# Patient Record
Sex: MALE | Race: WHITE | NOT HISPANIC OR LATINO | Employment: OTHER | URBAN - METROPOLITAN AREA
[De-identification: names, ages, dates, MRNs, and addresses within clinical notes are randomized per-mention and may not be internally consistent; named-entity substitution may affect disease eponyms.]

---

## 2017-01-18 ENCOUNTER — OFFICE VISIT (OUTPATIENT)
Dept: PAIN MEDICINE | Facility: CLINIC | Age: 62
End: 2017-01-18
Payer: MEDICARE

## 2017-01-18 VITALS
WEIGHT: 252 LBS | HEART RATE: 74 BPM | RESPIRATION RATE: 18 BRPM | DIASTOLIC BLOOD PRESSURE: 68 MMHG | SYSTOLIC BLOOD PRESSURE: 110 MMHG | TEMPERATURE: 98 F | BODY MASS INDEX: 32.35 KG/M2

## 2017-01-18 DIAGNOSIS — Z79.891 OPIOID CONTRACT EXISTS: ICD-10-CM

## 2017-01-18 DIAGNOSIS — M54.12 CERVICAL RADICULOPATHY: ICD-10-CM

## 2017-01-18 DIAGNOSIS — G89.4 CHRONIC PAIN SYNDROME: Primary | ICD-10-CM

## 2017-01-18 DIAGNOSIS — M96.1 POSTLAMINECTOMY SYNDROME OF LUMBAR REGION: ICD-10-CM

## 2017-01-18 DIAGNOSIS — M50.30 DDD (DEGENERATIVE DISC DISEASE), CERVICAL: ICD-10-CM

## 2017-01-18 PROCEDURE — 99213 OFFICE O/P EST LOW 20 MIN: CPT | Mod: PBBFAC,PO | Performed by: PHYSICIAN ASSISTANT

## 2017-01-18 PROCEDURE — 99999 PR PBB SHADOW E&M-EST. PATIENT-LVL III: CPT | Mod: PBBFAC,,, | Performed by: PHYSICIAN ASSISTANT

## 2017-01-18 PROCEDURE — 99214 OFFICE O/P EST MOD 30 MIN: CPT | Mod: S$PBB,,, | Performed by: PHYSICIAN ASSISTANT

## 2017-01-18 RX ORDER — OXYCODONE AND ACETAMINOPHEN 10; 325 MG/1; MG/1
1 TABLET ORAL 3 TIMES DAILY PRN
Qty: 90 TABLET | Refills: 0 | Status: SHIPPED | OUTPATIENT
Start: 2017-03-12 | End: 2017-04-11

## 2017-01-18 RX ORDER — OXYCODONE AND ACETAMINOPHEN 10; 325 MG/1; MG/1
1 TABLET ORAL 3 TIMES DAILY PRN
Qty: 90 TABLET | Refills: 0 | Status: SHIPPED | OUTPATIENT
Start: 2017-04-11 | End: 2017-04-11 | Stop reason: SDUPTHER

## 2017-01-18 RX ORDER — OXYCODONE AND ACETAMINOPHEN 10; 325 MG/1; MG/1
1 TABLET ORAL 3 TIMES DAILY PRN
Qty: 90 TABLET | Refills: 0 | Status: SHIPPED | OUTPATIENT
Start: 2017-02-10 | End: 2017-03-12

## 2017-01-19 NOTE — PROGRESS NOTES
CC:back pain    HPI: The patient is a 61-year-old man with a history of peripheral vascular disease, diabetes, cervical DDD, lumbar postlaminectomy syndrome who presents in followup.  He returns in follow-up today with multiple pain complaint.  He reports excellent relief of his low back and leg pain with his spinal cord stimulator.  However, he reports worsening neck pain radiating to the right arm.  This is worse with turning his head and using his arms, improved with pain medication.  He complains of weakness in his left upper extremity and numbness in his right fingers.  He denies bladder or bowel incontinence.    Pain intervention history: He has seen several other providers including Dr. Victor and Dr. Win Mckeon.  He has a cervical dorsal column stimulator which provides relief of neck pain.He had taken higher dose pain medication in the past but presented to us taking 60 mg of MS Contin t.i.d. and oxycodone 15 mg one to 2 tablets t.i.d.  He is taking gabapentin 300 mg t.i.d.  Baclofen 20 mg t.i.d.  Amitriptyline 25 mg q.h.s. He had undergone fusion L4-S1 in 2012.    ROS:He reports shortness of breath, weight gain, back pain, joint stiffness, and loss of balance.  Balance of review of systems is negative.    Medical, surgical, family and social history reviewed elsewhere in record.    Medications/Allergies: See med card    Vitals:    01/18/17 1307   BP: 110/68   Pulse: 74   Resp: 18   Temp: 97.7 °F (36.5 °C)   TempSrc: Oral   Weight: 114.3 kg (251 lb 15.8 oz)   PainSc:   6   PainLoc: Back         Physical exam:  Gen: A and O x3, pleasant, well-groomed  Skin:  Taut skin in legs with purplish hue bilateral lower legs.  IPG site incision healed without evidence of infection.  HEENT: PERRLA  CVS: Regular rate and rhythm, normal S1 and S2, no murmurs.  Resp: Clear to auscultation bilaterally, no wheezes or rales.  Abdomen: Soft, NT/ND, normal bowel sounds present.  Musculoskeletal: Antalgic gait. Unable to  heel and toe walk.      Neuro:  Upper extremities: 5/5 strength bilaterally  Lower extremities: 5/5 strength bilaterally   Reflexes: Brachioradialis 0+, Bicep 0+, Tricep 0+, Patellar 0+, Achilles 0+ bilaterally.  Sensory:  Intact and symmetrical to light touch and pinprick in C2-T1 dermatomes bilaterally.  Intact and symmetrical to light touch and pinprick in L2-S1 dermatomes bilaterally, except for L4-S1 distribution right leg decreased sensation.    Cervical spine: Range of motion is mildly reduced with flexion, extension and lateral rotation with increased pain in the bilateral neck during each maneuver but especially with extension.  Myofascial exam: No tenderness to palpation to the cervical paraspinous muscles.    Imagin12 Xray L-spine  There is disk desiccation and disk space narrowing at L5-S1. There is mild diffuse disk space narrowing elsewhere throughout the lumbar spine. Osteophytes project from vertebral bodies and there is sclerosis and posterior facets. There is no acute compression or subluxation.    CT L-spine 7/15/13  T12-L1: Left posterior lateral disk bulging and osteophyte formation is present causing mild left foraminal narrowing the central canal and right foraminal intact.  L1/2 Mild annular disk bulge and osteophyte formation is present causing a mild canal and left foraminal narrowing.  L2/3: Degenerative facet changes combine with disk bulging to produce mild left greater than right foraminal narrowing. Central canal is mildly distorted.  L3/4: There is mild annular disk bulging and degenerative facet changes are present. There is mild canal and foraminal distortion.  L4/5: At this level levels of prior laminectomy canal canal visualization is limited. The bony foramina appear grossly intact and there is no evidence of malalignment.  L5/S1: Moderate degenerative facet changes are present and there is some bony foraminal narrowing on the left. There is evidence of prior laminectomy  and the right foramen is intact. The bony canal soft tissue visualization is limited but it is grossly intact the degenerative changes are seen in the sacroiliac joints.    X-ray thoracic spine 12/18/14  Evidence of anterior cervical fusion is noted in the lower cervical spine. Neurostimulator appears to be present entering at approximately the T10-T11 level with its tip at the T8 level. Vertebral body heights appear well preserved. Alignment appears adequate. Osseous demineralization is noted.   T7-T8 T8-T9 and T9-T10 mild intervertebral disk height loss appears to be present.     X-ray thoracic and lumbar spine 12/28/15  Comparison: Thoracic spine radiographs-12/18/2014  Thoracic spine: There has been interval development of a new minor superior endplate compression fracture at the superior endplate of T7. There are two neurostimulator leads present within the posterior aspect of the thoracic spinal canal which terminating at approximately T7. No retropulsion. There is multilevel degenerative change of the thoracic spine in the form of marginal osteophyte formation and intervertebral disc space narrowing. There is suddenly observed postoperative change of ACDF at C5-C7. The heads of the C7 vertebral body screws do not appear firmly apposed to the adjacent metallic fixation plate.  Lumbar spine:There are postoperative changes of posterior intermittent fusion at L4-S1 with interbody device is noted at L4-L5 and L5-S1. No hardware complication. There is an IVC filter present. There is degenerative change of the lumbar spine in the form of marginal osteophyte formation and disc space narrowing.     CT cervical spine 3/7/16  Anterior plate fusion is noted at the C5, C6, and C7 levels with intervertebral disk prostheses at the C5-C6 C6-C7 levels.  There is loss of the normal cervical lordosis which may be positional or related to muscular strain.  Vertebral body heights appear well-preserved.  At the C2-C3 level, mild  disk osteophyte spurring is noted of approximately 2-3 mm.  No significant neuroforaminal stenosis is noted, minimal central canal narrowing is noted.  At the C3-C4 level mild disk osteophyte spurring is noted of approximately 2 mm.  No significant nerve foraminal narrowing is noted.  Minimal central canal narrowing is noted.  At the C4-C5 level, mild disk osteophyte bulge is noted centrally of up to 3 mm.  Facet arthropathy is noted bilaterally.  Moderate bilateral neuroforaminal narrowing appears to be present.  Mild central canal narrowing is noted.  At the C5-C6 level, a disk prosthesis is noted with metallic artifact hindering ideal evaluation.  Uncovertebral spurring and facet arthropathy appears to be present right greater than left with moderate right neural foraminal stenosis and mild to moderate left neural foraminal stenosis.  Mild to moderate central canal narrowing is suspected but not ideally evaluated  At the C6-C7 level, uncovertebral spurring is noted bilaterally.  Moderate to severe left-sided neuroforaminal stenosis is noted with moderate right-sided neuroforaminal stenosis.  Mild to moderate central canals narrowing is suspected.  Evaluation is hindered by metallic artifact.  At the C7-T1 level, uncovertebral spurring is noted to the right greater than left with severe right neural foraminal stenosis suspected and mild left neural foraminal stenosis with mild central canal is stenosis suspected.  Evaluation of this level is hindered by metallic artifact      Assessment:  The patient is a 61-year-old man with a history of peripheral vascular disease, diabetes, cervical DDD, lumbar postlaminectomy syndrome who presents in followup.      1. Chronic pain syndrome    2. Postlaminectomy syndrome of lumbar region    3. DDD (degenerative disc disease), cervical    4. Opioid contract exists    5. Cervical radiculopathy        Plan:  1.  The patient recently had an MRI of the brain and cervical spine CT at  [facility] and I have requested these records.  He would likely benefit from a cervical JENNIFER.  He is not sure what his latest hemoglobin A1c is and states that he will be seeing his primary care next week.  He said he may have this checked and will let us know what the results are.  If his hemoglobin A1c is very high, we may consider medial branch blocks instead of a cervical JENNIFER.  2.  Dr. Buckley provided prescriptions for oxycodone-acetaminophen 10/325 mg up to 3 times a day as needed for pain.  3.  Follow-up in 3 months or sooner as needed.    Greater than 50% of this 25 minute visit was spent counseling the patient.

## 2017-01-24 RX ORDER — GABAPENTIN 300 MG/1
CAPSULE ORAL
Qty: 180 CAPSULE | Refills: 2 | Status: SHIPPED | OUTPATIENT
Start: 2017-01-24 | End: 2017-08-11

## 2017-01-31 ENCOUNTER — TELEPHONE (OUTPATIENT)
Dept: PAIN MEDICINE | Facility: CLINIC | Age: 62
End: 2017-01-31

## 2017-01-31 NOTE — TELEPHONE ENCOUNTER
Please let the patient know that I reviewed his cervical spine MRI results and he has some arthritic changes as well as mild canal narrowing at C6-7 and foraminal narrowing at C4-5, C5-6 and C6-7.  He may benefit from a cervical JENNIFER and he states that he was going to have his hemoglobin A1c checked with his primary care last week.  Please ask him if he found out what it is.

## 2017-03-14 ENCOUNTER — HOSPITAL ENCOUNTER (EMERGENCY)
Facility: HOSPITAL | Age: 62
Discharge: HOME | End: 2017-03-15
Attending: EMERGENCY MEDICINE
Payer: MEDICARE

## 2017-03-14 DIAGNOSIS — S20.212A RIB CONTUSION, LEFT, INITIAL ENCOUNTER: Primary | ICD-10-CM

## 2017-03-14 DIAGNOSIS — R07.81 RIB PAIN ON LEFT SIDE: ICD-10-CM

## 2017-03-14 PROCEDURE — 99284 EMERGENCY DEPT VISIT MOD MDM: CPT | Mod: 25

## 2017-03-14 PROCEDURE — 96372 THER/PROPH/DIAG INJ SC/IM: CPT

## 2017-03-14 PROCEDURE — 63600175 PHARM REV CODE 636 W HCPCS: Performed by: EMERGENCY MEDICINE

## 2017-03-14 RX ORDER — HYDROMORPHONE HYDROCHLORIDE 1 MG/ML
1 INJECTION, SOLUTION INTRAMUSCULAR; INTRAVENOUS; SUBCUTANEOUS
Status: COMPLETED | OUTPATIENT
Start: 2017-03-14 | End: 2017-03-14

## 2017-03-14 RX ADMIN — HYDROMORPHONE HYDROCHLORIDE 1 MG: 1 INJECTION, SOLUTION INTRAMUSCULAR; INTRAVENOUS; SUBCUTANEOUS at 10:03

## 2017-03-14 NOTE — ED AVS SNAPSHOT
[facility] [address] [place] [address]               Kevan Colin   3/14/2017 10:05 PM   ED    Description:  Male : 1955   Department:  [facility]           Your Care was Coordinated By:     Provider Role From To    Byron Loyd MD Attending Provider [id number] --      Reason for Visit     Rib Injury           Diagnoses this Visit        Comments    Rib contusion, left, initial encounter    -  Primary     Rib pain on left side           ED Disposition     None           To Do List           Follow-up Information     Follow up with [facility].    Specialty:  Emergency Medicine    Why:  As needed, If symptoms worsen    Contact information:    [address] [place] [id number] [phone]       These Medications        Disp Refills Start End    ibuprofen (ADVIL,MOTRIN) 800 MG tablet 20 tablet 0 3/15/2017     Take 1 tablet (800 mg total) by mouth every 6 (six) hours as needed for Pain. - Oral    Pharmacy: [id number] [address]  #: [phone]         [facility]     [facility] Care [facility] -  Assistance  Registered nurses in the [facility] provide clinical advisement, health education, appointment booking, and other advisory services.  Call for this free service at [phone].             Medications           Message regarding Medications     Verify the changes and/or additions to your medication regime listed below are the same as discussed with your clinician today.  If any of these changes or additions are incorrect, please notify your healthcare provider.        START taking these NEW medications        Refills    ibuprofen (ADVIL,MOTRIN) 800 MG tablet 0    Sig: Take 1 tablet (800 mg total) by mouth every 6 (six) hours as needed for Pain.    Class: Print    Route: Oral      These medications were  "administered today        Dose Freq    hydromorphone (PF) injection 1 mg 1 mg ED 1 Time    Sig: Inject 1 mL (1 mg total) into the muscle ED 1 Time.    Class: Normal    Route: Intramuscular    hydromorphone (PF) injection 1 mg 1 mg ED 1 Time    Sig: Inject 1 mL (1 mg total) into the muscle ED 1 Time.    Class: Normal    Route: Intramuscular           Verify that the below list of medications is an accurate representation of the medications you are currently taking.  If none reported, the list may be blank. If incorrect, please contact your healthcare provider. Carry this list with you in case of emergency.           Current Medications     allopurinol (ZYLOPRIM) 100 MG tablet Take 100 mg by mouth once daily.    atorvastatin (LIPITOR) 40 MG tablet Take 1 tablet (40 mg total) by mouth once daily.    carbidopa-levodopa  mg (SINEMET)  mg per tablet Take 1 tablet by mouth 3 (three) times daily.     carvedilol (COREG) 3.125 MG tablet TAKE 1 TABLET BY MOUTH TWICE DAILY    ciclopirox (PENLAC) 8 % Soln Apply topically nightly.    ENOXAPARIN SODIUM (LOVENOX SUBQ) Inject into the skin. Pt unable to verify dosage    escitalopram oxalate (LEXAPRO) 20 MG tablet Take 20 mg by mouth every evening.     furosemide (LASIX) 40 MG tablet     gabapentin (NEURONTIN) 300 MG capsule TAKE TWO CAPSULES BY MOUTH THREE TIMES DAILY.    hydromorphone (PF) injection 1 mg Inject 1 mL (1 mg total) into the muscle ED 1 Time.    ibuprofen (ADVIL,MOTRIN) 800 MG tablet Take 1 tablet (800 mg total) by mouth every 6 (six) hours as needed for Pain.    insulin glargine (LANTUS) 100 unit/mL injection Inject 65 Units into the skin every evening.     insulin lispro (HUMALOG) 100 unit/mL injection Inject 15 Units into the skin 3 (three) times daily before meals.    insulin needles, disposable, (BD INSULIN PEN NEEDLE UF SHORT) 31 X 5/16 " Ndle 1 each by Misc.(Non-Drug; Combo Route) route 2 (two) times daily.    lipase-protease-amylase 25,000-85,000- " "136,000 unit CpDR Take 6 capsules by mouth 3 (three) times daily.     lisinopril (PRINIVIL,ZESTRIL) 20 MG tablet Take 0.5 tablets (10 mg total) by mouth once daily.    metformin (GLUCOPHAGE) 500 MG tablet Take 500 mg by mouth 2 (two) times daily with meals.    multivitamin capsule Take 1 capsule by mouth once daily.    olanzapine (ZYPREXA) 10 MG tablet Take 10 mg by mouth once daily.    ondansetron (ZOFRAN) 8 MG tablet Take 8 mg by mouth every 8 (eight) hours.    ondansetron (ZOFRAN-ODT) 8 MG TbDL Take 1 tablet (8 mg total) by mouth every 8 (eight) hours as needed.    oxycodone-acetaminophen (PERCOCET)  mg per tablet Take 1 tablet by mouth 3 (three) times daily as needed for Pain.    oxycodone-acetaminophen (PERCOCET)  mg per tablet Starting on Apr 11, 2017. Take 1 tablet by mouth 3 (three) times daily as needed for Pain.    pantoprazole (PROTONIX) 40 MG tablet Take 1 tablet (40 mg total) by mouth every morning.    potassium chloride SA (K-DUR,KLOR-CON) 20 MEQ tablet Take 20 mEq by mouth 2 (two) times daily.    promethazine (PHENERGAN) 25 MG suppository Place 1 suppository (25 mg total) rectally every 6 (six) hours as needed for Nausea.    quetiapine (SEROQUEL) 200 MG Tab Take 300 mg by mouth nightly as needed.    RIVAROXABAN (XARELTO ORAL) Take by mouth.    SPIRIVA WITH HANDIHALER 18 mcg inhalation capsule     trazodone (DESYREL) 100 MG tablet Take 100 mg by mouth nightly as needed for Insomnia.           Clinical Reference Information           Your Vitals Were     BP Pulse Temp Resp Height Weight    120/89 (BP Location: Right arm, Patient Position: Sitting) 94 97.4 °F (36.3 °C) (Oral) 16 6' 2" (1.88 m) 115.7 kg (255 lb)    SpO2 BMI             96% 32.74 kg/m2         Allergies as of 3/15/2017        Reactions    Bee Pollens Anaphylaxis    Bee stings     Penicillins Nausea Only    Other reaction(s): Unknown    Codeine Rash    Other reaction(s): Unknown    Morphine Rash      Immunizations Administered " on Date of Encounter - 3/15/2017     None      ED Micro, Lab, POCT     None      ED Imaging Orders     Start Ordered       Status Ordering Provider    03/14/17 2216 03/14/17 2215  X-Ray Chest PA And Lateral  1 time imaging      In process     03/14/17 2216 03/14/17 2215  X-Ray Ribs 2 View Left  1 time imaging      In process         Discharge Instructions         Rib Contusion or Minor Fracture    A rib contusion is a bruise to one or more rib bones. It may cause pain, tenderness, swelling, and a purplish tint to the skin. There may be a sharp pain with each breath. A rib contusion takes anywhere from a few days to a few weeks to heal. A minor rib fracture or break may cause the same symptoms as a rib contusion. The small crack may not be seen on a regular chest X-ray. Treatment for both problems is the same.  Home care  · You may use over-the-counter pain medicine to control pain, unless another pain medicine was prescribed. If you have chronic liver or kidney disease or ever had a stomach ulcer or GI bleeding, talk with your healthcare provider before using these medicines.  · Rest. Do not lift anything heavy or do any activity that causes pain.  · Apply an ice pack over the injured area for 15 to 20 minutes every 1 to 2 hours. You should do this for the first 24 to 48 hours. You can make an ice pack by filling a plastic bag that seals at the top with ice cubes and then wrapping it with a thin towel. Continue with ice packs as needed for the relief of pain and swelling.  · The first 3 to 4 weeks of healing will be the most painful. If your pain is not under control with the treatment given, call your healthcare provider. Sometimes a stronger pain medicine may be needed. A nerve block can be done in case of severe pain. It will numb the nerve between the ribs.  Follow-up care  Follow up with your healthcare provider, or as advised.  If X-rays were taken, you will be told of any new findings that may affect your  care.  Call 911  Call 911 if you have:  · Dizziness, weakness or fainting  · Shortness of breath with or without chest discomfort  · New or worsening pain  When to seek medical advice  Call your healthcare provider right away if any of these occur:  · Fever of 100.4°F (38°C) or above lasting for 24 to 48 hours  · Stomach pain  Date Last Reviewed: 12/2/2015  © 0035-4937 [employer]. [address] [place] [address], [place] [address]. All rights reserved. This information is not intended as a substitute for professional medical care. Always follow your healthcare professional's instructions.          Your Scheduled Appointments     Apr 18, 2017  1:00 PM CDT   Established Patient Visit with JO[facility] Jac[facility] - [facility] ([place]) [address] [facility] [address] [place] [address]   [phone]               [facility] complies with applicable Federal civil rights laws and does not discriminate on the basis of race, color, national origin, age, disability, or sex.        Language Assistance Services     ATTENTION: Language assistance services are available, free of charge. Please call [phone].      ATENCIÓN: Si habla español, tiene a cha disposición servicios gratuitos de asistencia lingüística. Llame al [phone].     CHÚ Ý: N?u b?n nói Ti?ng Vi?t, có các d?ch v? h? tr? ngôn ng? mi?n phí dành cho b?n. G?i s? [phone].

## 2017-03-15 VITALS
HEART RATE: 88 BPM | TEMPERATURE: 97 F | BODY MASS INDEX: 32.73 KG/M2 | SYSTOLIC BLOOD PRESSURE: 160 MMHG | OXYGEN SATURATION: 95 % | WEIGHT: 255 LBS | DIASTOLIC BLOOD PRESSURE: 86 MMHG | HEIGHT: 74 IN | RESPIRATION RATE: 16 BRPM

## 2017-03-15 PROCEDURE — 63600175 PHARM REV CODE 636 W HCPCS: Performed by: EMERGENCY MEDICINE

## 2017-03-15 RX ORDER — IBUPROFEN 800 MG/1
800 TABLET ORAL EVERY 6 HOURS PRN
Qty: 20 TABLET | Refills: 0 | Status: ON HOLD | OUTPATIENT
Start: 2017-03-15 | End: 2017-08-22 | Stop reason: HOSPADM

## 2017-03-15 RX ORDER — ONDANSETRON HYDROCHLORIDE 8 MG/1
8 TABLET, FILM COATED ORAL EVERY 8 HOURS
Qty: 12 TABLET | Refills: 0 | Status: SHIPPED | OUTPATIENT
Start: 2017-03-15 | End: 2018-02-12

## 2017-03-15 RX ORDER — HYDROMORPHONE HYDROCHLORIDE 1 MG/ML
1 INJECTION, SOLUTION INTRAMUSCULAR; INTRAVENOUS; SUBCUTANEOUS
Status: COMPLETED | OUTPATIENT
Start: 2017-03-15 | End: 2017-03-15

## 2017-03-15 RX ADMIN — HYDROMORPHONE HYDROCHLORIDE 1 MG: 1 INJECTION, SOLUTION INTRAMUSCULAR; INTRAVENOUS; SUBCUTANEOUS at 12:03

## 2017-03-15 NOTE — DISCHARGE INSTRUCTIONS
Rib Contusion or Minor Fracture    A rib contusion is a bruise to one or more rib bones. It may cause pain, tenderness, swelling, and a purplish tint to the skin. There may be a sharp pain with each breath. A rib contusion takes anywhere from a few days to a few weeks to heal. A minor rib fracture or break may cause the same symptoms as a rib contusion. The small crack may not be seen on a regular chest X-ray. Treatment for both problems is the same.  Home care  · You may use over-the-counter pain medicine to control pain, unless another pain medicine was prescribed. If you have chronic liver or kidney disease or ever had a stomach ulcer or GI bleeding, talk with your healthcare provider before using these medicines.  · Rest. Do not lift anything heavy or do any activity that causes pain.  · Apply an ice pack over the injured area for 15 to 20 minutes every 1 to 2 hours. You should do this for the first 24 to 48 hours. You can make an ice pack by filling a plastic bag that seals at the top with ice cubes and then wrapping it with a thin towel. Continue with ice packs as needed for the relief of pain and swelling.  · The first 3 to 4 weeks of healing will be the most painful. If your pain is not under control with the treatment given, call your healthcare provider. Sometimes a stronger pain medicine may be needed. A nerve block can be done in case of severe pain. It will numb the nerve between the ribs.  Follow-up care  Follow up with your healthcare provider, or as advised.  If X-rays were taken, you will be told of any new findings that may affect your care.  Call 911  Call 911 if you have:  · Dizziness, weakness or fainting  · Shortness of breath with or without chest discomfort  · New or worsening pain  When to seek medical advice  Call your healthcare provider right away if any of these occur:  · Fever of 100.4°F (38°C) or above lasting for 24 to 48 hours  · Stomach pain  Date Last Reviewed: 12/2/2015  ©  1004-2643 [employer]. [address] [place] [address], [place] [address]. All rights reserved. This information is not intended as a substitute for professional medical care. Always follow your healthcare professional's instructions.

## 2017-03-15 NOTE — ED NOTES
Patient identifiers for Kevan Colin checked and correct.  LOC: Patient is awake, alert, and aware of environment with an appropriate affect. Patient is oriented x 3 and speaking appropriately.  APPEARANCE:  Patient is clean and well groomed, patient's clothing is properly fastened.  SKIN: The skin is warm and dry. Patient has normal skin turgor and moist mucus membrances. Skin is intact; no bruising or breakdown noted.  MUSCULOSKELETAL: Patient is moving all extremities well, no obvious deformities noted. Pulses intact. Pt c/o left rib pain after fall while walking dog. Pt reports pain with resp.    RESPIRATORY: Airway is open and patent. Respirations are spontaneous and non-labored with normal effort and rate. Pt placed on continuous pulse ox.  CARDIAC: Patient has a normal rate and rhythm. No peripheral edema noted. Capillary refill < 3 seconds.     Allergies reported:   Review of patient's allergies indicates:   Allergen Reactions    Bee pollens Anaphylaxis     Bee stings     Penicillins Nausea Only     Other reaction(s): Unknown    Codeine Rash     Other reaction(s): Unknown    Morphine Rash     Bed locked, lowest position. Call light within easy reach. Side rails up x2.
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
## 2017-04-05 ENCOUNTER — TELEPHONE (OUTPATIENT)
Dept: PAIN MEDICINE | Facility: CLINIC | Age: 62
End: 2017-04-05

## 2017-04-05 NOTE — TELEPHONE ENCOUNTER
----- Message from Deborah Alicea sent at 4/5/2017 10:49 AM CDT -----  Contact: patient  Patient calling in regards to speaking with a Nurse about his medication. Please advise.  Call back .  Thanks!

## 2017-04-05 NOTE — TELEPHONE ENCOUNTER
Spoke with the patient and he has 1 more RX before he needs to come into the office for a visit. He is having trouble locating that RX. He will contact the office if he is unable to find it.

## 2017-04-11 ENCOUNTER — OFFICE VISIT (OUTPATIENT)
Dept: PAIN MEDICINE | Facility: CLINIC | Age: 62
End: 2017-04-11
Payer: MEDICARE

## 2017-04-11 VITALS
TEMPERATURE: 98 F | SYSTOLIC BLOOD PRESSURE: 110 MMHG | RESPIRATION RATE: 20 BRPM | DIASTOLIC BLOOD PRESSURE: 68 MMHG | BODY MASS INDEX: 32.15 KG/M2 | WEIGHT: 250.44 LBS | HEART RATE: 70 BPM

## 2017-04-11 DIAGNOSIS — G89.4 CHRONIC PAIN SYNDROME: Primary | ICD-10-CM

## 2017-04-11 DIAGNOSIS — M96.1 POSTLAMINECTOMY SYNDROME OF LUMBAR REGION: ICD-10-CM

## 2017-04-11 DIAGNOSIS — Z79.891 OPIOID CONTRACT EXISTS: ICD-10-CM

## 2017-04-11 DIAGNOSIS — M50.30 DDD (DEGENERATIVE DISC DISEASE), CERVICAL: ICD-10-CM

## 2017-04-11 PROCEDURE — 99213 OFFICE O/P EST LOW 20 MIN: CPT | Mod: S$PBB,,, | Performed by: PHYSICIAN ASSISTANT

## 2017-04-11 PROCEDURE — 99215 OFFICE O/P EST HI 40 MIN: CPT | Mod: PBBFAC,PO | Performed by: PHYSICIAN ASSISTANT

## 2017-04-11 PROCEDURE — 99999 PR PBB SHADOW E&M-EST. PATIENT-LVL V: CPT | Mod: PBBFAC,,, | Performed by: PHYSICIAN ASSISTANT

## 2017-04-11 RX ORDER — OXYCODONE AND ACETAMINOPHEN 10; 325 MG/1; MG/1
1 TABLET ORAL 3 TIMES DAILY PRN
Qty: 90 TABLET | Refills: 0 | Status: SHIPPED | OUTPATIENT
Start: 2017-04-11 | End: 2017-05-11

## 2017-04-11 RX ORDER — OXYCODONE AND ACETAMINOPHEN 10; 325 MG/1; MG/1
1 TABLET ORAL 3 TIMES DAILY PRN
Qty: 90 TABLET | Refills: 0 | Status: SHIPPED | OUTPATIENT
Start: 2017-05-11 | End: 2017-06-10

## 2017-04-11 RX ORDER — OXYCODONE AND ACETAMINOPHEN 10; 325 MG/1; MG/1
1 TABLET ORAL 3 TIMES DAILY PRN
Qty: 90 TABLET | Refills: 0 | Status: SHIPPED | OUTPATIENT
Start: 2017-06-10 | End: 2017-07-10 | Stop reason: SDUPTHER

## 2017-04-11 NOTE — PROGRESS NOTES
CC:back pain    HPI: The patient is a 61-year-old man with a history of peripheral vascular disease, diabetes, cervical DDD, lumbar postlaminectomy syndrome who presents in followup.  He returns in follow-up today early because he lost his last prescription he states that he is currently moving and must have misplaced it.  He complains of neck pain, back pain and rib pain.  He states that he fell a few weeks ago and possibly fractured a rib.  He has been losing weight, states that he has been walking for exercise and eating less.  He reports relief with his spinal cord stimulator.  He currently denies weakness.  He reports numbness in his fingers on the right.  He denies bladder or bowel incontinence.    Pain intervention history: He has seen several other providers including Dr. Victor and Dr. Win Mckeon.  He has a cervical dorsal column stimulator which provides relief of neck pain.He had taken higher dose pain medication in the past but presented to us taking 60 mg of MS Contin t.i.d. and oxycodone 15 mg one to 2 tablets t.i.d.  He is taking gabapentin 300 mg t.i.d.  Baclofen 20 mg t.i.d.  Amitriptyline 25 mg q.h.s. He had undergone fusion L4-S1 in 2012.    ROS:He reports shortness of breath, weight gain, back pain, joint stiffness, and loss of balance.  Balance of review of systems is negative.    Medical, surgical, family and social history reviewed elsewhere in record.    Medications/Allergies: See med card    Vitals:    04/11/17 0823   BP: 110/68   Pulse: 70   Resp: 20   Temp: 97.5 °F (36.4 °C)   TempSrc: Oral   Weight: 113.6 kg (250 lb 7.1 oz)   PainSc:   6   PainLoc: Back         Physical exam:  Gen: A and O x3, pleasant, well-groomed  Skin:  Taut skin in legs with purplish hue bilateral lower legs.  IPG site incision healed without evidence of infection.  HEENT: PERRLA  CVS: Regular rate and rhythm, normal S1 and S2, no murmurs.  Resp: Clear to auscultation bilaterally, no wheezes or  rales.  Abdomen: Soft, NT/ND, normal bowel sounds present.  Musculoskeletal: Antalgic gait. Unable to heel and toe walk.      Neuro:  Upper extremities: 5/5 strength bilaterally  Lower extremities: 5/5 strength bilaterally   Reflexes: Brachioradialis 0+, Bicep 0+, Tricep 0+, Patellar 0+, Achilles 0+ bilaterally.  Sensory:  Intact and symmetrical to light touch and pinprick in C2-T1 dermatomes bilaterally.  Intact and symmetrical to light touch and pinprick in L2-S1 dermatomes bilaterally, except for L4-S1 distribution right leg decreased sensation.    Cervical spine: Range of motion is mildly reduced with flexion, extension and lateral rotation with increased pain in the bilateral neck during each maneuver but especially with extension.  Myofascial exam: No tenderness to palpation to the cervical paraspinous muscles.    Lumbar spine:  Range of motion is mildly limited with flexion and extension without increased pain.  Straight leg raise negative bilaterally.  Internal and external rotation of hip is negative bilaterally.  Matteo's test is deferred.  Myofascial exam: No tenderness to palpation to lumbar paraspinous muscles.    Imagin12 Xray L-spine  There is disk desiccation and disk space narrowing at L5-S1. There is mild diffuse disk space narrowing elsewhere throughout the lumbar spine. Osteophytes project from vertebral bodies and there is sclerosis and posterior facets. There is no acute compression or subluxation.    CT L-spine 7/15/13  T12-L1: Left posterior lateral disk bulging and osteophyte formation is present causing mild left foraminal narrowing the central canal and right foraminal intact.  L1/2 Mild annular disk bulge and osteophyte formation is present causing a mild canal and left foraminal narrowing.  L2/3: Degenerative facet changes combine with disk bulging to produce mild left greater than right foraminal narrowing. Central canal is mildly distorted.  L3/4: There is mild annular disk  bulging and degenerative facet changes are present. There is mild canal and foraminal distortion.  L4/5: At this level levels of prior laminectomy canal canal visualization is limited. The bony foramina appear grossly intact and there is no evidence of malalignment.  L5/S1: Moderate degenerative facet changes are present and there is some bony foraminal narrowing on the left. There is evidence of prior laminectomy and the right foramen is intact. The bony canal soft tissue visualization is limited but it is grossly intact the degenerative changes are seen in the sacroiliac joints.    X-ray thoracic spine 12/18/14  Evidence of anterior cervical fusion is noted in the lower cervical spine. Neurostimulator appears to be present entering at approximately the T10-T11 level with its tip at the T8 level. Vertebral body heights appear well preserved. Alignment appears adequate. Osseous demineralization is noted.   T7-T8 T8-T9 and T9-T10 mild intervertebral disk height loss appears to be present.     X-ray thoracic and lumbar spine 12/28/15  Comparison: Thoracic spine radiographs-12/18/2014  Thoracic spine: There has been interval development of a new minor superior endplate compression fracture at the superior endplate of T7. There are two neurostimulator leads present within the posterior aspect of the thoracic spinal canal which terminating at approximately T7. No retropulsion. There is multilevel degenerative change of the thoracic spine in the form of marginal osteophyte formation and intervertebral disc space narrowing. There is suddenly observed postoperative change of ACDF at C5-C7. The heads of the C7 vertebral body screws do not appear firmly apposed to the adjacent metallic fixation plate.  Lumbar spine:There are postoperative changes of posterior intermittent fusion at L4-S1 with interbody device is noted at L4-L5 and L5-S1. No hardware complication. There is an IVC filter present. There is degenerative change of  the lumbar spine in the form of marginal osteophyte formation and disc space narrowing.     CT cervical spine 3/7/16  Anterior plate fusion is noted at the C5, C6, and C7 levels with intervertebral disk prostheses at the C5-C6 C6-C7 levels.  There is loss of the normal cervical lordosis which may be positional or related to muscular strain.  Vertebral body heights appear well-preserved.  At the C2-C3 level, mild disk osteophyte spurring is noted of approximately 2-3 mm.  No significant neuroforaminal stenosis is noted, minimal central canal narrowing is noted.  At the C3-C4 level mild disk osteophyte spurring is noted of approximately 2 mm.  No significant nerve foraminal narrowing is noted.  Minimal central canal narrowing is noted.  At the C4-C5 level, mild disk osteophyte bulge is noted centrally of up to 3 mm.  Facet arthropathy is noted bilaterally.  Moderate bilateral neuroforaminal narrowing appears to be present.  Mild central canal narrowing is noted.  At the C5-C6 level, a disk prosthesis is noted with metallic artifact hindering ideal evaluation.  Uncovertebral spurring and facet arthropathy appears to be present right greater than left with moderate right neural foraminal stenosis and mild to moderate left neural foraminal stenosis.  Mild to moderate central canal narrowing is suspected but not ideally evaluated  At the C6-C7 level, uncovertebral spurring is noted bilaterally.  Moderate to severe left-sided neuroforaminal stenosis is noted with moderate right-sided neuroforaminal stenosis.  Mild to moderate central canals narrowing is suspected.  Evaluation is hindered by metallic artifact.  At the C7-T1 level, uncovertebral spurring is noted to the right greater than left with severe right neural foraminal stenosis suspected and mild left neural foraminal stenosis with mild central canal is stenosis suspected.  Evaluation of this level is hindered by metallic artifact      Assessment:  The patient is a  61-year-old man with a history of peripheral vascular disease, diabetes, cervical DDD, lumbar postlaminectomy syndrome who presents in followup.      1. Chronic pain syndrome    2. Postlaminectomy syndrome of lumbar region    3. DDD (degenerative disc disease), cervical    4. Opioid contract exists        Plan:  1.  Overall patient is doing well.  Dr. Buckley provided prescriptions for oxycodone-acetaminophen 10/325 mg up to 3 times a day as needed for pain.  I have reviewed the [place] [employer] website and there are no abberancies.    2.  Follow-up in 3 months or sooner as needed.    Greater than 50% of this 15 minute visit was spent counseling the patient.

## 2017-06-24 ENCOUNTER — HOSPITAL ENCOUNTER (EMERGENCY)
Facility: HOSPITAL | Age: 62
Discharge: HOME | End: 2017-06-24
Attending: EMERGENCY MEDICINE
Payer: MEDICARE

## 2017-06-24 VITALS
RESPIRATION RATE: 20 BRPM | SYSTOLIC BLOOD PRESSURE: 104 MMHG | WEIGHT: 237 LBS | HEIGHT: 74 IN | OXYGEN SATURATION: 96 % | TEMPERATURE: 98 F | HEART RATE: 85 BPM | DIASTOLIC BLOOD PRESSURE: 69 MMHG | BODY MASS INDEX: 30.42 KG/M2

## 2017-06-24 DIAGNOSIS — R10.9 ABDOMINAL PAIN, VOMITING, AND DIARRHEA: Primary | ICD-10-CM

## 2017-06-24 DIAGNOSIS — R19.7 ABDOMINAL PAIN, VOMITING, AND DIARRHEA: Primary | ICD-10-CM

## 2017-06-24 DIAGNOSIS — R11.10 ABDOMINAL PAIN, VOMITING, AND DIARRHEA: Primary | ICD-10-CM

## 2017-06-24 LAB
ALBUMIN SERPL BCP-MCNC: 3.9 G/DL
ALP SERPL-CCNC: 106 U/L
ALT SERPL W/O P-5'-P-CCNC: 16 U/L
ANION GAP SERPL CALC-SCNC: 12 MMOL/L
AST SERPL-CCNC: 10 U/L
BACTERIA #/AREA URNS HPF: NORMAL /HPF
BASOPHILS # BLD AUTO: 0.1 K/UL
BASOPHILS NFR BLD: 0.9 %
BILIRUB SERPL-MCNC: 0.3 MG/DL
BILIRUB UR QL STRIP: NEGATIVE
BUN SERPL-MCNC: 10 MG/DL
CALCIUM SERPL-MCNC: 9.5 MG/DL
CHLORIDE SERPL-SCNC: 98 MMOL/L
CLARITY UR: CLEAR
CO2 SERPL-SCNC: 24 MMOL/L
COLOR UR: YELLOW
CREAT SERPL-MCNC: 1.1 MG/DL
DIFFERENTIAL METHOD: ABNORMAL
EOSINOPHIL # BLD AUTO: 0.2 K/UL
EOSINOPHIL NFR BLD: 2.2 %
ERYTHROCYTE [DISTWIDTH] IN BLOOD BY AUTOMATED COUNT: 12.9 %
EST. GFR  (AFRICAN AMERICAN): >60 ML/MIN/1.73 M^2
EST. GFR  (NON AFRICAN AMERICAN): >60 ML/MIN/1.73 M^2
GLUCOSE SERPL-MCNC: 347 MG/DL
GLUCOSE UR QL STRIP: ABNORMAL
HCT VFR BLD AUTO: 43.3 %
HGB BLD-MCNC: 14.5 G/DL
HGB UR QL STRIP: NEGATIVE
KETONES UR QL STRIP: NEGATIVE
LEUKOCYTE ESTERASE UR QL STRIP: NEGATIVE
LIPASE SERPL-CCNC: 41 U/L
LYMPHOCYTES # BLD AUTO: 3.5 K/UL
LYMPHOCYTES NFR BLD: 42.1 %
MCH RBC QN AUTO: 30.4 PG
MCHC RBC AUTO-ENTMCNC: 33.4 %
MCV RBC AUTO: 91 FL
MICROSCOPIC COMMENT: NORMAL
MONOCYTES # BLD AUTO: 0.5 K/UL
MONOCYTES NFR BLD: 5.6 %
NEUTROPHILS # BLD AUTO: 4.1 K/UL
NEUTROPHILS NFR BLD: 49.2 %
NITRITE UR QL STRIP: NEGATIVE
PH UR STRIP: 6 [PH] (ref 5–8)
PLATELET # BLD AUTO: 215 K/UL
PMV BLD AUTO: 8 FL
POTASSIUM SERPL-SCNC: 4.5 MMOL/L
PROT SERPL-MCNC: 7.8 G/DL
PROT UR QL STRIP: NEGATIVE
RBC # BLD AUTO: 4.76 M/UL
SODIUM SERPL-SCNC: 134 MMOL/L
SP GR UR STRIP: 1.01 (ref 1–1.03)
URN SPEC COLLECT METH UR: ABNORMAL
UROBILINOGEN UR STRIP-ACNC: NEGATIVE EU/DL
WBC # BLD AUTO: 8.3 K/UL
WBC #/AREA URNS HPF: 2 /HPF (ref 0–5)
YEAST URNS QL MICRO: NORMAL

## 2017-06-24 PROCEDURE — 99284 EMERGENCY DEPT VISIT MOD MDM: CPT | Mod: 25

## 2017-06-24 PROCEDURE — 85025 COMPLETE CBC W/AUTO DIFF WBC: CPT

## 2017-06-24 PROCEDURE — 81000 URINALYSIS NONAUTO W/SCOPE: CPT

## 2017-06-24 PROCEDURE — 25500020 PHARM REV CODE 255

## 2017-06-24 PROCEDURE — 96374 THER/PROPH/DIAG INJ IV PUSH: CPT | Mod: 59

## 2017-06-24 PROCEDURE — 96372 THER/PROPH/DIAG INJ SC/IM: CPT

## 2017-06-24 PROCEDURE — 96375 TX/PRO/DX INJ NEW DRUG ADDON: CPT

## 2017-06-24 PROCEDURE — 80053 COMPREHEN METABOLIC PANEL: CPT

## 2017-06-24 PROCEDURE — 36415 COLL VENOUS BLD VENIPUNCTURE: CPT

## 2017-06-24 PROCEDURE — 63600175 PHARM REV CODE 636 W HCPCS: Performed by: EMERGENCY MEDICINE

## 2017-06-24 PROCEDURE — 25000003 PHARM REV CODE 250: Performed by: EMERGENCY MEDICINE

## 2017-06-24 PROCEDURE — 83690 ASSAY OF LIPASE: CPT

## 2017-06-24 RX ORDER — ONDANSETRON 4 MG/1
4 TABLET, ORALLY DISINTEGRATING ORAL EVERY 8 HOURS PRN
Qty: 12 TABLET | Refills: 0 | Status: SHIPPED | OUTPATIENT
Start: 2017-06-24 | End: 2017-10-21 | Stop reason: ALTCHOICE

## 2017-06-24 RX ORDER — DICYCLOMINE HYDROCHLORIDE 10 MG/ML
20 INJECTION INTRAMUSCULAR
Status: COMPLETED | OUTPATIENT
Start: 2017-06-24 | End: 2017-06-24

## 2017-06-24 RX ORDER — ONDANSETRON 2 MG/ML
4 INJECTION INTRAMUSCULAR; INTRAVENOUS
Status: COMPLETED | OUTPATIENT
Start: 2017-06-24 | End: 2017-06-24

## 2017-06-24 RX ORDER — DICYCLOMINE HYDROCHLORIDE 20 MG/1
20 TABLET ORAL 3 TIMES DAILY PRN
Qty: 20 TABLET | Refills: 0 | Status: SHIPPED | OUTPATIENT
Start: 2017-06-24 | End: 2017-07-24

## 2017-06-24 RX ORDER — MORPHINE SULFATE 4 MG/ML
4 INJECTION, SOLUTION INTRAMUSCULAR; INTRAVENOUS
Status: COMPLETED | OUTPATIENT
Start: 2017-06-24 | End: 2017-06-24

## 2017-06-24 RX ADMIN — DICYCLOMINE HYDROCHLORIDE 20 MG: 10 INJECTION INTRAMUSCULAR at 07:06

## 2017-06-24 RX ADMIN — MORPHINE SULFATE 4 MG: 4 INJECTION INTRAVENOUS at 05:06

## 2017-06-24 RX ADMIN — IOHEXOL 100 ML: 350 INJECTION, SOLUTION INTRAVENOUS at 04:06

## 2017-06-24 RX ADMIN — ONDANSETRON 4 MG: 2 INJECTION INTRAMUSCULAR; INTRAVENOUS at 05:06

## 2017-06-24 RX ADMIN — SODIUM CHLORIDE 1000 ML: 0.9 INJECTION, SOLUTION INTRAVENOUS at 05:06
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
## 2017-06-28 ENCOUNTER — OFFICE VISIT (OUTPATIENT)
Dept: PODIATRY | Facility: CLINIC | Age: 62
End: 2017-06-28
Payer: MEDICARE

## 2017-06-28 VITALS — WEIGHT: 238.31 LBS | HEIGHT: 74 IN | BODY MASS INDEX: 30.59 KG/M2

## 2017-06-28 DIAGNOSIS — G20.A1 PARKINSON'S DISEASE: ICD-10-CM

## 2017-06-28 DIAGNOSIS — E11.42 DIABETIC POLYNEUROPATHY ASSOCIATED WITH TYPE 2 DIABETES MELLITUS: Primary | ICD-10-CM

## 2017-06-28 DIAGNOSIS — L84 CORN OR CALLUS: ICD-10-CM

## 2017-06-28 DIAGNOSIS — M20.10 HALLUX ABDUCTO VALGUS, UNSPECIFIED LATERALITY: ICD-10-CM

## 2017-06-28 DIAGNOSIS — I73.9 PVD (PERIPHERAL VASCULAR DISEASE): ICD-10-CM

## 2017-06-28 DIAGNOSIS — Z79.01 ANTICOAGULANT LONG-TERM USE: ICD-10-CM

## 2017-06-28 PROCEDURE — 4010F ACE/ARB THERAPY RXD/TAKEN: CPT | Mod: ,,, | Performed by: PODIATRIST

## 2017-06-28 PROCEDURE — 99999 PR PBB SHADOW E&M-EST. PATIENT-LVL III: CPT | Mod: PBBFAC,,, | Performed by: PODIATRIST

## 2017-06-28 PROCEDURE — 99213 OFFICE O/P EST LOW 20 MIN: CPT | Mod: S$PBB,,, | Performed by: PODIATRIST

## 2017-06-28 PROCEDURE — 99213 OFFICE O/P EST LOW 20 MIN: CPT | Mod: PBBFAC,PO | Performed by: PODIATRIST

## 2017-06-28 NOTE — PROGRESS NOTES
Subjective:      Patient ID: Kevan Colin is a 62 y.o. male.    Chief Complaint: Nail Care    Thick long painful toenails all ten toes.  Gradual onset, worsening over past several weeks, aggravated by increased weight bearing, shoe gear, pressure.  Periodic debridement helps symptoms.    CC#2:  Diabetes, increased risk amputation needing evaluation/management/optomization of foot care.     Casual shoes.    Review of Systems   Constitution: Negative for chills, diaphoresis, fever, malaise/fatigue and night sweats.   Cardiovascular: Negative for claudication, cyanosis, leg swelling and syncope.   Skin: Positive for nail changes. Negative for color change, dry skin, rash, suspicious lesions and unusual hair distribution.   Musculoskeletal: Negative for falls, joint pain, joint swelling, muscle cramps, muscle weakness and stiffness.   Gastrointestinal: Negative for constipation, diarrhea, nausea and vomiting.   Neurological: Positive for numbness, paresthesias and sensory change. Negative for brief paralysis, disturbances in coordination, focal weakness and tremors.           Objective:      Physical Exam   Constitutional: He is oriented to person, place, and time. He appears well-developed and well-nourished. He is cooperative.   Oriented to time, place, and person.   Cardiovascular:   Pulses:       Dorsalis pedis pulses are 1+ on the right side, and 1+ on the left side.        Posterior tibial pulses are 1+ on the right side, and 1+ on the left side.   Capillary fill time 3-5 seconds.  All toes warm to touch.      Negative lower extremity edema bilateral today.    Negative elevational pallor and dependent rubor bilateral.     Musculoskeletal:   Normal angle, base, station of gait. Decreased stride length, early heel off, moderately propulsive toe off bilateral.    All ten toes without clubbing, cyanosis, or signs of ischemia.      Visible and palpable bunion without pain at dorsomedial 1st metatarsal head  right and left.  Hallux abducted right and left partially reducible, tracks laterally without being track bound.  No ecchymosis, erythema, edema, or cardinal signs infection or signs of trauma same foot.    No pain to palpation bilateral lower extremities.      Range of motion, stability, muscle strength, and muscle tone are age and health appropriate normal bilateral feet and legs.       Lymphadenopathy:   Negative lymphadenopathy bilateral popliteal fossa and tarsal tunnel.     Neurological: He is alert and oriented to person, place, and time. He has normal strength. He is not disoriented. He displays no atrophy and no tremor. A sensory deficit is present. He exhibits normal muscle tone.   Reflex Scores:       Patellar reflexes are 2+ on the right side and 2+ on the left side.       Achilles reflexes are 2+ on the right side and 2+ on the left side.  Decreased/absent vibratory sensation bilateral feet to 128Hz tuning fork.    Diminished/loss of protective sensation all toes bilateral to 10 gram monofilament.    Paresthesias, and hyperesthesia bilateral feet with no clearly identified trigger or source.     Skin: Skin is warm, dry and intact. No abrasion, no bruising, no burn, no ecchymosis, no laceration, no lesion, no petechiae and no rash noted. He is not diaphoretic. No cyanosis or erythema. No pallor. Nails show no clubbing.   Focal hyperkeratotic lesion consisting entirely of hyperkeratotic tissue without open skin, drainage, pus, fluctuance, malodor, or signs of infection plantar medial left 1st pipj.    Otherwise, Skin thin, atrophic, with decreased density and distribution of pedal hair bilateral, but without , jaylin discoloration,  ulcers, masses, nodules or cords palpated bilateral feet and legs.    Mottled hyperpigmentation both feet/ankles.    Toenails 1st, 2nd, 3rd, 4th, 5th  bilateral are hypertrophic thickened 2-3 mm, dystrophic, discolored tanish brown with tan, gray crumbly subungual debris.   Tender to distal nail plate pressure, without periungual skin abnormality of each.               Assessment:       Encounter Diagnoses   Name Primary?    Diabetic polyneuropathy associated with type 2 diabetes mellitus Yes    PVD (peripheral vascular disease)     Anticoagulant long-term use     Parkinson's disease     Hallux abducto valgus, unspecified laterality     Corn or callus          Plan:       Kevan was seen today for nail care.    Diagnoses and all orders for this visit:    Diabetic polyneuropathy associated with type 2 diabetes mellitus  -     DIABETIC SHOES FOR HOME USE    PVD (peripheral vascular disease)    Anticoagulant long-term use    Parkinson's disease    Hallux abducto valgus, unspecified laterality  -     DIABETIC SHOES FOR HOME USE    Corn or callus  -     DIABETIC SHOES FOR HOME USE      I counseled the patient on his conditions, their implications and medical management.        - Shoe inspection. Diabetic Foot Education. Patient reminded of the importance of good nutrition and blood sugar control to help prevent podiatric complications of diabetes. Patient instructed on proper foot hygeine. We discussed wearing proper shoe gear, daily foot inspections, never walking without protective shoe gear, never putting sharp instruments to feet, routine podiatric visits at least annually.      Rx DM shoes/heat molded inserts.    Continue penlac.            Return in about 2 weeks (around 7/12/2017) for procb/non covred foot care.

## 2017-07-10 ENCOUNTER — OFFICE VISIT (OUTPATIENT)
Dept: PAIN MEDICINE | Facility: CLINIC | Age: 62
End: 2017-07-10
Payer: MEDICARE

## 2017-07-10 VITALS
HEART RATE: 74 BPM | TEMPERATURE: 97 F | RESPIRATION RATE: 20 BRPM | DIASTOLIC BLOOD PRESSURE: 60 MMHG | BODY MASS INDEX: 31.35 KG/M2 | SYSTOLIC BLOOD PRESSURE: 100 MMHG | WEIGHT: 244.19 LBS

## 2017-07-10 DIAGNOSIS — M51.36 DDD (DEGENERATIVE DISC DISEASE), LUMBAR: ICD-10-CM

## 2017-07-10 DIAGNOSIS — G89.4 CHRONIC PAIN SYNDROME: ICD-10-CM

## 2017-07-10 DIAGNOSIS — M96.1 POSTLAMINECTOMY SYNDROME OF LUMBAR REGION: Primary | ICD-10-CM

## 2017-07-10 DIAGNOSIS — Z79.891 OPIOID CONTRACT EXISTS: ICD-10-CM

## 2017-07-10 PROCEDURE — 99215 OFFICE O/P EST HI 40 MIN: CPT | Mod: PBBFAC,PO | Performed by: PHYSICIAN ASSISTANT

## 2017-07-10 PROCEDURE — 99999 PR PBB SHADOW E&M-EST. PATIENT-LVL V: CPT | Mod: PBBFAC,,, | Performed by: PHYSICIAN ASSISTANT

## 2017-07-10 PROCEDURE — 99213 OFFICE O/P EST LOW 20 MIN: CPT | Mod: S$PBB,,, | Performed by: PHYSICIAN ASSISTANT

## 2017-07-10 RX ORDER — OXYCODONE AND ACETAMINOPHEN 10; 325 MG/1; MG/1
1 TABLET ORAL 3 TIMES DAILY PRN
Qty: 90 TABLET | Refills: 0 | Status: SHIPPED | OUTPATIENT
Start: 2017-08-09 | End: 2017-09-08

## 2017-07-10 RX ORDER — OXYCODONE AND ACETAMINOPHEN 10; 325 MG/1; MG/1
1 TABLET ORAL 3 TIMES DAILY PRN
Qty: 90 TABLET | Refills: 0 | Status: ON HOLD | OUTPATIENT
Start: 2017-09-08 | End: 2017-08-22 | Stop reason: HOSPADM

## 2017-07-10 RX ORDER — OXYCODONE AND ACETAMINOPHEN 10; 325 MG/1; MG/1
1 TABLET ORAL 3 TIMES DAILY PRN
Qty: 90 TABLET | Refills: 0 | Status: SHIPPED | OUTPATIENT
Start: 2017-07-10 | End: 2017-08-09

## 2017-07-12 NOTE — PROGRESS NOTES
CC:back pain    HPI: The patient is a 62-year-old man with a history of peripheral vascular disease, diabetes, cervical DDD, lumbar postlaminectomy syndrome who presents in followup.  He returns in follow-up today with low back pain.  He states that his low back and leg pain is manageable with his pain medication and spinal cord stimulator.  He has been walking for exercise.  He reports having some abdominal pain and is being worked up for this.  He denies weakness, numbness, bladder or bowel incontinence.    Pain intervention history: He has seen several other providers including Dr. Victor and Dr. Win Mckeon.  He has a cervical dorsal column stimulator which provides relief of neck pain.He had taken higher dose pain medication in the past but presented to us taking 60 mg of MS Contin t.i.d. and oxycodone 15 mg one to 2 tablets t.i.d.  He is taking gabapentin 300 mg t.i.d.  Baclofen 20 mg t.i.d.  Amitriptyline 25 mg q.h.s. He had undergone fusion L4-S1 in 2012.    ROS:He reports shortness of breath, weight gain, back pain, joint stiffness, and loss of balance.  Balance of review of systems is negative.    Medical, surgical, family and social history reviewed elsewhere in record.    Medications/Allergies: See med card    Vitals:    07/10/17 1040   BP: 100/60   Pulse: 74   Resp: 20   Temp: 97.4 °F (36.3 °C)   TempSrc: Oral   Weight: 110.8 kg (244 lb 3.2 oz)   PainSc:   6   PainLoc: Back         Physical exam:  Gen: A and O x3, pleasant, well-groomed  Skin:  Taut skin in legs with purplish hue bilateral lower legs.   HEENT: PERRLA  CVS: Regular rate and rhythm, normal S1 and S2, no murmurs.  Resp: Clear to auscultation bilaterally, no wheezes or rales.  Abdomen: Soft, NT/ND, normal bowel sounds present.  Musculoskeletal: Antalgic gait. Unable to heel and toe walk.      Neuro:  Upper extremities: 5/5 strength bilaterally  Lower extremities: 5/5 strength bilaterally   Reflexes: Brachioradialis 0+, Bicep 0+,  Tricep 0+, Patellar 0+, Achilles 0+ bilaterally.  Sensory:  Intact and symmetrical to light touch and pinprick in C2-T1 dermatomes bilaterally.  Intact and symmetrical to light touch and pinprick in L2-S1 dermatomes bilaterally, except for L4-S1 distribution right leg decreased sensation.    Lumbar spine:  Range of motion is mildly limited with flexion and extension without increased pain.  Straight leg raise negative bilaterally.  Internal and external rotation of hip is negative bilaterally.  Matteo's test is deferred.  Myofascial exam: No tenderness to palpation to lumbar paraspinous muscles.    Imagin12 Xray L-spine  There is disk desiccation and disk space narrowing at L5-S1. There is mild diffuse disk space narrowing elsewhere throughout the lumbar spine. Osteophytes project from vertebral bodies and there is sclerosis and posterior facets. There is no acute compression or subluxation.    CT L-spine 7/15/13  T12-L1: Left posterior lateral disk bulging and osteophyte formation is present causing mild left foraminal narrowing the central canal and right foraminal intact.  L1/2 Mild annular disk bulge and osteophyte formation is present causing a mild canal and left foraminal narrowing.  L2/3: Degenerative facet changes combine with disk bulging to produce mild left greater than right foraminal narrowing. Central canal is mildly distorted.  L3/4: There is mild annular disk bulging and degenerative facet changes are present. There is mild canal and foraminal distortion.  L4/5: At this level levels of prior laminectomy canal canal visualization is limited. The bony foramina appear grossly intact and there is no evidence of malalignment.  L5/S1: Moderate degenerative facet changes are present and there is some bony foraminal narrowing on the left. There is evidence of prior laminectomy and the right foramen is intact. The bony canal soft tissue visualization is limited but it is grossly intact the  degenerative changes are seen in the sacroiliac joints.    X-ray thoracic spine 12/18/14  Evidence of anterior cervical fusion is noted in the lower cervical spine. Neurostimulator appears to be present entering at approximately the T10-T11 level with its tip at the T8 level. Vertebral body heights appear well preserved. Alignment appears adequate. Osseous demineralization is noted.   T7-T8 T8-T9 and T9-T10 mild intervertebral disk height loss appears to be present.     X-ray thoracic and lumbar spine 12/28/15  Comparison: Thoracic spine radiographs-12/18/2014  Thoracic spine: There has been interval development of a new minor superior endplate compression fracture at the superior endplate of T7. There are two neurostimulator leads present within the posterior aspect of the thoracic spinal canal which terminating at approximately T7. No retropulsion. There is multilevel degenerative change of the thoracic spine in the form of marginal osteophyte formation and intervertebral disc space narrowing. There is suddenly observed postoperative change of ACDF at C5-C7. The heads of the C7 vertebral body screws do not appear firmly apposed to the adjacent metallic fixation plate.  Lumbar spine:There are postoperative changes of posterior intermittent fusion at L4-S1 with interbody device is noted at L4-L5 and L5-S1. No hardware complication. There is an IVC filter present. There is degenerative change of the lumbar spine in the form of marginal osteophyte formation and disc space narrowing.     CT cervical spine 3/7/16  Anterior plate fusion is noted at the C5, C6, and C7 levels with intervertebral disk prostheses at the C5-C6 C6-C7 levels.  There is loss of the normal cervical lordosis which may be positional or related to muscular strain.  Vertebral body heights appear well-preserved.  At the C2-C3 level, mild disk osteophyte spurring is noted of approximately 2-3 mm.  No significant neuroforaminal stenosis is noted,  minimal central canal narrowing is noted.  At the C3-C4 level mild disk osteophyte spurring is noted of approximately 2 mm.  No significant nerve foraminal narrowing is noted.  Minimal central canal narrowing is noted.  At the C4-C5 level, mild disk osteophyte bulge is noted centrally of up to 3 mm.  Facet arthropathy is noted bilaterally.  Moderate bilateral neuroforaminal narrowing appears to be present.  Mild central canal narrowing is noted.  At the C5-C6 level, a disk prosthesis is noted with metallic artifact hindering ideal evaluation.  Uncovertebral spurring and facet arthropathy appears to be present right greater than left with moderate right neural foraminal stenosis and mild to moderate left neural foraminal stenosis.  Mild to moderate central canal narrowing is suspected but not ideally evaluated  At the C6-C7 level, uncovertebral spurring is noted bilaterally.  Moderate to severe left-sided neuroforaminal stenosis is noted with moderate right-sided neuroforaminal stenosis.  Mild to moderate central canals narrowing is suspected.  Evaluation is hindered by metallic artifact.  At the C7-T1 level, uncovertebral spurring is noted to the right greater than left with severe right neural foraminal stenosis suspected and mild left neural foraminal stenosis with mild central canal is stenosis suspected.  Evaluation of this level is hindered by metallic artifact      Assessment:  The patient is a 62-year-old man with a history of peripheral vascular disease, diabetes, cervical DDD, lumbar postlaminectomy syndrome who presents in followup.      1. Postlaminectomy syndrome of lumbar region    2. Chronic pain syndrome    3. Opioid contract exists    4. DDD (degenerative disc disease), lumbar        Plan:  1.  Dr. Buckley provided prescriptions for oxycodone-acetaminophen 10/325 mg up to 3 times a day as needed for pain.  I have reviewed the [place] Board of [employer] website and there are no abberancies.    2.   Follow-up in 3 months or sooner as needed.    Greater than 50% of this 15 minute visit was spent counseling the patient.

## 2017-08-11 ENCOUNTER — HOSPITAL ENCOUNTER (INPATIENT)
Facility: HOSPITAL | Age: 62
LOS: 10 days | Discharge: NURSING, REHAB OR LONG-TERM CARE | DRG: 004 | End: 2017-08-22
Attending: EMERGENCY MEDICINE | Admitting: INTERNAL MEDICINE
Payer: MEDICARE

## 2017-08-11 DIAGNOSIS — Z93.0 S/P EMERGENCY TRACHEOTOMY FOR ASSISTANCE IN BREATHING: ICD-10-CM

## 2017-08-11 DIAGNOSIS — I25.10 CORONARY ARTERY DISEASE INVOLVING NATIVE CORONARY ARTERY WITHOUT ANGINA PECTORIS, UNSPECIFIED WHETHER NATIVE OR TRANSPLANTED HEART: ICD-10-CM

## 2017-08-11 DIAGNOSIS — R73.9 HYPERGLYCEMIA: ICD-10-CM

## 2017-08-11 DIAGNOSIS — R63.30 FEEDING DIFFICULTY: ICD-10-CM

## 2017-08-11 DIAGNOSIS — J96.00 ACUTE RESPIRATORY FAILURE, UNSPECIFIED WHETHER WITH HYPOXIA OR HYPERCAPNIA: ICD-10-CM

## 2017-08-11 DIAGNOSIS — J44.1 COPD EXACERBATION: Primary | ICD-10-CM

## 2017-08-11 DIAGNOSIS — R05.9 COUGH: ICD-10-CM

## 2017-08-11 DIAGNOSIS — J96.02 ACUTE RESPIRATORY FAILURE WITH HYPOXIA AND HYPERCARBIA: ICD-10-CM

## 2017-08-11 DIAGNOSIS — G47.33 OSA (OBSTRUCTIVE SLEEP APNEA): ICD-10-CM

## 2017-08-11 DIAGNOSIS — I10 BENIGN ESSENTIAL HTN: ICD-10-CM

## 2017-08-11 DIAGNOSIS — J15.20 STAPHYLOCOCCAL PNEUMONIA: ICD-10-CM

## 2017-08-11 DIAGNOSIS — J96.01 ACUTE RESPIRATORY FAILURE WITH HYPOXIA AND HYPERCARBIA: ICD-10-CM

## 2017-08-11 DIAGNOSIS — E11.9 TYPE 2 DIABETES MELLITUS WITHOUT COMPLICATION, UNSPECIFIED LONG TERM INSULIN USE STATUS: ICD-10-CM

## 2017-08-11 DIAGNOSIS — J96.00 ACUTE RESPIRATORY FAILURE: ICD-10-CM

## 2017-08-11 DIAGNOSIS — J02.9 PHARYNGITIS, UNSPECIFIED ETIOLOGY: ICD-10-CM

## 2017-08-11 DIAGNOSIS — I25.10 CORONARY ARTERY DISEASE INVOLVING NATIVE CORONARY ARTERY OF NATIVE HEART WITHOUT ANGINA PECTORIS: ICD-10-CM

## 2017-08-11 LAB
ALBUMIN SERPL BCP-MCNC: 3.8 G/DL
ALBUMIN SERPL BCP-MCNC: 3.9 G/DL
ALP SERPL-CCNC: 81 U/L
ALP SERPL-CCNC: 84 U/L
ALT SERPL W/O P-5'-P-CCNC: 8 U/L
ALT SERPL W/O P-5'-P-CCNC: <5 U/L
ANION GAP SERPL CALC-SCNC: 12 MMOL/L
ANION GAP SERPL CALC-SCNC: 13 MMOL/L
AST SERPL-CCNC: 11 U/L
AST SERPL-CCNC: 9 U/L
BASOPHILS # BLD AUTO: 0 K/UL
BASOPHILS # BLD AUTO: ABNORMAL K/UL
BASOPHILS NFR BLD: 0 %
BASOPHILS NFR BLD: 0 %
BILIRUB SERPL-MCNC: 0.6 MG/DL
BILIRUB SERPL-MCNC: 0.6 MG/DL
BNP SERPL-MCNC: 33 PG/ML
BUN SERPL-MCNC: 11 MG/DL
BUN SERPL-MCNC: 15 MG/DL
CALCIUM SERPL-MCNC: 9.5 MG/DL
CALCIUM SERPL-MCNC: 9.7 MG/DL
CHLORIDE SERPL-SCNC: 97 MMOL/L
CHLORIDE SERPL-SCNC: 98 MMOL/L
CO2 SERPL-SCNC: 23 MMOL/L
CO2 SERPL-SCNC: 25 MMOL/L
CREAT SERPL-MCNC: 1 MG/DL
CREAT SERPL-MCNC: 1.1 MG/DL
DIFFERENTIAL METHOD: ABNORMAL
DIFFERENTIAL METHOD: ABNORMAL
EOSINOPHIL # BLD AUTO: 0.1 K/UL
EOSINOPHIL # BLD AUTO: ABNORMAL K/UL
EOSINOPHIL NFR BLD: 0 %
EOSINOPHIL NFR BLD: 0.9 %
ERYTHROCYTE [DISTWIDTH] IN BLOOD BY AUTOMATED COUNT: 12.9 %
ERYTHROCYTE [DISTWIDTH] IN BLOOD BY AUTOMATED COUNT: 13.1 %
EST. GFR  (AFRICAN AMERICAN): >60 ML/MIN/1.73 M^2
EST. GFR  (AFRICAN AMERICAN): >60 ML/MIN/1.73 M^2
EST. GFR  (NON AFRICAN AMERICAN): >60 ML/MIN/1.73 M^2
EST. GFR  (NON AFRICAN AMERICAN): >60 ML/MIN/1.73 M^2
ESTIMATED AVG GLUCOSE: 295 MG/DL
GLUCOSE SERPL-MCNC: 439 MG/DL
GLUCOSE SERPL-MCNC: 446 MG/DL
HBA1C MFR BLD HPLC: 11.9 %
HCT VFR BLD AUTO: 41.2 %
HCT VFR BLD AUTO: 42.1 %
HGB BLD-MCNC: 14 G/DL
HGB BLD-MCNC: 14.2 G/DL
LYMPHOCYTES # BLD AUTO: 1.7 K/UL
LYMPHOCYTES # BLD AUTO: ABNORMAL K/UL
LYMPHOCYTES NFR BLD: 10.3 %
LYMPHOCYTES NFR BLD: 9 %
MCH RBC QN AUTO: 30.4 PG
MCH RBC QN AUTO: 31 PG
MCHC RBC AUTO-ENTMCNC: 33.3 G/DL
MCHC RBC AUTO-ENTMCNC: 34.4 G/DL
MCV RBC AUTO: 90 FL
MCV RBC AUTO: 91 FL
MONOCYTES # BLD AUTO: 0.2 K/UL
MONOCYTES # BLD AUTO: ABNORMAL K/UL
MONOCYTES NFR BLD: 0.9 %
MONOCYTES NFR BLD: 4 %
NEUTROPHILS # BLD AUTO: 14.4 K/UL
NEUTROPHILS NFR BLD: 83 %
NEUTROPHILS NFR BLD: 87.9 %
NEUTS BAND NFR BLD MANUAL: 4 %
PLATELET # BLD AUTO: 183 K/UL
PLATELET # BLD AUTO: 193 K/UL
PLATELET BLD QL SMEAR: ABNORMAL
PMV BLD AUTO: 7.5 FL
PMV BLD AUTO: 8 FL
POCT GLUCOSE: 248 MG/DL (ref 70–110)
POCT GLUCOSE: 423 MG/DL (ref 70–110)
POCT GLUCOSE: 439 MG/DL (ref 70–110)
POCT GLUCOSE: 468 MG/DL (ref 70–110)
POTASSIUM SERPL-SCNC: 4.1 MMOL/L
POTASSIUM SERPL-SCNC: 4.4 MMOL/L
PROT SERPL-MCNC: 7.7 G/DL
PROT SERPL-MCNC: 8.1 G/DL
RBC # BLD AUTO: 4.57 M/UL
RBC # BLD AUTO: 4.6 M/UL
SODIUM SERPL-SCNC: 133 MMOL/L
SODIUM SERPL-SCNC: 135 MMOL/L
WBC # BLD AUTO: 16.4 K/UL
WBC # BLD AUTO: 20.5 K/UL

## 2017-08-11 PROCEDURE — 25000242 PHARM REV CODE 250 ALT 637 W/ HCPCS: Performed by: INTERNAL MEDICINE

## 2017-08-11 PROCEDURE — G0378 HOSPITAL OBSERVATION PER HR: HCPCS

## 2017-08-11 PROCEDURE — 25000003 PHARM REV CODE 250: Performed by: EMERGENCY MEDICINE

## 2017-08-11 PROCEDURE — 83036 HEMOGLOBIN GLYCOSYLATED A1C: CPT

## 2017-08-11 PROCEDURE — 87040 BLOOD CULTURE FOR BACTERIA: CPT

## 2017-08-11 PROCEDURE — 85025 COMPLETE CBC W/AUTO DIFF WBC: CPT

## 2017-08-11 PROCEDURE — G8998 SWALLOW D/C STATUS: HCPCS | Mod: CH

## 2017-08-11 PROCEDURE — 94640 AIRWAY INHALATION TREATMENT: CPT

## 2017-08-11 PROCEDURE — 96365 THER/PROPH/DIAG IV INF INIT: CPT

## 2017-08-11 PROCEDURE — 94761 N-INVAS EAR/PLS OXIMETRY MLT: CPT

## 2017-08-11 PROCEDURE — 63600175 PHARM REV CODE 636 W HCPCS

## 2017-08-11 PROCEDURE — 96372 THER/PROPH/DIAG INJ SC/IM: CPT

## 2017-08-11 PROCEDURE — 92610 EVALUATE SWALLOWING FUNCTION: CPT

## 2017-08-11 PROCEDURE — 99285 EMERGENCY DEPT VISIT HI MDM: CPT

## 2017-08-11 PROCEDURE — 85027 COMPLETE CBC AUTOMATED: CPT

## 2017-08-11 PROCEDURE — 82962 GLUCOSE BLOOD TEST: CPT

## 2017-08-11 PROCEDURE — 27000221 HC OXYGEN, UP TO 24 HOURS

## 2017-08-11 PROCEDURE — 96360 HYDRATION IV INFUSION INIT: CPT

## 2017-08-11 PROCEDURE — 80053 COMPREHEN METABOLIC PANEL: CPT | Mod: 91

## 2017-08-11 PROCEDURE — 25000242 PHARM REV CODE 250 ALT 637 W/ HCPCS: Performed by: EMERGENCY MEDICINE

## 2017-08-11 PROCEDURE — 63600175 PHARM REV CODE 636 W HCPCS: Performed by: EMERGENCY MEDICINE

## 2017-08-11 PROCEDURE — 80053 COMPREHEN METABOLIC PANEL: CPT

## 2017-08-11 PROCEDURE — G8997 SWALLOW GOAL STATUS: HCPCS | Mod: CH

## 2017-08-11 PROCEDURE — 83880 ASSAY OF NATRIURETIC PEPTIDE: CPT

## 2017-08-11 PROCEDURE — 63600175 PHARM REV CODE 636 W HCPCS: Performed by: INTERNAL MEDICINE

## 2017-08-11 PROCEDURE — G8996 SWALLOW CURRENT STATUS: HCPCS | Mod: CH

## 2017-08-11 PROCEDURE — 85007 BL SMEAR W/DIFF WBC COUNT: CPT

## 2017-08-11 PROCEDURE — 25000003 PHARM REV CODE 250: Performed by: INTERNAL MEDICINE

## 2017-08-11 PROCEDURE — 99219 PR INITIAL OBSERVATION CARE,LEVL II: CPT | Mod: ,,, | Performed by: INTERNAL MEDICINE

## 2017-08-11 PROCEDURE — 93005 ELECTROCARDIOGRAM TRACING: CPT

## 2017-08-11 PROCEDURE — 36415 COLL VENOUS BLD VENIPUNCTURE: CPT

## 2017-08-11 RX ORDER — PRAZOSIN HYDROCHLORIDE 2 MG/1
1 CAPSULE ORAL NIGHTLY
COMMUNITY
End: 2018-02-12

## 2017-08-11 RX ORDER — INSULIN ASPART 100 [IU]/ML
15 INJECTION, SOLUTION INTRAVENOUS; SUBCUTANEOUS
Status: DISCONTINUED | OUTPATIENT
Start: 2017-08-11 | End: 2017-08-11

## 2017-08-11 RX ORDER — ATORVASTATIN CALCIUM 40 MG/1
40 TABLET, FILM COATED ORAL DAILY
Status: DISCONTINUED | OUTPATIENT
Start: 2017-08-11 | End: 2017-08-22 | Stop reason: HOSPADM

## 2017-08-11 RX ORDER — GABAPENTIN 100 MG/1
200 CAPSULE ORAL 3 TIMES DAILY
COMMUNITY
End: 2018-01-08

## 2017-08-11 RX ORDER — CARBIDOPA AND LEVODOPA 50; 200 MG/1; MG/1
1 TABLET, EXTENDED RELEASE ORAL 3 TIMES DAILY
Status: DISCONTINUED | OUTPATIENT
Start: 2017-08-11 | End: 2017-08-11

## 2017-08-11 RX ORDER — TRAZODONE HYDROCHLORIDE 50 MG/1
150 TABLET ORAL NIGHTLY
Status: DISCONTINUED | OUTPATIENT
Start: 2017-08-11 | End: 2017-08-22 | Stop reason: HOSPADM

## 2017-08-11 RX ORDER — ALBUTEROL SULFATE 2.5 MG/.5ML
2.5 SOLUTION RESPIRATORY (INHALATION) EVERY 4 HOURS
Status: DISCONTINUED | OUTPATIENT
Start: 2017-08-11 | End: 2017-08-11

## 2017-08-11 RX ORDER — HYDROCODONE BITARTRATE AND ACETAMINOPHEN 10; 325 MG/1; MG/1
1 TABLET ORAL
Status: COMPLETED | OUTPATIENT
Start: 2017-08-11 | End: 2017-08-11

## 2017-08-11 RX ORDER — INSULIN ASPART 100 [IU]/ML
1-10 INJECTION, SOLUTION INTRAVENOUS; SUBCUTANEOUS
Status: DISCONTINUED | OUTPATIENT
Start: 2017-08-11 | End: 2017-08-22 | Stop reason: HOSPADM

## 2017-08-11 RX ORDER — CARBIDOPA, LEVODOPA AND ENTACAPONE 50; 200; 200 MG/1; MG/1; MG/1
1 TABLET, FILM COATED ORAL 3 TIMES DAILY
Status: DISCONTINUED | OUTPATIENT
Start: 2017-08-11 | End: 2017-08-11 | Stop reason: SDUPTHER

## 2017-08-11 RX ORDER — IPRATROPIUM BROMIDE AND ALBUTEROL SULFATE 2.5; .5 MG/3ML; MG/3ML
SOLUTION RESPIRATORY (INHALATION)
Status: DISPENSED
Start: 2017-08-11 | End: 2017-08-11

## 2017-08-11 RX ORDER — ENTACAPONE 200 MG/1
200 TABLET ORAL 3 TIMES DAILY
Status: DISCONTINUED | OUTPATIENT
Start: 2017-08-11 | End: 2017-08-11

## 2017-08-11 RX ORDER — HYDROXYZINE HYDROCHLORIDE 25 MG/1
25 TABLET, FILM COATED ORAL DAILY PRN
Status: DISCONTINUED | OUTPATIENT
Start: 2017-08-11 | End: 2017-08-22 | Stop reason: HOSPADM

## 2017-08-11 RX ORDER — MIRTAZAPINE 15 MG/1
15 TABLET, FILM COATED ORAL NIGHTLY
Status: DISCONTINUED | OUTPATIENT
Start: 2017-08-11 | End: 2017-08-22 | Stop reason: HOSPADM

## 2017-08-11 RX ORDER — IBUPROFEN 200 MG
24 TABLET ORAL
Status: DISCONTINUED | OUTPATIENT
Start: 2017-08-11 | End: 2017-08-22 | Stop reason: HOSPADM

## 2017-08-11 RX ORDER — QUETIAPINE FUMARATE 100 MG/1
300 TABLET, FILM COATED ORAL NIGHTLY PRN
Status: DISCONTINUED | OUTPATIENT
Start: 2017-08-11 | End: 2017-08-20

## 2017-08-11 RX ORDER — ALLOPURINOL 100 MG/1
200 TABLET ORAL DAILY
Status: DISCONTINUED | OUTPATIENT
Start: 2017-08-11 | End: 2017-08-22 | Stop reason: HOSPADM

## 2017-08-11 RX ORDER — ENTACAPONE 200 MG/1
200 TABLET ORAL
Status: DISCONTINUED | OUTPATIENT
Start: 2017-08-11 | End: 2017-08-11

## 2017-08-11 RX ORDER — IPRATROPIUM BROMIDE AND ALBUTEROL SULFATE 2.5; .5 MG/3ML; MG/3ML
3 SOLUTION RESPIRATORY (INHALATION)
Status: DISCONTINUED | OUTPATIENT
Start: 2017-08-11 | End: 2017-08-11 | Stop reason: SDUPTHER

## 2017-08-11 RX ORDER — PANTOPRAZOLE SODIUM 40 MG/1
40 TABLET, DELAYED RELEASE ORAL DAILY
Status: DISCONTINUED | OUTPATIENT
Start: 2017-08-11 | End: 2017-08-11 | Stop reason: SDUPTHER

## 2017-08-11 RX ORDER — TRAZODONE HYDROCHLORIDE 100 MG/1
100 TABLET ORAL NIGHTLY
COMMUNITY
End: 2020-01-06 | Stop reason: CLARIF

## 2017-08-11 RX ORDER — AMOXICILLIN 250 MG
1 CAPSULE ORAL 2 TIMES DAILY PRN
Status: DISCONTINUED | OUTPATIENT
Start: 2017-08-11 | End: 2017-08-22 | Stop reason: HOSPADM

## 2017-08-11 RX ORDER — METFORMIN HYDROCHLORIDE 500 MG/1
500 TABLET ORAL 2 TIMES DAILY WITH MEALS
Status: DISCONTINUED | OUTPATIENT
Start: 2017-08-11 | End: 2017-08-11

## 2017-08-11 RX ORDER — ONDANSETRON 4 MG/1
8 TABLET, ORALLY DISINTEGRATING ORAL EVERY 8 HOURS PRN
Status: DISCONTINUED | OUTPATIENT
Start: 2017-08-11 | End: 2017-08-22 | Stop reason: HOSPADM

## 2017-08-11 RX ORDER — DICYCLOMINE HYDROCHLORIDE 20 MG/1
20 TABLET ORAL 3 TIMES DAILY
COMMUNITY
End: 2020-06-29

## 2017-08-11 RX ORDER — OLANZAPINE 5 MG/1
10 TABLET ORAL DAILY
Status: DISCONTINUED | OUTPATIENT
Start: 2017-08-11 | End: 2017-08-20

## 2017-08-11 RX ORDER — NYSTATIN 100000 [USP'U]/ML
5 SUSPENSION ORAL EVERY 6 HOURS
Status: DISCONTINUED | OUTPATIENT
Start: 2017-08-11 | End: 2017-08-22 | Stop reason: HOSPADM

## 2017-08-11 RX ORDER — MIRTAZAPINE 15 MG/1
15 TABLET, FILM COATED ORAL NIGHTLY
COMMUNITY
End: 2018-02-12

## 2017-08-11 RX ORDER — CARBIDOPA, LEVODOPA AND ENTACAPONE 50; 200; 200 MG/1; MG/1; MG/1
1 TABLET, FILM COATED ORAL 3 TIMES DAILY
COMMUNITY
End: 2018-02-12

## 2017-08-11 RX ORDER — TIOTROPIUM BROMIDE 18 UG/1
1 CAPSULE ORAL; RESPIRATORY (INHALATION) DAILY
Status: DISCONTINUED | OUTPATIENT
Start: 2017-08-11 | End: 2017-08-11

## 2017-08-11 RX ORDER — GABAPENTIN 100 MG/1
200 CAPSULE ORAL 3 TIMES DAILY
Status: DISCONTINUED | OUTPATIENT
Start: 2017-08-11 | End: 2017-08-22 | Stop reason: HOSPADM

## 2017-08-11 RX ORDER — HYDROXYZINE HYDROCHLORIDE 25 MG/1
25 TABLET, FILM COATED ORAL DAILY PRN
COMMUNITY
End: 2017-09-11 | Stop reason: HOSPADM

## 2017-08-11 RX ORDER — ZOLPIDEM TARTRATE 5 MG/1
5 TABLET ORAL NIGHTLY PRN
COMMUNITY
End: 2018-02-12

## 2017-08-11 RX ORDER — FUROSEMIDE 40 MG/1
40 TABLET ORAL DAILY
Status: DISCONTINUED | OUTPATIENT
Start: 2017-08-11 | End: 2017-08-22 | Stop reason: HOSPADM

## 2017-08-11 RX ORDER — ESCITALOPRAM OXALATE 10 MG/1
20 TABLET ORAL NIGHTLY
Status: DISCONTINUED | OUTPATIENT
Start: 2017-08-11 | End: 2017-08-22 | Stop reason: HOSPADM

## 2017-08-11 RX ORDER — IPRATROPIUM BROMIDE 0.5 MG/2.5ML
0.5 SOLUTION RESPIRATORY (INHALATION) EVERY 6 HOURS
Status: DISCONTINUED | OUTPATIENT
Start: 2017-08-11 | End: 2017-08-11

## 2017-08-11 RX ORDER — BUPROPION HYDROCHLORIDE 150 MG/1
150 TABLET, EXTENDED RELEASE ORAL 2 TIMES DAILY
Status: DISCONTINUED | OUTPATIENT
Start: 2017-08-11 | End: 2017-08-22 | Stop reason: HOSPADM

## 2017-08-11 RX ORDER — CARBIDOPA AND LEVODOPA 50; 200 MG/1; MG/1
1 TABLET, EXTENDED RELEASE ORAL
Status: DISCONTINUED | OUTPATIENT
Start: 2017-08-11 | End: 2017-08-11

## 2017-08-11 RX ORDER — BUPROPION HYDROCHLORIDE 150 MG/1
150 TABLET, EXTENDED RELEASE ORAL 2 TIMES DAILY
COMMUNITY
End: 2018-02-12

## 2017-08-11 RX ORDER — OXYCODONE AND ACETAMINOPHEN 10; 325 MG/1; MG/1
1 TABLET ORAL 3 TIMES DAILY PRN
Status: DISCONTINUED | OUTPATIENT
Start: 2017-08-11 | End: 2017-08-19

## 2017-08-11 RX ORDER — PRAZOSIN HYDROCHLORIDE 1 MG/1
1 CAPSULE ORAL NIGHTLY
Status: DISCONTINUED | OUTPATIENT
Start: 2017-08-11 | End: 2017-08-22 | Stop reason: HOSPADM

## 2017-08-11 RX ORDER — GLUCAGON 1 MG
1 KIT INJECTION
Status: DISCONTINUED | OUTPATIENT
Start: 2017-08-11 | End: 2017-08-22 | Stop reason: HOSPADM

## 2017-08-11 RX ORDER — ENTACAPONE 200 MG/1
200 TABLET ORAL 3 TIMES DAILY
Status: DISCONTINUED | OUTPATIENT
Start: 2017-08-11 | End: 2017-08-22 | Stop reason: HOSPADM

## 2017-08-11 RX ORDER — IPRATROPIUM BROMIDE AND ALBUTEROL SULFATE 2.5; .5 MG/3ML; MG/3ML
3 SOLUTION RESPIRATORY (INHALATION)
Status: COMPLETED | OUTPATIENT
Start: 2017-08-11 | End: 2017-08-11

## 2017-08-11 RX ORDER — ACETAMINOPHEN 325 MG/1
650 TABLET ORAL EVERY 6 HOURS PRN
Status: DISCONTINUED | OUTPATIENT
Start: 2017-08-11 | End: 2017-08-20

## 2017-08-11 RX ORDER — IBUPROFEN 200 MG
16 TABLET ORAL
Status: DISCONTINUED | OUTPATIENT
Start: 2017-08-11 | End: 2017-08-22 | Stop reason: HOSPADM

## 2017-08-11 RX ORDER — ONDANSETRON 2 MG/ML
4 INJECTION INTRAMUSCULAR; INTRAVENOUS EVERY 8 HOURS PRN
Status: DISCONTINUED | OUTPATIENT
Start: 2017-08-11 | End: 2017-08-22 | Stop reason: HOSPADM

## 2017-08-11 RX ORDER — IPRATROPIUM BROMIDE AND ALBUTEROL SULFATE 2.5; .5 MG/3ML; MG/3ML
3 SOLUTION RESPIRATORY (INHALATION)
Status: DISCONTINUED | OUTPATIENT
Start: 2017-08-11 | End: 2017-08-12

## 2017-08-11 RX ORDER — POTASSIUM CHLORIDE 20 MEQ/1
20 TABLET, EXTENDED RELEASE ORAL 2 TIMES DAILY
Status: DISCONTINUED | OUTPATIENT
Start: 2017-08-11 | End: 2017-08-12

## 2017-08-11 RX ORDER — CARBIDOPA AND LEVODOPA 25; 100 MG/1; MG/1
2 TABLET ORAL 3 TIMES DAILY
Status: DISCONTINUED | OUTPATIENT
Start: 2017-08-11 | End: 2017-08-22 | Stop reason: HOSPADM

## 2017-08-11 RX ORDER — INSULIN ASPART 100 [IU]/ML
20 INJECTION, SOLUTION INTRAVENOUS; SUBCUTANEOUS
Status: DISCONTINUED | OUTPATIENT
Start: 2017-08-11 | End: 2017-08-12

## 2017-08-11 RX ORDER — PANTOPRAZOLE SODIUM 40 MG/1
40 TABLET, DELAYED RELEASE ORAL EVERY MORNING
Status: DISCONTINUED | OUTPATIENT
Start: 2017-08-11 | End: 2017-08-12

## 2017-08-11 RX ORDER — CARVEDILOL 3.12 MG/1
3.12 TABLET ORAL 2 TIMES DAILY
Status: DISCONTINUED | OUTPATIENT
Start: 2017-08-11 | End: 2017-08-16

## 2017-08-11 RX ADMIN — PANTOPRAZOLE SODIUM 40 MG: 40 TABLET, DELAYED RELEASE ORAL at 10:08

## 2017-08-11 RX ADMIN — HYDROCODONE BITARTRATE AND ACETAMINOPHEN 1 TABLET: 10; 325 TABLET ORAL at 06:08

## 2017-08-11 RX ADMIN — ENTACAPONE 200 MG: 200 TABLET, FILM COATED ORAL at 06:08

## 2017-08-11 RX ADMIN — CARVEDILOL 3.12 MG: 3.12 TABLET, FILM COATED ORAL at 08:08

## 2017-08-11 RX ADMIN — IPRATROPIUM BROMIDE AND ALBUTEROL SULFATE 3 ML: .5; 3 SOLUTION RESPIRATORY (INHALATION) at 06:08

## 2017-08-11 RX ADMIN — POTASSIUM CHLORIDE 20 MEQ: 1500 TABLET, EXTENDED RELEASE ORAL at 08:08

## 2017-08-11 RX ADMIN — ATORVASTATIN CALCIUM 40 MG: 40 TABLET, FILM COATED ORAL at 10:08

## 2017-08-11 RX ADMIN — PRAZOSIN HYDROCHLORIDE 1 MG: 1 CAPSULE ORAL at 09:08

## 2017-08-11 RX ADMIN — POTASSIUM CHLORIDE 20 MEQ: 1500 TABLET, EXTENDED RELEASE ORAL at 10:08

## 2017-08-11 RX ADMIN — INSULIN DETEMIR 75 UNITS: 100 INJECTION, SOLUTION SUBCUTANEOUS at 08:08

## 2017-08-11 RX ADMIN — NYSTATIN 500000 UNITS: 100000 SUSPENSION ORAL at 06:08

## 2017-08-11 RX ADMIN — BUPROPION HYDROCHLORIDE 150 MG: 150 TABLET, FILM COATED, EXTENDED RELEASE ORAL at 10:08

## 2017-08-11 RX ADMIN — OLANZAPINE 10 MG: 5 TABLET ORAL at 10:08

## 2017-08-11 RX ADMIN — IPRATROPIUM BROMIDE AND ALBUTEROL SULFATE 3 ML: .5; 3 SOLUTION RESPIRATORY (INHALATION) at 03:08

## 2017-08-11 RX ADMIN — INSULIN ASPART 15 UNITS: 100 INJECTION, SOLUTION INTRAVENOUS; SUBCUTANEOUS at 10:08

## 2017-08-11 RX ADMIN — PANCRELIPASE 6 CAPSULE: 24000; 76000; 120000 CAPSULE, DELAYED RELEASE PELLETS ORAL at 10:08

## 2017-08-11 RX ADMIN — IPRATROPIUM BROMIDE AND ALBUTEROL SULFATE 3 ML: .5; 3 SOLUTION RESPIRATORY (INHALATION) at 11:08

## 2017-08-11 RX ADMIN — IPRATROPIUM BROMIDE AND ALBUTEROL SULFATE 3 ML: .5; 3 SOLUTION RESPIRATORY (INHALATION) at 07:08

## 2017-08-11 RX ADMIN — PANCRELIPASE 6 CAPSULE: 24000; 76000; 120000 CAPSULE, DELAYED RELEASE PELLETS ORAL at 02:08

## 2017-08-11 RX ADMIN — NYSTATIN 500000 UNITS: 100000 SUSPENSION ORAL at 02:08

## 2017-08-11 RX ADMIN — QUETIAPINE FUMARATE 300 MG: 100 TABLET, FILM COATED ORAL at 10:08

## 2017-08-11 RX ADMIN — GABAPENTIN 200 MG: 100 CAPSULE ORAL at 02:08

## 2017-08-11 RX ADMIN — INSULIN ASPART 20 UNITS: 100 INJECTION, SOLUTION INTRAVENOUS; SUBCUTANEOUS at 12:08

## 2017-08-11 RX ADMIN — INSULIN ASPART 20 UNITS: 100 INJECTION, SOLUTION INTRAVENOUS; SUBCUTANEOUS at 06:08

## 2017-08-11 RX ADMIN — FUROSEMIDE 40 MG: 40 TABLET ORAL at 10:08

## 2017-08-11 RX ADMIN — LIDOCAINE HYDROCHLORIDE: 20 SOLUTION ORAL; TOPICAL at 06:08

## 2017-08-11 RX ADMIN — ALLOPURINOL 200 MG: 100 TABLET ORAL at 10:08

## 2017-08-11 RX ADMIN — GABAPENTIN 200 MG: 100 CAPSULE ORAL at 10:08

## 2017-08-11 RX ADMIN — ALBUTEROL SULFATE 2.5 MG: 2.5 SOLUTION RESPIRATORY (INHALATION) at 08:08

## 2017-08-11 RX ADMIN — RIVAROXABAN 20 MG: 20 TABLET, FILM COATED ORAL at 10:08

## 2017-08-11 RX ADMIN — AZITHROMYCIN MONOHYDRATE 500 MG: 500 INJECTION, POWDER, LYOPHILIZED, FOR SOLUTION INTRAVENOUS at 07:08

## 2017-08-11 RX ADMIN — OXYCODONE AND ACETAMINOPHEN 1 TABLET: 10; 325 TABLET ORAL at 06:08

## 2017-08-11 RX ADMIN — TRAZODONE HYDROCHLORIDE 150 MG: 50 TABLET ORAL at 08:08

## 2017-08-11 RX ADMIN — INSULIN ASPART 5 UNITS: 100 INJECTION, SOLUTION INTRAVENOUS; SUBCUTANEOUS at 08:08

## 2017-08-11 RX ADMIN — MIRTAZAPINE 15 MG: 15 TABLET, FILM COATED ORAL at 08:08

## 2017-08-11 RX ADMIN — BUPROPION HYDROCHLORIDE 150 MG: 150 TABLET, FILM COATED, EXTENDED RELEASE ORAL at 08:08

## 2017-08-11 RX ADMIN — INSULIN ASPART 4 UNITS: 100 INJECTION, SOLUTION INTRAVENOUS; SUBCUTANEOUS at 06:08

## 2017-08-11 RX ADMIN — ESCITALOPRAM 20 MG: 10 TABLET, FILM COATED ORAL at 08:08

## 2017-08-11 RX ADMIN — CARVEDILOL 3.12 MG: 3.12 TABLET, FILM COATED ORAL at 10:08

## 2017-08-11 RX ADMIN — CARBIDOPA AND LEVODOPA 2 TABLET: 25; 100 TABLET ORAL at 06:08

## 2017-08-11 RX ADMIN — OXYCODONE AND ACETAMINOPHEN 1 TABLET: 10; 325 TABLET ORAL at 10:08

## 2017-08-11 RX ADMIN — INSULIN ASPART 10 UNITS: 100 INJECTION, SOLUTION INTRAVENOUS; SUBCUTANEOUS at 12:08

## 2017-08-11 RX ADMIN — PREDNISONE 30 MG: 5 TABLET ORAL at 02:08

## 2017-08-11 RX ADMIN — THERA TABS 1 TABLET: TAB at 10:08
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
## 2017-08-11 NOTE — UM SECONDARY REVIEW
Physician Advisor External    Level of Care Issue    Per Dr. Wright at [facility], pt is OP appropriate for 8/11/2017
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
## 2017-08-11 NOTE — PLAN OF CARE
08/11/17 0845   Patient Assessment/Suction   Level of Consciousness (AVPU) alert   Respiratory Effort Normal;Unlabored   Expansion/Accessory Muscles/Retractions no retractions;no use of accessory muscles   All Lung Fields Breath Sounds diminished   Cough Frequency infrequent   Cough Type good;nonproductive   PRE-TX-O2-ETCO2   O2 Device (Oxygen Therapy) nasal cannula   $ Is the patient on Oxygen? Yes   Flow (L/min) 2   Oxygen Concentration (%) 28   SpO2 95 %   Pulse Oximetry Type Intermittent   $ Pulse Oximetry - Multiple Charge Pulse Oximetry - Multiple   Pulse 96   Resp 18   Aerosol Therapy   $ Aerosol Therapy Charges Aerosol Treatment   Respiratory Treatment Status given   SVN/Inhaler Treatment Route mask   Position During Treatment HOB at 30 degrees   Patient Tolerance good   Post-Treatment   Post-treatment Heart Rate (beats/min) 98   Post-treatment Resp Rate (breaths/min) 18   All Fields Breath Sounds unchanged       Aerosol treatments q4. Patient tolerated well.

## 2017-08-11 NOTE — PLAN OF CARE
Problem: SLP Goal  Goal: SLP Goal  Outcome: Outcome(s) achieved Date Met: 08/11/17    Kevan Colin is a 62 y.o. male with a medical diagnosis of COPD exacerbation. Clinical swallowing evaluation completed. Limited PO trials 2/2  c/o odynophagia. Pt tolerated thin liquids via cup and ~ 2 oz Jello with no overt s/s oropharyngeal dysphagia. Facial grimacing noted with each swallow 2/2 odynophagia. Oropharyngeal dysphagia not suspected. Continue PO diet as tolerated; may need full or clear liquid diet. Consider GI consult. Please reconsult PRN.    Hayde Malagon MS, CCC/SLP 8/11/2017

## 2017-08-11 NOTE — ED NOTES
Pt presents with complaints of shortness of breath and sore throat. Symptoms started yesterday. Pt has COPD with shortness of breath but worse than normal since yesterday. Pt lungs with coarse breath sounds upon expiration. Pt alert and oriented. No noted cyanosis. On oxygen at 3 lpm per nasal cannula. Pt also on vitals and cardiac monitor. Respiratory treatment in progress.

## 2017-08-12 ENCOUNTER — OUTSIDE PLACE OF SERVICE (OUTPATIENT)
Dept: PULMONOLOGY | Facility: CLINIC | Age: 62
End: 2017-08-12
Payer: MEDICARE

## 2017-08-12 PROBLEM — J96.00 ACUTE RESPIRATORY FAILURE: Status: ACTIVE | Noted: 2017-08-12

## 2017-08-12 LAB
ALBUMIN SERPL BCP-MCNC: 3.3 G/DL
ALLENS TEST: ABNORMAL
ALP SERPL-CCNC: 66 U/L
ALT SERPL W/O P-5'-P-CCNC: 11 U/L
ANION GAP SERPL CALC-SCNC: 10 MMOL/L
ANION GAP SERPL CALC-SCNC: 11 MMOL/L
ANION GAP SERPL CALC-SCNC: 11 MMOL/L
ANION GAP SERPL CALC-SCNC: 12 MMOL/L
AST SERPL-CCNC: 11 U/L
BASOPHILS # BLD AUTO: 0 K/UL
BASOPHILS NFR BLD: 0.2 %
BILIRUB SERPL-MCNC: 0.5 MG/DL
BUN SERPL-MCNC: 11 MG/DL
BUN SERPL-MCNC: 12 MG/DL
CALCIUM SERPL-MCNC: 8.7 MG/DL
CALCIUM SERPL-MCNC: 8.9 MG/DL
CALCIUM SERPL-MCNC: 9.1 MG/DL
CALCIUM SERPL-MCNC: 9.2 MG/DL
CHLORIDE SERPL-SCNC: 100 MMOL/L
CHLORIDE SERPL-SCNC: 102 MMOL/L
CHLORIDE SERPL-SCNC: 104 MMOL/L
CHLORIDE SERPL-SCNC: 93 MMOL/L
CO2 SERPL-SCNC: 23 MMOL/L
CO2 SERPL-SCNC: 24 MMOL/L
CO2 SERPL-SCNC: 25 MMOL/L
CO2 SERPL-SCNC: 25 MMOL/L
CREAT SERPL-MCNC: 0.8 MG/DL
CREAT SERPL-MCNC: 0.8 MG/DL
CREAT SERPL-MCNC: 0.9 MG/DL
CREAT SERPL-MCNC: 0.9 MG/DL
DELSYS: ABNORMAL
DIFFERENTIAL METHOD: ABNORMAL
EOSINOPHIL # BLD AUTO: 0 K/UL
EOSINOPHIL NFR BLD: 0 %
ERYTHROCYTE [DISTWIDTH] IN BLOOD BY AUTOMATED COUNT: 13.3 %
ERYTHROCYTE [SEDIMENTATION RATE] IN BLOOD BY WESTERGREN METHOD: 14 MM/H
EST. GFR  (AFRICAN AMERICAN): >60 ML/MIN/1.73 M^2
EST. GFR  (NON AFRICAN AMERICAN): >60 ML/MIN/1.73 M^2
FIO2: 40
GLUCOSE SERPL-MCNC: 213 MG/DL
GLUCOSE SERPL-MCNC: 224 MG/DL
GLUCOSE SERPL-MCNC: 344 MG/DL
GLUCOSE SERPL-MCNC: 360 MG/DL
HCO3 UR-SCNC: 28.7 MMOL/L (ref 24–28)
HCT VFR BLD AUTO: 38 %
HGB BLD-MCNC: 13.7 G/DL
LYMPHOCYTES # BLD AUTO: 1.1 K/UL
LYMPHOCYTES NFR BLD: 5.9 %
MAGNESIUM SERPL-MCNC: 2.1 MG/DL
MCH RBC QN AUTO: 33.3 PG
MCHC RBC AUTO-ENTMCNC: 36.1 G/DL
MCV RBC AUTO: 92 FL
MIN VOL: 11.2
MODE: ABNORMAL
MONOCYTES # BLD AUTO: 0.4 K/UL
MONOCYTES NFR BLD: 2.3 %
NEUTROPHILS # BLD AUTO: 17.5 K/UL
NEUTROPHILS NFR BLD: 91.6 %
PCO2 BLDA: 55.5 MMHG (ref 35–45)
PEEP: 5
PH SMN: 7.32 [PH] (ref 7.35–7.45)
PIP: 20
PLATELET # BLD AUTO: 158 K/UL
PMV BLD AUTO: 8.8 FL
PO2 BLDA: 68 MMHG (ref 80–100)
POC BE: 3 MMOL/L
POC SATURATED O2: 91 % (ref 95–100)
POC TCO2: 30 MMOL/L (ref 23–27)
POCT GLUCOSE: 223 MG/DL (ref 70–110)
POCT GLUCOSE: 313 MG/DL (ref 70–110)
POCT GLUCOSE: 330 MG/DL (ref 70–110)
POTASSIUM SERPL-SCNC: 4 MMOL/L
POTASSIUM SERPL-SCNC: 4 MMOL/L
POTASSIUM SERPL-SCNC: 4.2 MMOL/L
POTASSIUM SERPL-SCNC: 4.8 MMOL/L
PROT SERPL-MCNC: 7.4 G/DL
RBC # BLD AUTO: 4.13 M/UL
SAMPLE: ABNORMAL
SITE: ABNORMAL
SODIUM SERPL-SCNC: 127 MMOL/L
SODIUM SERPL-SCNC: 137 MMOL/L
SODIUM SERPL-SCNC: 138 MMOL/L
SODIUM SERPL-SCNC: 138 MMOL/L
SP02: 96
VT: 500
WBC # BLD AUTO: 19.1 K/UL

## 2017-08-12 PROCEDURE — 80053 COMPREHEN METABOLIC PANEL: CPT

## 2017-08-12 PROCEDURE — 83735 ASSAY OF MAGNESIUM: CPT

## 2017-08-12 PROCEDURE — S0028 INJECTION, FAMOTIDINE, 20 MG: HCPCS | Performed by: NURSE PRACTITIONER

## 2017-08-12 PROCEDURE — 63600175 PHARM REV CODE 636 W HCPCS: Performed by: FAMILY MEDICINE

## 2017-08-12 PROCEDURE — 27000190 HC CPAP FULL FACE MASK W/VALVE

## 2017-08-12 PROCEDURE — 63600175 PHARM REV CODE 636 W HCPCS: Performed by: NURSE PRACTITIONER

## 2017-08-12 PROCEDURE — 99900035 HC TECH TIME PER 15 MIN (STAT)

## 2017-08-12 PROCEDURE — 94660 CPAP INITIATION&MGMT: CPT

## 2017-08-12 PROCEDURE — C9113 INJ PANTOPRAZOLE SODIUM, VIA: HCPCS | Performed by: INTERNAL MEDICINE

## 2017-08-12 PROCEDURE — 27000221 HC OXYGEN, UP TO 24 HOURS

## 2017-08-12 PROCEDURE — 25000003 PHARM REV CODE 250: Performed by: NURSE PRACTITIONER

## 2017-08-12 PROCEDURE — 99291 CRITICAL CARE FIRST HOUR: CPT | Mod: ,,, | Performed by: INTERNAL MEDICINE

## 2017-08-12 PROCEDURE — 25000242 PHARM REV CODE 250 ALT 637 W/ HCPCS: Performed by: NURSE PRACTITIONER

## 2017-08-12 PROCEDURE — 63600175 PHARM REV CODE 636 W HCPCS

## 2017-08-12 PROCEDURE — 96361 HYDRATE IV INFUSION ADD-ON: CPT

## 2017-08-12 PROCEDURE — 36600 WITHDRAWAL OF ARTERIAL BLOOD: CPT

## 2017-08-12 PROCEDURE — 25000003 PHARM REV CODE 250: Performed by: FAMILY MEDICINE

## 2017-08-12 PROCEDURE — 94003 VENT MGMT INPAT SUBQ DAY: CPT

## 2017-08-12 PROCEDURE — 85025 COMPLETE CBC W/AUTO DIFF WBC: CPT

## 2017-08-12 PROCEDURE — 63600175 PHARM REV CODE 636 W HCPCS: Performed by: INTERNAL MEDICINE

## 2017-08-12 PROCEDURE — 25500020 PHARM REV CODE 255

## 2017-08-12 PROCEDURE — 94002 VENT MGMT INPAT INIT DAY: CPT

## 2017-08-12 PROCEDURE — 20000000 HC ICU ROOM

## 2017-08-12 PROCEDURE — 87077 CULTURE AEROBIC IDENTIFY: CPT

## 2017-08-12 PROCEDURE — 80048 BASIC METABOLIC PNL TOTAL CA: CPT

## 2017-08-12 PROCEDURE — 36415 COLL VENOUS BLD VENIPUNCTURE: CPT

## 2017-08-12 PROCEDURE — 25000003 PHARM REV CODE 250: Performed by: INTERNAL MEDICINE

## 2017-08-12 PROCEDURE — 25000003 PHARM REV CODE 250

## 2017-08-12 PROCEDURE — 87205 SMEAR GRAM STAIN: CPT

## 2017-08-12 PROCEDURE — 87186 SC STD MICRODIL/AGAR DIL: CPT

## 2017-08-12 PROCEDURE — 87070 CULTURE OTHR SPECIMN AEROBIC: CPT

## 2017-08-12 PROCEDURE — 94640 AIRWAY INHALATION TREATMENT: CPT

## 2017-08-12 PROCEDURE — 82803 BLOOD GASES ANY COMBINATION: CPT

## 2017-08-12 PROCEDURE — 94770 HC EXHALED C02 TEST: CPT

## 2017-08-12 PROCEDURE — 63600175 PHARM REV CODE 636 W HCPCS: Performed by: PHYSICIAN ASSISTANT

## 2017-08-12 RX ORDER — PROPOFOL 10 MG/ML
INJECTION, EMULSION INTRAVENOUS
Status: COMPLETED
Start: 2017-08-12 | End: 2017-08-12

## 2017-08-12 RX ORDER — KETAMINE HYDROCHLORIDE 100 MG/ML
INJECTION, SOLUTION INTRAMUSCULAR; INTRAVENOUS
Status: COMPLETED
Start: 2017-08-12 | End: 2017-08-12

## 2017-08-12 RX ORDER — DIPHENHYDRAMINE HYDROCHLORIDE 50 MG/ML
50 INJECTION INTRAMUSCULAR; INTRAVENOUS ONCE
Status: COMPLETED | OUTPATIENT
Start: 2017-08-12 | End: 2017-08-12

## 2017-08-12 RX ORDER — POTASSIUM CHLORIDE 20 MEQ/15ML
20 SOLUTION ORAL 2 TIMES DAILY
Status: DISCONTINUED | OUTPATIENT
Start: 2017-08-12 | End: 2017-08-22 | Stop reason: HOSPADM

## 2017-08-12 RX ORDER — INSULIN ASPART 100 [IU]/ML
12 INJECTION, SOLUTION INTRAVENOUS; SUBCUTANEOUS ONCE
Status: COMPLETED | OUTPATIENT
Start: 2017-08-12 | End: 2017-08-12

## 2017-08-12 RX ORDER — ONDANSETRON 2 MG/ML
INJECTION INTRAMUSCULAR; INTRAVENOUS
Status: COMPLETED
Start: 2017-08-12 | End: 2017-08-12

## 2017-08-12 RX ORDER — DEXAMETHASONE SODIUM PHOSPHATE 4 MG/ML
10 INJECTION, SOLUTION INTRA-ARTICULAR; INTRALESIONAL; INTRAMUSCULAR; INTRAVENOUS; SOFT TISSUE ONCE
Status: COMPLETED | OUTPATIENT
Start: 2017-08-12 | End: 2017-08-12

## 2017-08-12 RX ORDER — SODIUM CHLORIDE 9 MG/ML
INJECTION, SOLUTION INTRAVENOUS CONTINUOUS
Status: DISCONTINUED | OUTPATIENT
Start: 2017-08-12 | End: 2017-08-22 | Stop reason: HOSPADM

## 2017-08-12 RX ORDER — INSULIN ASPART 100 [IU]/ML
22 INJECTION, SOLUTION INTRAVENOUS; SUBCUTANEOUS
Status: DISCONTINUED | OUTPATIENT
Start: 2017-08-12 | End: 2017-08-19

## 2017-08-12 RX ORDER — PANTOPRAZOLE SODIUM 40 MG/10ML
40 INJECTION, POWDER, LYOPHILIZED, FOR SOLUTION INTRAVENOUS DAILY
Status: DISCONTINUED | OUTPATIENT
Start: 2017-08-12 | End: 2017-08-18 | Stop reason: SDUPTHER

## 2017-08-12 RX ORDER — PROPOFOL 10 MG/ML
20 INJECTION, EMULSION INTRAVENOUS CONTINUOUS
Status: DISCONTINUED | OUTPATIENT
Start: 2017-08-12 | End: 2017-08-20

## 2017-08-12 RX ORDER — EPINEPHRINE 1 MG/ML
0.3 INJECTION, SOLUTION INTRACARDIAC; INTRAMUSCULAR; INTRAVENOUS; SUBCUTANEOUS ONCE
Status: COMPLETED | OUTPATIENT
Start: 2017-08-12 | End: 2017-08-12

## 2017-08-12 RX ORDER — IPRATROPIUM BROMIDE AND ALBUTEROL SULFATE 2.5; .5 MG/3ML; MG/3ML
3 SOLUTION RESPIRATORY (INHALATION) EVERY 6 HOURS
Status: DISCONTINUED | OUTPATIENT
Start: 2017-08-12 | End: 2017-08-22 | Stop reason: HOSPADM

## 2017-08-12 RX ORDER — DIPHENHYDRAMINE HYDROCHLORIDE 50 MG/ML
25 INJECTION INTRAMUSCULAR; INTRAVENOUS EVERY 8 HOURS
Status: DISCONTINUED | OUTPATIENT
Start: 2017-08-12 | End: 2017-08-20

## 2017-08-12 RX ORDER — FAMOTIDINE 10 MG/ML
20 INJECTION INTRAVENOUS ONCE
Status: COMPLETED | OUTPATIENT
Start: 2017-08-12 | End: 2017-08-12

## 2017-08-12 RX ORDER — METHYLPREDNISOLONE SOD SUCC 125 MG
125 VIAL (EA) INJECTION EVERY 6 HOURS
Status: DISCONTINUED | OUTPATIENT
Start: 2017-08-12 | End: 2017-08-14

## 2017-08-12 RX ADMIN — DIPHENHYDRAMINE HYDROCHLORIDE 25 MG: 50 INJECTION, SOLUTION INTRAMUSCULAR; INTRAVENOUS at 09:08

## 2017-08-12 RX ADMIN — IPRATROPIUM BROMIDE AND ALBUTEROL SULFATE 3 ML: .5; 3 SOLUTION RESPIRATORY (INHALATION) at 07:08

## 2017-08-12 RX ADMIN — INSULIN ASPART 2 UNITS: 100 INJECTION, SOLUTION INTRAVENOUS; SUBCUTANEOUS at 10:08

## 2017-08-12 RX ADMIN — FUROSEMIDE 40 MG: 40 TABLET ORAL at 09:08

## 2017-08-12 RX ADMIN — CARVEDILOL 3.12 MG: 3.12 TABLET, FILM COATED ORAL at 09:08

## 2017-08-12 RX ADMIN — RACEPINEPHRINE HYDROCHLORIDE 0.5 ML: 11.25 SOLUTION RESPIRATORY (INHALATION) at 04:08

## 2017-08-12 RX ADMIN — IOHEXOL 100 ML: 350 INJECTION, SOLUTION INTRAVENOUS at 03:08

## 2017-08-12 RX ADMIN — ATORVASTATIN CALCIUM 40 MG: 40 TABLET, FILM COATED ORAL at 09:08

## 2017-08-12 RX ADMIN — ENTACAPONE 200 MG: 200 TABLET, FILM COATED ORAL at 04:08

## 2017-08-12 RX ADMIN — KETAMINE HYDROCHLORIDE: 100 INJECTION, SOLUTION, CONCENTRATE INTRAMUSCULAR; INTRAVENOUS at 03:08

## 2017-08-12 RX ADMIN — INSULIN ASPART 22 UNITS: 100 INJECTION, SOLUTION INTRAVENOUS; SUBCUTANEOUS at 04:08

## 2017-08-12 RX ADMIN — SODIUM CHLORIDE: 0.9 INJECTION, SOLUTION INTRAVENOUS at 04:08

## 2017-08-12 RX ADMIN — ENTACAPONE 200 MG: 200 TABLET, FILM COATED ORAL at 09:08

## 2017-08-12 RX ADMIN — ALLOPURINOL 200 MG: 100 TABLET ORAL at 09:08

## 2017-08-12 RX ADMIN — PRAZOSIN HYDROCHLORIDE 1 MG: 1 CAPSULE ORAL at 10:08

## 2017-08-12 RX ADMIN — DIPHENHYDRAMINE HYDROCHLORIDE 25 MG: 50 INJECTION, SOLUTION INTRAMUSCULAR; INTRAVENOUS at 02:08

## 2017-08-12 RX ADMIN — POTASSIUM CHLORIDE 20 MEQ: 1500 TABLET, EXTENDED RELEASE ORAL at 09:08

## 2017-08-12 RX ADMIN — MIRTAZAPINE 15 MG: 15 TABLET, FILM COATED ORAL at 09:08

## 2017-08-12 RX ADMIN — GABAPENTIN 200 MG: 100 CAPSULE ORAL at 09:08

## 2017-08-12 RX ADMIN — IPRATROPIUM BROMIDE AND ALBUTEROL SULFATE 3 ML: .5; 3 SOLUTION RESPIRATORY (INHALATION) at 01:08

## 2017-08-12 RX ADMIN — PROPOFOL 20 MCG/KG/MIN: 10 INJECTION, EMULSION INTRAVENOUS at 07:08

## 2017-08-12 RX ADMIN — BUPROPION HYDROCHLORIDE 150 MG: 150 TABLET, FILM COATED, EXTENDED RELEASE ORAL at 09:08

## 2017-08-12 RX ADMIN — METHYLPREDNISOLONE SODIUM SUCCINATE 125 MG: 125 INJECTION, POWDER, FOR SOLUTION INTRAMUSCULAR; INTRAVENOUS at 11:08

## 2017-08-12 RX ADMIN — ESCITALOPRAM 20 MG: 10 TABLET, FILM COATED ORAL at 09:08

## 2017-08-12 RX ADMIN — RIVAROXABAN 20 MG: 20 TABLET, FILM COATED ORAL at 09:08

## 2017-08-12 RX ADMIN — PROPOFOL 50 MCG/KG/MIN: 10 INJECTION, EMULSION INTRAVENOUS at 09:08

## 2017-08-12 RX ADMIN — CARBIDOPA AND LEVODOPA 2 TABLET: 25; 100 TABLET ORAL at 04:08

## 2017-08-12 RX ADMIN — SODIUM CHLORIDE 500 ML: 0.9 INJECTION, SOLUTION INTRAVENOUS at 10:08

## 2017-08-12 RX ADMIN — DEXAMETHASONE SODIUM PHOSPHATE 10 MG: 4 INJECTION, SOLUTION INTRAMUSCULAR; INTRAVENOUS at 02:08

## 2017-08-12 RX ADMIN — DEXMEDETOMIDINE HYDROCHLORIDE 0.2 MCG/KG/HR: 100 INJECTION, SOLUTION INTRAVENOUS at 10:08

## 2017-08-12 RX ADMIN — ENTACAPONE 200 MG: 200 TABLET, FILM COATED ORAL at 11:08

## 2017-08-12 RX ADMIN — TRAZODONE HYDROCHLORIDE 150 MG: 50 TABLET ORAL at 09:08

## 2017-08-12 RX ADMIN — CARBIDOPA AND LEVODOPA 2 TABLET: 25; 100 TABLET ORAL at 09:08

## 2017-08-12 RX ADMIN — THERA TABS 1 TABLET: TAB at 09:08

## 2017-08-12 RX ADMIN — EPINEPHRINE 0.3 MG: 1 INJECTION INTRAMUSCULAR; INTRAVENOUS; SUBCUTANEOUS at 02:08

## 2017-08-12 RX ADMIN — PROPOFOL 20 MCG/KG/MIN: 10 INJECTION, EMULSION INTRAVENOUS at 10:08

## 2017-08-12 RX ADMIN — OLANZAPINE 10 MG: 5 TABLET ORAL at 09:08

## 2017-08-12 RX ADMIN — ONDANSETRON: 2 INJECTION, SOLUTION INTRAMUSCULAR; INTRAVENOUS at 04:08

## 2017-08-12 RX ADMIN — DIPHENHYDRAMINE HYDROCHLORIDE 50 MG: 50 INJECTION, SOLUTION INTRAMUSCULAR; INTRAVENOUS at 02:08

## 2017-08-12 RX ADMIN — INSULIN ASPART 8 UNITS: 100 INJECTION, SOLUTION INTRAVENOUS; SUBCUTANEOUS at 07:08

## 2017-08-12 RX ADMIN — CARBIDOPA AND LEVODOPA 2 TABLET: 25; 100 TABLET ORAL at 11:08

## 2017-08-12 RX ADMIN — PANTOPRAZOLE SODIUM 40 MG: 40 INJECTION, POWDER, FOR SOLUTION INTRAVENOUS at 09:08

## 2017-08-12 RX ADMIN — PROPOFOL 20 MCG/KG/MIN: 10 INJECTION, EMULSION INTRAVENOUS at 06:08

## 2017-08-12 RX ADMIN — PROPOFOL 20 MCG/KG/MIN: 10 INJECTION, EMULSION INTRAVENOUS at 04:08

## 2017-08-12 RX ADMIN — INSULIN ASPART 22 UNITS: 100 INJECTION, SOLUTION INTRAVENOUS; SUBCUTANEOUS at 12:08

## 2017-08-12 RX ADMIN — BUPROPION HYDROCHLORIDE 150 MG: 150 TABLET, FILM COATED, EXTENDED RELEASE ORAL at 10:08

## 2017-08-12 RX ADMIN — NYSTATIN 500000 UNITS: 100000 SUSPENSION ORAL at 12:08

## 2017-08-12 RX ADMIN — PANCRELIPASE 6 CAPSULE: 24000; 76000; 120000 CAPSULE, DELAYED RELEASE PELLETS ORAL at 09:08

## 2017-08-12 RX ADMIN — GABAPENTIN 200 MG: 100 CAPSULE ORAL at 02:08

## 2017-08-12 RX ADMIN — PROPOFOL 50 MCG/KG/MIN: 10 INJECTION, EMULSION INTRAVENOUS at 02:08

## 2017-08-12 RX ADMIN — POTASSIUM CHLORIDE 20 MEQ: 20 SOLUTION ORAL at 10:08

## 2017-08-12 RX ADMIN — CEFTRIAXONE 1 G: 1 INJECTION, SOLUTION INTRAVENOUS at 02:08

## 2017-08-12 RX ADMIN — INSULIN ASPART 8 UNITS: 100 INJECTION, SOLUTION INTRAVENOUS; SUBCUTANEOUS at 12:08

## 2017-08-12 RX ADMIN — RACEPINEPHRINE HYDROCHLORIDE 0.5 ML: 11.25 SOLUTION RESPIRATORY (INHALATION) at 02:08

## 2017-08-12 RX ADMIN — METHYLPREDNISOLONE SODIUM SUCCINATE 125 MG: 125 INJECTION, POWDER, FOR SOLUTION INTRAMUSCULAR; INTRAVENOUS at 06:08

## 2017-08-12 RX ADMIN — PROPOFOL 1000 MG: 10 INJECTION, EMULSION INTRAVENOUS at 04:08

## 2017-08-12 RX ADMIN — Medication 20 MG: at 03:08

## 2017-08-12 RX ADMIN — INSULIN ASPART 12 UNITS: 100 INJECTION, SOLUTION INTRAVENOUS; SUBCUTANEOUS at 10:08

## 2017-08-12 RX ADMIN — PROPOFOL 50 MCG/KG/MIN: 10 INJECTION, EMULSION INTRAVENOUS at 11:08

## 2017-08-12 NOTE — PROGRESS NOTES
Progress Note    Admit Date: 8/11/2017   LOS: 0 days     SUBJECTIVE:     Chief Complaint: Worsening SOB and sore-throat for 1 day     History of Present Illness:  Patient is a 62 y.o. male admitted to Hospitalist Service from [facility] Emergency Room with complaint of worsening SOB and sore-throat for 1 day. Patient has PMH significant for COPD, CAD, DM-2, depression, DVT, hypertension, history of MI, history of PE and chronic pain. Patient is continuing to smoke cigarettes. Patient presented with complaint of subjective fever, sore-throat and SOB for 1-2 days. Patient admits yellow productive cough and frequent wheezing. Patient requiring multiple doses of nebulization treatments in the ER. Patient denied chest pain, abdominal pain, nausea, vomiting, headache, vision changes, focal neuro-deficits. Patient admitted non-compliance with medications and diet. He had few doughnuts last night and now blood sugars are high.    Interval history  Patient seen and examined this morning. Patient intubated last night due to worsening respiratory status. Follow up with pulmonary for further recommendations.       Scheduled Meds:   albuterol-ipratropium 2.5mg-0.5mg/3mL  3 mL Nebulization Q6H    allopurinol  200 mg Oral Daily    atorvastatin  40 mg Oral Daily    buPROPion  150 mg Oral BID    carbidopa-levodopa  mg  2 tablet Oral TID    And    entacapone  200 mg Oral TID    carvedilol  3.125 mg Oral BID    cefTRIAXone (ROCEPHIN) IVPB  1 g Intravenous Q24H    diphenhydrAMINE  25 mg Intravenous Q8H    escitalopram oxalate  20 mg Oral QHS    furosemide  40 mg Oral Daily    gabapentin  200 mg Oral TID    insulin aspart  20 Units Subcutaneous TID AC    insulin detemir  75 Units Subcutaneous QHS    lipase-protease-amylase 24,000-76,000-120,000 units  6 capsule Oral TID    methylPREDNISolone sodium succinate  125 mg Intravenous Q6H    mirtazapine  15 mg Oral QHS    multivitamin  1 tablet Oral Daily     nystatin  5 mL Oral Q6H    olanzapine  10 mg Oral Daily    pantoprazole  40 mg Intravenous Daily    potassium chloride SA  20 mEq Oral BID    prazosin  1 mg Oral QHS    rivaroxaban  20 mg Oral Daily    trazodone  150 mg Oral QHS     Continuous Infusions:   sodium chloride 0.9% 100 mL/hr at 08/12/17 0400    propofol 50 mcg/kg/min (08/12/17 0928)     PRN Meds:acetaminophen, dextrose 50%, dextrose 50%, glucagon (human recombinant), glucose, glucose, hydrOXYzine HCl, insulin aspart, ondansetron, ondansetron, oxycodone-acetaminophen, quetiapine, senna-docusate 8.6-50 mg    Review of patient's allergies indicates:   Allergen Reactions    Bee pollens Anaphylaxis     Bee stings     Penicillins Nausea Only     Other reaction(s): Unknown    Codeine Rash     Other reaction(s): Unknown    Morphine Rash       Review of Systems  -unable to perform patient is intubated     OBJECTIVE:     Vital Signs (Most Recent)  Temp: 97.8 °F (36.6 °C) (08/12/17 0730)  Pulse: 81 (08/12/17 1000)  Resp: (!) 22 (08/12/17 0915)  BP: 138/73 (08/12/17 1000)  SpO2: 95 % (08/12/17 1000)    Vital Signs Range (Last 24H):  Temp:  [97.8 °F (36.6 °C)-99.1 °F (37.3 °C)]   Pulse:  []   Resp:  [12-34]   BP: ()/(53-81)   SpO2:  [93 %-99 %]     I & O (Last 24H):  Intake/Output Summary (Last 24 hours) at 08/12/17 1023  Last data filed at 08/12/17 1000   Gross per 24 hour   Intake              585 ml   Output             2050 ml   Net            -1465 ml     Physical Exam:  General appearance: INTUBATED and sedated   Head: normocephalic, atraumatic  Eyes:  conjunctivae/corneas clear. PERRL.  Nose: Nares normal. Septum midline.  Throat: lips, mucosa, and tongue normal; teeth and gums normal, no throat erythema.  Neck: supple, symmetrical, trachea midline, no JVD and thyroid not enlarged, symmetric, no tenderness/mass/nodules  Lungs:  Bilateral scattered rhonchi with prolonged expiration  Chest wall: no tenderness  Heart: regular rate and  rhythm, S1, S2 normal, no murmur, click, rub or gallop  Abdomen: soft, non-tender non-distented; bowel sounds normal; no masses,  no organomegaly  Extremities: no cyanosis, clubbing or edema.   Pulses: 2+ and symmetric  Skin: Skin color, texture, turgor normal. No rashes or lesions.  Lymph nodes: Cervical, supraclavicular, and axillary nodes normal.  Neurologic: Normal strength and tone. No focal numbness or weakness. CNII-XII intact.    Laboratory:  CBC:   Recent Labs  Lab 08/12/17  0628   WBC 19.10*   RBC 4.13*   HGB 13.7*   HCT 38.0*      MCV 92   MCH 33.3*   MCHC 36.1*     CMP:   Recent Labs  Lab 08/12/17 0628   *   CALCIUM 8.7   ALBUMIN 3.3*   PROT 7.4   *   K 4.8   CO2 23   CL 93*   BUN 11   CREATININE 0.8   ALKPHOS 66   ALT 11   AST 11   BILITOT 0.5       Diagnostic Results:  Labs: Reviewed  X-Ray: Reviewed  /    ASSESSMENT/PLAN:      *COPD exacerbation [J44.1]  Intubated --> follow up pulmonary   Prednisone 30 mg po q day.  Continue beta 2 agonist bronchodilator treatments.   Continue IV antibiotics.  Check sputum GS and Cx.   Continue routine medications as before. Patient stated, he quit smoking 2 weeks ago.   Yes    Diabetes mellitus, type II [E11.9] - uncontrolled due to non-compliance with diet  Check blood glucose level q AC/HS.  Use Novolog Insulin Sliding Scale as needed.   Continue American Diabetic Association 1800 Kcal diet.  Dietry compliance discussed with patient.   Yes    Benign essential HTN [I10]  Chronic problem. Will continue chronic medications and monitor for any changes, adjusting as needed.   Yes    JOSE ALBERTO (obstructive sleep apnea) [G47.33]  Use CPAP as needed.      Yes    Depression [F32.9]  Chronic problem. Will continue chronic medications and monitor for any changes, adjusting as needed.   Yes    CAD (coronary artery disease) [I25.10]  Patient with known CAD and monitor for S/Sx of angina/ACS. Continue to monitor on telemetry.   Yes    DVT (deep venous  thrombosis)- Hx IVC filter [I82.409]  Lovenox 40 mg SQ q day.      Yes              VTE Risk Mitigation      Lovenox 40 mg SQ q day     Schuyler Lino MD  [facility]  8/12/2017 10:26 AM

## 2017-08-12 NOTE — PLAN OF CARE
Problem: Patient Care Overview  Goal: Plan of Care Review  Ventilated via oral ETT settings as charted alarms oambu, sx at bedside aerosolQ 4 W/A

## 2017-08-12 NOTE — PLAN OF CARE
Problem: Patient Care Overview  Goal: Plan of Care Review  Outcome: Ongoing (interventions implemented as appropriate)  Pt remains intubated and sedated. Even w/ propofol maxed at 50 mcg/kg/min pt opens eyes and follows commands. His BG remains elevated, his insulin aspart and levemir were both increased today. He's remained free from falls/injuries this shift.

## 2017-08-12 NOTE — PROGRESS NOTES
Pt intubated by Dr. Iglesias with a 7.0 ETT. ETT secured 24 cm at the lips and patent. Settings as documented. Pt maintaining sats at this time of >92% with 40% FiO2. All alarms on and functioning with [facility] at [facility]. Duoneb ordered Q6.

## 2017-08-12 NOTE — NURSING
DR. Iglesias and BE Murphy at bedside  To Assess pt. New orders received.Pt continues to have difficulty breathing. Pt on Bipap. Pt to be transfer to ICU. House supervisor, charge nurse notified.

## 2017-08-12 NOTE — NURSING
Pt continue to complain of difficulty breathing.Assess pt and notice has with patches in the back of mouth. Respiratory in room with pt. resp stated pt has respiratory stridor. Called JOYA An and notified that the pt was still complaining of breathing difficulty and that pt gland in neck was swollen. Also notified that respiratory therapist had put pt on Bipap. PA state that she would have NP Katherine have a look at pt.

## 2017-08-12 NOTE — NURSING
CT scan completed. Pt arrived to ICU on bipap. Report given to Divina. DR. Iglesias and BE Murphy at bedside.
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
## 2017-08-12 NOTE — NURSING
C/o of sob. Pt on 2L nc. O2 sat 98%. Pt ambulated around the unit without oxygen. Tolerated well.  Reminded pt to  Not get out of bed without oxygen. Will continue to monitor.

## 2017-08-12 NOTE — NURSING
Pt stated that he is not taking any meds for BG at home. He was taking Metformin previously but it was dc by physician because it was causing him to have diarrhea. Pt is also c/o difficulty breathing. His O2 is 98% on 2L nc. he received resp Tx at 1945 next dose resp tx will be 2345. Please advice. JOYA An notified. Will continue to monitor

## 2017-08-12 NOTE — PROGRESS NOTES
"Called by nursing staff multiple times for different issues:    1. Elevated blood sugar: patient is on his routine insulin dose in addition to a sliding scale. He was due for his nightly dose of insulin 15 minutes after the initial call was made. Nurse was instructed to continue home medications. Increase sliding scale and monitor. He is currently on steroids for his COPD which is most likely the cause of his hyperglycemia in addition to his known DM-2.    2. Called for shortness of breath with oxygen saturation of 98%. CXR reviewed. EKG obtained today and patient denying chest pain. Patient ambulated around unit, per nursing staff, without oxygen, after complaint of shortness of breath, with no complaints. His oxygen saturation is stable. Call at 12:00AM per nursing reports patient is still with stable oxygen saturation and waiting on nebulizing treatment from respiratory. No further work up at this time. Please call if oxygen saturation worsens or patient has increased work of breathing. Do not allow patient to ambulate without oxygen. Patient with PMH significant for DVT and PE. Chart does show documented IVF filter. Patient with history of medication non-compliance. May require further workup if no improvement.    Called back by nursing staff and reported patient was placed on BiPAP for "swollen glands'. She is unable to give me more information. Called Iona Marroquin NP in house to have her go access patient. She reports stridor and patient was given racemic epi and continuous pulse ox with no improvement. Patient moved to ICU for closer monitoring.     Discussed with YOEL Marroquin NP. Patient was evaluated by ED physician. Patient with continued stridor and increased work of breathing despite BiPAP. STAT CT neck ordered by in house providers, which was negative for acute findings including no PTA or oral swelling. I discussed the case with Dr. Wu who recommends intubation by ED provider with  continued steroids and " anti-histamine. Hold any ACE/ARB medication. No other recommendations from MD at this time. Attempted to call ICU nursing staff, will call back to check on patient.

## 2017-08-12 NOTE — NURSING
To CT scan, with BIPAP on, accompanied by nurse and respiratory therapist, pt's wife called with no answer, pt's sister called to inform of pt's transfer to ICU after CT scan.

## 2017-08-12 NOTE — NURSING
"Patient sister (Lisette) called. Update given that patient in ICU. Explained patient intubated and restrained. Lisette states "his wife was very tired and I will call her in the morning and let her know. Reassurance given. Password established.  "

## 2017-08-12 NOTE — PROGRESS NOTES
08/11/17 1945   Patient Assessment/Suction   Level of Consciousness (AVPU) alert   Respiratory Effort Normal   Expansion/Accessory Muscles/Retractions no retractions;no use of accessory muscles;expansion symmetric   All Lung Fields Breath Sounds diminished   Rhythm/Pattern, Respiratory unlabored   Cough Frequency infrequent   Cough Type good   Suction Method not required   PRE-TX-O2-ETCO2   O2 Device (Oxygen Therapy) nasal cannula   $ Is the patient on Oxygen? Yes   Flow (L/min) 2   SpO2 96 %   Pulse Oximetry Type Intermittent   $ Pulse Oximetry - Multiple Charge Pulse Oximetry - Multiple   Pulse 87   Resp 18   Aerosol Therapy   $ Aerosol Therapy Charges Aerosol Treatment   Respiratory Treatment Status given   SVN/Inhaler Treatment Route mask   Position During Treatment HOB at 30 degrees   Patient Tolerance good   Post-Treatment   Post-treatment Heart Rate (beats/min) 88   Post-treatment Resp Rate (breaths/min) 18   All Fields Breath Sounds aeration increased

## 2017-08-12 NOTE — SIGNIFICANT EVENT
Results for GODFREY SUAREZ (MRN [id number]) as of 8/12/2017 05:30   Ref. Range 8/12/2017 05:19   POC PH Latest Ref Range: 7.35 - 7.45  7.322 (L)   POC PCO2 Latest Ref Range: 35 - 45 mmHg 55.5 (H)   POC PO2 Latest Ref Range: 80 - 100 mmHg 68 (L)   POC BE Latest Ref Range: -2 to 2 mmol/L 3   POC HCO3 Latest Ref Range: 24 - 28 mmol/L 28.7 (H)   POC SATURATED O2 Latest Ref Range: 95 - 100 % 91 (L)   POC TCO2 Latest Ref Range: 23 - 27 mmol/L 30 (H)   FiO2 Unknown 40   Vt Unknown 500   PiP Unknown 20   PEEP Unknown 5   Sample Unknown ARTERIAL   DelSys Unknown Adult Vent   Allens Test Unknown Pass   Site Unknown RR   Mode Unknown AC/PRVC   Results reported to AARTI Murcia ATT. No critical values.

## 2017-08-12 NOTE — NURSING
pt  BG is 423, gave 5 unit insulin per sliding scale, and 75 unit levemir. BE Araya notified. Will continue to monitor

## 2017-08-12 NOTE — PLAN OF CARE
Pt called stating he was having difficulty breathing. Pt noted to have inspiratory stridor & supra clavicular retractions both of which improved somewhat in the sniff position. Room Air P Ox 04%. Pt's nurse aware.     BiPAP 13/8 @ 30% started & successfully tolerated by patient.. Improved respiration per patient. He states easier to breath & my throat not making those noises.    POx 98% with BiPAP 13/8 in use and patient resting comfortably after 5 minutes.

## 2017-08-12 NOTE — PLAN OF CARE
Problem: Patient Care Overview  Goal: Plan of Care Review  Pt with c/o throat hurting every time he swallows. Slight redness noted to throat. Gave pt sucret lozenges and throat spray. Order obtained for nystatin. Pt only able to eat soup, jello, and mashed potatoes.

## 2017-08-12 NOTE — SIGNIFICANT EVENT
Called to examine patient.    Upon entering room he appears very comfortable on bipap with reported pulse ox 98% on 13/8 at 30% FiO2.    Upon removal of bipap the patient appears anxious, inspiratory stridor noted with coarse breath sounds upon tracheal auscultation, does have good breath sounds bilaterally in lower lung fields.  Stridorous inhalation noted with swollen, tender RIGHT cervical lymphadenopathy, upon examination of throat exudate noted, no edema or erythema.  Patient is alert, oriented, and does not appear sedate or lethargic.  He reports worsening of throat discomfort and increased sensation of swelling throughout the night.  He reports great improvement on bipap      Discussed with PA on call for Dr. Henry's service.  With apparent stridor and improved condition on bipap, will continue bipap therapy for now, administer racemic epi inhalation, monitor continuous pulse ox, continue steroid therapy as currently ordered, and escalate care as required.

## 2017-08-12 NOTE — PROGRESS NOTES
08/12/17 0215   Patient Assessment/Suction   Level of Consciousness (AVPU) alert   Expansion/Accessory Muscles/Retractions supraclavicular retractions   All Lung Fields Breath Sounds stridorous, inspiratory   Rhythm/Pattern, Respiratory mechanical device   PRE-TX-O2-ETCO2   O2 Device (Oxygen Therapy) BiPAP   Oxygen Concentration (%) 30   SpO2 99 %   Pulse Oximetry Type Continuous   Pulse 86   Resp 12   Aerosol Therapy   $ Aerosol Therapy Charges Aerosol Treatment   Respiratory Treatment Status given   SVN/Inhaler Treatment Route in-line;mask   Position During Treatment HOB at 30 degrees   Patient Tolerance good   Post-Treatment   Post-treatment Heart Rate (beats/min) 65   Post-treatment Resp Rate (breaths/min) 13   All Fields Breath Sounds aeration increased   Ready to Wean/Extubation Screen   FIO2<=50 (chart decimal) 0.3

## 2017-08-12 NOTE — SIGNIFICANT EVENT
Patient arrived to ICU after CT neck, bipap removed with continued inspiratory stridor and supraclavicular retractions.  No benefit noted from IM epinephrine, benadryl, pepcid, decadron IV, and repeat racemic epi inhalation.  Dr. Iglesias notifed, at bedside with anesthesiologist-- Dr. Espinoza-- for intubation for airway maintenance. JOYA An notified of status requiring intubation-- further care per on call provider, patient stabilized with ventilator and sedation orders placed.    I made two attempts to contact both his spouse and sister listed on emergency contact list with no answer.

## 2017-08-12 NOTE — PLAN OF CARE
CM spoke to sister, Lisette Monsivais, next of kin, [phone] and significant other, Cheryle Villafana, whom patient lives with to get information. CM verified address and insurance. They report that patient was previously independent at home, and does not have HH. Patient sees MELODY Ochoa NP  for primary care.  Patient jose snot have POA or living will. Discharge plans are unsure at this time, patient in ICU and intubated.      08/12/17 5779   Discharge Assessment   Assessment Type Discharge Planning Assessment   Assessment information obtained from? Caregiver   Prior to hospitilization cognitive status: Alert/Oriented   Prior to hospitalization functional status: Assistive Equipment   Current cognitive status: Unable to Assess   Current Functional Status: Completely Dependent   Arrived From home or self-care   Lives With significant other  (Cheryle Broderick [phone]- significant other)   Able to Return to Prior Arrangements unable to determine at this time (comments)   Is patient able to care for self after discharge? Unable to determine at this time (comments)   How many people do you have in your home that can help with your care after discharge? 1   Who are your caregiver(s) and their phone number(s)? Cheryle Villafana- significant other [phone]   Patient's perception of discharge disposition home or selfcare   Readmission Within The Last 30 Days no previous admission in last 30 days   Patient currently being followed by outpatient case management? No   Patient currently receives home health services? No   Does the patient currently use HME? No   Patient currently receives private duty nursing? No   Patient currently receives any other outside agency services? No   Equipment Currently Used at Home shower chair;cane, quad   Do you have any problems affording any of your prescribed medications? No   Is the patient taking medications as prescribed? yes   Do you have any financial concerns preventing you from  receiving the healthcare you need? No   Does the patient have transportation to healthcare appointments? Yes   Transportation Available car   On Dialysis? No   Does the patient receive services at the [facility]? No   Are there any open cases? No   Discharge Plan A Home   Patient/Family In Agreement With Plan yes

## 2017-08-13 LAB
ALLENS TEST: ABNORMAL
ANION GAP SERPL CALC-SCNC: 10 MMOL/L
ANION GAP SERPL CALC-SCNC: 9 MMOL/L
BASOPHILS # BLD AUTO: 0 K/UL
BASOPHILS NFR BLD: 0.3 %
BUN SERPL-MCNC: 15 MG/DL
BUN SERPL-MCNC: 18 MG/DL
CALCIUM SERPL-MCNC: 8.9 MG/DL
CALCIUM SERPL-MCNC: 9.1 MG/DL
CHLORIDE SERPL-SCNC: 104 MMOL/L
CHLORIDE SERPL-SCNC: 105 MMOL/L
CO2 SERPL-SCNC: 24 MMOL/L
CO2 SERPL-SCNC: 24 MMOL/L
CREAT SERPL-MCNC: 0.8 MG/DL
CREAT SERPL-MCNC: 0.8 MG/DL
DELSYS: ABNORMAL
DIFFERENTIAL METHOD: ABNORMAL
EOSINOPHIL # BLD AUTO: 0 K/UL
EOSINOPHIL NFR BLD: 0 %
ERYTHROCYTE [DISTWIDTH] IN BLOOD BY AUTOMATED COUNT: 13.2 %
ERYTHROCYTE [SEDIMENTATION RATE] IN BLOOD BY WESTERGREN METHOD: 14 MM/H
ERYTHROCYTE [SEDIMENTATION RATE] IN BLOOD BY WESTERGREN METHOD: 18 MM/H
EST. GFR  (AFRICAN AMERICAN): >60 ML/MIN/1.73 M^2
EST. GFR  (AFRICAN AMERICAN): >60 ML/MIN/1.73 M^2
EST. GFR  (NON AFRICAN AMERICAN): >60 ML/MIN/1.73 M^2
EST. GFR  (NON AFRICAN AMERICAN): >60 ML/MIN/1.73 M^2
ETCO2: 30
FIO2: 45
FIO2: 45
FIO2: 50
GLUCOSE SERPL-MCNC: 271 MG/DL
GLUCOSE SERPL-MCNC: 295 MG/DL
HCO3 UR-SCNC: 26.3 MMOL/L (ref 24–28)
HCO3 UR-SCNC: 29.1 MMOL/L (ref 24–28)
HCO3 UR-SCNC: 29.9 MMOL/L (ref 24–28)
HCT VFR BLD AUTO: 41.3 %
HGB BLD-MCNC: 13.5 G/DL
LYMPHOCYTES # BLD AUTO: 1.2 K/UL
LYMPHOCYTES NFR BLD: 7.3 %
MCH RBC QN AUTO: 30.7 PG
MCHC RBC AUTO-ENTMCNC: 32.8 G/DL
MCV RBC AUTO: 94 FL
MIN VOL: 12
MIN VOL: 9.5
MODE: ABNORMAL
MONOCYTES # BLD AUTO: 0.4 K/UL
MONOCYTES NFR BLD: 2.2 %
NEUTROPHILS # BLD AUTO: 15 K/UL
NEUTROPHILS NFR BLD: 90.2 %
PCO2 BLDA: 37.7 MMHG (ref 35–45)
PCO2 BLDA: 40.3 MMHG (ref 35–45)
PCO2 BLDA: 42.6 MMHG (ref 35–45)
PEEP: 5
PH SMN: 7.45 [PH] (ref 7.35–7.45)
PH SMN: 7.45 [PH] (ref 7.35–7.45)
PH SMN: 7.47 [PH] (ref 7.35–7.45)
PIP: 16
PIP: 24
PLATELET # BLD AUTO: 171 K/UL
PMV BLD AUTO: 8.4 FL
PO2 BLDA: 56 MMHG (ref 80–100)
PO2 BLDA: 57 MMHG (ref 80–100)
PO2 BLDA: 71 MMHG (ref 80–100)
POC BE: 2 MMOL/L
POC BE: 5 MMOL/L
POC BE: 6 MMOL/L
POC SATURATED O2: 90 % (ref 95–100)
POC SATURATED O2: 91 % (ref 95–100)
POC SATURATED O2: 95 % (ref 95–100)
POC TCO2: 27 MMOL/L (ref 23–27)
POC TCO2: 30 MMOL/L (ref 23–27)
POC TCO2: 31 MMOL/L (ref 23–27)
POCT GLUCOSE: 167 MG/DL (ref 70–110)
POCT GLUCOSE: 223 MG/DL (ref 70–110)
POCT GLUCOSE: 223 MG/DL (ref 70–110)
POCT GLUCOSE: 279 MG/DL (ref 70–110)
POCT GLUCOSE: 285 MG/DL (ref 70–110)
POTASSIUM SERPL-SCNC: 4.1 MMOL/L
POTASSIUM SERPL-SCNC: 4.5 MMOL/L
PS: 10
RBC # BLD AUTO: 4.4 M/UL
SAMPLE: ABNORMAL
SITE: ABNORMAL
SODIUM SERPL-SCNC: 138 MMOL/L
SODIUM SERPL-SCNC: 138 MMOL/L
SP02: 93
SP02: 93
SPONT RATE: 17
VT: 500
VT: 500
WBC # BLD AUTO: 16.7 K/UL

## 2017-08-13 PROCEDURE — 20000000 HC ICU ROOM

## 2017-08-13 PROCEDURE — 25000242 PHARM REV CODE 250 ALT 637 W/ HCPCS: Performed by: NURSE PRACTITIONER

## 2017-08-13 PROCEDURE — 63600175 PHARM REV CODE 636 W HCPCS: Performed by: NURSE PRACTITIONER

## 2017-08-13 PROCEDURE — C9113 INJ PANTOPRAZOLE SODIUM, VIA: HCPCS | Performed by: INTERNAL MEDICINE

## 2017-08-13 PROCEDURE — 25000003 PHARM REV CODE 250: Performed by: NURSE PRACTITIONER

## 2017-08-13 PROCEDURE — 63600175 PHARM REV CODE 636 W HCPCS

## 2017-08-13 PROCEDURE — 94761 N-INVAS EAR/PLS OXIMETRY MLT: CPT

## 2017-08-13 PROCEDURE — 85025 COMPLETE CBC W/AUTO DIFF WBC: CPT

## 2017-08-13 PROCEDURE — 94770 HC EXHALED C02 TEST: CPT

## 2017-08-13 PROCEDURE — 0B110F4 BYPASS TRACHEA TO CUTANEOUS WITH TRACHEOSTOMY DEVICE, OPEN APPROACH: ICD-10-PCS | Performed by: SURGERY

## 2017-08-13 PROCEDURE — 63600175 PHARM REV CODE 636 W HCPCS: Performed by: PHYSICIAN ASSISTANT

## 2017-08-13 PROCEDURE — 82803 BLOOD GASES ANY COMBINATION: CPT

## 2017-08-13 PROCEDURE — 27000221 HC OXYGEN, UP TO 24 HOURS

## 2017-08-13 PROCEDURE — 0BH18EZ INSERTION OF ENDOTRACHEAL AIRWAY INTO TRACHEA, VIA NATURAL OR ARTIFICIAL OPENING ENDOSCOPIC: ICD-10-PCS | Performed by: INTERNAL MEDICINE

## 2017-08-13 PROCEDURE — 99900035 HC TECH TIME PER 15 MIN (STAT)

## 2017-08-13 PROCEDURE — 25000003 PHARM REV CODE 250: Performed by: INTERNAL MEDICINE

## 2017-08-13 PROCEDURE — 99291 CRITICAL CARE FIRST HOUR: CPT | Mod: ,,, | Performed by: INTERNAL MEDICINE

## 2017-08-13 PROCEDURE — 25000003 PHARM REV CODE 250: Performed by: EMERGENCY MEDICINE

## 2017-08-13 PROCEDURE — 5A1955Z RESPIRATORY VENTILATION, GREATER THAN 96 CONSECUTIVE HOURS: ICD-10-PCS | Performed by: INTERNAL MEDICINE

## 2017-08-13 PROCEDURE — 94002 VENT MGMT INPAT INIT DAY: CPT

## 2017-08-13 PROCEDURE — 63600175 PHARM REV CODE 636 W HCPCS: Performed by: FAMILY MEDICINE

## 2017-08-13 PROCEDURE — 63600175 PHARM REV CODE 636 W HCPCS: Performed by: INTERNAL MEDICINE

## 2017-08-13 PROCEDURE — 94003 VENT MGMT INPAT SUBQ DAY: CPT

## 2017-08-13 PROCEDURE — 36415 COLL VENOUS BLD VENIPUNCTURE: CPT

## 2017-08-13 PROCEDURE — 63600175 PHARM REV CODE 636 W HCPCS: Performed by: EMERGENCY MEDICINE

## 2017-08-13 PROCEDURE — 94640 AIRWAY INHALATION TREATMENT: CPT

## 2017-08-13 PROCEDURE — 80048 BASIC METABOLIC PNL TOTAL CA: CPT

## 2017-08-13 PROCEDURE — 25000003 PHARM REV CODE 250

## 2017-08-13 PROCEDURE — 36600 WITHDRAWAL OF ARTERIAL BLOOD: CPT

## 2017-08-13 RX ORDER — INSULIN ASPART 100 [IU]/ML
7 INJECTION, SOLUTION INTRAVENOUS; SUBCUTANEOUS ONCE
Status: COMPLETED | OUTPATIENT
Start: 2017-08-13 | End: 2017-08-13

## 2017-08-13 RX ORDER — SUCCINYLCHOLINE CHLORIDE 20 MG/ML
120 INJECTION INTRAMUSCULAR; INTRAVENOUS ONCE
Status: COMPLETED | OUTPATIENT
Start: 2017-08-13 | End: 2017-08-13

## 2017-08-13 RX ORDER — ETOMIDATE 2 MG/ML
30 INJECTION INTRAVENOUS ONCE
Status: COMPLETED | OUTPATIENT
Start: 2017-08-13 | End: 2017-08-13

## 2017-08-13 RX ADMIN — PROPOFOL 50 MCG/KG/MIN: 10 INJECTION, EMULSION INTRAVENOUS at 03:08

## 2017-08-13 RX ADMIN — SUCCINYLCHOLINE CHLORIDE 120 MG: 20 INJECTION, SOLUTION INTRAMUSCULAR; INTRAVENOUS at 02:08

## 2017-08-13 RX ADMIN — GABAPENTIN 200 MG: 100 CAPSULE ORAL at 06:08

## 2017-08-13 RX ADMIN — FUROSEMIDE 40 MG: 40 TABLET ORAL at 10:08

## 2017-08-13 RX ADMIN — IPRATROPIUM BROMIDE AND ALBUTEROL SULFATE 3 ML: .5; 3 SOLUTION RESPIRATORY (INHALATION) at 07:08

## 2017-08-13 RX ADMIN — PROPOFOL 40 MCG/KG/MIN: 10 INJECTION, EMULSION INTRAVENOUS at 11:08

## 2017-08-13 RX ADMIN — INSULIN ASPART 4 UNITS: 100 INJECTION, SOLUTION INTRAVENOUS; SUBCUTANEOUS at 05:08

## 2017-08-13 RX ADMIN — INSULIN ASPART 1 UNITS: 100 INJECTION, SOLUTION INTRAVENOUS; SUBCUTANEOUS at 10:08

## 2017-08-13 RX ADMIN — PANTOPRAZOLE SODIUM 40 MG: 40 INJECTION, POWDER, FOR SOLUTION INTRAVENOUS at 10:08

## 2017-08-13 RX ADMIN — METHYLPREDNISOLONE SODIUM SUCCINATE 125 MG: 125 INJECTION, POWDER, FOR SOLUTION INTRAMUSCULAR; INTRAVENOUS at 05:08

## 2017-08-13 RX ADMIN — DIPHENHYDRAMINE HYDROCHLORIDE 25 MG: 50 INJECTION, SOLUTION INTRAMUSCULAR; INTRAVENOUS at 09:08

## 2017-08-13 RX ADMIN — PROPOFOL 20 MCG/KG/MIN: 10 INJECTION, EMULSION INTRAVENOUS at 11:08

## 2017-08-13 RX ADMIN — ENTACAPONE 200 MG: 200 TABLET, FILM COATED ORAL at 11:08

## 2017-08-13 RX ADMIN — PROPOFOL 35 MCG/KG/MIN: 10 INJECTION, EMULSION INTRAVENOUS at 09:08

## 2017-08-13 RX ADMIN — LORAZEPAM 2 MG: 2 INJECTION INTRAMUSCULAR; INTRAVENOUS at 02:08

## 2017-08-13 RX ADMIN — ALLOPURINOL 200 MG: 100 TABLET ORAL at 10:08

## 2017-08-13 RX ADMIN — DEXMEDETOMIDINE HYDROCHLORIDE 0.2 MCG/KG/HR: 100 INJECTION, SOLUTION INTRAVENOUS at 09:08

## 2017-08-13 RX ADMIN — ESCITALOPRAM 20 MG: 10 TABLET, FILM COATED ORAL at 09:08

## 2017-08-13 RX ADMIN — DIPHENHYDRAMINE HYDROCHLORIDE 25 MG: 50 INJECTION, SOLUTION INTRAMUSCULAR; INTRAVENOUS at 01:08

## 2017-08-13 RX ADMIN — METHYLPREDNISOLONE SODIUM SUCCINATE 125 MG: 125 INJECTION, POWDER, FOR SOLUTION INTRAMUSCULAR; INTRAVENOUS at 11:08

## 2017-08-13 RX ADMIN — ETOMIDATE 30 MG: 2 INJECTION, SOLUTION INTRAVENOUS at 02:08

## 2017-08-13 RX ADMIN — DIPHENHYDRAMINE HYDROCHLORIDE 25 MG: 50 INJECTION, SOLUTION INTRAMUSCULAR; INTRAVENOUS at 06:08

## 2017-08-13 RX ADMIN — METHYLPREDNISOLONE SODIUM SUCCINATE 125 MG: 125 INJECTION, POWDER, FOR SOLUTION INTRAMUSCULAR; INTRAVENOUS at 12:08

## 2017-08-13 RX ADMIN — PROPOFOL 50 MCG/KG/MIN: 10 INJECTION, EMULSION INTRAVENOUS at 06:08

## 2017-08-13 RX ADMIN — PROPOFOL 20 MCG/KG/MIN: 10 INJECTION, EMULSION INTRAVENOUS at 01:08

## 2017-08-13 RX ADMIN — ATORVASTATIN CALCIUM 40 MG: 40 TABLET, FILM COATED ORAL at 10:08

## 2017-08-13 RX ADMIN — CARBIDOPA AND LEVODOPA 2 TABLET: 25; 100 TABLET ORAL at 11:08

## 2017-08-13 RX ADMIN — NYSTATIN 500000 UNITS: 100000 SUSPENSION ORAL at 12:08

## 2017-08-13 RX ADMIN — INSULIN DETEMIR 90 UNITS: 100 INJECTION, SOLUTION SUBCUTANEOUS at 09:08

## 2017-08-13 RX ADMIN — CEFTRIAXONE 1 G: 1 INJECTION, SOLUTION INTRAVENOUS at 03:08

## 2017-08-13 RX ADMIN — DEXMEDETOMIDINE HYDROCHLORIDE 0.7 MCG/KG/HR: 100 INJECTION, SOLUTION INTRAVENOUS at 01:08

## 2017-08-13 RX ADMIN — PRAZOSIN HYDROCHLORIDE 1 MG: 1 CAPSULE ORAL at 09:08

## 2017-08-13 RX ADMIN — INSULIN ASPART 22 UNITS: 100 INJECTION, SOLUTION INTRAVENOUS; SUBCUTANEOUS at 10:08

## 2017-08-13 RX ADMIN — BUPROPION HYDROCHLORIDE 150 MG: 150 TABLET, FILM COATED, EXTENDED RELEASE ORAL at 09:08

## 2017-08-13 RX ADMIN — GABAPENTIN 200 MG: 100 CAPSULE ORAL at 09:08

## 2017-08-13 RX ADMIN — TRAZODONE HYDROCHLORIDE 150 MG: 50 TABLET ORAL at 09:08

## 2017-08-13 RX ADMIN — RIVAROXABAN 20 MG: 20 TABLET, FILM COATED ORAL at 10:08

## 2017-08-13 RX ADMIN — BUPROPION HYDROCHLORIDE 150 MG: 150 TABLET, FILM COATED, EXTENDED RELEASE ORAL at 10:08

## 2017-08-13 RX ADMIN — METHYLPREDNISOLONE SODIUM SUCCINATE 125 MG: 125 INJECTION, POWDER, FOR SOLUTION INTRAMUSCULAR; INTRAVENOUS at 06:08

## 2017-08-13 RX ADMIN — NYSTATIN 500000 UNITS: 100000 SUSPENSION ORAL at 06:08

## 2017-08-13 RX ADMIN — POTASSIUM CHLORIDE 20 MEQ: 20 SOLUTION ORAL at 09:08

## 2017-08-13 RX ADMIN — INSULIN ASPART 7 UNITS: 100 INJECTION, SOLUTION INTRAVENOUS; SUBCUTANEOUS at 10:08

## 2017-08-13 RX ADMIN — IPRATROPIUM BROMIDE AND ALBUTEROL SULFATE 3 ML: .5; 3 SOLUTION RESPIRATORY (INHALATION) at 08:08

## 2017-08-13 RX ADMIN — INSULIN ASPART 6 UNITS: 100 INJECTION, SOLUTION INTRAVENOUS; SUBCUTANEOUS at 06:08

## 2017-08-13 RX ADMIN — POTASSIUM CHLORIDE 20 MEQ: 20 SOLUTION ORAL at 10:08

## 2017-08-13 RX ADMIN — THERA TABS 1 TABLET: TAB at 10:08

## 2017-08-13 RX ADMIN — CARVEDILOL 3.12 MG: 3.12 TABLET, FILM COATED ORAL at 09:08

## 2017-08-13 RX ADMIN — PANCRELIPASE 6 CAPSULE: 24000; 76000; 120000 CAPSULE, DELAYED RELEASE PELLETS ORAL at 09:08

## 2017-08-13 RX ADMIN — MIRTAZAPINE 15 MG: 15 TABLET, FILM COATED ORAL at 09:08

## 2017-08-13 RX ADMIN — IPRATROPIUM BROMIDE AND ALBUTEROL SULFATE 3 ML: .5; 3 SOLUTION RESPIRATORY (INHALATION) at 12:08

## 2017-08-13 RX ADMIN — CARBIDOPA AND LEVODOPA 2 TABLET: 25; 100 TABLET ORAL at 05:08

## 2017-08-13 RX ADMIN — INSULIN ASPART 22 UNITS: 100 INJECTION, SOLUTION INTRAVENOUS; SUBCUTANEOUS at 05:08

## 2017-08-13 RX ADMIN — ENTACAPONE 200 MG: 200 TABLET, FILM COATED ORAL at 05:08

## 2017-08-13 RX ADMIN — PANCRELIPASE 6 CAPSULE: 24000; 76000; 120000 CAPSULE, DELAYED RELEASE PELLETS ORAL at 06:08

## 2017-08-13 RX ADMIN — OLANZAPINE 10 MG: 5 TABLET ORAL at 10:08

## 2017-08-13 NOTE — PLAN OF CARE
Problem: Patient Care Overview  Goal: Plan of Care Review  Pt reintubated by Dr. Loyd due to increasing SOB and stridor

## 2017-08-13 NOTE — PROGRESS NOTES
Results for GODFREY SUAREZ (MRN [id number]) as of 8/13/2017 06:24   Ref. Range 8/13/2017 05:13   POC PH Latest Ref Range: 7.35 - 7.45  7.454 (H)   POC PCO2 Latest Ref Range: 35 - 45 mmHg 42.6   POC PO2 Latest Ref Range: 80 - 100 mmHg 71 (L)   POC BE Latest Ref Range: -2 to 2 mmol/L 6   POC HCO3 Latest Ref Range: 24 - 28 mmol/L 29.9 (H)   POC SATURATED O2 Latest Ref Range: 95 - 100 % 95   POC TCO2 Latest Ref Range: 23 - 27 mmol/L 31 (H)   FiO2 Unknown 45   Vt Unknown 500   PiP Unknown 24   PEEP Unknown 5   Sample Unknown ARTERIAL   DelSys Unknown Adult Vent   Allens Test Unknown Pass   Site Unknown RR   Mode Unknown AC/PRVC   Results reported to RN ATT. No critical values

## 2017-08-13 NOTE — PLAN OF CARE
Problem: Patient Care Overview  Goal: Plan of Care Review  Outcome: Ongoing (interventions implemented as appropriate)  Pt extubated today, reintubated d/t sever respiratory distress. Pt again intubated and sedated. He's remained free from falls/injuries this shift.

## 2017-08-13 NOTE — PROGRESS NOTES
Progress Note    Admit Date: 8/11/2017   LOS: 1 day     SUBJECTIVE:     Chief Complaint: Worsening SOB and sore-throat for 1 day     History of Present Illness:  Patient is a 62 y.o. male admitted to Hospitalist Service from [facility] Emergency Room with complaint of worsening SOB and sore-throat for 1 day. Patient has PMH significant for COPD, CAD, DM-2, depression, DVT, hypertension, history of MI, history of PE and chronic pain. Patient is continuing to smoke cigarettes. Patient presented with complaint of subjective fever, sore-throat and SOB for 1-2 days. Patient admits yellow productive cough and frequent wheezing. Patient requiring multiple doses of nebulization treatments in the ER. Patient denied chest pain, abdominal pain, nausea, vomiting, headache, vision changes, focal neuro-deficits. Patient admitted non-compliance with medications and diet. He had few doughnuts last night and now blood sugars are high.    Interval history  Patient seen and examined this morning. Patient remains intubated. No acute events overnight. Follow up with pulmonary for further recommendations.       Scheduled Meds:   albuterol-ipratropium 2.5mg-0.5mg/3mL  3 mL Nebulization Q6H    allopurinol  200 mg Oral Daily    atorvastatin  40 mg Oral Daily    buPROPion  150 mg Oral BID    carbidopa-levodopa  mg  2 tablet Oral TID    And    entacapone  200 mg Oral TID    carvedilol  3.125 mg Oral BID    cefTRIAXone (ROCEPHIN) IVPB  1 g Intravenous Q24H    diphenhydrAMINE  25 mg Intravenous Q8H    escitalopram oxalate  20 mg Oral QHS    furosemide  40 mg Oral Daily    gabapentin  200 mg Oral TID    insulin aspart  22 Units Subcutaneous TID AC    insulin aspart  7 Units Subcutaneous Once    insulin detemir  90 Units Subcutaneous QHS    lipase-protease-amylase 24,000-76,000-120,000 units  6 capsule Oral TID    methylPREDNISolone sodium succinate  125 mg Intravenous Q6H    mirtazapine  15 mg Oral QHS     multivitamin  1 tablet Oral Daily    nystatin  5 mL Oral Q6H    olanzapine  10 mg Oral Daily    pantoprazole  40 mg Intravenous Daily    potassium chloride 10%  20 mEq Oral BID    prazosin  1 mg Oral QHS    rivaroxaban  20 mg Oral Daily    trazodone  150 mg Oral QHS     Continuous Infusions:   sodium chloride 0.9% 100 mL/hr at 08/12/17 0400    dexmedetomidine (PRECEDEX) infusion 0.2 mcg/kg/hr (08/12/17 2221)    propofol 20 mcg/kg/min (08/13/17 0124)     PRN Meds:acetaminophen, dextrose 50%, dextrose 50%, glucagon (human recombinant), glucose, glucose, hydrOXYzine HCl, insulin aspart, ondansetron, ondansetron, oxycodone-acetaminophen, quetiapine, senna-docusate 8.6-50 mg    Review of patient's allergies indicates:   Allergen Reactions    Bee pollens Anaphylaxis     Bee stings     Penicillins Nausea Only     Other reaction(s): Unknown    Codeine Rash     Other reaction(s): Unknown    Morphine Rash       Review of Systems  -unable to perform patient is intubated     OBJECTIVE:     Vital Signs (Most Recent)  Temp: 98.8 °F (37.1 °C) (08/13/17 0800)  Pulse: 73 (08/13/17 0845)  Resp: (!) 30 (08/13/17 0845)  BP: 131/66 (08/13/17 0845)  SpO2: (!) 94 % (08/13/17 0845)    Vital Signs Range (Last 24H):  Temp:  [98.6 °F (37 °C)-99.2 °F (37.3 °C)]   Pulse:  [53-96]   Resp:  [14-39]   BP: ()/(53-78)   SpO2:  [89 %-97 %]     I & O (Last 24H):    Intake/Output Summary (Last 24 hours) at 08/13/17 0927  Last data filed at 08/13/17 0600   Gross per 24 hour   Intake          3233.84 ml   Output             3700 ml   Net          -466.16 ml     Physical Exam:  General appearance: INTUBATED and sedated   Head: normocephalic, atraumatic  Eyes:  conjunctivae/corneas clear. PERRL.  Nose: Nares normal. Septum midline.  Throat: lips, mucosa, and tongue normal; teeth and gums normal, no throat erythema.  Neck: supple, symmetrical, trachea midline, no JVD and thyroid not enlarged, symmetric, no tenderness/mass/nodules  Lungs:   Bilateral scattered rhonchi with prolonged expiration  Chest wall: no tenderness  Heart: regular rate and rhythm, S1, S2 normal, no murmur, click, rub or gallop  Abdomen: soft, non-tender non-distented; bowel sounds normal; no masses,  no organomegaly  Extremities: no cyanosis, clubbing or edema.   Pulses: 2+ and symmetric  Skin: Skin color, texture, turgor normal. No rashes or lesions.  Lymph nodes: Cervical, supraclavicular, and axillary nodes normal.  Neurologic: Normal strength and tone. No focal numbness or weakness. CNII-XII intact.    Laboratory:  CBC:     Recent Labs  Lab 08/12/17 0628   WBC 19.10*   RBC 4.13*   HGB 13.7*   HCT 38.0*      MCV 92   MCH 33.3*   MCHC 36.1*     CMP:   Recent Labs  Lab 08/12/17 0628 08/13/17  0352   *  < > 271*   CALCIUM 8.7  < > 9.1   ALBUMIN 3.3*  --   --    PROT 7.4  --   --    *  < > 138   K 4.8  < > 4.5   CO2 23  < > 24   CL 93*  < > 105   BUN 11  < > 15   CREATININE 0.8  < > 0.8   ALKPHOS 66  --   --    ALT 11  --   --    AST 11  --   --    BILITOT 0.5  --   --    < > = values in this interval not displayed.    Diagnostic Results:  Labs: Reviewed  X-Ray: Reviewed  /    ASSESSMENT/PLAN:      *COPD exacerbation [J44.1]  Intubated --> follow up pulmonary   Prednisone 30 mg po q day.  Continue beta 2 agonist bronchodilator treatments.   Continue IV antibiotics.  Check sputum GS and Cx.   Continue routine medications as before. Patient stated, he quit smoking 2 weeks ago.   Yes    Diabetes mellitus, type II [E11.9] - uncontrolled due to non-compliance with diet  Check blood glucose level q AC/HS.  Use Novolog Insulin Sliding Scale as needed.   Continue American Diabetic Association 1800 Kcal diet.  Dietry compliance discussed with patient.   Yes    Benign essential HTN [I10]  Chronic problem. Will continue chronic medications and monitor for any changes, adjusting as needed.   Yes    JOSE ALBERTO (obstructive sleep apnea) [G47.33]  Use CPAP as needed.      Yes     Depression [F32.9]  Chronic problem. Will continue chronic medications and monitor for any changes, adjusting as needed.   Yes    CAD (coronary artery disease) [I25.10]  Patient with known CAD and monitor for S/Sx of angina/ACS. Continue to monitor on telemetry.   Yes    DVT (deep venous thrombosis)- Hx IVC filter [I82.409]  Lovenox 40 mg SQ q day.      Yes              VTE Risk Mitigation      Lovenox 40 mg SQ q day     Schuyler Lino MD  [facility]  8/13/2017 10:26 AM

## 2017-08-13 NOTE — PROGRESS NOTES
Attempted to call pt's wife Cheryle w/ no answer, will try again at a later time.    Calvin Mello RN

## 2017-08-13 NOTE — CODE DOCUMENTATION
Intubation documentation.    Called to bedside for patient in severe respiratory distress on BiPAP with audible stridor.  Presentation note revealed the patient had significant subglottic stenosis upon intubation.  Patient's O2 sats are in the low 90s on BiPAP the patient with significant work of breathing.  RSI was initiated with etomidate and succinylcholine.  4-0 mac laryngoscope blade used to visualize 100% of the cords.  Significant edema noted in the subglottic area with narrowing.  8-0 ET tube was used to perform intubation on first attempt CO2 detector confirmed placement.  No breath sounds over the abdomen and bilateral breath sounds present.  Chest x-ray confirms proper placement.  No immediate consultations.    Byron Loyd MD

## 2017-08-13 NOTE — CODE DOCUMENTATION
The hospitalist nurse practitioner called into the patient's room to evaluate possible stridor.  The patient was hospitalized for presumed COPD and tells me he's been having a sore throat since admission.  The sore throat appears to be getting worse and the respiratory therapist evaluated the patient placed on BiPAP.  The patient's lungs were clear but he did have inspiratory stridor.  I ordered subcutaneous epinephrine ×3, racemic epinephrine nebulizer, Benadryl, Pepcid, and a CT scan of the neck.  The patient's symptoms did not improve and the original read by virtual radiologic was no acute findings.  The patient went to the ICU where I monitored him closely but is stridor was worsening.  Therefore I intubated the patient using a 7 ET tube with a Mac 4 blade with 1 look.  There patient was sedated with ketamine and then subsequently paralyzed prior to looking.  Upon looking the patient had supraglottic edema with significant stenosis of the upper airway.  I was able to pass a tube on first attempt.  Chest x-ray showed the tube is in good place.  He had bilateral breath sounds with no wheezing and the positive color change on the end tidal CO2 device.  Patient care resume by the hospitalist team.

## 2017-08-13 NOTE — PROGRESS NOTES
Pt currently on  at documented settings with all alarms on and functioning. AMBU at [facility]. Q6 Duoneb aero tx given and tolerated well. ABG in AM

## 2017-08-13 NOTE — PROGRESS NOTES
Shortly after extubation pt developed respiratory distress and stridor.  I arrived to see pt and we decided that he would need reintubation.  Pt reintubated by ER MD without difficulty.  Vent orders given and CXR ordered.  He will need sedation (required propofol and precedex).

## 2017-08-13 NOTE — PROGRESS NOTES
1405- Since pt was extubated his respiratory status has progressively worsened. Pt was coughing up large amounts of thick green phlegm. His BBS was diminshed w/ little ait movement noted. He appeared flushed. He repeatedly stated he felt like he couldn't breathe. His SpO2 was >94% w/ RR 30s. His O2 was titrated from NC to 100% NRB. Scattered wheezes noted. He was given lorazepam IV push for his anxiety and started back on precedex drip w/ no help in WOB. He was then placed on bipap per RT and Dr. Aldrich was called and notified of his declining status. MD stated he would be here shortly to assess pt.    1410- Pt having inspiratory stridor. Dr. Aldrich and Dr. Evert PINTO at . Decision made to intubated pt for airway management.    Calvin Mello RN

## 2017-08-13 NOTE — PLAN OF CARE
Problem: Patient Care Overview  Goal: Plan of Care Review  Remains ventilated via oral ETT settings as charted alarms on ambu and sx at bedside aerosol Q 6

## 2017-08-13 NOTE — PROGRESS NOTES
Pt's wife Cheryle at BS. I informed her that I had tried to call her, but she must have been in route to hospital. Updated her on events post intubation requiring reintubation. She was very grateful, would like to be called shelly when pulmonologist has more info about what could be causing her  to have stridor and difficulty breathing.    Calvin Mello RN

## 2017-08-13 NOTE — PROGRESS NOTES
"Progress Note, Pulmonary/Critical Care    Admit Date: 8/11/2017    SUBJECTIVE:     Follow-up For:  COPD exacerbation    HPI/Interval history (See H&P for complete P,F,SHx) :     Stable overnight, no new issues reported.  He was placed on a SBT with good TV and RR and adequate ABG and will plan to extubate.  Pt sedated when I saw him and not able to provide any history.    Review of Systems: List if applicable  Pain scale: 0/10  Review of Systems   Unable to perform ROS: Intubated       OBJECTIVE:     Vital Signs Range (Last 24H):  Temp:  [98.6 °F (37 °C)-99.2 °F (37.3 °C)]   Pulse:  [49-96]   Resp:  [13-39]   BP: ()/(54-78)   SpO2:  [89 %-97 %]     I & O (Last 24H):    Intake/Output Summary (Last 24 hours) at 08/13/17 1342  Last data filed at 08/13/17 0600   Gross per 24 hour   Intake          2733.84 ml   Output             2500 ml   Net           233.84 ml       Estimated body mass index is 32.27 kg/m² as calculated from the following:    Height as of this encounter: 6' 2" (1.88 m).    Weight as of this encounter: 114 kg (251 lb 5.2 oz).    Vent Settings- Vent Mode: Spont  Oxygen Concentration (%):  [28-45] 28  Resp Rate Total:  [15 br/min-23 br/min] 15 br/min  Vt Set:  [500 mL] 500 mL  PEEP/CPAP:  [5 cmH20] 5 cmH20  Pressure Support:  [0 cmH20] 0 cmH20  Mean Airway Pressure:  [-1.4 fsK96-41 cmH20] 6 cmH20      Physical Exam  Physical Exam   Constitutional: He is well-developed, well-nourished, and in no distress. No distress.   intubated   HENT:   Head: Normocephalic and atraumatic.   intubated   Eyes: Conjunctivae and EOM are normal. Pupils are equal, round, and reactive to light. No scleral icterus.   Neck: Normal range of motion. Neck supple. No JVD present. No tracheal deviation present. No thyromegaly present.   Cardiovascular: Normal rate, regular rhythm and normal heart sounds.  Exam reveals no gallop and no friction rub.    No murmur heard.  Pulmonary/Chest: Effort normal and breath sounds normal. " No stridor. No respiratory distress. He has no wheezes. He has no rales. He exhibits no tenderness.   Abdominal: Soft. Bowel sounds are normal. He exhibits no distension. There is no tenderness. There is no rebound and no guarding.   Genitourinary: Penis normal.   Musculoskeletal: Normal range of motion. He exhibits no edema or deformity.   Lymphadenopathy:     He has no cervical adenopathy.   Neurological:   Sedated, GUERIN   Skin: Skin is warm and dry. He is not diaphoretic.   Vitals reviewed.      Laboratory/Diagnostic Data:  Reviewed and noted in plan where applicable- Please see chart for full lab data.  CBC, BMP, ABG noted  CXR - reviewed    Medications:  Medication list was reviewed and changes noted under Assessment/Plan.    ASSESSMENT/PLAN:     Active Problems:    Active Hospital Problems    Diagnosis  POA    *COPD exacerbation [J44.1]  Yes    Acute respiratory failure [J96.00]  Yes    Diabetes mellitus, type II [E11.9]  Yes    Benign essential HTN [I10]  Yes    JOSE ALBERTO (obstructive sleep apnea) [G47.33]  Yes    Depression [F32.9]  Yes    CAD (coronary artery disease) [I25.10]  Yes    DVT (deep venous thrombosis)- Hx IVC filter [I82.409]  Yes      Resolved Hospital Problems    Diagnosis Date Resolved POA   No resolved problems to display.     Respiratory failure acute and chronic hypercapneic  - will extubate as above  - continue other treatments  COPD/emphysema  - aware  - continue treatment  H/o cigarette use  - aware, will discuss with pt when stable  H/o DVT/PE  - on treatment  H/o sleep apnea   - aware  Leukocytosis  - likely related to infection, better  - follow with treatment  - await cultures  Hyponatremia  - likely related to underlying lung issues  - give NS and follow, better  Anemia  - no acute blood loss reported, likely chronic anemia     Critical Care Time     I have spent > 35 minutes providing critical care services for this pt for the above diagnoses.  These services have included pt  evaluation, pt exam, ventilator assessment, SBT oversight and decision to extubate , discussions with staff, chart review, data review, note preparation and .

## 2017-08-14 PROBLEM — J15.20 STAPHYLOCOCCAL PNEUMONIA: Status: ACTIVE | Noted: 2017-08-14

## 2017-08-14 PROBLEM — J96.02 ACUTE RESPIRATORY FAILURE WITH HYPOXIA AND HYPERCARBIA: Status: ACTIVE | Noted: 2017-08-14

## 2017-08-14 PROBLEM — J96.01 ACUTE RESPIRATORY FAILURE WITH HYPOXIA AND HYPERCARBIA: Status: ACTIVE | Noted: 2017-08-14

## 2017-08-14 LAB
ALLENS TEST: ABNORMAL
ANION GAP SERPL CALC-SCNC: 11 MMOL/L
BUN SERPL-MCNC: 21 MG/DL
CALCIUM SERPL-MCNC: 8.8 MG/DL
CHLORIDE SERPL-SCNC: 106 MMOL/L
CO2 SERPL-SCNC: 23 MMOL/L
CREAT SERPL-MCNC: 0.7 MG/DL
DELSYS: ABNORMAL
ERYTHROCYTE [SEDIMENTATION RATE] IN BLOOD BY WESTERGREN METHOD: 22 MM/H
EST. GFR  (AFRICAN AMERICAN): >60 ML/MIN/1.73 M^2
EST. GFR  (NON AFRICAN AMERICAN): >60 ML/MIN/1.73 M^2
ETCO2: 27
FIO2: 60
GLUCOSE SERPL-MCNC: 218 MG/DL
HCO3 UR-SCNC: 27.5 MMOL/L (ref 24–28)
MIN VOL: 10.6
MODE: ABNORMAL
PCO2 BLDA: 36.7 MMHG (ref 35–45)
PEEP: 5
PH SMN: 7.48 [PH] (ref 7.35–7.45)
PIP: 17
PO2 BLDA: 74 MMHG (ref 80–100)
POC BE: 4 MMOL/L
POC SATURATED O2: 96 % (ref 95–100)
POC TCO2: 29 MMOL/L (ref 23–27)
POCT GLUCOSE: 110 MG/DL (ref 70–110)
POCT GLUCOSE: 198 MG/DL (ref 70–110)
POCT GLUCOSE: 243 MG/DL (ref 70–110)
POTASSIUM SERPL-SCNC: 3.9 MMOL/L
SAMPLE: ABNORMAL
SITE: ABNORMAL
SODIUM SERPL-SCNC: 140 MMOL/L
SP02: 96
VT: 500

## 2017-08-14 PROCEDURE — 63600175 PHARM REV CODE 636 W HCPCS: Performed by: PHYSICIAN ASSISTANT

## 2017-08-14 PROCEDURE — 99233 SBSQ HOSP IP/OBS HIGH 50: CPT | Mod: ,,, | Performed by: INTERNAL MEDICINE

## 2017-08-14 PROCEDURE — 82803 BLOOD GASES ANY COMBINATION: CPT

## 2017-08-14 PROCEDURE — 25000003 PHARM REV CODE 250: Performed by: INTERNAL MEDICINE

## 2017-08-14 PROCEDURE — 63600175 PHARM REV CODE 636 W HCPCS: Performed by: INTERNAL MEDICINE

## 2017-08-14 PROCEDURE — 36600 WITHDRAWAL OF ARTERIAL BLOOD: CPT

## 2017-08-14 PROCEDURE — 20000000 HC ICU ROOM

## 2017-08-14 PROCEDURE — 99900035 HC TECH TIME PER 15 MIN (STAT)

## 2017-08-14 PROCEDURE — C9113 INJ PANTOPRAZOLE SODIUM, VIA: HCPCS | Performed by: INTERNAL MEDICINE

## 2017-08-14 PROCEDURE — 25000003 PHARM REV CODE 250: Performed by: NURSE PRACTITIONER

## 2017-08-14 PROCEDURE — 94770 HC EXHALED C02 TEST: CPT

## 2017-08-14 PROCEDURE — 94640 AIRWAY INHALATION TREATMENT: CPT

## 2017-08-14 PROCEDURE — 94003 VENT MGMT INPAT SUBQ DAY: CPT

## 2017-08-14 PROCEDURE — 87040 BLOOD CULTURE FOR BACTERIA: CPT

## 2017-08-14 PROCEDURE — 36415 COLL VENOUS BLD VENIPUNCTURE: CPT

## 2017-08-14 PROCEDURE — 94761 N-INVAS EAR/PLS OXIMETRY MLT: CPT

## 2017-08-14 PROCEDURE — 27000221 HC OXYGEN, UP TO 24 HOURS

## 2017-08-14 PROCEDURE — 25000242 PHARM REV CODE 250 ALT 637 W/ HCPCS: Performed by: NURSE PRACTITIONER

## 2017-08-14 PROCEDURE — 63600175 PHARM REV CODE 636 W HCPCS: Performed by: NURSE PRACTITIONER

## 2017-08-14 PROCEDURE — 99291 CRITICAL CARE FIRST HOUR: CPT | Mod: ,,, | Performed by: INTERNAL MEDICINE

## 2017-08-14 PROCEDURE — 80048 BASIC METABOLIC PNL TOTAL CA: CPT

## 2017-08-14 RX ORDER — ENOXAPARIN SODIUM 100 MG/ML
1 INJECTION SUBCUTANEOUS
Status: DISCONTINUED | OUTPATIENT
Start: 2017-08-14 | End: 2017-08-14

## 2017-08-14 RX ORDER — DIAZEPAM 10 MG/2ML
INJECTION INTRAMUSCULAR
Status: DISPENSED
Start: 2017-08-14 | End: 2017-08-14

## 2017-08-14 RX ORDER — PREDNISONE 5 MG/ML
10 SOLUTION ORAL 2 TIMES DAILY
Status: DISCONTINUED | OUTPATIENT
Start: 2017-08-14 | End: 2017-08-22 | Stop reason: HOSPADM

## 2017-08-14 RX ORDER — DIAZEPAM 10 MG/2ML
5 INJECTION INTRAMUSCULAR
Status: DISCONTINUED | OUTPATIENT
Start: 2017-08-14 | End: 2017-08-18 | Stop reason: RX

## 2017-08-14 RX ORDER — ENOXAPARIN SODIUM 150 MG/ML
1 INJECTION SUBCUTANEOUS
Status: DISCONTINUED | OUTPATIENT
Start: 2017-08-14 | End: 2017-08-14

## 2017-08-14 RX ORDER — ENOXAPARIN SODIUM 100 MG/ML
40 INJECTION SUBCUTANEOUS EVERY 24 HOURS
Status: DISCONTINUED | OUTPATIENT
Start: 2017-08-14 | End: 2017-08-14

## 2017-08-14 RX ORDER — ENOXAPARIN SODIUM 150 MG/ML
110 INJECTION SUBCUTANEOUS
Status: DISCONTINUED | OUTPATIENT
Start: 2017-08-14 | End: 2017-08-20

## 2017-08-14 RX ORDER — DIAZEPAM 10 MG/2ML
5 INJECTION INTRAMUSCULAR EVERY 4 HOURS PRN
Status: DISCONTINUED | OUTPATIENT
Start: 2017-08-14 | End: 2017-08-14

## 2017-08-14 RX ADMIN — CARBIDOPA AND LEVODOPA 2 TABLET: 25; 100 TABLET ORAL at 05:08

## 2017-08-14 RX ADMIN — INSULIN ASPART 2 UNITS: 100 INJECTION, SOLUTION INTRAVENOUS; SUBCUTANEOUS at 05:08

## 2017-08-14 RX ADMIN — NYSTATIN 500000 UNITS: 100000 SUSPENSION ORAL at 05:08

## 2017-08-14 RX ADMIN — ENTACAPONE 200 MG: 200 TABLET, FILM COATED ORAL at 12:08

## 2017-08-14 RX ADMIN — PANTOPRAZOLE SODIUM 40 MG: 40 INJECTION, POWDER, FOR SOLUTION INTRAVENOUS at 08:08

## 2017-08-14 RX ADMIN — DIAZEPAM 5 MG: 5 INJECTION, SOLUTION INTRAMUSCULAR; INTRAVENOUS at 09:08

## 2017-08-14 RX ADMIN — INSULIN ASPART 22 UNITS: 100 INJECTION, SOLUTION INTRAVENOUS; SUBCUTANEOUS at 08:08

## 2017-08-14 RX ADMIN — ENOXAPARIN SODIUM 110 MG: 150 INJECTION SUBCUTANEOUS at 03:08

## 2017-08-14 RX ADMIN — PROPOFOL 20 MCG/KG/MIN: 10 INJECTION, EMULSION INTRAVENOUS at 03:08

## 2017-08-14 RX ADMIN — INSULIN ASPART 22 UNITS: 100 INJECTION, SOLUTION INTRAVENOUS; SUBCUTANEOUS at 12:08

## 2017-08-14 RX ADMIN — ENTACAPONE 200 MG: 200 TABLET, FILM COATED ORAL at 05:08

## 2017-08-14 RX ADMIN — ENTACAPONE 200 MG: 200 TABLET, FILM COATED ORAL at 07:08

## 2017-08-14 RX ADMIN — CARBIDOPA AND LEVODOPA 2 TABLET: 25; 100 TABLET ORAL at 12:08

## 2017-08-14 RX ADMIN — IPRATROPIUM BROMIDE AND ALBUTEROL SULFATE 3 ML: .5; 3 SOLUTION RESPIRATORY (INHALATION) at 06:08

## 2017-08-14 RX ADMIN — ALLOPURINOL 200 MG: 100 TABLET ORAL at 08:08

## 2017-08-14 RX ADMIN — TRAZODONE HYDROCHLORIDE 150 MG: 50 TABLET ORAL at 09:08

## 2017-08-14 RX ADMIN — INSULIN ASPART 22 UNITS: 100 INJECTION, SOLUTION INTRAVENOUS; SUBCUTANEOUS at 04:08

## 2017-08-14 RX ADMIN — METHYLPREDNISOLONE SODIUM SUCCINATE 125 MG: 125 INJECTION, POWDER, FOR SOLUTION INTRAMUSCULAR; INTRAVENOUS at 12:08

## 2017-08-14 RX ADMIN — ESCITALOPRAM 20 MG: 10 TABLET, FILM COATED ORAL at 09:08

## 2017-08-14 RX ADMIN — INSULIN DETEMIR 90 UNITS: 100 INJECTION, SOLUTION SUBCUTANEOUS at 09:08

## 2017-08-14 RX ADMIN — IPRATROPIUM BROMIDE AND ALBUTEROL SULFATE 3 ML: .5; 3 SOLUTION RESPIRATORY (INHALATION) at 12:08

## 2017-08-14 RX ADMIN — BUPROPION HYDROCHLORIDE 150 MG: 150 TABLET, FILM COATED, EXTENDED RELEASE ORAL at 09:08

## 2017-08-14 RX ADMIN — PANCRELIPASE 6 CAPSULE: 24000; 76000; 120000 CAPSULE, DELAYED RELEASE PELLETS ORAL at 09:08

## 2017-08-14 RX ADMIN — PROPOFOL 20 MCG/KG/MIN: 10 INJECTION, EMULSION INTRAVENOUS at 08:08

## 2017-08-14 RX ADMIN — OLANZAPINE 10 MG: 5 TABLET ORAL at 08:08

## 2017-08-14 RX ADMIN — METHYLPREDNISOLONE SODIUM SUCCINATE 125 MG: 125 INJECTION, POWDER, FOR SOLUTION INTRAMUSCULAR; INTRAVENOUS at 05:08

## 2017-08-14 RX ADMIN — OXYCODONE AND ACETAMINOPHEN 1 TABLET: 10; 325 TABLET ORAL at 08:08

## 2017-08-14 RX ADMIN — INSULIN ASPART 4 UNITS: 100 INJECTION, SOLUTION INTRAVENOUS; SUBCUTANEOUS at 12:08

## 2017-08-14 RX ADMIN — FUROSEMIDE 40 MG: 40 TABLET ORAL at 08:08

## 2017-08-14 RX ADMIN — ATORVASTATIN CALCIUM 40 MG: 40 TABLET, FILM COATED ORAL at 08:08

## 2017-08-14 RX ADMIN — PROPOFOL 50 MCG/KG/MIN: 10 INJECTION, EMULSION INTRAVENOUS at 03:08

## 2017-08-14 RX ADMIN — VANCOMYCIN HYDROCHLORIDE 1750 MG: 1 INJECTION, POWDER, LYOPHILIZED, FOR SOLUTION INTRAVENOUS at 03:08

## 2017-08-14 RX ADMIN — DIPHENHYDRAMINE HYDROCHLORIDE 25 MG: 50 INJECTION, SOLUTION INTRAMUSCULAR; INTRAVENOUS at 03:08

## 2017-08-14 RX ADMIN — POTASSIUM CHLORIDE 20 MEQ: 20 SOLUTION ORAL at 08:08

## 2017-08-14 RX ADMIN — MIRTAZAPINE 15 MG: 15 TABLET, FILM COATED ORAL at 09:08

## 2017-08-14 RX ADMIN — PROPOFOL 50 MCG/KG/MIN: 10 INJECTION, EMULSION INTRAVENOUS at 07:08

## 2017-08-14 RX ADMIN — CARBIDOPA AND LEVODOPA 2 TABLET: 25; 100 TABLET ORAL at 07:08

## 2017-08-14 RX ADMIN — GABAPENTIN 200 MG: 100 CAPSULE ORAL at 05:08

## 2017-08-14 RX ADMIN — SODIUM CHLORIDE: 0.9 INJECTION, SOLUTION INTRAVENOUS at 03:08

## 2017-08-14 RX ADMIN — BUPROPION HYDROCHLORIDE 150 MG: 150 TABLET, FILM COATED, EXTENDED RELEASE ORAL at 08:08

## 2017-08-14 RX ADMIN — PROPOFOL 50 MCG/KG/MIN: 10 INJECTION, EMULSION INTRAVENOUS at 10:08

## 2017-08-14 RX ADMIN — POTASSIUM CHLORIDE 20 MEQ: 20 SOLUTION ORAL at 09:08

## 2017-08-14 RX ADMIN — DIPHENHYDRAMINE HYDROCHLORIDE 25 MG: 50 INJECTION, SOLUTION INTRAMUSCULAR; INTRAVENOUS at 09:08

## 2017-08-14 RX ADMIN — PREDNISONE 10 MG: 5 SOLUTION ORAL at 09:08

## 2017-08-14 RX ADMIN — CEFTRIAXONE 1 G: 1 INJECTION, SOLUTION INTRAVENOUS at 03:08

## 2017-08-14 RX ADMIN — DEXMEDETOMIDINE HYDROCHLORIDE 0.5 MCG/KG/HR: 100 INJECTION, SOLUTION INTRAVENOUS at 07:08

## 2017-08-14 RX ADMIN — DIAZEPAM 5 MG: 5 INJECTION, SOLUTION INTRAMUSCULAR; INTRAVENOUS at 03:08

## 2017-08-14 RX ADMIN — IPRATROPIUM BROMIDE AND ALBUTEROL SULFATE 3 ML: .5; 3 SOLUTION RESPIRATORY (INHALATION) at 07:08

## 2017-08-14 RX ADMIN — NYSTATIN 500000 UNITS: 100000 SUSPENSION ORAL at 12:08

## 2017-08-14 RX ADMIN — PRAZOSIN HYDROCHLORIDE 1 MG: 1 CAPSULE ORAL at 09:08

## 2017-08-14 RX ADMIN — GABAPENTIN 200 MG: 100 CAPSULE ORAL at 09:08

## 2017-08-14 RX ADMIN — GABAPENTIN 200 MG: 100 CAPSULE ORAL at 02:08

## 2017-08-14 RX ADMIN — DEXMEDETOMIDINE HYDROCHLORIDE 0.4 MCG/KG/HR: 100 INJECTION, SOLUTION INTRAVENOUS at 03:08

## 2017-08-14 RX ADMIN — THERA TABS 1 TABLET: TAB at 08:08

## 2017-08-14 RX ADMIN — DIPHENHYDRAMINE HYDROCHLORIDE 25 MG: 50 INJECTION, SOLUTION INTRAMUSCULAR; INTRAVENOUS at 05:08

## 2017-08-14 RX ADMIN — PROPOFOL 50 MCG/KG/MIN: 10 INJECTION, EMULSION INTRAVENOUS at 05:08

## 2017-08-14 RX ADMIN — CARVEDILOL 3.12 MG: 3.12 TABLET, FILM COATED ORAL at 09:08

## 2017-08-14 RX ADMIN — PANCRELIPASE 6 CAPSULE: 24000; 76000; 120000 CAPSULE, DELAYED RELEASE PELLETS ORAL at 05:08

## 2017-08-14 RX ADMIN — RIVAROXABAN 20 MG: 20 TABLET, FILM COATED ORAL at 08:08

## 2017-08-14 RX ADMIN — PROPOFOL 20 MCG/KG/MIN: 10 INJECTION, EMULSION INTRAVENOUS at 11:08

## 2017-08-14 NOTE — SIGNIFICANT EVENT
Results for GODFREY SUAREZ (MRN [id number]) as of 8/14/2017 04:12   Ref. Range 8/13/2017 21:04   POC PH Latest Ref Range: 7.35 - 7.45  7.466 (H)   POC PCO2 Latest Ref Range: 35 - 45 mmHg 40.3   POC PO2 Latest Ref Range: 80 - 100 mmHg 57 (LL)   POC BE Latest Ref Range: -2 to 2 mmol/L 5   POC HCO3 Latest Ref Range: 24 - 28 mmol/L 29.1 (H)   POC SATURATED O2 Latest Ref Range: 95 - 100 % 91 (L)   POC TCO2 Latest Ref Range: 23 - 27 mmol/L 30 (H)   FiO2 Unknown 50   Vt Unknown 500   PiP Unknown 16   PEEP Unknown 5   Sample Unknown ARTERIAL   DelSys Unknown Adult Vent   Allens Test Unknown Pass   Site Unknown RR   Mode Unknown AC/PRVC   Results reported to AARTI Murcia ATT. Increased FiO2 to 60% post ABG.

## 2017-08-14 NOTE — PLAN OF CARE
Problem: Patient Care Overview  Goal: Plan of Care Review  Outcome: Ongoing (interventions implemented as appropriate)  Pt remains intubated and sedated on propofol and precedex, required prn sedation in addition. He will likely need trach but has to be off of xarelto for 5 days prior. He's remained free from falls/injuries this shift.

## 2017-08-14 NOTE — PROGRESS NOTES
Pt remains on vent on documented settings. Alarms on & functioning. Ambu bag @ HOB.   08/14/17 0650   Patient Assessment/Suction   Level of Consciousness (AVPU) alert   All Lung Fields Breath Sounds clear   Sputum Amount small   Sputum Color white   Sputum Consistency thick   PRE-TX-O2-ETCO2   O2 Device (Oxygen Therapy) ventilator   $ Is the patient on Oxygen? Yes   Oxygen Concentration (%) 60   SpO2 95 %   Pulse Oximetry Type Continuous   $ Pulse Oximetry - Multiple Charge Pulse Oximetry - Multiple   ETCO2 (mmHg) 27 mmHg   $ ETCO2 Charge Exhaled CO2 Monitoring   $ ETCO2 Usage Currently wearing   Pulse (!) 42   Resp (!) 22   Aerosol Therapy   $ Aerosol Therapy Charges Aerosol Treatment   Respiratory Treatment Status given   SVN/Inhaler Treatment Route in-line   Position During Treatment Flat   Patient Tolerance good   Post-Treatment   Post-treatment Heart Rate (beats/min) 43   Post-treatment Resp Rate (breaths/min) 19   All Fields Breath Sounds clear       Airway - Non-Surgical 08/13/17 1410 Endotracheal Tube   Placement Date/Time: 08/13/17 1410   Method of Intubation: Direct laryngoscopy  Inserted by: MD  Airway Device: Endotracheal Tube  Airway Device Size: 8.0  Style: Cuffed  Cuff Inflated With: Air  Placement Verified By: Auscultation;Chest X-ray;Capnome...   Secured at 25 cm   Measured At Lips   Secured Location Center   Secured by Commercial tube tinoco   Bite Block none   Site Condition Cool;Dry   Status Intact;Secured;Patent   Site Assessment Clean;Dry   Vent Select   Conventional Vent Y   Ventilator Initiated No   $ Ventilator Subsequent Yes   Preset Conventional Ventilator Settings   Vent Type    Ventilation Type VC   Vent Mode A/C   Humidity HME   Set Rate 18 bmp   Vt Set 500 mL   PEEP/CPAP 5 cmH20   Pressure Support 0 cmH20   Waveform RAMP   Peak Flow 65 L/min   Set Inspiratory Pressure 0 cmH20   Insp Time 0 Sec(s)   Plateau Set/Insp. Hold (sec) 0   Insp Rise Time  0 %   Trigger Sensitivity  Flow/I-Trigger 3 L/min   P High 0 cm H2O   P Low 0 cm H2O   T High 0 sec   T Low 0 sec   Patient Ventilator Parameters   Resp Rate Total 24 br/min   Peak Airway Pressure 20 cmH2O   Mean Airway Pressure 9.8 cmH20   Plateau Pressure 0 cmH20   Exhaled Vt 21 mL   Total Ve 12.6 mL   Spont Ve 0 L   I:E Ratio Measured 1.90:1   Conventional Ventilator Alarms   Ve High Alarm 25 L/min   Resp Rate High Alarm 45 br/min   Press High Alarm 45 cmH2O   Apnea Rate 10   Apnea Volume (mL) 730 mL   Apnea Oxygen Concentration  100   Apnea Flow Rate (L/min) 87   T Apnea 20 sec(s)   Ready to Wean/Extubation Screen   FIO2<=50 (chart decimal) (!) 0.6   MV<16L (chart vol.) 12.6   PEEP <=8 (chart #) 5   Ready to Wean Parameters   F/VT Ratio<105 (RSBI) 1047.62

## 2017-08-14 NOTE — CONSULTS
Kevan Colin [id number] is a 62 y.o. male who has been consulted for vancomycin dosing.    The patient has the following labs:     Date Creatinine (mg/dl)    WBC Count   8/14/2017 Estimated Creatinine Clearance: 145 mL/min (based on Cr of 0.7). Lab Results   Component Value Date    WBC 16.70 (H) 08/13/2017        Current weight is 114 kg (251 lb 5.2 oz)    Pt being treated with vancomycin for staph pneumonia.    The patient will be started on vancomycin at a dose of 1750 mg every 12 hours (~15 mg/kg/dose). The vancomycin trough has been ordered for 8/16/17 at 0200.  Target goal is 15-20.     Patient will be followed by pharmacy for changes in renal function, toxicity, and efficacy.      Thank you for allowing us to participate in this patient's care.     Alejo Byrd, Pharmacist

## 2017-08-14 NOTE — NURSING
eICU notified of bradycardia and what appears to be a vagal respons to any position changes. Notified of gtts infusing and precedex titrated off.

## 2017-08-14 NOTE — PHYSICIAN QUERY
PT Name: Kevan Colin  MR #: [id number]     Physician Query Form - Documentation Clarification      CDS/: Lita Fabian RN               Contact information:[phone]    This form is a permanent document in the medical record.     Query Date: August 14, 2017    By submitting this query, we are merely seeking further clarification of documentation. Please utilize your independent clinical judgment when addressing the question(s) below.    The Medical record reflects the following:    Supporting Clinical Findings Location in Medical Record   ·  ·Patient presented with complaint of subjective fever, sore-throat and SOB for 1-2 days    Pt was coughing up large amounts of thick green phlegm       Comment: coughing up thick sputum after quitting 14 days     8/11 8/13 RN PN      8/11 ED MD note   Moderate Gram positive cocci    The cardiac silhouette is enlarged and there are central perivascular congestion, bilateral perihilar reticulonodular air space opacities, and patchy segmental air space opacity at the lung bases, likely a combination of a atelectasis and/or consolidation/aspiration.    Worsening airspace opacity and edema in the mid and lower lungs bilaterally      There has been interval development of patchy segmental air space opacity at the right lung base which is concerning for aspiration and/or pneumonitis      Ceftriaxone 1 gm IV PB  racepinephrine 2.25 % nebulizer solution 0.5 mL    albuterol sulfate nebulizer solution 2.5 mg   tiotropium inhalation capsule 18 mcg   azithromycin 500 mg IV PB    8/12 Respiratory culture    8/13 CXR                    8/13 CXR          8/12 -8/14MAR     8/11 MAR                                                                            Doctor, Please specify diagnosis or diagnoses associated with above clinical findings.    Provider Use Only    [  ] Pneumonia Due to Unspecified Organism    [ x ] Pneumonia Due to other Organism - Staph Aureus sp    [  ]  Other Respiratory Infections___________________________                                                                                                             [  ] Clinically undetermined

## 2017-08-14 NOTE — PROGRESS NOTES
Progress Note  PULMONARY    Admit Date: 8/11/2017 08/14/2017      SUBJECTIVE:     Aug 14, pt presented with sore throat and sob x 1 day on 8/11, intubated 8/12 with airway difficulties, extubated and intubated 8/13 with stridor again.  Pt had ct neck early in course.  Now sedate on vent.      PFSH and Allergies reviewed.  Unable to do ros due to sedation and intubation.  OBJECTIVE:     Vitals (Most recent):  Vitals:    08/14/17 1220   BP:    Pulse: (!) 48   Resp: (!) 21   Temp:        Vitals (24 hour range):  Temp:  [98.2 °F (36.8 °C)-98.8 °F (37.1 °C)]   Pulse:  [40-97]   Resp:  [13-67]   BP: (117-191)/()   SpO2:  [86 %-100 %]       Intake/Output Summary (Last 24 hours) at 08/14/17 1254  Last data filed at 08/14/17 1200   Gross per 24 hour   Intake          1827.95 ml   Output             1625 ml   Net           202.95 ml          Physical Exam:  The patient's neuro status (alertness,orientation,cognitive function,motor skills,), pharyngeal exam (oral lesions, hygiene, abn dentition,), Neck (jvd,mass,thyroid,nodes in neck and above/below clavicle),RESPIRATORY(symmetry,effort,fremitus,percussion,auscultation),  Cor(rhythm,heart tones including gallops,perfusion,edema)ABD(distention,hepatic&splenomegaly,tenderness,masses), Skin(rash,cyanosis),Psyc(affect,judgement,).  Exam negative except for these pertinent findings:    Oral intubation, no air leak with cuff down. Good bs, no edema    Radiographs reviewed: view by direct vision  , cxr with right atelectasis.  Results for orders placed during the hospital encounter of 08/11/17   X-Ray Chest 1 View    Narrative AP chest compared to 08/13/2017    Findings: The endotracheal tube and nasogastric tube remain appropriately positioned.  A spinal stimulator device is again noted.  The heart size is upper normal.  There are persistent perihilar and bibasilar infiltrates as well as a small left pleural effusion.  An increase in consolidative component is seen at the  right lung base.  There is no pneumothorax.    Impression Appropriate positioning of support devices.  Persistent perihilar and bibasilar infiltrates, with increase in consolidative component on the right.  Stable small left pleural effusion.      Electronically signed by: Ishmael Flood MD  Date:     08/14/17  Time:    07:39    ]    Labs     No results for input(s): WBC, HGB, HCT, PLT, BAND, METAMYELOCYT, MYELOPCT, HGBA1C in the last 24 hours.  Recent Labs  Lab 08/14/17  0527      K 3.9      CO2 23   BUN 21   CREATININE 0.7   *   CALCIUM 8.8     Recent Labs  Lab 08/14/17  0448   PH 7.483*   PCO2 36.7   PO2 74*   HCO3 27.5     Microbiology Results (last 7 days)     Procedure Component Value Units Date/Time    Culture, Respiratory with Gram Stain [[id number]] Collected:  08/12/17 1340    Order Status:  Completed Specimen:  Respiratory from Tracheal Aspirate Updated:  08/14/17 1005     Respiratory Culture --     STAPHYLOCOCCUS AUREUS  Many  Susceptibility pending       Gram Stain (Respiratory) <10 epithelial cells per low power field.     Gram Stain (Respiratory) Rare WBC's     Gram Stain (Respiratory) Moderate Gram positive cocci    Blood Culture #1 [[id number]] Collected:  08/11/17 0734    Order Status:  Completed Specimen:  Blood from Antecubital, Right  Arm Updated:  08/13/17 1412     Blood Culture, Routine No Growth to date     Blood Culture, Routine No Growth to date     Blood Culture, Routine No Growth to date    Blood Culture #2 [[id number]] Collected:  08/11/17 0733    Order Status:  Completed Specimen:  Blood Updated:  08/13/17 1412     Blood Culture, Routine No Growth to date     Blood Culture, Routine No Growth to date     Blood Culture, Routine No Growth to date          Impression:  Active Hospital Problems    Diagnosis  POA    *COPD exacerbation [J44.1]  Yes    Acute respiratory failure [J96.00]  Yes    Diabetes mellitus, type II [E11.9]  Yes    Benign essential HTN [I10]  Yes     JOSE ALBERTO (obstructive sleep apnea) [G47.33]  Yes    Depression [F32.9]  Yes    CAD (coronary artery disease) [I25.10]  Yes    DVT (deep venous thrombosis)- Hx IVC filter [I82.409]  Yes      Resolved Hospital Problems    Diagnosis Date Resolved POA   No resolved problems to display.               Plan:     Aug 14, will  need trach, add valium to facilitate sedation, with staph pneumonia - steroids need to be minimal.  Will dose vanc til sensitivity out.  Start tube feed, stop xarelto and use lovenox.    Tried to call wife - no answer.      The following were evaluated and adjusted as needed: mechanical ventilator settings and weaning status, intravenous fluids and nutritional status, sedation and neurologic status, antibiotics and acid base balance and oxygenation needs       Critical Care  - THE PATIENT HAS A HIGH PROBABILITY OF IMMINENT OR LIFE THREATENING DETERIORATION.  Over 50%time of encounter was in direct care at bedside.  Time was 30 to 74 minutes required for patient care.  Time needed for all of the above totaled 33 minutes.                                      .

## 2017-08-14 NOTE — PROGRESS NOTES
Results for GODFREY SUAREZ (MRN [id number]) as of 8/14/2017 04:55   Ref. Range 8/14/2017 04:48   POC PH Latest Ref Range: 7.35 - 7.45  7.483 (H)   POC PCO2 Latest Ref Range: 35 - 45 mmHg 36.7   POC PO2 Latest Ref Range: 80 - 100 mmHg 74 (L)   POC BE Latest Ref Range: -2 to 2 mmol/L 4   POC HCO3 Latest Ref Range: 24 - 28 mmol/L 27.5   POC SATURATED O2 Latest Ref Range: 95 - 100 % 96   POC TCO2 Latest Ref Range: 23 - 27 mmol/L 29 (H)   FiO2 Unknown 60   Vt Unknown 500   PiP Unknown 17   PEEP Unknown 5   Sample Unknown ARTERIAL   DelSys Unknown Adult Vent   Allens Test Unknown Pass   Site Unknown RR   Mode Unknown AC/PRVC   Results reported to AARTI Murcia ATT. No critical values.

## 2017-08-14 NOTE — NURSING
Not tolerating any positional changes without what appears to be a vagal respons. Attempts to raise the HOB or change the patient position suddenly drops the heartrate.  All other vital signs stable. Follows simple commands. Denies any pain.

## 2017-08-14 NOTE — PROGRESS NOTES
Progress Note    Admit Date: 8/11/2017   LOS: 2 days     SUBJECTIVE:     Chief Complaint: Worsening SOB and sore-throat for 1 day     History of Present Illness:  Patient is a 62 y.o. male admitted to Hospitalist Service from [facility] Room with complaint of worsening SOB and sore-throat for 1 day. Patient has PMH significant for COPD, CAD, DM-2, depression, DVT, hypertension, history of MI, history of PE and chronic pain. Patient is continuing to smoke cigarettes. Patient presented with complaint of subjective fever, sore-throat and SOB for 1-2 days. Patient admits yellow productive cough and frequent wheezing. Patient requiring multiple doses of nebulization treatments in the ER. Patient denied chest pain, abdominal pain, nausea, vomiting, headache, vision changes, focal neuro-deficits. Patient admitted non-compliance with medications and diet. He had few doughnuts last night and now blood sugars are high.    Interval history  Events over the week end noted. Patient got re-intubated after extubation. On IV Propofol and IV Precedex infusion.    Scheduled Meds:   albuterol-ipratropium 2.5mg-0.5mg/3mL  3 mL Nebulization Q6H    allopurinol  200 mg Oral Daily    atorvastatin  40 mg Oral Daily    buPROPion  150 mg Oral BID    carbidopa-levodopa  mg  2 tablet Oral TID    And    entacapone  200 mg Oral TID    carvedilol  3.125 mg Oral BID    cefTRIAXone (ROCEPHIN) IVPB  1 g Intravenous Q24H    diazePAM        diphenhydrAMINE  25 mg Intravenous Q8H    escitalopram oxalate  20 mg Oral QHS    furosemide  40 mg Oral Daily    gabapentin  200 mg Oral TID    insulin aspart  22 Units Subcutaneous TID AC    insulin detemir  90 Units Subcutaneous QHS    lipase-protease-amylase 24,000-76,000-120,000 units  6 capsule Oral TID    methylPREDNISolone sodium succinate  125 mg Intravenous Q6H    mirtazapine  15 mg Oral QHS    multivitamin  1 tablet Oral Daily    nystatin  5 mL Oral Q6H     olanzapine  10 mg Oral Daily    pantoprazole  40 mg Intravenous Daily    potassium chloride 10%  20 mEq Oral BID    prazosin  1 mg Oral QHS    rivaroxaban  20 mg Oral Daily    trazodone  150 mg Oral QHS     Continuous Infusions:   sodium chloride 0.9% 100 mL/hr at 08/14/17 0322    dexmedetomidine (PRECEDEX) infusion 0.5 mcg/kg/hr (08/14/17 0757)    propofol 50 mcg/kg/min (08/14/17 0758)     PRN Meds:acetaminophen, dextrose 50%, dextrose 50%, diazePAM, glucagon (human recombinant), glucose, glucose, hydrOXYzine HCl, insulin aspart, ondansetron, ondansetron, oxycodone-acetaminophen, quetiapine, senna-docusate 8.6-50 mg    Review of patient's allergies indicates:   Allergen Reactions    Bee pollens Anaphylaxis     Bee stings     Penicillins Nausea Only     Other reaction(s): Unknown    Codeine Rash     Other reaction(s): Unknown    Morphine Rash       Review of Systems  -unable to perform patient is intubated     OBJECTIVE:     Vital Signs (Most Recent)  Temp: 98.4 °F (36.9 °C) (08/14/17 0745)  Pulse: (!) 46 (08/14/17 0915)  Resp: 19 (08/14/17 0915)  BP: (!) 150/76 (08/14/17 0900)  SpO2: 97 % (08/14/17 0915)    Vital Signs Range (Last 24H):  Temp:  [98.2 °F (36.8 °C)-98.8 °F (37.1 °C)]   Pulse:  [42-97]   Resp:  [13-67]   BP: (117-191)/()   SpO2:  [86 %-100 %]     I & O (Last 24H):    Intake/Output Summary (Last 24 hours) at 08/14/17 0935  Last data filed at 08/14/17 0500   Gross per 24 hour   Intake          1587.95 ml   Output             1625 ml   Net           -37.05 ml     Physical Exam:  General appearance: INTUBATED and sedated   Head: normocephalic, atraumatic  Eyes:  conjunctivae/corneas clear. PERRL.  Nose: Nares normal. Septum midline.  Throat: lips, mucosa, and tongue normal; teeth and gums normal, no throat erythema.  Neck: supple, symmetrical, trachea midline, no JVD and thyroid not enlarged, symmetric, no tenderness/mass/nodules  Lungs:  Bilateral scattered rhonchi with prolonged  expiration  Chest wall: no tenderness  Heart: regular rate and rhythm, S1, S2 normal, no murmur, click, rub or gallop  Abdomen: soft, non-tender non-distented; bowel sounds normal; no masses,  no organomegaly  Extremities: no cyanosis, clubbing or edema.   Pulses: 2+ and symmetric  Skin: Skin color, texture, turgor normal. No rashes or lesions.  Lymph nodes: Cervical, supraclavicular, and axillary nodes normal.  Neurologic: sedated   Laboratory:  CBC:     Recent Labs  Lab 08/13/17  1001   WBC 16.70*   RBC 4.40*   HGB 13.5*   HCT 41.3      MCV 94   MCH 30.7   MCHC 32.8     CMP:   Recent Labs  Lab 08/12/17  0628  08/14/17  0527   *  < > 218*   CALCIUM 8.7  < > 8.8   ALBUMIN 3.3*  --   --    PROT 7.4  --   --    *  < > 140   K 4.8  < > 3.9   CO2 23  < > 23   CL 93*  < > 106   BUN 11  < > 21   CREATININE 0.8  < > 0.7   ALKPHOS 66  --   --    ALT 11  --   --    AST 11  --   --    BILITOT 0.5  --   --    < > = values in this interval not displayed.    Diagnostic Results:  CT soft tissue neck:  No acute findings in this patient status post prior cervical spine surgery. Bullous emphysematous changes are noted in the upper lung fields.    CXR: An orogastric tube is seen with its tip in the region of the stomach    KUB: There is a gastric tube which appears coiled within the left upper quadrant of the abdomen, likely within the stomach.  There is a battery pack for a neurostimulator device visible over the left upper quadrant.  There are 2 neurostimulator leads projected over the spinal canal in the lower thoracic region.  The cardiac silhouette is enlarged and there are central perivascular congestion, bilateral perihilar reticulonodular air space opacities, and patchy segmental air space opacity at the lung bases, likely a combination of a atelectasis and/or consolidation/aspiration.  There is a possible small volume of left basilar pleural fluid.  There is degenerative change of the thoracic  spine.    CXR: Worsening airspace opacity and edema in the mid and lower lungs bilaterally.    CXR: Appropriate positioning of support devices.  Persistent perihilar and bibasilar infiltrates, with increase in consolidative component on the right. Stable small left pleural effusion.    ASSESSMENT/PLAN:      *COPD exacerbation [J44.1]  Intubated --> follow up pulmonary   Prednisone 10 mg BID.  Continue beta 2 agonist bronchodilator treatments.   Continue IV antibiotics.  Check sputum GS and Cx.   Continue routine medications as before. Patient stated, he quit smoking 2 weeks ago.   Yes    Diabetes mellitus, type II [E11.9] - uncontrolled due to non-compliance with diet  Check blood glucose level q 6h.  Use Novolog Insulin Sliding Scale as needed.   Dietry compliance discussed with patient.   Yes    Benign essential HTN [I10]  Chronic problem. Will continue chronic medications and monitor for any changes, adjusting as needed.   Yes    JOSE ALBERTO (obstructive sleep apnea) [G47.33]  On ventilator      Yes    Depression [F32.9]  Chronic problem. Will continue chronic medications and monitor for any changes, adjusting as needed.   Yes    CAD (coronary artery disease) [I25.10]  Patient with known CAD and monitor for S/Sx of angina/ACS. Continue to monitor on telemetry.   Yes    DVT (deep venous thrombosis)- Hx IVC filter [I82.409]  Lovenox 40 mg SQ q day.      Yes              VTE Risk Mitigation      Lovenox 40 mg SQ q day     Memo Henry MD

## 2017-08-15 LAB
ALBUMIN SERPL BCP-MCNC: 2.4 G/DL
ALLENS TEST: ABNORMAL
ALP SERPL-CCNC: 55 U/L
ALT SERPL W/O P-5'-P-CCNC: 5 U/L
ANION GAP SERPL CALC-SCNC: 9 MMOL/L
AST SERPL-CCNC: 10 U/L
BACTERIA SPEC AEROBE CULT: NORMAL
BASOPHILS # BLD AUTO: 0.1 K/UL
BASOPHILS NFR BLD: 0.4 %
BILIRUB SERPL-MCNC: 0.2 MG/DL
BUN SERPL-MCNC: 17 MG/DL
CALCIUM SERPL-MCNC: 8.4 MG/DL
CHLORIDE SERPL-SCNC: 106 MMOL/L
CO2 SERPL-SCNC: 24 MMOL/L
CREAT SERPL-MCNC: 0.8 MG/DL
DELSYS: ABNORMAL
DIFFERENTIAL METHOD: ABNORMAL
EOSINOPHIL # BLD AUTO: 0 K/UL
EOSINOPHIL NFR BLD: 0.2 %
ERYTHROCYTE [DISTWIDTH] IN BLOOD BY AUTOMATED COUNT: 13.1 %
ERYTHROCYTE [SEDIMENTATION RATE] IN BLOOD BY WESTERGREN METHOD: 12 MM/H
EST. GFR  (AFRICAN AMERICAN): >60 ML/MIN/1.73 M^2
EST. GFR  (NON AFRICAN AMERICAN): >60 ML/MIN/1.73 M^2
ETCO2: 29
FIO2: 60
GLUCOSE SERPL-MCNC: 183 MG/DL
GRAM STN SPEC: NORMAL
HCO3 UR-SCNC: 25.8 MMOL/L (ref 24–28)
HCT VFR BLD AUTO: 41.6 %
HGB BLD-MCNC: 13.8 G/DL
LYMPHOCYTES # BLD AUTO: 3.6 K/UL
LYMPHOCYTES NFR BLD: 27.1 %
MCH RBC QN AUTO: 30.7 PG
MCHC RBC AUTO-ENTMCNC: 33.2 G/DL
MCV RBC AUTO: 92 FL
MIN VOL: 9.3
MODE: ABNORMAL
MONOCYTES # BLD AUTO: 0.5 K/UL
MONOCYTES NFR BLD: 3.6 %
NEUTROPHILS # BLD AUTO: 9.2 K/UL
NEUTROPHILS NFR BLD: 68.7 %
PCO2 BLDA: 38.8 MMHG (ref 35–45)
PEEP: 5
PH SMN: 7.43 [PH] (ref 7.35–7.45)
PIP: 19
PLATELET # BLD AUTO: 188 K/UL
PMV BLD AUTO: 7.9 FL
PO2 BLDA: 65 MMHG (ref 80–100)
POC BE: 2 MMOL/L
POC SATURATED O2: 93 % (ref 95–100)
POC TCO2: 27 MMOL/L (ref 23–27)
POCT GLUCOSE: 132 MG/DL (ref 70–110)
POCT GLUCOSE: 135 MG/DL (ref 70–110)
POTASSIUM SERPL-SCNC: 3 MMOL/L
PROT SERPL-MCNC: 5.9 G/DL
RBC # BLD AUTO: 4.51 M/UL
SAMPLE: ABNORMAL
SITE: ABNORMAL
SODIUM SERPL-SCNC: 139 MMOL/L
SP02: 96
VT: 600
WBC # BLD AUTO: 13.4 K/UL

## 2017-08-15 PROCEDURE — 94640 AIRWAY INHALATION TREATMENT: CPT

## 2017-08-15 PROCEDURE — 25000003 PHARM REV CODE 250: Performed by: INTERNAL MEDICINE

## 2017-08-15 PROCEDURE — 80053 COMPREHEN METABOLIC PANEL: CPT

## 2017-08-15 PROCEDURE — 94770 HC EXHALED C02 TEST: CPT

## 2017-08-15 PROCEDURE — 20000000 HC ICU ROOM

## 2017-08-15 PROCEDURE — 27000221 HC OXYGEN, UP TO 24 HOURS

## 2017-08-15 PROCEDURE — 94761 N-INVAS EAR/PLS OXIMETRY MLT: CPT

## 2017-08-15 PROCEDURE — C9113 INJ PANTOPRAZOLE SODIUM, VIA: HCPCS | Performed by: INTERNAL MEDICINE

## 2017-08-15 PROCEDURE — 36415 COLL VENOUS BLD VENIPUNCTURE: CPT

## 2017-08-15 PROCEDURE — 63600175 PHARM REV CODE 636 W HCPCS: Performed by: INTERNAL MEDICINE

## 2017-08-15 PROCEDURE — 25000242 PHARM REV CODE 250 ALT 637 W/ HCPCS: Performed by: NURSE PRACTITIONER

## 2017-08-15 PROCEDURE — 82803 BLOOD GASES ANY COMBINATION: CPT

## 2017-08-15 PROCEDURE — 25000003 PHARM REV CODE 250: Performed by: NURSE PRACTITIONER

## 2017-08-15 PROCEDURE — 99233 SBSQ HOSP IP/OBS HIGH 50: CPT | Mod: ,,, | Performed by: INTERNAL MEDICINE

## 2017-08-15 PROCEDURE — 85025 COMPLETE CBC W/AUTO DIFF WBC: CPT

## 2017-08-15 PROCEDURE — 63600175 PHARM REV CODE 636 W HCPCS: Performed by: FAMILY MEDICINE

## 2017-08-15 PROCEDURE — 36600 WITHDRAWAL OF ARTERIAL BLOOD: CPT

## 2017-08-15 PROCEDURE — 63600175 PHARM REV CODE 636 W HCPCS: Performed by: NURSE PRACTITIONER

## 2017-08-15 PROCEDURE — 99900035 HC TECH TIME PER 15 MIN (STAT)

## 2017-08-15 PROCEDURE — 63600175 PHARM REV CODE 636 W HCPCS: Performed by: PHYSICIAN ASSISTANT

## 2017-08-15 PROCEDURE — 94003 VENT MGMT INPAT SUBQ DAY: CPT

## 2017-08-15 RX ORDER — DIAZEPAM 10 MG/2ML
5 INJECTION INTRAMUSCULAR EVERY 6 HOURS
Status: DISCONTINUED | OUTPATIENT
Start: 2017-08-15 | End: 2017-08-18 | Stop reason: RX

## 2017-08-15 RX ADMIN — VANCOMYCIN HYDROCHLORIDE 1750 MG: 1 INJECTION, POWDER, LYOPHILIZED, FOR SOLUTION INTRAVENOUS at 02:08

## 2017-08-15 RX ADMIN — GABAPENTIN 200 MG: 100 CAPSULE ORAL at 02:08

## 2017-08-15 RX ADMIN — MIRTAZAPINE 15 MG: 15 TABLET, FILM COATED ORAL at 10:08

## 2017-08-15 RX ADMIN — PREDNISONE 10 MG: 5 SOLUTION ORAL at 10:08

## 2017-08-15 RX ADMIN — SODIUM CHLORIDE: 0.9 INJECTION, SOLUTION INTRAVENOUS at 06:08

## 2017-08-15 RX ADMIN — DIAZEPAM 5 MG: 5 INJECTION, SOLUTION INTRAMUSCULAR; INTRAVENOUS at 11:08

## 2017-08-15 RX ADMIN — ATORVASTATIN CALCIUM 40 MG: 40 TABLET, FILM COATED ORAL at 09:08

## 2017-08-15 RX ADMIN — ENOXAPARIN SODIUM 110 MG: 150 INJECTION SUBCUTANEOUS at 05:08

## 2017-08-15 RX ADMIN — DIAZEPAM 5 MG: 5 INJECTION, SOLUTION INTRAMUSCULAR; INTRAVENOUS at 12:08

## 2017-08-15 RX ADMIN — IPRATROPIUM BROMIDE AND ALBUTEROL SULFATE 3 ML: .5; 3 SOLUTION RESPIRATORY (INHALATION) at 12:08

## 2017-08-15 RX ADMIN — POTASSIUM CHLORIDE 20 MEQ: 20 SOLUTION ORAL at 10:08

## 2017-08-15 RX ADMIN — DIAZEPAM 5 MG: 5 INJECTION, SOLUTION INTRAMUSCULAR; INTRAVENOUS at 08:08

## 2017-08-15 RX ADMIN — PANCRELIPASE 6 CAPSULE: 24000; 76000; 120000 CAPSULE, DELAYED RELEASE PELLETS ORAL at 10:08

## 2017-08-15 RX ADMIN — NYSTATIN 500000 UNITS: 100000 SUSPENSION ORAL at 11:08

## 2017-08-15 RX ADMIN — IPRATROPIUM BROMIDE AND ALBUTEROL SULFATE 3 ML: .5; 3 SOLUTION RESPIRATORY (INHALATION) at 06:08

## 2017-08-15 RX ADMIN — CARBIDOPA AND LEVODOPA 2 TABLET: 25; 100 TABLET ORAL at 10:08

## 2017-08-15 RX ADMIN — INSULIN ASPART 22 UNITS: 100 INJECTION, SOLUTION INTRAVENOUS; SUBCUTANEOUS at 06:08

## 2017-08-15 RX ADMIN — ENTACAPONE 200 MG: 200 TABLET, FILM COATED ORAL at 06:08

## 2017-08-15 RX ADMIN — PREDNISONE 10 MG: 5 SOLUTION ORAL at 09:08

## 2017-08-15 RX ADMIN — OLANZAPINE 10 MG: 5 TABLET ORAL at 09:08

## 2017-08-15 RX ADMIN — INSULIN ASPART 22 UNITS: 100 INJECTION, SOLUTION INTRAVENOUS; SUBCUTANEOUS at 11:08

## 2017-08-15 RX ADMIN — DEXMEDETOMIDINE HYDROCHLORIDE 0.4 MCG/KG/HR: 100 INJECTION, SOLUTION INTRAVENOUS at 10:08

## 2017-08-15 RX ADMIN — NYSTATIN 500000 UNITS: 100000 SUSPENSION ORAL at 12:08

## 2017-08-15 RX ADMIN — TRAZODONE HYDROCHLORIDE 150 MG: 50 TABLET ORAL at 10:08

## 2017-08-15 RX ADMIN — POTASSIUM CHLORIDE 20 MEQ: 20 SOLUTION ORAL at 09:08

## 2017-08-15 RX ADMIN — DIPHENHYDRAMINE HYDROCHLORIDE 25 MG: 50 INJECTION, SOLUTION INTRAMUSCULAR; INTRAVENOUS at 06:08

## 2017-08-15 RX ADMIN — PANCRELIPASE 6 CAPSULE: 24000; 76000; 120000 CAPSULE, DELAYED RELEASE PELLETS ORAL at 06:08

## 2017-08-15 RX ADMIN — DIPHENHYDRAMINE HYDROCHLORIDE 25 MG: 50 INJECTION, SOLUTION INTRAMUSCULAR; INTRAVENOUS at 02:08

## 2017-08-15 RX ADMIN — CARBIDOPA AND LEVODOPA 2 TABLET: 25; 100 TABLET ORAL at 02:08

## 2017-08-15 RX ADMIN — ESCITALOPRAM 20 MG: 10 TABLET, FILM COATED ORAL at 10:08

## 2017-08-15 RX ADMIN — INSULIN ASPART 4 UNITS: 100 INJECTION, SOLUTION INTRAVENOUS; SUBCUTANEOUS at 06:08

## 2017-08-15 RX ADMIN — CARBIDOPA AND LEVODOPA 2 TABLET: 25; 100 TABLET ORAL at 06:08

## 2017-08-15 RX ADMIN — CEFTRIAXONE 1 G: 1 INJECTION, SOLUTION INTRAVENOUS at 03:08

## 2017-08-15 RX ADMIN — PANCRELIPASE 6 CAPSULE: 24000; 76000; 120000 CAPSULE, DELAYED RELEASE PELLETS ORAL at 02:08

## 2017-08-15 RX ADMIN — PRAZOSIN HYDROCHLORIDE 1 MG: 1 CAPSULE ORAL at 10:08

## 2017-08-15 RX ADMIN — IPRATROPIUM BROMIDE AND ALBUTEROL SULFATE 3 ML: .5; 3 SOLUTION RESPIRATORY (INHALATION) at 07:08

## 2017-08-15 RX ADMIN — NYSTATIN 500000 UNITS: 100000 SUSPENSION ORAL at 06:08

## 2017-08-15 RX ADMIN — ENTACAPONE 200 MG: 200 TABLET, FILM COATED ORAL at 02:08

## 2017-08-15 RX ADMIN — ALLOPURINOL 200 MG: 100 TABLET ORAL at 09:08

## 2017-08-15 RX ADMIN — DIAZEPAM 5 MG: 5 INJECTION, SOLUTION INTRAMUSCULAR; INTRAVENOUS at 05:08

## 2017-08-15 RX ADMIN — GABAPENTIN 200 MG: 100 CAPSULE ORAL at 06:08

## 2017-08-15 RX ADMIN — FUROSEMIDE 40 MG: 40 TABLET ORAL at 09:08

## 2017-08-15 RX ADMIN — GABAPENTIN 200 MG: 100 CAPSULE ORAL at 10:08

## 2017-08-15 RX ADMIN — PANTOPRAZOLE SODIUM 40 MG: 40 INJECTION, POWDER, FOR SOLUTION INTRAVENOUS at 09:08

## 2017-08-15 RX ADMIN — PROPOFOL 40 MCG/KG/MIN: 10 INJECTION, EMULSION INTRAVENOUS at 10:08

## 2017-08-15 RX ADMIN — PROPOFOL 20 MCG/KG/MIN: 10 INJECTION, EMULSION INTRAVENOUS at 05:08

## 2017-08-15 RX ADMIN — DIPHENHYDRAMINE HYDROCHLORIDE 25 MG: 50 INJECTION, SOLUTION INTRAMUSCULAR; INTRAVENOUS at 10:08

## 2017-08-15 RX ADMIN — ENOXAPARIN SODIUM 110 MG: 150 INJECTION SUBCUTANEOUS at 03:08

## 2017-08-15 RX ADMIN — THERA TABS 1 TABLET: TAB at 09:08

## 2017-08-15 RX ADMIN — DEXMEDETOMIDINE HYDROCHLORIDE 0.2 MCG/KG/HR: 100 INJECTION, SOLUTION INTRAVENOUS at 01:08

## 2017-08-15 RX ADMIN — DIAZEPAM 5 MG: 5 INJECTION, SOLUTION INTRAMUSCULAR; INTRAVENOUS at 10:08

## 2017-08-15 RX ADMIN — ENTACAPONE 200 MG: 200 TABLET, FILM COATED ORAL at 09:08

## 2017-08-15 RX ADMIN — BUPROPION HYDROCHLORIDE 150 MG: 150 TABLET, FILM COATED, EXTENDED RELEASE ORAL at 09:08

## 2017-08-15 RX ADMIN — PROPOFOL 30 MCG/KG/MIN: 10 INJECTION, EMULSION INTRAVENOUS at 09:08

## 2017-08-15 RX ADMIN — DEXMEDETOMIDINE HYDROCHLORIDE 0.4 MCG/KG/HR: 100 INJECTION, SOLUTION INTRAVENOUS at 12:08

## 2017-08-15 RX ADMIN — PROPOFOL 40 MCG/KG/MIN: 10 INJECTION, EMULSION INTRAVENOUS at 02:08

## 2017-08-15 RX ADMIN — PROPOFOL 30 MCG/KG/MIN: 10 INJECTION, EMULSION INTRAVENOUS at 05:08

## 2017-08-15 RX ADMIN — INSULIN DETEMIR 90 UNITS: 100 INJECTION, SOLUTION SUBCUTANEOUS at 10:08

## 2017-08-15 RX ADMIN — PROPOFOL 20 MCG/KG/MIN: 10 INJECTION, EMULSION INTRAVENOUS at 02:08

## 2017-08-15 NOTE — PROGRESS NOTES
Progress Note    Admit Date: 8/11/2017   LOS: 3 days     SUBJECTIVE:      History of Present Illness:  Patient is a 62 y.o. male admitted to Hospitalist Service from [facility] Emergency Room with complaint of worsening SOB and sore-throat for 1 day. Patient has PMH significant for COPD, CAD, DM-2, depression, DVT, hypertension, history of MI, history of PE and chronic pain. Patient is continuing to smoke cigarettes. Patient presented with complaint of subjective fever, sore-throat and SOB for 1-2 days. Patient admits yellow productive cough and frequent wheezing. Patient requiring multiple doses of nebulization treatments in the ER. Patient denied chest pain, abdominal pain, nausea, vomiting, headache, vision changes, focal neuro-deficits. Patient admitted non-compliance with medications and diet. He had few doughnuts last night and now blood sugars are high.    Interval history  On ventilator. NGT feeding. On IV Propofol and IV Precedex infusion.    Scheduled Meds:   albuterol-ipratropium 2.5mg-0.5mg/3mL  3 mL Nebulization Q6H    allopurinol  200 mg Oral Daily    atorvastatin  40 mg Oral Daily    buPROPion  150 mg Oral BID    carbidopa-levodopa  mg  2 tablet Oral TID    And    entacapone  200 mg Oral TID    carvedilol  3.125 mg Oral BID    cefTRIAXone (ROCEPHIN) IVPB  1 g Intravenous Q24H    diazePAM  5 mg Intravenous Q6H    diphenhydrAMINE  25 mg Intravenous Q8H    enoxaparin  110 mg Subcutaneous Q12H    escitalopram oxalate  20 mg Oral QHS    furosemide  40 mg Oral Daily    gabapentin  200 mg Oral TID    insulin aspart  22 Units Subcutaneous TID AC    insulin detemir  90 Units Subcutaneous QHS    lipase-protease-amylase 24,000-76,000-120,000 units  6 capsule Oral TID    mirtazapine  15 mg Oral QHS    multivitamin  1 tablet Oral Daily    nystatin  5 mL Oral Q6H    olanzapine  10 mg Oral Daily    pantoprazole  40 mg Intravenous Daily    potassium chloride 10%  20 mEq Oral  BID    prazosin  1 mg Oral QHS    predniSONE 5 mg/5 ml  10 mg Oral BID    trazodone  150 mg Oral QHS    vancomycin (VANCOCIN) IVPB  15 mg/kg Intravenous Q12H     Continuous Infusions:   sodium chloride 0.9% 100 mL/hr at 08/15/17 0643    dexmedetomidine (PRECEDEX) infusion 0.2 mcg/kg/hr (08/15/17 0123)    propofol 50 mcg/kg/min (08/15/17 0902)     PRN Meds:acetaminophen, dextrose 50%, dextrose 50%, diazePAM, glucagon (human recombinant), glucose, glucose, hydrOXYzine HCl, insulin aspart, ondansetron, ondansetron, oxycodone-acetaminophen, quetiapine, senna-docusate 8.6-50 mg    Review of patient's allergies indicates:   Allergen Reactions    Bee pollens Anaphylaxis     Bee stings     Penicillins Nausea Only     Other reaction(s): Unknown    Codeine Rash     Other reaction(s): Unknown    Morphine Rash       Review of Systems  -unable to perform patient is intubated     OBJECTIVE:     Vital Signs (Most Recent)  Temp: 97.4 °F (36.3 °C) (08/15/17 0530)  Pulse: (!) 40 (08/15/17 0640)  Resp: 14 (08/15/17 0640)  BP: (!) 156/77 (08/15/17 0530)  SpO2: 95 % (08/15/17 0640)    Vital Signs Range (Last 24H):  Temp:  [97.4 °F (36.3 °C)-98.2 °F (36.8 °C)]   Pulse:  [38-65]   Resp:  [12-21]   BP: (140-160)/(69-81)   SpO2:  [93 %-99 %]     I & O (Last 24H):    Intake/Output Summary (Last 24 hours) at 08/15/17 0904  Last data filed at 08/15/17 0600   Gross per 24 hour   Intake          5409.01 ml   Output             2750 ml   Net          2659.01 ml     Physical Exam:  General appearance: INTUBATED and sedated   Head: normocephalic, atraumatic  Eyes:  conjunctivae/corneas clear. PERRL.  Nose: Nares normal. Septum midline.  Throat: lips, mucosa, and tongue normal; teeth and gums normal, no throat erythema.  Neck: supple, symmetrical, trachea midline, no JVD and thyroid not enlarged, symmetric, no tenderness/mass/nodules  Lungs:  Bilateral scattered rhonchi with prolonged expiration  Chest wall: no tenderness  Heart: regular  rate and rhythm, S1, S2 normal, no murmur, click, rub or gallop  Abdomen: soft, non-tender non-distented; bowel sounds normal; no masses,  no organomegaly  Extremities: no cyanosis, clubbing or edema.   Pulses: 2+ and symmetric  Skin: Skin color, texture, turgor normal. No rashes or lesions.  Lymph nodes: Cervical, supraclavicular, and axillary nodes normal.  Neurologic: sedated   Laboratory:  CBC:     Recent Labs  Lab 08/15/17  0821   WBC 13.40*   RBC 4.51*   HGB 13.8*   HCT 41.6      MCV 92   MCH 30.7   MCHC 33.2     CMP:   Recent Labs  Lab 08/12/17  0628  08/14/17  0527   *  < > 218*   CALCIUM 8.7  < > 8.8   ALBUMIN 3.3*  --   --    PROT 7.4  --   --    *  < > 140   K 4.8  < > 3.9   CO2 23  < > 23   CL 93*  < > 106   BUN 11  < > 21   CREATININE 0.8  < > 0.7   ALKPHOS 66  --   --    ALT 11  --   --    AST 11  --   --    BILITOT 0.5  --   --    < > = values in this interval not displayed.    Diagnostic Results:  CT soft tissue neck:  No acute findings in this patient status post prior cervical spine surgery. Bullous emphysematous changes are noted in the upper lung fields.    CXR: An orogastric tube is seen with its tip in the region of the stomach    KUB: There is a gastric tube which appears coiled within the left upper quadrant of the abdomen, likely within the stomach.  There is a battery pack for a neurostimulator device visible over the left upper quadrant.  There are 2 neurostimulator leads projected over the spinal canal in the lower thoracic region.  The cardiac silhouette is enlarged and there are central perivascular congestion, bilateral perihilar reticulonodular air space opacities, and patchy segmental air space opacity at the lung bases, likely a combination of a atelectasis and/or consolidation/aspiration.  There is a possible small volume of left basilar pleural fluid.  There is degenerative change of the thoracic spine.    CXR: Worsening airspace opacity and edema in the mid  and lower lungs bilaterally.    CXR: Appropriate positioning of support devices.  Persistent perihilar and bibasilar infiltrates, with increase in consolidative component on the right. Stable small left pleural effusion.    ASSESSMENT/PLAN:      *COPD exacerbation [J44.1]  Staph Aureus Pneumonia  Intubated --> follow up pulmonary   Prednisone 10 mg BID.  Continue beta 2 agonist bronchodilator treatments.   Continue IV antibiotics - Vancomycin and Ceftriaxone  Check sputum GS and Cx.   Continue routine medications as before. Patient stated, he quit smoking 2 weeks ago.  ?Tracheostomy.   Yes    Diabetes mellitus, type II [E11.9] - uncontrolled due to non-compliance with diet  Check blood glucose level q 6h.  Use Novolog Insulin Sliding Scale as needed.   Dietry compliance discussed with patient.   Yes    Benign essential HTN [I10]  Chronic problem. Will continue chronic medications and monitor for any changes, adjusting as needed.  Patient is bradycardic. Coreg held and Precedex is being weaning.     Yes    JOSE ALBERTO (obstructive sleep apnea) [G47.33]  On ventilator      Yes    Depression [F32.9]  Chronic problem. Will continue chronic medications and monitor for any changes, adjusting as needed.   Yes    CAD (coronary artery disease) [I25.10]  Patient with known CAD and monitor for S/Sx of angina/ACS. Continue to monitor on telemetry.   Yes    DVT (deep venous thrombosis)- Hx IVC filter [I82.409]  Xarelto changed to Lovenox 1 mg/kg sq q 12 hrs.      Yes              VTE Risk Mitigation      Lovenox 40 mg SQ q day     Memo Henry MD

## 2017-08-15 NOTE — PLAN OF CARE
08/14/17 1942   Patient Assessment/Suction   Level of Consciousness (AVPU) responds to voice   Respiratory Effort Unlabored   Expansion/Accessory Muscles/Retractions no use of accessory muscles   All Lung Fields Breath Sounds clear;diminished   Rhythm/Pattern, Respiratory artificial airway;mechanical device;ventilator assisted   Cough Frequency infrequent;with stimulation   Suction Method in-line suction catheter (closed)   Sputum Amount scant   Sputum Color clear;white   Sputum Consistency thick   PRE-TX-O2-ETCO2   O2 Device (Oxygen Therapy) ventilator   Oxygen Concentration (%) 60   SpO2 96 %   ETCO2 (mmHg) 31 mmHg   Pulse (!) 42   Resp 12   Aerosol Therapy   $ Aerosol Therapy Charges Aerosol Treatment   Respiratory Treatment Status given   SVN/Inhaler Treatment Route in-line;with oxygen   Position During Treatment HOB at 30 degrees   Patient Tolerance good   Post-Treatment   Post-treatment Heart Rate (beats/min) 43   Post-treatment Resp Rate (breaths/min) 13   All Fields Breath Sounds unchanged       Airway - Non-Surgical 08/13/17 1410 Endotracheal Tube   Placement Date/Time: 08/13/17 1410   Method of Intubation: Direct laryngoscopy  Inserted by: MD  Airway Device: Endotracheal Tube  Airway Device Size: 8.0  Style: Cuffed  Cuff Inflated With: Air  Placement Verified By: Auscultation;Chest X-ray;Capnome...   Secured at 24 cm   Measured At Lips   Secured Location Center   Secured by Commercial tube tinoco   Bite Block none   Site Condition Cool;Dry   Status Intact;Secured;Patent   Vent Select   Conventional Vent Y   Preset Conventional Ventilator Settings   Vent Type    Ventilation Type VC+   Vent Mode A/C   Set Rate 12 bmp   Vt Set 600 mL   PEEP/CPAP 5 cmH20   Pressure Support 0 cmH20   Peak Flow 0 L/min   Set Inspiratory Pressure 0 cmH20   Insp Time 0.8 Sec(s)   Plateau Set/Insp. Hold (sec) 0   Insp Rise Time  50 %   Trigger Sensitivity Flow/I-Trigger 3 L/min   P High 0 cm H2O   P Low 0 cm H2O   T High 0  sec   T Low 0 sec   Patient Ventilator Parameters   Resp Rate Total 14 br/min   Peak Airway Pressure 15 cmH2O   Mean Airway Pressure 6.9 cmH20   Plateau Pressure 0 cmH20   Exhaled Vt 615 mL   Total Ve 8.89 mL   Spont Ve 0 L   I:E Ratio Measured 1:4.30   Conventional Ventilator Alarms   Alarms On Y   Ve High Alarm 25 L/min   Resp Rate High Alarm 45 br/min   Press High Alarm 45 cmH2O   Apnea Rate 10   Apnea Volume (mL) 730 mL   Apnea Oxygen Concentration  100   Apnea Flow Rate (L/min) 87   T Apnea 20 sec(s)   Ready to Wean/Extubation Screen   FIO2<=50 (chart decimal) (!) 0.6   MV<16L (chart vol.) 8.89   PEEP <=8 (chart #) 5   Ready to Wean Parameters   F/VT Ratio<105 (RSBI) (!) 19.51   Pt stable on current vent settings. Vent to red outlet with alarms on and functioning. Ambu/flowmeter at [facility]. Pt tolerates treatments well.

## 2017-08-15 NOTE — PLAN OF CARE
Problem: Nutrition, Enteral (Adult)  Goal: Signs and Symptoms of Listed Potential Problems Will be Absent, Minimized or Managed (Nutrition, Enteral)  Signs and symptoms of listed potential problems will be absent, minimized or managed by discharge/transition of care (reference Nutrition, Enteral (Adult) CPG).  Outcome: Ongoing (interventions implemented as appropriate)  Recommendations  Recommendation/Intervention:   1.) Enteral nurition: recommend Peptamen VHP @ 10 mls/hr advancing by 10 mls Q6 hrs to goal rate 65 mls/hr continuous providing 1560 kcals/day, 144 g protein/day, 119 g CHO/day, and 1310 mls water/day (+509 propofol) for a total of 2069 kcals/day. Hold TF x4 hrs for residuals >250mls. Flush 120 mls free water Q4 hrs or per MD.   Goals: 1.) Enteral nutrition will initiate within 72 hrs.   Nutrition Goal Status: new  Communication of JOURDAN Recs: other (comment) (second sign to MD)

## 2017-08-15 NOTE — PROGRESS NOTES
Progress Note  PULMONARY    Admit Date: 8/11/2017   08/15/2017      SUBJECTIVE:     Aug 14, pt presented with sore throat and sob x 1 day on 8/11, intubated 8/12 with airway difficulties, extubated and intubated 8/13 with stridor again.  Pt had ct neck early in course.  Now sedate on vent.  Aug 15, sedated, discussed with wife     PFSH and Allergies reviewed.  Unable to do ros due to sedation and intubation.  OBJECTIVE:     Vitals (Most recent):  Vitals:    08/15/17 0640   BP:    Pulse: (!) 40   Resp: 14   Temp:        Vitals (24 hour range):  Temp:  [97.4 °F (36.3 °C)-98.4 °F (36.9 °C)]   Pulse:  [38-65]   Resp:  [12-32]   BP: (140-160)/(69-81)   SpO2:  [93 %-100 %]       Intake/Output Summary (Last 24 hours) at 08/15/17 0744  Last data filed at 08/15/17 0600   Gross per 24 hour   Intake          5529.01 ml   Output             2750 ml   Net          2779.01 ml          Physical Exam:  The patient's neuro status (alertness,orientation,cognitive function,motor skills,), pharyngeal exam (oral lesions, hygiene, abn dentition,), Neck (jvd,mass,thyroid,nodes in neck and above/below clavicle),RESPIRATORY(symmetry,effort,fremitus,percussion,auscultation),  Cor(rhythm,heart tones including gallops,perfusion,edema)ABD(distention,hepatic&splenomegaly,tenderness,masses), Skin(rash,cyanosis),Psyc(affect,judgement,).  Exam negative except for these pertinent findings:    Oral intubation, no air leak with cuff down. Good bs, no edema    Radiographs reviewed: view by direct vision  , cxr with right atelectasis and more opacified 8/15 right lower lung.      Results for orders placed during the hospital encounter of 08/11/17   X-Ray Chest 1 View    Narrative AP chest compared to 08/13/2017    Findings: The endotracheal tube and nasogastric tube remain appropriately positioned.  A spinal stimulator device is again noted.  The heart size is upper normal.  There are persistent perihilar and bibasilar infiltrates as well as a small  left pleural effusion.  An increase in consolidative component is seen at the right lung base.  There is no pneumothorax.    Impression Appropriate positioning of support devices.  Persistent perihilar and bibasilar infiltrates, with increase in consolidative component on the right.  Stable small left pleural effusion.      Electronically signed by: Ishmael Flood MD  Date:     08/14/17  Time:    07:39    ]    Labs     No results for input(s): WBC, HGB, HCT, PLT, BAND, METAMYELOCYT, MYELOPCT, HGBA1C in the last 24 hours.No results for input(s): NA, K, CL, CO2, BUN, CREATININE, GLU, CALCIUM, CAION, MG, PHOS, AST, ALT, ALKPHOS, BILITOT, BILIDIR, PROT, ALBUMIN, PREALBUMIN, AMYLASE, LIPASE, CRP, HSCRP, SEDRATE, PROCAL, INR, PTT, LABHEPA, LACTATE, TROPONINI, CPK, CPKMB, MB, BNP in the last 24 hours.    Recent Labs  Lab 08/15/17  0442   PH 7.432   PCO2 38.8   PO2 65*   HCO3 25.8     Microbiology Results (last 7 days)     Procedure Component Value Units Date/Time    Blood culture [[id number]] Collected:  08/14/17 1448    Order Status:  Completed Specimen:  Blood from Peripheral, Right  Hand Updated:  08/15/17 0515     Blood Culture, Routine No Growth to date    Blood Culture #1 [[id number]] Collected:  08/11/17 0734    Order Status:  Completed Specimen:  Blood from Antecubital, Right  Arm Updated:  08/14/17 1412     Blood Culture, Routine No Growth to date     Blood Culture, Routine No Growth to date     Blood Culture, Routine No Growth to date     Blood Culture, Routine No Growth to date    Blood Culture #2 [[id number]] Collected:  08/11/17 0733    Order Status:  Completed Specimen:  Blood Updated:  08/14/17 1412     Blood Culture, Routine No Growth to date     Blood Culture, Routine No Growth to date     Blood Culture, Routine No Growth to date     Blood Culture, Routine No Growth to date    Culture, Respiratory with Gram Stain [[id number]] Collected:  08/12/17 1340    Order Status:  Completed Specimen:  Respiratory from  Tracheal Aspirate Updated:  08/14/17 1005     Respiratory Culture --     STAPHYLOCOCCUS AUREUS  Many  Susceptibility pending       Gram Stain (Respiratory) <10 epithelial cells per low power field.     Gram Stain (Respiratory) Rare WBC's     Gram Stain (Respiratory) Moderate Gram positive cocci          Impression:  Active Hospital Problems    Diagnosis  POA    *COPD exacerbation [J44.1]  Yes    Acute respiratory failure with hypoxia and hypercarbia [J96.01, J96.02]  No    Staphylococcal pneumonia [J15.20]  Yes    Acute respiratory failure [J96.00]  Yes    Diabetes mellitus, type II [E11.9]  Yes    Benign essential HTN [I10]  Yes    JOSE ALBERTO (obstructive sleep apnea) [G47.33]  Yes    Depression [F32.9]  Yes    CAD (coronary artery disease) [I25.10]  Yes    DVT (deep venous thrombosis)- Hx IVC filter [I82.409]  Yes      Resolved Hospital Problems    Diagnosis Date Resolved POA   No resolved problems to display.               Plan:     Aug 14, will  need trach, add valium to facilitate sedation, with staph pneumonia - steroids need to be minimal.  Will dose vanc til sensitivity out.  Start tube feed, stop xarelto and use lovenox.    Tried to call wife - no answer.      The following were evaluated and adjusted as needed: mechanical ventilator settings and weaning status, intravenous fluids and nutritional status, sedation and neurologic status, antibiotics and acid base balance and oxygenation needs       Critical Care  - THE PATIENT HAS A HIGH PROBABILITY OF IMMINENT OR LIFE THREATENING DETERIORATION.  Over 50%time of encounter was in direct care at bedside.  Time was 30 to 74 minutes required for patient care.  Time needed for all of the above totaled 33 minutes.    Aug 15, staph id na still, still risk of bleed from xarelto, will ask ent to see to figure plan.  Need to keep steroids low with staph pneumonia.  Expect trach best management option for airway stridor that required 2 intubations and cannot be dosed  steroids (high dose).    Wil on propofol?  On coreg?  Observe- stop coreg if hr<45                                  .
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
## 2017-08-15 NOTE — PROGRESS NOTES
Pt remains on vent on documented settings. Alarms on & functioning. Jo Annu bag @ [facility]. Oral care performed

## 2017-08-16 LAB
ALBUMIN SERPL BCP-MCNC: 2.3 G/DL
ALLENS TEST: ABNORMAL
ALP SERPL-CCNC: 62 U/L
ALT SERPL W/O P-5'-P-CCNC: 9 U/L
ANION GAP SERPL CALC-SCNC: 11 MMOL/L
AST SERPL-CCNC: 10 U/L
BACTERIA BLD CULT: NORMAL
BACTERIA BLD CULT: NORMAL
BASOPHILS # BLD AUTO: 0 K/UL
BASOPHILS NFR BLD: 0.3 %
BILIRUB SERPL-MCNC: 0.2 MG/DL
BUN SERPL-MCNC: 12 MG/DL
CALCIUM SERPL-MCNC: 8.3 MG/DL
CHLORIDE SERPL-SCNC: 105 MMOL/L
CO2 SERPL-SCNC: 25 MMOL/L
CREAT SERPL-MCNC: 0.7 MG/DL
DELSYS: ABNORMAL
DIFFERENTIAL METHOD: ABNORMAL
EOSINOPHIL # BLD AUTO: 0 K/UL
EOSINOPHIL NFR BLD: 0.2 %
ERYTHROCYTE [DISTWIDTH] IN BLOOD BY AUTOMATED COUNT: 12.9 %
ERYTHROCYTE [SEDIMENTATION RATE] IN BLOOD BY WESTERGREN METHOD: 12 MM/H
EST. GFR  (AFRICAN AMERICAN): >60 ML/MIN/1.73 M^2
EST. GFR  (NON AFRICAN AMERICAN): >60 ML/MIN/1.73 M^2
ETCO2: 29
FIO2: 60
GLUCOSE SERPL-MCNC: 126 MG/DL
HCO3 UR-SCNC: 28 MMOL/L (ref 24–28)
HCT VFR BLD AUTO: 42.3 %
HGB BLD-MCNC: 14.3 G/DL
LYMPHOCYTES # BLD AUTO: 3 K/UL
LYMPHOCYTES NFR BLD: 26.4 %
MCH RBC QN AUTO: 30.9 PG
MCHC RBC AUTO-ENTMCNC: 33.8 G/DL
MCV RBC AUTO: 92 FL
MIN VOL: 10.4
MODE: ABNORMAL
MONOCYTES # BLD AUTO: 0.4 K/UL
MONOCYTES NFR BLD: 3.2 %
NEUTROPHILS # BLD AUTO: 8 K/UL
NEUTROPHILS NFR BLD: 69.9 %
PCO2 BLDA: 37 MMHG (ref 35–45)
PEEP: 5
PH SMN: 7.49 [PH] (ref 7.35–7.45)
PIP: 20
PLATELET # BLD AUTO: 199 K/UL
PMV BLD AUTO: 8.4 FL
PO2 BLDA: 77 MMHG (ref 80–100)
POC BE: 5 MMOL/L
POC SATURATED O2: 96 % (ref 95–100)
POC TCO2: 29 MMOL/L (ref 23–27)
POCT GLUCOSE: 105 MG/DL (ref 70–110)
POCT GLUCOSE: 148 MG/DL (ref 70–110)
POCT GLUCOSE: 232 MG/DL (ref 70–110)
POCT GLUCOSE: 86 MG/DL (ref 70–110)
POTASSIUM SERPL-SCNC: 3.3 MMOL/L
PROT SERPL-MCNC: 5.9 G/DL
RBC # BLD AUTO: 4.62 M/UL
SAMPLE: ABNORMAL
SITE: ABNORMAL
SODIUM SERPL-SCNC: 141 MMOL/L
SP02: 97
VANCOMYCIN TROUGH SERPL-MCNC: 17.2 UG/ML
VT: 600
WBC # BLD AUTO: 11.5 K/UL

## 2017-08-16 PROCEDURE — 63600175 PHARM REV CODE 636 W HCPCS: Performed by: NURSE PRACTITIONER

## 2017-08-16 PROCEDURE — 80202 ASSAY OF VANCOMYCIN: CPT

## 2017-08-16 PROCEDURE — 94770 HC EXHALED C02 TEST: CPT

## 2017-08-16 PROCEDURE — 20000000 HC ICU ROOM

## 2017-08-16 PROCEDURE — C9113 INJ PANTOPRAZOLE SODIUM, VIA: HCPCS | Performed by: INTERNAL MEDICINE

## 2017-08-16 PROCEDURE — 82803 BLOOD GASES ANY COMBINATION: CPT

## 2017-08-16 PROCEDURE — 25000003 PHARM REV CODE 250: Performed by: NURSE PRACTITIONER

## 2017-08-16 PROCEDURE — 99233 SBSQ HOSP IP/OBS HIGH 50: CPT | Mod: ,,, | Performed by: INTERNAL MEDICINE

## 2017-08-16 PROCEDURE — 94640 AIRWAY INHALATION TREATMENT: CPT

## 2017-08-16 PROCEDURE — 63600175 PHARM REV CODE 636 W HCPCS: Performed by: PHYSICIAN ASSISTANT

## 2017-08-16 PROCEDURE — 63600175 PHARM REV CODE 636 W HCPCS: Performed by: INTERNAL MEDICINE

## 2017-08-16 PROCEDURE — 94003 VENT MGMT INPAT SUBQ DAY: CPT

## 2017-08-16 PROCEDURE — 36600 WITHDRAWAL OF ARTERIAL BLOOD: CPT

## 2017-08-16 PROCEDURE — 27000221 HC OXYGEN, UP TO 24 HOURS

## 2017-08-16 PROCEDURE — 85025 COMPLETE CBC W/AUTO DIFF WBC: CPT

## 2017-08-16 PROCEDURE — 99232 SBSQ HOSP IP/OBS MODERATE 35: CPT | Mod: ,,, | Performed by: INTERNAL MEDICINE

## 2017-08-16 PROCEDURE — 36415 COLL VENOUS BLD VENIPUNCTURE: CPT

## 2017-08-16 PROCEDURE — 80053 COMPREHEN METABOLIC PANEL: CPT

## 2017-08-16 PROCEDURE — 25000242 PHARM REV CODE 250 ALT 637 W/ HCPCS: Performed by: NURSE PRACTITIONER

## 2017-08-16 PROCEDURE — 63600175 PHARM REV CODE 636 W HCPCS: Performed by: FAMILY MEDICINE

## 2017-08-16 PROCEDURE — 99900035 HC TECH TIME PER 15 MIN (STAT)

## 2017-08-16 PROCEDURE — 25000003 PHARM REV CODE 250: Performed by: INTERNAL MEDICINE

## 2017-08-16 PROCEDURE — 94761 N-INVAS EAR/PLS OXIMETRY MLT: CPT

## 2017-08-16 RX ORDER — POTASSIUM CHLORIDE 7.45 MG/ML
10 INJECTION INTRAVENOUS
Status: COMPLETED | OUTPATIENT
Start: 2017-08-16 | End: 2017-08-16

## 2017-08-16 RX ADMIN — GABAPENTIN 200 MG: 100 CAPSULE ORAL at 02:08

## 2017-08-16 RX ADMIN — DIPHENHYDRAMINE HYDROCHLORIDE 25 MG: 50 INJECTION, SOLUTION INTRAMUSCULAR; INTRAVENOUS at 09:08

## 2017-08-16 RX ADMIN — PROPOFOL 40 MCG/KG/MIN: 10 INJECTION, EMULSION INTRAVENOUS at 12:08

## 2017-08-16 RX ADMIN — CARBIDOPA AND LEVODOPA 2 TABLET: 25; 100 TABLET ORAL at 05:08

## 2017-08-16 RX ADMIN — GABAPENTIN 200 MG: 100 CAPSULE ORAL at 06:08

## 2017-08-16 RX ADMIN — DIAZEPAM 5 MG: 5 INJECTION, SOLUTION INTRAMUSCULAR; INTRAVENOUS at 06:08

## 2017-08-16 RX ADMIN — NYSTATIN 500000 UNITS: 100000 SUSPENSION ORAL at 05:08

## 2017-08-16 RX ADMIN — DIAZEPAM 5 MG: 5 INJECTION, SOLUTION INTRAMUSCULAR; INTRAVENOUS at 12:08

## 2017-08-16 RX ADMIN — DIPHENHYDRAMINE HYDROCHLORIDE 25 MG: 50 INJECTION, SOLUTION INTRAMUSCULAR; INTRAVENOUS at 06:08

## 2017-08-16 RX ADMIN — SODIUM CHLORIDE 500 ML: 0.9 INJECTION, SOLUTION INTRAVENOUS at 10:08

## 2017-08-16 RX ADMIN — POTASSIUM CHLORIDE 10 MEQ: 10 INJECTION, SOLUTION INTRAVENOUS at 01:08

## 2017-08-16 RX ADMIN — DEXMEDETOMIDINE HYDROCHLORIDE 0.4 MCG/KG/HR: 100 INJECTION, SOLUTION INTRAVENOUS at 07:08

## 2017-08-16 RX ADMIN — THERA TABS 1 TABLET: TAB at 08:08

## 2017-08-16 RX ADMIN — INSULIN ASPART 4 UNITS: 100 INJECTION, SOLUTION INTRAVENOUS; SUBCUTANEOUS at 05:08

## 2017-08-16 RX ADMIN — POTASSIUM CHLORIDE 10 MEQ: 10 INJECTION, SOLUTION INTRAVENOUS at 09:08

## 2017-08-16 RX ADMIN — POTASSIUM CHLORIDE 20 MEQ: 20 SOLUTION ORAL at 08:08

## 2017-08-16 RX ADMIN — NYSTATIN 500000 UNITS: 100000 SUSPENSION ORAL at 12:08

## 2017-08-16 RX ADMIN — PREDNISONE 10 MG: 5 SOLUTION ORAL at 09:08

## 2017-08-16 RX ADMIN — MIRTAZAPINE 15 MG: 15 TABLET, FILM COATED ORAL at 09:08

## 2017-08-16 RX ADMIN — VANCOMYCIN HYDROCHLORIDE 1750 MG: 5 INJECTION, POWDER, LYOPHILIZED, FOR SOLUTION INTRAVENOUS at 04:08

## 2017-08-16 RX ADMIN — ALLOPURINOL 200 MG: 100 TABLET ORAL at 08:08

## 2017-08-16 RX ADMIN — IPRATROPIUM BROMIDE AND ALBUTEROL SULFATE 3 ML: .5; 3 SOLUTION RESPIRATORY (INHALATION) at 12:08

## 2017-08-16 RX ADMIN — POTASSIUM CHLORIDE 10 MEQ: 10 INJECTION, SOLUTION INTRAVENOUS at 10:08

## 2017-08-16 RX ADMIN — BUPROPION HYDROCHLORIDE 150 MG: 150 TABLET, FILM COATED, EXTENDED RELEASE ORAL at 08:08

## 2017-08-16 RX ADMIN — CARBIDOPA AND LEVODOPA 2 TABLET: 25; 100 TABLET ORAL at 12:08

## 2017-08-16 RX ADMIN — PRAZOSIN HYDROCHLORIDE 1 MG: 1 CAPSULE ORAL at 09:08

## 2017-08-16 RX ADMIN — PANTOPRAZOLE SODIUM 40 MG: 40 INJECTION, POWDER, FOR SOLUTION INTRAVENOUS at 08:08

## 2017-08-16 RX ADMIN — PANCRELIPASE 6 CAPSULE: 24000; 76000; 120000 CAPSULE, DELAYED RELEASE PELLETS ORAL at 09:08

## 2017-08-16 RX ADMIN — PROPOFOL 20 MCG/KG/MIN: 10 INJECTION, EMULSION INTRAVENOUS at 02:08

## 2017-08-16 RX ADMIN — DIAZEPAM 5 MG: 5 INJECTION, SOLUTION INTRAMUSCULAR; INTRAVENOUS at 05:08

## 2017-08-16 RX ADMIN — ENTACAPONE 200 MG: 200 TABLET, FILM COATED ORAL at 12:08

## 2017-08-16 RX ADMIN — POTASSIUM CHLORIDE 20 MEQ: 20 SOLUTION ORAL at 09:08

## 2017-08-16 RX ADMIN — PROPOFOL 40 MCG/KG/MIN: 10 INJECTION, EMULSION INTRAVENOUS at 08:08

## 2017-08-16 RX ADMIN — ENTACAPONE 200 MG: 200 TABLET, FILM COATED ORAL at 05:08

## 2017-08-16 RX ADMIN — ATORVASTATIN CALCIUM 40 MG: 40 TABLET, FILM COATED ORAL at 08:08

## 2017-08-16 RX ADMIN — VANCOMYCIN HYDROCHLORIDE 1750 MG: 5 INJECTION, POWDER, LYOPHILIZED, FOR SOLUTION INTRAVENOUS at 05:08

## 2017-08-16 RX ADMIN — CARBIDOPA AND LEVODOPA 2 TABLET: 25; 100 TABLET ORAL at 08:08

## 2017-08-16 RX ADMIN — PROPOFOL 40 MCG/KG/MIN: 10 INJECTION, EMULSION INTRAVENOUS at 05:08

## 2017-08-16 RX ADMIN — IPRATROPIUM BROMIDE AND ALBUTEROL SULFATE 3 ML: .5; 3 SOLUTION RESPIRATORY (INHALATION) at 08:08

## 2017-08-16 RX ADMIN — PROPOFOL 40 MCG/KG/MIN: 10 INJECTION, EMULSION INTRAVENOUS at 06:08

## 2017-08-16 RX ADMIN — INSULIN DETEMIR 90 UNITS: 100 INJECTION, SOLUTION SUBCUTANEOUS at 09:08

## 2017-08-16 RX ADMIN — CEFTRIAXONE 1 G: 1 INJECTION, SOLUTION INTRAVENOUS at 03:08

## 2017-08-16 RX ADMIN — PANCRELIPASE 6 CAPSULE: 24000; 76000; 120000 CAPSULE, DELAYED RELEASE PELLETS ORAL at 06:08

## 2017-08-16 RX ADMIN — PANCRELIPASE 6 CAPSULE: 24000; 76000; 120000 CAPSULE, DELAYED RELEASE PELLETS ORAL at 02:08

## 2017-08-16 RX ADMIN — TRAZODONE HYDROCHLORIDE 150 MG: 50 TABLET ORAL at 09:08

## 2017-08-16 RX ADMIN — DIPHENHYDRAMINE HYDROCHLORIDE 25 MG: 50 INJECTION, SOLUTION INTRAMUSCULAR; INTRAVENOUS at 02:08

## 2017-08-16 RX ADMIN — SODIUM CHLORIDE: 0.9 INJECTION, SOLUTION INTRAVENOUS at 08:08

## 2017-08-16 RX ADMIN — NYSTATIN 500000 UNITS: 100000 SUSPENSION ORAL at 06:08

## 2017-08-16 RX ADMIN — ENOXAPARIN SODIUM 110 MG: 150 INJECTION SUBCUTANEOUS at 05:08

## 2017-08-16 RX ADMIN — POTASSIUM CHLORIDE 10 MEQ: 10 INJECTION, SOLUTION INTRAVENOUS at 12:08

## 2017-08-16 RX ADMIN — OLANZAPINE 10 MG: 5 TABLET ORAL at 08:08

## 2017-08-16 RX ADMIN — DEXMEDETOMIDINE HYDROCHLORIDE 0.3 MCG/KG/HR: 100 INJECTION, SOLUTION INTRAVENOUS at 09:08

## 2017-08-16 RX ADMIN — ESCITALOPRAM 20 MG: 10 TABLET, FILM COATED ORAL at 09:08

## 2017-08-16 RX ADMIN — GABAPENTIN 200 MG: 100 CAPSULE ORAL at 09:08

## 2017-08-16 RX ADMIN — PROPOFOL 40 MCG/KG/MIN: 10 INJECTION, EMULSION INTRAVENOUS at 01:08

## 2017-08-16 RX ADMIN — INSULIN ASPART 22 UNITS: 100 INJECTION, SOLUTION INTRAVENOUS; SUBCUTANEOUS at 05:08

## 2017-08-16 RX ADMIN — ENTACAPONE 200 MG: 200 TABLET, FILM COATED ORAL at 08:08

## 2017-08-16 RX ADMIN — CARVEDILOL 3.12 MG: 3.12 TABLET, FILM COATED ORAL at 08:08

## 2017-08-16 RX ADMIN — INSULIN ASPART 22 UNITS: 100 INJECTION, SOLUTION INTRAVENOUS; SUBCUTANEOUS at 06:08

## 2017-08-16 RX ADMIN — FUROSEMIDE 40 MG: 40 TABLET ORAL at 08:08

## 2017-08-16 RX ADMIN — PROPOFOL 40 MCG/KG/MIN: 10 INJECTION, EMULSION INTRAVENOUS at 10:08

## 2017-08-16 RX ADMIN — ENOXAPARIN SODIUM 110 MG: 150 INJECTION SUBCUTANEOUS at 04:08

## 2017-08-16 RX ADMIN — IPRATROPIUM BROMIDE AND ALBUTEROL SULFATE 3 ML: .5; 3 SOLUTION RESPIRATORY (INHALATION) at 07:08

## 2017-08-16 RX ADMIN — OXYCODONE AND ACETAMINOPHEN 1 TABLET: 10; 325 TABLET ORAL at 01:08

## 2017-08-16 RX ADMIN — PREDNISONE 10 MG: 5 SOLUTION ORAL at 08:08

## 2017-08-16 NOTE — SIGNIFICANT EVENT
Nick Rodriguez   2017 3:00 PM   Office Visit   MRN: [id number]    Department:  Acupuncture Med   Dept Phone:  [phone]    Description:  Male : 1954   Provider:  Eva Rand M.D.           Allergies as of 2017     Allergen Noted Reactions    Nkda [No Known Drug Allergy] 2010         Vital Signs     Smoking Status                   Never Smoker            Basic Information     Date Of Birth Sex Race Ethnicity Preferred Language    1954 Male White Non- English      Your appointments     Oct 05, 2017  8:00 AM   ANNUAL EXAM PREVENTATIVE with Eva Rand M.D.   [facility] [address] ([facility])    [address] [facility] [address] [phone]              Problem List              ICD-10-CM Priority Class Noted - Resolved    Hyperlipidemia E78.5   10/11/2010 - Present    Insomnia G47.00   10/11/2010 - Present    Groin strain S76.219A   10/21/2011 - Present    Postnasal drip R09.82   5/3/2012 - Present    Sleep apnea G47.30   3/20/2013 - Present    Vitamin D insufficiency E55.9   2013 - Present    Right hip pain M25.551   2013 - Present    BPH (benign prostatic hypertrophy) N40.0   2015 - Present    Scoliosis M41.9   10/9/2015 - Present    Thrombocytopenia (CMS-HCC) D69.6   2015 - Present    Seasonal allergies J30.2   2016 - Present    Left forearm pain M79.632   2016 - Present    Left lateral epicondylitis M77.12   2016 - Present    Left elbow pain M25.522   2016 - Present    Preventative health care Z00.00   10/17/2016 - Present    Need for vaccination Z23   10/17/2016 - Present    Obstructive sleep apnea syndrome G47.33   10/17/2016 - Present    Pain in right shin M79.661   10/17/2016 - Present      Health Maintenance        Date Due Completion Dates    COLONOSCOPY 2018    IMM DTaP/Tdap/Td Vaccine (2 - Td) 10/16/2023 10/16/2013            Current Immunizations     Hepatitis A Vaccine, Adult  This note also relates to the following rows which could not be included:  BP - Cannot attach notes to unvalidated device data     08/16/17 0728   Patient Assessment/Suction   Level of Consciousness (AVPU) responds to voice   Respiratory Effort Unlabored   Expansion/Accessory Muscles/Retractions no retractions;no use of accessory muscles   All Lung Fields Breath Sounds coarse   Rhythm/Pattern, Respiratory ventilator assisted   Suction Method in-line suction catheter (closed)   Sputum Amount small   Sputum Color white   Sputum Consistency thick   PRE-TX-O2-ETCO2   O2 Device (Oxygen Therapy) ventilator   $ Is the patient on Oxygen? Yes   Oxygen Concentration (%) 60   SpO2 96 %   Pulse Oximetry Type Continuous   $ Pulse Oximetry - Multiple Charge Pulse Oximetry - Multiple   ETCO2 (mmHg) 30 mmHg   Pulse (!) 59   Resp 17   Positioning HOB elevated 30 degrees   Aerosol Therapy   $ Aerosol Therapy Charges Aerosol Treatment   Respiratory Treatment Status given   SVN/Inhaler Treatment Route in-line   Position During Treatment HOB at 30 degrees   Patient Tolerance good   Post-Treatment   Post-treatment Heart Rate (beats/min) 58   Post-treatment Resp Rate (breaths/min) 17   All Fields Breath Sounds unchanged       Airway - Non-Surgical 08/13/17 1410 Endotracheal Tube   Placement Date/Time: 08/13/17 1410   Method of Intubation: Direct laryngoscopy  Inserted by: MD  Airway Device: Endotracheal Tube  Airway Device Size: 8.0  Style: Cuffed  Cuff Inflated With: Air  Placement Verified By: Auscultation;Chest X-ray;Capnome...   Secured at 24 cm   Measured At Lips   Secured Location Right   Secured by Commercial tube tinoco   Bite Block none   Site Condition Cool;Dry   Status Intact;Secured;Patent   Site Assessment Clean;Dry   Cuff Pressure 32 cm H2O   IHI Ventilator Associated Pneumonia Bundle   Head of Bed Elevated  HOB 30   Oral Care Mouth suctioned   Preset Conventional Ventilator Settings   Vent Type    Ventilation Type VC+    Vent Mode A/C   Humidity HME   Set Rate 12 bmp   Vt Set 600 mL   PEEP/CPAP 5 cmH20   Pressure Support 0 cmH20   Peak Flow 0 L/min   Set Inspiratory Pressure 0 cmH20   Insp Time 0.8 Sec(s)   Plateau Set/Insp. Hold (sec) 0   Insp Rise Time  50 %   Trigger Sensitivity Flow/I-Trigger 3 L/min   P High 0 cm H2O   P Low 0 cm H2O   T High 0 sec   T Low 0 sec   Patient Ventilator Parameters   Resp Rate Total 18 br/min   Peak Airway Pressure 20 cmH2O   Mean Airway Pressure 8.4 cmH20   Plateau Pressure 0 cmH20   Exhaled Vt 554 mL   Total Ve 10.6 mL   Spont Ve 0 L   I:E Ratio Measured 1:3.20   Conventional Ventilator Alarms   Ve High Alarm 25 L/min   Resp Rate High Alarm 45 br/min   Press High Alarm 45 cmH2O   Apnea Rate 10   Apnea Volume (mL) 730 mL   Apnea Oxygen Concentration  100   Apnea Flow Rate (L/min) 87   T Apnea 20 sec(s)   Ready to Wean/Extubation Screen   FIO2<=50 (chart decimal) (!) 0.6   MV<16L (chart vol.) 10.6   PEEP <=8 (chart #) 5   Ready to Wean Parameters   F/VT Ratio<105 (RSBI) (!) 30.69      10/16/2013    Influenza TIV (IM) 10/24/2012, 10/24/2011    Influenza Vaccine Quad Inj (Preserved) 10/9/2015    SHINGLES VACCINE 10/17/2016  6:02 PM    Tdap Vaccine 10/16/2013      Below and/or attached are the medications your provider expects you to take. Review all of your home medications and newly ordered medications with your provider and/or pharmacist. Follow medication instructions as directed by your provider and/or pharmacist. Please keep your medication list with you and share with your provider. Update the information when medications are discontinued, doses are changed, or new medications (including over-the-counter products) are added; and carry medication information at all times in the event of emergency situations     Allergies:  NKDA - (reactions not documented)               Medications  Valid as of: May 26, 2017 -  4:42 PM    Generic Name Brand Name Tablet Size Instructions for use    Aspirin (Chew Tab) ASA 81 MG CHEW AND SWALLOW ONE TABLET BY MOUTH EVERY DAY        Betamethasone Valerate (Cream) VALISONE 0.1 % Apply 1 Application to affected area(s) 2 times a day. Apply to affected area(s) 2 times a day.        Cyclobenzaprine HCl (Tab) FLEXERIL 5 MG Take 1-2 Tabs by mouth 3 times a day as needed.        DiazePAM (Tab) VALIUM 5 MG Take 1 Tab by mouth 1 time daily as needed for Anxiety.        Fluorouracil (Cream) EFUDEX 5 % Apply 1 Application to affected area(s) 2 times a day. Use 2-4 weeks as directed- apply bid.        Ketoconazole (Cream) NIZORAL 2 % Apply  to affected area(s) every day. Apply to affected area(s) every day.        Simvastatin (Tab) ZOCOR 40 MG Take 1 Tab by mouth every evening.        Tamsulosin HCl (Cap) FLOMAX 0.4 MG Take 1 Cap by mouth every day.        Zolpidem Tartrate (Tab) AMBIEN 10 MG TAKE ONE TABLET BY MOUTH AT BEDTIME ASNEEDED FOR SLEEP -GENERIC FOR AMBIEN        .                 Medicines prescribed today were sent to:     [id number] - [place], [place] - [address]  [facility]    [address] [address] [facility] [address]    Phone: [phone] Fax: [phone]    Open 24 Hours?: No      Medication refill instructions:       If your prescription bottle indicates you have medication refills left, it is not necessary to call your provider’s office. Please contact your pharmacy and they will refill your medication.    If your prescription bottle indicates you do not have any refills left, you may request refills at any time through one of the following ways: The online [email or web address] system (except Urgent Care), by calling your provider’s office, or by asking your pharmacy to contact your provider’s office with a refill request. Medication refills are processed only during regular business hours and may not be available until the next business day. Your provider may request additional information or to have a follow-up visit with you prior to refilling your medication.   *Please Note: Medication refills are assigned a new Rx number when refilled electronically. Your pharmacy may indicate that no refills were authorized even though a new prescription for the same medication is available at the pharmacy. Please request the medicine by name with the pharmacy before contacting your provider for a refill.           [email or web address] Access Code: Activation code not generated  Current [employer] Status: Active

## 2017-08-16 NOTE — PROGRESS NOTES
Progress Note  PULMONARY    Admit Date: 8/11/2017 08/16/2017      SUBJECTIVE:     Aug 14, pt presented with sore throat and sob x 1 day on 8/11, intubated 8/12 with airway difficulties, extubated and intubated 8/13 with stridor again.  Pt had ct neck early in course.  Now sedate on vent.  Aug 15, sedated, discussed with wife   Aug 16, sedate, discussed with wife.        PFSH and Allergies reviewed.  Unable to do ros due to sedation and intubation.  OBJECTIVE:     Vitals (Most recent):  Vitals:    08/16/17 1300   BP: (!) 81/52   Pulse: 63   Resp: 14   Temp:        Vitals (24 hour range):  Temp:  [96.4 °F (35.8 °C)-98.8 °F (37.1 °C)]   Pulse:  [48-72]   Resp:  [10-25]   BP: ()/(50-81)   SpO2:  [93 %-99 %]       Intake/Output Summary (Last 24 hours) at 08/16/17 1527  Last data filed at 08/16/17 1000   Gross per 24 hour   Intake          3125.67 ml   Output             2200 ml   Net           925.67 ml          Physical Exam:  The patient's neuro status (alertness,orientation,cognitive function,motor skills,), pharyngeal exam (oral lesions, hygiene, abn dentition,), Neck (jvd,mass,thyroid,nodes in neck and above/below clavicle),RESPIRATORY(symmetry,effort,fremitus,percussion,auscultation),  Cor(rhythm,heart tones including gallops,perfusion,edema)ABD(distention,hepatic&splenomegaly,tenderness,masses), Skin(rash,cyanosis),Psyc(affect,judgement,).  Exam negative except for these pertinent findings:    Oral intubation, no air leak with cuff down. Good bs, no edema    Radiographs reviewed: view by direct vision  , cxr with right atelectasis and more opacified 8/15 (no new) right lower lung.      Results for orders placed during the hospital encounter of 08/11/17   X-Ray Chest 1 View    Narrative AP chest compared to 08/13/2017    Findings: The endotracheal tube and nasogastric tube remain appropriately positioned.  A spinal stimulator device is again noted.  The heart size is upper normal.  There are persistent  perihilar and bibasilar infiltrates as well as a small left pleural effusion.  An increase in consolidative component is seen at the right lung base.  There is no pneumothorax.    Impression Appropriate positioning of support devices.  Persistent perihilar and bibasilar infiltrates, with increase in consolidative component on the right.  Stable small left pleural effusion.      Electronically signed by: Ishmael Flood MD  Date:     08/14/17  Time:    07:39    ]    Labs       Recent Labs  Lab 08/16/17  0605   WBC 11.50   HGB 14.3   HCT 42.3          Recent Labs  Lab 08/16/17  0605      K 3.3*      CO2 25   BUN 12   CREATININE 0.7   *   CALCIUM 8.3*   AST 10   ALT 9*   ALKPHOS 62   BILITOT 0.2   PROT 5.9*   ALBUMIN 2.3*       Recent Labs  Lab 08/16/17  0305   PH 7.487*   PCO2 37.0   PO2 77*   HCO3 28.0     Microbiology Results (last 7 days)     Procedure Component Value Units Date/Time    Blood Culture #1 [[id number]] Collected:  08/11/17 0734    Order Status:  Completed Specimen:  Blood from Antecubital, Right  Arm Updated:  08/16/17 1412     Blood Culture, Routine No growth after 5 days.    Blood Culture #2 [[id number]] Collected:  08/11/17 0733    Order Status:  Completed Specimen:  Blood Updated:  08/16/17 1412     Blood Culture, Routine No growth after 5 days.    Blood culture [[id number]] Collected:  08/14/17 1448    Order Status:  Completed Specimen:  Blood from Peripheral, Right  Hand Updated:  08/15/17 2312     Blood Culture, Routine No Growth to date     Blood Culture, Routine No Growth to date    Culture, Respiratory with Gram Stain [[id number]]  (Susceptibility) Collected:  08/12/17 1340    Order Status:  Completed Specimen:  Respiratory from Tracheal Aspirate Updated:  08/15/17 1414     Respiratory Culture --     METHICILLIN RESISTANT STAPHYLOCOCCUS AUREUS  Many       Gram Stain (Respiratory) <10 epithelial cells per low power field.     Gram Stain (Respiratory) Rare WBC's     Gram  Stain (Respiratory) Moderate Gram positive cocci          Impression:  Active Hospital Problems    Diagnosis  POA    *COPD exacerbation [J44.1]  Yes    Acute respiratory failure with hypoxia and hypercarbia [J96.01, J96.02]  No    Staphylococcal pneumonia [J15.20]  Yes    Acute respiratory failure [J96.00]  Yes    Diabetes mellitus, type II [E11.9]  Yes    Benign essential HTN [I10]  Yes    JOSE ALBERTO (obstructive sleep apnea) [G47.33]  Yes    Depression [F32.9]  Yes    CAD (coronary artery disease) [I25.10]  Yes    DVT (deep venous thrombosis)- Hx IVC filter [I82.409]  Yes      Resolved Hospital Problems    Diagnosis Date Resolved POA   No resolved problems to display.               Plan:     Aug 14, will  need trach, add valium to facilitate sedation, with staph pneumonia - steroids need to be minimal.  Will dose vanc til sensitivity out.  Start tube feed, stop xarelto and use lovenox.    Tried to call wife - no answer.      The following were evaluated and adjusted as needed: mechanical ventilator settings and weaning status, intravenous fluids and nutritional status, sedation and neurologic status, antibiotics and acid base balance and oxygenation needs       Critical Care  - THE PATIENT HAS A HIGH PROBABILITY OF IMMINENT OR LIFE THREATENING DETERIORATION.  Over 50%time of encounter was in direct care at bedside.  Time was 30 to 74 minutes required for patient care.  Time needed for all of the above totaled 33 minutes.    Aug 15, staph id na still, still risk of bleed from xarelto, will ask ent to see to figure plan.  Need to keep steroids low with staph pneumonia.  Expect trach best management option for airway stridor that required 2 intubations and cannot be dosed steroids (high dose).    Wil on propofol?  On coreg?  Observe- stop coreg if hr<45    Aug 16, stagnant, got xarelto 8/14, needs general surg to do trach.  Likely will wean quickly. Daily  Leak test reasonable but high risk airway- no steroids  high dose with mrsa.                              .

## 2017-08-16 NOTE — PLAN OF CARE
Problem: Patient Care Overview  Goal: Plan of Care Review  Outcome: Ongoing (interventions implemented as appropriate)  Pt intubated on vent sedated with propofol and precedex drips. Pt afebrile over night VSS. Pt remains in bilateral soft wrist restraints to prevent self extubation. Safety maintained.

## 2017-08-16 NOTE — CONSULTS
Kevan Colin [id number] is a 62 y.o. male who has been consulted for vancomycin dosing.    The patient has the following labs:     Date Creatinine (mg/dl)    BUN WBC Count   8/16/2017 Estimated Creatinine Clearance: 127.8 mL/min (based on Cr of 0.8). Lab Results   Component Value Date    BUN 17 08/15/2017     Lab Results   Component Value Date    WBC 13.40 (H) 08/15/2017        Current weight is 115.8 kg (255 lb 4.7 oz)    Pt is receiving vancomycin 1750 mg every 12 hours.  Vancomycin trough from 8/16 at 0254 was 17.2 mg/dL.  Target trough range is 15-20 mg/dL.   Trough was drawn on time and anticipate it is therapeutic. Pharmacy will continue current dose.   The patient will be continued on a vancomycin dose of 1750 mg every 12 hours. A vancomycin trough has been ordered prior to 4th dose due 8/17 at 1600.      Patient will be followed by pharmacy for changes in renal function, toxicity, and efficacy.  Thank you for allowing us to participate in this patient's care.     Eufemia Ying

## 2017-08-16 NOTE — PLAN OF CARE
Problem: Patient Care Overview  Goal: Plan of Care Review  Outcome: Ongoing (interventions implemented as appropriate)  Care plan reviewed.  Accu checks done [id number], routine insulin and sliding scale given as ordered when parameters met.  Patient safety maintained this shift.  Patient remains on ventilator, intubated and sedated with propofol and precedex.   Restraints monitored per hospital protocol.  Patient receiving IV antibiotics. Afebrile this shift. High temp 98.8.  Patient on specialty bed with rotation therapy utilized.  Patient with tube feeding at 40 ml per hour with goal of 65 ml per hour. Patient tolerating, no residuals.   General surgery consulted for trach placement.     Goal: Individualization & Mutuality  Outcome: Ongoing (interventions implemented as appropriate)  No changes, patient remains intubated and sedated.

## 2017-08-16 NOTE — PLAN OF CARE
08/15/17 1932   Patient Assessment/Suction   Level of Consciousness (AVPU) responds to voice   Respiratory Effort Unlabored   Expansion/Accessory Muscles/Retractions no use of accessory muscles   All Lung Fields Breath Sounds coarse;diminished  (slightly coarse)   Rhythm/Pattern, Respiratory artificial airway;mechanical device;ventilator assisted   Cough Frequency infrequent   Cough Type good;nonproductive   Suction Method in-line suction catheter (closed)   Sputum Amount small   Sputum Color clear;white   Sputum Consistency thick   PRE-TX-O2-ETCO2   O2 Device (Oxygen Therapy) ventilator   SpO2 98 %   Pulse (!) 55   Resp 14   Aerosol Therapy   $ Aerosol Therapy Charges Aerosol Treatment   Respiratory Treatment Status given   SVN/Inhaler Treatment Route in-line;with oxygen   Position During Treatment HOB at 30 degrees   Patient Tolerance good   Vent Select   Conventional Vent Y   Preset Conventional Ventilator Settings   Vent Type    Conventional Ventilator Alarms   Alarms On Y        08/15/17 1932   Patient Assessment/Suction   Level of Consciousness (AVPU) responds to voice   Respiratory Effort Unlabored   Expansion/Accessory Muscles/Retractions no use of accessory muscles   All Lung Fields Breath Sounds coarse;diminished  (slightly coarse)   Rhythm/Pattern, Respiratory artificial airway;mechanical device;ventilator assisted   Cough Frequency infrequent   Cough Type good;nonproductive   Suction Method in-line suction catheter (closed)   Sputum Amount small   Sputum Color clear;white   Sputum Consistency thick   PRE-TX-O2-ETCO2   O2 Device (Oxygen Therapy) ventilator   SpO2 98 %   Pulse (!) 55   Resp 14   Aerosol Therapy   $ Aerosol Therapy Charges Aerosol Treatment   Respiratory Treatment Status given   SVN/Inhaler Treatment Route in-line;with oxygen   Position During Treatment HOB at 30 degrees   Patient Tolerance good   Vent Select   Conventional Vent Y   Preset Conventional Ventilator Settings   Vent  Type    Conventional Ventilator Alarms   Alarms On Y   Pt stable on current vent settings. Vent to red outlet with alarms on and functioning. Ambu/flowmeter at [facility]. [facility] downtime.

## 2017-08-16 NOTE — PROGRESS NOTES
Progress Note    Admit Date: 8/11/2017   LOS: 4 days     SUBJECTIVE:      History of Present Illness:  Patient is a 62 y.o. male admitted to Hospitalist Service from [facility] Emergency Room with complaint of worsening SOB and sore-throat for 1 day. Patient has PMH significant for COPD, CAD, DM-2, depression, DVT, hypertension, history of MI, history of PE and chronic pain. Patient is continuing to smoke cigarettes. Patient presented with complaint of subjective fever, sore-throat and SOB for 1-2 days. Patient admits yellow productive cough and frequent wheezing. Patient requiring multiple doses of nebulization treatments in the ER. Patient denied chest pain, abdominal pain, nausea, vomiting, headache, vision changes, focal neuro-deficits. Patient admitted non-compliance with medications and diet. He had few doughnuts last night and now blood sugars are high.    Interval history  On ventilator. NGT feeding. On IV Propofol and IV Precedex infusion.    Scheduled Meds:   albuterol-ipratropium 2.5mg-0.5mg/3mL  3 mL Nebulization Q6H    allopurinol  200 mg Oral Daily    atorvastatin  40 mg Oral Daily    buPROPion  150 mg Oral BID    carbidopa-levodopa  mg  2 tablet Oral TID    And    entacapone  200 mg Oral TID    carvedilol  3.125 mg Oral BID    cefTRIAXone (ROCEPHIN) IVPB  1 g Intravenous Q24H    diazePAM  5 mg Intravenous Q6H    diphenhydrAMINE  25 mg Intravenous Q8H    enoxaparin  110 mg Subcutaneous Q12H    escitalopram oxalate  20 mg Oral QHS    furosemide  40 mg Oral Daily    gabapentin  200 mg Oral TID    insulin aspart  22 Units Subcutaneous TID AC    insulin detemir  90 Units Subcutaneous QHS    lipase-protease-amylase 24,000-76,000-120,000 units  6 capsule Oral TID    mirtazapine  15 mg Oral QHS    multivitamin  1 tablet Oral Daily    nystatin  5 mL Oral Q6H    olanzapine  10 mg Oral Daily    pantoprazole  40 mg Intravenous Daily    potassium chloride 10%  20 mEq Oral  BID    prazosin  1 mg Oral QHS    predniSONE 5 mg/5 ml  10 mg Oral BID    trazodone  150 mg Oral QHS    vancomycin (VANCOCIN) IVPB  15 mg/kg Intravenous Q12H     Continuous Infusions:   sodium chloride 0.9% 100 mL/hr at 08/16/17 0813    dexmedetomidine (PRECEDEX) infusion 0.4 mcg/kg/hr (08/16/17 0709)    propofol 40 mcg/kg/min (08/16/17 0815)     PRN Meds:acetaminophen, dextrose 50%, dextrose 50%, diazePAM, glucagon (human recombinant), glucose, glucose, hydrOXYzine HCl, insulin aspart, ondansetron, ondansetron, oxycodone-acetaminophen, quetiapine, senna-docusate 8.6-50 mg    Review of patient's allergies indicates:   Allergen Reactions    Bee pollens Anaphylaxis     Bee stings     Penicillins Nausea Only     Other reaction(s): Unknown    Codeine Rash     Other reaction(s): Unknown    Morphine Rash       Review of Systems  -unable to perform patient is intubated     OBJECTIVE:     Vital Signs (Most Recent)  Temp: 98.8 °F (37.1 °C) (08/16/17 0800)  Pulse: 68 (08/16/17 0800)  Resp: 17 (08/16/17 0800)  BP: (!) 91/50 (08/16/17 0800)  SpO2: 97 % (08/16/17 0800)    Vital Signs Range (Last 24H):  Temp:  [96.4 °F (35.8 °C)-98.8 °F (37.1 °C)]   Pulse:  [46-72]   Resp:  [9-25]   BP: ()/(50-81)   SpO2:  [93 %-99 %]     I & O (Last 24H):    Intake/Output Summary (Last 24 hours) at 08/16/17 0918  Last data filed at 08/16/17 0600   Gross per 24 hour   Intake          4985.67 ml   Output             1300 ml   Net          3685.67 ml     Physical Exam:  General appearance: INTUBATED and sedated   Head: normocephalic, atraumatic  Eyes:  conjunctivae/corneas clear. PERRL.  Nose: Nares normal. Septum midline.  Throat: lips, mucosa, and tongue normal; teeth and gums normal, no throat erythema.  Neck: supple, symmetrical, trachea midline, no JVD and thyroid not enlarged, symmetric, no tenderness/mass/nodules  Lungs:  Bilateral scattered rhonchi with prolonged expiration  Chest wall: no tenderness  Heart: regular rate  and rhythm, S1, S2 normal, no murmur, click, rub or gallop  Abdomen: soft, non-tender non-distented; bowel sounds normal; no masses,  no organomegaly  Extremities: no cyanosis, clubbing or edema.   Pulses: 2+ and symmetric  Skin: Skin color, texture, turgor normal. No rashes or lesions.  Lymph nodes: Cervical, supraclavicular, and axillary nodes normal.  Neurologic: sedated   Laboratory:  CBC:     Recent Labs  Lab 08/16/17  0605   WBC 11.50   RBC 4.62   HGB 14.3   HCT 42.3      MCV 92   MCH 30.9   MCHC 33.8     CMP:     Recent Labs  Lab 08/16/17  0605   *   CALCIUM 8.3*   ALBUMIN 2.3*   PROT 5.9*      K 3.3*   CO2 25      BUN 12   CREATININE 0.7   ALKPHOS 62   ALT 9*   AST 10   BILITOT 0.2       Diagnostic Results:  CT soft tissue neck:  No acute findings in this patient status post prior cervical spine surgery. Bullous emphysematous changes are noted in the upper lung fields.    CXR: An orogastric tube is seen with its tip in the region of the stomach    KUB: There is a gastric tube which appears coiled within the left upper quadrant of the abdomen, likely within the stomach.  There is a battery pack for a neurostimulator device visible over the left upper quadrant.  There are 2 neurostimulator leads projected over the spinal canal in the lower thoracic region.  The cardiac silhouette is enlarged and there are central perivascular congestion, bilateral perihilar reticulonodular air space opacities, and patchy segmental air space opacity at the lung bases, likely a combination of a atelectasis and/or consolidation/aspiration.  There is a possible small volume of left basilar pleural fluid.  There is degenerative change of the thoracic spine.    CXR: Worsening airspace opacity and edema in the mid and lower lungs bilaterally.    CXR: Appropriate positioning of support devices.  Persistent perihilar and bibasilar infiltrates, with increase in consolidative component on the right. Stable small  left pleural effusion.    ASSESSMENT/PLAN:      *COPD exacerbation [J44.1]  Staph Aureus Pneumonia  Intubated --> follow up pulmonary   Prednisone 10 mg BID.  Continue beta 2 agonist bronchodilator treatments.   Continue IV antibiotics - Vancomycin and Ceftriaxone  Check sputum GS and Cx.   Continue routine medications as before. Patient stated, he quit smoking 2 weeks ago.  ?Tracheostomy.   Yes    Diabetes mellitus, type II [E11.9] - uncontrolled due to non-compliance with diet  Check blood glucose level q 6h.  Use Novolog Insulin Sliding Scale as needed.   Dietry compliance discussed with patient.   Yes    Benign essential HTN [I10]  Chronic problem. Will continue chronic medications and monitor for any changes, adjusting as needed.  Patient is bradycardic. Coreg held and Precedex is being weaning.     Yes    JOSE ALBERTO (obstructive sleep apnea) [G47.33]  On ventilator      Yes    Depression [F32.9]  Chronic problem. Will continue chronic medications and monitor for any changes, adjusting as needed.   Yes    CAD (coronary artery disease) [I25.10]  Patient with known CAD and monitor for S/Sx of angina/ACS. Continue to monitor on telemetry.   Yes    DVT (deep venous thrombosis)- Hx IVC filter [I82.409]  Xarelto changed to Lovenox 1 mg/kg sq q 12 hrs.      Yes              VTE Risk Mitigation      Lovenox 40 mg SQ q day     Memo Henry MD

## 2017-08-17 ENCOUNTER — ANESTHESIA EVENT (OUTPATIENT)
Dept: SURGERY | Facility: HOSPITAL | Age: 62
DRG: 004 | End: 2017-08-17
Payer: MEDICARE

## 2017-08-17 LAB
ALBUMIN SERPL BCP-MCNC: 2.2 G/DL
ALLENS TEST: ABNORMAL
ALP SERPL-CCNC: 60 U/L
ALT SERPL W/O P-5'-P-CCNC: 5 U/L
ANION GAP SERPL CALC-SCNC: 9 MMOL/L
AST SERPL-CCNC: 8 U/L
BASOPHILS # BLD AUTO: 0 K/UL
BASOPHILS NFR BLD: 0.2 %
BILIRUB SERPL-MCNC: 0.3 MG/DL
BUN SERPL-MCNC: 12 MG/DL
CALCIUM SERPL-MCNC: 8.5 MG/DL
CHLORIDE SERPL-SCNC: 108 MMOL/L
CO2 SERPL-SCNC: 24 MMOL/L
CREAT SERPL-MCNC: 0.7 MG/DL
DELSYS: ABNORMAL
DIFFERENTIAL METHOD: ABNORMAL
EOSINOPHIL # BLD AUTO: 0.1 K/UL
EOSINOPHIL NFR BLD: 0.5 %
ERYTHROCYTE [DISTWIDTH] IN BLOOD BY AUTOMATED COUNT: 13.3 %
ERYTHROCYTE [SEDIMENTATION RATE] IN BLOOD BY WESTERGREN METHOD: 12 MM/H
EST. GFR  (AFRICAN AMERICAN): >60 ML/MIN/1.73 M^2
EST. GFR  (NON AFRICAN AMERICAN): >60 ML/MIN/1.73 M^2
ETCO2: 32
FIO2: 60
GLUCOSE SERPL-MCNC: 193 MG/DL
HCO3 UR-SCNC: 29.4 MMOL/L (ref 24–28)
HCT VFR BLD AUTO: 42 %
HGB BLD-MCNC: 13.8 G/DL
LYMPHOCYTES # BLD AUTO: 2.2 K/UL
LYMPHOCYTES NFR BLD: 20.2 %
MCH RBC QN AUTO: 30.4 PG
MCHC RBC AUTO-ENTMCNC: 32.9 G/DL
MCV RBC AUTO: 92 FL
MIN VOL: 8.17
MODE: ABNORMAL
MONOCYTES # BLD AUTO: 0.6 K/UL
MONOCYTES NFR BLD: 5.6 %
NEUTROPHILS # BLD AUTO: 8.2 K/UL
NEUTROPHILS NFR BLD: 73.5 %
PCO2 BLDA: 43.7 MMHG (ref 35–45)
PEEP: 5
PH SMN: 7.44 [PH] (ref 7.35–7.45)
PIP: 22
PLATELET # BLD AUTO: 183 K/UL
PMV BLD AUTO: 8.1 FL
PO2 BLDA: 71 MMHG (ref 80–100)
POC BE: 5 MMOL/L
POC SATURATED O2: 95 % (ref 95–100)
POC TCO2: 31 MMOL/L (ref 23–27)
POCT GLUCOSE: 144 MG/DL (ref 70–110)
POCT GLUCOSE: 156 MG/DL (ref 70–110)
POCT GLUCOSE: 209 MG/DL (ref 70–110)
POCT GLUCOSE: 225 MG/DL (ref 70–110)
POTASSIUM SERPL-SCNC: 3.9 MMOL/L
PROT SERPL-MCNC: 5.6 G/DL
RBC # BLD AUTO: 4.54 M/UL
SAMPLE: ABNORMAL
SITE: ABNORMAL
SODIUM SERPL-SCNC: 141 MMOL/L
SP02: 97
VANCOMYCIN TROUGH SERPL-MCNC: 19.3 UG/ML
VT: 600
WBC # BLD AUTO: 11.1 K/UL

## 2017-08-17 PROCEDURE — C9113 INJ PANTOPRAZOLE SODIUM, VIA: HCPCS | Performed by: INTERNAL MEDICINE

## 2017-08-17 PROCEDURE — 63600175 PHARM REV CODE 636 W HCPCS: Performed by: INTERNAL MEDICINE

## 2017-08-17 PROCEDURE — 99233 SBSQ HOSP IP/OBS HIGH 50: CPT | Mod: ,,, | Performed by: INTERNAL MEDICINE

## 2017-08-17 PROCEDURE — 63600175 PHARM REV CODE 636 W HCPCS: Performed by: NURSE PRACTITIONER

## 2017-08-17 PROCEDURE — 85025 COMPLETE CBC W/AUTO DIFF WBC: CPT

## 2017-08-17 PROCEDURE — 20000000 HC ICU ROOM

## 2017-08-17 PROCEDURE — 94770 HC EXHALED C02 TEST: CPT

## 2017-08-17 PROCEDURE — 99900026 HC AIRWAY MAINTENANCE (STAT)

## 2017-08-17 PROCEDURE — 80202 ASSAY OF VANCOMYCIN: CPT

## 2017-08-17 PROCEDURE — 85347 COAGULATION TIME ACTIVATED: CPT

## 2017-08-17 PROCEDURE — 25000003 PHARM REV CODE 250: Performed by: NURSE PRACTITIONER

## 2017-08-17 PROCEDURE — 36600 WITHDRAWAL OF ARTERIAL BLOOD: CPT

## 2017-08-17 PROCEDURE — 80053 COMPREHEN METABOLIC PANEL: CPT

## 2017-08-17 PROCEDURE — 25000003 PHARM REV CODE 250: Performed by: INTERNAL MEDICINE

## 2017-08-17 PROCEDURE — 94640 AIRWAY INHALATION TREATMENT: CPT

## 2017-08-17 PROCEDURE — 94761 N-INVAS EAR/PLS OXIMETRY MLT: CPT

## 2017-08-17 PROCEDURE — 27000221 HC OXYGEN, UP TO 24 HOURS

## 2017-08-17 PROCEDURE — 25000242 PHARM REV CODE 250 ALT 637 W/ HCPCS: Performed by: NURSE PRACTITIONER

## 2017-08-17 PROCEDURE — 63600175 PHARM REV CODE 636 W HCPCS: Performed by: PHYSICIAN ASSISTANT

## 2017-08-17 PROCEDURE — 94003 VENT MGMT INPAT SUBQ DAY: CPT

## 2017-08-17 PROCEDURE — 99900035 HC TECH TIME PER 15 MIN (STAT)

## 2017-08-17 PROCEDURE — 36415 COLL VENOUS BLD VENIPUNCTURE: CPT

## 2017-08-17 PROCEDURE — 99223 1ST HOSP IP/OBS HIGH 75: CPT | Mod: ,,, | Performed by: SURGERY

## 2017-08-17 RX ORDER — MIDODRINE HYDROCHLORIDE 2.5 MG/1
5 TABLET ORAL 2 TIMES DAILY WITH MEALS
Status: DISCONTINUED | OUTPATIENT
Start: 2017-08-17 | End: 2017-08-21

## 2017-08-17 RX ORDER — LIDOCAINE HYDROCHLORIDE 10 MG/ML
1 INJECTION, SOLUTION EPIDURAL; INFILTRATION; INTRACAUDAL; PERINEURAL ONCE
Status: CANCELLED | OUTPATIENT
Start: 2017-08-17 | End: 2017-08-17

## 2017-08-17 RX ORDER — SODIUM CHLORIDE, SODIUM LACTATE, POTASSIUM CHLORIDE, CALCIUM CHLORIDE 600; 310; 30; 20 MG/100ML; MG/100ML; MG/100ML; MG/100ML
INJECTION, SOLUTION INTRAVENOUS CONTINUOUS
Status: CANCELLED | OUTPATIENT
Start: 2017-08-17

## 2017-08-17 RX ADMIN — OXYCODONE AND ACETAMINOPHEN 1 TABLET: 10; 325 TABLET ORAL at 03:08

## 2017-08-17 RX ADMIN — GABAPENTIN 200 MG: 100 CAPSULE ORAL at 09:08

## 2017-08-17 RX ADMIN — PROPOFOL 50 MCG/KG/MIN: 10 INJECTION, EMULSION INTRAVENOUS at 08:08

## 2017-08-17 RX ADMIN — NYSTATIN 500000 UNITS: 100000 SUSPENSION ORAL at 11:08

## 2017-08-17 RX ADMIN — MIDODRINE HYDROCHLORIDE 5 MG: 2.5 TABLET ORAL at 04:08

## 2017-08-17 RX ADMIN — PROPOFOL 20 MCG/KG/MIN: 10 INJECTION, EMULSION INTRAVENOUS at 11:08

## 2017-08-17 RX ADMIN — TRAZODONE HYDROCHLORIDE 150 MG: 50 TABLET ORAL at 09:08

## 2017-08-17 RX ADMIN — DIAZEPAM 5 MG: 5 INJECTION, SOLUTION INTRAMUSCULAR; INTRAVENOUS at 06:08

## 2017-08-17 RX ADMIN — DEXMEDETOMIDINE HYDROCHLORIDE 0.1 MCG/KG/HR: 100 INJECTION, SOLUTION INTRAVENOUS at 01:08

## 2017-08-17 RX ADMIN — INSULIN ASPART 22 UNITS: 100 INJECTION, SOLUTION INTRAVENOUS; SUBCUTANEOUS at 06:08

## 2017-08-17 RX ADMIN — PREDNISONE 10 MG: 5 SOLUTION ORAL at 08:08

## 2017-08-17 RX ADMIN — INSULIN ASPART 1 UNITS: 100 INJECTION, SOLUTION INTRAVENOUS; SUBCUTANEOUS at 12:08

## 2017-08-17 RX ADMIN — PANTOPRAZOLE SODIUM 40 MG: 40 INJECTION, POWDER, FOR SOLUTION INTRAVENOUS at 08:08

## 2017-08-17 RX ADMIN — PRAZOSIN HYDROCHLORIDE 1 MG: 1 CAPSULE ORAL at 09:08

## 2017-08-17 RX ADMIN — IPRATROPIUM BROMIDE AND ALBUTEROL SULFATE 3 ML: .5; 3 SOLUTION RESPIRATORY (INHALATION) at 07:08

## 2017-08-17 RX ADMIN — BUPROPION HYDROCHLORIDE 150 MG: 150 TABLET, FILM COATED, EXTENDED RELEASE ORAL at 09:08

## 2017-08-17 RX ADMIN — VANCOMYCIN HYDROCHLORIDE 1750 MG: 5 INJECTION, POWDER, LYOPHILIZED, FOR SOLUTION INTRAVENOUS at 05:08

## 2017-08-17 RX ADMIN — PROPOFOL 50 MCG/KG/MIN: 10 INJECTION, EMULSION INTRAVENOUS at 02:08

## 2017-08-17 RX ADMIN — DIAZEPAM 5 MG: 5 INJECTION, SOLUTION INTRAMUSCULAR; INTRAVENOUS at 05:08

## 2017-08-17 RX ADMIN — PANCRELIPASE 6 CAPSULE: 24000; 76000; 120000 CAPSULE, DELAYED RELEASE PELLETS ORAL at 06:08

## 2017-08-17 RX ADMIN — PANCRELIPASE 6 CAPSULE: 24000; 76000; 120000 CAPSULE, DELAYED RELEASE PELLETS ORAL at 09:08

## 2017-08-17 RX ADMIN — OLANZAPINE 10 MG: 5 TABLET ORAL at 08:08

## 2017-08-17 RX ADMIN — DIAZEPAM 5 MG: 5 INJECTION, SOLUTION INTRAMUSCULAR; INTRAVENOUS at 08:08

## 2017-08-17 RX ADMIN — ENOXAPARIN SODIUM 110 MG: 150 INJECTION SUBCUTANEOUS at 04:08

## 2017-08-17 RX ADMIN — VANCOMYCIN HYDROCHLORIDE 1750 MG: 5 INJECTION, POWDER, LYOPHILIZED, FOR SOLUTION INTRAVENOUS at 04:08

## 2017-08-17 RX ADMIN — NYSTATIN 500000 UNITS: 100000 SUSPENSION ORAL at 06:08

## 2017-08-17 RX ADMIN — INSULIN DETEMIR 90 UNITS: 100 INJECTION, SOLUTION SUBCUTANEOUS at 09:08

## 2017-08-17 RX ADMIN — DIPHENHYDRAMINE HYDROCHLORIDE 25 MG: 50 INJECTION, SOLUTION INTRAMUSCULAR; INTRAVENOUS at 02:08

## 2017-08-17 RX ADMIN — ESCITALOPRAM 20 MG: 10 TABLET, FILM COATED ORAL at 09:08

## 2017-08-17 RX ADMIN — FUROSEMIDE 40 MG: 40 TABLET ORAL at 11:08

## 2017-08-17 RX ADMIN — ALLOPURINOL 200 MG: 100 TABLET ORAL at 08:08

## 2017-08-17 RX ADMIN — INSULIN ASPART 22 UNITS: 100 INJECTION, SOLUTION INTRAVENOUS; SUBCUTANEOUS at 11:08

## 2017-08-17 RX ADMIN — PROPOFOL 50 MCG/KG/MIN: 10 INJECTION, EMULSION INTRAVENOUS at 11:08

## 2017-08-17 RX ADMIN — DIPHENHYDRAMINE HYDROCHLORIDE 25 MG: 50 INJECTION, SOLUTION INTRAMUSCULAR; INTRAVENOUS at 06:08

## 2017-08-17 RX ADMIN — NYSTATIN 500000 UNITS: 100000 SUSPENSION ORAL at 05:08

## 2017-08-17 RX ADMIN — GABAPENTIN 200 MG: 100 CAPSULE ORAL at 02:08

## 2017-08-17 RX ADMIN — POTASSIUM CHLORIDE 20 MEQ: 20 SOLUTION ORAL at 09:08

## 2017-08-17 RX ADMIN — SODIUM CHLORIDE: 0.9 INJECTION, SOLUTION INTRAVENOUS at 05:08

## 2017-08-17 RX ADMIN — THERA TABS 1 TABLET: TAB at 08:08

## 2017-08-17 RX ADMIN — INSULIN ASPART 4 UNITS: 100 INJECTION, SOLUTION INTRAVENOUS; SUBCUTANEOUS at 11:08

## 2017-08-17 RX ADMIN — DIAZEPAM 5 MG: 5 INJECTION, SOLUTION INTRAMUSCULAR; INTRAVENOUS at 12:08

## 2017-08-17 RX ADMIN — MIRTAZAPINE 15 MG: 15 TABLET, FILM COATED ORAL at 09:08

## 2017-08-17 RX ADMIN — ENTACAPONE 200 MG: 200 TABLET, FILM COATED ORAL at 04:08

## 2017-08-17 RX ADMIN — ATORVASTATIN CALCIUM 40 MG: 40 TABLET, FILM COATED ORAL at 08:08

## 2017-08-17 RX ADMIN — DIAZEPAM 5 MG: 5 INJECTION, SOLUTION INTRAMUSCULAR; INTRAVENOUS at 03:08

## 2017-08-17 RX ADMIN — DIAZEPAM 5 MG: 5 INJECTION, SOLUTION INTRAMUSCULAR; INTRAVENOUS at 09:08

## 2017-08-17 RX ADMIN — PROPOFOL 35 MCG/KG/MIN: 10 INJECTION, EMULSION INTRAVENOUS at 06:08

## 2017-08-17 RX ADMIN — PANCRELIPASE 6 CAPSULE: 24000; 76000; 120000 CAPSULE, DELAYED RELEASE PELLETS ORAL at 02:08

## 2017-08-17 RX ADMIN — NYSTATIN 500000 UNITS: 100000 SUSPENSION ORAL at 12:08

## 2017-08-17 RX ADMIN — CARBIDOPA AND LEVODOPA 2 TABLET: 25; 100 TABLET ORAL at 04:08

## 2017-08-17 RX ADMIN — POTASSIUM CHLORIDE 20 MEQ: 20 SOLUTION ORAL at 08:08

## 2017-08-17 RX ADMIN — INSULIN ASPART 22 UNITS: 100 INJECTION, SOLUTION INTRAVENOUS; SUBCUTANEOUS at 05:08

## 2017-08-17 RX ADMIN — CARBIDOPA AND LEVODOPA 2 TABLET: 25; 100 TABLET ORAL at 08:08

## 2017-08-17 RX ADMIN — ENTACAPONE 200 MG: 200 TABLET, FILM COATED ORAL at 11:08

## 2017-08-17 RX ADMIN — GABAPENTIN 200 MG: 100 CAPSULE ORAL at 06:08

## 2017-08-17 RX ADMIN — CEFTRIAXONE 1 G: 1 INJECTION, SOLUTION INTRAVENOUS at 03:08

## 2017-08-17 RX ADMIN — PROPOFOL 20 MCG/KG/MIN: 10 INJECTION, EMULSION INTRAVENOUS at 03:08

## 2017-08-17 RX ADMIN — DIAZEPAM 5 MG: 5 INJECTION, SOLUTION INTRAMUSCULAR; INTRAVENOUS at 11:08

## 2017-08-17 RX ADMIN — CARBIDOPA AND LEVODOPA 2 TABLET: 25; 100 TABLET ORAL at 11:08

## 2017-08-17 RX ADMIN — DIPHENHYDRAMINE HYDROCHLORIDE 25 MG: 50 INJECTION, SOLUTION INTRAMUSCULAR; INTRAVENOUS at 09:08

## 2017-08-17 RX ADMIN — ENTACAPONE 200 MG: 200 TABLET, FILM COATED ORAL at 08:08

## 2017-08-17 RX ADMIN — PREDNISONE 10 MG: 5 SOLUTION ORAL at 09:08

## 2017-08-17 RX ADMIN — IPRATROPIUM BROMIDE AND ALBUTEROL SULFATE 3 ML: .5; 3 SOLUTION RESPIRATORY (INHALATION) at 01:08

## 2017-08-17 RX ADMIN — PROPOFOL 50 MCG/KG/MIN: 10 INJECTION, EMULSION INTRAVENOUS at 04:08

## 2017-08-17 RX ADMIN — BUPROPION HYDROCHLORIDE 150 MG: 150 TABLET, FILM COATED, EXTENDED RELEASE ORAL at 08:08

## 2017-08-17 NOTE — ASSESSMENT & PLAN NOTE
1. Plan tracheostomy.  2. Risks and benefits of the planned procedure were discussed at length with the patient's wife.  Risks and benefits of not proceeding with the procedure were discussed as well. All questions were answered. The patient's wife expressed clear understanding and would like to proceed with the procedure as discussed.

## 2017-08-17 NOTE — HPI
61 yo male admitted on 8/11 with pneumonia and respiratory failure. Has failed extubation attempts. History of DVT and PE and is on Xarelto. Last dose was Monday 8/14. I was consulted for tracheostomy placement to facilitate weaning.

## 2017-08-17 NOTE — EICU
Notified of persistent hypotension with BP 78/50 and HR 67.  Current level of sedation is unchanged with propofol at 30 mcg/kg/min and dexmedetomidine at 0.3 mcg/kg/hour.  Last night HR was in 40s with higher BP on similar dose of sedation.  Net fluid balance is positive and urine output/renal function remains very good.    Currently on FIO2 60%/PEEP 5with infiltrates (right > left) on CXR, so I am reluctant to give lots of fluid.    · Will give normal saline 500 ml bolus x 1.  · If BP remains low, it may be necessary to begin vasopressor via current peripheral IV access.  · Consider increasing PEEP to 8 given PaO2/FIO2 ratio 128 on most recent blood gas.

## 2017-08-17 NOTE — PLAN OF CARE
Place Cuff Manometer on ET tube cuff, I advance air to achieve 30cc. With a 10cc syringe I withdrew air from the cuff, @4.0cc I heard a leak. I advance 0.5cc of air in cuff to seal leak.     08/17/17 0715   Patient Assessment/Suction   Level of Consciousness (AVPU) responds to voice   Respiratory Effort Unlabored   Expansion/Accessory Muscles/Retractions no retractions;no use of accessory muscles   All Lung Fields Breath Sounds coarse   Rhythm/Pattern, Respiratory ventilator assisted   Suction Method in-line suction catheter (closed)   $ Suction Charges Inline Suction Procedure Stat Charge   Sputum Amount moderate   Sputum Color creamy;tan   Sputum Consistency thick   PRE-TX-O2-ETCO2   O2 Device (Oxygen Therapy) ventilator   Oxygen Concentration (%) 60   SpO2 98 %   Pulse Oximetry Type Continuous   $ Pulse Oximetry - Multiple Charge Pulse Oximetry - Multiple   ETCO2 (mmHg) 31 mmHg   $ ETCO2 Charge Exhaled CO2 Monitoring   $ ETCO2 Usage Currently wearing   Pulse 65   Resp (!) 22   Positioning HOB elevated 45 degrees   Aerosol Therapy   $ Aerosol Therapy Charges Aerosol Treatment   Respiratory Treatment Status given   SVN/Inhaler Treatment Route in-line   Position During Treatment HOB at 45 degrees   Patient Tolerance good   Post-Treatment   Post-treatment Heart Rate (beats/min) 67   Post-treatment Resp Rate (breaths/min) 17   All Fields Breath Sounds unchanged       Airway - Non-Surgical 08/13/17 1410 Endotracheal Tube   Placement Date/Time: 08/13/17 1410   Method of Intubation: Direct laryngoscopy  Inserted by: MD  Airway Device: Endotracheal Tube  Airway Device Size: 8.0  Style: Cuffed  Cuff Inflated With: Air  Placement Verified By: Auscultation;Chest X-ray;Capnome...   Secured at 25 cm   Measured At Lips   Secured Location Right   Secured by Commercial tube tinoco   Bite Block none   Site Condition Cool;Dry   Status Intact;Secured;Patent   Site Assessment Clean;Dry   Cuff Pressure 32 cm H2O   IHI Ventilator  Associated Pneumonia Bundle   Head of Bed Elevated  HOB 45   Oral Care Mouth suctioned   Preset Conventional Ventilator Settings   Vent Type    Ventilation Type VC+   Vent Mode A/C   Humidity HME   Set Rate 12 bmp   Vt Set 600 mL   PEEP/CPAP 5 cmH20   Pressure Support 0 cmH20   Peak Flow 0 L/min   Set Inspiratory Pressure 0 cmH20   Insp Time 0.8 Sec(s)   Plateau Set/Insp. Hold (sec) 0   Insp Rise Time  50 %   Trigger Sensitivity Flow/I-Trigger 3 L/min   P High 0 cm H2O   P Low 0 cm H2O   T High 0 sec   T Low 0 sec   Patient Ventilator Parameters   Resp Rate Total 19 br/min   Peak Airway Pressure 22 cmH2O   Mean Airway Pressure 11 cmH20   Plateau Pressure 0 cmH20   Exhaled Vt 795 mL   Total Ve 10.8 mL   Spont Ve 0 L   I:E Ratio Measured 1:3.60   Conventional Ventilator Alarms   Ve High Alarm 25 L/min   Resp Rate High Alarm 45 br/min   Press High Alarm 45 cmH2O   Apnea Rate 10   Apnea Volume (mL) 730 mL   Apnea Oxygen Concentration  100   Apnea Flow Rate (L/min) 87   T Apnea 20 sec(s)   Ready to Wean/Extubation Screen   FIO2<=50 (chart decimal) (!) 0.6   MV<16L (chart vol.) 10.8   PEEP <=8 (chart #) 5   Ready to Wean Parameters   F/VT Ratio<105 (RSBI) (!) 27.67
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
## 2017-08-17 NOTE — PROGRESS NOTES
Progress Note    Admit Date: 8/11/2017   LOS: 5 days     SUBJECTIVE:      History of Present Illness:  Patient is a 62 y.o. male admitted to Hospitalist Service from [facility] Emergency Room with complaint of worsening SOB and sore-throat for 1 day. Patient has PMH significant for COPD, CAD, DM-2, depression, DVT, hypertension, history of MI, history of PE and chronic pain. Patient is continuing to smoke cigarettes. Patient presented with complaint of subjective fever, sore-throat and SOB for 1-2 days. Patient admits yellow productive cough and frequent wheezing. Patient requiring multiple doses of nebulization treatments in the ER. Patient denied chest pain, abdominal pain, nausea, vomiting, headache, vision changes, focal neuro-deficits. Patient admitted non-compliance with medications and diet. He had few doughnuts last night and now blood sugars are high.    Interval history  On ventilator. NGT feeding. On IV Propofol and IV Precedex infusion. Hypotensive last night. BP still low.    Scheduled Meds:   albuterol-ipratropium 2.5mg-0.5mg/3mL  3 mL Nebulization Q6H    allopurinol  200 mg Oral Daily    atorvastatin  40 mg Oral Daily    buPROPion  150 mg Oral BID    carbidopa-levodopa  mg  2 tablet Oral TID    And    entacapone  200 mg Oral TID    cefTRIAXone (ROCEPHIN) IVPB  1 g Intravenous Q24H    diazePAM  5 mg Intravenous Q6H    diphenhydrAMINE  25 mg Intravenous Q8H    enoxaparin  110 mg Subcutaneous Q12H    escitalopram oxalate  20 mg Oral QHS    furosemide  40 mg Oral Daily    gabapentin  200 mg Oral TID    insulin aspart  22 Units Subcutaneous TID AC    insulin detemir  90 Units Subcutaneous QHS    lipase-protease-amylase 24,000-76,000-120,000 units  6 capsule Oral TID    mirtazapine  15 mg Oral QHS    multivitamin  1 tablet Oral Daily    nystatin  5 mL Oral Q6H    olanzapine  10 mg Oral Daily    pantoprazole  40 mg Intravenous Daily    potassium chloride 10%  20 mEq  Oral BID    prazosin  1 mg Oral QHS    predniSONE 5 mg/5 ml  10 mg Oral BID    trazodone  150 mg Oral QHS    vancomycin (VANCOCIN) IVPB  15 mg/kg Intravenous Q12H     Continuous Infusions:   sodium chloride 0.9% 100 mL/hr at 08/16/17 0813    dexmedetomidine (PRECEDEX) infusion 0.1 mcg/kg/hr (08/17/17 0932)    propofol 45 mcg/kg/min (08/17/17 0910)     PRN Meds:acetaminophen, dextrose 50%, dextrose 50%, diazePAM, glucagon (human recombinant), glucose, glucose, hydrOXYzine HCl, insulin aspart, ondansetron, ondansetron, oxycodone-acetaminophen, quetiapine, senna-docusate 8.6-50 mg    Review of patient's allergies indicates:   Allergen Reactions    Bee pollens Anaphylaxis     Bee stings     Penicillins Nausea Only     Other reaction(s): Unknown    Codeine Rash     Other reaction(s): Unknown    Morphine Rash       Review of Systems  -unable to perform patient is intubated     OBJECTIVE:     Vital Signs (Most Recent)  Temp: 98.6 °F (37 °C) (08/17/17 0400)  Pulse: 68 (08/17/17 0915)  Resp: 10 (08/17/17 0915)  BP: (!) 96/54 (08/17/17 0915)  SpO2: 98 % (08/17/17 0915)    Vital Signs Range (Last 24H):  Temp:  [98.5 °F (36.9 °C)-98.8 °F (37.1 °C)]   Pulse:  [53-73]   Resp:  [10-24]   BP: ()/(47-64)   SpO2:  [94 %-100 %]     I & O (Last 24H):    Intake/Output Summary (Last 24 hours) at 08/17/17 0955  Last data filed at 08/17/17 0608   Gross per 24 hour   Intake          5113.88 ml   Output             4725 ml   Net           388.88 ml     Physical Exam:  General appearance: INTUBATED and sedated   Head: normocephalic, atraumatic  Eyes:  conjunctivae/corneas clear. PERRL.  Nose: Nares normal. Septum midline.  Throat: lips, mucosa, and tongue normal; teeth and gums normal, no throat erythema.  Neck: supple, symmetrical, trachea midline, no JVD and thyroid not enlarged, symmetric, no tenderness/mass/nodules  Lungs:  Bilateral scattered rhonchi with prolonged expiration  Chest wall: no tenderness  Heart: regular  rate and rhythm, S1, S2 normal, no murmur, click, rub or gallop  Abdomen: soft, non-tender non-distented; bowel sounds normal; no masses,  no organomegaly  Extremities: no cyanosis, clubbing or edema.   Pulses: 2+ and symmetric  Skin: Skin color, texture, turgor normal. No rashes or lesions.  Lymph nodes: Cervical, supraclavicular, and axillary nodes normal.  Neurologic: sedated   Laboratory:  CBC:     Recent Labs  Lab 08/17/17  0357   WBC 11.10   RBC 4.54*   HGB 13.8*   HCT 42.0      MCV 92   MCH 30.4   MCHC 32.9     CMP:     Recent Labs  Lab 08/17/17  0357   *   CALCIUM 8.5*   ALBUMIN 2.2*   PROT 5.6*      K 3.9   CO2 24      BUN 12   CREATININE 0.7   ALKPHOS 60   ALT 5*   AST 8*   BILITOT 0.3       Diagnostic Results:  CT soft tissue neck:  No acute findings in this patient status post prior cervical spine surgery. Bullous emphysematous changes are noted in the upper lung fields.    CXR: An orogastric tube is seen with its tip in the region of the stomach    KUB: There is a gastric tube which appears coiled within the left upper quadrant of the abdomen, likely within the stomach.  There is a battery pack for a neurostimulator device visible over the left upper quadrant.  There are 2 neurostimulator leads projected over the spinal canal in the lower thoracic region.  The cardiac silhouette is enlarged and there are central perivascular congestion, bilateral perihilar reticulonodular air space opacities, and patchy segmental air space opacity at the lung bases, likely a combination of a atelectasis and/or consolidation/aspiration.  There is a possible small volume of left basilar pleural fluid.  There is degenerative change of the thoracic spine.    CXR: Worsening airspace opacity and edema in the mid and lower lungs bilaterally.    CXR: Appropriate positioning of support devices.  Persistent perihilar and bibasilar infiltrates, with increase in consolidative component on the right. Stable  small left pleural effusion.    ASSESSMENT/PLAN:      *Acute respiratory failure with hypoxia and hypercarbia  COPD exacerbation [J44.1]  Staph Aureus Pneumonia  Intubated --> follow up pulmonary. General surgery consulted for trach placement  Prednisone 10 mg BID.  Start midodrine 5 mg BID  Continue beta 2 agonist bronchodilator treatments.   Continue IV antibiotics - Vancomycin and Ceftriaxone  Check sputum GS and Cx.   Continue routine medications as before. Patient stated, he quit smoking 2 weeks ago.     Yes    Diabetes mellitus, type II [E11.9] - uncontrolled due to non-compliance with diet  Check blood glucose level q 6h.  Use Novolog Insulin Sliding Scale as needed.   Dietry compliance discussed with patient.   Yes    Benign essential HTN [I10]  Chronic problem. Will continue chronic medications and monitor for any changes, adjusting as needed.  Patient is bradycardic. Coreg held and Precedex is being weaning.     Yes    JOSE ALBERTO (obstructive sleep apnea) [G47.33]  On ventilator      Yes    Depression [F32.9]  Chronic problem. Will continue chronic medications and monitor for any changes, adjusting as needed.   Yes    CAD (coronary artery disease) [I25.10]  Patient with known CAD and monitor for S/Sx of angina/ACS. Continue to monitor on telemetry.   Yes    DVT (deep venous thrombosis)- Hx IVC filter [I82.409]  Xarelto changed to Lovenox 1 mg/kg sq q 12 hrs.      Yes              VTE Risk Mitigation      Lovenox 40 mg SQ q day     Memo Henry MD
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
## 2017-08-17 NOTE — PROGRESS NOTES
Place Cuff Manometer on ET tube cuff, I advance air to achieve 30cc. With a 10cc syringe I withdrew air from the cuff, @3.5cc I heard a leak. I advance 0.5cc of air in cuff to seal leak. @2035 I informed Dr. Cordero of results.

## 2017-08-17 NOTE — PLAN OF CARE
Problem: Patient Care Overview  Goal: Plan of Care Review  Outcome: Ongoing (interventions implemented as appropriate)  Care plan reviewed.  Safety maintained, restraints monitored per protocol, no injury noted.  Remains on ventilator intubated and sedated.  Receiving IV antibiotics  Tube feeding continues, no residuals.  Blood sugars monitored with insulin sliding scale and routine insulin.  Patient will have surgery tomorrow for tracheostomy and will be NPO after midnight.

## 2017-08-17 NOTE — PLAN OF CARE
Problem: Patient Care Overview  Goal: Plan of Care Review  Outcome: Ongoing (interventions implemented as appropriate)  Pt received on  ventilator with settings documented. All alarms on, set and functional. Ambu-bag @[facility].

## 2017-08-17 NOTE — PLAN OF CARE
Problem: Patient Care Overview  Goal: Plan of Care Review  Outcome: Ongoing (interventions implemented as appropriate)  Pt intubated on vent sedated with propofol and precedex drips. Pt afebrile over night. BP low pt responsive to IVF bolus. Pt remains in bilateral soft wrist restraints to prevent self extubation. Safety maintained.

## 2017-08-18 ENCOUNTER — SURGERY (OUTPATIENT)
Age: 62
End: 2017-08-18

## 2017-08-18 ENCOUNTER — ANESTHESIA (OUTPATIENT)
Dept: SURGERY | Facility: HOSPITAL | Age: 62
DRG: 004 | End: 2017-08-18
Payer: MEDICARE

## 2017-08-18 VITALS — RESPIRATION RATE: 15 BRPM

## 2017-08-18 LAB
ALBUMIN SERPL BCP-MCNC: 2.4 G/DL
ALLENS TEST: ABNORMAL
ALLENS TEST: ABNORMAL
ALP SERPL-CCNC: 61 U/L
ALT SERPL W/O P-5'-P-CCNC: 13 U/L
ANION GAP SERPL CALC-SCNC: 11 MMOL/L
AST SERPL-CCNC: 21 U/L
BASOPHILS # BLD AUTO: 0.1 K/UL
BASOPHILS NFR BLD: 0.5 %
BILIRUB SERPL-MCNC: 0.4 MG/DL
BUN SERPL-MCNC: 11 MG/DL
CALCIUM SERPL-MCNC: 8.1 MG/DL
CHLORIDE SERPL-SCNC: 108 MMOL/L
CO2 SERPL-SCNC: 23 MMOL/L
CREAT SERPL-MCNC: 0.7 MG/DL
DELSYS: ABNORMAL
DELSYS: ABNORMAL
DIFFERENTIAL METHOD: ABNORMAL
EOSINOPHIL # BLD AUTO: 0.1 K/UL
EOSINOPHIL NFR BLD: 0.7 %
ERYTHROCYTE [DISTWIDTH] IN BLOOD BY AUTOMATED COUNT: 13.9 %
ERYTHROCYTE [SEDIMENTATION RATE] IN BLOOD BY WESTERGREN METHOD: 12 MM/H
EST. GFR  (AFRICAN AMERICAN): >60 ML/MIN/1.73 M^2
EST. GFR  (NON AFRICAN AMERICAN): >60 ML/MIN/1.73 M^2
ETCO2: 33
FIO2: 60
FIO2: 60
GLUCOSE SERPL-MCNC: 189 MG/DL
HCO3 UR-SCNC: 28.9 MMOL/L (ref 24–28)
HCO3 UR-SCNC: 29 MMOL/L (ref 24–28)
HCT VFR BLD AUTO: 44.1 %
HGB BLD-MCNC: 14.2 G/DL
LYMPHOCYTES # BLD AUTO: 2.4 K/UL
LYMPHOCYTES NFR BLD: 18.2 %
MCH RBC QN AUTO: 30.3 PG
MCHC RBC AUTO-ENTMCNC: 32.3 G/DL
MCV RBC AUTO: 94 FL
MIN VOL: 11.2
MIN VOL: 17.6
MODE: ABNORMAL
MODE: ABNORMAL
MONOCYTES # BLD AUTO: 0.8 K/UL
MONOCYTES NFR BLD: 5.8 %
NEUTROPHILS # BLD AUTO: 10 K/UL
NEUTROPHILS NFR BLD: 74.8 %
PCO2 BLDA: 35.4 MMHG (ref 35–45)
PCO2 BLDA: 43.9 MMHG (ref 35–45)
PEEP: 5
PEEP: 5
PH SMN: 7.43 [PH] (ref 7.35–7.45)
PH SMN: 7.52 [PH] (ref 7.35–7.45)
PIP: 20
PLATELET # BLD AUTO: 179 K/UL
PMV BLD AUTO: 8 FL
PO2 BLDA: 76 MMHG (ref 80–100)
PO2 BLDA: 97 MMHG (ref 80–100)
POC BE: 5 MMOL/L
POC BE: 6 MMOL/L
POC SATURATED O2: 95 % (ref 95–100)
POC SATURATED O2: 98 % (ref 95–100)
POC TCO2: 30 MMOL/L (ref 23–27)
POC TCO2: 30 MMOL/L (ref 23–27)
POCT GLUCOSE: 193 MG/DL (ref 70–110)
POCT GLUCOSE: 207 MG/DL (ref 70–110)
POCT GLUCOSE: 218 MG/DL (ref 70–110)
POTASSIUM SERPL-SCNC: 3.7 MMOL/L
PROT SERPL-MCNC: 6.1 G/DL
PS: 10
RBC # BLD AUTO: 4.71 M/UL
SAMPLE: ABNORMAL
SAMPLE: ABNORMAL
SITE: ABNORMAL
SITE: ABNORMAL
SODIUM SERPL-SCNC: 142 MMOL/L
SP02: 100
SP02: 97
SPONT RATE: 19
VT: 600
WBC # BLD AUTO: 13.3 K/UL

## 2017-08-18 PROCEDURE — D9220A PRA ANESTHESIA: Mod: ANES,,, | Performed by: ANESTHESIOLOGY

## 2017-08-18 PROCEDURE — 27201423 OPTIME MED/SURG SUP & DEVICES STERILE SUPPLY: Performed by: SURGERY

## 2017-08-18 PROCEDURE — 63600175 PHARM REV CODE 636 W HCPCS: Performed by: INTERNAL MEDICINE

## 2017-08-18 PROCEDURE — 37000008 HC ANESTHESIA 1ST 15 MINUTES: Performed by: SURGERY

## 2017-08-18 PROCEDURE — 99900035 HC TECH TIME PER 15 MIN (STAT)

## 2017-08-18 PROCEDURE — 36415 COLL VENOUS BLD VENIPUNCTURE: CPT

## 2017-08-18 PROCEDURE — 37000009 HC ANESTHESIA EA ADD 15 MINS: Performed by: SURGERY

## 2017-08-18 PROCEDURE — C9113 INJ PANTOPRAZOLE SODIUM, VIA: HCPCS | Performed by: INTERNAL MEDICINE

## 2017-08-18 PROCEDURE — 36000707: Performed by: SURGERY

## 2017-08-18 PROCEDURE — 94003 VENT MGMT INPAT SUBQ DAY: CPT

## 2017-08-18 PROCEDURE — 25000003 PHARM REV CODE 250: Performed by: INTERNAL MEDICINE

## 2017-08-18 PROCEDURE — 82803 BLOOD GASES ANY COMBINATION: CPT

## 2017-08-18 PROCEDURE — 36556 INSERT NON-TUNNEL CV CATH: CPT | Mod: 59,,, | Performed by: ANESTHESIOLOGY

## 2017-08-18 PROCEDURE — 25000242 PHARM REV CODE 250 ALT 637 W/ HCPCS: Performed by: NURSE PRACTITIONER

## 2017-08-18 PROCEDURE — 80053 COMPREHEN METABOLIC PANEL: CPT

## 2017-08-18 PROCEDURE — 94770 HC EXHALED C02 TEST: CPT

## 2017-08-18 PROCEDURE — 25000003 PHARM REV CODE 250: Performed by: SURGERY

## 2017-08-18 PROCEDURE — D9220A PRA ANESTHESIA: Mod: CRNA,,, | Performed by: NURSE ANESTHETIST, CERTIFIED REGISTERED

## 2017-08-18 PROCEDURE — 63600175 PHARM REV CODE 636 W HCPCS: Performed by: NURSE ANESTHETIST, CERTIFIED REGISTERED

## 2017-08-18 PROCEDURE — 94640 AIRWAY INHALATION TREATMENT: CPT

## 2017-08-18 PROCEDURE — 36000706: Performed by: SURGERY

## 2017-08-18 PROCEDURE — 94761 N-INVAS EAR/PLS OXIMETRY MLT: CPT

## 2017-08-18 PROCEDURE — 20000000 HC ICU ROOM

## 2017-08-18 PROCEDURE — 63600175 PHARM REV CODE 636 W HCPCS: Performed by: PHYSICIAN ASSISTANT

## 2017-08-18 PROCEDURE — 31600 PLANNED TRACHEOSTOMY: CPT | Mod: ,,, | Performed by: SURGERY

## 2017-08-18 PROCEDURE — 36600 WITHDRAWAL OF ARTERIAL BLOOD: CPT

## 2017-08-18 PROCEDURE — 25000003 PHARM REV CODE 250: Performed by: NURSE ANESTHETIST, CERTIFIED REGISTERED

## 2017-08-18 PROCEDURE — 27000221 HC OXYGEN, UP TO 24 HOURS

## 2017-08-18 PROCEDURE — 85025 COMPLETE CBC W/AUTO DIFF WBC: CPT

## 2017-08-18 PROCEDURE — 99232 SBSQ HOSP IP/OBS MODERATE 35: CPT | Mod: ,,, | Performed by: INTERNAL MEDICINE

## 2017-08-18 PROCEDURE — 63600175 PHARM REV CODE 636 W HCPCS: Performed by: NURSE PRACTITIONER

## 2017-08-18 PROCEDURE — 99233 SBSQ HOSP IP/OBS HIGH 50: CPT | Mod: ,,, | Performed by: INTERNAL MEDICINE

## 2017-08-18 PROCEDURE — 25000003 PHARM REV CODE 250: Performed by: NURSE PRACTITIONER

## 2017-08-18 PROCEDURE — 99900026 HC AIRWAY MAINTENANCE (STAT)

## 2017-08-18 RX ORDER — PHENYLEPHRINE HYDROCHLORIDE 10 MG/ML
INJECTION INTRAVENOUS
Status: DISCONTINUED | OUTPATIENT
Start: 2017-08-18 | End: 2017-08-18

## 2017-08-18 RX ORDER — FENTANYL CITRATE 50 UG/ML
INJECTION, SOLUTION INTRAMUSCULAR; INTRAVENOUS
Status: DISCONTINUED | OUTPATIENT
Start: 2017-08-18 | End: 2017-08-18

## 2017-08-18 RX ORDER — PROPOFOL 10 MG/ML
VIAL (ML) INTRAVENOUS
Status: DISCONTINUED | OUTPATIENT
Start: 2017-08-18 | End: 2017-08-18

## 2017-08-18 RX ORDER — CISATRACURIUM BESYLATE 10 MG/ML
INJECTION, SOLUTION INTRAVENOUS
Status: DISCONTINUED | OUTPATIENT
Start: 2017-08-18 | End: 2017-08-18

## 2017-08-18 RX ORDER — PANTOPRAZOLE SODIUM 40 MG/10ML
40 INJECTION, POWDER, LYOPHILIZED, FOR SOLUTION INTRAVENOUS
Status: DISCONTINUED | OUTPATIENT
Start: 2017-08-19 | End: 2017-08-22 | Stop reason: HOSPADM

## 2017-08-18 RX ORDER — HYDROMORPHONE HYDROCHLORIDE 2 MG/ML
0.5 INJECTION, SOLUTION INTRAMUSCULAR; INTRAVENOUS; SUBCUTANEOUS EVERY 4 HOURS PRN
Status: DISCONTINUED | OUTPATIENT
Start: 2017-08-18 | End: 2017-08-20

## 2017-08-18 RX ORDER — HYDROMORPHONE HYDROCHLORIDE 2 MG/ML
0.5 INJECTION, SOLUTION INTRAMUSCULAR; INTRAVENOUS; SUBCUTANEOUS EVERY 4 HOURS PRN
Status: DISCONTINUED | OUTPATIENT
Start: 2017-08-18 | End: 2017-08-18

## 2017-08-18 RX ORDER — MIDAZOLAM HYDROCHLORIDE 1 MG/ML
INJECTION, SOLUTION INTRAMUSCULAR; INTRAVENOUS
Status: DISCONTINUED | OUTPATIENT
Start: 2017-08-18 | End: 2017-08-18

## 2017-08-18 RX ADMIN — IPRATROPIUM BROMIDE AND ALBUTEROL SULFATE 3 ML: .5; 3 SOLUTION RESPIRATORY (INHALATION) at 07:08

## 2017-08-18 RX ADMIN — DIPHENHYDRAMINE HYDROCHLORIDE 25 MG: 50 INJECTION, SOLUTION INTRAMUSCULAR; INTRAVENOUS at 05:08

## 2017-08-18 RX ADMIN — IPRATROPIUM BROMIDE AND ALBUTEROL SULFATE 3 ML: .5; 3 SOLUTION RESPIRATORY (INHALATION) at 12:08

## 2017-08-18 RX ADMIN — NYSTATIN 500000 UNITS: 100000 SUSPENSION ORAL at 05:08

## 2017-08-18 RX ADMIN — FUROSEMIDE 40 MG: 40 TABLET ORAL at 09:08

## 2017-08-18 RX ADMIN — OLANZAPINE 10 MG: 5 TABLET ORAL at 09:08

## 2017-08-18 RX ADMIN — PROPOFOL 50 MCG/KG/MIN: 10 INJECTION, EMULSION INTRAVENOUS at 01:08

## 2017-08-18 RX ADMIN — LORAZEPAM 0.5 MG: 2 INJECTION INTRAMUSCULAR; INTRAVENOUS at 05:08

## 2017-08-18 RX ADMIN — PREDNISONE 10 MG: 5 SOLUTION ORAL at 09:08

## 2017-08-18 RX ADMIN — PROPOFOL 40 MG: 10 INJECTION, EMULSION INTRAVENOUS at 12:08

## 2017-08-18 RX ADMIN — INSULIN DETEMIR 90 UNITS: 100 INJECTION, SOLUTION SUBCUTANEOUS at 08:08

## 2017-08-18 RX ADMIN — HYDROMORPHONE HYDROCHLORIDE 0.5 MG: 2 INJECTION, SOLUTION INTRAMUSCULAR; INTRAVENOUS; SUBCUTANEOUS at 08:08

## 2017-08-18 RX ADMIN — HYDROMORPHONE HYDROCHLORIDE 0.5 MG: 2 INJECTION, SOLUTION INTRAMUSCULAR; INTRAVENOUS; SUBCUTANEOUS at 03:08

## 2017-08-18 RX ADMIN — NEOMYCIN AND POLYMYXIN B SULFATES AND BACITRACIN ZINC 2 PACKET: 400; 3.5; 5 OINTMENT TOPICAL at 01:08

## 2017-08-18 RX ADMIN — PROPOFOL 50 MCG/KG/MIN: 10 INJECTION, EMULSION INTRAVENOUS at 07:08

## 2017-08-18 RX ADMIN — PANTOPRAZOLE SODIUM 40 MG: 40 INJECTION, POWDER, FOR SOLUTION INTRAVENOUS at 09:08

## 2017-08-18 RX ADMIN — PROPOFOL 40 MG: 10 INJECTION, EMULSION INTRAVENOUS at 01:08

## 2017-08-18 RX ADMIN — PHENYLEPHRINE HYDROCHLORIDE 200 MG: 10 INJECTION INTRAVENOUS at 01:08

## 2017-08-18 RX ADMIN — BUPROPION HYDROCHLORIDE 150 MG: 150 TABLET, FILM COATED, EXTENDED RELEASE ORAL at 09:08

## 2017-08-18 RX ADMIN — IPRATROPIUM BROMIDE AND ALBUTEROL SULFATE 3 ML: .5; 3 SOLUTION RESPIRATORY (INHALATION) at 01:08

## 2017-08-18 RX ADMIN — PROPOFOL 50 MCG/KG/MIN: 10 INJECTION, EMULSION INTRAVENOUS at 11:08

## 2017-08-18 RX ADMIN — ALLOPURINOL 200 MG: 100 TABLET ORAL at 09:08

## 2017-08-18 RX ADMIN — VANCOMYCIN HYDROCHLORIDE 1750 MG: 5 INJECTION, POWDER, LYOPHILIZED, FOR SOLUTION INTRAVENOUS at 05:08

## 2017-08-18 RX ADMIN — DIAZEPAM 5 MG: 5 INJECTION, SOLUTION INTRAMUSCULAR; INTRAVENOUS at 05:08

## 2017-08-18 RX ADMIN — FENTANYL CITRATE 100 MCG: 50 INJECTION INTRAMUSCULAR; INTRAVENOUS at 01:08

## 2017-08-18 RX ADMIN — DIPHENHYDRAMINE HYDROCHLORIDE 25 MG: 50 INJECTION, SOLUTION INTRAMUSCULAR; INTRAVENOUS at 03:08

## 2017-08-18 RX ADMIN — POTASSIUM CHLORIDE 20 MEQ: 20 SOLUTION ORAL at 09:08

## 2017-08-18 RX ADMIN — INSULIN ASPART 4 UNITS: 100 INJECTION, SOLUTION INTRAVENOUS; SUBCUTANEOUS at 06:08

## 2017-08-18 RX ADMIN — CARBIDOPA AND LEVODOPA 2 TABLET: 25; 100 TABLET ORAL at 07:08

## 2017-08-18 RX ADMIN — CISATRACURIUM BESYLATE 10 MG: 10 INJECTION INTRAVENOUS at 01:08

## 2017-08-18 RX ADMIN — THERA TABS 1 TABLET: TAB at 09:08

## 2017-08-18 RX ADMIN — MIDODRINE HYDROCHLORIDE 5 MG: 2.5 TABLET ORAL at 07:08

## 2017-08-18 RX ADMIN — ATORVASTATIN CALCIUM 40 MG: 40 TABLET, FILM COATED ORAL at 09:08

## 2017-08-18 RX ADMIN — PROPOFOL 50 MCG/KG/MIN: 10 INJECTION, EMULSION INTRAVENOUS at 03:08

## 2017-08-18 RX ADMIN — PROPOFOL 50 MCG/KG/MIN: 10 INJECTION, EMULSION INTRAVENOUS at 04:08

## 2017-08-18 RX ADMIN — DIPHENHYDRAMINE HYDROCHLORIDE 25 MG: 50 INJECTION, SOLUTION INTRAMUSCULAR; INTRAVENOUS at 09:08

## 2017-08-18 RX ADMIN — SODIUM CHLORIDE: 0.9 INJECTION, SOLUTION INTRAVENOUS at 08:08

## 2017-08-18 RX ADMIN — LORAZEPAM 0.5 MG: 2 INJECTION INTRAMUSCULAR; INTRAVENOUS at 08:08

## 2017-08-18 RX ADMIN — ENOXAPARIN SODIUM 110 MG: 150 INJECTION SUBCUTANEOUS at 05:08

## 2017-08-18 RX ADMIN — CEFTRIAXONE 1 G: 1 INJECTION, SOLUTION INTRAVENOUS at 04:08

## 2017-08-18 RX ADMIN — ENTACAPONE 200 MG: 200 TABLET, FILM COATED ORAL at 08:08

## 2017-08-18 RX ADMIN — MIDAZOLAM 2 MG: 1 INJECTION INTRAMUSCULAR; INTRAVENOUS at 12:08

## 2017-08-18 NOTE — PLAN OF CARE
Patient transferred to ICU via bed with monitor and oxygen in place, report given to ICU nurse. CP,RN
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
## 2017-08-18 NOTE — PROGRESS NOTES
Performed leak test, leak noted with 4cc air removed from ett cuff. Stridor noted when pt coughed with cuff partially deflated during leak test.

## 2017-08-18 NOTE — BRIEF OP NOTE
Patient: Kevan Colin     Date of Procedure: 8/18/2017    Procedure: Treacheostomy    Surgeon: Ishmael Sebastian MD    Assistant: None    Pre-op Diagnosis: Acute respiratory failure [J96.00]     Post-op Diagnosis: Acute respiratory failure [J96.00]    Findings: trachea    Specimen: none    EBL: 15 cc    Complications: None

## 2017-08-18 NOTE — CONSULTS
Kevan Colin [id number] is a 62 y.o. male who has been consulted for vancomycin dosing.    The patient has the following labs:     Date Creatinine (mg/dl)    BUN WBC Count   8/17/2017 Estimated Creatinine Clearance: 146.1 mL/min (based on Cr of 0.7). Lab Results   Component Value Date    BUN 12 08/17/2017     Lab Results   Component Value Date    WBC 11.10 08/17/2017        Current weight is 115.6 kg (254 lb 13.6 oz)    Pt is receiving vancomycin 1750 mg every 12 hours.  Vancomycin trough from 08/17 at 1714 was 19.3 mg/dL.  Target trough range is 15-20 mg/dL.   Trough was drawn about 45 minutes late and anticipate it is therapeutic.  Pharmacy will continue current regimen.   A vancomycin trough has been ordered prior to 4th dose due 08/19 at 0430.      Patient will be followed by pharmacy for changes in renal function, toxicity, and efficacy.  Thank you for allowing us to participate in this patient's care.     Chuck Jacobo, MarthaD

## 2017-08-18 NOTE — PROGRESS NOTES
Progress Note  PULMONARY    Admit Date: 8/11/2017 08/18/2017      SUBJECTIVE:     Aug 14, pt presented with sore throat and sob x 1 day on 8/11, intubated 8/12 with airway difficulties, extubated and intubated 8/13 with stridor again.  Pt had ct neck early in course.  Now sedate on vent.  Aug 15, sedated, discussed with wife   Aug 16, sedate, discussed with wife.  Aug 17, trach  Aug 18, post trach , awake, tolerates cpap.    PFSH and Allergies reviewed.  Unable to do ros due to sedation and intubation.  OBJECTIVE:     Vitals (Most recent):  Vitals:    08/18/17 1700   BP: (!) 153/73   Pulse: 76   Resp: 19   Temp:        Vitals (24 hour range):  Temp:  [97.4 °F (36.3 °C)-98.7 °F (37.1 °C)]   Pulse:  [60-93]   Resp:  [0-25]   BP: ()/()   SpO2:  [94 %-100 %]       Intake/Output Summary (Last 24 hours) at 08/18/17 1710  Last data filed at 08/18/17 0600   Gross per 24 hour   Intake          2976.01 ml   Output             3800 ml   Net          -823.99 ml          Physical Exam:  The patient's neuro status (alertness,orientation,cognitive function,motor skills,), pharyngeal exam (oral lesions, hygiene, abn dentition,), Neck (jvd,mass,thyroid,nodes in neck and above/below clavicle),RESPIRATORY(symmetry,effort,fremitus,percussion,auscultation),  Cor(rhythm,heart tones including gallops,perfusion,edema)ABD(distention,hepatic&splenomegaly,tenderness,masses), Skin(rash,cyanosis),Psyc(affect,judgement,).  Exam negative except for these pertinent findings:    Oral intubation, no air leak with cuff down. Good bs, no edema    Radiographs reviewed: view by direct vision  , cxr with right atelectasis and more opacified 8/15 (no new) right lower lung.      Results for orders placed during the hospital encounter of 08/11/17   X-Ray Chest 1 View    Narrative AP chest compared to 08/13/2017    Findings: The endotracheal tube and nasogastric tube remain appropriately positioned.  A spinal stimulator device is again noted.   The heart size is upper normal.  There are persistent perihilar and bibasilar infiltrates as well as a small left pleural effusion.  An increase in consolidative component is seen at the right lung base.  There is no pneumothorax.    Impression Appropriate positioning of support devices.  Persistent perihilar and bibasilar infiltrates, with increase in consolidative component on the right.  Stable small left pleural effusion.      Electronically signed by: Ishmael Flood MD  Date:     08/14/17  Time:    07:39    ]    Labs       Recent Labs  Lab 08/18/17  0413   WBC 13.30*   HGB 14.2   HCT 44.1          Recent Labs  Lab 08/18/17  0413      K 3.7      CO2 23   BUN 11   CREATININE 0.7   *   CALCIUM 8.1*   AST 21   ALT 13   ALKPHOS 61   BILITOT 0.4   PROT 6.1   ALBUMIN 2.4*       Recent Labs  Lab 08/18/17  0528   PH 7.426   PCO2 43.9   PO2 76*   HCO3 28.9*     Microbiology Results (last 7 days)     Procedure Component Value Units Date/Time    Blood culture [[id number]] Collected:  08/14/17 1448    Order Status:  Completed Specimen:  Blood from Peripheral, Right  Hand Updated:  08/17/17 2312     Blood Culture, Routine No Growth to date     Blood Culture, Routine No Growth to date     Blood Culture, Routine No Growth to date     Blood Culture, Routine No Growth to date    Blood Culture #1 [[id number]] Collected:  08/11/17 0734    Order Status:  Completed Specimen:  Blood from Antecubital, Right  Arm Updated:  08/16/17 1412     Blood Culture, Routine No growth after 5 days.    Blood Culture #2 [[id number]] Collected:  08/11/17 0733    Order Status:  Completed Specimen:  Blood Updated:  08/16/17 1412     Blood Culture, Routine No growth after 5 days.    Culture, Respiratory with Gram Stain [[id number]]  (Susceptibility) Collected:  08/12/17 1340    Order Status:  Completed Specimen:  Respiratory from Tracheal Aspirate Updated:  08/15/17 1414     Respiratory Culture --     METHICILLIN RESISTANT  STAPHYLOCOCCUS AUREUS  Many       Gram Stain (Respiratory) <10 epithelial cells per low power field.     Gram Stain (Respiratory) Rare WBC's     Gram Stain (Respiratory) Moderate Gram positive cocci          Impression:  Active Hospital Problems    Diagnosis  POA    *COPD exacerbation [J44.1]  Yes    Acute respiratory failure with hypoxia and hypercarbia [J96.01, J96.02]  No    Staphylococcal pneumonia [J15.20]  Yes    Acute respiratory failure [J96.00]  Yes    Type 2 diabetes mellitus without complication [E11.9]  Yes    Benign essential HTN [I10]  Yes    JOSE ALBERTO (obstructive sleep apnea) [G47.33]  Yes    Depression [F32.9]  Yes    CAD (coronary artery disease) [I25.10]  Yes    DVT (deep venous thrombosis)- Hx IVC filter [I82.409]  Yes      Resolved Hospital Problems    Diagnosis Date Resolved POA   No resolved problems to display.               Plan:     Aug 14, will  need trach, add valium to facilitate sedation, with staph pneumonia - steroids need to be minimal.  Will dose vanc til sensitivity out.  Start tube feed, stop xarelto and use lovenox.    Tried to call wife - no answer.      The following were evaluated and adjusted as needed: mechanical ventilator settings and weaning status, intravenous fluids and nutritional status, sedation and neurologic status, antibiotics and acid base balance and oxygenation needs       Critical Care  - THE PATIENT HAS A HIGH PROBABILITY OF IMMINENT OR LIFE THREATENING DETERIORATION.  Over 50%time of encounter was in direct care at bedside.  Time was 30 to 74 minutes required for patient care.  Time needed for all of the above totaled 33 minutes.    Aug 15, staph id na still, still risk of bleed from xarelto, will ask ent to see to figure plan.  Need to keep steroids low with staph pneumonia.  Expect trach best management option for airway stridor that required 2 intubations and cannot be dosed steroids (high dose).    Wil on propofol?  On coreg?  Observe- stop coreg  if hr<45    Aug 16, stagnant, got xarelto 8/14, needs general surg to do trach.  Likely will wean quickly. Daily  Leak test reasonable but high risk airway- no steroids high dose with mrsa.    Aug 18- trach - now wean prn, mobilize speech eval , cont abx.                          .

## 2017-08-18 NOTE — PROGRESS NOTES
08/18/17 0758   Patient Assessment/Suction   Level of Consciousness (AVPU) responds to voice   All Lung Fields Breath Sounds coarse   Rhythm/Pattern, Respiratory ventilator assisted   Cough Frequency with stimulation   Cough Type good   Suction Method in-line suction catheter (closed)   PRE-TX-O2-ETCO2   O2 Device (Oxygen Therapy) ventilator   Oxygen Concentration (%) 60   SpO2 99 %   Pulse Oximetry Type Continuous   $ Pulse Oximetry - Multiple Charge Pulse Oximetry - Multiple   ETCO2 (mmHg) 36 mmHg   Pulse 79   Resp 12   Aerosol Therapy   $ Aerosol Therapy Charges Aerosol Treatment   Respiratory Treatment Status given   SVN/Inhaler Treatment Route in-line   Position During Treatment HOB at 45 degrees   Patient Tolerance good   Vent Select   Conventional Vent Y   $ Ventilator Subsequent Yes   Preset Conventional Ventilator Settings   Vent Type    Ventilation Type VC+   Vent Mode A/C   Humidity HME   Set Rate 12 bmp   Vt Set 600 mL   PEEP/CPAP 5 cmH20   Pressure Support 0 cmH20   Peak Flow 0 L/min   Set Inspiratory Pressure 0 cmH20   Insp Time 0.8 Sec(s)   Plateau Set/Insp. Hold (sec) 0   Insp Rise Time  50 %   Trigger Sensitivity Flow/I-Trigger 3 L/min   P High 0 cm H2O   P Low 0 cm H2O   T High 0 sec   T Low 0 sec   Patient Ventilator Parameters   Resp Rate Total 14 br/min   Peak Airway Pressure 17 cmH2O   Mean Airway Pressure 7.6 cmH20   Plateau Pressure 0 cmH20   Exhaled Vt 625 mL   Total Ve 9.81 mL   Spont Ve 0 L   I:E Ratio Measured 1:4.20   Conventional Ventilator Alarms   Alarms On Y   Ve High Alarm 40 L/min   Resp Rate High Alarm 45 br/min   Press High Alarm 100 cmH2O   Apnea Rate 10   Apnea Volume (mL) 730 mL   Apnea Oxygen Concentration  100   Apnea Flow Rate (L/min) 87   T Apnea 20 sec(s)   Ready to Wean/Extubation Screen   FIO2<=50 (chart decimal) (!) 0.6   MV<16L (chart vol.) 9.81   PEEP <=8 (chart #) 5   Ready to Wean Parameters   F/VT Ratio<105 (RSBI) (!) 19.2   received pt this am on ,  settings as charted, pt gets duoneb q6, leak test BID, ABG Qday, tolerated tx and sx well.

## 2017-08-18 NOTE — ANESTHESIA PREPROCEDURE EVALUATION
08/18/2017  Kevan Colin is a 62 y.o., male.    Anesthesia Evaluation    I have reviewed the Patient Summary Reports.    I have reviewed the Nursing Notes.   I have reviewed the Medications.     Review of Systems  Social:  Former Smoker    Cardiovascular:   Hypertension Past MI CAD   QUIÑONES    Pulmonary:   Pneumonia COPD, severe Asthma Shortness of breath Sleep Apnea Respiratory failure s/p multiple failed weaning attempts    Renal/:  Renal/ Normal     Hepatic/GI:   Chronic pancreatitis    Musculoskeletal:   Arthritis   Spine Disorders: Degenerative disease    Neurological:   Peripheral Neuropathy    Endocrine:   Diabetes, type 2    Psych:   Psychiatric History depression          Physical Exam  General:  Obesity    Airway/Jaw/Neck:  Airway Findings: Mouth Opening: Normal Tongue: Normal  Pre-Existing Airway Tube(s): Oral Endotracheal tube        Eyes/Ears/Nose:  EYES/EARS/NOSE FINDINGS: Normal   Dental:  DENTAL FINDINGS: Normal   Chest/Lungs:  Chest/Lungs Findings: Clear to auscultation, Normal Respiratory Rate     Heart/Vascular:  Heart Findings: Rate: Normal  Rhythm: Regular Rhythm  Sounds: Normal     Abdomen:  Abdomen Findings: Normal    Musculoskeletal:  Musculoskeletal Findings: Normal   Skin:  Skin Findings: Normal    Mental Status:  Mental Status Findings:  Unconscious         Anesthesia Plan  Type of Anesthesia, risks & benefits discussed:  Anesthesia Type:  general  Patient's Preference:   Intra-op Monitoring Plan: standard ASA monitors  Intra-op Monitoring Plan Comments:   Post Op Pain Control Plan: per primary service following discharge from PACU and IV/PO Opioids PRN  Post Op Pain Control Plan Comments:   Induction:   IV  Beta Blocker:  Patient is not currently on a Beta-Blocker (No further documentation required).       Informed Consent: Patient representative understands risks and  agrees with Anesthesia plan.  Questions answered. Anesthesia consent signed with patient representative.  ASA Score: 4     Day of Surgery Review of History & Physical: I have interviewed and examined the patient. I have reviewed the patient's H&P dated:    H&P update referred to the surgeon.         Ready For Surgery From Anesthesia Perspective.

## 2017-08-18 NOTE — ANESTHESIA POSTPROCEDURE EVALUATION
"Anesthesia Post Evaluation    Patient: Kevan Colin    Procedure(s) Performed: Procedure(s) (LRB):  TRACHEOSTOMY (N/A)    Final Anesthesia Type: general  Patient location during evaluation: ICU  Patient participation: No - Unable to Participate, Sedation  Level of consciousness: awake and alert  Post-procedure vital signs: reviewed and stable  Pain management: adequate  Airway patency: patent  PONV status at discharge: No PONV  Anesthetic complications: no      Cardiovascular status: blood pressure returned to baseline  Respiratory status: ventilator  Hydration status: euvolemic  Follow-up not needed.        Visit Vitals  BP (!) 87/52   Pulse 65   Temp 36.3 °C (97.4 °F) (Axillary)   Resp 13   Ht 6' 2" (1.88 m)   Wt 114.9 kg (253 lb 4.9 oz)   SpO2 99%   BMI 32.52 kg/m²       Pain/[place] Score: Pain Assessment Performed: Yes (8/18/2017 11:15 AM)  Presence of Pain: non-verbal indicators absent (8/18/2017 11:15 AM)  Pain Rating Prior to Med Admin: 6 (8/17/2017  3:09 AM)      "

## 2017-08-18 NOTE — ANESTHESIA PROCEDURE NOTES
Central Line    Diagnosis: acute respiratory failure  Doctor requesting consult: Dave  Patient location during procedure: done in OR  Procedure start time: 8/18/2017 2:08 PM  Timeout: 8/18/2017 2:07 PM  Procedure end time: 8/18/2017 2:20 PM  Staffing  Anesthesiologist: KARSON FOSTER  Performed: anesthesiologist   Anesthesiologist was present at the time of the procedure.  Preanesthetic Checklist  Completed: patient identified, site marked, surgical consent, pre-op evaluation, timeout performed, IV checked, risks and benefits discussed, monitors and equipment checked and anesthesia consent given  Indication  Indication: vascular access     Anesthesia   general anesthesia    Central Line  Skin Prep: skin prepped with ChloraPrep, skin prep agent completely dried prior to procedure  maximum sterile barriers used during central venous catheter insertion  hand hygiene performed prior to central venous catheter insertion  Location: right subclavian,   Catheter type: triple lumen  Catheter Size: 7.5 Fr  Inserted Catheter Length: 16 cm  Ultrasound: none  Manometry: none  Insertion Attempts: 1   Securement:line sutured, sterile dressing applied and blood return through all ports     Post-Procedure  X-Ray: no pneumothorax on x-ray, placement verified by x-ray, tip termination and successful placement  Adverse Events:none

## 2017-08-18 NOTE — PROGRESS NOTES
Patient returned from surgery. Patient connected to ventilator via tracheostomy. Small amount of bleeding noted.   Noted patient has TLC to RCW. Waiting for CXR.

## 2017-08-18 NOTE — TRANSFER OF CARE
"Anesthesia Transfer of Care Note    Patient: Kevan Colin    Procedure(s) Performed: Procedure(s) (LRB):  TRACHEOSTOMY (N/A)    Patient location: ICU    Anesthesia Type: general    Transport from OR: Transported from OR intubated on 100% O2 by AMBU with adequate controlled ventilation. Upon arrival to PACU/ICU, patient attached to ventilator and auscultated to confirm bilateral breath sounds and adequate TV. Continuous ECG monitoring in transport. Continuous SpO2 monitoring in transport. Continuos invasive BP monitoring in transport    Post pain: adequate analgesia    Post assessment: no apparent anesthetic complications    Post vital signs: stable    Level of consciousness: sedated    Nausea/Vomiting: no nausea/vomiting    Complications: none    Transfer of care protocol was followed      Last vitals:   Visit Vitals  BP (!) 87/52   Pulse 65   Temp 36.3 °C (97.4 °F) (Axillary)   Resp 13   Ht 6' 2" (1.88 m)   Wt 114.9 kg (253 lb 4.9 oz)   SpO2 99%   BMI 32.52 kg/m²     "

## 2017-08-18 NOTE — PROGRESS NOTES
Changed pt closed inline suction catheter to the proper one, and new HME, cleaned around new tracheostomy site and applied clean gauze.

## 2017-08-18 NOTE — PROGRESS NOTES
Progress Note    Admit Date: 8/11/2017   LOS: 6 days     SUBJECTIVE:      History of Present Illness:  Patient is a 62 y.o. male admitted to Hospitalist Service from [facility] Emergency Room with complaint of worsening SOB and sore-throat for 1 day. Patient has PMH significant for COPD, CAD, DM-2, depression, DVT, hypertension, history of MI, history of PE and chronic pain. Patient is continuing to smoke cigarettes. Patient presented with complaint of subjective fever, sore-throat and SOB for 1-2 days. Patient admits yellow productive cough and frequent wheezing. Patient requiring multiple doses of nebulization treatments in the ER. Patient denied chest pain, abdominal pain, nausea, vomiting, headache, vision changes, focal neuro-deficits. Patient admitted non-compliance with medications and diet. He had few doughnuts last night and now blood sugars are high.    Interval history  On ventilator. NGT feeding. On IV Propofol and IV Precedex infusion. Scheduled for tracheostomy.    Scheduled Meds:   albuterol-ipratropium 2.5mg-0.5mg/3mL  3 mL Nebulization Q6H    allopurinol  200 mg Oral Daily    atorvastatin  40 mg Oral Daily    buPROPion  150 mg Oral BID    carbidopa-levodopa  mg  2 tablet Oral TID    And    entacapone  200 mg Oral TID    cefTRIAXone (ROCEPHIN) IVPB  1 g Intravenous Q24H    diazePAM  5 mg Intravenous Q6H    diphenhydrAMINE  25 mg Intravenous Q8H    enoxaparin  110 mg Subcutaneous Q12H    escitalopram oxalate  20 mg Oral QHS    furosemide  40 mg Oral Daily    gabapentin  200 mg Oral TID    insulin aspart  22 Units Subcutaneous TID AC    insulin detemir  90 Units Subcutaneous QHS    lipase-protease-amylase 24,000-76,000-120,000 units  6 capsule Oral TID    midodrine  5 mg Oral BID WM    mirtazapine  15 mg Oral QHS    multivitamin  1 tablet Oral Daily    nystatin  5 mL Oral Q6H    olanzapine  10 mg Oral Daily    pantoprazole  40 mg Intravenous Daily    potassium  chloride 10%  20 mEq Oral BID    prazosin  1 mg Oral QHS    predniSONE 5 mg/5 ml  10 mg Oral BID    trazodone  150 mg Oral QHS    vancomycin (VANCOCIN) IVPB  15 mg/kg Intravenous Q12H     Continuous Infusions:   sodium chloride 0.9% 100 mL/hr at 08/17/17 1700    dexmedetomidine (PRECEDEX) infusion Stopped (08/17/17 1723)    propofol 50 mcg/kg/min (08/18/17 0756)     PRN Meds:acetaminophen, dextrose 50%, dextrose 50%, diazePAM, glucagon (human recombinant), glucose, glucose, hydrOXYzine HCl, insulin aspart, ondansetron, ondansetron, oxycodone-acetaminophen, quetiapine, senna-docusate 8.6-50 mg    Review of patient's allergies indicates:   Allergen Reactions    Bee pollens Anaphylaxis     Bee stings     Penicillins Nausea Only     Other reaction(s): Unknown    Codeine Rash     Other reaction(s): Unknown    Morphine Rash       Review of Systems  -unable to perform patient is intubated     OBJECTIVE:     Vital Signs (Most Recent)  Temp: 98.3 °F (36.8 °C) (08/18/17 0400)  Pulse: 73 (08/18/17 0930)  Resp: (!) 8 (08/18/17 0930)  BP: (!) 96/53 (08/18/17 0930)  SpO2: 97 % (08/18/17 0930)    Vital Signs Range (Last 24H):  Temp:  [98.3 °F (36.8 °C)-98.7 °F (37.1 °C)]   Pulse:  [65-93]   Resp:  [1-25]   BP: ()/(52-87)   SpO2:  [94 %-100 %]     I & O (Last 24H):    Intake/Output Summary (Last 24 hours) at 08/18/17 1003  Last data filed at 08/18/17 0600   Gross per 24 hour   Intake          2976.01 ml   Output             5850 ml   Net         -2873.99 ml     Physical Exam:  General appearance: INTUBATED and sedated   Head: normocephalic, atraumatic  Eyes:  conjunctivae/corneas clear. PERRL.  Nose: Nares normal. Septum midline.  Throat: lips, mucosa, and tongue normal; teeth and gums normal, no throat erythema.  Neck: supple, symmetrical, trachea midline, no JVD and thyroid not enlarged, symmetric, no tenderness/mass/nodules  Lungs:  Bilateral scattered rhonchi with prolonged expiration  Chest wall: no  tenderness  Heart: regular rate and rhythm, S1, S2 normal, no murmur, click, rub or gallop  Abdomen: soft, non-tender non-distented; bowel sounds normal; no masses,  no organomegaly  Extremities: no cyanosis, clubbing or edema.   Pulses: 2+ and symmetric  Skin: Skin color, texture, turgor normal. No rashes or lesions.  Lymph nodes: Cervical, supraclavicular, and axillary nodes normal.  Neurologic: sedated   Laboratory:  CBC:     Recent Labs  Lab 08/18/17  0413   WBC 13.30*   RBC 4.71   HGB 14.2   HCT 44.1      MCV 94   MCH 30.3   MCHC 32.3     CMP:     Recent Labs  Lab 08/18/17 0413   *   CALCIUM 8.1*   ALBUMIN 2.4*   PROT 6.1      K 3.7   CO2 23      BUN 11   CREATININE 0.7   ALKPHOS 61   ALT 13   AST 21   BILITOT 0.4       Diagnostic Results:  CT soft tissue neck:  No acute findings in this patient status post prior cervical spine surgery. Bullous emphysematous changes are noted in the upper lung fields.    CXR: An orogastric tube is seen with its tip in the region of the stomach    KUB: There is a gastric tube which appears coiled within the left upper quadrant of the abdomen, likely within the stomach.  There is a battery pack for a neurostimulator device visible over the left upper quadrant.  There are 2 neurostimulator leads projected over the spinal canal in the lower thoracic region.  The cardiac silhouette is enlarged and there are central perivascular congestion, bilateral perihilar reticulonodular air space opacities, and patchy segmental air space opacity at the lung bases, likely a combination of a atelectasis and/or consolidation/aspiration.  There is a possible small volume of left basilar pleural fluid.  There is degenerative change of the thoracic spine.    CXR: Worsening airspace opacity and edema in the mid and lower lungs bilaterally.    CXR: Appropriate positioning of support devices.  Persistent perihilar and bibasilar infiltrates, with increase in consolidative  component on the right. Stable small left pleural effusion.    CXR: Support devices as above.  Bibasilar atelectasis and/or infiltrate and small pleural effusions without significant change.    ASSESSMENT/PLAN:      *Acute respiratory failure with hypoxia and hypercarbia  COPD exacerbation [J44.1]  Staph Aureus Pneumonia  Intubated --> follow up pulmonary. General surgery consulted for trach placement  Prednisone 10 mg BID.  On midodrine 5 mg BID  Continue beta 2 agonist bronchodilator treatments.   Continue IV antibiotics - Vancomycin and Ceftriaxone  Check sputum GS and Cx.   Continue routine medications as before. Patient stated, he quit smoking 2 weeks ago.     Yes    Diabetes mellitus, type II [E11.9] - uncontrolled due to non-compliance with diet  Check blood glucose level q 6h.  Use Novolog Insulin Sliding Scale as needed.   Dietry compliance discussed with patient.     Yes    Benign essential HTN [I10]  Chronic problem. Will continue chronic medications and monitor for any changes, adjusting as needed.  Patient is bradycardic. Coreg held and Precedex is being weaning.     Yes    JOSE ALBERTO (obstructive sleep apnea) [G47.33]  On ventilator      Yes    Depression [F32.9]  Chronic problem. Will continue chronic medications and monitor for any changes, adjusting as needed.     Yes    CAD (coronary artery disease) [I25.10]  Patient with known CAD and monitor for S/Sx of angina/ACS. Continue to monitor on telemetry.     Yes    DVT (deep venous thrombosis)- Hx IVC filter [I82.409]  Xarelto changed to Lovenox 1 mg/kg sq q 12 hrs.      Yes              VTE Risk Mitigation      Lovenox 40 mg SQ q day     Memo Henry MD

## 2017-08-18 NOTE — PLAN OF CARE
08/17/17 1935   Patient Assessment/Suction   Level of Consciousness (AVPU) responds to voice   Respiratory Effort Normal   Expansion/Accessory Muscles/Retractions expansion symmetric   All Lung Fields Breath Sounds coarse   Rhythm/Pattern, Respiratory ventilator assisted   Cough Frequency with stimulation   Cough Type good   Suction Method in-line suction catheter (closed)   $ Suction Charges Inline Suction Procedure Stat Charge   Sputum Amount moderate   Sputum Color white-yellow   Sputum Consistency thick   Is this patient in SNF or Inpatient Rehab? No   PRE-TX-O2-ETCO2   O2 Device (Oxygen Therapy) ventilator   $ Is the patient on Oxygen? Yes   Oxygen Concentration (%) 60   SpO2 99 %   Pulse Oximetry Type Continuous   $ Pulse Oximetry - Multiple Charge Pulse Oximetry - Multiple   ETCO2 (mmHg) 34 mmHg   $ ETCO2 Charge Exhaled CO2 Monitoring   $ ETCO2 Usage Currently wearing   Pulse 74   Resp 19   Positioning HOB elevated 30 degrees   Aerosol Therapy   $ Aerosol Therapy Charges Aerosol Treatment   Respiratory Treatment Status given   SVN/Inhaler Treatment Route in-line   Position During Treatment HOB at 30 degrees   Patient Tolerance good   Post-Treatment   Post-treatment Heart Rate (beats/min) 78   Post-treatment Resp Rate (breaths/min) 20   All Fields Breath Sounds unchanged       Airway - Non-Surgical 08/13/17 1410 Endotracheal Tube   Placement Date/Time: 08/13/17 1410   Method of Intubation: Direct laryngoscopy  Inserted by: MD  Airway Device: Endotracheal Tube  Airway Device Size: 8.0  Style: Cuffed  Cuff Inflated With: Air  Placement Verified By: Auscultation;Chest X-ray;Capnome...   Secured at 25 cm   Measured At Lips   Secured Location Center   Secured by Commercial tube tinoco   Bite Block none   Site Condition Cool;Dry   Status Intact;Secured;Patent   Site Assessment Clean;Dry   Cuff Pressure 30 cm H2O   Vent Select   Conventional Vent Y   Ventilator Initiated No   $ Ventilator Subsequent Yes   Preset  Conventional Ventilator Settings   Vent Type    Ventilation Type VC+   Vent Mode A/C   Humidity HME   Set Rate 12 bmp   Vt Set 600 mL   PEEP/CPAP 5 cmH20   Pressure Support 0 cmH20   Peak Flow 0 L/min   Set Inspiratory Pressure 0 cmH20   Insp Time 0.8 Sec(s)   Plateau Set/Insp. Hold (sec) 0   Insp Rise Time  50 %   Trigger Sensitivity Flow/I-Trigger 3 L/min   P High 0 cm H2O   P Low 0 cm H2O   T High 0 sec   T Low 0 sec   Patient Ventilator Parameters   Resp Rate Total 24 br/min   Peak Airway Pressure 25 cmH2O   Mean Airway Pressure 13 cmH20   Plateau Pressure 0 cmH20   Exhaled Vt 849 mL   Total Ve 14.6 mL   Spont Ve 0 L   I:E Ratio Measured 1:4.10   Conventional Ventilator Alarms   Ve High Alarm 40 L/min   Resp Rate High Alarm 45 br/min   Press High Alarm 100 cmH2O   Apnea Rate 10   Apnea Volume (mL) 730 mL   Apnea Oxygen Concentration  100   Apnea Flow Rate (L/min) 87   T Apnea 20 sec(s)   Ready to Wean/Extubation Screen   FIO2<=50 (chart decimal) (!) 0.6   MV<16L (chart vol.) 14.6   PEEP <=8 (chart #) 5   Ready to Wean Parameters   F/VT Ratio<105 (RSBI) (!) 22.38

## 2017-08-18 NOTE — PLAN OF CARE
Problem: Nutrition, Enteral (Adult)  Goal: Signs and Symptoms of Listed Potential Problems Will be Absent, Minimized or Managed (Nutrition, Enteral)  Signs and symptoms of listed potential problems will be absent, minimized or managed by discharge/transition of care (reference Nutrition, Enteral (Adult) CPG).   Outcome: Ongoing (interventions implemented as appropriate)  Recommendations  Recommendation/Intervention: 1.) When appropriate, continue with Peptamen VHP @ goal rate 50 mls/hr continuous providing 1200 kcals/day, 110 g protein/day, 91 g CHO/day, and 1008 mls water/day. Hold TF x4 hrs for residuals >250mls. Flush 160 mls Q4 hrs to meet fluid needs or per MD.  Goals: 1.) Enteral nutrition will initiate within 72 hrs. 2.) Patient will tolerate TF at goal rate  Nutrition Goal Status: 1.) goal met 2.) new  Communication of JOURDAN Recs: reviewed with RN

## 2017-08-19 PROBLEM — Z93.0 S/P EMERGENCY TRACHEOTOMY FOR ASSISTANCE IN BREATHING: Status: ACTIVE | Noted: 2017-08-19

## 2017-08-19 LAB
ALBUMIN SERPL BCP-MCNC: 2.6 G/DL
ALLENS TEST: ABNORMAL
ALLENS TEST: ABNORMAL
ALP SERPL-CCNC: 87 U/L
ALT SERPL W/O P-5'-P-CCNC: 34 U/L
ANION GAP SERPL CALC-SCNC: 9 MMOL/L
AST SERPL-CCNC: 36 U/L
BACTERIA BLD CULT: NORMAL
BASOPHILS # BLD AUTO: 0.2 K/UL
BASOPHILS NFR BLD: 1.1 %
BILIRUB SERPL-MCNC: 0.5 MG/DL
BUN SERPL-MCNC: 11 MG/DL
CALCIUM SERPL-MCNC: 8.6 MG/DL
CHLORIDE SERPL-SCNC: 104 MMOL/L
CO2 SERPL-SCNC: 28 MMOL/L
CREAT SERPL-MCNC: 0.7 MG/DL
DELSYS: ABNORMAL
DELSYS: ABNORMAL
DIFFERENTIAL METHOD: ABNORMAL
EOSINOPHIL # BLD AUTO: 0.2 K/UL
EOSINOPHIL NFR BLD: 1 %
ERYTHROCYTE [DISTWIDTH] IN BLOOD BY AUTOMATED COUNT: 13.5 %
ERYTHROCYTE [SEDIMENTATION RATE] IN BLOOD BY WESTERGREN METHOD: 38 MM/H
EST. GFR  (AFRICAN AMERICAN): >60 ML/MIN/1.73 M^2
EST. GFR  (NON AFRICAN AMERICAN): >60 ML/MIN/1.73 M^2
FIO2: 50
FIO2: 50
FLOW: 10
GLUCOSE SERPL-MCNC: 90 MG/DL
HCO3 UR-SCNC: 27.2 MMOL/L (ref 24–28)
HCO3 UR-SCNC: 28 MMOL/L (ref 24–28)
HCT VFR BLD AUTO: 38.3 %
HGB BLD-MCNC: 12.6 G/DL
LYMPHOCYTES # BLD AUTO: 4.5 K/UL
LYMPHOCYTES NFR BLD: 31.1 %
MCH RBC QN AUTO: 30.4 PG
MCHC RBC AUTO-ENTMCNC: 33 G/DL
MCV RBC AUTO: 92 FL
MIN VOL: 14.6
MODE: ABNORMAL
MODE: ABNORMAL
MONOCYTES # BLD AUTO: 0.8 K/UL
MONOCYTES NFR BLD: 5.6 %
NEUTROPHILS # BLD AUTO: 8.8 K/UL
NEUTROPHILS NFR BLD: 61.2 %
PCO2 BLDA: 32.4 MMHG (ref 35–45)
PCO2 BLDA: 47 MMHG (ref 35–45)
PEEP: 5
PH SMN: 7.38 [PH] (ref 7.35–7.45)
PH SMN: 7.53 [PH] (ref 7.35–7.45)
PLATELET # BLD AUTO: 251 K/UL
PLATELET BLD QL SMEAR: ABNORMAL
PMV BLD AUTO: 8 FL
PO2 BLDA: 39 MMHG (ref 40–60)
PO2 BLDA: 73 MMHG (ref 80–100)
POC BE: 3 MMOL/L
POC BE: 4 MMOL/L
POC SATURATED O2: 72 % (ref 95–100)
POC SATURATED O2: 96 % (ref 95–100)
POC TCO2: 28 MMOL/L (ref 23–27)
POC TCO2: 29 MMOL/L (ref 24–29)
POCT GLUCOSE: 139 MG/DL (ref 70–110)
POCT GLUCOSE: 169 MG/DL (ref 70–110)
POCT GLUCOSE: 72 MG/DL (ref 70–110)
POCT GLUCOSE: 86 MG/DL (ref 70–110)
POTASSIUM SERPL-SCNC: 3.4 MMOL/L
PROT SERPL-MCNC: 6.2 G/DL
PS: 10
RBC # BLD AUTO: 4.16 M/UL
SAMPLE: ABNORMAL
SAMPLE: ABNORMAL
SITE: ABNORMAL
SITE: ABNORMAL
SODIUM SERPL-SCNC: 141 MMOL/L
SP02: 92
SP02: 98
SPONT RATE: 19
VANCOMYCIN TROUGH SERPL-MCNC: 19.7 UG/ML
WBC # BLD AUTO: 14.5 K/UL

## 2017-08-19 PROCEDURE — G8997 SWALLOW GOAL STATUS: HCPCS | Mod: CI

## 2017-08-19 PROCEDURE — 25000003 PHARM REV CODE 250: Performed by: INTERNAL MEDICINE

## 2017-08-19 PROCEDURE — 80053 COMPREHEN METABOLIC PANEL: CPT

## 2017-08-19 PROCEDURE — C9113 INJ PANTOPRAZOLE SODIUM, VIA: HCPCS | Performed by: SURGERY

## 2017-08-19 PROCEDURE — 63600175 PHARM REV CODE 636 W HCPCS: Performed by: NURSE PRACTITIONER

## 2017-08-19 PROCEDURE — 27200966 HC CLOSED SUCTION SYSTEM

## 2017-08-19 PROCEDURE — 85025 COMPLETE CBC W/AUTO DIFF WBC: CPT

## 2017-08-19 PROCEDURE — 82803 BLOOD GASES ANY COMBINATION: CPT

## 2017-08-19 PROCEDURE — 63600175 PHARM REV CODE 636 W HCPCS: Performed by: PHYSICIAN ASSISTANT

## 2017-08-19 PROCEDURE — G8996 SWALLOW CURRENT STATUS: HCPCS | Mod: CM

## 2017-08-19 PROCEDURE — 63600175 PHARM REV CODE 636 W HCPCS: Performed by: INTERNAL MEDICINE

## 2017-08-19 PROCEDURE — 94003 VENT MGMT INPAT SUBQ DAY: CPT

## 2017-08-19 PROCEDURE — 99900035 HC TECH TIME PER 15 MIN (STAT)

## 2017-08-19 PROCEDURE — 36600 WITHDRAWAL OF ARTERIAL BLOOD: CPT

## 2017-08-19 PROCEDURE — 25000242 PHARM REV CODE 250 ALT 637 W/ HCPCS: Performed by: NURSE PRACTITIONER

## 2017-08-19 PROCEDURE — 94640 AIRWAY INHALATION TREATMENT: CPT

## 2017-08-19 PROCEDURE — 94761 N-INVAS EAR/PLS OXIMETRY MLT: CPT

## 2017-08-19 PROCEDURE — 20000000 HC ICU ROOM

## 2017-08-19 PROCEDURE — 99900026 HC AIRWAY MAINTENANCE (STAT)

## 2017-08-19 PROCEDURE — 99232 SBSQ HOSP IP/OBS MODERATE 35: CPT | Mod: ,,, | Performed by: INTERNAL MEDICINE

## 2017-08-19 PROCEDURE — 92610 EVALUATE SWALLOWING FUNCTION: CPT

## 2017-08-19 PROCEDURE — 63600175 PHARM REV CODE 636 W HCPCS: Performed by: SURGERY

## 2017-08-19 PROCEDURE — 25000003 PHARM REV CODE 250: Performed by: NURSE PRACTITIONER

## 2017-08-19 PROCEDURE — 80202 ASSAY OF VANCOMYCIN: CPT

## 2017-08-19 PROCEDURE — 27000221 HC OXYGEN, UP TO 24 HOURS

## 2017-08-19 PROCEDURE — 94770 HC EXHALED C02 TEST: CPT

## 2017-08-19 RX ORDER — INSULIN ASPART 100 [IU]/ML
11 INJECTION, SOLUTION INTRAVENOUS; SUBCUTANEOUS
Status: DISCONTINUED | OUTPATIENT
Start: 2017-08-19 | End: 2017-08-22 | Stop reason: HOSPADM

## 2017-08-19 RX ADMIN — DIPHENHYDRAMINE HYDROCHLORIDE 25 MG: 50 INJECTION, SOLUTION INTRAMUSCULAR; INTRAVENOUS at 09:08

## 2017-08-19 RX ADMIN — NYSTATIN 500000 UNITS: 100000 SUSPENSION ORAL at 12:08

## 2017-08-19 RX ADMIN — ESCITALOPRAM 20 MG: 10 TABLET, FILM COATED ORAL at 10:08

## 2017-08-19 RX ADMIN — LORAZEPAM 0.5 MG: 2 INJECTION INTRAMUSCULAR; INTRAVENOUS at 07:08

## 2017-08-19 RX ADMIN — NYSTATIN 500000 UNITS: 100000 SUSPENSION ORAL at 05:08

## 2017-08-19 RX ADMIN — HYDROMORPHONE HYDROCHLORIDE 0.5 MG: 2 INJECTION, SOLUTION INTRAMUSCULAR; INTRAVENOUS; SUBCUTANEOUS at 04:08

## 2017-08-19 RX ADMIN — VANCOMYCIN HYDROCHLORIDE 1750 MG: 5 INJECTION, POWDER, LYOPHILIZED, FOR SOLUTION INTRAVENOUS at 05:08

## 2017-08-19 RX ADMIN — INSULIN DETEMIR 40 UNITS: 100 INJECTION, SOLUTION SUBCUTANEOUS at 10:08

## 2017-08-19 RX ADMIN — CEFTRIAXONE 1 G: 1 INJECTION, SOLUTION INTRAVENOUS at 03:08

## 2017-08-19 RX ADMIN — IPRATROPIUM BROMIDE AND ALBUTEROL SULFATE 3 ML: .5; 3 SOLUTION RESPIRATORY (INHALATION) at 12:08

## 2017-08-19 RX ADMIN — HYDROMORPHONE HYDROCHLORIDE 0.5 MG: 2 INJECTION, SOLUTION INTRAMUSCULAR; INTRAVENOUS; SUBCUTANEOUS at 12:08

## 2017-08-19 RX ADMIN — PREDNISONE 10 MG: 5 SOLUTION ORAL at 10:08

## 2017-08-19 RX ADMIN — IPRATROPIUM BROMIDE AND ALBUTEROL SULFATE 3 ML: .5; 3 SOLUTION RESPIRATORY (INHALATION) at 07:08

## 2017-08-19 RX ADMIN — HYDROMORPHONE HYDROCHLORIDE 0.5 MG: 2 INJECTION, SOLUTION INTRAMUSCULAR; INTRAVENOUS; SUBCUTANEOUS at 08:08

## 2017-08-19 RX ADMIN — LORAZEPAM 0.5 MG: 2 INJECTION INTRAMUSCULAR; INTRAVENOUS at 12:08

## 2017-08-19 RX ADMIN — ENOXAPARIN SODIUM 110 MG: 150 INJECTION SUBCUTANEOUS at 05:08

## 2017-08-19 RX ADMIN — HYDROMORPHONE HYDROCHLORIDE 0.5 MG: 2 INJECTION, SOLUTION INTRAMUSCULAR; INTRAVENOUS; SUBCUTANEOUS at 05:08

## 2017-08-19 RX ADMIN — HYDROMORPHONE HYDROCHLORIDE 0.5 MG: 2 INJECTION, SOLUTION INTRAMUSCULAR; INTRAVENOUS; SUBCUTANEOUS at 09:08

## 2017-08-19 RX ADMIN — SODIUM CHLORIDE: 0.9 INJECTION, SOLUTION INTRAVENOUS at 10:08

## 2017-08-19 RX ADMIN — BUPROPION HYDROCHLORIDE 150 MG: 150 TABLET, FILM COATED, EXTENDED RELEASE ORAL at 10:08

## 2017-08-19 RX ADMIN — POTASSIUM CHLORIDE 20 MEQ: 20 SOLUTION ORAL at 10:08

## 2017-08-19 RX ADMIN — DIPHENHYDRAMINE HYDROCHLORIDE 25 MG: 50 INJECTION, SOLUTION INTRAMUSCULAR; INTRAVENOUS at 05:08

## 2017-08-19 RX ADMIN — MIRTAZAPINE 15 MG: 15 TABLET, FILM COATED ORAL at 10:08

## 2017-08-19 RX ADMIN — DIPHENHYDRAMINE HYDROCHLORIDE 25 MG: 50 INJECTION, SOLUTION INTRAMUSCULAR; INTRAVENOUS at 02:08

## 2017-08-19 RX ADMIN — PANCRELIPASE 6 CAPSULE: 24000; 76000; 120000 CAPSULE, DELAYED RELEASE PELLETS ORAL at 10:08

## 2017-08-19 RX ADMIN — IPRATROPIUM BROMIDE AND ALBUTEROL SULFATE 3 ML: .5; 3 SOLUTION RESPIRATORY (INHALATION) at 01:08

## 2017-08-19 RX ADMIN — TRAZODONE HYDROCHLORIDE 150 MG: 50 TABLET ORAL at 10:08

## 2017-08-19 RX ADMIN — PANTOPRAZOLE SODIUM 40 MG: 40 INJECTION, POWDER, FOR SOLUTION INTRAVENOUS at 05:08

## 2017-08-19 RX ADMIN — PRAZOSIN HYDROCHLORIDE 1 MG: 1 CAPSULE ORAL at 10:08

## 2017-08-19 RX ADMIN — VANCOMYCIN HYDROCHLORIDE 1750 MG: 5 INJECTION, POWDER, LYOPHILIZED, FOR SOLUTION INTRAVENOUS at 04:08

## 2017-08-19 RX ADMIN — GABAPENTIN 200 MG: 100 CAPSULE ORAL at 10:08

## 2017-08-19 NOTE — PLAN OF CARE
08/18/17 1935   Patient Assessment/Suction   Level of Consciousness (AVPU) alert   Respiratory Effort Normal   Expansion/Accessory Muscles/Retractions expansion symmetric   All Lung Fields Breath Sounds coarse   Cough Frequency frequent   Cough Type good;loose   Suction Method in-line suction catheter (closed)   $ Suction Charges Inline Suction Procedure Stat Charge   Sputum Amount small   Sputum Color yellow   Sputum Consistency thick   PRE-TX-O2-ETCO2   O2 Device (Oxygen Therapy) ventilator   $ Is the patient on Oxygen? Yes   Oxygen Concentration (%) 60   SpO2 100 %   Pulse Oximetry Type Continuous   $ Pulse Oximetry - Multiple Charge Pulse Oximetry - Multiple   ETCO2 (mmHg) 32 mmHg   $ ETCO2 Charge Exhaled CO2 Monitoring   $ ETCO2 Usage Currently wearing   Pulse 81   Resp 19   BP (!) 155/74   Aerosol Therapy   $ Aerosol Therapy Charges Aerosol Treatment   Respiratory Treatment Status given   SVN/Inhaler Treatment Route in-line   Position During Treatment HOB at 45 degrees   Patient Tolerance good   Post-Treatment   Post-treatment Heart Rate (beats/min) 84   Post-treatment Resp Rate (breaths/min) 18   All Fields Breath Sounds coarse   Vent Select   Conventional Vent Y   Preset Conventional Ventilator Settings   Vent Type    Ventilation Type VC   Vent Mode Spont   Set Rate 0 bmp   Vt Set 600 mL   PEEP/CPAP 5 cmH20   Pressure Support 10 cmH20   Waveform RAMP   Peak Flow 82 L/min   Set Inspiratory Pressure 0 cmH20   Insp Time 0 Sec(s)   Plateau Set/Insp. Hold (sec) 0   Insp Rise Time  50 %   Trigger Sensitivity Flow/I-Trigger 3 L/min   P High 0 cm H2O   P Low 0 cm H2O   T High 0 sec   T Low 0 sec   Patient Ventilator Parameters   Resp Rate Total 14 br/min   Peak Airway Pressure 16 cmH2O   Mean Airway Pressure 9.3 cmH20   Plateau Pressure 0 cmH20   Exhaled Vt 794 mL   Total Ve 15 mL   Spont Ve 15 L   I:E Ratio Measured 1.20:1   Conventional Ventilator Alarms   Ve High Alarm 40 L/min   Resp Rate High Alarm 45  br/min   Press High Alarm 100 cmH2O   Apnea Rate 10   Apnea Volume (mL) 730 mL   Apnea Oxygen Concentration  100   Apnea Flow Rate (L/min) 87   T Apnea 20 sec(s)   Ready to Wean/Extubation Screen   FIO2<=50 (chart decimal) (!) 0.6   MV<16L (chart vol.) 15   PEEP <=8 (chart #) 5   Ready to Wean Parameters   F/VT Ratio<105 (RSBI) (!) 23.93   Labs   $ Was an ABG obtained? Arterial Puncture;ISTAT - Blood gas

## 2017-08-19 NOTE — PROGRESS NOTES
Progress Note  PULMONARY    Admit Date: 8/11/2017 08/19/2017      SUBJECTIVE:     Aug 14, pt presented with sore throat and sob x 1 day on 8/11, intubated 8/12 with airway difficulties, extubated and intubated 8/13 with stridor again.  Pt had ct neck early in course.  Now sedate on vent.  Aug 15, sedated, discussed with wife   Aug 16, sedate, discussed with wife.  Aug 17, trach  Aug 18, post trach , awake, tolerates cpap  Aug 19, did well with wean, no c/o.       PFSH and Allergies reviewed.  Unable to do ros due to sedation and intubation.  OBJECTIVE:     Vitals (Most recent):  Vitals:    08/19/17 1249   BP:    Pulse: 75   Resp: 14   Temp:        Vitals (24 hour range):  Temp:  [98.6 °F (37 °C)-100.4 °F (38 °C)]   Pulse:  [75-96]   Resp:  [0-35]   BP: (124-176)/()   SpO2:  [92 %-100 %]       Intake/Output Summary (Last 24 hours) at 08/19/17 1416  Last data filed at 08/19/17 0600   Gross per 24 hour   Intake          4997.15 ml   Output             5225 ml   Net          -227.85 ml          Physical Exam:  The patient's neuro status (alertness,orientation,cognitive function,motor skills,), pharyngeal exam (oral lesions, hygiene, abn dentition,), Neck (jvd,mass,thyroid,nodes in neck and above/below clavicle),RESPIRATORY(symmetry,effort,fremitus,percussion,auscultation),  Cor(rhythm,heart tones including gallops,perfusion,edema)ABD(distention,hepatic&splenomegaly,tenderness,masses), Skin(rash,cyanosis),Psyc(affect,judgement,).  Exam negative except for these pertinent findings:    Trach in place  Good bs, no edema, very tender legs from neuropathy?    Radiographs reviewed: view by direct vision  , cxr with right atelectasis and more opacified 8/15 (no new) right lower lung.lower lung pneumonia    Results for orders placed during the hospital encounter of 08/11/17   X-Ray Chest 1 View    Narrative AP chest compared to 08/13/2017    Findings: The endotracheal tube and nasogastric tube remain appropriately  positioned.  A spinal stimulator device is again noted.  The heart size is upper normal.  There are persistent perihilar and bibasilar infiltrates as well as a small left pleural effusion.  An increase in consolidative component is seen at the right lung base.  There is no pneumothorax.    Impression Appropriate positioning of support devices.  Persistent perihilar and bibasilar infiltrates, with increase in consolidative component on the right.  Stable small left pleural effusion.      Electronically signed by: Ishmael Flood MD  Date:     08/14/17  Time:    07:39    ]    Labs       Recent Labs  Lab 08/19/17  0419   WBC 14.50*   HGB 12.6*   HCT 38.3*          Recent Labs  Lab 08/19/17  0419      K 3.4*      CO2 28   BUN 11   CREATININE 0.7   GLU 90   CALCIUM 8.6*   AST 36   ALT 34   ALKPHOS 87   BILITOT 0.5   PROT 6.2   ALBUMIN 2.6*       Recent Labs  Lab 08/19/17  1223   PH 7.383   PCO2 47.0*   PO2 39*   HCO3 28.0     Microbiology Results (last 7 days)     Procedure Component Value Units Date/Time    Blood culture [[id number]] Collected:  08/14/17 1448    Order Status:  Completed Specimen:  Blood from Peripheral, Right  Hand Updated:  08/18/17 2312     Blood Culture, Routine No Growth to date     Blood Culture, Routine No Growth to date     Blood Culture, Routine No Growth to date     Blood Culture, Routine No Growth to date     Blood Culture, Routine No Growth to date    Blood Culture #1 [[id number]] Collected:  08/11/17 0734    Order Status:  Completed Specimen:  Blood from Antecubital, Right  Arm Updated:  08/16/17 1412     Blood Culture, Routine No growth after 5 days.    Blood Culture #2 [[id number]] Collected:  08/11/17 0733    Order Status:  Completed Specimen:  Blood Updated:  08/16/17 1412     Blood Culture, Routine No growth after 5 days.    Culture, Respiratory with Gram Stain [[id number]]  (Susceptibility) Collected:  08/12/17 1340    Order Status:  Completed Specimen:  Respiratory  from Tracheal Aspirate Updated:  08/15/17 1414     Respiratory Culture --     METHICILLIN RESISTANT STAPHYLOCOCCUS AUREUS  Many       Gram Stain (Respiratory) <10 epithelial cells per low power field.     Gram Stain (Respiratory) Rare WBC's     Gram Stain (Respiratory) Moderate Gram positive cocci          Impression:  Active Hospital Problems    Diagnosis  POA    *COPD exacerbation [J44.1]  Yes    Acute respiratory failure with hypoxia and hypercarbia [J96.01, J96.02]  No    Staphylococcal pneumonia [J15.20]  Yes    Acute respiratory failure [J96.00]  Yes    Type 2 diabetes mellitus without complication [E11.9]  Yes    Benign essential HTN [I10]  Yes    JOSE ALBERTO (obstructive sleep apnea) [G47.33]  Yes    Depression [F32.9]  Yes    CAD (coronary artery disease) [I25.10]  Yes    DVT (deep venous thrombosis)- Hx IVC filter [I82.409]  Yes      Resolved Hospital Problems    Diagnosis Date Resolved POA   No resolved problems to display.               Plan:     Aug 14, will  need trach, add valium to facilitate sedation, with staph pneumonia - steroids need to be minimal.  Will dose vanc til sensitivity out.  Start tube feed, stop xarelto and use lovenox.    Tried to call wife - no answer.      The following were evaluated and adjusted as needed: mechanical ventilator settings and weaning status, intravenous fluids and nutritional status, sedation and neurologic status, antibiotics and acid base balance and oxygenation needs       Critical Care  - THE PATIENT HAS A HIGH PROBABILITY OF IMMINENT OR LIFE THREATENING DETERIORATION.  Over 50%time of encounter was in direct care at bedside.  Time was 30 to 74 minutes required for patient care.  Time needed for all of the above totaled 33 minutes.    Aug 15, staph id na still, still risk of bleed from xarelto, will ask ent to see to figure plan.  Need to keep steroids low with staph pneumonia.  Expect trach best management option for airway stridor that required 2  intubations and cannot be dosed steroids (high dose).    Wil on propofol?  On coreg?  Observe- stop coreg if hr<45    Aug 16, stagnant, got xarelto 8/14, needs general surg to do trach.  Likely will wean quickly. Daily  Leak test reasonable but high risk airway- no steroids high dose with mrsa.    Aug 18- trach - now wean prn, mobilize speech eval , cont abx.    Aug 19, seems encephalopathic, resp status would improve quicker with mobilization.                      .

## 2017-08-19 NOTE — PLAN OF CARE
Problem: Patient Care Overview  Goal: Plan of Care Review  Tolerated CPAP mode per vent all night. Swallow test to be done today. Soft wrist restraints bilaterally to keep from pulling at fresh trach placed yesterday. Great urine output.

## 2017-08-19 NOTE — CONSULTS
Kevan Colin [id number] is a 62 y.o. male who has been consulted for vancomycin dosing.    The patient has the following labs:     Date Creatinine (mg/dl)    BUN WBC Count   8/19/2017 Estimated Creatinine Clearance: 145.6 mL/min (based on Cr of 0.7). Lab Results   Component Value Date    BUN 11 08/19/2017     Lab Results   Component Value Date    WBC 14.50 (H) 08/19/2017        Current weight is 114.9 kg (253 lb 4.9 oz)    Pt is receiving vancomycin 1750 mg every 12 hours.  Vancomycin trough from 8/19 at 0419 was 19.7 mg/dL.  Target trough range is 15-20 mg/dL.   Trough was drawn on time and anticipate it is therapeutic. Pharmacy will continue current dose.   A vancomycin trough has been ordered for 8/20 at 1630.      Patient will be followed by pharmacy for changes in renal function, toxicity, and efficacy.  Thank you for allowing us to participate in this patient's care.     Kate Dougherty

## 2017-08-19 NOTE — PLAN OF CARE
Problem: Patient Care Overview  Goal: Plan of Care Review  Outcome: Ongoing (interventions implemented as appropriate)  Care plan reviewed.  Patient remains in restraints and monitored per protocol.\  Safety maintained.   Patient now has tracheostomy on ventilator/CPAP mode. Tolerating well.  Accu checks done and covered with insulin sliding scale as needed. Patient is NPO.

## 2017-08-19 NOTE — OP NOTE
DATE OF PROCEDURE:  08/18/2017.    PROCEDURE:  Tracheostomy.    PREOPERATIVE DIAGNOSIS:  Respiratory failure.    POSTOPERATIVE DIAGNOSIS:  Respiratory failure.    SURGEON:  Ishmael Acosta Jr., M.D.    ASSISTANT:  None.    SPECIMEN:  None.    PROCEDURE IN DETAIL:  After appropriate consent was obtained, the patient was   taken from the ICU to the Operating Room on the ventilator.  Time-out procedure   was performed.  The patient was on preoperative antibiotics.  The neck was   positioned and then prepped and draped in the usual sterile fashion.  Midline   incision was made above the sternal notch and dissection was performed down in   the midline until the trachea was identified.  I had to incise into the inferior   edge of the thyroid and this was easily controlled with electrocautery.  A   small vein was controlled with clamps and ties.  The cricoid was identified.    Stay sutures of 2-0 Prolene were placed on the tracheal rings and then once that   was done, a tracheotomy was performed and dilated.  The endotracheal tube was   pulled back to the site and then #8 Shiley trach was then inserted.  The   tracheal rings were very calcified and quite sharp and this punctured the   balloon at #8 Shiley.  We removed this and replaced this with a #6 Shiley and   ventilated the patient.  There was good return of CO2 and he was adequately   ventilated.  When that was completed, the flange of the tracheostomy tube was   secured to the skin with 2-0 silk suture and an antibiotic ointment was placed   in the skin incision.  The inferior aspect of the incision was reapproximated   with a 3-0 chromic suture.  The patient was transferred back to the Intensive   Care Unit in stable condition.  He tolerated the procedure without complication.    Blood loss was approximately 15 mL.  Counts were correct at the end of the   procedure.      RTL/HN  dd: 08/18/2017 14:23:15 (CDT)  td: 08/18/2017 19:36:50 (CDT)  Doc ID   #[id number]  Job ID  #[id number]    CC:

## 2017-08-19 NOTE — PROGRESS NOTES
"Progress Note  [facility] [place] Medicine    Admit Date: 8/11/2017    SUBJECTIVE:     Follow-up For:  Acute respiratory failure with hypoxia and hypercarbia      Interval history (See H&P for complete P,F,SHx) : Tracheostomy is placed yesterday and the patient is currently postoperative day #1.  Continues to have significant pain associated with his trach.  Also continues to be on ventilatory support at the current time.  I discussed POC with patient's significant other and her son. Attempted weaning trial unsuccessfully. Discussed with Dr. Cordero. Failed Swallow study- had NG tube placed.    Review of Systems:  Unable to determine- patient nonverbal      OBJECTIVE:     Vital Signs Range (Last 24H):  Temp:  [98.3 °F (36.8 °C)-100.4 °F (38 °C)]   Pulse:  [64-94]   Resp:  [10-35]   BP: (116-211)/(58-85)   SpO2:  [82 %-100 %]     I & O (Last 24H):    Intake/Output Summary (Last 24 hours) at 08/19/17 2335  Last data filed at 08/19/17 1800   Gross per 24 hour   Intake             2300 ml   Output             4325 ml   Net            -2025 ml       Estimated body mass index is 32.04 kg/m² as calculated from the following:    Height as of this encounter: 6' 2" (1.88 m).    Weight as of this encounter: 113.2 kg (249 lb 9 oz).    Physical Exam:  General- Patient is nonverbal secondary to the trach and ventilation but is completely responsive to commands nonverbally.  HEENT- PERRLA, EOMI, OP clear, He does have a tracheostomy noted within the midline trachea with minimal exudate around the trach site  Neck- No JVD, Lymphadenopathy, Thyromegaly  CV- Regular rate and rhythm, No Murmur/vasiliy/rubs  Resp- Lungs have diminished breath sounds bilaterally with mildly increased work of breathing noted.   GI- Non tender/non-distended, BS normoactive x4 quads, no HSM  Extrem- Patient does appear to have swelling noted on his left dorsum of his hand associated with erythema and redness as well as significant pain on " palpation.  Neuro- Strength 5/5 flexors/extensors, DTRs 2+ and symmetric, Intact sensation to light touch grossly  Skin-  No rashes, masses or lesions noted    Laboratory/Diagnostic Data:  Reviewed and noted in plan where applicable- Please see chart for full lab data.    Medications:  Medication list was reviewed and changes noted under Assessment/Plan.    ASSESSMENT/PLAN:     Active Problems:    Active Hospital Problems    Diagnosis  POA    *Acute respiratory failure with hypoxia and hypercarbia [J96.01, J96.02]- Patient is now status post trach placement with secondary subglottic swelling as determined on previous imaging studies.  He is done well after trach.  Will attempt to wean the patient off of his ventilatory support by doing a T piece trial today.  The patient does well be able to place him back on T piece with perhaps some ventilatory support at night.  We will continue the patient on vancomycin for 10 days of treatment for MRSA noted within the sputum. Pulmonary consulted- defer to them for further treatment.  No    S/P emergency tracheotomy for assistance in breathing [Z93.0]-  Defer to ENT for trach post-op care.  Not Applicable    Staphylococcal pneumonia [J15.20]-  IV vancomycin as per above.  Yes    Type 2 diabetes mellitus without complication [E11.9]-   Patient's FSGs are not controlled on current hypoglycemics.   Last A1c reviewed- Lab Results   Component Value Date    HGBA1C 11.9 (H) 08/11/2017     Most recent fingerstick glucose reviewed-   Recent Labs  Lab 08/19/17  2241   POCTGLUCOSE 139*     Current correctional scale  Low  Decrease insulin dose as follows- Change detemir to 40 units QHS and increase if tolerated meals.  Yes    Benign essential HTN [I10]- Chronic, uncontrolled.  Monitor BP closely.  Will continue BP medications as needed for sustained BP control. Crush and place down NGT. PRN labetolol.  Yes    JOSE ALBERTO (obstructive sleep apnea) [G47.33]-  S/p trach. Place on backup vent  rate at night.  Yes    Depression [F32.9]- Chronic problem, controlled. Will continue chronic medication(s), Trazodone/remeron and monitor for any changes, adjusting as needed.   Yes    CAD (coronary artery disease) [I25.10]- Patient with known CAD s/p stent placement, which is controlled Will continue lovenox and monitor for S/Sx of angina/ACS. Continue to monitor on telemetry.     Yes    DVT (deep venous thrombosis)- Hx IVC filter [I82.409] -  Continue lovenox and switch back to xarelto if able to tolerate PO.  Yes      Resolved Hospital Problems    Diagnosis Date Resolved POA   No resolved problems to display.         Disposition- Unsure    VTE Risk Mitigation     None      Lovenox

## 2017-08-19 NOTE — PROGRESS NOTES
08/19/17 0726   Patient Assessment/Suction   All Lung Fields Breath Sounds diminished   $ Suction Charges Inline Suction Procedure Stat Charge   Sputum Amount small   Sputum Color red-streaked   Sputum Consistency thick   PRE-TX-O2-ETCO2   O2 Device (Oxygen Therapy) ventilator   $ Is the patient on Oxygen? Yes   Oxygen Concentration (%) 50   SpO2 100 %   Pulse Oximetry Type Continuous   $ Pulse Oximetry - Multiple Charge Pulse Oximetry - Multiple   ETCO2 (mmHg) 29 mmHg   Pulse 83   Resp (!) 23   Aerosol Therapy   $ Aerosol Therapy Charges Aerosol Treatment   Respiratory Treatment Status given   SVN/Inhaler Treatment Route in-line   Position During Treatment HOB at 30 degrees   Patient Tolerance good   Post-Treatment   Post-treatment Heart Rate (beats/min) 80   Post-treatment Resp Rate (breaths/min) 11   All Fields Breath Sounds diminished       Surgical Airway 08/18/17 1500   Placement Date/Time: 08/18/17 1500   Present Prior to Hospital Arrival?: No  Inserted by: MD  Airway Device Size: 6.0   Cuff Pressure 32 cm H2O   Status Secured   Site Assessment Clean;Drainage   Ready to Wean/Extubation Screen   FIO2<=50 (chart decimal) 0.5
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
## 2017-08-20 LAB
ALBUMIN SERPL BCP-MCNC: 2.4 G/DL
ALLENS TEST: ABNORMAL
ALP SERPL-CCNC: 265 U/L
ALT SERPL W/O P-5'-P-CCNC: 319 U/L
ANION GAP SERPL CALC-SCNC: 7 MMOL/L
AST SERPL-CCNC: 215 U/L
BASOPHILS # BLD AUTO: 0 K/UL
BASOPHILS NFR BLD: 0.3 %
BILIRUB SERPL-MCNC: 2.1 MG/DL
BUN SERPL-MCNC: 11 MG/DL
CALCIUM SERPL-MCNC: 8.6 MG/DL
CHLORIDE SERPL-SCNC: 105 MMOL/L
CO2 SERPL-SCNC: 27 MMOL/L
CREAT SERPL-MCNC: 0.7 MG/DL
DELSYS: ABNORMAL
DIFFERENTIAL METHOD: ABNORMAL
EOSINOPHIL # BLD AUTO: 0.1 K/UL
EOSINOPHIL NFR BLD: 1.5 %
ERYTHROCYTE [DISTWIDTH] IN BLOOD BY AUTOMATED COUNT: 13.5 %
EST. GFR  (AFRICAN AMERICAN): >60 ML/MIN/1.73 M^2
EST. GFR  (NON AFRICAN AMERICAN): >60 ML/MIN/1.73 M^2
FIO2: 50
GLUCOSE SERPL-MCNC: 146 MG/DL
HCO3 UR-SCNC: 27.3 MMOL/L (ref 24–28)
HCT VFR BLD AUTO: 35.5 %
HGB BLD-MCNC: 11.7 G/DL
LYMPHOCYTES # BLD AUTO: 1.2 K/UL
LYMPHOCYTES NFR BLD: 13.5 %
MCH RBC QN AUTO: 30.6 PG
MCHC RBC AUTO-ENTMCNC: 33 G/DL
MCV RBC AUTO: 93 FL
MIN VOL: 12
MODE: ABNORMAL
MONOCYTES # BLD AUTO: 0.8 K/UL
MONOCYTES NFR BLD: 8.3 %
NEUTROPHILS # BLD AUTO: 6.9 K/UL
NEUTROPHILS NFR BLD: 76.4 %
PCO2 BLDA: 43.4 MMHG (ref 35–45)
PEEP: 5
PH SMN: 7.41 [PH] (ref 7.35–7.45)
PLATELET # BLD AUTO: 194 K/UL
PMV BLD AUTO: 8.3 FL
PO2 BLDA: 94 MMHG (ref 80–100)
POC BE: 3 MMOL/L
POC SATURATED O2: 97 % (ref 95–100)
POC TCO2: 29 MMOL/L (ref 23–27)
POCT GLUCOSE: 101 MG/DL (ref 70–110)
POCT GLUCOSE: 113 MG/DL (ref 70–110)
POCT GLUCOSE: 146 MG/DL (ref 70–110)
POCT GLUCOSE: 169 MG/DL (ref 70–110)
POCT GLUCOSE: 97 MG/DL (ref 70–110)
POTASSIUM SERPL-SCNC: 3.9 MMOL/L
PROT SERPL-MCNC: 6.1 G/DL
PS: 10
RBC # BLD AUTO: 3.83 M/UL
SAMPLE: ABNORMAL
SITE: ABNORMAL
SODIUM SERPL-SCNC: 139 MMOL/L
SP02: 95
SPONT RATE: 24
VANCOMYCIN TROUGH SERPL-MCNC: 13.3 UG/ML
WBC # BLD AUTO: 9.1 K/UL

## 2017-08-20 PROCEDURE — 99232 SBSQ HOSP IP/OBS MODERATE 35: CPT | Mod: ,,, | Performed by: INTERNAL MEDICINE

## 2017-08-20 PROCEDURE — C9113 INJ PANTOPRAZOLE SODIUM, VIA: HCPCS | Performed by: SURGERY

## 2017-08-20 PROCEDURE — 63600175 PHARM REV CODE 636 W HCPCS: Performed by: NURSE PRACTITIONER

## 2017-08-20 PROCEDURE — 82803 BLOOD GASES ANY COMBINATION: CPT

## 2017-08-20 PROCEDURE — 97161 PT EVAL LOW COMPLEX 20 MIN: CPT

## 2017-08-20 PROCEDURE — 36600 WITHDRAWAL OF ARTERIAL BLOOD: CPT

## 2017-08-20 PROCEDURE — 99900035 HC TECH TIME PER 15 MIN (STAT)

## 2017-08-20 PROCEDURE — 63600175 PHARM REV CODE 636 W HCPCS: Performed by: PHYSICIAN ASSISTANT

## 2017-08-20 PROCEDURE — 94760 N-INVAS EAR/PLS OXIMETRY 1: CPT

## 2017-08-20 PROCEDURE — 25000003 PHARM REV CODE 250: Performed by: HOSPITALIST

## 2017-08-20 PROCEDURE — 94770 HC EXHALED C02 TEST: CPT

## 2017-08-20 PROCEDURE — 80053 COMPREHEN METABOLIC PANEL: CPT

## 2017-08-20 PROCEDURE — 63600175 PHARM REV CODE 636 W HCPCS: Performed by: INTERNAL MEDICINE

## 2017-08-20 PROCEDURE — 85025 COMPLETE CBC W/AUTO DIFF WBC: CPT

## 2017-08-20 PROCEDURE — 99900026 HC AIRWAY MAINTENANCE (STAT)

## 2017-08-20 PROCEDURE — 80202 ASSAY OF VANCOMYCIN: CPT

## 2017-08-20 PROCEDURE — 25000003 PHARM REV CODE 250: Performed by: NURSE PRACTITIONER

## 2017-08-20 PROCEDURE — 27000221 HC OXYGEN, UP TO 24 HOURS

## 2017-08-20 PROCEDURE — 27200966 HC CLOSED SUCTION SYSTEM

## 2017-08-20 PROCEDURE — 25000242 PHARM REV CODE 250 ALT 637 W/ HCPCS: Performed by: NURSE PRACTITIONER

## 2017-08-20 PROCEDURE — 20000000 HC ICU ROOM

## 2017-08-20 PROCEDURE — 63600175 PHARM REV CODE 636 W HCPCS: Performed by: HOSPITALIST

## 2017-08-20 PROCEDURE — 63600175 PHARM REV CODE 636 W HCPCS: Performed by: SURGERY

## 2017-08-20 PROCEDURE — 94640 AIRWAY INHALATION TREATMENT: CPT

## 2017-08-20 PROCEDURE — 36415 COLL VENOUS BLD VENIPUNCTURE: CPT

## 2017-08-20 PROCEDURE — 94761 N-INVAS EAR/PLS OXIMETRY MLT: CPT

## 2017-08-20 PROCEDURE — 94003 VENT MGMT INPAT SUBQ DAY: CPT

## 2017-08-20 PROCEDURE — 25000003 PHARM REV CODE 250: Performed by: INTERNAL MEDICINE

## 2017-08-20 RX ORDER — OLANZAPINE 5 MG/1
10 TABLET, ORALLY DISINTEGRATING ORAL DAILY
Status: DISCONTINUED | OUTPATIENT
Start: 2017-08-20 | End: 2017-08-20

## 2017-08-20 RX ORDER — ACETAMINOPHEN 10 MG/ML
1000 INJECTION, SOLUTION INTRAVENOUS EVERY 6 HOURS
Status: DISCONTINUED | OUTPATIENT
Start: 2017-08-20 | End: 2017-08-20

## 2017-08-20 RX ORDER — KETOROLAC TROMETHAMINE 30 MG/ML
15 INJECTION, SOLUTION INTRAMUSCULAR; INTRAVENOUS EVERY 6 HOURS PRN
Status: DISCONTINUED | OUTPATIENT
Start: 2017-08-20 | End: 2017-08-22 | Stop reason: HOSPADM

## 2017-08-20 RX ORDER — QUETIAPINE FUMARATE 100 MG/1
200 TABLET, FILM COATED ORAL DAILY PRN
Status: DISCONTINUED | OUTPATIENT
Start: 2017-08-20 | End: 2017-08-22 | Stop reason: HOSPADM

## 2017-08-20 RX ORDER — QUETIAPINE FUMARATE 25 MG/1
50 TABLET, FILM COATED ORAL DAILY PRN
Status: DISCONTINUED | OUTPATIENT
Start: 2017-08-20 | End: 2017-08-22 | Stop reason: HOSPADM

## 2017-08-20 RX ORDER — OLANZAPINE 5 MG/1
15 TABLET, ORALLY DISINTEGRATING ORAL DAILY
Status: DISCONTINUED | OUTPATIENT
Start: 2017-08-20 | End: 2017-08-22 | Stop reason: HOSPADM

## 2017-08-20 RX ADMIN — HYDROMORPHONE HYDROCHLORIDE 0.5 MG: 2 INJECTION, SOLUTION INTRAMUSCULAR; INTRAVENOUS; SUBCUTANEOUS at 07:08

## 2017-08-20 RX ADMIN — ESCITALOPRAM 20 MG: 10 TABLET, FILM COATED ORAL at 08:08

## 2017-08-20 RX ADMIN — POTASSIUM CHLORIDE 20 MEQ: 20 SOLUTION ORAL at 08:08

## 2017-08-20 RX ADMIN — BUPROPION HYDROCHLORIDE 150 MG: 150 TABLET, FILM COATED, EXTENDED RELEASE ORAL at 08:08

## 2017-08-20 RX ADMIN — CARBIDOPA AND LEVODOPA 2 TABLET: 25; 100 TABLET ORAL at 05:08

## 2017-08-20 RX ADMIN — PREDNISONE 10 MG: 5 SOLUTION ORAL at 08:08

## 2017-08-20 RX ADMIN — INSULIN DETEMIR 40 UNITS: 100 INJECTION, SOLUTION SUBCUTANEOUS at 08:08

## 2017-08-20 RX ADMIN — IPRATROPIUM BROMIDE AND ALBUTEROL SULFATE 3 ML: .5; 3 SOLUTION RESPIRATORY (INHALATION) at 07:08

## 2017-08-20 RX ADMIN — KETOROLAC TROMETHAMINE 15 MG: 30 INJECTION, SOLUTION INTRAMUSCULAR at 10:08

## 2017-08-20 RX ADMIN — DEXMEDETOMIDINE HYDROCHLORIDE 0.7 MCG/KG/HR: 100 INJECTION, SOLUTION INTRAVENOUS at 08:08

## 2017-08-20 RX ADMIN — NYSTATIN 500000 UNITS: 100000 SUSPENSION ORAL at 05:08

## 2017-08-20 RX ADMIN — FUROSEMIDE 40 MG: 40 TABLET ORAL at 02:08

## 2017-08-20 RX ADMIN — QUETIAPINE FUMARATE 200 MG: 100 TABLET, FILM COATED ORAL at 10:08

## 2017-08-20 RX ADMIN — ENOXAPARIN SODIUM 110 MG: 150 INJECTION SUBCUTANEOUS at 04:08

## 2017-08-20 RX ADMIN — PANTOPRAZOLE SODIUM 40 MG: 40 INJECTION, POWDER, FOR SOLUTION INTRAVENOUS at 05:08

## 2017-08-20 RX ADMIN — CEFTRIAXONE 1 G: 1 INJECTION, SOLUTION INTRAVENOUS at 04:08

## 2017-08-20 RX ADMIN — PRAZOSIN HYDROCHLORIDE 1 MG: 1 CAPSULE ORAL at 08:08

## 2017-08-20 RX ADMIN — LORAZEPAM 0.5 MG: 2 INJECTION INTRAMUSCULAR; INTRAVENOUS at 08:08

## 2017-08-20 RX ADMIN — PANCRELIPASE 6 CAPSULE: 24000; 76000; 120000 CAPSULE, DELAYED RELEASE PELLETS ORAL at 05:08

## 2017-08-20 RX ADMIN — DIPHENHYDRAMINE HYDROCHLORIDE 25 MG: 50 INJECTION, SOLUTION INTRAMUSCULAR; INTRAVENOUS at 05:08

## 2017-08-20 RX ADMIN — HYDROMORPHONE HYDROCHLORIDE 0.5 MG: 2 INJECTION, SOLUTION INTRAMUSCULAR; INTRAVENOUS; SUBCUTANEOUS at 02:08

## 2017-08-20 RX ADMIN — ENTACAPONE 200 MG: 200 TABLET, FILM COATED ORAL at 04:08

## 2017-08-20 RX ADMIN — GABAPENTIN 200 MG: 100 CAPSULE ORAL at 05:08

## 2017-08-20 RX ADMIN — HYDROMORPHONE HYDROCHLORIDE 0.5 MG: 2 INJECTION, SOLUTION INTRAMUSCULAR; INTRAVENOUS; SUBCUTANEOUS at 05:08

## 2017-08-20 RX ADMIN — NYSTATIN 500000 UNITS: 100000 SUSPENSION ORAL at 12:08

## 2017-08-20 RX ADMIN — IPRATROPIUM BROMIDE AND ALBUTEROL SULFATE 3 ML: .5; 3 SOLUTION RESPIRATORY (INHALATION) at 06:08

## 2017-08-20 RX ADMIN — MIRTAZAPINE 15 MG: 15 TABLET, FILM COATED ORAL at 08:08

## 2017-08-20 RX ADMIN — THERA TABS 1 TABLET: TAB at 02:08

## 2017-08-20 RX ADMIN — INSULIN ASPART 11 UNITS: 100 INJECTION, SOLUTION INTRAVENOUS; SUBCUTANEOUS at 05:08

## 2017-08-20 RX ADMIN — OLANZAPINE 15 MG: 5 TABLET, ORALLY DISINTEGRATING ORAL at 02:08

## 2017-08-20 RX ADMIN — GABAPENTIN 200 MG: 100 CAPSULE ORAL at 10:08

## 2017-08-20 RX ADMIN — TRAZODONE HYDROCHLORIDE 150 MG: 50 TABLET ORAL at 08:08

## 2017-08-20 RX ADMIN — NYSTATIN 500000 UNITS: 100000 SUSPENSION ORAL at 11:08

## 2017-08-20 RX ADMIN — VANCOMYCIN HYDROCHLORIDE 1750 MG: 5 INJECTION, POWDER, LYOPHILIZED, FOR SOLUTION INTRAVENOUS at 04:08

## 2017-08-20 RX ADMIN — ALLOPURINOL 200 MG: 100 TABLET ORAL at 02:08

## 2017-08-20 RX ADMIN — DEXMEDETOMIDINE HYDROCHLORIDE 0.7 MCG/KG/HR: 100 INJECTION, SOLUTION INTRAVENOUS at 09:08

## 2017-08-20 RX ADMIN — MIDODRINE HYDROCHLORIDE 5 MG: 2.5 TABLET ORAL at 04:08

## 2017-08-20 RX ADMIN — DEXMEDETOMIDINE HYDROCHLORIDE 0.6 MCG/KG/HR: 100 INJECTION, SOLUTION INTRAVENOUS at 02:08

## 2017-08-20 RX ADMIN — IPRATROPIUM BROMIDE AND ALBUTEROL SULFATE 3 ML: .5; 3 SOLUTION RESPIRATORY (INHALATION) at 01:08

## 2017-08-20 RX ADMIN — ATORVASTATIN CALCIUM 40 MG: 40 TABLET, FILM COATED ORAL at 02:08

## 2017-08-20 RX ADMIN — GABAPENTIN 200 MG: 100 CAPSULE ORAL at 02:08

## 2017-08-20 RX ADMIN — SODIUM CHLORIDE: 0.9 INJECTION, SOLUTION INTRAVENOUS at 11:08

## 2017-08-20 RX ADMIN — VANCOMYCIN HYDROCHLORIDE 1750 MG: 5 INJECTION, POWDER, LYOPHILIZED, FOR SOLUTION INTRAVENOUS at 05:08

## 2017-08-20 NOTE — CONSULTS
Kevan Colin [id number] is a 62 y.o. male who has been consulted for vancomycin dosing.    The patient has the following labs:     Date Creatinine (mg/dl)    BUN WBC Count   8/20/2017 Estimated Creatinine Clearance: 144.5 mL/min (based on Cr of 0.7). Lab Results   Component Value Date    BUN 11 08/20/2017     Lab Results   Component Value Date    WBC 9.10 08/20/2017        Current weight is 113.2 kg (249 lb 9 oz)    Pt is receiving vancomycin 1750 mg every 12 hours.  Vancomycin trough from 08/20 at 1625 was 13.3 mg/dL.  Target trough range is 15-20 mg/dL.   Trough was drawn on time and anticipate it is sub/therapeutic. Pharmacy will increase current regimen to a vancomycin dose of 2000 mg every 12 hours. A vancomycin trough has been ordered prior to 4th dose due 08/22 at 0230.      Patient will be followed by pharmacy for changes in renal function, toxicity, and efficacy.  Thank you for allowing us to participate in this patient's care.     Chuck Jacobo, MarthaD
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
## 2017-08-20 NOTE — CLINICAL REVIEW
Virgil completed, but unable to assess sit<->stand  And gait as pt. Was unfit to do these activities today. Once it is safe, he can be tested for these and have goals added to POC.

## 2017-08-20 NOTE — PROGRESS NOTES
Progress Note  PULMONARY    Admit Date: 8/11/2017 08/20/2017      SUBJECTIVE:     Aug 14, pt presented with sore throat and sob x 1 day on 8/11, intubated 8/12 with airway difficulties, extubated and intubated 8/13 with stridor again.  Pt had ct neck early in course.  Now sedate on vent.  Aug 15, sedated, discussed with wife   Aug 16, sedate, discussed with wife.  Aug 17, trach  Aug 18, post trach , awake, tolerates cpap  Aug 19, did well with wean, no c/o.   Aug 20, doing ok with wean but poor loc still, delirium.    PFSH and Allergies reviewed.  Unable to do ros due to sedation and intubation.  OBJECTIVE:     Vitals (Most recent):  Vitals:    08/20/17 0829   BP:    Pulse: 82   Resp: (!) 24   Temp:        Vitals (24 hour range):  Temp:  [97.9 °F (36.6 °C)-99.1 °F (37.3 °C)]   Pulse:  [64-90]   Resp:  [10-44]   BP: (116-211)/(58-85)   SpO2:  [90 %-100 %]       Intake/Output Summary (Last 24 hours) at 08/20/17 1000  Last data filed at 08/19/17 2200   Gross per 24 hour   Intake              620 ml   Output             1950 ml   Net            -1330 ml          Physical Exam:  The patient's neuro status (alertness,orientation,cognitive function,motor skills,), pharyngeal exam (oral lesions, hygiene, abn dentition,), Neck (jvd,mass,thyroid,nodes in neck and above/below clavicle),RESPIRATORY(symmetry,effort,fremitus,percussion,auscultation),  Cor(rhythm,heart tones including gallops,perfusion,edema)ABD(distention,hepatic&splenomegaly,tenderness,masses), Skin(rash,cyanosis),Psyc(affect,judgement,).  Exam negative except for these pertinent findings:    Trach in place  Good bs, no edema, very tender legs from neuropathy?  Very aggitated not able to take po    Radiographs reviewed: view by direct vision  , cxr with right atelectasis and more opacified 8/15 (no new) right lower lung.lower lung pneumonia    Results for orders placed during the hospital encounter of 08/11/17   X-Ray Chest 1 View    Narrative AP chest  compared to 08/13/2017    Findings: The endotracheal tube and nasogastric tube remain appropriately positioned.  A spinal stimulator device is again noted.  The heart size is upper normal.  There are persistent perihilar and bibasilar infiltrates as well as a small left pleural effusion.  An increase in consolidative component is seen at the right lung base.  There is no pneumothorax.    Impression Appropriate positioning of support devices.  Persistent perihilar and bibasilar infiltrates, with increase in consolidative component on the right.  Stable small left pleural effusion.      Electronically signed by: Ishmael Flood MD  Date:     08/14/17  Time:    07:39    ]    Labs       Recent Labs  Lab 08/20/17  0341   WBC 9.10   HGB 11.7*   HCT 35.5*          Recent Labs  Lab 08/20/17  0341      K 3.9      CO2 27   BUN 11   CREATININE 0.7   *   CALCIUM 8.6*   *   *   ALKPHOS 265*   BILITOT 2.1*   PROT 6.1   ALBUMIN 2.4*       Recent Labs  Lab 08/20/17  0528   PH 7.406   PCO2 43.4   PO2 94   HCO3 27.3     Microbiology Results (last 7 days)     Procedure Component Value Units Date/Time    Blood culture [[id number]] Collected:  08/14/17 1448    Order Status:  Completed Specimen:  Blood from Peripheral, Right  Hand Updated:  08/19/17 2312     Blood Culture, Routine No growth after 5 days.    Blood Culture #1 [[id number]] Collected:  08/11/17 0734    Order Status:  Completed Specimen:  Blood from Antecubital, Right  Arm Updated:  08/16/17 1412     Blood Culture, Routine No growth after 5 days.    Blood Culture #2 [[id number]] Collected:  08/11/17 0733    Order Status:  Completed Specimen:  Blood Updated:  08/16/17 1412     Blood Culture, Routine No growth after 5 days.    Culture, Respiratory with Gram Stain [[id number]]  (Susceptibility) Collected:  08/12/17 1340    Order Status:  Completed Specimen:  Respiratory from Tracheal Aspirate Updated:  08/15/17 1414     Respiratory Culture --      METHICILLIN RESISTANT STAPHYLOCOCCUS AUREUS  Many       Gram Stain (Respiratory) <10 epithelial cells per low power field.     Gram Stain (Respiratory) Rare WBC's     Gram Stain (Respiratory) Moderate Gram positive cocci          Impression:  Active Hospital Problems    Diagnosis  POA    *Acute respiratory failure with hypoxia and hypercarbia [J96.01, J96.02]  No    S/P emergency tracheotomy for assistance in breathing [Z93.0]  Not Applicable    Staphylococcal pneumonia [J15.20]  Yes    Type 2 diabetes mellitus without complication [E11.9]  Yes    Benign essential HTN [I10]  Yes    JOSE ALBERTO (obstructive sleep apnea) [G47.33]  Yes    Depression [F32.9]  Yes    CAD (coronary artery disease) [I25.10]  Yes    DVT (deep venous thrombosis)- Hx IVC filter [I82.409]  Yes      Resolved Hospital Problems    Diagnosis Date Resolved POA   No resolved problems to display.               Plan:     Aug 14, will  need trach, add valium to facilitate sedation, with staph pneumonia - steroids need to be minimal.  Will dose vanc til sensitivity out.  Start tube feed, stop xarelto and use lovenox.    Tried to call wife - no answer.      The following were evaluated and adjusted as needed: mechanical ventilator settings and weaning status, intravenous fluids and nutritional status, sedation and neurologic status, antibiotics and acid base balance and oxygenation needs       Critical Care  - THE PATIENT HAS A HIGH PROBABILITY OF IMMINENT OR LIFE THREATENING DETERIORATION.  Over 50%time of encounter was in direct care at bedside.  Time was 30 to 74 minutes required for patient care.  Time needed for all of the above totaled 33 minutes.    Aug 15, staph id na still, still risk of bleed from xarelto, will ask ent to see to figure plan.  Need to keep steroids low with staph pneumonia.  Expect trach best management option for airway stridor that required 2 intubations and cannot be dosed steroids (high dose).    Wil on propofol?  On  coreg?  Observe- stop coreg if hr<45    Aug 16, stagnant, got xarelto 8/14, needs general surg to do trach.  Likely will wean quickly. Daily  Leak test reasonable but high risk airway- no steroids high dose with mrsa.    Aug 18- trach - now wean prn, mobilize speech eval , cont abx.    Aug 19, seems encephalopathic, resp status would improve quicker with mobilization.    Aug 20,  Labs good- pt not, encephalopathy, delirium, mrsa pneumonia,needs peg, tolerates t tube.  Discussed with dr Gomez                  .

## 2017-08-20 NOTE — PROGRESS NOTES
This note also relates to the following rows which could not be included:  Oxygen Concentration (%) - Cannot attach notes to unvalidated device data  ETCO2 (mmHg) - Cannot attach notes to unvalidated device data  Ventilation Type - Cannot attach notes to unvalidated device data  Vent Mode - Cannot attach notes to unvalidated device data  Set Rate - Cannot attach notes to unvalidated device data  Vt Set - Cannot attach notes to unvalidated device data  PEEP/CPAP - Cannot attach notes to unvalidated device data  Pressure Support - Cannot attach notes to unvalidated device data  Waveform - Cannot attach notes to unvalidated device data  Peak Flow - Cannot attach notes to unvalidated device data  Set Inspiratory Pressure - Cannot attach notes to unvalidated device data  Insp Time - Cannot attach notes to unvalidated device data  Plateau Set/Insp. Hold (sec) - Cannot attach notes to unvalidated device data  Insp Rise Time  - Cannot attach notes to unvalidated device data  Trigger Sensitivity Flow/I-Trigger - Cannot attach notes to unvalidated device data  P High - Cannot attach notes to unvalidated device data  P Low - Cannot attach notes to unvalidated device data  T High - Cannot attach notes to unvalidated device data  T Low - Cannot attach notes to unvalidated device data  Resp Rate Total - Cannot attach notes to unvalidated device data  Peak Airway Pressure - Cannot attach notes to unvalidated device data  Mean Airway Pressure - Cannot attach notes to unvalidated device data  Plateau Pressure - Cannot attach notes to unvalidated device data  Exhaled Vt - Cannot attach notes to unvalidated device data  Total Ve - Cannot attach notes to unvalidated device data  Spont Ve - Cannot attach notes to unvalidated device data  I:E Ratio Measured - Cannot attach notes to unvalidated device data  Ve High Alarm - Cannot attach notes to unvalidated device data  Resp Rate High Alarm - Cannot attach notes to unvalidated device  data  Press High Alarm - Cannot attach notes to unvalidated device data  Apnea Rate - Cannot attach notes to unvalidated device data  Apnea Volume (mL) - Cannot attach notes to unvalidated device data  Apnea Oxygen Concentration  - Cannot attach notes to unvalidated device data  Apnea Flow Rate (L/min) - Cannot attach notes to unvalidated device data  T Apnea - Cannot attach notes to unvalidated device data       08/20/17 0739   Patient Assessment/Suction   Level of Consciousness (AVPU) alert   All Lung Fields Breath Sounds coarse   PRE-TX-O2-ETCO2   O2 Device (Oxygen Therapy) ventilator   $ Is the patient on Oxygen? Yes   SpO2 100 %   Pulse Oximetry Type Continuous   $ Pulse Oximetry - Multiple Charge Pulse Oximetry - Multiple   Pulse 80   Resp (!) 29   Aerosol Therapy   $ Aerosol Therapy Charges Aerosol Treatment   Respiratory Treatment Status given   SVN/Inhaler Treatment Route in-line;trach tube   Position During Treatment HOB at 30 degrees   Patient Tolerance good   Post-Treatment   Post-treatment Heart Rate (beats/min) 76   Post-treatment Resp Rate (breaths/min) 25   All Fields Breath Sounds aeration increased   Vent Select   Conventional Vent Y   $ Ventilator Subsequent Yes   Preset Conventional Ventilator Settings   Vent Type    Humidity HME   Conventional Ventilator Alarms   Alarms On Y

## 2017-08-20 NOTE — PLAN OF CARE
08/19/17 1955   Patient Assessment/Suction   Level of Consciousness (AVPU) alert   Respiratory Effort Normal   Expansion/Accessory Muscles/Retractions expansion symmetric   All Lung Fields Breath Sounds diminished   Rhythm/Pattern, Respiratory ventilator assisted   Cough Frequency infrequent   Cough Type good;loose   Suction Method in-line suction catheter (closed)   $ Suction Charges Inline Suction Procedure Stat Charge   Sputum Amount small   Sputum Color yellow   Sputum Consistency thick   PRE-TX-O2-ETCO2   O2 Device (Oxygen Therapy) ventilator   $ Is the patient on Oxygen? Yes   Oxygen Concentration (%) 50   SpO2 (!) 94 %   Pulse Oximetry Type Continuous   $ Pulse Oximetry - Multiple Charge Pulse Oximetry - Multiple   ETCO2 (mmHg) 38 mmHg   $ ETCO2 Charge Exhaled CO2 Monitoring   $ ETCO2 Usage Currently wearing   Pulse 72   Resp (!) 24   Positioning HOB elevated 45 degrees   Aerosol Therapy   $ Aerosol Therapy Charges Aerosol Treatment   Respiratory Treatment Status given   SVN/Inhaler Treatment Route in-line   Position During Treatment HOB at 45 degrees   Patient Tolerance good   Post-Treatment   Post-treatment Heart Rate (beats/min) 63   Post-treatment Resp Rate (breaths/min) 14   All Fields Breath Sounds diminished       Surgical Airway 08/18/17 1500   Placement Date/Time: 08/18/17 1500   Present Prior to Hospital Arrival?: No  Inserted by: MD  Airway Device Size: 6.0   Cuff Pressure 32 cm H2O   Status Secured   Site Assessment Clean;Dry   Ties Assessment Clean;Dry   Vent Select   Conventional Vent Y   Ventilator Initiated No   Preset Conventional Ventilator Settings   Vent Type    Ventilation Type VC   Vent Mode Spont   Humidity HME   Set Rate 0 bmp   Vt Set 600 mL   PEEP/CPAP 5 cmH20   Pressure Support 10 cmH20   Waveform RAMP   Peak Flow 82 L/min   Set Inspiratory Pressure 0 cmH20   Insp Time 0 Sec(s)   Plateau Set/Insp. Hold (sec) 0   Insp Rise Time  50 %   Trigger Sensitivity Flow/I-Trigger 3  L/min   P High 0 cm H2O   P Low 0 cm H2O   T High 0 sec   T Low 0 sec   Patient Ventilator Parameters   Resp Rate Total 12 br/min   Peak Airway Pressure 15 cmH2O   Mean Airway Pressure 7.6 cmH20   Plateau Pressure 0 cmH20   Exhaled Vt 583 mL   Total Ve 8.56 mL   Spont Ve 8.56 L   I:E Ratio Measured 1:4.60   Conventional Ventilator Alarms   Ve High Alarm 40 L/min   Resp Rate High Alarm 45 br/min   Press High Alarm 100 cmH2O   Apnea Rate 10   Apnea Volume (mL) 730 mL   Apnea Oxygen Concentration  100   Apnea Flow Rate (L/min) 87   T Apnea 20 sec(s)   Ready to Wean/Extubation Screen   FIO2<=50 (chart decimal) 0.5   MV<16L (chart vol.) 8.56   PEEP <=8 (chart #) 5   Ready to Wean Parameters   F/VT Ratio<105 (RSBI) (!) 41.17

## 2017-08-20 NOTE — PROGRESS NOTES
1930-Pt pulled out NGT that was placed earlier per Calvin BROUSSARD.  2100-NGT placed without difficulty, CXR done.  2200-VRAD results in, OK to use NGT  2250-Restraints placed on pt due to pt attempting to remove NGT again

## 2017-08-21 LAB
ALBUMIN SERPL BCP-MCNC: 2.3 G/DL
ALLENS TEST: ABNORMAL
ALLENS TEST: ABNORMAL
ALP SERPL-CCNC: 218 U/L
ALT SERPL W/O P-5'-P-CCNC: 41 U/L
ANION GAP SERPL CALC-SCNC: 8 MMOL/L
AST SERPL-CCNC: 40 U/L
BASOPHILS # BLD AUTO: 0 K/UL
BASOPHILS NFR BLD: 0.3 %
BILIRUB SERPL-MCNC: 0.5 MG/DL
BUN SERPL-MCNC: 11 MG/DL
CALCIUM SERPL-MCNC: 8.7 MG/DL
CHLORIDE SERPL-SCNC: 107 MMOL/L
CO2 SERPL-SCNC: 25 MMOL/L
CREAT SERPL-MCNC: 0.7 MG/DL
DELSYS: ABNORMAL
DELSYS: ABNORMAL
DIFFERENTIAL METHOD: ABNORMAL
EOSINOPHIL # BLD AUTO: 0.1 K/UL
EOSINOPHIL NFR BLD: 1.3 %
ERYTHROCYTE [DISTWIDTH] IN BLOOD BY AUTOMATED COUNT: 13.3 %
EST. GFR  (AFRICAN AMERICAN): >60 ML/MIN/1.73 M^2
EST. GFR  (NON AFRICAN AMERICAN): >60 ML/MIN/1.73 M^2
FIO2: 45
FIO2: 45
GLUCOSE SERPL-MCNC: 145 MG/DL
HCO3 UR-SCNC: 24.2 MMOL/L (ref 24–28)
HCO3 UR-SCNC: 26.7 MMOL/L (ref 24–28)
HCT VFR BLD AUTO: 34.9 %
HGB BLD-MCNC: 11.5 G/DL
LYMPHOCYTES # BLD AUTO: 1.8 K/UL
LYMPHOCYTES NFR BLD: 17.2 %
MAGNESIUM SERPL-MCNC: 1.8 MG/DL
MCH RBC QN AUTO: 30.4 PG
MCHC RBC AUTO-ENTMCNC: 32.8 G/DL
MCV RBC AUTO: 93 FL
MIN VOL: 12.3
MIN VOL: 15
MODE: ABNORMAL
MODE: ABNORMAL
MONOCYTES # BLD AUTO: 0.7 K/UL
MONOCYTES NFR BLD: 7 %
NEUTROPHILS # BLD AUTO: 7.8 K/UL
NEUTROPHILS NFR BLD: 74.2 %
PCO2 BLDA: 42.2 MMHG (ref 35–45)
PCO2 BLDA: 55 MMHG (ref 35–45)
PEEP: 5
PEEP: 5
PH SMN: 7.29 [PH] (ref 7.35–7.45)
PH SMN: 7.37 [PH] (ref 7.35–7.45)
PHOSPHATE SERPL-MCNC: 3.8 MG/DL
PLATELET # BLD AUTO: 210 K/UL
PMV BLD AUTO: 8 FL
PO2 BLDA: 103 MMHG (ref 80–100)
PO2 BLDA: 62 MMHG (ref 80–100)
POC BE: -1 MMOL/L
POC BE: 0 MMOL/L
POC SATURATED O2: 90 % (ref 95–100)
POC SATURATED O2: 97 % (ref 95–100)
POC TCO2: 25 MMOL/L (ref 23–27)
POC TCO2: 28 MMOL/L (ref 23–27)
POCT GLUCOSE: 135 MG/DL (ref 70–110)
POCT GLUCOSE: 172 MG/DL (ref 70–110)
POCT GLUCOSE: 189 MG/DL (ref 70–110)
POCT GLUCOSE: 243 MG/DL (ref 70–110)
POCT GLUCOSE: 94 MG/DL (ref 70–110)
POTASSIUM SERPL-SCNC: 3.6 MMOL/L
PROT SERPL-MCNC: 6.1 G/DL
PS: 10
PS: 10
RBC # BLD AUTO: 3.77 M/UL
SAMPLE: ABNORMAL
SAMPLE: ABNORMAL
SITE: ABNORMAL
SITE: ABNORMAL
SODIUM SERPL-SCNC: 140 MMOL/L
SP02: 93
SP02: 96
SPONT RATE: 15
SPONT RATE: 17
WBC # BLD AUTO: 10.4 K/UL

## 2017-08-21 PROCEDURE — 25000003 PHARM REV CODE 250: Performed by: HOSPITALIST

## 2017-08-21 PROCEDURE — 99222 1ST HOSP IP/OBS MODERATE 55: CPT | Mod: ,,, | Performed by: INTERNAL MEDICINE

## 2017-08-21 PROCEDURE — 99233 SBSQ HOSP IP/OBS HIGH 50: CPT | Mod: ,,, | Performed by: INTERNAL MEDICINE

## 2017-08-21 PROCEDURE — 94770 HC EXHALED C02 TEST: CPT

## 2017-08-21 PROCEDURE — 25000003 PHARM REV CODE 250: Performed by: NURSE PRACTITIONER

## 2017-08-21 PROCEDURE — 97110 THERAPEUTIC EXERCISES: CPT

## 2017-08-21 PROCEDURE — 99900026 HC AIRWAY MAINTENANCE (STAT)

## 2017-08-21 PROCEDURE — 99900035 HC TECH TIME PER 15 MIN (STAT)

## 2017-08-21 PROCEDURE — 82803 BLOOD GASES ANY COMBINATION: CPT

## 2017-08-21 PROCEDURE — 63600175 PHARM REV CODE 636 W HCPCS: Performed by: INTERNAL MEDICINE

## 2017-08-21 PROCEDURE — 85025 COMPLETE CBC W/AUTO DIFF WBC: CPT

## 2017-08-21 PROCEDURE — 63600175 PHARM REV CODE 636 W HCPCS: Performed by: HOSPITALIST

## 2017-08-21 PROCEDURE — 94003 VENT MGMT INPAT SUBQ DAY: CPT

## 2017-08-21 PROCEDURE — 99232 SBSQ HOSP IP/OBS MODERATE 35: CPT | Mod: ,,, | Performed by: INTERNAL MEDICINE

## 2017-08-21 PROCEDURE — 20000000 HC ICU ROOM

## 2017-08-21 PROCEDURE — 25000003 PHARM REV CODE 250: Performed by: INTERNAL MEDICINE

## 2017-08-21 PROCEDURE — 63600175 PHARM REV CODE 636 W HCPCS: Performed by: SURGERY

## 2017-08-21 PROCEDURE — 83735 ASSAY OF MAGNESIUM: CPT

## 2017-08-21 PROCEDURE — 80053 COMPREHEN METABOLIC PANEL: CPT

## 2017-08-21 PROCEDURE — 92526 ORAL FUNCTION THERAPY: CPT

## 2017-08-21 PROCEDURE — 85610 PROTHROMBIN TIME: CPT

## 2017-08-21 PROCEDURE — 97530 THERAPEUTIC ACTIVITIES: CPT

## 2017-08-21 PROCEDURE — C9113 INJ PANTOPRAZOLE SODIUM, VIA: HCPCS | Performed by: SURGERY

## 2017-08-21 PROCEDURE — 36415 COLL VENOUS BLD VENIPUNCTURE: CPT

## 2017-08-21 PROCEDURE — 25000242 PHARM REV CODE 250 ALT 637 W/ HCPCS: Performed by: NURSE PRACTITIONER

## 2017-08-21 PROCEDURE — 36600 WITHDRAWAL OF ARTERIAL BLOOD: CPT

## 2017-08-21 PROCEDURE — 84100 ASSAY OF PHOSPHORUS: CPT

## 2017-08-21 PROCEDURE — 94640 AIRWAY INHALATION TREATMENT: CPT

## 2017-08-21 PROCEDURE — 63600175 PHARM REV CODE 636 W HCPCS: Performed by: NURSE PRACTITIONER

## 2017-08-21 PROCEDURE — 27000221 HC OXYGEN, UP TO 24 HOURS

## 2017-08-21 PROCEDURE — 94761 N-INVAS EAR/PLS OXIMETRY MLT: CPT

## 2017-08-21 RX ORDER — OXYCODONE HYDROCHLORIDE 30 MG/1
30 TABLET ORAL EVERY 8 HOURS PRN
Status: DISCONTINUED | OUTPATIENT
Start: 2017-08-21 | End: 2017-08-21

## 2017-08-21 RX ORDER — OXYCODONE AND ACETAMINOPHEN 10; 325 MG/1; MG/1
1 TABLET ORAL EVERY 8 HOURS PRN
Status: DISCONTINUED | OUTPATIENT
Start: 2017-08-21 | End: 2017-08-22 | Stop reason: HOSPADM

## 2017-08-21 RX ADMIN — CARBIDOPA AND LEVODOPA 2 TABLET: 25; 100 TABLET ORAL at 12:08

## 2017-08-21 RX ADMIN — PRAZOSIN HYDROCHLORIDE 1 MG: 1 CAPSULE ORAL at 09:08

## 2017-08-21 RX ADMIN — IPRATROPIUM BROMIDE AND ALBUTEROL SULFATE 3 ML: .5; 3 SOLUTION RESPIRATORY (INHALATION) at 01:08

## 2017-08-21 RX ADMIN — ENTACAPONE 200 MG: 200 TABLET, FILM COATED ORAL at 12:08

## 2017-08-21 RX ADMIN — NYSTATIN 500000 UNITS: 100000 SUSPENSION ORAL at 12:08

## 2017-08-21 RX ADMIN — NYSTATIN 500000 UNITS: 100000 SUSPENSION ORAL at 05:08

## 2017-08-21 RX ADMIN — BUPROPION HYDROCHLORIDE 150 MG: 150 TABLET, FILM COATED, EXTENDED RELEASE ORAL at 09:08

## 2017-08-21 RX ADMIN — PANTOPRAZOLE SODIUM 40 MG: 40 INJECTION, POWDER, FOR SOLUTION INTRAVENOUS at 05:08

## 2017-08-21 RX ADMIN — OXYCODONE AND ACETAMINOPHEN 1 TABLET: 10; 325 TABLET ORAL at 09:08

## 2017-08-21 RX ADMIN — IPRATROPIUM BROMIDE AND ALBUTEROL SULFATE 3 ML: .5; 3 SOLUTION RESPIRATORY (INHALATION) at 07:08

## 2017-08-21 RX ADMIN — VANCOMYCIN HYDROCHLORIDE 2000 MG: 1 INJECTION, POWDER, LYOPHILIZED, FOR SOLUTION INTRAVENOUS at 03:08

## 2017-08-21 RX ADMIN — CEFTRIAXONE 1 G: 1 INJECTION, SOLUTION INTRAVENOUS at 03:08

## 2017-08-21 RX ADMIN — POTASSIUM CHLORIDE 20 MEQ: 20 SOLUTION ORAL at 10:08

## 2017-08-21 RX ADMIN — TRAZODONE HYDROCHLORIDE 150 MG: 50 TABLET ORAL at 09:08

## 2017-08-21 RX ADMIN — ALLOPURINOL 200 MG: 100 TABLET ORAL at 09:08

## 2017-08-21 RX ADMIN — INSULIN ASPART 11 UNITS: 100 INJECTION, SOLUTION INTRAVENOUS; SUBCUTANEOUS at 12:08

## 2017-08-21 RX ADMIN — GABAPENTIN 200 MG: 100 CAPSULE ORAL at 02:08

## 2017-08-21 RX ADMIN — CARBIDOPA AND LEVODOPA 2 TABLET: 25; 100 TABLET ORAL at 09:08

## 2017-08-21 RX ADMIN — OLANZAPINE 15 MG: 5 TABLET, ORALLY DISINTEGRATING ORAL at 09:08

## 2017-08-21 RX ADMIN — CARBIDOPA AND LEVODOPA 2 TABLET: 25; 100 TABLET ORAL at 05:08

## 2017-08-21 RX ADMIN — GABAPENTIN 200 MG: 100 CAPSULE ORAL at 05:08

## 2017-08-21 RX ADMIN — ATORVASTATIN CALCIUM 40 MG: 40 TABLET, FILM COATED ORAL at 09:08

## 2017-08-21 RX ADMIN — GABAPENTIN 200 MG: 100 CAPSULE ORAL at 09:08

## 2017-08-21 RX ADMIN — OXYCODONE AND ACETAMINOPHEN 1 TABLET: 10; 325 TABLET ORAL at 12:08

## 2017-08-21 RX ADMIN — INSULIN ASPART 4 UNITS: 100 INJECTION, SOLUTION INTRAVENOUS; SUBCUTANEOUS at 05:08

## 2017-08-21 RX ADMIN — PREDNISONE 10 MG: 5 SOLUTION ORAL at 09:08

## 2017-08-21 RX ADMIN — POTASSIUM CHLORIDE 20 MEQ: 20 SOLUTION ORAL at 09:08

## 2017-08-21 RX ADMIN — MIRTAZAPINE 15 MG: 15 TABLET, FILM COATED ORAL at 09:08

## 2017-08-21 RX ADMIN — INSULIN ASPART 11 UNITS: 100 INJECTION, SOLUTION INTRAVENOUS; SUBCUTANEOUS at 05:08

## 2017-08-21 RX ADMIN — INSULIN DETEMIR 40 UNITS: 100 INJECTION, SOLUTION SUBCUTANEOUS at 09:08

## 2017-08-21 RX ADMIN — ENTACAPONE 200 MG: 200 TABLET, FILM COATED ORAL at 09:08

## 2017-08-21 RX ADMIN — ESCITALOPRAM 20 MG: 10 TABLET, FILM COATED ORAL at 09:08

## 2017-08-21 RX ADMIN — DEXMEDETOMIDINE HYDROCHLORIDE 0.7 MCG/KG/HR: 100 INJECTION, SOLUTION INTRAVENOUS at 04:08

## 2017-08-21 RX ADMIN — DEXMEDETOMIDINE HYDROCHLORIDE 1.2 MCG/KG/HR: 100 INJECTION, SOLUTION INTRAVENOUS at 12:08

## 2017-08-21 RX ADMIN — INSULIN ASPART 2 UNITS: 100 INJECTION, SOLUTION INTRAVENOUS; SUBCUTANEOUS at 12:08

## 2017-08-21 RX ADMIN — ENTACAPONE 200 MG: 200 TABLET, FILM COATED ORAL at 05:08

## 2017-08-21 RX ADMIN — FUROSEMIDE 40 MG: 40 TABLET ORAL at 09:08

## 2017-08-21 RX ADMIN — INSULIN ASPART 2 UNITS: 100 INJECTION, SOLUTION INTRAVENOUS; SUBCUTANEOUS at 05:08

## 2017-08-21 NOTE — PLAN OF CARE
08/20/17 1857   Patient Assessment/Suction   Level of Consciousness (AVPU) alert   Respiratory Effort Unlabored   Expansion/Accessory Muscles/Retractions expansion symmetric   All Lung Fields Breath Sounds coarse;diminished   Rhythm/Pattern, Respiratory pattern regular   Cough Frequency frequent   Cough Type good;productive   Suction Method in-line suction catheter (closed)   $ Suction Charges Inline Suction Procedure Stat Charge   Sputum Amount moderate   Sputum Color yellow   Sputum Consistency thick   PRE-TX-O2-ETCO2   O2 Device (Oxygen Therapy) T-Piece Trial   $ Is the patient on Oxygen? Yes   Oxygen Concentration (%) 40   SpO2 95 %   Pulse Oximetry Type Continuous   $ Pulse Oximetry - Multiple Charge Pulse Oximetry - Multiple   Pulse 61   Resp 20   Aerosol Therapy   $ Aerosol Therapy Charges Aerosol Treatment   Respiratory Treatment Status given   SVN/Inhaler Treatment Route in-line   Position During Treatment HOB at 45 degrees   Patient Tolerance good   Post-Treatment   Post-treatment Heart Rate (beats/min) 60   Post-treatment Resp Rate (breaths/min) 20   All Fields Breath Sounds aeration increased       Surgical Airway 08/18/17 1500   Placement Date/Time: 08/18/17 1500   Present Prior to Hospital Arrival?: No  Inserted by: MD  Airway Device Size: 6.0   Cuff Pressure 0 cm H2O   Status Secured   Site Assessment Clean;Dry   Ties Assessment Clean;Dry;Intact   Ready to Wean/Extubation Screen   FIO2<=50 (chart decimal) 0.4

## 2017-08-21 NOTE — PLAN OF CARE
Problem: SLP Goal  Goal: SLP Goal  Tolerate Dysphagia Diet 2 with thin liquids   Outcome: Ongoing (interventions implemented as appropriate)  Cough with ejection of material into trach after puree trial.  Cough after ice chip

## 2017-08-21 NOTE — PROGRESS NOTES
Progress Note    Admit Date: 8/11/2017   LOS: 9 days     SUBJECTIVE:      History of Present Illness:  Patient is a 62 y.o. male admitted to Hospitalist Service from [facility] Emergency Room with complaint of worsening SOB and sore-throat for 1 day. Patient has PMH significant for COPD, CAD, DM-2, depression, DVT, hypertension, history of MI, history of PE and chronic pain. Patient is continuing to smoke cigarettes. Patient presented with complaint of subjective fever, sore-throat and SOB for 1-2 days. Patient admits yellow productive cough and frequent wheezing. Patient requiring multiple doses of nebulization treatments in the ER. Patient denied chest pain, abdominal pain, nausea, vomiting, headache, vision changes, focal neuro-deficits. Patient admitted non-compliance with medications and diet. He had few doughnuts last night and now blood sugars are high.    Interval history  S/p tracheostomy. Scheduled for PEG placement in AM. Patient reportedly failed swallow evaluation.    Scheduled Meds:   albuterol-ipratropium 2.5mg-0.5mg/3mL  3 mL Nebulization Q6H    allopurinol  200 mg Oral Daily    atorvastatin  40 mg Oral Daily    buPROPion  150 mg Oral BID    carbidopa-levodopa  mg  2 tablet Oral TID    And    entacapone  200 mg Oral TID    cefTRIAXone (ROCEPHIN) IVPB  1 g Intravenous Q24H    escitalopram oxalate  20 mg Oral QHS    furosemide  40 mg Oral Daily    gabapentin  200 mg Oral TID    insulin aspart  11 Units Subcutaneous TID AC    insulin detemir  40 Units Subcutaneous QHS    lipase-protease-amylase 24,000-76,000-120,000 units  6 capsule Oral TID    midodrine  5 mg Oral BID WM    mirtazapine  15 mg Oral QHS    nystatin  5 mL Oral Q6H    olanzapine zydis  15 mg Oral Daily    pantoprazole  40 mg Intravenous Before breakfast    potassium chloride 10%  20 mEq Oral BID    prazosin  1 mg Oral QHS    predniSONE 5 mg/5 ml  10 mg Oral BID    trazodone  150 mg Oral QHS     vancomycin (VANCOCIN) IVPB  2,000 mg Intravenous Q12H     Continuous Infusions:   sodium chloride 0.9% 100 mL/hr at 08/20/17 2319    dexmedetomidine (PRECEDEX) infusion 0.3 mcg/kg/hr (08/21/17 0546)     PRN Meds:dextrose 50%, dextrose 50%, glucagon (human recombinant), glucose, glucose, hydrOXYzine HCl, insulin aspart, ketorolac, ondansetron, ondansetron, quetiapine, quetiapine, senna-docusate 8.6-50 mg    Review of patient's allergies indicates:   Allergen Reactions    Bee pollens Anaphylaxis     Bee stings     Penicillins Nausea Only     Other reaction(s): Unknown    Codeine Rash     Other reaction(s): Unknown    Morphine Rash       Review of Systems  -unable to perform patient is intubated     OBJECTIVE:     Vital Signs (Most Recent)  Temp: 98.8 °F (37.1 °C) (08/21/17 0745)  Pulse: (!) 54 (08/21/17 0753)  Resp: 16 (08/21/17 0753)  BP: (!) 164/72 (08/21/17 0730)  SpO2: 97 % (08/21/17 0753)    Vital Signs Range (Last 24H):  Temp:  [98.4 °F (36.9 °C)-99.1 °F (37.3 °C)]   Pulse:  [51-86]   Resp:  [12-36]   BP: ()/(46-79)   SpO2:  [86 %-99 %]     I & O (Last 24H):    Intake/Output Summary (Last 24 hours) at 08/21/17 0909  Last data filed at 08/21/17 0743   Gross per 24 hour   Intake          4031.67 ml   Output             4500 ml   Net          -468.33 ml     Physical Exam:  General appearance: INTUBATED and sedated   Head: normocephalic, atraumatic  Eyes:  conjunctivae/corneas clear. PERRL.  Nose: Nares normal. Septum midline.  Throat: lips, mucosa, and tongue normal; teeth and gums normal, no throat erythema.  Neck: supple, symmetrical, trachea midline, no JVD and thyroid not enlarged, symmetric, no tenderness/mass/nodules  Lungs:  Bilateral scattered rhonchi with prolonged expiration  Chest wall: no tenderness  Heart: regular rate and rhythm, S1, S2 normal, no murmur, click, rub or gallop  Abdomen: soft, non-tender non-distented; bowel sounds normal; no masses,  no organomegaly  Extremities: no  cyanosis, clubbing or edema.   Pulses: 2+ and symmetric  Skin: Skin color, texture, turgor normal. No rashes or lesions.  Lymph nodes: Cervical, supraclavicular, and axillary nodes normal.  Neurologic: sedated   Laboratory:  CBC:     Recent Labs  Lab 08/21/17  0342   WBC 10.40   RBC 3.77*   HGB 11.5*   HCT 34.9*      MCV 93   MCH 30.4   MCHC 32.8     CMP:     Recent Labs  Lab 08/21/17  0342   *   CALCIUM 8.7   ALBUMIN 2.3*   PROT 6.1      K 3.6   CO2 25      BUN 11   CREATININE 0.7   ALKPHOS 218*   ALT 41   AST 40   BILITOT 0.5       Diagnostic Results:  CT soft tissue neck:  No acute findings in this patient status post prior cervical spine surgery. Bullous emphysematous changes are noted in the upper lung fields.    CXR: An orogastric tube is seen with its tip in the region of the stomach    KUB: There is a gastric tube which appears coiled within the left upper quadrant of the abdomen, likely within the stomach.  There is a battery pack for a neurostimulator device visible over the left upper quadrant.  There are 2 neurostimulator leads projected over the spinal canal in the lower thoracic region.  The cardiac silhouette is enlarged and there are central perivascular congestion, bilateral perihilar reticulonodular air space opacities, and patchy segmental air space opacity at the lung bases, likely a combination of a atelectasis and/or consolidation/aspiration.  There is a possible small volume of left basilar pleural fluid.  There is degenerative change of the thoracic spine.    CXR: Worsening airspace opacity and edema in the mid and lower lungs bilaterally.    CXR: Appropriate positioning of support devices.  Persistent perihilar and bibasilar infiltrates, with increase in consolidative component on the right. Stable small left pleural effusion.    CXR: Support devices as above.  Bibasilar atelectasis and/or infiltrate and small pleural effusions without significant change.    CXR:  Tip of the NG tube at the level of the body of the stomach.    ASSESSMENT/PLAN:      *Acute respiratory failure with hypoxia and hypercarbia s/p trachestomy  COPD exacerbation [J44.1]  MRSA Pneumonia  Ventilator weaning as per Dr. Cordero.  Prednisone 10 mg BID.  Continue beta 2 agonist bronchodilator treatments.   Continue IV antibiotics - Vancomycin and DC IV Ceftriaxone  Check sputum GS and Cx.   Continue routine medications as before. Patient stated, he quit smoking 2 weeks ago.     Yes    Diabetes mellitus, type II [E11.9] - uncontrolled due to non-compliance with diet  Check blood glucose level q 6h.  Use Novolog Insulin Sliding Scale as needed.   Dietry compliance discussed with patient.      Yes    Benign essential HTN [I10]  Chronic problem. Will continue chronic medications and monitor for any changes, adjusting as needed.  DC Midodrine as BP is stable and on higher side.     Yes    JOSE ALBERTO (obstructive sleep apnea) [G47.33]  On ventilator      Yes    Depression [F32.9]  Chronic problem. Will continue chronic medications and monitor for any changes, adjusting as needed.     Yes    CAD (coronary artery disease) [I25.10]  Patient with known CAD and monitor for S/Sx of angina/ACS. Continue to monitor on telemetry.     Yes    DVT (deep venous thrombosis)- Hx IVC filter [I82.409]  Xarelto changed to Lovenox 1 mg/kg sq q 12 hrs.      Yes              VTE Risk Mitigation      Lovenox 40 mg SQ q day     Memo Henry MD

## 2017-08-21 NOTE — PLAN OF CARE
Problem: Patient Care Overview  Goal: Plan of Care Review  Outcome: Ongoing (interventions implemented as appropriate)  Pt afebrile, VSS, placed on cpap later in the night due to apnea and diminished sats, stout intact with good uop, pt resting comfortably at this time

## 2017-08-21 NOTE — PT/OT/SLP PROGRESS
Physical Therapy  Treatment    Kevan Colin   MRN: [id number]   Admitting Diagnosis: Acute respiratory failure with hypoxia and hypercarbia    PT Received On: 08/21/17  PT Start Time: 0923     PT Stop Time: 0946    PT Total Time (min): 23 min       Billable Minutes:  Therapeutic Activity 10 and Therapeutic Exercise 13    Treatment Type: Treatment  PT/PTA: PT             General Precautions: Standard, fall, respiratory, contact (MRSA sputum)  Orthopedic Precautions: N/A   Braces: N/A    Do you have any cultural, spiritual, Evangelical conflicts, given your current situation?: none     Subjective:  Communicated with nurse maki HERRERA prior to session.  Pt asleep but awakened with name calling  On vent via trach site, bilateral soft mitten restraints, restless and grimacing with movements of LE's- pt is tall    Pain/Comfort  Pain Rating 1:  (unable to state but grimacing and restless)  Location 1: back  Pain Addressed 1: Reposition    Objective:   Patient found with: stout catheter, blood pressure cuff, central line, NG tube, peripheral IV, restraints, telemetry, ventilator, tracheostomy    Functional Mobility:  Bed Mobility:   Scooting/Bridging: Total Assistance    Transfers:       Gait:        Stairs:      Balance:   Static Sit:   Dynamic Sit:   Static Stand:   Dynamic stand:      Therapeutic Activities and Exercises:  Limited PT treatment in bed for LE's thera ex as advised by nurse Simpson- pt placed back on vent support via trach yesterday  Pt assisting with AP, GS, heel slides, abd/add, limited SLR x 10-20  Pt repositioned, to middle of bed, pillow under knees and heels- left supine     AM-PAC 6 CLICK MOBILITY  How much help from another person does this patient currently need?   1 = Unable, Total/Dependent Assistance  2 = A lot, Maximum/Moderate Assistance  3 = A little, Minimum/Contact Guard/Supervision  4 = None, Modified [place]/Independent         AM-PAC Raw Score CMS G-Code Modifier Level of Impairment  Assistance   6 % Total / Unable   7 - 9 CM 80 - 100% Maximal Assist   10 - 14 CL 60 - 80% Moderate Assist   15 - 19 CK 40 - 60% Moderate Assist   20 - 22 CJ 20 - 40% Minimal Assist   23 CI 1-20% SBA / CGA   24 CH 0% Independent/ Mod I     Patient left supine with all lines intact, call button in reach, restraints reapplied at end of session and ST [facility] present.    Assessment:  Kevan Colin is a 62 y.o. male with a medical diagnosis of Acute respiratory failure with hypoxia and hypercarbia and presents with respiratory failure on vent/trach. Pt restless and repositioned in bed. Pt able to assist with thera ex to LE's. Will mobilize and attempt EOB/OOB as indicated.    Rehab identified problem list/impairments: Rehab identified problem list/impairments: weakness, impaired endurance, impaired self care skills, impaired functional mobilty, decreased lower extremity function, pain, decreased safety awareness, impaired cardiopulmonary response to activity    Rehab potential is fair.    Activity tolerance: Fair    Discharge recommendations: Discharge Facility/Level Of Care Needs: [facility] (long term acute care hospital)     Barriers to discharge: Barriers to Discharge: Decreased caregiver support    Equipment recommendations:       GOALS:    Physical Therapy Goals        Problem: Physical Therapy Goal    Goal Priority Disciplines Outcome Goal Variances Interventions   Physical Therapy Goal     PT/OT, PT Ongoing (interventions implemented as appropriate)     Description:  Goals to be met by: 17    Patient will increase functional independence with mobility by performin)Pt to demonstrate ability to transfer supine <->sit EOB with MIN A   2)Pt.. To demonstrate I with sitting EOB , HEP  3) Gait and transfer sit<->stand TBA and goals set accordingly                       PLAN:    Patient to be seen daily  to address the above listed problems via gait training, therapeutic activities, therapeutic  exercises  Plan of Care expires: 09/08/17  Plan of Care reviewed with: patient         Sheila Torreingrid, PT  08/21/2017

## 2017-08-21 NOTE — PT/OT/SLP PROGRESS
Speech Language Pathology  Treatment    Kevan Colin   MRN: [id number]   Admitting Diagnosis: Acute respiratory failure with hypoxia and hypercarbia    Diet recommendations: Solid Diet Level: NPO  Liquid Diet Level: NPO (allow ice chips sparingly) aspiration precautions    SLP Treatment Date: 08/21/17  Speech Start Time: 0955     Speech Stop Time: 1004     Speech Total (min): 9 min       TREATMENT BILLABLE MINUTES:  Treatment Swallowing Dysfunction 9    Has the patient been evaluated by SLP for swallowing? : Yes  Keep patient NPO?: Yes   General Precautions: Standard, aspiration, NPO, contact, fall  Current Respiratory Status: trach with vent       Subjective:  Aphonic.  Indicates Y/N with head nod/shake.  Indicated he still has a sore throat.       Objective:   Patient found with: oxygen, restraints, pulse ox (continuous), tracheostomy, ventilator, NG tube, cuff inflated.  No new CXR.  Occasional cough before po trials, immediate cough after puree trial with ejection of material into trach tube.  Cough after ice chip trial.      Assessment:  Kevan Colin is a 62 y.o. male with a medical diagnosis of Acute respiratory failure with hypoxia and hypercarbia and presented with s/s pharyngeal dysphagia & indications trach cuff is not protecting airway.  Recommend remain NPO until MBSS can be done.  May need long-term alternate method for nutrition/hydration/meds.  Will follow.     Discharge recommendations: Discharge Facility/Level Of Care Needs: home     Goals:    SLP Goals        Problem: SLP Goal    Goal Priority Disciplines Outcome   SLP Goal     SLP Ongoing (interventions implemented as appropriate)   Description:  Tolerate Dysphagia Diet 2 with thin liquids              Multidisciplinary Problems (Resolved)        Problem: SLP Goal    Goal Priority Disciplines Outcome   SLP Goal   (Resolved)     SLP Outcome(s) achieved                    Plan:   Patient to be seen Therapy Frequency: 3 x/week   Plan of  Care expires:    Plan of Care reviewed with: patient  SLP Follow-up?: Yes  SLP - Next Visit Date: 08/22/17           Violetta Mays CCC-SLP/A  08/21/2017

## 2017-08-21 NOTE — PROGRESS NOTES
Progress Note  PULMONARY    Admit Date: 8/11/2017 08/21/2017      SUBJECTIVE:     Aug 14, pt presented with sore throat and sob x 1 day on 8/11, intubated 8/12 with airway difficulties, extubated and intubated 8/13 with stridor again.  Pt had ct neck early in course.  Now sedate on vent.  Aug 15, sedated, discussed with wife   Aug 16, sedate, discussed with wife.  Aug 17, trach  Aug 18, post trach , awake, tolerates cpap  Aug 19, did well with wean, no c/o.   Aug 20, doing ok with wean but poor loc still, delirium.  Aug 21, affect is very unusual, grimaces with light touch of shoulder?      PFSH and Allergies reviewed.  Unable to do ros due to sedation and intubation.  OBJECTIVE:     Vitals (Most recent):  Vitals:    08/21/17 1200   BP: (!) 118/57   Pulse: 66   Resp: 17   Temp:        Vitals (24 hour range):  Temp:  [98.4 °F (36.9 °C)-99.1 °F (37.3 °C)]   Pulse:  [51-80]   Resp:  [12-35]   BP: ()/(46-79)   SpO2:  [86 %-100 %]       Intake/Output Summary (Last 24 hours) at 08/21/17 1301  Last data filed at 08/21/17 0743   Gross per 24 hour   Intake          4031.67 ml   Output             4500 ml   Net          -468.33 ml          Physical Exam:  The patient's neuro status (alertness,orientation,cognitive function,motor skills,), pharyngeal exam (oral lesions, hygiene, abn dentition,), Neck (jvd,mass,thyroid,nodes in neck and above/below clavicle),RESPIRATORY(symmetry,effort,fremitus,percussion,auscultation),  Cor(rhythm,heart tones including gallops,perfusion,edema)ABD(distention,hepatic&splenomegaly,tenderness,masses), Skin(rash,cyanosis),Psyc(affect,judgement,).  Exam negative except for these pertinent findings:    Trach in place  Good bs, no edema,  No distress, affect unusual.  Nl cor.      Radiographs reviewed: view by direct vision  , cxr with right atelectasis and more opacified 8/15 (no new) right lower lung.lower lung pneumonia    Results for orders placed during the hospital encounter of 08/11/17    X-Ray Chest 1 View    Narrative AP chest compared to 08/13/2017    Findings: The endotracheal tube and nasogastric tube remain appropriately positioned.  A spinal stimulator device is again noted.  The heart size is upper normal.  There are persistent perihilar and bibasilar infiltrates as well as a small left pleural effusion.  An increase in consolidative component is seen at the right lung base.  There is no pneumothorax.    Impression Appropriate positioning of support devices.  Persistent perihilar and bibasilar infiltrates, with increase in consolidative component on the right.  Stable small left pleural effusion.      Electronically signed by: Ishmael Flood MD  Date:     08/14/17  Time:    07:39    ]    Labs       Recent Labs  Lab 08/21/17  0342   WBC 10.40   HGB 11.5*   HCT 34.9*          Recent Labs  Lab 08/21/17  0342      K 3.6      CO2 25   BUN 11   CREATININE 0.7   *   CALCIUM 8.7   MG 1.8   PHOS 3.8   AST 40   ALT 41   ALKPHOS 218*   BILITOT 0.5   PROT 6.1   ALBUMIN 2.3*       Recent Labs  Lab 08/21/17  0507   PH 7.366   PCO2 42.2   PO2 62*   HCO3 24.2     Microbiology Results (last 7 days)     Procedure Component Value Units Date/Time    Blood culture [[id number]] Collected:  08/14/17 1448    Order Status:  Completed Specimen:  Blood from Peripheral, Right  Hand Updated:  08/19/17 2312     Blood Culture, Routine No growth after 5 days.    Blood Culture #1 [[id number]] Collected:  08/11/17 0734    Order Status:  Completed Specimen:  Blood from Antecubital, Right  Arm Updated:  08/16/17 1412     Blood Culture, Routine No growth after 5 days.    Blood Culture #2 [[id number]] Collected:  08/11/17 0733    Order Status:  Completed Specimen:  Blood Updated:  08/16/17 1412     Blood Culture, Routine No growth after 5 days.    Culture, Respiratory with Gram Stain [[id number]]  (Susceptibility) Collected:  08/12/17 1340    Order Status:  Completed Specimen:  Respiratory from Tracheal  Aspirate Updated:  08/15/17 1414     Respiratory Culture --     METHICILLIN RESISTANT STAPHYLOCOCCUS AUREUS  Many       Gram Stain (Respiratory) <10 epithelial cells per low power field.     Gram Stain (Respiratory) Rare WBC's     Gram Stain (Respiratory) Moderate Gram positive cocci          Impression:  Active Hospital Problems    Diagnosis  POA    *Acute respiratory failure with hypoxia and hypercarbia [J96.01, J96.02]  No    S/P emergency tracheotomy for assistance in breathing [Z93.0]  Not Applicable    Staphylococcal pneumonia [J15.20]  Yes    Type 2 diabetes mellitus without complication [E11.9]  Yes    Benign essential HTN [I10]  Yes    JOSE ALBERTO (obstructive sleep apnea) [G47.33]  Yes    Depression [F32.9]  Yes    CAD (coronary artery disease) [I25.10]  Yes    DVT (deep venous thrombosis)- Hx IVC filter [I82.409]  Yes      Resolved Hospital Problems    Diagnosis Date Resolved POA   No resolved problems to display.               Plan:     Aug 14, will  need trach, add valium to facilitate sedation, with staph pneumonia - steroids need to be minimal.  Will dose vanc til sensitivity out.  Start tube feed, stop xarelto and use lovenox.    Tried to call wife - no answer.      The following were evaluated and adjusted as needed: mechanical ventilator settings and weaning status, intravenous fluids and nutritional status, sedation and neurologic status, antibiotics and acid base balance and oxygenation needs       Critical Care  - THE PATIENT HAS A HIGH PROBABILITY OF IMMINENT OR LIFE THREATENING DETERIORATION.  Over 50%time of encounter was in direct care at bedside.  Time was 30 to 74 minutes required for patient care.  Time needed for all of the above totaled 33 minutes.    Aug 15, staph id na still, still risk of bleed from xarelto, will ask ent to see to figure plan.  Need to keep steroids low with staph pneumonia.  Expect trach best management option for airway stridor that required 2 intubations and  cannot be dosed steroids (high dose).    Wil on propofol?  On coreg?  Observe- stop coreg if hr<45    Aug 16, stagnant, got xarelto 8/14, needs general surg to do trach.  Likely will wean quickly. Daily  Leak test reasonable but high risk airway- no steroids high dose with mrsa.    Aug 18- trach - now wean prn, mobilize speech eval , cont abx.    Aug 19, seems encephalopathic, resp status would improve quicker with mobilization.    Aug 20,  Labs good- pt not, encephalopathy, delirium, mrsa pneumonia,needs peg, tolerates t tube.  Discussed with dr Gomez    Aug 21, trach in place, hard to eval airway (may be able to pull trach) BUT pt's affect is ramez offf.   Best to set up transition back to home at [facility]/snf.  Will drop rocephin.  Cont vanc.              .

## 2017-08-21 NOTE — PROGRESS NOTES
I sent a 3 day placement packet, consults and current meds to [facility] via [facility]. Cindy Duvall LMSW      · 8/21/2017 3:48:17 PM Under Review: Onsite Review  [email or web address]

## 2017-08-21 NOTE — PLAN OF CARE
08/21/17 0753   Patient Assessment/Suction   Level of Consciousness (AVPU) alert   Respiratory Effort Unlabored   Expansion/Accessory Muscles/Retractions no retractions;no use of accessory muscles   All Lung Fields Breath Sounds coarse   Rhythm/Pattern, Respiratory unlabored;artificial airway;mechanical device;ventilator assisted   Cough Frequency infrequent   Cough Type good;productive   Suction Method in-line suction catheter (closed);tracheostomy   Sputum Amount moderate   Sputum Color white-yellow   Sputum Consistency thick   PRE-TX-O2-ETCO2   O2 Device (Oxygen Therapy) ventilator   $ Is the patient on Oxygen? Yes   Oxygen Concentration (%) 45   SpO2 97 %   Pulse Oximetry Type Continuous   $ Pulse Oximetry - Multiple Charge Pulse Oximetry - Multiple   ETCO2 (mmHg) 28 mmHg   $ ETCO2 Charge Exhaled CO2 Monitoring   $ ETCO2 Usage Currently wearing   Pulse (!) 54   Resp 16   Aerosol Therapy   $ Aerosol Therapy Charges Aerosol Treatment   Respiratory Treatment Status given   SVN/Inhaler Treatment Route in-line   Position During Treatment HOB at 30 degrees   Patient Tolerance good   Post-Treatment   Post-treatment Heart Rate (beats/min) 53   Post-treatment Resp Rate (breaths/min) 16   All Fields Breath Sounds aeration increased       Surgical Airway 08/18/17 1500   Placement Date/Time: 08/18/17 1500   Present Prior to Hospital Arrival?: No  Inserted by: MD  Airway Device Size: 6.0   Cuff Pressure 30 cm H2O   Status Secured   Site Assessment Clean;Dry;No bleeding;No drainage   Ties Assessment Clean;Dry;Intact   Vent Select   Conventional Vent Y   $ Ventilator Subsequent Yes   Preset Conventional Ventilator Settings   Vent Type    Ventilation Type VC   Vent Mode Spont   Humidity HME   Set Rate 0 bmp   Vt Set 600 mL   PEEP/CPAP 5 cmH20   Pressure Support 10 cmH20   Waveform RAMP   Peak Flow 82 L/min   Set Inspiratory Pressure 0 cmH20   Insp Time 0 Sec(s)   Plateau Set/Insp. Hold (sec) 0   Insp Rise Time  50 %    Trigger Sensitivity Flow/I-Trigger 3 L/min   P High 0 cm H2O   P Low 0 cm H2O   T High 0 sec   T Low 0 sec   Patient Ventilator Parameters   Resp Rate Total 14 br/min   Peak Airway Pressure 15 cmH2O   Mean Airway Pressure 9.6 cmH20   Plateau Pressure 0 cmH20   Exhaled Vt 561 mL   Total Ve 9.11 mL   Spont Ve 9.11 L   I:E Ratio Measured 1:1.20   Conventional Ventilator Alarms   Ve High Alarm 40 L/min   Resp Rate High Alarm 45 br/min   Press High Alarm 100 cmH2O   Apnea Rate 10   Apnea Volume (mL) 730 mL   Apnea Oxygen Concentration  100   Apnea Flow Rate (L/min) 87   T Apnea 20 sec(s)   Ready to Wean/Extubation Screen   FIO2<=50 (chart decimal) 0.45   MV<16L (chart vol.) 9.11   PEEP <=8 (chart #) 5   Ready to Wean Parameters   F/VT Ratio<105 (RSBI) (!) 28.52   Airway Safety   Ambu bag with the patient? Yes, Adult Ambu   Is mask with the patient? Yes, Adult Mask       Aerosol treatments q6 with Duoneb. Patient tolerated well.

## 2017-08-21 NOTE — SIGNIFICANT EVENT
Results for GODFREY SUAREZ (MRN [id number]) as of 8/21/2017 05:42   Ref. Range 8/21/2017 04:56   POC PH Latest Ref Range: 7.35 - 7.45  7.294 (L)   POC PCO2 Latest Ref Range: 35 - 45 mmHg 55.0 (H)   POC PO2 Latest Ref Range: 80 - 100 mmHg 103 (H)   POC BE Latest Ref Range: -2 to 2 mmol/L 0   POC HCO3 Latest Ref Range: 24 - 28 mmol/L 26.7   POC SATURATED O2 Latest Ref Range: 95 - 100 % 97   POC TCO2 Latest Ref Range: 23 - 27 mmol/L 28 (H)   FiO2 Unknown 45   PEEP Unknown 5   Sample Unknown ARTERIAL   DelSys Unknown Adult Vent   Allens Test Unknown N/A   Site Unknown RB   Mode Unknown CPAP   Error on abg, redraw done

## 2017-08-21 NOTE — PLAN OF CARE
Problem: Physical Therapy Goal  Goal: Physical Therapy Goal  Goals to be met by: 17    Patient will increase functional independence with mobility by performin)Pt to demonstrate ability to transfer supine <->sit EOB with MIN A   2)Pt.. To demonstrate I with sitting EOB , HEP  3) Gait and transfer sit<->stand TBA and goals set accordingly      Outcome: Ongoing (interventions implemented as appropriate)  Pt asleep but awakened, on mitten restraints, seen for AAROME to lower extremeties/vent. Repositioned in bed, c/o back pain

## 2017-08-21 NOTE — PROGRESS NOTES
"Progress Note  [facility]    Admit Date: 8/11/2017    SUBJECTIVE:     Follow-up For:  Acute respiratory failure with hypoxia and hypercarbia      Interval history (See H&P for complete P,F,SHx) :Patient seen and examined.  Overnight, patient continued to become increasingly confused and agitated.  He had demanded pain medication was given Dilaudid and Ativan and then increasingly became confused pulling at lines.  Patient was  restrained this morning for his agitation and combativeness.  Patient was discussed in detail with Dr. Cordero and Dr. Acosta at the bedside.    Review of Systems:  Unable to determine- patient nonverbal      OBJECTIVE:     Vital Signs Range (Last 24H):  Temp:  [97.9 °F (36.6 °C)-99.5 °F (37.5 °C)]   Pulse:  [60-90]   Resp:  [11-51]   BP: ()/(46-79)   SpO2:  [86 %-100 %]     I & O (Last 24H):    Intake/Output Summary (Last 24 hours) at 08/20/17 2322  Last data filed at 08/20/17 1905   Gross per 24 hour   Intake          2021.67 ml   Output             2300 ml   Net          -278.33 ml       Estimated body mass index is 32.04 kg/m² as calculated from the following:    Height as of this encounter: 6' 2" (1.88 m).    Weight as of this encounter: 113.2 kg (249 lb 9 oz).    Physical Exam:  General- Patient is nonverbal secondary to the trach but is completely responsive to commands nonverbally.  HEENT- PERRLA, EOMI, OP clear, He does have a tracheostomy noted within the midline trachea with minimal exudate around the trach site  Neck- No JVD, Lymphadenopathy, Thyromegaly  CV- Regular rate and rhythm, No Murmur/vasiliy/rubs  Resp- Lungs have diminished breath sounds bilaterally with mildly increased work of breathing noted.   GI- Non tender/non-distended, BS normoactive x4 quads, no HSM  Extrem- Patient does appear to have swelling noted on his left dorsum of his hand associated with erythema and redness as well as significant pain on palpation.  Neuro- Strength 5/5 " flexors/extensors, DTRs 2+ and symmetric, Intact sensation to light touch grossly  Skin-  No rashes, masses or lesions noted    Laboratory/Diagnostic Data:  Reviewed and noted in plan where applicable- Please see chart for full lab data.    Medications:  Medication list was reviewed and changes noted under Assessment/Plan.    ASSESSMENT/PLAN:     Active Problems:    Active Hospital Problems    Diagnosis  POA    *Acute respiratory failure with hypoxia and hypercarbia [J96.01, J96.02]- Patient is now status post trach placement with secondary subglottic swelling as determined on previous imaging studies.  He is done well after trach.  Will attempt to wean the patient off of his ventilatory support by doing a T piece in the morning.  The patient does well be able to place him back on T piece with perhaps some ventilatory support at night.  We will continue the patient on vancomycin for 10 days of treatment for MRSA noted within the sputum. Pulmonary consulted- defer to them for further treatment.  No    Malnutrition, severe-patient has severe malnutrition and is unable to swallow at the present time. NGT placed. He may need a gastrostomy in order to better facilitate nutrition and medication delivery.  Due to the fact the patient is on Lovenox we have held the Lovenox with the blessing of Dr. Cordero, and will consult with GI in the morning for potential percutaneous gastrostomy placement.  In the meantime we will continue patient on NG tube feedings and medications  Not Applicable    Staphylococcal pneumonia [J15.20]-  IV vancomycin as per above.  Yes    Type 2 diabetes mellitus without complication [E11.9]-   Patient's FSGs are not controlled on current hypoglycemics.   Last A1c reviewed-   Lab Results   Component Value Date    HGBA1C 11.9 (H) 08/11/2017     Most recent fingerstick glucose reviewed-     Recent Labs  Lab 08/20/17 2056   POCTGLUCOSE 97     Current correctional scale  Low  Maintain insulin dose as  follows- Detemir to 40 units QHS and increase if tolerated meals.  Yes    Benign essential HTN [I10]- Chronic, uncontrolled.  Monitor BP closely.  Will continue BP medications as needed for sustained BP control. Crush and place down NGT. PRN labetolol.  Yes    Delirium, acute-  Patient appears to have developed some episodes of acute delirium with waxing waning mental status with bizzare behavior.Patient with acute delirium. There is no specific treatment. Will avoid narcotics/benzos that are known to worsen condition and add PRN antipsychotics (seroquel) to limit behaviors of self harm. Monitor closely. Increase zyprexa.  Yes    Depression [F32.9]- Chronic problem, controlled. Will continue chronic medication(s), Trazodone/remeron and monitor for any changes, adjusting as needed.   Yes    CAD (coronary artery disease) [I25.10]- Patient with known CAD s/p stent placement, which is controlled Will continue lovenox and monitor for S/Sx of angina/ACS. Continue to monitor on telemetry.     Yes    DVT (deep venous thrombosis)- Hx IVC filter [I82.409] -  Hold Lovenox for now for PEG.  Yes      Resolved Hospital Problems    Diagnosis Date Resolved POA   No resolved problems to display.     Disposition- Unsure    VTE Risk Mitigation     None      Lovenox

## 2017-08-22 ENCOUNTER — ANESTHESIA (OUTPATIENT)
Dept: ENDOSCOPY | Facility: HOSPITAL | Age: 62
DRG: 004 | End: 2017-08-22
Payer: MEDICARE

## 2017-08-22 ENCOUNTER — SURGERY (OUTPATIENT)
Age: 62
End: 2017-08-22

## 2017-08-22 ENCOUNTER — ANESTHESIA EVENT (OUTPATIENT)
Dept: ENDOSCOPY | Facility: HOSPITAL | Age: 62
DRG: 004 | End: 2017-08-22
Payer: MEDICARE

## 2017-08-22 VITALS — RESPIRATION RATE: 24 BRPM

## 2017-08-22 VITALS
WEIGHT: 249.56 LBS | TEMPERATURE: 99 F | SYSTOLIC BLOOD PRESSURE: 152 MMHG | DIASTOLIC BLOOD PRESSURE: 66 MMHG | HEART RATE: 86 BPM | RESPIRATION RATE: 12 BRPM | OXYGEN SATURATION: 99 % | BODY MASS INDEX: 32.03 KG/M2 | HEIGHT: 74 IN

## 2017-08-22 LAB
ALBUMIN SERPL BCP-MCNC: 2.5 G/DL
ALLENS TEST: ABNORMAL
ALP SERPL-CCNC: 182 U/L
ALT SERPL W/O P-5'-P-CCNC: 102 U/L
ANION GAP SERPL CALC-SCNC: 9 MMOL/L
AST SERPL-CCNC: 20 U/L
BASOPHILS # BLD AUTO: 0 K/UL
BASOPHILS NFR BLD: 0.2 %
BILIRUB SERPL-MCNC: 0.5 MG/DL
BUN SERPL-MCNC: 10 MG/DL
CALCIUM SERPL-MCNC: 9.1 MG/DL
CHLORIDE SERPL-SCNC: 105 MMOL/L
CO2 SERPL-SCNC: 27 MMOL/L
CREAT SERPL-MCNC: 0.7 MG/DL
DELSYS: ABNORMAL
DIFFERENTIAL METHOD: ABNORMAL
EOSINOPHIL # BLD AUTO: 0.1 K/UL
EOSINOPHIL NFR BLD: 0.6 %
ERYTHROCYTE [DISTWIDTH] IN BLOOD BY AUTOMATED COUNT: 12.7 %
EST. GFR  (AFRICAN AMERICAN): >60 ML/MIN/1.73 M^2
EST. GFR  (NON AFRICAN AMERICAN): >60 ML/MIN/1.73 M^2
GLUCOSE SERPL-MCNC: 187 MG/DL
HCO3 UR-SCNC: 26.9 MMOL/L (ref 24–28)
HCT VFR BLD AUTO: 36.2 %
HGB BLD-MCNC: 12.1 G/DL
INR PPP: 1.1
LYMPHOCYTES # BLD AUTO: 1.9 K/UL
LYMPHOCYTES NFR BLD: 17.7 %
MCH RBC QN AUTO: 30.8 PG
MCHC RBC AUTO-ENTMCNC: 33.5 G/DL
MCV RBC AUTO: 92 FL
MONOCYTES # BLD AUTO: 0.7 K/UL
MONOCYTES NFR BLD: 6.2 %
NEUTROPHILS # BLD AUTO: 8.3 K/UL
NEUTROPHILS NFR BLD: 75.3 %
PCO2 BLDA: 40.9 MMHG (ref 35–45)
PH SMN: 7.43 [PH] (ref 7.35–7.45)
PLATELET # BLD AUTO: 249 K/UL
PMV BLD AUTO: 7.6 FL
PO2 BLDA: 68 MMHG (ref 80–100)
POC BE: 2 MMOL/L
POC SATURATED O2: 94 % (ref 95–100)
POC TCO2: 28 MMOL/L (ref 23–27)
POCT GLUCOSE: 136 MG/DL (ref 70–110)
POCT GLUCOSE: 200 MG/DL (ref 70–110)
POCT GLUCOSE: 232 MG/DL (ref 70–110)
POTASSIUM SERPL-SCNC: 3.9 MMOL/L
PROT SERPL-MCNC: 6.7 G/DL
PROTHROMBIN TIME: 11.5 SEC
RBC # BLD AUTO: 3.93 M/UL
SAMPLE: ABNORMAL
SITE: ABNORMAL
SODIUM SERPL-SCNC: 141 MMOL/L
VANCOMYCIN TROUGH SERPL-MCNC: 18.7 UG/ML
WBC # BLD AUTO: 11 K/UL

## 2017-08-22 PROCEDURE — 37000008 HC ANESTHESIA 1ST 15 MINUTES: Performed by: INTERNAL MEDICINE

## 2017-08-22 PROCEDURE — 63600175 PHARM REV CODE 636 W HCPCS: Performed by: SURGERY

## 2017-08-22 PROCEDURE — 25000003 PHARM REV CODE 250: Performed by: NURSE PRACTITIONER

## 2017-08-22 PROCEDURE — 94640 AIRWAY INHALATION TREATMENT: CPT

## 2017-08-22 PROCEDURE — 25000242 PHARM REV CODE 250 ALT 637 W/ HCPCS: Performed by: NURSE PRACTITIONER

## 2017-08-22 PROCEDURE — 63600175 PHARM REV CODE 636 W HCPCS: Performed by: NURSE ANESTHETIST, CERTIFIED REGISTERED

## 2017-08-22 PROCEDURE — 99232 SBSQ HOSP IP/OBS MODERATE 35: CPT | Mod: ,,, | Performed by: INTERNAL MEDICINE

## 2017-08-22 PROCEDURE — 25000003 PHARM REV CODE 250: Performed by: NURSE ANESTHETIST, CERTIFIED REGISTERED

## 2017-08-22 PROCEDURE — 25000003 PHARM REV CODE 250: Performed by: HOSPITALIST

## 2017-08-22 PROCEDURE — 25000003 PHARM REV CODE 250: Performed by: INTERNAL MEDICINE

## 2017-08-22 PROCEDURE — 43246 EGD PLACE GASTROSTOMY TUBE: CPT | Mod: ,,, | Performed by: INTERNAL MEDICINE

## 2017-08-22 PROCEDURE — D9220A PRA ANESTHESIA: Mod: ANES,,, | Performed by: ANESTHESIOLOGY

## 2017-08-22 PROCEDURE — 63600175 PHARM REV CODE 636 W HCPCS: Performed by: HOSPITALIST

## 2017-08-22 PROCEDURE — 80053 COMPREHEN METABOLIC PANEL: CPT

## 2017-08-22 PROCEDURE — 80202 ASSAY OF VANCOMYCIN: CPT

## 2017-08-22 PROCEDURE — 27201018 HC KIT, PEG (ANY): Performed by: INTERNAL MEDICINE

## 2017-08-22 PROCEDURE — 99239 HOSP IP/OBS DSCHRG MGMT >30: CPT | Mod: ,,, | Performed by: INTERNAL MEDICINE

## 2017-08-22 PROCEDURE — D9220A PRA ANESTHESIA: Mod: CRNA,,, | Performed by: NURSE ANESTHETIST, CERTIFIED REGISTERED

## 2017-08-22 PROCEDURE — 43246 EGD PLACE GASTROSTOMY TUBE: CPT | Performed by: INTERNAL MEDICINE

## 2017-08-22 PROCEDURE — C9113 INJ PANTOPRAZOLE SODIUM, VIA: HCPCS | Performed by: SURGERY

## 2017-08-22 PROCEDURE — 85025 COMPLETE CBC W/AUTO DIFF WBC: CPT

## 2017-08-22 PROCEDURE — 94761 N-INVAS EAR/PLS OXIMETRY MLT: CPT

## 2017-08-22 PROCEDURE — 37000009 HC ANESTHESIA EA ADD 15 MINS: Performed by: INTERNAL MEDICINE

## 2017-08-22 PROCEDURE — 63600175 PHARM REV CODE 636 W HCPCS: Performed by: INTERNAL MEDICINE

## 2017-08-22 PROCEDURE — 27000221 HC OXYGEN, UP TO 24 HOURS

## 2017-08-22 PROCEDURE — 99900026 HC AIRWAY MAINTENANCE (STAT)

## 2017-08-22 RX ORDER — INSULIN ASPART 100 [IU]/ML
1-10 INJECTION, SOLUTION INTRAVENOUS; SUBCUTANEOUS
Refills: 0
Start: 2017-08-22 | End: 2018-02-12

## 2017-08-22 RX ORDER — PROPOFOL 10 MG/ML
VIAL (ML) INTRAVENOUS
Status: DISCONTINUED | OUTPATIENT
Start: 2017-08-22 | End: 2017-08-22

## 2017-08-22 RX ORDER — IPRATROPIUM BROMIDE AND ALBUTEROL SULFATE 2.5; .5 MG/3ML; MG/3ML
3 SOLUTION RESPIRATORY (INHALATION) EVERY 6 HOURS
Refills: 0
Start: 2017-08-22 | End: 2019-07-05 | Stop reason: CLARIF

## 2017-08-22 RX ORDER — NYSTATIN 100000 [USP'U]/ML
5 SUSPENSION ORAL EVERY 6 HOURS
Start: 2017-08-22 | End: 2017-08-27

## 2017-08-22 RX ORDER — HYDRALAZINE HYDROCHLORIDE 20 MG/ML
10 INJECTION INTRAMUSCULAR; INTRAVENOUS ONCE
Status: COMPLETED | OUTPATIENT
Start: 2017-08-22 | End: 2017-08-22

## 2017-08-22 RX ORDER — PREDNISONE 5 MG/ML
10 SOLUTION ORAL 2 TIMES DAILY
Start: 2017-08-22 | End: 2017-09-01

## 2017-08-22 RX ORDER — LIDOCAINE HYDROCHLORIDE 10 MG/ML
INJECTION, SOLUTION INTRAVENOUS
Status: DISCONTINUED | OUTPATIENT
Start: 2017-08-22 | End: 2017-08-22

## 2017-08-22 RX ADMIN — LIDOCAINE HYDROCHLORIDE 50 MG: 10 INJECTION, SOLUTION INTRAVENOUS at 12:08

## 2017-08-22 RX ADMIN — KETOROLAC TROMETHAMINE 15 MG: 30 INJECTION, SOLUTION INTRAMUSCULAR at 09:08

## 2017-08-22 RX ADMIN — PROPOFOL 20 MG: 10 INJECTION, EMULSION INTRAVENOUS at 12:08

## 2017-08-22 RX ADMIN — OXYCODONE AND ACETAMINOPHEN 1 TABLET: 10; 325 TABLET ORAL at 05:08

## 2017-08-22 RX ADMIN — PROPOFOL 150 MG: 10 INJECTION, EMULSION INTRAVENOUS at 12:08

## 2017-08-22 RX ADMIN — IPRATROPIUM BROMIDE AND ALBUTEROL SULFATE 3 ML: .5; 3 SOLUTION RESPIRATORY (INHALATION) at 01:08

## 2017-08-22 RX ADMIN — HYDRALAZINE HYDROCHLORIDE 10 MG: 20 INJECTION INTRAMUSCULAR; INTRAVENOUS at 09:08

## 2017-08-22 RX ADMIN — NYSTATIN 500000 UNITS: 100000 SUSPENSION ORAL at 12:08

## 2017-08-22 RX ADMIN — SODIUM CHLORIDE: 0.9 INJECTION, SOLUTION INTRAVENOUS at 03:08

## 2017-08-22 RX ADMIN — IPRATROPIUM BROMIDE AND ALBUTEROL SULFATE 3 ML: .5; 3 SOLUTION RESPIRATORY (INHALATION) at 12:08

## 2017-08-22 RX ADMIN — INSULIN ASPART 1 UNITS: 100 INJECTION, SOLUTION INTRAVENOUS; SUBCUTANEOUS at 12:08

## 2017-08-22 RX ADMIN — QUETIAPINE FUMARATE 200 MG: 100 TABLET, FILM COATED ORAL at 12:08

## 2017-08-22 RX ADMIN — VANCOMYCIN HYDROCHLORIDE 2000 MG: 1 INJECTION, POWDER, LYOPHILIZED, FOR SOLUTION INTRAVENOUS at 03:08

## 2017-08-22 RX ADMIN — INSULIN ASPART 4 UNITS: 100 INJECTION, SOLUTION INTRAVENOUS; SUBCUTANEOUS at 05:08

## 2017-08-22 RX ADMIN — PANTOPRAZOLE SODIUM 40 MG: 40 INJECTION, POWDER, FOR SOLUTION INTRAVENOUS at 05:08

## 2017-08-22 RX ADMIN — IPRATROPIUM BROMIDE AND ALBUTEROL SULFATE 3 ML: .5; 3 SOLUTION RESPIRATORY (INHALATION) at 07:08

## 2017-08-22 NOTE — PROGRESS NOTES
I faxed DC orders and AVS to [facility]. I updated the pts nurse, Calvin that report can be called to [phone] and the pt is transporting via ambulance. Cindy Duvall LMSW

## 2017-08-22 NOTE — PT/OT/SLP PROGRESS
Occupational Therapy      Kevan Kvng Colin  MRN: [id number]    Patient not seen today secondary to Other (Comment) (bedside PEG insertion). Will follow-up 8/23/17.    SHADIA Russo  8/22/2017

## 2017-08-22 NOTE — DISCHARGE SUMMARY
Discharge Summary  Hospital Medicine    Admit Date: 8/11/2017    Date and Time: 8/22/20172:35 PM    Discharge Attending Physician: Memo Henry MD    Primary Care Physician: Faiza Ochoa NP    Diagnoses:  Active Hospital Problems    Diagnosis  POA    *Acute respiratory failure with hypoxia and hypercarbia [J96.01, J96.02]  No    Feeding difficulty [R63.3] s/p PEG placement  Unknown    S/P emergency tracheotomy for assistance in breathing [Z93.0] for sub-glottic swelling  Not Applicable    Staphylococcal pneumonia [J15.20]  Yes    Type 2 diabetes mellitus without complication [E11.9]  Yes    Benign essential HTN [I10]  Yes    JOSE ALBERTO (obstructive sleep apnea) [G47.33]  Yes    Depression [F32.9]  Yes    CAD (coronary artery disease) [I25.10]  Yes    DVT (deep venous thrombosis)- Hx IVC filter [I82.409]  Yes      Resolved Hospital Problems    Diagnosis Date Resolved POA   No resolved problems to display.     Discharged Condition: Good    Hospital Course:   Patient is a 62 y.o. male admitted to Hospitalist Service from [facility] Emergency Room with complaint of worsening SOB and sore-throat for 1 day. Patient has PMH significant for COPD, CAD, DM-2, depression, DVT, hypertension, history of MI, history of PE and chronic pain. Patient is continuing to smoke cigarettes. Patient presented with complaint of subjective fever, sore-throat and SOB for 1-2 days. Patient admits yellow productive cough and frequent wheezing. Patient requiring multiple doses of nebulization treatments in the ER. Patient denied chest pain, abdominal pain, nausea, vomiting, headache, vision changes, focal neuro-deficits. Patient admitted non-compliance with medications and diet. He had few doughnuts previous night and now blood sugars are high. Patient was admitted to Hospitalist medicine service. Patient was being treated with IV Steroid, antibiotics and breathing treatments when, patient developed stridor and worsening of  respiratory status, requiring admission to ICU and ET placed. Dr. Cordero follow the patient. Patient continued to have persisting sub-glottic swelling. Later patient under went tracheostomy. Patient continue dto have swallowing dysfunction for which he got PEG placed. Patient is still in need for oxygen weaning and ventilator weaning. Patient was discharged to LTAC in stable condition with following discharge plan of care. Total time with the patient was 30 minutes and greater than 50% was spent in counseling and coordination of care. The assessment and plan have been discussed at length. Physicians' notes reviewed. Labs and procedure reviewed.     Consults: Dr. Cordero, Dr. SUKI Acosta, Dr. Christian    Significant Diagnostic Studies:   CT soft tissue neck:  No acute findings in this patient status post prior cervical spine surgery. Bullous emphysematous changes are noted in the upper lung fields.     CXR: An orogastric tube is seen with its tip in the region of the stomach     KUB: There is a gastric tube which appears coiled within the left upper quadrant of the abdomen, likely within the stomach.  There is a battery pack for a neurostimulator device visible over the left upper quadrant.  There are 2 neurostimulator leads projected over the spinal canal in the lower thoracic region.  The cardiac silhouette is enlarged and there are central perivascular congestion, bilateral perihilar reticulonodular air space opacities, and patchy segmental air space opacity at the lung bases, likely a combination of a atelectasis and/or consolidation/aspiration.  There is a possible small volume of left basilar pleural fluid.  There is degenerative change of the thoracic spine.     CXR: Worsening airspace opacity and edema in the mid and lower lungs bilaterally.     CXR: Appropriate positioning of support devices.  Persistent perihilar and bibasilar infiltrates, with increase in consolidative component on the right. Stable small left  pleural effusion.     CXR: Support devices as above.  Bibasilar atelectasis and/or infiltrate and small pleural effusions without significant change.     CXR: Tip of the NG tube at the level of the body of the stomach.  Microbiology Results (last 7 days)     Procedure Component Value Units Date/Time    Blood culture [[id number]] Collected:  08/14/17 1448    Order Status:  Completed Specimen:  Blood from Peripheral, Right  Hand Updated:  08/19/17 2312     Blood Culture, Routine No growth after 5 days.    Blood Culture #1 [[id number]] Collected:  08/11/17 0734    Order Status:  Completed Specimen:  Blood from Antecubital, Right  Arm Updated:  08/16/17 1412     Blood Culture, Routine No growth after 5 days.    Blood Culture #2 [[id number]] Collected:  08/11/17 0733    Order Status:  Completed Specimen:  Blood Updated:  08/16/17 1412     Blood Culture, Routine No growth after 5 days.        Special Treatments/Procedures: as above  Disposition: LTAC    Medications:  Reconciled Home Medications: Current Discharge Medication List      START taking these medications    Details   albuterol-ipratropium 2.5mg-0.5mg/3mL (DUO-NEB) 0.5 mg-3 mg(2.5 mg base)/3 mL nebulizer solution Take 3 mLs by nebulization every 6 (six) hours. Rescue  Refills: 0      dextrose 5 % SolP 500 mL with vancomycin 1,000 mg SolR 2,000 mg Inject 2,000 mg into the vein every 12 (twelve) hours.  Refills: 0      insulin aspart (NOVOLOG) 100 unit/mL InPn pen Inject 1-10 Units into the skin before meals and at bedtime as needed (Hyperglycemia).  Refills: 0      nystatin (MYCOSTATIN) 100,000 unit/mL suspension Take 5 mLs (500,000 Units total) by mouth every 6 (six) hours.      predniSONE 5 mg/5 ml 5 mg/5 mL Soln Take 10 mLs (10 mg total) by mouth 2 (two) times daily.         CONTINUE these medications which have NOT CHANGED    Details   allopurinol (ZYLOPRIM) 100 MG tablet Take 200 mg by mouth once daily.       atorvastatin (LIPITOR) 40 MG tablet Take 1 tablet (40  mg total) by mouth once daily.  Qty: 30 tablet, Refills: 0      buPROPion (WELLBUTRIN SR) 150 MG TBSR 12 hr tablet Take 150 mg by mouth 2 (two) times daily. Take 2nd dose by 4pm      carbidopa-levodopa-entacapone (STALEVO) -200 mg per tablet Take 1 tablet by mouth 3 (three) times daily.      carvedilol (COREG) 3.125 MG tablet TAKE 1 TABLET BY MOUTH TWICE DAILY  Qty: 60 tablet, Refills: 3      dicyclomine (BENTYL) 20 mg tablet Take 20 mg by mouth every 6 (six) hours.      escitalopram oxalate (LEXAPRO) 20 MG tablet Take 20 mg by mouth every evening.       furosemide (LASIX) 40 MG tablet 40 mg once daily.       gabapentin (NEURONTIN) 100 MG capsule Take 200 mg by mouth 3 (three) times daily.      hydrOXYzine HCl (ATARAX) 25 MG tablet Take 25 mg by mouth daily as needed for Itching. Take 1 tablet daily prn for anxiety and 1-2 tablets in evening for insomnia      insulin glargine (LANTUS) 100 unit/mL injection Inject 75 Units into the skin every evening.       insulin lispro (HUMALOG) 100 unit/mL injection Inject 20 Units into the skin 3 (three) times daily before meals.       lipase-protease-amylase 25,000-85,000- 136,000 unit CpDR Take 6 capsules by mouth 3 (three) times daily.       mirtazapine (REMERON) 15 MG tablet Take 15 mg by mouth every evening.      multivitamin capsule Take 1 capsule by mouth once daily.      olanzapine (ZYPREXA) 10 MG tablet Take 10 mg by mouth once daily.      ondansetron (ZOFRAN) 8 MG tablet Take 1 tablet (8 mg total) by mouth every 8 (eight) hours.  Qty: 12 tablet, Refills: 0      !! ondansetron (ZOFRAN-ODT) 4 MG TbDL Take 1 tablet (4 mg total) by mouth every 8 (eight) hours as needed (nausea/vomiting).  Qty: 12 tablet, Refills: 0      !! ondansetron (ZOFRAN-ODT) 8 MG TbDL Take 1 tablet (8 mg total) by mouth every 8 (eight) hours as needed.  Qty: 20 tablet, Refills: 0      oxycodone-acetaminophen (PERCOCET)  mg per tablet Take 1 tablet by mouth 3 (three) times daily as needed  "for Pain.  Qty: 90 tablet, Refills: 0      pantoprazole (PROTONIX) 40 MG tablet Take 1 tablet (40 mg total) by mouth every morning.  Qty: 30 tablet, Refills: 11      potassium chloride SA (K-DUR,KLOR-CON) 20 MEQ tablet Take 20 mEq by mouth 2 (two) times daily.      prazosin (MINIPRESS) 2 MG Cap Take 1 mg by mouth every evening. For nightmares      quetiapine (SEROQUEL) 200 MG Tab Take 300 mg by mouth nightly as needed.      RIVAROXABAN (XARELTO ORAL) Take 20 mg by mouth once daily.       SPIRIVA WITH HANDIHALER 18 mcg inhalation capsule       trazodone (DESYREL) 150 MG tablet Take 150 mg by mouth every evening.      zolpidem (AMBIEN) 5 MG Tab Take 5 mg by mouth nightly as needed.      insulin needles, disposable, (BD INSULIN PEN NEEDLE UF SHORT) 31 X 5/16 " Ndle 1 each by Misc.(Non-Drug; Combo Route) route 2 (two) times daily.  Qty: 100 each, Refills: 4       !! - Potential duplicate medications found. Please discuss with provider.      STOP taking these medications       ibuprofen (ADVIL,MOTRIN) 800 MG tablet Comments:   Reason for Stopping:         carbidopa-levodopa  mg (SINEMET)  mg per tablet Comments:   Reason for Stopping:         ciclopirox (PENLAC) 8 % Soln Comments:   Reason for Stopping:         ENOXAPARIN SODIUM (LOVENOX SUBQ) Comments:   Reason for Stopping:         lisinopril (PRINIVIL,ZESTRIL) 20 MG tablet Comments:   Reason for Stopping:         promethazine (PHENERGAN) 25 MG suppository Comments:   Reason for Stopping:               Discharge Procedure Orders  Diet general   Order Comments: PEG feeding: Isosource 1.5 @ 30 mls/hr advancing by 10 mls Q4 hrs to goal rate 55 mls/hr providing 1980 kcals/day, 90 g protein/day, 232 g CHO/day, and 1008 mls water/day. Hold TF x4 hrs for residuals >250mls. Flush 160 mls Q4 hrs to meet fluid needs or per MD.     Other restrictions (specify):   Order Comments: Fall precautions     Call MD for:   Order Comments: For worsening symptoms, chest pain, " shortness of breath, increased abdominal pain, high grade fever, stroke or stroke like symptoms, immediately go to the nearest Emergency Room or call 911 as soon as possible.       Follow-up Information     [facility].    Specialty:  LTAC  Why:  LTAC  Contact information:  [address] [facility] [address]  [phone]             Faiza Ochoa NP.    Specialty:  Internal Medicine  Why:  upon DC  Contact information:  [address] [address]  [address]  [place] [facility] [address]  [phone]

## 2017-08-22 NOTE — PLAN OF CARE
08/22/17 1542   Patient Assessment/Suction   Level of Consciousness (AVPU) alert   PRE-TX-O2-ETCO2   O2 Device (Oxygen Therapy) T-Piece Trial   Oxygen Concentration (%) 40   SpO2 97 %   Pulse Oximetry Type Continuous   $ Pulse Oximetry - Multiple Charge Pulse Oximetry - Multiple   Pulse 85   Resp 12   Ready to Wean/Extubation Screen   FIO2<=50 (chart decimal) 0.4       Deflated cuff on trach per MD orders. Patient tolerating well and able to speak. All vital signs stable, will continue to monitor.

## 2017-08-22 NOTE — PLAN OF CARE
EGD- successful placement of PEG tube    Recommendations:  -Ok to use PEG for feeds, medications and water in 4 hours (1600)  -Ok to resume anticoagulation  -Routine PEG care    Eli Christian MD

## 2017-08-22 NOTE — ANESTHESIA POSTPROCEDURE EVALUATION
"Anesthesia Post Evaluation    Patient: Kevan Colin    Procedure(s) Performed: Procedure(s) (LRB):  ESOPHAGOGASTRODUODENOSCOPY (EGD) (N/A)  PLACEMENT-TUBE-PEG (N/A)    Final Anesthesia Type: general  Patient location during evaluation: PACU  Patient participation: Yes- Able to Participate  Level of consciousness: awake and alert  Post-procedure vital signs: reviewed and stable  Pain management: adequate  Airway patency: patent  PONV status at discharge: No PONV  Anesthetic complications: no      Cardiovascular status: blood pressure returned to baseline  Respiratory status: unassisted and T-piece  Hydration status: euvolemic  Follow-up not needed.        Visit Vitals  BP (!) 102/54   Pulse 96   Temp 37.2 °C (99 °F)   Resp (!) 23   Ht 6' 2" (1.88 m)   Wt 113.2 kg (249 lb 9 oz)   SpO2 95%   BMI 32.04 kg/m²       Pain/[place] Score: Pain Assessment Performed: Yes (8/22/2017 12:00 PM)  Presence of Pain: non-verbal indicators absent (8/22/2017 12:00 PM)  Pain Rating Prior to Med Admin: 7 (8/22/2017 10:00 AM)  Pain Rating Post Med Admin: 4 (8/22/2017 10:00 AM)      "

## 2017-08-22 NOTE — PROGRESS NOTES
Report called to John acuna ANTOLINRichie Suarez to be called and transport arranged for 4PM.    Calvin Mello RN

## 2017-08-22 NOTE — PLAN OF CARE
08/22/17 0757   Patient Assessment/Suction   Level of Consciousness (AVPU) alert   Respiratory Effort Unlabored   Expansion/Accessory Muscles/Retractions expansion symmetric;no retractions;no use of accessory muscles   All Lung Fields Breath Sounds coarse   Rhythm/Pattern, Respiratory pattern regular;unlabored   Cough Frequency infrequent   Cough Type good;productive   Suction Method in-line suction catheter (closed);tracheostomy   Sputum Amount moderate   Sputum Color white-yellow   Sputum Consistency thick   PRE-TX-O2-ETCO2   O2 Device (Oxygen Therapy) T-Piece Trial   $ Is the patient on Oxygen? Yes   Flow (L/min) 10   Oxygen Concentration (%) 40   SpO2 (!) 93 %   Pulse Oximetry Type Continuous   $ Pulse Oximetry - Multiple Charge Pulse Oximetry - Multiple   Pulse 74   Resp 18   Aerosol Therapy   $ Aerosol Therapy Charges Aerosol Treatment   Respiratory Treatment Status given   SVN/Inhaler Treatment Route in-line   Position During Treatment HOB at 45 degrees   Patient Tolerance good   Post-Treatment   Post-treatment Heart Rate (beats/min) 71   Post-treatment Resp Rate (breaths/min) 18   All Fields Breath Sounds aeration increased       Surgical Airway 08/18/17 1500   Placement Date/Time: 08/18/17 1500   Present Prior to Hospital Arrival?: No  Inserted by: MD  Airway Device Size: 6.0   Cuff Pressure 28 cm H2O   Status Secured   Site Assessment Clean;Dry;No bleeding;No drainage   Ties Assessment Clean;Dry;Intact;Secure   Ready to Wean/Extubation Screen   FIO2<=50 (chart decimal) 0.4       Patient resting comfortably with no distress noted. Aerosol treatments q6 with Duoneb.

## 2017-08-22 NOTE — PLAN OF CARE
Problem: Nutrition, Enteral (Adult)  Goal: Signs and Symptoms of Listed Potential Problems Will be Absent, Minimized or Managed (Nutrition, Enteral)  Signs and symptoms of listed potential problems will be absent, minimized or managed by discharge/transition of care (reference Nutrition, Enteral (Adult) CPG).   Outcome: Ongoing (interventions implemented as appropriate)  Recommendations  Recommendation/Intervention: 1.) Discharge feeds: recommend Isosource 1.5 @ 30 mls/hr advancing by 10 mls Q4 hrs to goal rate 55 mls/hr providing 1980 kcals/day, 90 g protein/day, 232 g CHO/day, and 1008 mls water/day. Hold TF x4 hrs for residuals >250mls. Flush 160 mls Q4 hrs to meet fluid needs or per MD.   Goals: 1.) Enteral nutrition will initiate within 72 hrs. 2.) Patient will tolerate TF at goal rate  Nutrition Goal Status: 1.) goal met 2.) progressing toward goal  Communication of RD Recs: reviewed with RN

## 2017-08-22 NOTE — OR NURSING
A trocar and guidewire was introduced and a standard 20 FR PEG was sucsessfuly placed via pull through traction technique. It sits at 3.5 CM level with skin/site.
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
## 2017-08-22 NOTE — TRANSFER OF CARE
"Anesthesia Transfer of Care Note    Patient: Kevan Colin    Procedure(s) Performed: Procedure(s) (LRB):  ESOPHAGOGASTRODUODENOSCOPY (EGD) (N/A)  PLACEMENT-TUBE-PEG (N/A)    Patient location: PACU    Anesthesia Type: general    Transport from OR: Transported from OR on 2-3 L/min O2 by NC with adequate spontaneous ventilation    Post pain: adequate analgesia    Post assessment: no apparent anesthetic complications and tolerated procedure well    Post vital signs: stable    Level of consciousness: awake, alert and oriented    Nausea/Vomiting: no nausea/vomiting    Complications: none    Transfer of care protocol was followed      Last vitals:   Visit Vitals  BP (!) 172/76   Pulse 87   Temp 37.2 °C (99 °F)   Resp 18   Ht 6' 2" (1.88 m)   Wt 113.2 kg (249 lb 9 oz)   SpO2 97%   BMI 32.04 kg/m²     "
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
## 2017-08-22 NOTE — PLAN OF CARE
08/1955   Patient Assessment/Suction   Level of Consciousness (AVPU) alert   Respiratory Effort Unlabored   Expansion/Accessory Muscles/Retractions expansion symmetric   All Lung Fields Breath Sounds coarse   Rhythm/Pattern, Respiratory pattern regular   Cough Frequency frequent   Cough Type good;productive   Suction Method in-line suction catheter (closed)   $ Suction Charges Inline Suction Procedure Stat Charge   Sputum Amount large   Sputum Color yellow   Sputum Consistency thick   PRE-TX-O2-ETCO2   O2 Device (Oxygen Therapy) T-Piece Trial   $ Is the patient on Oxygen? Yes   Oxygen Concentration (%) 40   SpO2 95 %   Pulse Oximetry Type Continuous   $ Pulse Oximetry - Multiple Charge Pulse Oximetry - Multiple   Pulse 86   Resp 18   Aerosol Therapy   $ Aerosol Therapy Charges Aerosol Treatment   Respiratory Treatment Status given   SVN/Inhaler Treatment Route in-line   Position During Treatment HOB at 45 degrees   Patient Tolerance good   Post-Treatment   Post-treatment Heart Rate (beats/min) 88   Post-treatment Resp Rate (breaths/min) 18   All Fields Breath Sounds aeration increased   Preset Conventional Ventilator Settings   Vent Type   (ventilator on stand-by at bedside)   Ready to Wean/Extubation Screen   FIO2<=50 (chart decimal) 0.4

## 2017-08-22 NOTE — PROGRESS NOTES
Progress Note  PULMONARY    Admit Date: 8/11/2017 08/22/2017      SUBJECTIVE:     Aug 14, pt presented with sore throat and sob x 1 day on 8/11, intubated 8/12 with airway difficulties, extubated and intubated 8/13 with stridor again.  Pt had ct neck early in course.  Now sedate on vent.  Aug 15, sedated, discussed with wife   Aug 16, sedate, discussed with wife.  Aug 17, trach  Aug 18, post trach , awake, tolerates cpap  Aug 19, did well with wean, no c/o.   Aug 20, doing ok with wean but poor loc still, delirium.  Aug 21, affect is very unusual, grimaces with light touch of shoulder?  Aug 22, indicates pain in groin area?? On t tube    PFSH and Allergies reviewed.  Unable to do ros due to sedation and intubation.  OBJECTIVE:     Vitals (Most recent):  Vitals:    08/22/17 1300   BP: (!) 174/66   Pulse: 96   Resp: (!) 23   Temp:        Vitals (24 hour range):  Temp:  [99 °F (37.2 °C)-100 °F (37.8 °C)]   Pulse:  []   Resp:  [10-71]   BP: (102-203)/()   SpO2:  [84 %-98 %]       Intake/Output Summary (Last 24 hours) at 08/22/17 1338  Last data filed at 08/22/17 1220   Gross per 24 hour   Intake             3060 ml   Output             6675 ml   Net            -3615 ml          Physical Exam:  The patient's neuro status (alertness,orientation,cognitive function,motor skills,), pharyngeal exam (oral lesions, hygiene, abn dentition,), Neck (jvd,mass,thyroid,nodes in neck and above/below clavicle),RESPIRATORY(symmetry,effort,fremitus,percussion,auscultation),  Cor(rhythm,heart tones including gallops,perfusion,edema)ABD(distention,hepatic&splenomegaly,tenderness,masses), Skin(rash,cyanosis),Psyc(affect,judgement,).  Exam negative except for these pertinent findings:    Trach in place  Good bs, no edema,  No distress, affect unusual.  Nl cor.      Radiographs reviewed: view by direct vision  , cxr with right atelectasis and more opacified 8/15 (no new) right lower lung.lower lung pneumonia    Results for  orders placed during the hospital encounter of 08/11/17   X-Ray Chest 1 View    Narrative AP chest compared to 08/13/2017    Findings: The endotracheal tube and nasogastric tube remain appropriately positioned.  A spinal stimulator device is again noted.  The heart size is upper normal.  There are persistent perihilar and bibasilar infiltrates as well as a small left pleural effusion.  An increase in consolidative component is seen at the right lung base.  There is no pneumothorax.    Impression Appropriate positioning of support devices.  Persistent perihilar and bibasilar infiltrates, with increase in consolidative component on the right.  Stable small left pleural effusion.      Electronically signed by: Ishmael Flood MD  Date:     08/14/17  Time:    07:39    ]    Labs       Recent Labs  Lab 08/22/17  0248   WBC 11.00   HGB 12.1*   HCT 36.2*          Recent Labs  Lab 08/21/17  2331 08/22/17  0248   NA  --  141   K  --  3.9   CL  --  105   CO2  --  27   BUN  --  10   CREATININE  --  0.7   GLU  --  187*   CALCIUM  --  9.1   AST  --  20   ALT  --  102*   ALKPHOS  --  182*   BILITOT  --  0.5   PROT  --  6.7   ALBUMIN  --  2.5*   INR 1.1  --        Recent Labs  Lab 08/22/17  0541   PH 7.426   PCO2 40.9   PO2 68*   HCO3 26.9     Microbiology Results (last 7 days)     Procedure Component Value Units Date/Time    Blood culture [[id number]] Collected:  08/14/17 1448    Order Status:  Completed Specimen:  Blood from Peripheral, Right  Hand Updated:  08/19/17 2312     Blood Culture, Routine No growth after 5 days.    Blood Culture #1 [[id number]] Collected:  08/11/17 0734    Order Status:  Completed Specimen:  Blood from Antecubital, Right  Arm Updated:  08/16/17 1412     Blood Culture, Routine No growth after 5 days.    Blood Culture #2 [[id number]] Collected:  08/11/17 0733    Order Status:  Completed Specimen:  Blood Updated:  08/16/17 1412     Blood Culture, Routine No growth after 5 days.    Culture, Respiratory  with Gram Stain [[id number]]  (Susceptibility) Collected:  08/12/17 1340    Order Status:  Completed Specimen:  Respiratory from Tracheal Aspirate Updated:  08/15/17 1414     Respiratory Culture --     METHICILLIN RESISTANT STAPHYLOCOCCUS AUREUS  Many       Gram Stain (Respiratory) <10 epithelial cells per low power field.     Gram Stain (Respiratory) Rare WBC's     Gram Stain (Respiratory) Moderate Gram positive cocci          Impression:  Active Hospital Problems    Diagnosis  POA    *Acute respiratory failure with hypoxia and hypercarbia [J96.01, J96.02]  No    Feeding difficulty [R63.3]  Unknown    S/P emergency tracheotomy for assistance in breathing [Z93.0]  Not Applicable    Staphylococcal pneumonia [J15.20]  Yes    Type 2 diabetes mellitus without complication [E11.9]  Yes    Benign essential HTN [I10]  Yes    JOSE ALBERTO (obstructive sleep apnea) [G47.33]  Yes    Depression [F32.9]  Yes    CAD (coronary artery disease) [I25.10]  Yes    DVT (deep venous thrombosis)- Hx IVC filter [I82.409]  Yes      Resolved Hospital Problems    Diagnosis Date Resolved POA   No resolved problems to display.               Plan:     Aug 14, will  need trach, add valium to facilitate sedation, with staph pneumonia - steroids need to be minimal.  Will dose vanc til sensitivity out.  Start tube feed, stop xarelto and use lovenox.    Tried to call wife - no answer.      The following were evaluated and adjusted as needed: mechanical ventilator settings and weaning status, intravenous fluids and nutritional status, sedation and neurologic status, antibiotics and acid base balance and oxygenation needs       Critical Care  - THE PATIENT HAS A HIGH PROBABILITY OF IMMINENT OR LIFE THREATENING DETERIORATION.  Over 50%time of encounter was in direct care at bedside.  Time was 30 to 74 minutes required for patient care.  Time needed for all of the above totaled 33 minutes.    Aug 15, staph id na still, still risk of bleed from xarelto,  will ask ent to see to figure plan.  Need to keep steroids low with staph pneumonia.  Expect trach best management option for airway stridor that required 2 intubations and cannot be dosed steroids (high dose).    Wil on propofol?  On coreg?  Observe- stop coreg if hr<45    Aug 16, stagnant, got xarelto 8/14, needs general surg to do trach.  Likely will wean quickly. Daily  Leak test reasonable but high risk airway- no steroids high dose with mrsa.    Aug 18- trach - now wean prn, mobilize speech eval , cont abx.    Aug 19, seems encephalopathic, resp status would improve quicker with mobilization.    Aug 20,  Labs good- pt not, encephalopathy, delirium, mrsa pneumonia,needs peg, tolerates t tube.  Discussed with dr Gomez    Aug 21, trach in place, hard to eval airway (may be able to pull trach) BUT pt's affect is ramez offf.   Best to set up transition back to home at ltac/[facility].  Will drop rocephin.  Cont vanc.    Aug 22, tolerates t tube, seems somewhat reasonable but light touch ppt severe agony?  Pt hard to evaluate.  Will deflate cuff.  F/u cxr            .

## 2017-08-22 NOTE — PROGRESS NOTES
Per Tosin with [facility], the pt is approved to transfer to their facility today. Cindy Duvall LMSW

## 2017-08-22 NOTE — OR NURSING
Lidocaine was injected at marked site and a small horizontal incision, aprox 1 CM was made to the mid upper abdomen area. Very minimal oozing noted.

## 2017-08-22 NOTE — ANESTHESIA PREPROCEDURE EVALUATION
08/22/2017  Kevan Colin is a 62 y.o., male.    Pre-op Assessment    I have reviewed the Patient Summary Reports.     I have reviewed the Nursing Notes.   I have reviewed the Medications.     Review of Systems  Social:  Former Smoker    Cardiovascular:   Hypertension Past MI CAD   QUIÑONES    Pulmonary:   Pneumonia COPD, severe Asthma Shortness of breath Sleep Apnea Respiratory failure s/p multiple failed weaning attempts    Renal/:  Renal/ Normal     Hepatic/GI:   Chronic pancreatitis    Musculoskeletal:   Arthritis   Spine Disorders: Degenerative disease    Neurological:   Peripheral Neuropathy    Endocrine:   Diabetes, type 2    Psych:   Psychiatric History depression          Physical Exam  General:  Obesity    Airway/Jaw/Neck:  Airway Findings: Mouth Opening: Normal Tongue: Normal  Pre-Existing Airway Tube(s): Tracheostomy tube        Eyes/Ears/Nose:  EYES/EARS/NOSE FINDINGS: Normal   Dental:  DENTAL FINDINGS: Normal   Chest/Lungs:  Chest/Lungs Findings: Clear to auscultation, Normal Respiratory Rate     Heart/Vascular:  Heart Findings: Rate: Normal  Rhythm: Regular Rhythm  Sounds: Normal     Abdomen:  Abdomen Findings: Normal    Musculoskeletal:  Musculoskeletal Findings: Normal   Skin:  Skin Findings: Normal    Mental Status:  Mental Status Findings:  Unconscious         Anesthesia Plan  Type of Anesthesia, risks & benefits discussed:  Anesthesia Type:  general  Patient's Preference:   Intra-op Monitoring Plan: standard ASA monitors  Intra-op Monitoring Plan Comments:   Post Op Pain Control Plan: per primary service following discharge from PACU and IV/PO Opioids PRN  Post Op Pain Control Plan Comments:   Induction:   IV  Beta Blocker:  Patient is not currently on a Beta-Blocker (No further documentation required).       Informed Consent: Patient representative understands risks and agrees with  Anesthesia plan.  Questions answered. Anesthesia consent signed with patient representative.  ASA Score: 4     Day of Surgery Review of History & Physical: I have interviewed and examined the patient. I have reviewed the patient's H&P dated:    H&P update referred to the surgeon.         Ready For Surgery From Anesthesia Perspective.

## 2017-08-22 NOTE — PLAN OF CARE
Problem: Patient Care Overview  Goal: Plan of Care Review  Outcome: Ongoing (interventions implemented as appropriate)  Pt VSS, remained on t-piece throughout the night, restraints dc'd, pt awake and asking appropriate questions,  NPO after MN for placement of PEG in the am

## 2017-08-22 NOTE — CONSULTS
Kevan Colin [id number] is a 62 y.o. male who has been consulted for vancomycin dosing.    The patient has the following labs:     Date Creatinine (mg/dl)    BUN WBC Count   8/22/2017 Estimated Creatinine Clearance: 144.5 mL/min (based on Cr of 0.7). Lab Results   Component Value Date    BUN 10 08/22/2017     Lab Results   Component Value Date    WBC 11.00 08/22/2017        Current weight is 113.2 kg (249 lb 9 oz)    Pt is receiving vancomycin 2000 mg every 12 hours.  Vancomycin trough from 8/22 at 0248 was 18.7 mg/dL.  Target trough range is 15-20 mg/dL.   Trough was drawn on time and anticipate it is therapeutic. Pharmacy will continue current dose.   A vancomycin trough has been ordered for 8/23 at 0230.    Patient will be followed by pharmacy for changes in renal function, toxicity, and efficacy.  Thank you for allowing us to participate in this patient's care.     Kate Dougherty

## 2017-08-22 NOTE — PROGRESS NOTES
Erick at  to  pt for transport to [facility]. Pt in NAD. Pt off floor via stretcher.    Calvin Mello RN

## 2017-08-22 NOTE — PT/OT/SLP PROGRESS
Physical Therapy      Kevan Kvng Colin  MRN: [id number]    Patient not seen today secondary to  (bedside PEG procedure). Will follow-up 08-.    Sheila Hardin, PT

## 2017-08-23 NOTE — PLAN OF CARE
08/23/17 1553   Final Note   Assessment Type Final Discharge Note   Discharge Disposition LTAC

## 2017-08-25 PROBLEM — R63.30 FEEDING DIFFICULTY: Status: ACTIVE | Noted: 2017-08-25

## 2017-10-11 ENCOUNTER — LAB VISIT (OUTPATIENT)
Dept: LAB | Facility: HOSPITAL | Age: 62
End: 2017-10-11
Attending: NURSE PRACTITIONER
Payer: MEDICARE

## 2017-10-11 DIAGNOSIS — J96.11 CHRONIC HYPOXEMIC RESPIRATORY FAILURE: Primary | ICD-10-CM

## 2017-10-11 LAB
ANION GAP SERPL CALC-SCNC: 12 MMOL/L
BNP SERPL-MCNC: 162 PG/ML
BUN SERPL-MCNC: 11 MG/DL
CALCIUM SERPL-MCNC: 8.4 MG/DL
CHLORIDE SERPL-SCNC: 103 MMOL/L
CO2 SERPL-SCNC: 24 MMOL/L
CREAT SERPL-MCNC: 0.9 MG/DL
EST. GFR  (AFRICAN AMERICAN): >60 ML/MIN/1.73 M^2
EST. GFR  (NON AFRICAN AMERICAN): >60 ML/MIN/1.73 M^2
GLUCOSE SERPL-MCNC: 207 MG/DL
POTASSIUM SERPL-SCNC: 4.5 MMOL/L
SODIUM SERPL-SCNC: 139 MMOL/L

## 2017-10-11 PROCEDURE — 80048 BASIC METABOLIC PNL TOTAL CA: CPT

## 2017-10-11 PROCEDURE — 83880 ASSAY OF NATRIURETIC PEPTIDE: CPT

## 2017-10-11 PROCEDURE — 36415 COLL VENOUS BLD VENIPUNCTURE: CPT

## 2017-10-16 ENCOUNTER — OFFICE VISIT (OUTPATIENT)
Dept: PAIN MEDICINE | Facility: CLINIC | Age: 62
End: 2017-10-16
Payer: MEDICARE

## 2017-10-16 VITALS
HEART RATE: 72 BPM | DIASTOLIC BLOOD PRESSURE: 70 MMHG | SYSTOLIC BLOOD PRESSURE: 120 MMHG | RESPIRATION RATE: 20 BRPM | WEIGHT: 258.38 LBS | TEMPERATURE: 98 F | BODY MASS INDEX: 33.17 KG/M2

## 2017-10-16 DIAGNOSIS — Z79.891 OPIOID CONTRACT EXISTS: ICD-10-CM

## 2017-10-16 DIAGNOSIS — M96.1 POSTLAMINECTOMY SYNDROME OF LUMBAR REGION: Primary | ICD-10-CM

## 2017-10-16 DIAGNOSIS — G89.4 CHRONIC PAIN SYNDROME: ICD-10-CM

## 2017-10-16 DIAGNOSIS — M51.36 DDD (DEGENERATIVE DISC DISEASE), LUMBAR: ICD-10-CM

## 2017-10-16 PROCEDURE — 99215 OFFICE O/P EST HI 40 MIN: CPT | Mod: PBBFAC,PO | Performed by: PHYSICIAN ASSISTANT

## 2017-10-16 PROCEDURE — 99213 OFFICE O/P EST LOW 20 MIN: CPT | Mod: S$PBB,,, | Performed by: PHYSICIAN ASSISTANT

## 2017-10-16 PROCEDURE — 99999 PR PBB SHADOW E&M-EST. PATIENT-LVL V: CPT | Mod: PBBFAC,,, | Performed by: PHYSICIAN ASSISTANT

## 2017-10-16 RX ORDER — OXYCODONE AND ACETAMINOPHEN 10; 325 MG/1; MG/1
1 TABLET ORAL 3 TIMES DAILY PRN
Qty: 90 TABLET | Refills: 0 | Status: SHIPPED | OUTPATIENT
Start: 2017-10-16 | End: 2017-11-15

## 2017-10-16 RX ORDER — OXYCODONE AND ACETAMINOPHEN 10; 325 MG/1; MG/1
1 TABLET ORAL 3 TIMES DAILY PRN
Qty: 90 TABLET | Refills: 0 | Status: SHIPPED | OUTPATIENT
Start: 2017-12-15 | End: 2018-01-08 | Stop reason: SDUPTHER

## 2017-10-16 RX ORDER — OXYCODONE AND ACETAMINOPHEN 10; 325 MG/1; MG/1
1 TABLET ORAL 3 TIMES DAILY PRN
Qty: 90 TABLET | Refills: 0 | Status: SHIPPED | OUTPATIENT
Start: 2017-11-15 | End: 2017-12-15

## 2017-10-16 NOTE — PROGRESS NOTES
CC:back pain    HPI: The patient is a 62-year-old man with a history of peripheral vascular disease, diabetes, cervical DDD, lumbar postlaminectomy syndrome who presents in followup.  He returns in follow-up today with back pain.  Since his last visit, he reports being hospitalized with pneumonia and respiratory failure requiring endotracheal intubation and eventual tracheostomy.  He was transferred to an LTAC and is now at home with home health care nursing.  He states that he is doing much better and is back to going for walks for exercise.  He reports excellent relief of his low back and bilateral leg pain with his pain medication and his spinal cord stimulator.  He denies weakness, numbness, bladder or bowel incontinence.    Pain intervention history: He has seen several other providers including Dr. Victor and Dr. Win Mckeon.  He has a cervical dorsal column stimulator which provides relief of neck pain.He had taken higher dose pain medication in the past but presented to us taking 60 mg of MS Contin t.i.d. and oxycodone 15 mg one to 2 tablets t.i.d.  He is taking gabapentin 300 mg t.i.d.  Baclofen 20 mg t.i.d.  Amitriptyline 25 mg q.h.s. He had undergone fusion L4-S1 in 2012.    ROS:He reports shortness of breath, weight gain, back pain, joint stiffness, and loss of balance.  Balance of review of systems is negative.    Medical, surgical, family and social history reviewed elsewhere in record.    Medications/Allergies: See med card    Vitals:    10/16/17 0927   BP: 120/70   Pulse: 72   Resp: 20   Temp: 97.8 °F (36.6 °C)   TempSrc: Oral   Weight: 117.2 kg (258 lb 6.1 oz)   PainSc:   6   PainLoc: Back         Physical exam:  Gen: A and O x3, pleasant, well-groomed  Skin:  Taut skin in legs with purplish hue bilateral lower legs.   HEENT: PERRLA  CVS: Regular rate and rhythm, normal S1 and S2, no murmurs.  Resp: Clear to auscultation bilaterally, no wheezes or rales.  Abdomen: Soft, NT/ND, normal bowel  sounds present.  Musculoskeletal: Antalgic gait. Unable to heel and toe walk.      Neuro:  Upper extremities: 5/5 strength bilaterally  Lower extremities: 5/5 strength bilaterally   Reflexes: Brachioradialis 0+, Bicep 0+, Tricep 0+, Patellar 0+, Achilles 0+ bilaterally.  Sensory:  Intact and symmetrical to light touch and pinprick in C2-T1 dermatomes bilaterally.  Intact and symmetrical to light touch and pinprick in L2-S1 dermatomes bilaterally, except for L4-S1 distribution right leg decreased sensation.    Lumbar spine:  Range of motion is mildly limited with flexion and extension without increased pain.  Straight leg raise negative bilaterally.  Internal and external rotation of hip is negative bilaterally.  Matteo's test is negative bilaterally.  Myofascial exam: No tenderness to palpation to lumbar paraspinous muscles.    Imagin12 Xray L-spine  There is disk desiccation and disk space narrowing at L5-S1. There is mild diffuse disk space narrowing elsewhere throughout the lumbar spine. Osteophytes project from vertebral bodies and there is sclerosis and posterior facets. There is no acute compression or subluxation.    CT L-spine 7/15/13  T12-L1: Left posterior lateral disk bulging and osteophyte formation is present causing mild left foraminal narrowing the central canal and right foraminal intact.  L1/2 Mild annular disk bulge and osteophyte formation is present causing a mild canal and left foraminal narrowing.  L2/3: Degenerative facet changes combine with disk bulging to produce mild left greater than right foraminal narrowing. Central canal is mildly distorted.  L3/4: There is mild annular disk bulging and degenerative facet changes are present. There is mild canal and foraminal distortion.  L4/5: At this level levels of prior laminectomy canal canal visualization is limited. The bony foramina appear grossly intact and there is no evidence of malalignment.  L5/S1: Moderate degenerative facet  changes are present and there is some bony foraminal narrowing on the left. There is evidence of prior laminectomy and the right foramen is intact. The bony canal soft tissue visualization is limited but it is grossly intact the degenerative changes are seen in the sacroiliac joints.    X-ray thoracic spine 12/18/14  Evidence of anterior cervical fusion is noted in the lower cervical spine. Neurostimulator appears to be present entering at approximately the T10-T11 level with its tip at the T8 level. Vertebral body heights appear well preserved. Alignment appears adequate. Osseous demineralization is noted.   T7-T8 T8-T9 and T9-T10 mild intervertebral disk height loss appears to be present.     X-ray thoracic and lumbar spine 12/28/15  Comparison: Thoracic spine radiographs-12/18/2014  Thoracic spine: There has been interval development of a new minor superior endplate compression fracture at the superior endplate of T7. There are two neurostimulator leads present within the posterior aspect of the thoracic spinal canal which terminating at approximately T7. No retropulsion. There is multilevel degenerative change of the thoracic spine in the form of marginal osteophyte formation and intervertebral disc space narrowing. There is suddenly observed postoperative change of ACDF at C5-C7. The heads of the C7 vertebral body screws do not appear firmly apposed to the adjacent metallic fixation plate.  Lumbar spine:There are postoperative changes of posterior intermittent fusion at L4-S1 with interbody device is noted at L4-L5 and L5-S1. No hardware complication. There is an IVC filter present. There is degenerative change of the lumbar spine in the form of marginal osteophyte formation and disc space narrowing.     CT cervical spine 3/7/16  Anterior plate fusion is noted at the C5, C6, and C7 levels with intervertebral disk prostheses at the C5-C6 C6-C7 levels.  There is loss of the normal cervical lordosis which may be  positional or related to muscular strain.  Vertebral body heights appear well-preserved.  At the C2-C3 level, mild disk osteophyte spurring is noted of approximately 2-3 mm.  No significant neuroforaminal stenosis is noted, minimal central canal narrowing is noted.  At the C3-C4 level mild disk osteophyte spurring is noted of approximately 2 mm.  No significant nerve foraminal narrowing is noted.  Minimal central canal narrowing is noted.  At the C4-C5 level, mild disk osteophyte bulge is noted centrally of up to 3 mm.  Facet arthropathy is noted bilaterally.  Moderate bilateral neuroforaminal narrowing appears to be present.  Mild central canal narrowing is noted.  At the C5-C6 level, a disk prosthesis is noted with metallic artifact hindering ideal evaluation.  Uncovertebral spurring and facet arthropathy appears to be present right greater than left with moderate right neural foraminal stenosis and mild to moderate left neural foraminal stenosis.  Mild to moderate central canal narrowing is suspected but not ideally evaluated  At the C6-C7 level, uncovertebral spurring is noted bilaterally.  Moderate to severe left-sided neuroforaminal stenosis is noted with moderate right-sided neuroforaminal stenosis.  Mild to moderate central canals narrowing is suspected.  Evaluation is hindered by metallic artifact.  At the C7-T1 level, uncovertebral spurring is noted to the right greater than left with severe right neural foraminal stenosis suspected and mild left neural foraminal stenosis with mild central canal is stenosis suspected.  Evaluation of this level is hindered by metallic artifact      Assessment:  The patient is a 62-year-old man with a history of peripheral vascular disease, diabetes, cervical DDD, lumbar postlaminectomy syndrome who presents in followup.      1. Postlaminectomy syndrome of lumbar region    2. Chronic pain syndrome    3. DDD (degenerative disc disease), lumbar    4. Opioid contract exists         Plan:  1.  Dr. Buckley has provided prescriptions for oxycodone-acetaminophen 10/325 mg up to 3 times a day as needed for pain.  I have reviewed the [place] [employer] website and there are no abberancies.    2.  Follow-up in 3 months or sooner as needed.    Greater than 50% of this 15 minute visit was spent counseling the patient.

## 2017-10-21 ENCOUNTER — HOSPITAL ENCOUNTER (EMERGENCY)
Facility: HOSPITAL | Age: 62
Discharge: HOME | End: 2017-10-21
Attending: EMERGENCY MEDICINE
Payer: MEDICARE

## 2017-10-21 VITALS
RESPIRATION RATE: 20 BRPM | BODY MASS INDEX: 32.08 KG/M2 | TEMPERATURE: 99 F | SYSTOLIC BLOOD PRESSURE: 108 MMHG | DIASTOLIC BLOOD PRESSURE: 67 MMHG | HEART RATE: 77 BPM | HEIGHT: 74 IN | WEIGHT: 250 LBS | OXYGEN SATURATION: 99 %

## 2017-10-21 DIAGNOSIS — R11.10 EMESIS: ICD-10-CM

## 2017-10-21 DIAGNOSIS — K52.9 GASTROENTERITIS: Primary | ICD-10-CM

## 2017-10-21 DIAGNOSIS — R07.0 THROAT PAIN IN ADULT: ICD-10-CM

## 2017-10-21 LAB
ALBUMIN SERPL BCP-MCNC: 3.7 G/DL
ALP SERPL-CCNC: 94 U/L
ALT SERPL W/O P-5'-P-CCNC: 9 U/L
ANION GAP SERPL CALC-SCNC: 11 MMOL/L
AST SERPL-CCNC: 45 U/L
BASOPHILS # BLD AUTO: 0.1 K/UL
BASOPHILS NFR BLD: 0.9 %
BILIRUB SERPL-MCNC: 0.2 MG/DL
BILIRUB UR QL STRIP: NEGATIVE
BUN SERPL-MCNC: 10 MG/DL
CALCIUM SERPL-MCNC: 9 MG/DL
CHLORIDE SERPL-SCNC: 102 MMOL/L
CLARITY UR: CLEAR
CO2 SERPL-SCNC: 22 MMOL/L
COLOR UR: YELLOW
CREAT SERPL-MCNC: 1 MG/DL
DIFFERENTIAL METHOD: ABNORMAL
EOSINOPHIL # BLD AUTO: 0.2 K/UL
EOSINOPHIL NFR BLD: 2.9 %
ERYTHROCYTE [DISTWIDTH] IN BLOOD BY AUTOMATED COUNT: 14 %
EST. GFR  (AFRICAN AMERICAN): >60 ML/MIN/1.73 M^2
EST. GFR  (NON AFRICAN AMERICAN): >60 ML/MIN/1.73 M^2
GLUCOSE SERPL-MCNC: 211 MG/DL
GLUCOSE UR QL STRIP: NEGATIVE
HCT VFR BLD AUTO: 40 %
HGB BLD-MCNC: 13.5 G/DL
HGB UR QL STRIP: NEGATIVE
KETONES UR QL STRIP: NEGATIVE
LEUKOCYTE ESTERASE UR QL STRIP: NEGATIVE
LIPASE SERPL-CCNC: 17 U/L
LYMPHOCYTES # BLD AUTO: 2.6 K/UL
LYMPHOCYTES NFR BLD: 37.9 %
MCH RBC QN AUTO: 30.7 PG
MCHC RBC AUTO-ENTMCNC: 33.8 G/DL
MCV RBC AUTO: 91 FL
MONOCYTES # BLD AUTO: 0.5 K/UL
MONOCYTES NFR BLD: 6.8 %
NEUTROPHILS # BLD AUTO: 3.6 K/UL
NEUTROPHILS NFR BLD: 51.5 %
NITRITE UR QL STRIP: NEGATIVE
PH UR STRIP: 8 [PH] (ref 5–8)
PLATELET # BLD AUTO: 235 K/UL
PMV BLD AUTO: 7.8 FL
POTASSIUM SERPL-SCNC: 5 MMOL/L
PROT SERPL-MCNC: 7.3 G/DL
PROT UR QL STRIP: NEGATIVE
RBC # BLD AUTO: 4.41 M/UL
SODIUM SERPL-SCNC: 135 MMOL/L
SP GR UR STRIP: 1.01 (ref 1–1.03)
URN SPEC COLLECT METH UR: NORMAL
UROBILINOGEN UR STRIP-ACNC: NEGATIVE EU/DL
WBC # BLD AUTO: 6.9 K/UL

## 2017-10-21 PROCEDURE — 96374 THER/PROPH/DIAG INJ IV PUSH: CPT

## 2017-10-21 PROCEDURE — 63600175 PHARM REV CODE 636 W HCPCS: Performed by: EMERGENCY MEDICINE

## 2017-10-21 PROCEDURE — 36415 COLL VENOUS BLD VENIPUNCTURE: CPT

## 2017-10-21 PROCEDURE — 93005 ELECTROCARDIOGRAM TRACING: CPT

## 2017-10-21 PROCEDURE — 81003 URINALYSIS AUTO W/O SCOPE: CPT

## 2017-10-21 PROCEDURE — 85025 COMPLETE CBC W/AUTO DIFF WBC: CPT

## 2017-10-21 PROCEDURE — 80053 COMPREHEN METABOLIC PANEL: CPT

## 2017-10-21 PROCEDURE — 99284 EMERGENCY DEPT VISIT MOD MDM: CPT | Mod: 25

## 2017-10-21 PROCEDURE — 83690 ASSAY OF LIPASE: CPT

## 2017-10-21 PROCEDURE — 25000003 PHARM REV CODE 250: Performed by: EMERGENCY MEDICINE

## 2017-10-21 RX ORDER — ONDANSETRON 2 MG/ML
4 INJECTION INTRAMUSCULAR; INTRAVENOUS
Status: COMPLETED | OUTPATIENT
Start: 2017-10-21 | End: 2017-10-21

## 2017-10-21 RX ORDER — LOPERAMIDE HYDROCHLORIDE 2 MG/1
2 CAPSULE ORAL 4 TIMES DAILY PRN
Qty: 12 CAPSULE | Refills: 0 | Status: SHIPPED | OUTPATIENT
Start: 2017-10-21 | End: 2017-10-31

## 2017-10-21 RX ORDER — ONDANSETRON 4 MG/1
4 TABLET, ORALLY DISINTEGRATING ORAL EVERY 8 HOURS PRN
Qty: 12 TABLET | Refills: 0 | Status: SHIPPED | OUTPATIENT
Start: 2017-10-21 | End: 2017-12-14 | Stop reason: ALTCHOICE

## 2017-10-21 RX ORDER — HYDROCODONE POLISTIREX AND CHLORPHENIRAMINE POLISTIREX 10; 8 MG/5ML; MG/5ML
5 SUSPENSION, EXTENDED RELEASE ORAL EVERY 12 HOURS PRN
Qty: 115 ML | Refills: 0 | Status: SHIPPED | OUTPATIENT
Start: 2017-10-21 | End: 2018-02-12

## 2017-10-21 RX ADMIN — LIDOCAINE HYDROCHLORIDE: 20 SOLUTION ORAL; TOPICAL at 04:10

## 2017-10-21 RX ADMIN — ONDANSETRON 4 MG: 2 INJECTION INTRAMUSCULAR; INTRAVENOUS at 03:10
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
## 2017-12-13 ENCOUNTER — HOSPITAL ENCOUNTER (EMERGENCY)
Facility: HOSPITAL | Age: 62
Discharge: HOME | End: 2017-12-14
Attending: EMERGENCY MEDICINE
Payer: MEDICARE

## 2017-12-13 VITALS
DIASTOLIC BLOOD PRESSURE: 75 MMHG | HEART RATE: 61 BPM | BODY MASS INDEX: 32.08 KG/M2 | OXYGEN SATURATION: 96 % | RESPIRATION RATE: 16 BRPM | TEMPERATURE: 98 F | WEIGHT: 250 LBS | SYSTOLIC BLOOD PRESSURE: 136 MMHG | HEIGHT: 74 IN

## 2017-12-13 DIAGNOSIS — R10.13 EPIGASTRIC PAIN: ICD-10-CM

## 2017-12-13 DIAGNOSIS — R11.2 NON-INTRACTABLE VOMITING WITH NAUSEA, UNSPECIFIED VOMITING TYPE: Primary | ICD-10-CM

## 2017-12-13 LAB
ALBUMIN SERPL BCP-MCNC: 3.6 G/DL
ALP SERPL-CCNC: 182 U/L
ALT SERPL W/O P-5'-P-CCNC: 140 U/L
ANION GAP SERPL CALC-SCNC: 13 MMOL/L
AST SERPL-CCNC: 38 U/L
BASOPHILS # BLD AUTO: 0.1 K/UL
BASOPHILS NFR BLD: 1.4 %
BILIRUB SERPL-MCNC: 0.3 MG/DL
BUN SERPL-MCNC: 12 MG/DL
CALCIUM SERPL-MCNC: 9.4 MG/DL
CHLORIDE SERPL-SCNC: 101 MMOL/L
CO2 SERPL-SCNC: 22 MMOL/L
CREAT SERPL-MCNC: 0.9 MG/DL
DIFFERENTIAL METHOD: ABNORMAL
EOSINOPHIL # BLD AUTO: 0.2 K/UL
EOSINOPHIL NFR BLD: 2.9 %
ERYTHROCYTE [DISTWIDTH] IN BLOOD BY AUTOMATED COUNT: 13.8 %
EST. GFR  (AFRICAN AMERICAN): >60 ML/MIN/1.73 M^2
EST. GFR  (NON AFRICAN AMERICAN): >60 ML/MIN/1.73 M^2
GLUCOSE SERPL-MCNC: 294 MG/DL
HCT VFR BLD AUTO: 41.6 %
HGB BLD-MCNC: 13.9 G/DL
LIPASE SERPL-CCNC: 17 U/L
LYMPHOCYTES # BLD AUTO: 2.7 K/UL
LYMPHOCYTES NFR BLD: 37.3 %
MCH RBC QN AUTO: 30.1 PG
MCHC RBC AUTO-ENTMCNC: 33.3 G/DL
MCV RBC AUTO: 91 FL
MONOCYTES # BLD AUTO: 0.5 K/UL
MONOCYTES NFR BLD: 7.2 %
NEUTROPHILS # BLD AUTO: 3.7 K/UL
NEUTROPHILS NFR BLD: 51.2 %
PLATELET # BLD AUTO: 204 K/UL
PMV BLD AUTO: 7.9 FL
POCT GLUCOSE: 267 MG/DL (ref 70–110)
POTASSIUM SERPL-SCNC: 5 MMOL/L
PROT SERPL-MCNC: 7.6 G/DL
RBC # BLD AUTO: 4.6 M/UL
SODIUM SERPL-SCNC: 136 MMOL/L
TROPONIN I SERPL DL<=0.01 NG/ML-MCNC: <0.006 NG/ML
WBC # BLD AUTO: 7.2 K/UL

## 2017-12-13 PROCEDURE — 93005 ELECTROCARDIOGRAM TRACING: CPT

## 2017-12-13 PROCEDURE — 82962 GLUCOSE BLOOD TEST: CPT

## 2017-12-13 PROCEDURE — 84484 ASSAY OF TROPONIN QUANT: CPT

## 2017-12-13 PROCEDURE — 96365 THER/PROPH/DIAG IV INF INIT: CPT

## 2017-12-13 PROCEDURE — 99284 EMERGENCY DEPT VISIT MOD MDM: CPT | Mod: 25

## 2017-12-13 PROCEDURE — 85025 COMPLETE CBC W/AUTO DIFF WBC: CPT

## 2017-12-13 PROCEDURE — 80053 COMPREHEN METABOLIC PANEL: CPT

## 2017-12-13 PROCEDURE — 63600175 PHARM REV CODE 636 W HCPCS: Performed by: EMERGENCY MEDICINE

## 2017-12-13 PROCEDURE — 36415 COLL VENOUS BLD VENIPUNCTURE: CPT

## 2017-12-13 PROCEDURE — 83605 ASSAY OF LACTIC ACID: CPT

## 2017-12-13 PROCEDURE — 25000003 PHARM REV CODE 250: Performed by: EMERGENCY MEDICINE

## 2017-12-13 PROCEDURE — 83690 ASSAY OF LIPASE: CPT

## 2017-12-13 PROCEDURE — 96361 HYDRATE IV INFUSION ADD-ON: CPT

## 2017-12-13 RX ADMIN — PROMETHAZINE HYDROCHLORIDE 12.5 MG: 25 INJECTION INTRAMUSCULAR; INTRAVENOUS at 11:12

## 2017-12-13 RX ADMIN — SODIUM CHLORIDE 1000 ML: 0.9 INJECTION, SOLUTION INTRAVENOUS at 11:12

## 2017-12-13 RX ADMIN — IOHEXOL 100 ML: 350 INJECTION, SOLUTION INTRAVENOUS at 11:12

## 2017-12-14 LAB — LACTATE SERPL-SCNC: 1.2 MMOL/L

## 2017-12-14 PROCEDURE — 25500020 PHARM REV CODE 255: Performed by: EMERGENCY MEDICINE

## 2017-12-14 RX ORDER — PROMETHAZINE HYDROCHLORIDE 25 MG/1
25 TABLET ORAL EVERY 6 HOURS PRN
Qty: 15 TABLET | Refills: 0 | Status: SHIPPED | OUTPATIENT
Start: 2017-12-14 | End: 2018-02-12

## 2017-12-14 RX ORDER — ONDANSETRON 8 MG/1
8 TABLET, ORALLY DISINTEGRATING ORAL EVERY 12 HOURS PRN
Qty: 6 TABLET | Refills: 0 | Status: SHIPPED | OUTPATIENT
Start: 2017-12-14 | End: 2017-12-17
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
## 2017-12-14 NOTE — ED NOTES
No further vomiting noted while in ER. Condition stable. MD to bedside for discharge teaching, pt verbalizes understanding

## 2017-12-15 RX ORDER — OXYCODONE AND ACETAMINOPHEN 10; 325 MG/1; MG/1
1 TABLET ORAL 3 TIMES DAILY PRN
Qty: 90 TABLET | Refills: 0 | OUTPATIENT
Start: 2017-12-15 | End: 2018-01-14

## 2017-12-15 NOTE — TELEPHONE ENCOUNTER
----- Message from Ofelia Olivas sent at 12/15/2017  7:58 AM CST -----  Pt is requesting a refill today / oxycodone-acetaminophen (PERCOCET)  mg per tablet 90 tablet /next available appointment and scheduled for 01/08/18 .. call pt at [phone] (home)     [id number] - [place] - [address]  [address] [place] [address]  Phone: [phone] Fax: [phone]

## 2017-12-15 NOTE — TELEPHONE ENCOUNTER
----- Message from Efrain Munoz sent at 12/15/2017 10:39 AM CST -----  Contact: pt  Pt is calling to inform doctor that he found the prescription   Call Back#[phone]  Thanks

## 2018-01-08 ENCOUNTER — OFFICE VISIT (OUTPATIENT)
Dept: PAIN MEDICINE | Facility: CLINIC | Age: 63
End: 2018-01-08
Payer: MEDICARE

## 2018-01-08 VITALS
RESPIRATION RATE: 18 BRPM | SYSTOLIC BLOOD PRESSURE: 110 MMHG | WEIGHT: 266.31 LBS | BODY MASS INDEX: 34.19 KG/M2 | TEMPERATURE: 98 F | DIASTOLIC BLOOD PRESSURE: 60 MMHG | HEART RATE: 70 BPM

## 2018-01-08 DIAGNOSIS — M51.36 DDD (DEGENERATIVE DISC DISEASE), LUMBAR: ICD-10-CM

## 2018-01-08 DIAGNOSIS — Z79.891 OPIOID CONTRACT EXISTS: ICD-10-CM

## 2018-01-08 DIAGNOSIS — M62.838 MUSCLE SPASM: ICD-10-CM

## 2018-01-08 DIAGNOSIS — M96.1 POSTLAMINECTOMY SYNDROME OF LUMBAR REGION: Primary | ICD-10-CM

## 2018-01-08 DIAGNOSIS — G89.4 CHRONIC PAIN SYNDROME: ICD-10-CM

## 2018-01-08 PROCEDURE — 99999 PR PBB SHADOW E&M-EST. PATIENT-LVL V: CPT | Mod: PBBFAC,,, | Performed by: PHYSICIAN ASSISTANT

## 2018-01-08 PROCEDURE — 99213 OFFICE O/P EST LOW 20 MIN: CPT | Mod: S$PBB,,, | Performed by: PHYSICIAN ASSISTANT

## 2018-01-08 PROCEDURE — 99215 OFFICE O/P EST HI 40 MIN: CPT | Mod: PBBFAC,PN | Performed by: PHYSICIAN ASSISTANT

## 2018-01-08 RX ORDER — GABAPENTIN 300 MG/1
600 CAPSULE ORAL 3 TIMES DAILY
Qty: 180 CAPSULE | Refills: 5 | Status: SHIPPED | OUTPATIENT
Start: 2018-01-08 | End: 2018-06-13 | Stop reason: SDUPTHER

## 2018-01-08 RX ORDER — OXYCODONE AND ACETAMINOPHEN 10; 325 MG/1; MG/1
1 TABLET ORAL 3 TIMES DAILY PRN
Qty: 90 TABLET | Refills: 0 | Status: SHIPPED | OUTPATIENT
Start: 2018-02-13 | End: 2018-03-15

## 2018-01-08 RX ORDER — CYCLOBENZAPRINE HCL 5 MG
5 TABLET ORAL 2 TIMES DAILY PRN
Qty: 60 TABLET | Refills: 2 | Status: SHIPPED | OUTPATIENT
Start: 2018-01-08 | End: 2018-04-06 | Stop reason: SDUPTHER

## 2018-01-08 RX ORDER — OXYCODONE AND ACETAMINOPHEN 10; 325 MG/1; MG/1
1 TABLET ORAL 3 TIMES DAILY PRN
Qty: 90 TABLET | Refills: 0 | Status: SHIPPED | OUTPATIENT
Start: 2018-01-14 | End: 2018-02-13

## 2018-01-08 RX ORDER — OXYCODONE AND ACETAMINOPHEN 10; 325 MG/1; MG/1
1 TABLET ORAL 3 TIMES DAILY PRN
Qty: 90 TABLET | Refills: 0 | Status: SHIPPED | OUTPATIENT
Start: 2018-03-15 | End: 2018-04-09 | Stop reason: SDUPTHER

## 2018-01-08 NOTE — PROGRESS NOTES
CC:back pain    HPI: The patient is a 62-year-old man with a history of peripheral vascular disease, diabetes, cervical DDD, lumbar postlaminectomy syndrome who presents in followup.  He returns in follow-up today with low back pain.  He reports some worsening pain because his dog chewed up is stimulator recharger and he needs to get a new one so that he can use his spinal cord stimulator.  He has pain across the low back and into his legs.  He also reports having muscle spasms in his calves.  He has some relief of his pain with his medication but is having difficulty walking any distance since the stimulator is not working.  He complains of weakness and numbness in his legs.  He denies bladder or bowel incontinence.    Pain intervention history: He has seen several other providers including Dr. Victor and Dr. Win Mckeon.  He has a cervical dorsal column stimulator which provides relief of neck pain.He had taken higher dose pain medication in the past but presented to us taking 60 mg of MS Contin t.i.d. and oxycodone 15 mg one to 2 tablets t.i.d.  He is taking gabapentin 300 mg t.i.d.  Baclofen 20 mg t.i.d.  Amitriptyline 25 mg q.h.s. He had undergone fusion L4-S1 in 2012.    ROS:He reports shortness of breath, weight gain, back pain, joint stiffness, and loss of balance.  Balance of review of systems is negative.    Medical, surgical, family and social history reviewed elsewhere in record.    Medications/Allergies: See med card    Vitals:    01/08/18 1251   BP: 110/60   Pulse: 70   Resp: 18   Temp: 98.1 °F (36.7 °C)   TempSrc: Oral   Weight: 120.8 kg (266 lb 5.1 oz)   PainSc:   4   PainLoc: Back         Physical exam:  Gen: A and O x3, pleasant, well-groomed  Skin:  Taut skin in legs with purplish hue bilateral lower legs.   HEENT: PERRLA  CVS: Regular rate and rhythm, normal S1 and S2, no murmurs.  Resp: Clear to auscultation bilaterally, no wheezes or rales.  Abdomen: Soft, NT/ND, normal bowel sounds  present.  Musculoskeletal: Presents in wheelchair today.  Able to stand without assistance.    Neuro:  Upper extremities: 5/5 strength bilaterally  Lower extremities: 5/5 strength bilaterally   Reflexes: Brachioradialis 0+, Bicep 0+, Tricep 0+, Patellar 0+, Achilles 0+ bilaterally.  Sensory:  Intact and symmetrical to light touch and pinprick in C2-T1 dermatomes bilaterally.  Intact and symmetrical to light touch and pinprick in L2-S1 dermatomes bilaterally, except for L4-S1 distribution right leg decreased sensation.    Lumbar spine:  Range of motion is mildly limited with flexion and extension without increased pain.  Straight leg raise negative bilaterally.  Internal and external rotation of hip is negative bilaterally.  Matteo's test is negative bilaterally.  Myofascial exam: Mild tenderness to palpation to the right greater than left lumbar paraspinous muscles.    Imagin12 Xray L-spine  There is disk desiccation and disk space narrowing at L5-S1. There is mild diffuse disk space narrowing elsewhere throughout the lumbar spine. Osteophytes project from vertebral bodies and there is sclerosis and posterior facets. There is no acute compression or subluxation.    CT L-spine 7/15/13  T12-L1: Left posterior lateral disk bulging and osteophyte formation is present causing mild left foraminal narrowing the central canal and right foraminal intact.  L1/2 Mild annular disk bulge and osteophyte formation is present causing a mild canal and left foraminal narrowing.  L2/3: Degenerative facet changes combine with disk bulging to produce mild left greater than right foraminal narrowing. Central canal is mildly distorted.  L3/4: There is mild annular disk bulging and degenerative facet changes are present. There is mild canal and foraminal distortion.  L4/5: At this level levels of prior laminectomy canal canal visualization is limited. The bony foramina appear grossly intact and there is no evidence of  malalignment.  L5/S1: Moderate degenerative facet changes are present and there is some bony foraminal narrowing on the left. There is evidence of prior laminectomy and the right foramen is intact. The bony canal soft tissue visualization is limited but it is grossly intact the degenerative changes are seen in the sacroiliac joints.    X-ray thoracic spine 12/18/14  Evidence of anterior cervical fusion is noted in the lower cervical spine. Neurostimulator appears to be present entering at approximately the T10-T11 level with its tip at the T8 level. Vertebral body heights appear well preserved. Alignment appears adequate. Osseous demineralization is noted.   T7-T8 T8-T9 and T9-T10 mild intervertebral disk height loss appears to be present.     X-ray thoracic and lumbar spine 12/28/15  Comparison: Thoracic spine radiographs-12/18/2014  Thoracic spine: There has been interval development of a new minor superior endplate compression fracture at the superior endplate of T7. There are two neurostimulator leads present within the posterior aspect of the thoracic spinal canal which terminating at approximately T7. No retropulsion. There is multilevel degenerative change of the thoracic spine in the form of marginal osteophyte formation and intervertebral disc space narrowing. There is suddenly observed postoperative change of ACDF at C5-C7. The heads of the C7 vertebral body screws do not appear firmly apposed to the adjacent metallic fixation plate.  Lumbar spine:There are postoperative changes of posterior intermittent fusion at L4-S1 with interbody device is noted at L4-L5 and L5-S1. No hardware complication. There is an IVC filter present. There is degenerative change of the lumbar spine in the form of marginal osteophyte formation and disc space narrowing.     CT cervical spine 3/7/16  Anterior plate fusion is noted at the C5, C6, and C7 levels with intervertebral disk prostheses at the C5-C6 C6-C7 levels.  There is  loss of the normal cervical lordosis which may be positional or related to muscular strain.  Vertebral body heights appear well-preserved.  At the C2-C3 level, mild disk osteophyte spurring is noted of approximately 2-3 mm.  No significant neuroforaminal stenosis is noted, minimal central canal narrowing is noted.  At the C3-C4 level mild disk osteophyte spurring is noted of approximately 2 mm.  No significant nerve foraminal narrowing is noted.  Minimal central canal narrowing is noted.  At the C4-C5 level, mild disk osteophyte bulge is noted centrally of up to 3 mm.  Facet arthropathy is noted bilaterally.  Moderate bilateral neuroforaminal narrowing appears to be present.  Mild central canal narrowing is noted.  At the C5-C6 level, a disk prosthesis is noted with metallic artifact hindering ideal evaluation.  Uncovertebral spurring and facet arthropathy appears to be present right greater than left with moderate right neural foraminal stenosis and mild to moderate left neural foraminal stenosis.  Mild to moderate central canal narrowing is suspected but not ideally evaluated  At the C6-C7 level, uncovertebral spurring is noted bilaterally.  Moderate to severe left-sided neuroforaminal stenosis is noted with moderate right-sided neuroforaminal stenosis.  Mild to moderate central canals narrowing is suspected.  Evaluation is hindered by metallic artifact.  At the C7-T1 level, uncovertebral spurring is noted to the right greater than left with severe right neural foraminal stenosis suspected and mild left neural foraminal stenosis with mild central canal is stenosis suspected.  Evaluation of this level is hindered by metallic artifact      Assessment:  The patient is a 62-year-old man with a history of peripheral vascular disease, diabetes, cervical DDD, lumbar postlaminectomy syndrome who presents in followup.      1. Postlaminectomy syndrome of lumbar region    2. Chronic pain syndrome    3. DDD (degenerative disc  disease), lumbar    4. Opioid contract exists    5. Muscle spasm        Plan:  1.  Dr. Buckley has provided prescriptions for oxycodone-acetaminophen 10/325 mg up to 3 times a day as needed for pain.  I have reviewed the [place] Board of Pharmacy website and there are no abberancies.    2.  He will contact the [employer] representative to get a new  so that he can use his spinal cord stimulator.  3.  I refilled gabapentin 600 mg 3 times a day and provided a prescription for Flexeril 5 mg twice a day as needed for muscle spasms.  4.  Follow-up in 3 months or sooner as needed.

## 2018-02-12 ENCOUNTER — HOSPITAL ENCOUNTER (EMERGENCY)
Facility: HOSPITAL | Age: 63
Discharge: HOME | End: 2018-02-12
Attending: EMERGENCY MEDICINE
Payer: MEDICARE

## 2018-02-12 VITALS
DIASTOLIC BLOOD PRESSURE: 60 MMHG | WEIGHT: 266 LBS | HEART RATE: 95 BPM | SYSTOLIC BLOOD PRESSURE: 127 MMHG | OXYGEN SATURATION: 96 % | HEIGHT: 74 IN | TEMPERATURE: 98 F | RESPIRATION RATE: 20 BRPM | BODY MASS INDEX: 34.14 KG/M2

## 2018-02-12 DIAGNOSIS — S76.211A INGUINAL STRAIN, RIGHT, INITIAL ENCOUNTER: ICD-10-CM

## 2018-02-12 DIAGNOSIS — M54.16 LUMBAR RADICULOPATHY, ACUTE: Primary | ICD-10-CM

## 2018-02-12 LAB
ALBUMIN SERPL BCP-MCNC: 4.2 G/DL
ALP SERPL-CCNC: 102 U/L
ALT SERPL W/O P-5'-P-CCNC: 25 U/L
ANION GAP SERPL CALC-SCNC: 13 MMOL/L
AST SERPL-CCNC: 16 U/L
BACTERIA #/AREA URNS HPF: NORMAL /HPF
BASOPHILS # BLD AUTO: 0.1 K/UL
BASOPHILS NFR BLD: 0.7 %
BILIRUB SERPL-MCNC: 0.4 MG/DL
BILIRUB UR QL STRIP: NEGATIVE
BUN SERPL-MCNC: 12 MG/DL
CALCIUM SERPL-MCNC: 10 MG/DL
CHLORIDE SERPL-SCNC: 93 MMOL/L
CLARITY UR: CLEAR
CO2 SERPL-SCNC: 25 MMOL/L
COLOR UR: YELLOW
CREAT SERPL-MCNC: 1.4 MG/DL
DIFFERENTIAL METHOD: ABNORMAL
EOSINOPHIL # BLD AUTO: 0.2 K/UL
EOSINOPHIL NFR BLD: 1.9 %
ERYTHROCYTE [DISTWIDTH] IN BLOOD BY AUTOMATED COUNT: 13.3 %
EST. GFR  (AFRICAN AMERICAN): >60 ML/MIN/1.73 M^2
EST. GFR  (NON AFRICAN AMERICAN): 53 ML/MIN/1.73 M^2
GLUCOSE SERPL-MCNC: 438 MG/DL
GLUCOSE UR QL STRIP: ABNORMAL
HCT VFR BLD AUTO: 43.8 %
HGB BLD-MCNC: 14.4 G/DL
HGB UR QL STRIP: NEGATIVE
KETONES UR QL STRIP: NEGATIVE
LEUKOCYTE ESTERASE UR QL STRIP: NEGATIVE
LYMPHOCYTES # BLD AUTO: 3.5 K/UL
LYMPHOCYTES NFR BLD: 36.6 %
MCH RBC QN AUTO: 29.6 PG
MCHC RBC AUTO-ENTMCNC: 32.9 G/DL
MCV RBC AUTO: 90 FL
MICROSCOPIC COMMENT: NORMAL
MONOCYTES # BLD AUTO: 0.7 K/UL
MONOCYTES NFR BLD: 7 %
NEUTROPHILS # BLD AUTO: 5.1 K/UL
NEUTROPHILS NFR BLD: 53.8 %
NITRITE UR QL STRIP: NEGATIVE
PH UR STRIP: 5 [PH] (ref 5–8)
PLATELET # BLD AUTO: 217 K/UL
PMV BLD AUTO: 8 FL
POTASSIUM SERPL-SCNC: 4.4 MMOL/L
PROT SERPL-MCNC: 8.1 G/DL
PROT UR QL STRIP: NEGATIVE
RBC # BLD AUTO: 4.87 M/UL
SODIUM SERPL-SCNC: 131 MMOL/L
SP GR UR STRIP: 1.01 (ref 1–1.03)
SQUAMOUS #/AREA URNS HPF: 0 /HPF
URN SPEC COLLECT METH UR: ABNORMAL
UROBILINOGEN UR STRIP-ACNC: NEGATIVE EU/DL
WBC # BLD AUTO: 9.5 K/UL
WBC #/AREA URNS HPF: 1 /HPF (ref 0–5)
YEAST URNS QL MICRO: NORMAL

## 2018-02-12 PROCEDURE — 63600175 PHARM REV CODE 636 W HCPCS: Performed by: EMERGENCY MEDICINE

## 2018-02-12 PROCEDURE — 81000 URINALYSIS NONAUTO W/SCOPE: CPT

## 2018-02-12 PROCEDURE — 96372 THER/PROPH/DIAG INJ SC/IM: CPT | Mod: 59

## 2018-02-12 PROCEDURE — 80053 COMPREHEN METABOLIC PANEL: CPT

## 2018-02-12 PROCEDURE — 36415 COLL VENOUS BLD VENIPUNCTURE: CPT

## 2018-02-12 PROCEDURE — 99284 EMERGENCY DEPT VISIT MOD MDM: CPT | Mod: 25

## 2018-02-12 PROCEDURE — 96374 THER/PROPH/DIAG INJ IV PUSH: CPT

## 2018-02-12 PROCEDURE — 25000003 PHARM REV CODE 250: Performed by: EMERGENCY MEDICINE

## 2018-02-12 PROCEDURE — 85025 COMPLETE CBC W/AUTO DIFF WBC: CPT

## 2018-02-12 RX ORDER — INSULIN DEGLUDEC 100 U/ML
65 INJECTION, SOLUTION SUBCUTANEOUS DAILY
Status: ON HOLD | COMMUNITY
End: 2019-07-31 | Stop reason: HOSPADM

## 2018-02-12 RX ORDER — OXYCODONE AND ACETAMINOPHEN 5; 325 MG/1; MG/1
1 TABLET ORAL
Status: COMPLETED | OUTPATIENT
Start: 2018-02-12 | End: 2018-02-12

## 2018-02-12 RX ORDER — KETOROLAC TROMETHAMINE 30 MG/ML
10 INJECTION, SOLUTION INTRAMUSCULAR; INTRAVENOUS
Status: COMPLETED | OUTPATIENT
Start: 2018-02-12 | End: 2018-02-12

## 2018-02-12 RX ORDER — HYDROCODONE BITARTRATE AND ACETAMINOPHEN 5; 325 MG/1; MG/1
1 TABLET ORAL EVERY 4 HOURS PRN
Qty: 15 TABLET | Refills: 0 | Status: SHIPPED | OUTPATIENT
Start: 2018-02-12 | End: 2018-04-09

## 2018-02-12 RX ORDER — INSULIN LISPRO 100 [IU]/ML
15 INJECTION, SOLUTION INTRAVENOUS; SUBCUTANEOUS
COMMUNITY
End: 2021-01-07 | Stop reason: SDUPTHER

## 2018-02-12 RX ADMIN — OXYCODONE AND ACETAMINOPHEN 1 TABLET: 5; 325 TABLET ORAL at 05:02

## 2018-02-12 RX ADMIN — INSULIN HUMAN 8 UNITS: 100 INJECTION, SOLUTION PARENTERAL at 07:02

## 2018-02-12 RX ADMIN — KETOROLAC TROMETHAMINE 10 MG: 30 INJECTION, SOLUTION INTRAMUSCULAR at 05:02

## 2018-02-12 NOTE — ED NOTES
Pt awake alert in no acute distress, pt co rt flank pain since twisting pack PTA, pt reports pain radiates to RLQ abd, denies chest pain or sob, pt denies n/v/d, reports dysuria
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
## 2018-02-14 ENCOUNTER — TELEPHONE (OUTPATIENT)
Dept: SMOKING CESSATION | Facility: CLINIC | Age: 63
End: 2018-02-14

## 2018-02-20 ENCOUNTER — TELEPHONE (OUTPATIENT)
Dept: SMOKING CESSATION | Facility: CLINIC | Age: 63
End: 2018-02-20

## 2018-03-19 ENCOUNTER — HOSPITAL ENCOUNTER (OUTPATIENT)
Dept: RADIOLOGY | Facility: HOSPITAL | Age: 63
Discharge: HOME | End: 2018-03-19
Attending: NURSE PRACTITIONER
Payer: MEDICARE

## 2018-03-19 DIAGNOSIS — M25.531 RIGHT WRIST PAIN: Primary | ICD-10-CM

## 2018-03-19 DIAGNOSIS — M25.531 RIGHT WRIST PAIN: ICD-10-CM

## 2018-03-19 PROCEDURE — 73110 X-RAY EXAM OF WRIST: CPT | Mod: 26,RT,, | Performed by: RADIOLOGY

## 2018-03-19 PROCEDURE — 73110 X-RAY EXAM OF WRIST: CPT | Mod: TC,FY,RT

## 2018-03-30 ENCOUNTER — HOSPITAL ENCOUNTER (EMERGENCY)
Facility: HOSPITAL | Age: 63
Discharge: HOME | End: 2018-03-30
Attending: EMERGENCY MEDICINE
Payer: MEDICARE

## 2018-03-30 VITALS
HEART RATE: 96 BPM | TEMPERATURE: 98 F | DIASTOLIC BLOOD PRESSURE: 82 MMHG | SYSTOLIC BLOOD PRESSURE: 133 MMHG | OXYGEN SATURATION: 97 % | BODY MASS INDEX: 31.44 KG/M2 | RESPIRATION RATE: 16 BRPM | WEIGHT: 245 LBS | HEIGHT: 74 IN

## 2018-03-30 DIAGNOSIS — M25.531 RIGHT WRIST PAIN: ICD-10-CM

## 2018-03-30 DIAGNOSIS — M25.331 SCAPHOLUNATE DISSOCIATION OF RIGHT WRIST: Primary | ICD-10-CM

## 2018-03-30 DIAGNOSIS — M79.601 RIGHT ARM PAIN: ICD-10-CM

## 2018-03-30 PROCEDURE — 99283 EMERGENCY DEPT VISIT LOW MDM: CPT | Mod: 25

## 2018-03-30 PROCEDURE — 29125 APPL SHORT ARM SPLINT STATIC: CPT
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
## 2018-03-31 NOTE — DISCHARGE INSTRUCTIONS
Keep the splint in place until you are able to follow up and see an orthopedist. See the list below.  Continue your routine pain medication.  For worsening symptoms, chest pain, shortness of breath, increased abdominal pain, high grade fever, stroke or stroke like symptoms, immediately go to the nearest Emergency Room or call 911 as soon as possible.

## 2018-04-06 RX ORDER — CYCLOBENZAPRINE HCL 5 MG
TABLET ORAL
Qty: 60 TABLET | Refills: 1 | Status: SHIPPED | OUTPATIENT
Start: 2018-04-06 | End: 2018-07-10 | Stop reason: SDUPTHER

## 2018-04-09 ENCOUNTER — OFFICE VISIT (OUTPATIENT)
Dept: PAIN MEDICINE | Facility: CLINIC | Age: 63
End: 2018-04-09
Payer: MEDICARE

## 2018-04-09 VITALS
BODY MASS INDEX: 33.09 KG/M2 | SYSTOLIC BLOOD PRESSURE: 96 MMHG | HEART RATE: 74 BPM | DIASTOLIC BLOOD PRESSURE: 53 MMHG | RESPIRATION RATE: 93 BRPM | TEMPERATURE: 97 F | WEIGHT: 257.69 LBS

## 2018-04-09 DIAGNOSIS — M51.36 DDD (DEGENERATIVE DISC DISEASE), LUMBAR: ICD-10-CM

## 2018-04-09 DIAGNOSIS — M96.1 POSTLAMINECTOMY SYNDROME OF LUMBAR REGION: Primary | ICD-10-CM

## 2018-04-09 DIAGNOSIS — G89.4 CHRONIC PAIN SYNDROME: ICD-10-CM

## 2018-04-09 DIAGNOSIS — Z79.891 OPIOID CONTRACT EXISTS: ICD-10-CM

## 2018-04-09 PROCEDURE — 99999 PR PBB SHADOW E&M-EST. PATIENT-LVL IV: CPT | Mod: PBBFAC,,, | Performed by: PHYSICIAN ASSISTANT

## 2018-04-09 PROCEDURE — 99213 OFFICE O/P EST LOW 20 MIN: CPT | Mod: S$PBB,,, | Performed by: PHYSICIAN ASSISTANT

## 2018-04-09 PROCEDURE — 99214 OFFICE O/P EST MOD 30 MIN: CPT | Mod: PBBFAC,PN | Performed by: PHYSICIAN ASSISTANT

## 2018-04-09 RX ORDER — OXYCODONE AND ACETAMINOPHEN 10; 325 MG/1; MG/1
1 TABLET ORAL 3 TIMES DAILY PRN
Qty: 90 TABLET | Refills: 0 | Status: SHIPPED | OUTPATIENT
Start: 2018-05-15 | End: 2018-06-14

## 2018-04-09 RX ORDER — OXYCODONE AND ACETAMINOPHEN 10; 325 MG/1; MG/1
1 TABLET ORAL 3 TIMES DAILY PRN
Qty: 90 TABLET | Refills: 0 | Status: SHIPPED | OUTPATIENT
Start: 2018-04-15 | End: 2018-05-15

## 2018-04-09 RX ORDER — OXYCODONE AND ACETAMINOPHEN 10; 325 MG/1; MG/1
1 TABLET ORAL 3 TIMES DAILY PRN
Qty: 90 TABLET | Refills: 0 | Status: SHIPPED | OUTPATIENT
Start: 2018-06-14 | End: 2018-07-10 | Stop reason: SDUPTHER
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
## 2018-04-16 DIAGNOSIS — M25.531 PAIN IN RIGHT WRIST: Primary | ICD-10-CM

## 2018-04-20 ENCOUNTER — HOSPITAL ENCOUNTER (OUTPATIENT)
Dept: RADIOLOGY | Facility: HOSPITAL | Age: 63
Discharge: HOME | End: 2018-04-20
Attending: ORTHOPAEDIC SURGERY
Payer: MEDICARE

## 2018-04-20 DIAGNOSIS — M25.531 PAIN IN RIGHT WRIST: ICD-10-CM

## 2018-04-27 DIAGNOSIS — M25.531 PAIN IN RIGHT WRIST: Primary | ICD-10-CM

## 2018-05-02 ENCOUNTER — HOSPITAL ENCOUNTER (OUTPATIENT)
Dept: RADIOLOGY | Facility: HOSPITAL | Age: 63
Discharge: HOME | End: 2018-05-02
Attending: ORTHOPAEDIC SURGERY
Payer: MEDICARE

## 2018-05-02 DIAGNOSIS — M25.531 PAIN IN RIGHT WRIST: ICD-10-CM

## 2018-05-02 PROCEDURE — 25246 INJECTION FOR WRIST X-RAY: CPT | Mod: RT,,, | Performed by: RADIOLOGY

## 2018-05-02 PROCEDURE — 73115 CONTRAST X-RAY OF WRIST: CPT | Mod: TC,RT

## 2018-05-02 PROCEDURE — 25000003 PHARM REV CODE 250

## 2018-05-02 PROCEDURE — 73200 CT UPPER EXTREMITY W/O DYE: CPT | Mod: TC,RT

## 2018-05-02 PROCEDURE — 25500020 PHARM REV CODE 255

## 2018-05-02 PROCEDURE — 73115 CONTRAST X-RAY OF WRIST: CPT | Mod: 26,RT,, | Performed by: RADIOLOGY

## 2018-05-02 PROCEDURE — 73200 CT UPPER EXTREMITY W/O DYE: CPT | Mod: 26,RT,, | Performed by: RADIOLOGY

## 2018-05-02 RX ORDER — LIDOCAINE HYDROCHLORIDE 10 MG/ML
INJECTION, SOLUTION EPIDURAL; INFILTRATION; INTRACAUDAL; PERINEURAL
Status: COMPLETED
Start: 2018-05-02 | End: 2018-05-02

## 2018-05-02 RX ORDER — LIDOCAINE HCL/EPINEPHRINE/PF 2%-1:200K
VIAL (ML) INJECTION
Status: DISCONTINUED
Start: 2018-05-02 | End: 2018-05-02 | Stop reason: WASHOUT

## 2018-05-02 RX ADMIN — IOHEXOL 5 ML: 350 INJECTION, SOLUTION INTRAVENOUS at 02:05

## 2018-05-02 RX ADMIN — LIDOCAINE HYDROCHLORIDE 5 ML: 10 INJECTION, SOLUTION EPIDURAL; INFILTRATION; INTRACAUDAL; PERINEURAL at 02:05

## 2018-05-12 ENCOUNTER — HOSPITAL ENCOUNTER (EMERGENCY)
Facility: HOSPITAL | Age: 63
Discharge: HOME | End: 2018-05-12
Attending: EMERGENCY MEDICINE
Payer: MEDICARE

## 2018-05-12 VITALS
TEMPERATURE: 97 F | HEIGHT: 74 IN | HEART RATE: 82 BPM | DIASTOLIC BLOOD PRESSURE: 89 MMHG | BODY MASS INDEX: 33.1 KG/M2 | SYSTOLIC BLOOD PRESSURE: 160 MMHG | RESPIRATION RATE: 20 BRPM | WEIGHT: 257.94 LBS | OXYGEN SATURATION: 97 %

## 2018-05-12 DIAGNOSIS — G89.18 POST-OP PAIN: Primary | ICD-10-CM

## 2018-05-12 PROCEDURE — 96375 TX/PRO/DX INJ NEW DRUG ADDON: CPT

## 2018-05-12 PROCEDURE — 99284 EMERGENCY DEPT VISIT MOD MDM: CPT | Mod: 25

## 2018-05-12 PROCEDURE — 63600175 PHARM REV CODE 636 W HCPCS: Performed by: NURSE PRACTITIONER

## 2018-05-12 PROCEDURE — 96374 THER/PROPH/DIAG INJ IV PUSH: CPT

## 2018-05-12 PROCEDURE — 25000003 PHARM REV CODE 250: Performed by: NURSE PRACTITIONER

## 2018-05-12 RX ORDER — HYDROXYZINE HYDROCHLORIDE 25 MG/1
25 TABLET, FILM COATED ORAL 3 TIMES DAILY
COMMUNITY
End: 2019-07-05 | Stop reason: CLARIF

## 2018-05-12 RX ORDER — DIPHENHYDRAMINE HYDROCHLORIDE 50 MG/ML
25 INJECTION INTRAMUSCULAR; INTRAVENOUS
Status: COMPLETED | OUTPATIENT
Start: 2018-05-12 | End: 2018-05-12

## 2018-05-12 RX ORDER — ONDANSETRON 4 MG/1
4 TABLET, ORALLY DISINTEGRATING ORAL EVERY 8 HOURS PRN
Qty: 12 TABLET | Refills: 0 | Status: SHIPPED | OUTPATIENT
Start: 2018-05-12 | End: 2019-07-05 | Stop reason: CLARIF

## 2018-05-12 RX ORDER — OXYCODONE AND ACETAMINOPHEN 5; 325 MG/1; MG/1
1 TABLET ORAL
Status: COMPLETED | OUTPATIENT
Start: 2018-05-12 | End: 2018-05-12

## 2018-05-12 RX ORDER — MORPHINE SULFATE 4 MG/ML
8 INJECTION, SOLUTION INTRAMUSCULAR; INTRAVENOUS
Status: COMPLETED | OUTPATIENT
Start: 2018-05-12 | End: 2018-05-12

## 2018-05-12 RX ORDER — HYDROMORPHONE HYDROCHLORIDE 2 MG/ML
0.5 INJECTION, SOLUTION INTRAMUSCULAR; INTRAVENOUS; SUBCUTANEOUS
Status: COMPLETED | OUTPATIENT
Start: 2018-05-12 | End: 2018-05-12

## 2018-05-12 RX ORDER — PROMETHAZINE HYDROCHLORIDE 25 MG/1
25 TABLET ORAL EVERY 6 HOURS PRN
COMMUNITY
End: 2019-07-05 | Stop reason: CLARIF

## 2018-05-12 RX ORDER — ATORVASTATIN CALCIUM 40 MG/1
40 TABLET, FILM COATED ORAL DAILY
COMMUNITY
End: 2020-07-24 | Stop reason: SDUPTHER

## 2018-05-12 RX ORDER — ONDANSETRON 4 MG/1
4 TABLET, ORALLY DISINTEGRATING ORAL
Status: COMPLETED | OUTPATIENT
Start: 2018-05-12 | End: 2018-05-12

## 2018-05-12 RX ORDER — INSULIN GLARGINE 300 [IU]/ML
85 INJECTION, SOLUTION SUBCUTANEOUS NIGHTLY
COMMUNITY
End: 2021-01-07 | Stop reason: SDUPTHER

## 2018-05-12 RX ADMIN — ONDANSETRON 4 MG: 4 TABLET, ORALLY DISINTEGRATING ORAL at 11:05

## 2018-05-12 RX ADMIN — OXYCODONE HYDROCHLORIDE AND ACETAMINOPHEN 1 TABLET: 5; 325 TABLET ORAL at 01:05

## 2018-05-12 RX ADMIN — HYDROMORPHONE HYDROCHLORIDE 0.5 MG: 2 INJECTION INTRAMUSCULAR; INTRAVENOUS; SUBCUTANEOUS at 11:05

## 2018-05-12 RX ADMIN — MORPHINE SULFATE 8 MG: 4 INJECTION INTRAVENOUS at 11:05

## 2018-05-12 RX ADMIN — DIPHENHYDRAMINE HYDROCHLORIDE 25 MG: 50 INJECTION, SOLUTION INTRAMUSCULAR; INTRAVENOUS at 11:05
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
## 2018-05-12 NOTE — ED NOTES
Reapplied ACE wrap to splinted right wrist; fitted arm sling.  Patient and wife verbalize readiness for discharge.

## 2018-05-12 NOTE — ED NOTES
Pt had fusion yesterday at [facility]. Pt also reports having a nerve block procedure with some numbness to the right hand and finger. Pt reports unbearable pain today that is worsening and also pain numbness to the fingers that is worsening.

## 2018-05-15 ENCOUNTER — TELEPHONE (OUTPATIENT)
Dept: PAIN MEDICINE | Facility: CLINIC | Age: 63
End: 2018-05-15

## 2018-05-15 NOTE — TELEPHONE ENCOUNTER
----- Message from Javier Gupta sent at 5/15/2018 10:15 AM CDT -----  Contact: Kevan  He had a surgery last Friday on his hand. Please call him to discuss. He was given more pain pills, (lower dosage) he wants to go over that with the doctors so there is no problems in the future. Please call [phone] (home)   Thanks!

## 2018-05-15 NOTE — TELEPHONE ENCOUNTER
----- Message from Alma Sofia sent at 5/14/2018  4:45 PM CDT -----  Contact: patient   Patient calling to speak to the Nurse. He recently had surgery and he is wanting to inform the office. Please advise. Call to pod. No answer.   Call back    Thanks!

## 2018-05-15 NOTE — TELEPHONE ENCOUNTER
Patient called to inform us that he had surgery on his wrist and was given pain medication post-op. He was given oxycodone 5mg and was told to take two every 4 hours as needed for pain. He is almost done with that prescription and will then continue with the prescriptions that we gave him to get filled. EMIL

## 2018-06-13 NOTE — TELEPHONE ENCOUNTER
----- Message from Florence Vasquez sent at 6/13/2018  1:32 PM CDT -----  Type:  RX Refill Request    Who Called:  Kevan  Refill or New Rx:  refill  RX Name and Strength:  Gabapentin 100 mg  How is the patient currently taking it? (ex. 1XDay):  3Xdaily  Is this a 30 day or 90 day RX:  90  Preferred Pharmacy with phone number:    [id number] - [place] - [address]  [address] [place] [address]  Phone: [phone] Fax: [phone]  Local or Mail Order:  local  Ordering Provider:  Dr aMrcio Slaughter Call Back Number:  [phone]  Additional Information:  Thank you!

## 2018-06-14 RX ORDER — GABAPENTIN 300 MG/1
600 CAPSULE ORAL 3 TIMES DAILY
Qty: 180 CAPSULE | Refills: 5 | Status: SHIPPED | OUTPATIENT
Start: 2018-06-14 | End: 2018-07-10 | Stop reason: SDUPTHER

## 2018-07-10 ENCOUNTER — OFFICE VISIT (OUTPATIENT)
Dept: PAIN MEDICINE | Facility: CLINIC | Age: 63
End: 2018-07-10
Payer: MEDICARE

## 2018-07-10 VITALS
TEMPERATURE: 97 F | HEART RATE: 77 BPM | DIASTOLIC BLOOD PRESSURE: 67 MMHG | SYSTOLIC BLOOD PRESSURE: 122 MMHG | BODY MASS INDEX: 31.29 KG/M2 | RESPIRATION RATE: 18 BRPM | WEIGHT: 243.75 LBS | OXYGEN SATURATION: 96 %

## 2018-07-10 DIAGNOSIS — M62.838 MUSCLE SPASM: ICD-10-CM

## 2018-07-10 DIAGNOSIS — M51.36 DDD (DEGENERATIVE DISC DISEASE), LUMBAR: ICD-10-CM

## 2018-07-10 DIAGNOSIS — Z79.891 OPIOID CONTRACT EXISTS: ICD-10-CM

## 2018-07-10 DIAGNOSIS — G89.4 CHRONIC PAIN SYNDROME: ICD-10-CM

## 2018-07-10 DIAGNOSIS — M96.1 POSTLAMINECTOMY SYNDROME OF LUMBAR REGION: Primary | ICD-10-CM

## 2018-07-10 PROCEDURE — 99213 OFFICE O/P EST LOW 20 MIN: CPT | Mod: S$PBB,,, | Performed by: PHYSICIAN ASSISTANT

## 2018-07-10 PROCEDURE — 99215 OFFICE O/P EST HI 40 MIN: CPT | Mod: PBBFAC,PN | Performed by: PHYSICIAN ASSISTANT

## 2018-07-10 PROCEDURE — 99999 PR PBB SHADOW E&M-EST. PATIENT-LVL V: CPT | Mod: PBBFAC,,, | Performed by: PHYSICIAN ASSISTANT

## 2018-07-10 RX ORDER — OXYCODONE AND ACETAMINOPHEN 10; 325 MG/1; MG/1
1 TABLET ORAL 3 TIMES DAILY PRN
Qty: 90 TABLET | Refills: 0 | Status: SHIPPED | OUTPATIENT
Start: 2018-08-13 | End: 2018-09-12

## 2018-07-10 RX ORDER — OXYCODONE AND ACETAMINOPHEN 10; 325 MG/1; MG/1
1 TABLET ORAL 3 TIMES DAILY PRN
Qty: 90 TABLET | Refills: 0 | Status: SHIPPED | OUTPATIENT
Start: 2018-07-14 | End: 2018-08-13

## 2018-07-10 RX ORDER — GABAPENTIN 300 MG/1
600 CAPSULE ORAL 3 TIMES DAILY
Qty: 540 CAPSULE | Refills: 3 | Status: SHIPPED | OUTPATIENT
Start: 2018-07-10 | End: 2019-10-07 | Stop reason: SDUPTHER

## 2018-07-10 RX ORDER — OXYCODONE AND ACETAMINOPHEN 10; 325 MG/1; MG/1
1 TABLET ORAL 3 TIMES DAILY PRN
Qty: 90 TABLET | Refills: 0 | Status: SHIPPED | OUTPATIENT
Start: 2018-09-12 | End: 2018-09-25 | Stop reason: SDUPTHER

## 2018-07-10 RX ORDER — CYCLOBENZAPRINE HCL 5 MG
5 TABLET ORAL 2 TIMES DAILY PRN
Qty: 60 TABLET | Refills: 2 | Status: SHIPPED | OUTPATIENT
Start: 2018-07-10 | End: 2019-03-18

## 2018-07-20 ENCOUNTER — HOSPITAL ENCOUNTER (EMERGENCY)
Facility: HOSPITAL | Age: 63
Discharge: HOME | End: 2018-07-20
Attending: EMERGENCY MEDICINE
Payer: MEDICARE

## 2018-07-20 VITALS
TEMPERATURE: 98 F | HEART RATE: 96 BPM | OXYGEN SATURATION: 96 % | BODY MASS INDEX: 31.18 KG/M2 | RESPIRATION RATE: 20 BRPM | HEIGHT: 74 IN | DIASTOLIC BLOOD PRESSURE: 61 MMHG | WEIGHT: 243 LBS | SYSTOLIC BLOOD PRESSURE: 141 MMHG

## 2018-07-20 DIAGNOSIS — L03.116 CELLULITIS OF LEFT LOWER EXTREMITY: Primary | ICD-10-CM

## 2018-07-20 LAB — POCT GLUCOSE: 342 MG/DL (ref 70–110)

## 2018-07-20 PROCEDURE — 99283 EMERGENCY DEPT VISIT LOW MDM: CPT | Mod: 25

## 2018-07-20 PROCEDURE — 25000003 PHARM REV CODE 250: Performed by: PHYSICIAN ASSISTANT

## 2018-07-20 PROCEDURE — 82962 GLUCOSE BLOOD TEST: CPT

## 2018-07-20 RX ORDER — IBUPROFEN 400 MG/1
800 TABLET ORAL
Status: COMPLETED | OUTPATIENT
Start: 2018-07-20 | End: 2018-07-20

## 2018-07-20 RX ORDER — CLINDAMYCIN HYDROCHLORIDE 150 MG/1
300 CAPSULE ORAL 4 TIMES DAILY
Qty: 56 CAPSULE | Refills: 0 | Status: SHIPPED | OUTPATIENT
Start: 2018-07-20 | End: 2018-07-27

## 2018-07-20 RX ORDER — CLINDAMYCIN HYDROCHLORIDE 150 MG/1
300 CAPSULE ORAL
Status: COMPLETED | OUTPATIENT
Start: 2018-07-20 | End: 2018-07-20

## 2018-07-20 RX ADMIN — CLINDAMYCIN HYDROCHLORIDE 300 MG: 150 CAPSULE ORAL at 12:07

## 2018-07-20 RX ADMIN — IBUPROFEN 800 MG: 400 TABLET ORAL at 12:07
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
## 2018-07-20 NOTE — ED NOTES
C/o increased pain and redness to left lower leg pt states he took a percocet at 0900 but it has not helped with pain and he is concerned it may be infected. ROM sensation and pulses intact. Aware to notify nurse of needs or concerns.

## 2018-09-12 DIAGNOSIS — R51.9 NEW ONSET OF HEADACHES: Primary | ICD-10-CM

## 2018-09-13 ENCOUNTER — HOSPITAL ENCOUNTER (OUTPATIENT)
Dept: RADIOLOGY | Facility: HOSPITAL | Age: 63
Discharge: HOME | End: 2018-09-13
Attending: NURSE PRACTITIONER
Payer: MEDICARE

## 2018-09-13 DIAGNOSIS — R51.9 NEW ONSET OF HEADACHES: ICD-10-CM

## 2018-09-13 PROCEDURE — 70450 CT HEAD/BRAIN W/O DYE: CPT | Mod: 26,,, | Performed by: RADIOLOGY

## 2018-09-13 PROCEDURE — 70450 CT HEAD/BRAIN W/O DYE: CPT | Mod: TC

## 2018-09-18 ENCOUNTER — HOSPITAL ENCOUNTER (EMERGENCY)
Facility: HOSPITAL | Age: 63
Discharge: HOME | End: 2018-09-18
Attending: EMERGENCY MEDICINE
Payer: MEDICARE

## 2018-09-18 VITALS
HEIGHT: 74 IN | OXYGEN SATURATION: 99 % | WEIGHT: 260 LBS | BODY MASS INDEX: 33.37 KG/M2 | SYSTOLIC BLOOD PRESSURE: 141 MMHG | HEART RATE: 87 BPM | DIASTOLIC BLOOD PRESSURE: 78 MMHG | RESPIRATION RATE: 20 BRPM

## 2018-09-18 DIAGNOSIS — M54.42 ACUTE LEFT-SIDED LOW BACK PAIN WITH LEFT-SIDED SCIATICA: Primary | ICD-10-CM

## 2018-09-18 PROCEDURE — 63600175 PHARM REV CODE 636 W HCPCS: Performed by: PHYSICIAN ASSISTANT

## 2018-09-18 PROCEDURE — 96372 THER/PROPH/DIAG INJ SC/IM: CPT

## 2018-09-18 PROCEDURE — 99284 EMERGENCY DEPT VISIT MOD MDM: CPT

## 2018-09-18 PROCEDURE — 25000003 PHARM REV CODE 250: Performed by: PHYSICIAN ASSISTANT

## 2018-09-18 RX ORDER — METHOCARBAMOL 500 MG/1
500 TABLET, FILM COATED ORAL 3 TIMES DAILY PRN
Qty: 30 TABLET | Refills: 0 | Status: SHIPPED | OUTPATIENT
Start: 2018-09-18 | End: 2018-09-23

## 2018-09-18 RX ORDER — OXYCODONE AND ACETAMINOPHEN 10; 325 MG/1; MG/1
1 TABLET ORAL
Status: COMPLETED | OUTPATIENT
Start: 2018-09-18 | End: 2018-09-18

## 2018-09-18 RX ORDER — KETOROLAC TROMETHAMINE 30 MG/ML
30 INJECTION, SOLUTION INTRAMUSCULAR; INTRAVENOUS
Status: COMPLETED | OUTPATIENT
Start: 2018-09-18 | End: 2018-09-18

## 2018-09-18 RX ORDER — ORPHENADRINE CITRATE 30 MG/ML
60 INJECTION INTRAMUSCULAR; INTRAVENOUS
Status: COMPLETED | OUTPATIENT
Start: 2018-09-18 | End: 2018-09-18

## 2018-09-18 RX ADMIN — ORPHENADRINE CITRATE 60 MG: 30 INJECTION INTRAMUSCULAR; INTRAVENOUS at 10:09

## 2018-09-18 RX ADMIN — KETOROLAC TROMETHAMINE 30 MG: 30 INJECTION, SOLUTION INTRAMUSCULAR at 10:09

## 2018-09-18 RX ADMIN — OXYCODONE HYDROCHLORIDE AND ACETAMINOPHEN 1 TABLET: 10; 325 TABLET ORAL at 10:09
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
## 2018-09-23 ENCOUNTER — HOSPITAL ENCOUNTER (EMERGENCY)
Facility: HOSPITAL | Age: 63
Discharge: HOME | End: 2018-09-23
Attending: EMERGENCY MEDICINE
Payer: MEDICARE

## 2018-09-23 VITALS
WEIGHT: 260 LBS | BODY MASS INDEX: 33.38 KG/M2 | DIASTOLIC BLOOD PRESSURE: 86 MMHG | RESPIRATION RATE: 20 BRPM | TEMPERATURE: 97 F | SYSTOLIC BLOOD PRESSURE: 131 MMHG | HEART RATE: 94 BPM | OXYGEN SATURATION: 93 %

## 2018-09-23 DIAGNOSIS — G89.29 ACUTE EXACERBATION OF CHRONIC LOW BACK PAIN: Primary | ICD-10-CM

## 2018-09-23 DIAGNOSIS — M54.50 ACUTE EXACERBATION OF CHRONIC LOW BACK PAIN: Primary | ICD-10-CM

## 2018-09-23 PROCEDURE — 96372 THER/PROPH/DIAG INJ SC/IM: CPT

## 2018-09-23 PROCEDURE — 99284 EMERGENCY DEPT VISIT MOD MDM: CPT | Mod: 25

## 2018-09-23 PROCEDURE — 25000003 PHARM REV CODE 250: Performed by: EMERGENCY MEDICINE

## 2018-09-23 RX ORDER — OXYCODONE AND ACETAMINOPHEN 5; 325 MG/1; MG/1
2 TABLET ORAL
Status: COMPLETED | OUTPATIENT
Start: 2018-09-23 | End: 2018-09-23

## 2018-09-23 RX ADMIN — Medication 1 MG: at 03:09

## 2018-09-23 RX ADMIN — OXYCODONE AND ACETAMINOPHEN 2 TABLET: 5; 325 TABLET ORAL at 04:09

## 2018-09-23 NOTE — ED NOTES
Here for eval of acute on chronic Lt lower back pain (noted pain pump/stimulator to mid Lt back).
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
## 2018-09-23 NOTE — ED NOTES
Ambulated with minimal assist approx 100 feet in department before reports cramps to bilat thighs. Returned to bed, ambulatory, @ this time

## 2018-09-25 ENCOUNTER — OFFICE VISIT (OUTPATIENT)
Dept: PAIN MEDICINE | Facility: CLINIC | Age: 63
End: 2018-09-25
Payer: MEDICARE

## 2018-09-25 VITALS
SYSTOLIC BLOOD PRESSURE: 130 MMHG | RESPIRATION RATE: 20 BRPM | OXYGEN SATURATION: 94 % | HEART RATE: 112 BPM | DIASTOLIC BLOOD PRESSURE: 67 MMHG | TEMPERATURE: 98 F

## 2018-09-25 DIAGNOSIS — M51.36 DDD (DEGENERATIVE DISC DISEASE), LUMBAR: Primary | ICD-10-CM

## 2018-09-25 DIAGNOSIS — M96.1 POSTLAMINECTOMY SYNDROME OF LUMBAR REGION: ICD-10-CM

## 2018-09-25 DIAGNOSIS — Z79.891 OPIOID CONTRACT EXISTS: ICD-10-CM

## 2018-09-25 DIAGNOSIS — G89.4 CHRONIC PAIN SYNDROME: ICD-10-CM

## 2018-09-25 PROCEDURE — 99215 OFFICE O/P EST HI 40 MIN: CPT | Mod: PBBFAC,PN | Performed by: PHYSICIAN ASSISTANT

## 2018-09-25 PROCEDURE — 99214 OFFICE O/P EST MOD 30 MIN: CPT | Mod: S$PBB,,, | Performed by: PHYSICIAN ASSISTANT

## 2018-09-25 PROCEDURE — 99999 PR PBB SHADOW E&M-EST. PATIENT-LVL V: CPT | Mod: PBBFAC,,, | Performed by: PHYSICIAN ASSISTANT

## 2018-09-25 RX ORDER — OXYCODONE AND ACETAMINOPHEN 10; 325 MG/1; MG/1
1 TABLET ORAL 3 TIMES DAILY PRN
Qty: 90 TABLET | Refills: 0 | Status: SHIPPED | OUTPATIENT
Start: 2018-12-11 | End: 2018-12-17 | Stop reason: SDUPTHER

## 2018-09-25 RX ORDER — OXYCODONE AND ACETAMINOPHEN 10; 325 MG/1; MG/1
1 TABLET ORAL 3 TIMES DAILY PRN
Qty: 90 TABLET | Refills: 0 | Status: SHIPPED | OUTPATIENT
Start: 2018-10-12 | End: 2018-11-11

## 2018-09-25 RX ORDER — OXYCODONE AND ACETAMINOPHEN 10; 325 MG/1; MG/1
1 TABLET ORAL 3 TIMES DAILY PRN
Qty: 90 TABLET | Refills: 0 | Status: SHIPPED | OUTPATIENT
Start: 2018-11-11 | End: 2018-12-11

## 2018-09-26 ENCOUNTER — TELEPHONE (OUTPATIENT)
Dept: PAIN MEDICINE | Facility: CLINIC | Age: 63
End: 2018-09-26

## 2018-09-26 NOTE — TELEPHONE ENCOUNTER
Spoke with melissa at [facility] and she is going to call the nurse to give instructions regarding MRI.

## 2018-09-26 NOTE — TELEPHONE ENCOUNTER
----- Message from Alin Leo sent at 9/26/2018 12:58 PM CDT -----  Contact: Sloane/FOUZIA at [facility] call placed to office.  Sloane called in and stated she spoke to nurse earlier & she was supposed to get back with her about the attached patient?  Sloane's call back number is [phone]

## 2018-09-26 NOTE — TELEPHONE ENCOUNTER
----- Message from Erlinda Carroll sent at 9/26/2018 12:59 PM CDT -----  Contact: Sloane at [place]  Patient currently at [facility] for MRI, stating need his pain stimulator information.    Sloane [phone]  Thank you

## 2018-09-26 NOTE — TELEPHONE ENCOUNTER
----- Message from Monika East sent at 9/26/2018 12:20 PM CDT -----  Type: Needs Medical Advice    Who Called:  [facility]  Symptoms (please be specific): Na  How long has patient had these symptoms:  [facility]elle  Pharmacy name and phone #:  [facility]  [facility] Call Back Number: [phone]  Additional Information: Need pain stimulator information, patient currently at their location for MRI,

## 2018-09-27 ENCOUNTER — TELEPHONE (OUTPATIENT)
Dept: PAIN MEDICINE | Facility: CLINIC | Age: 63
End: 2018-09-27

## 2018-09-27 NOTE — TELEPHONE ENCOUNTER
----- Message from Marcella Odell sent at 9/27/2018 11:14 AM CDT -----  Contact: self  Type: Needs Medical Advice    Who Called:  self  Symptoms (please be specific):  Back pain  How long has patient had these symptoms:    [facility] Call Back Number: [phone]  Additional Information: patient states he has tried 3 different times to have a MRI but they will not do it with the stimulator in his back. Please advise patient.

## 2018-09-27 NOTE — TELEPHONE ENCOUNTER
Spoke with patient regarding this. He stated a CT was performed. He is currently in the hospital being treated for jaundice and dehydration.

## 2018-09-27 NOTE — PROGRESS NOTES
CC:back pain    HPI: The patient is a 63-year-old man with a history of peripheral vascular disease, diabetes, cervical DDD, lumbar postlaminectomy syndrome who presents in followup.  He returns in follow-up today with worsening back pain.  He complains of severe sharp left greater than right low back pain.  He is also having pain left lower leg and sensitivity.  He has some skin discoloration and was recently treated for cellulitis with antibiotics.  He denies any pain traveling down his leg from the back.  He has gone to the emergency department twice in the past week or 2 for severe back pain.  He denies bladder or bowel incontinence but reports weakness and numbness in both legs.    Pain intervention history: He has seen several other providers including Dr. Victor and Dr. Win Mckeon.  He has a cervical dorsal column stimulator which provides relief of neck pain.He had taken higher dose pain medication in the past but presented to us taking 60 mg of MS Contin t.i.d. and oxycodone 15 mg one to 2 tablets t.i.d.  He is taking gabapentin 300 mg t.i.d.  Baclofen 20 mg t.i.d.  Amitriptyline 25 mg q.h.s. He had undergone fusion L4-S1 in 2012.    ROS:He reports shortness of breath, weight gain, back pain, joint stiffness, and loss of balance.  Balance of review of systems is negative.    Medical, surgical, family and social history reviewed elsewhere in record.    Medications/Allergies: See med card    Vitals:    09/25/18 1407   BP: 130/67   Pulse: (!) 112   Resp: 20   Temp: 98.4 °F (36.9 °C)   TempSrc: Oral   SpO2: (!) 94%   PainSc:   8   PainLoc: Leg         Physical exam:  Gen: A and O x3, pleasant, well-groomed  Skin:  Taut skin in legs with purplish hue bilateral lower legs.   HEENT: PERRLA  CVS: Regular rate and rhythm, normal S1 and S2, no murmurs.  Resp: Clear to auscultation bilaterally, no wheezes or rales.  Abdomen: Soft, NT/ND, normal bowel sounds present.  Musculoskeletal:  Slow, cautious  gait.    Neuro:  Upper extremities: 5/5 strength bilaterally  Lower extremities: 5/5 strength bilaterally   Reflexes: Brachioradialis 0+, Bicep 0+, Tricep 0+, Patellar 0+, Achilles 0+ bilaterally.  Sensory:  Intact and symmetrical to light touch and pinprick in C2-T1 dermatomes bilaterally.  Intact and symmetrical to light touch and pinprick in L2-S1 dermatomes bilaterally, except for L4-S1 distribution right leg decreased sensation.    Lumbar spine:  Range of motion is mildly limited with flexion and extension without increased pain.  Straight leg raise negative bilaterally.  Internal and external rotation of hip is negative bilaterally.  Matteo's test is negative bilaterally.  Myofascial exam:  Exquisite tenderness to palpation to the left lumbar paraspinous muscles.    Imagin12 Xray L-spine  There is disk desiccation and disk space narrowing at L5-S1. There is mild diffuse disk space narrowing elsewhere throughout the lumbar spine. Osteophytes project from vertebral bodies and there is sclerosis and posterior facets. There is no acute compression or subluxation.    CT L-spine 7/15/13  T12-L1: Left posterior lateral disk bulging and osteophyte formation is present causing mild left foraminal narrowing the central canal and right foraminal intact.  L1/2 Mild annular disk bulge and osteophyte formation is present causing a mild canal and left foraminal narrowing.  L2/3: Degenerative facet changes combine with disk bulging to produce mild left greater than right foraminal narrowing. Central canal is mildly distorted.  L3/4: There is mild annular disk bulging and degenerative facet changes are present. There is mild canal and foraminal distortion.  L4/5: At this level levels of prior laminectomy canal canal visualization is limited. The bony foramina appear grossly intact and there is no evidence of malalignment.  L5/S1: Moderate degenerative facet changes are present and there is some bony foraminal  narrowing on the left. There is evidence of prior laminectomy and the right foramen is intact. The bony canal soft tissue visualization is limited but it is grossly intact the degenerative changes are seen in the sacroiliac joints.    X-ray thoracic spine 12/18/14  Evidence of anterior cervical fusion is noted in the lower cervical spine. Neurostimulator appears to be present entering at approximately the T10-T11 level with its tip at the T8 level. Vertebral body heights appear well preserved. Alignment appears adequate. Osseous demineralization is noted.   T7-T8 T8-T9 and T9-T10 mild intervertebral disk height loss appears to be present.     X-ray thoracic and lumbar spine 12/28/15  Comparison: Thoracic spine radiographs-12/18/2014  Thoracic spine: There has been interval development of a new minor superior endplate compression fracture at the superior endplate of T7. There are two neurostimulator leads present within the posterior aspect of the thoracic spinal canal which terminating at approximately T7. No retropulsion. There is multilevel degenerative change of the thoracic spine in the form of marginal osteophyte formation and intervertebral disc space narrowing. There is suddenly observed postoperative change of ACDF at C5-C7. The heads of the C7 vertebral body screws do not appear firmly apposed to the adjacent metallic fixation plate.  Lumbar spine:There are postoperative changes of posterior intermittent fusion at L4-S1 with interbody device is noted at L4-L5 and L5-S1. No hardware complication. There is an IVC filter present. There is degenerative change of the lumbar spine in the form of marginal osteophyte formation and disc space narrowing.     CT cervical spine 3/7/16  Anterior plate fusion is noted at the C5, C6, and C7 levels with intervertebral disk prostheses at the C5-C6 C6-C7 levels.  There is loss of the normal cervical lordosis which may be positional or related to muscular strain.  Vertebral  body heights appear well-preserved.  At the C2-C3 level, mild disk osteophyte spurring is noted of approximately 2-3 mm.  No significant neuroforaminal stenosis is noted, minimal central canal narrowing is noted.  At the C3-C4 level mild disk osteophyte spurring is noted of approximately 2 mm.  No significant nerve foraminal narrowing is noted.  Minimal central canal narrowing is noted.  At the C4-C5 level, mild disk osteophyte bulge is noted centrally of up to 3 mm.  Facet arthropathy is noted bilaterally.  Moderate bilateral neuroforaminal narrowing appears to be present.  Mild central canal narrowing is noted.  At the C5-C6 level, a disk prosthesis is noted with metallic artifact hindering ideal evaluation.  Uncovertebral spurring and facet arthropathy appears to be present right greater than left with moderate right neural foraminal stenosis and mild to moderate left neural foraminal stenosis.  Mild to moderate central canal narrowing is suspected but not ideally evaluated  At the C6-C7 level, uncovertebral spurring is noted bilaterally.  Moderate to severe left-sided neuroforaminal stenosis is noted with moderate right-sided neuroforaminal stenosis.  Mild to moderate central canals narrowing is suspected.  Evaluation is hindered by metallic artifact.  At the C7-T1 level, uncovertebral spurring is noted to the right greater than left with severe right neural foraminal stenosis suspected and mild left neural foraminal stenosis with mild central canal is stenosis suspected.  Evaluation of this level is hindered by metallic artifact      Assessment:  The patient is a 63-year-old man with a history of peripheral vascular disease, diabetes, cervical DDD, lumbar postlaminectomy syndrome who presents in followup.      1. DDD (degenerative disc disease), lumbar    2. Postlaminectomy syndrome of lumbar region    3. Chronic pain syndrome    4. Opioid contract exists        Plan:  1.  Since he is having severe increased  pain, I will update his lumbar spine CT for further evaluation.  He is going to contact his primary care provider in regards to his left lower leg pain which may be due to infection or vascular disease.  2.  Dr. Bcukley provided prescriptions for oxycodone-acetaminophen 10/325 mg up to 3 times daily as needed for pain.  3.  We will call him with results and recommendations.    Greater than 50% of this 30 min visit was spent on counseling the patient.

## 2018-10-08 RX ORDER — CYCLOBENZAPRINE HCL 5 MG
TABLET ORAL
Qty: 60 TABLET | Refills: 1 | Status: SHIPPED | OUTPATIENT
Start: 2018-10-08 | End: 2019-03-18

## 2018-12-17 ENCOUNTER — OFFICE VISIT (OUTPATIENT)
Dept: PAIN MEDICINE | Facility: CLINIC | Age: 63
End: 2018-12-17
Payer: MEDICARE

## 2018-12-17 VITALS
RESPIRATION RATE: 20 BRPM | DIASTOLIC BLOOD PRESSURE: 80 MMHG | TEMPERATURE: 98 F | BODY MASS INDEX: 32.2 KG/M2 | WEIGHT: 250.75 LBS | SYSTOLIC BLOOD PRESSURE: 114 MMHG | HEART RATE: 73 BPM

## 2018-12-17 DIAGNOSIS — M51.36 DDD (DEGENERATIVE DISC DISEASE), LUMBAR: Primary | ICD-10-CM

## 2018-12-17 DIAGNOSIS — G89.4 CHRONIC PAIN SYNDROME: ICD-10-CM

## 2018-12-17 DIAGNOSIS — M96.1 POSTLAMINECTOMY SYNDROME OF LUMBAR REGION: ICD-10-CM

## 2018-12-17 DIAGNOSIS — Z79.891 OPIOID CONTRACT EXISTS: ICD-10-CM

## 2018-12-17 PROCEDURE — 99213 OFFICE O/P EST LOW 20 MIN: CPT | Mod: S$PBB,,, | Performed by: PHYSICIAN ASSISTANT

## 2018-12-17 PROCEDURE — 99999 PR PBB SHADOW E&M-EST. PATIENT-LVL V: CPT | Mod: PBBFAC,,, | Performed by: PHYSICIAN ASSISTANT

## 2018-12-17 PROCEDURE — 99215 OFFICE O/P EST HI 40 MIN: CPT | Mod: PBBFAC,PN | Performed by: PHYSICIAN ASSISTANT

## 2018-12-17 RX ORDER — OXYCODONE AND ACETAMINOPHEN 10; 325 MG/1; MG/1
1 TABLET ORAL 3 TIMES DAILY PRN
Qty: 90 TABLET | Refills: 0 | Status: SHIPPED | OUTPATIENT
Start: 2019-01-10 | End: 2019-02-09

## 2018-12-17 RX ORDER — OXYCODONE AND ACETAMINOPHEN 10; 325 MG/1; MG/1
1 TABLET ORAL 3 TIMES DAILY PRN
Qty: 90 TABLET | Refills: 0 | Status: SHIPPED | OUTPATIENT
Start: 2019-03-11 | End: 2019-03-18 | Stop reason: SDUPTHER

## 2018-12-17 RX ORDER — OXYCODONE AND ACETAMINOPHEN 10; 325 MG/1; MG/1
1 TABLET ORAL 3 TIMES DAILY PRN
Qty: 90 TABLET | Refills: 0 | Status: SHIPPED | OUTPATIENT
Start: 2019-02-09 | End: 2019-03-11

## 2018-12-19 NOTE — PROGRESS NOTES
CC:back pain    HPI: The patient is a 63-year-old man with a history of peripheral vascular disease, diabetes, cervical DDD, lumbar postlaminectomy syndrome who presents in followup.  He returns in follow-up today with worsening low back pain. I had ordered a lumbar spine CT several months ago and he believes that he had this done while hospitalized at [place].  He continues to have pain across the low back that is significantly worse with standing and walking, improved with sitting and pain medication.  He denies any current relief with his spinal cord stimulator.  He reports numbness and weakness in his legs.  He denies bladder or bowel incontinence.      Pain intervention history: He has seen several other providers including Dr. Victor and Dr. Win Mckeon.  He has a cervical dorsal column stimulator which provides relief of neck pain.He had taken higher dose pain medication in the past but presented to us taking 60 mg of MS Contin t.i.d. and oxycodone 15 mg one to 2 tablets t.i.d.  He is taking gabapentin 300 mg t.i.d.  Baclofen 20 mg t.i.d.  Amitriptyline 25 mg q.h.s. He had undergone fusion L4-S1 in 2012.    ROS:He reports shortness of breath, weight gain, back pain, joint stiffness, and loss of balance.  Balance of review of systems is negative.    Medical, surgical, family and social history reviewed elsewhere in record.    Medications/Allergies: See med card    Vitals:    12/17/18 1305   BP: 114/80   Pulse: 73   Resp: 20   Temp: 97.5 °F (36.4 °C)   TempSrc: Oral   Weight: 113.7 kg (250 lb 12.4 oz)   PainSc:   6   PainLoc: Back         Physical exam:  Gen: A and O x3, pleasant, well-groomed  Skin:  Taut skin in legs with purplish hue bilateral lower legs.   HEENT: PERRLA  CVS: Regular rate and rhythm, normal S1 and S2, no murmurs.  Resp: Clear to auscultation bilaterally, no wheezes or rales.  Abdomen: Soft, NT/ND, normal bowel sounds present.  Musculoskeletal:  Slow, cautious gait.    Neuro:  Upper  extremities: 5/5 strength bilaterally  Lower extremities: 5/5 strength bilaterally   Reflexes: Brachioradialis 0+, Bicep 0+, Tricep 0+, Patellar 0+, Achilles 0+ bilaterally.  Sensory:  Intact and symmetrical to light touch and pinprick in C2-T1 dermatomes bilaterally.  Intact and symmetrical to light touch and pinprick in L2-S1 dermatomes bilaterally, except for L4-S1 distribution right leg decreased sensation.    Lumbar spine:  Range of motion is mildly limited with flexion and extension without increased pain.  Straight leg raise negative bilaterally.  Internal and external rotation of hip is negative bilaterally.  Matteo's test is negative bilaterally.  Myofascial exam:  Exquisite tenderness to palpation to the left greater than right lumbar paraspinous muscles.    Imagin12 Xray L-spine  There is disk desiccation and disk space narrowing at L5-S1. There is mild diffuse disk space narrowing elsewhere throughout the lumbar spine. Osteophytes project from vertebral bodies and there is sclerosis and posterior facets. There is no acute compression or subluxation.    CT L-spine 7/15/13  T12-L1: Left posterior lateral disk bulging and osteophyte formation is present causing mild left foraminal narrowing the central canal and right foraminal intact.  L1/2 Mild annular disk bulge and osteophyte formation is present causing a mild canal and left foraminal narrowing.  L2/3: Degenerative facet changes combine with disk bulging to produce mild left greater than right foraminal narrowing. Central canal is mildly distorted.  L3/4: There is mild annular disk bulging and degenerative facet changes are present. There is mild canal and foraminal distortion.  L4/5: At this level levels of prior laminectomy canal canal visualization is limited. The bony foramina appear grossly intact and there is no evidence of malalignment.  L5/S1: Moderate degenerative facet changes are present and there is some bony foraminal narrowing on  the left. There is evidence of prior laminectomy and the right foramen is intact. The bony canal soft tissue visualization is limited but it is grossly intact the degenerative changes are seen in the sacroiliac joints.    X-ray thoracic spine 12/18/14  Evidence of anterior cervical fusion is noted in the lower cervical spine. Neurostimulator appears to be present entering at approximately the T10-T11 level with its tip at the T8 level. Vertebral body heights appear well preserved. Alignment appears adequate. Osseous demineralization is noted.   T7-T8 T8-T9 and T9-T10 mild intervertebral disk height loss appears to be present.     X-ray thoracic and lumbar spine 12/28/15  Comparison: Thoracic spine radiographs-12/18/2014  Thoracic spine: There has been interval development of a new minor superior endplate compression fracture at the superior endplate of T7. There are two neurostimulator leads present within the posterior aspect of the thoracic spinal canal which terminating at approximately T7. No retropulsion. There is multilevel degenerative change of the thoracic spine in the form of marginal osteophyte formation and intervertebral disc space narrowing. There is suddenly observed postoperative change of ACDF at C5-C7. The heads of the C7 vertebral body screws do not appear firmly apposed to the adjacent metallic fixation plate.  Lumbar spine:There are postoperative changes of posterior intermittent fusion at L4-S1 with interbody device is noted at L4-L5 and L5-S1. No hardware complication. There is an IVC filter present. There is degenerative change of the lumbar spine in the form of marginal osteophyte formation and disc space narrowing.     CT cervical spine 3/7/16  Anterior plate fusion is noted at the C5, C6, and C7 levels with intervertebral disk prostheses at the C5-C6 C6-C7 levels.  There is loss of the normal cervical lordosis which may be positional or related to muscular strain.  Vertebral body heights  appear well-preserved.  At the C2-C3 level, mild disk osteophyte spurring is noted of approximately 2-3 mm.  No significant neuroforaminal stenosis is noted, minimal central canal narrowing is noted.  At the C3-C4 level mild disk osteophyte spurring is noted of approximately 2 mm.  No significant nerve foraminal narrowing is noted.  Minimal central canal narrowing is noted.  At the C4-C5 level, mild disk osteophyte bulge is noted centrally of up to 3 mm.  Facet arthropathy is noted bilaterally.  Moderate bilateral neuroforaminal narrowing appears to be present.  Mild central canal narrowing is noted.  At the C5-C6 level, a disk prosthesis is noted with metallic artifact hindering ideal evaluation.  Uncovertebral spurring and facet arthropathy appears to be present right greater than left with moderate right neural foraminal stenosis and mild to moderate left neural foraminal stenosis.  Mild to moderate central canal narrowing is suspected but not ideally evaluated  At the C6-C7 level, uncovertebral spurring is noted bilaterally.  Moderate to severe left-sided neuroforaminal stenosis is noted with moderate right-sided neuroforaminal stenosis.  Mild to moderate central canals narrowing is suspected.  Evaluation is hindered by metallic artifact.  At the C7-T1 level, uncovertebral spurring is noted to the right greater than left with severe right neural foraminal stenosis suspected and mild left neural foraminal stenosis with mild central canal is stenosis suspected.  Evaluation of this level is hindered by metallic artifact      Assessment:  The patient is a 63-year-old man with a history of peripheral vascular disease, diabetes, cervical DDD, lumbar postlaminectomy syndrome who presents in followup.      1. DDD (degenerative disc disease), lumbar    2. Postlaminectomy syndrome of lumbar region    3. Chronic pain syndrome    4. Opioid contract exists        Plan:  1.  I have requested the lumbar spine CT results from  [facility].  Once we have the results, we will call him and may consider interventional procedures.  2.  The Dr. Buckley provided prescriptions for oxycodone-acetaminophen 10/325 mg up to 3 times a day as needed for pain.  I have reviewed the [place] Board [employer]acy website and there are no abberancies.    3.  We will call him with results and recommendations.    Greater than 50% of this 20 min visit was spent on counseling the patient.

## 2018-12-20 ENCOUNTER — TELEPHONE (OUTPATIENT)
Dept: PAIN MEDICINE | Facility: CLINIC | Age: 63
End: 2018-12-20

## 2018-12-20 DIAGNOSIS — M51.36 DDD (DEGENERATIVE DISC DISEASE), LUMBAR: Primary | ICD-10-CM

## 2018-12-20 DIAGNOSIS — M96.1 POSTLAMINECTOMY SYNDROME OF LUMBAR REGION: ICD-10-CM

## 2018-12-20 NOTE — TELEPHONE ENCOUNTER
Please let the patient know I reviewed his lumbar spine CT results and he does not have any acute findings.  I would like him to do some physical therapy.  If he would like to do this, I will send orders to [facility] in [place].

## 2019-03-18 ENCOUNTER — OFFICE VISIT (OUTPATIENT)
Dept: PAIN MEDICINE | Facility: CLINIC | Age: 64
End: 2019-03-18
Payer: MEDICARE

## 2019-03-18 VITALS
SYSTOLIC BLOOD PRESSURE: 114 MMHG | HEIGHT: 74 IN | WEIGHT: 255.94 LBS | BODY MASS INDEX: 32.85 KG/M2 | RESPIRATION RATE: 13 BRPM | HEART RATE: 70 BPM | DIASTOLIC BLOOD PRESSURE: 68 MMHG

## 2019-03-18 DIAGNOSIS — Z79.891 OPIOID CONTRACT EXISTS: ICD-10-CM

## 2019-03-18 DIAGNOSIS — M51.36 DDD (DEGENERATIVE DISC DISEASE), LUMBAR: Primary | ICD-10-CM

## 2019-03-18 DIAGNOSIS — M96.1 POSTLAMINECTOMY SYNDROME OF LUMBAR REGION: ICD-10-CM

## 2019-03-18 DIAGNOSIS — M62.838 MUSCLE SPASM: ICD-10-CM

## 2019-03-18 PROCEDURE — 99999 PR PBB SHADOW E&M-EST. PATIENT-LVL V: ICD-10-PCS | Mod: PBBFAC,,, | Performed by: PHYSICIAN ASSISTANT

## 2019-03-18 PROCEDURE — 99213 OFFICE O/P EST LOW 20 MIN: CPT | Mod: S$PBB,,, | Performed by: PHYSICIAN ASSISTANT

## 2019-03-18 PROCEDURE — 99215 OFFICE O/P EST HI 40 MIN: CPT | Mod: PBBFAC,PN | Performed by: PHYSICIAN ASSISTANT

## 2019-03-18 PROCEDURE — 99999 PR PBB SHADOW E&M-EST. PATIENT-LVL V: CPT | Mod: PBBFAC,,, | Performed by: PHYSICIAN ASSISTANT

## 2019-03-18 PROCEDURE — 99213 PR OFFICE/OUTPT VISIT, EST, LEVL III, 20-29 MIN: ICD-10-PCS | Mod: S$PBB,,, | Performed by: PHYSICIAN ASSISTANT

## 2019-03-18 RX ORDER — CYCLOBENZAPRINE HCL 5 MG
5 TABLET ORAL 2 TIMES DAILY PRN
Qty: 60 TABLET | Refills: 2 | Status: SHIPPED | OUTPATIENT
Start: 2019-03-18 | End: 2019-10-07 | Stop reason: SDUPTHER

## 2019-03-18 RX ORDER — OXYCODONE AND ACETAMINOPHEN 10; 325 MG/1; MG/1
1 TABLET ORAL 3 TIMES DAILY PRN
Qty: 90 TABLET | Refills: 0 | Status: SHIPPED | OUTPATIENT
Start: 2019-05-10 | End: 2019-05-10 | Stop reason: SDUPTHER

## 2019-03-18 RX ORDER — OXYCODONE AND ACETAMINOPHEN 10; 325 MG/1; MG/1
1 TABLET ORAL 3 TIMES DAILY PRN
Qty: 90 TABLET | Refills: 0 | Status: SHIPPED | OUTPATIENT
Start: 2019-04-10 | End: 2019-05-10

## 2019-03-18 RX ORDER — SUVOREXANT 20 MG/1
TABLET, FILM COATED ORAL
COMMUNITY
Start: 2018-12-18 | End: 2019-07-05 | Stop reason: CLARIF

## 2019-03-18 RX ORDER — OXYCODONE AND ACETAMINOPHEN 10; 325 MG/1; MG/1
1 TABLET ORAL 3 TIMES DAILY PRN
Qty: 90 TABLET | Refills: 0 | Status: SHIPPED | OUTPATIENT
Start: 2019-06-09 | End: 2019-06-13 | Stop reason: SDUPTHER

## 2019-03-18 NOTE — PROGRESS NOTES
"CC:back pain    HPI: The patient is a 63-year-old man with a history of peripheral vascular disease, diabetes, cervical DDD, lumbar postlaminectomy syndrome who presents in followup.  He returns in follow-up today with back pain. He states that his back pain has significantly improved following physical therapy at [facility] in [place].  He continues to do his exercises and stretches that he learned and feels that his mobility and strength has significantly improved.  However, he has been having some sharp muscle cramping in his left lower buttock, lateral and posterior thigh.  This is worse with standing and walking, improved with sitting and Flexeril but he has not have Flexeril in about 1 month.  He reports numbness in his lower legs.  He denies bladder or bowel incontinence.      Pain intervention history: He has seen several other providers including Dr. Victor and Dr. Win Mckeon.  He has a cervical dorsal column stimulator which provides relief of neck pain.He had taken higher dose pain medication in the past but presented to us taking 60 mg of MS Contin t.i.d. and oxycodone 15 mg one to 2 tablets t.i.d.  He is taking gabapentin 300 mg t.i.d.  Baclofen 20 mg t.i.d.  Amitriptyline 25 mg q.h.s. He had undergone fusion L4-S1 in 2012.    ROS:He reports shortness of breath, weight gain, back pain, joint stiffness, and loss of balance.  Balance of review of systems is negative.    Medical, surgical, family and social history reviewed elsewhere in record.    Medications/Allergies: See med card    Vitals:    03/18/19 1355   BP: 114/68   Pulse: 70   Resp: 13   Weight: 116.1 kg (255 lb 15.3 oz)   Height: 6' 2" (1.88 m)   PainSc:   5   PainLoc: Hip         Physical exam:  Gen: A and O x3, pleasant, well-groomed  Skin:  Taut skin in legs with purplish hue bilateral lower legs.   HEENT: PERRLA  CVS: Regular rate and rhythm, normal S1 and S2, no murmurs.  Resp: Clear to auscultation bilaterally, no wheezes or " rales.  Abdomen: Soft, NT/ND, normal bowel sounds present.  Musculoskeletal:  Slow, cautious gait.    Neuro:  Upper extremities: 5/5 strength bilaterally  Lower extremities: 5/5 strength bilaterally   Reflexes: Brachioradialis 0+, Bicep 0+, Tricep 0+, Patellar 0+, Achilles 0+ bilaterally.  Sensory:  Intact and symmetrical to light touch and pinprick in C2-T1 dermatomes bilaterally.  Intact and symmetrical to light touch and pinprick in L2-S1 dermatomes bilaterally, except for L4-S1 distribution right leg decreased sensation.    Lumbar spine:  Range of motion is mildly limited with flexion and extension without increased pain.  Straight leg raise negative bilaterally.  Internal and external rotation of hip is negative bilaterally.  Matteo's test is negative bilaterally.  Myofascial exam:  No tenderness to palpation to the lumbar paraspinous muscles.  Mild tenderness to palpation to the left lateral thigh.    Imagin12 Xray L-spine  There is disk desiccation and disk space narrowing at L5-S1. There is mild diffuse disk space narrowing elsewhere throughout the lumbar spine. Osteophytes project from vertebral bodies and there is sclerosis and posterior facets. There is no acute compression or subluxation.    CT L-spine 7/15/13  T12-L1: Left posterior lateral disk bulging and osteophyte formation is present causing mild left foraminal narrowing the central canal and right foraminal intact.  L1/2 Mild annular disk bulge and osteophyte formation is present causing a mild canal and left foraminal narrowing.  L2/3: Degenerative facet changes combine with disk bulging to produce mild left greater than right foraminal narrowing. Central canal is mildly distorted.  L3/4: There is mild annular disk bulging and degenerative facet changes are present. There is mild canal and foraminal distortion.  L4/5: At this level levels of prior laminectomy canal canal visualization is limited. The bony foramina appear grossly intact  and there is no evidence of malalignment.  L5/S1: Moderate degenerative facet changes are present and there is some bony foraminal narrowing on the left. There is evidence of prior laminectomy and the right foramen is intact. The bony canal soft tissue visualization is limited but it is grossly intact the degenerative changes are seen in the sacroiliac joints.    X-ray thoracic spine 12/18/14  Evidence of anterior cervical fusion is noted in the lower cervical spine. Neurostimulator appears to be present entering at approximately the T10-T11 level with its tip at the T8 level. Vertebral body heights appear well preserved. Alignment appears adequate. Osseous demineralization is noted.   T7-T8 T8-T9 and T9-T10 mild intervertebral disk height loss appears to be present.     X-ray thoracic and lumbar spine 12/28/15  Comparison: Thoracic spine radiographs-12/18/2014  Thoracic spine: There has been interval development of a new minor superior endplate compression fracture at the superior endplate of T7. There are two neurostimulator leads present within the posterior aspect of the thoracic spinal canal which terminating at approximately T7. No retropulsion. There is multilevel degenerative change of the thoracic spine in the form of marginal osteophyte formation and intervertebral disc space narrowing. There is suddenly observed postoperative change of ACDF at C5-C7. The heads of the C7 vertebral body screws do not appear firmly apposed to the adjacent metallic fixation plate.  Lumbar spine:There are postoperative changes of posterior intermittent fusion at L4-S1 with interbody device is noted at L4-L5 and L5-S1. No hardware complication. There is an IVC filter present. There is degenerative change of the lumbar spine in the form of marginal osteophyte formation and disc space narrowing.     CT cervical spine 3/7/16  Anterior plate fusion is noted at the C5, C6, and C7 levels with intervertebral disk prostheses at the  C5-C6 C6-C7 levels.  There is loss of the normal cervical lordosis which may be positional or related to muscular strain.  Vertebral body heights appear well-preserved.  At the C2-C3 level, mild disk osteophyte spurring is noted of approximately 2-3 mm.  No significant neuroforaminal stenosis is noted, minimal central canal narrowing is noted.  At the C3-C4 level mild disk osteophyte spurring is noted of approximately 2 mm.  No significant nerve foraminal narrowing is noted.  Minimal central canal narrowing is noted.  At the C4-C5 level, mild disk osteophyte bulge is noted centrally of up to 3 mm.  Facet arthropathy is noted bilaterally.  Moderate bilateral neuroforaminal narrowing appears to be present.  Mild central canal narrowing is noted.  At the C5-C6 level, a disk prosthesis is noted with metallic artifact hindering ideal evaluation.  Uncovertebral spurring and facet arthropathy appears to be present right greater than left with moderate right neural foraminal stenosis and mild to moderate left neural foraminal stenosis.  Mild to moderate central canal narrowing is suspected but not ideally evaluated  At the C6-C7 level, uncovertebral spurring is noted bilaterally.  Moderate to severe left-sided neuroforaminal stenosis is noted with moderate right-sided neuroforaminal stenosis.  Mild to moderate central canals narrowing is suspected.  Evaluation is hindered by metallic artifact.  At the C7-T1 level, uncovertebral spurring is noted to the right greater than left with severe right neural foraminal stenosis suspected and mild left neural foraminal stenosis with mild central canal is stenosis suspected.  Evaluation of this level is hindered by metallic artifact      Assessment:  The patient is a 63-year-old man with a history of peripheral vascular disease, diabetes, cervical DDD, lumbar postlaminectomy syndrome who presents in followup.      1. DDD (degenerative disc disease), lumbar    2. Postlaminectomy  syndrome of lumbar region    3. Muscle spasm    4. Opioid contract exists        Plan:  1.  Dr. Buckley provided prescriptions for oxycodone-acetaminophen 10/325 mg up to 3 times a day as needed for pain.  I have reviewed the [place] Board of Pharmacy website and there are no abberancies.  I have refilled his Flexeril and encouraged him to continue the exercises and stretches he learned in physical therapy.  2.  Follow-up in 3 months or sooner as needed.

## 2019-04-24 ENCOUNTER — OFFICE VISIT (OUTPATIENT)
Dept: PAIN MEDICINE | Facility: CLINIC | Age: 64
End: 2019-04-24
Payer: MEDICARE

## 2019-04-24 VITALS
HEART RATE: 86 BPM | DIASTOLIC BLOOD PRESSURE: 72 MMHG | BODY MASS INDEX: 32.99 KG/M2 | RESPIRATION RATE: 18 BRPM | OXYGEN SATURATION: 96 % | SYSTOLIC BLOOD PRESSURE: 120 MMHG | WEIGHT: 256.94 LBS | TEMPERATURE: 96 F

## 2019-04-24 DIAGNOSIS — Z79.891 OPIOID CONTRACT EXISTS: ICD-10-CM

## 2019-04-24 DIAGNOSIS — M54.16 LUMBAR RADICULOPATHY: Primary | ICD-10-CM

## 2019-04-24 DIAGNOSIS — M51.36 DDD (DEGENERATIVE DISC DISEASE), LUMBAR: ICD-10-CM

## 2019-04-24 DIAGNOSIS — M96.1 POSTLAMINECTOMY SYNDROME OF LUMBAR REGION: ICD-10-CM

## 2019-04-24 PROCEDURE — 99999 PR PBB SHADOW E&M-EST. PATIENT-LVL V: CPT | Mod: PBBFAC,,, | Performed by: PHYSICIAN ASSISTANT

## 2019-04-24 PROCEDURE — 99214 OFFICE O/P EST MOD 30 MIN: CPT | Mod: S$PBB,,, | Performed by: PHYSICIAN ASSISTANT

## 2019-04-24 PROCEDURE — 99215 OFFICE O/P EST HI 40 MIN: CPT | Mod: PBBFAC,PN | Performed by: PHYSICIAN ASSISTANT

## 2019-04-24 PROCEDURE — 99999 PR PBB SHADOW E&M-EST. PATIENT-LVL V: ICD-10-PCS | Mod: PBBFAC,,, | Performed by: PHYSICIAN ASSISTANT

## 2019-04-24 PROCEDURE — 99214 PR OFFICE/OUTPT VISIT, EST, LEVL IV, 30-39 MIN: ICD-10-PCS | Mod: S$PBB,,, | Performed by: PHYSICIAN ASSISTANT

## 2019-04-30 NOTE — PROGRESS NOTES
CC:back pain    HPI: The patient is a 63-year-old man with a history of peripheral vascular disease, diabetes, cervical DDD, lumbar postlaminectomy syndrome who presents in followup.  He returns in follow-up today with severe worsening low back and left leg pain. This has been present for over 1 month but has been slowly getting worse.  He tried using his spinal cord stimulator for this pain but he states he had worsening pain with this and he has not used it in 1 month.  Pain is located in the left low back, left groin, left buttock, posterior thigh and calf.  He describes it as burning, sharp and warm.  The pain is worse with standing walking improved with sitting.  He reports intermittent weakness and numbness in the left leg.  He denies bladder or bowel incontinence.      Pain intervention history: He has seen several other providers including Dr. Victor and Dr. Win Mckeon.  He has a cervical dorsal column stimulator which provides relief of neck pain.He had taken higher dose pain medication in the past but presented to us taking 60 mg of MS Contin t.i.d. and oxycodone 15 mg one to 2 tablets t.i.d.  He is taking gabapentin 300 mg t.i.d.  Baclofen 20 mg t.i.d.  Amitriptyline 25 mg q.h.s. He had undergone fusion L4-S1 in 2012.    ROS:He reports shortness of breath, weight gain, back pain, joint stiffness, and loss of balance.  Balance of review of systems is negative.    Medical, surgical, family and social history reviewed elsewhere in record.    Medications/Allergies: See med card    Vitals:    04/24/19 1439   BP: 120/72   Pulse: 86   Resp: 18   Temp: 96.1 °F (35.6 °C)   TempSrc: Oral   SpO2: 96%   Weight: 116.6 kg (256 lb 15.1 oz)   PainSc:   6   PainLoc: Hip         Physical exam:  Gen: A and O x3, pleasant, well-groomed  Skin:  Taut skin in legs with purplish hue bilateral lower legs.   HEENT: PERRLA  CVS: Regular rate and rhythm, normal S1 and S2, no murmurs.  Resp: Clear to auscultation bilaterally,  no wheezes or rales.  Abdomen: Soft, NT/ND, normal bowel sounds present.  Musculoskeletal:  Slow, cautious gait.    Neuro:  Upper extremities: 5/5 strength bilaterally  Lower extremities: 5/5 strength bilaterally   Reflexes: Brachioradialis 0+, Bicep 0+, Tricep 0+, Patellar 0+, Achilles 0+ bilaterally.  Sensory:  Intact and symmetrical to light touch and pinprick in C2-T1 dermatomes bilaterally.  Intact and symmetrical to light touch and pinprick in L2-S1 dermatomes bilaterally, except for L4-S1 distribution right leg decreased sensation.     Lumbar spine:  Range of motion is mildly limited with flexion and extension with increased left low back and left buttock pain during each maneuver but especially with flexion.  Straight leg raise causes left buttock and posterior thigh pain.  Internal and external rotation of hip is negative bilaterally.  Matteo's test is negative bilaterally.  Myofascial exam:  No tenderness to palpation to the lumbar paraspinous muscles.      Imagin12 Xray L-spine  There is disk desiccation and disk space narrowing at L5-S1. There is mild diffuse disk space narrowing elsewhere throughout the lumbar spine. Osteophytes project from vertebral bodies and there is sclerosis and posterior facets. There is no acute compression or subluxation.    CT L-spine 7/15/13  T12-L1: Left posterior lateral disk bulging and osteophyte formation is present causing mild left foraminal narrowing the central canal and right foraminal intact.  L1/2 Mild annular disk bulge and osteophyte formation is present causing a mild canal and left foraminal narrowing.  L2/3: Degenerative facet changes combine with disk bulging to produce mild left greater than right foraminal narrowing. Central canal is mildly distorted.  L3/4: There is mild annular disk bulging and degenerative facet changes are present. There is mild canal and foraminal distortion.  L4/5: At this level levels of prior laminectomy canal canal  visualization is limited. The bony foramina appear grossly intact and there is no evidence of malalignment.  L5/S1: Moderate degenerative facet changes are present and there is some bony foraminal narrowing on the left. There is evidence of prior laminectomy and the right foramen is intact. The bony canal soft tissue visualization is limited but it is grossly intact the degenerative changes are seen in the sacroiliac joints.    X-ray thoracic spine 12/18/14  Evidence of anterior cervical fusion is noted in the lower cervical spine. Neurostimulator appears to be present entering at approximately the T10-T11 level with its tip at the T8 level. Vertebral body heights appear well preserved. Alignment appears adequate. Osseous demineralization is noted.   T7-T8 T8-T9 and T9-T10 mild intervertebral disk height loss appears to be present.     X-ray thoracic and lumbar spine 12/28/15  Comparison: Thoracic spine radiographs-12/18/2014  Thoracic spine: There has been interval development of a new minor superior endplate compression fracture at the superior endplate of T7. There are two neurostimulator leads present within the posterior aspect of the thoracic spinal canal which terminating at approximately T7. No retropulsion. There is multilevel degenerative change of the thoracic spine in the form of marginal osteophyte formation and intervertebral disc space narrowing. There is suddenly observed postoperative change of ACDF at C5-C7. The heads of the C7 vertebral body screws do not appear firmly apposed to the adjacent metallic fixation plate.  Lumbar spine:There are postoperative changes of posterior intermittent fusion at L4-S1 with interbody device is noted at L4-L5 and L5-S1. No hardware complication. There is an IVC filter present. There is degenerative change of the lumbar spine in the form of marginal osteophyte formation and disc space narrowing.     CT cervical spine 3/7/16  Anterior plate fusion is noted at the  C5, C6, and C7 levels with intervertebral disk prostheses at the C5-C6 C6-C7 levels.  There is loss of the normal cervical lordosis which may be positional or related to muscular strain.  Vertebral body heights appear well-preserved.  At the C2-C3 level, mild disk osteophyte spurring is noted of approximately 2-3 mm.  No significant neuroforaminal stenosis is noted, minimal central canal narrowing is noted.  At the C3-C4 level mild disk osteophyte spurring is noted of approximately 2 mm.  No significant nerve foraminal narrowing is noted.  Minimal central canal narrowing is noted.  At the C4-C5 level, mild disk osteophyte bulge is noted centrally of up to 3 mm.  Facet arthropathy is noted bilaterally.  Moderate bilateral neuroforaminal narrowing appears to be present.  Mild central canal narrowing is noted.  At the C5-C6 level, a disk prosthesis is noted with metallic artifact hindering ideal evaluation.  Uncovertebral spurring and facet arthropathy appears to be present right greater than left with moderate right neural foraminal stenosis and mild to moderate left neural foraminal stenosis.  Mild to moderate central canal narrowing is suspected but not ideally evaluated  At the C6-C7 level, uncovertebral spurring is noted bilaterally.  Moderate to severe left-sided neuroforaminal stenosis is noted with moderate right-sided neuroforaminal stenosis.  Mild to moderate central canals narrowing is suspected.  Evaluation is hindered by metallic artifact.  At the C7-T1 level, uncovertebral spurring is noted to the right greater than left with severe right neural foraminal stenosis suspected and mild left neural foraminal stenosis with mild central canal is stenosis suspected.  Evaluation of this level is hindered by metallic artifact      Assessment:  The patient is a 63-year-old man with a history of peripheral vascular disease, diabetes, cervical DDD, lumbar postlaminectomy syndrome who presents in followup.      1.  Lumbar radiculopathy    2. DDD (degenerative disc disease), lumbar    3. Postlaminectomy syndrome of lumbar region    4. Opioid contract exists        Plan:  1.  We discussed that he has multilevel foraminal stenosis as visualized on his most recent lumbar spine CT.  He would likely benefit from a caudal JENNIFER but he is not sure what his hemoglobin A1c is.  I have requested this from [facility] in [place].  Based on these results will determine whether not he can be scheduled for a caudal JENNIFER verses sending him back to physical therapy at [facility] in [place].  2.  He continues to take pain medication and is not due for refill.      Greater than 50% of this 25 min visit was spent counseling the patient.

## 2019-05-10 RX ORDER — OXYCODONE AND ACETAMINOPHEN 10; 325 MG/1; MG/1
1 TABLET ORAL 3 TIMES DAILY PRN
Qty: 90 TABLET | Refills: 0 | Status: SHIPPED | OUTPATIENT
Start: 2019-05-10 | End: 2019-06-09

## 2019-05-10 RX ORDER — OXYCODONE AND ACETAMINOPHEN 10; 325 MG/1; MG/1
1 TABLET ORAL 3 TIMES DAILY PRN
Qty: 90 TABLET | Refills: 0 | Status: CANCELLED | OUTPATIENT
Start: 2019-06-09 | End: 2019-07-09

## 2019-05-10 NOTE — TELEPHONE ENCOUNTER
----- Message from Alicia Schaeffer sent at 5/10/2019 12:17 PM CDT -----  Contact: Self   Patient want to remind office to call his percocet in to [facility], any questions please call back at [phone] (home) Patient said he lost rx    [id number] - [place] - [address]  [address] [place] [address]  Phone: [phone] Fax: [phone]

## 2019-05-10 NOTE — TELEPHONE ENCOUNTER
Spoke with the patient and he is aware that we can send to his pharmacy. Please do not print.      ----- Message from Asha May sent at 5/10/2019  8:36 AM CDT -----  Contact: self  Patient need to another script printed out on oxyCODONE-acetaminophen (PERCOCET)  mg per tablet    Due to he lost the last script       Patient will like to  script today ,patient states he will like to come to office within next hour per patient       Please call to advice [phone] (home)

## 2019-05-21 ENCOUNTER — TELEPHONE (OUTPATIENT)
Dept: PAIN MEDICINE | Facility: CLINIC | Age: 64
End: 2019-05-21

## 2019-05-21 NOTE — TELEPHONE ENCOUNTER
Left voice message that we have not received the results. They need to contact [facility] to have it sent to us. Request faxed again to [facility].

## 2019-05-21 NOTE — TELEPHONE ENCOUNTER
----- Message from Kaitlyn Porras sent at 5/21/2019  2:11 PM CDT -----  Contact: patient  Type: Needs Medical Advice    Who Called:  patient  Best Call Back Number: [phone]  Additional Information: wants to know if the doctor was able to determine if he would be able to receive the hip injections. Please give call back

## 2019-06-11 ENCOUNTER — HOSPITAL ENCOUNTER (EMERGENCY)
Facility: HOSPITAL | Age: 64
Discharge: HOME | End: 2019-06-12
Attending: EMERGENCY MEDICINE
Payer: MEDICARE

## 2019-06-11 DIAGNOSIS — L03.116 CELLULITIS OF LEFT LEG WITHOUT FOOT: Primary | ICD-10-CM

## 2019-06-11 PROCEDURE — 99284 EMERGENCY DEPT VISIT MOD MDM: CPT

## 2019-06-12 VITALS
DIASTOLIC BLOOD PRESSURE: 66 MMHG | SYSTOLIC BLOOD PRESSURE: 105 MMHG | TEMPERATURE: 98 F | OXYGEN SATURATION: 95 % | HEIGHT: 74 IN | BODY MASS INDEX: 31.95 KG/M2 | WEIGHT: 249 LBS | HEART RATE: 91 BPM | RESPIRATION RATE: 16 BRPM

## 2019-06-12 PROCEDURE — 25000003 PHARM REV CODE 250: Performed by: EMERGENCY MEDICINE

## 2019-06-12 RX ORDER — DOXYCYCLINE 100 MG/1
100 CAPSULE ORAL 2 TIMES DAILY
Qty: 14 CAPSULE | Refills: 0 | Status: SHIPPED | OUTPATIENT
Start: 2019-06-12 | End: 2019-06-19

## 2019-06-12 RX ORDER — CEPHALEXIN 250 MG/1
500 CAPSULE ORAL
Status: COMPLETED | OUTPATIENT
Start: 2019-06-12 | End: 2019-06-12

## 2019-06-12 RX ORDER — CEPHALEXIN 500 MG/1
500 CAPSULE ORAL EVERY 6 HOURS
Qty: 28 CAPSULE | Refills: 0 | Status: SHIPPED | OUTPATIENT
Start: 2019-06-12 | End: 2019-06-19

## 2019-06-12 RX ORDER — DOXYCYCLINE HYCLATE 100 MG
100 TABLET ORAL
Status: COMPLETED | OUTPATIENT
Start: 2019-06-12 | End: 2019-06-12

## 2019-06-12 RX ADMIN — DOXYCYCLINE HYCLATE 100 MG: 100 TABLET, COATED ORAL at 12:06

## 2019-06-12 RX ADMIN — CEPHALEXIN 500 MG: 250 CAPSULE ORAL at 12:06
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
## 2019-06-12 NOTE — ED NOTES
Assumed care from:  Patient awake, alert and oriented x 3, skin warm and dry, in NAD with family at bedside.  Patient states that he was bitten by fire ants 2 days ago, now CO BLE pain, redness and swelling.    Patient identifiers for Kevan Cloin checked and correct.  LOC:  Patient is awake, alert, and aware of environment with an appropriate affect. Patient is oriented x 3 and speaking appropriately.  APPEARANCE:  Patient resting comfortably and in no acute distress. Patient is clean and well groomed, patient's clothing is properly fastened.  SKIN:  The skin is warm and dry. Patient has normal skin turgor and moist mucus membranes. BLE pain, redness, swelling.  MUSCULOSKELETAL:  Patient is moving all extremities well, no obvious deformities noted. Pulses intact.   RESPIRATORY:  Airway is open and patent. Respirations are spontaneous and non-labored with normal effort and rate.  CARDIAC:  Patient has a normal rate and rhythm. No peripheral edema noted. Capillary refill < 3 seconds.  ABDOMEN:  No distention noted.  Soft and non-tender upon palpation.  NEUROLOGICAL:  PERRL. Facial expression is symmetrical. Hand grasps are equal bilaterally. Normal sensation in all extremities when touched with finger.  Allergies reported:    Review of patient's allergies indicates:   Allergen Reactions    Bee pollens Anaphylaxis     Bee stings     Penicillins Nausea Only     Other reaction(s): Unknown    Codeine Rash     Other reaction(s): Unknown    Morphine Rash     OTHER NOTES:

## 2019-06-13 RX ORDER — OXYCODONE AND ACETAMINOPHEN 10; 325 MG/1; MG/1
1 TABLET ORAL 3 TIMES DAILY PRN
Qty: 90 TABLET | Refills: 0 | Status: SHIPPED | OUTPATIENT
Start: 2019-08-08 | End: 2019-07-05 | Stop reason: CLARIF

## 2019-06-13 RX ORDER — OXYCODONE AND ACETAMINOPHEN 10; 325 MG/1; MG/1
1 TABLET ORAL 3 TIMES DAILY PRN
Qty: 90 TABLET | Refills: 0 | Status: SHIPPED | OUTPATIENT
Start: 2019-09-07 | End: 2019-10-07 | Stop reason: SDUPTHER

## 2019-06-13 RX ORDER — OXYCODONE AND ACETAMINOPHEN 10; 325 MG/1; MG/1
1 TABLET ORAL 3 TIMES DAILY PRN
Qty: 90 TABLET | Refills: 0 | Status: SHIPPED | OUTPATIENT
Start: 2019-07-09 | End: 2019-07-05 | Stop reason: CLARIF

## 2019-07-01 ENCOUNTER — OFFICE VISIT (OUTPATIENT)
Dept: PAIN MEDICINE | Facility: CLINIC | Age: 64
End: 2019-07-01
Payer: MEDICARE

## 2019-07-01 VITALS
HEIGHT: 74 IN | HEART RATE: 71 BPM | BODY MASS INDEX: 33.05 KG/M2 | DIASTOLIC BLOOD PRESSURE: 69 MMHG | SYSTOLIC BLOOD PRESSURE: 110 MMHG | WEIGHT: 257.5 LBS

## 2019-07-01 DIAGNOSIS — Z79.891 OPIOID CONTRACT EXISTS: ICD-10-CM

## 2019-07-01 DIAGNOSIS — M96.1 POSTLAMINECTOMY SYNDROME OF LUMBAR REGION: ICD-10-CM

## 2019-07-01 DIAGNOSIS — M51.36 DDD (DEGENERATIVE DISC DISEASE), LUMBAR: Primary | ICD-10-CM

## 2019-07-01 PROCEDURE — 99999 PR PBB SHADOW E&M-EST. PATIENT-LVL IV: CPT | Mod: PBBFAC,,, | Performed by: PHYSICIAN ASSISTANT

## 2019-07-01 PROCEDURE — 99999 PR PBB SHADOW E&M-EST. PATIENT-LVL IV: ICD-10-PCS | Mod: PBBFAC,,, | Performed by: PHYSICIAN ASSISTANT

## 2019-07-01 PROCEDURE — 99213 OFFICE O/P EST LOW 20 MIN: CPT | Mod: S$PBB,,, | Performed by: PHYSICIAN ASSISTANT

## 2019-07-01 PROCEDURE — 99213 PR OFFICE/OUTPT VISIT, EST, LEVL III, 20-29 MIN: ICD-10-PCS | Mod: S$PBB,,, | Performed by: PHYSICIAN ASSISTANT

## 2019-07-01 PROCEDURE — 99214 OFFICE O/P EST MOD 30 MIN: CPT | Mod: PBBFAC,PN | Performed by: PHYSICIAN ASSISTANT

## 2019-07-05 ENCOUNTER — HOSPITAL ENCOUNTER (OUTPATIENT)
Facility: HOSPITAL | Age: 64
Discharge: HOME | End: 2019-07-08
Attending: EMERGENCY MEDICINE | Admitting: HOSPITALIST
Payer: MEDICARE

## 2019-07-05 DIAGNOSIS — G45.4 TRANSIENT GLOBAL AMNESIA: Primary | ICD-10-CM

## 2019-07-05 DIAGNOSIS — I10 BENIGN ESSENTIAL HTN: ICD-10-CM

## 2019-07-05 DIAGNOSIS — E11.9 TYPE 2 DIABETES MELLITUS WITHOUT COMPLICATION, WITHOUT LONG-TERM CURRENT USE OF INSULIN: ICD-10-CM

## 2019-07-05 DIAGNOSIS — Z79.01 ANTICOAGULANT LONG-TERM USE: ICD-10-CM

## 2019-07-05 DIAGNOSIS — R41.3 AMNESIA: ICD-10-CM

## 2019-07-05 LAB
ALBUMIN SERPL BCP-MCNC: 3.9 G/DL (ref 3.5–5.2)
ALP SERPL-CCNC: 111 U/L (ref 55–135)
ALT SERPL W/O P-5'-P-CCNC: 16 U/L (ref 10–44)
ANION GAP SERPL CALC-SCNC: 12 MMOL/L (ref 8–16)
APTT BLDCRRT: 30.6 SEC (ref 21–32)
AST SERPL-CCNC: 14 U/L (ref 10–40)
BASOPHILS # BLD AUTO: 0.1 K/UL (ref 0–0.2)
BASOPHILS NFR BLD: 1 % (ref 0–1.9)
BILIRUB SERPL-MCNC: 0.2 MG/DL (ref 0.1–1)
BUN SERPL-MCNC: 8 MG/DL (ref 8–23)
CALCIUM SERPL-MCNC: 8.7 MG/DL (ref 8.7–10.5)
CHLORIDE SERPL-SCNC: 100 MMOL/L (ref 95–110)
CHOLEST SERPL-MCNC: 150 MG/DL (ref 120–199)
CHOLEST/HDLC SERPL: 3.7 {RATIO} (ref 2–5)
CO2 SERPL-SCNC: 23 MMOL/L (ref 23–29)
CREAT SERPL-MCNC: 1.1 MG/DL (ref 0.5–1.4)
DIFFERENTIAL METHOD: ABNORMAL
EOSINOPHIL # BLD AUTO: 0.4 K/UL (ref 0–0.5)
EOSINOPHIL NFR BLD: 4.4 % (ref 0–8)
ERYTHROCYTE [DISTWIDTH] IN BLOOD BY AUTOMATED COUNT: 13.2 % (ref 11.5–14.5)
EST. GFR  (AFRICAN AMERICAN): >60 ML/MIN/1.73 M^2
EST. GFR  (NON AFRICAN AMERICAN): >60 ML/MIN/1.73 M^2
GLUCOSE SERPL-MCNC: 112 MG/DL (ref 70–110)
HCT VFR BLD AUTO: 39.2 % (ref 40–54)
HDLC SERPL-MCNC: 41 MG/DL (ref 40–75)
HDLC SERPL: 27.3 % (ref 20–50)
HGB BLD-MCNC: 12.4 G/DL (ref 14–18)
IMM GRANULOCYTES # BLD AUTO: 0.04 K/UL (ref 0–0.04)
INR PPP: 1 (ref 0.8–1.2)
LDLC SERPL CALC-MCNC: 73.2 MG/DL (ref 63–159)
LYMPHOCYTES # BLD AUTO: 4.2 K/UL (ref 1–4.8)
LYMPHOCYTES NFR BLD: 42.4 % (ref 18–48)
MCH RBC QN AUTO: 29.9 PG (ref 27–31)
MCHC RBC AUTO-ENTMCNC: 31.6 G/DL (ref 32–36)
MCV RBC AUTO: 95 FL (ref 82–98)
MONOCYTES # BLD AUTO: 1 K/UL (ref 0.3–1)
MONOCYTES NFR BLD: 9.7 % (ref 4–15)
NEUTROPHILS # BLD AUTO: 4.2 K/UL (ref 1.8–7.7)
NEUTROPHILS NFR BLD: 42.1 % (ref 38–73)
NONHDLC SERPL-MCNC: 109 MG/DL
NRBC BLD-RTO: 0 /100 WBC
PLATELET # BLD AUTO: 230 K/UL (ref 150–350)
PMV BLD AUTO: 9.8 FL (ref 9.2–12.9)
POC PTINR: 1 (ref 0.9–1.2)
POC PTWBT: 12.5 SEC (ref 9.7–14.3)
POTASSIUM SERPL-SCNC: 3.9 MMOL/L (ref 3.5–5.1)
PROT SERPL-MCNC: 7.3 G/DL (ref 6–8.4)
PROTHROMBIN TIME: 10.3 SEC (ref 9–12.5)
RBC # BLD AUTO: 4.15 M/UL (ref 4.6–6.2)
SAMPLE: NORMAL
SODIUM SERPL-SCNC: 135 MMOL/L (ref 136–145)
TRIGL SERPL-MCNC: 179 MG/DL (ref 30–150)
TSH SERPL DL<=0.005 MIU/L-ACNC: 3.29 UIU/ML (ref 0.4–4)
WBC # BLD AUTO: 9.86 K/UL (ref 3.9–12.7)

## 2019-07-05 PROCEDURE — 99900035 HC TECH TIME PER 15 MIN (STAT)

## 2019-07-05 PROCEDURE — 36416 COLLJ CAPILLARY BLOOD SPEC: CPT

## 2019-07-05 PROCEDURE — G0378 HOSPITAL OBSERVATION PER HR: HCPCS

## 2019-07-05 PROCEDURE — 80053 COMPREHEN METABOLIC PANEL: CPT

## 2019-07-05 PROCEDURE — 80061 LIPID PANEL: CPT

## 2019-07-05 PROCEDURE — 93005 ELECTROCARDIOGRAM TRACING: CPT

## 2019-07-05 PROCEDURE — 85610 PROTHROMBIN TIME: CPT

## 2019-07-05 PROCEDURE — 85730 THROMBOPLASTIN TIME PARTIAL: CPT

## 2019-07-05 PROCEDURE — 36415 COLL VENOUS BLD VENIPUNCTURE: CPT

## 2019-07-05 PROCEDURE — 84443 ASSAY THYROID STIM HORMONE: CPT

## 2019-07-05 PROCEDURE — 85025 COMPLETE CBC W/AUTO DIFF WBC: CPT

## 2019-07-05 PROCEDURE — 99285 EMERGENCY DEPT VISIT HI MDM: CPT | Mod: 25

## 2019-07-05 RX ORDER — PRAZOSIN HYDROCHLORIDE 2 MG/1
2 CAPSULE ORAL 2 TIMES DAILY
COMMUNITY
End: 2020-06-29

## 2019-07-05 RX ORDER — LAMOTRIGINE 25 MG/1
25 TABLET ORAL 2 TIMES DAILY
Status: ON HOLD | COMMUNITY
End: 2019-07-08 | Stop reason: HOSPADM

## 2019-07-05 RX ORDER — METFORMIN HYDROCHLORIDE 500 MG/1
1000 TABLET ORAL 2 TIMES DAILY WITH MEALS
Status: ON HOLD | COMMUNITY
End: 2019-07-31 | Stop reason: HOSPADM

## 2019-07-05 RX ORDER — POTASSIUM CHLORIDE 20 MEQ/1
20 TABLET, EXTENDED RELEASE ORAL 2 TIMES DAILY
Status: ON HOLD | COMMUNITY
End: 2019-07-31 | Stop reason: HOSPADM

## 2019-07-06 LAB
ALBUMIN SERPL BCP-MCNC: 3.8 G/DL (ref 3.5–5.2)
ALP SERPL-CCNC: 102 U/L (ref 55–135)
ALT SERPL W/O P-5'-P-CCNC: 18 U/L (ref 10–44)
ANION GAP SERPL CALC-SCNC: 12 MMOL/L (ref 8–16)
AST SERPL-CCNC: 16 U/L (ref 10–40)
AV INDEX (PROSTH): 1.18
AV MEAN GRADIENT: 1 MMHG
AV PEAK GRADIENT: 2 MMHG
AV VALVE AREA: 4.89 CM2
AV VELOCITY RATIO: 1
BACTERIA #/AREA URNS HPF: NORMAL /HPF
BASOPHILS # BLD AUTO: 0.07 K/UL (ref 0–0.2)
BASOPHILS NFR BLD: 0.8 % (ref 0–1.9)
BILIRUB SERPL-MCNC: 0.3 MG/DL (ref 0.1–1)
BILIRUB UR QL STRIP: NEGATIVE
BSA FOR ECHO PROCEDURE: 2.45 M2
BUN SERPL-MCNC: 9 MG/DL (ref 8–23)
CALCIUM SERPL-MCNC: 9 MG/DL (ref 8.7–10.5)
CHLORIDE SERPL-SCNC: 105 MMOL/L (ref 95–110)
CLARITY UR: CLEAR
CO2 SERPL-SCNC: 23 MMOL/L (ref 23–29)
COLOR UR: YELLOW
CREAT SERPL-MCNC: 0.9 MG/DL (ref 0.5–1.4)
DIFFERENTIAL METHOD: ABNORMAL
DOP CALC AO PEAK VEL: 0.69 M/S
DOP CALC AO VTI: 11.22 CM
DOP CALC LVOT AREA: 4.2 CM2
DOP CALC LVOT DIAMETER: 2.3 CM
DOP CALC LVOT PEAK VEL: 0.69 M/S
DOP CALC LVOT STROKE VOLUME: 54.86 CM3
DOP CALCLVOT PEAK VEL VTI: 13.21 CM
E WAVE DECELERATION TIME: 174.78 MSEC
E/A RATIO: 1.33
E/E' RATIO: 14.15 M/S
EOSINOPHIL # BLD AUTO: 0.4 K/UL (ref 0–0.5)
EOSINOPHIL NFR BLD: 3.8 % (ref 0–8)
ERYTHROCYTE [DISTWIDTH] IN BLOOD BY AUTOMATED COUNT: 13.3 % (ref 11.5–14.5)
EST. GFR  (AFRICAN AMERICAN): >60 ML/MIN/1.73 M^2
EST. GFR  (NON AFRICAN AMERICAN): >60 ML/MIN/1.73 M^2
ESTIMATED AVG GLUCOSE: 197 MG/DL (ref 68–131)
GLUCOSE SERPL-MCNC: 164 MG/DL (ref 70–110)
GLUCOSE UR QL STRIP: ABNORMAL
HBA1C MFR BLD HPLC: 8.5 % (ref 4–5.6)
HCT VFR BLD AUTO: 42.8 % (ref 40–54)
HGB BLD-MCNC: 13.7 G/DL (ref 14–18)
HGB UR QL STRIP: NEGATIVE
IMM GRANULOCYTES # BLD AUTO: 0.04 K/UL (ref 0–0.04)
IVRT: 0.12 MSEC
KETONES UR QL STRIP: NEGATIVE
LEUKOCYTE ESTERASE UR QL STRIP: NEGATIVE
LV LATERAL E/E' RATIO: 15.33 M/S
LV SEPTAL E/E' RATIO: 13.14 M/S
LYMPHOCYTES # BLD AUTO: 2.7 K/UL (ref 1–4.8)
LYMPHOCYTES NFR BLD: 29.3 % (ref 18–48)
MAGNESIUM SERPL-MCNC: 2.3 MG/DL (ref 1.6–2.6)
MCH RBC QN AUTO: 30.2 PG (ref 27–31)
MCHC RBC AUTO-ENTMCNC: 32 G/DL (ref 32–36)
MCV RBC AUTO: 95 FL (ref 82–98)
MICROSCOPIC COMMENT: NORMAL
MONOCYTES # BLD AUTO: 0.8 K/UL (ref 0.3–1)
MONOCYTES NFR BLD: 9.1 % (ref 4–15)
MV A" WAVE DURATION": 157 MSEC
MV PEAK A VEL: 0.69 M/S
MV PEAK E VEL: 0.92 M/S
NEUTROPHILS # BLD AUTO: 5.2 K/UL (ref 1.8–7.7)
NEUTROPHILS NFR BLD: 56.6 % (ref 38–73)
NITRITE UR QL STRIP: NEGATIVE
NRBC BLD-RTO: 0 /100 WBC
PH UR STRIP: 7 [PH] (ref 5–8)
PHOSPHATE SERPL-MCNC: 4 MG/DL (ref 2.7–4.5)
PLATELET # BLD AUTO: 230 K/UL (ref 150–350)
PMV BLD AUTO: 9.4 FL (ref 9.2–12.9)
POCT GLUCOSE: 160 MG/DL (ref 70–110)
POCT GLUCOSE: 177 MG/DL (ref 70–110)
POCT GLUCOSE: 181 MG/DL (ref 70–110)
POCT GLUCOSE: 193 MG/DL (ref 70–110)
POTASSIUM SERPL-SCNC: 4.3 MMOL/L (ref 3.5–5.1)
PROT SERPL-MCNC: 7.4 G/DL (ref 6–8.4)
PROT UR QL STRIP: NEGATIVE
PULM VEIN A" WAVE DURATION": 156 MSEC
PULM VEIN S/D RATIO: 0.88
PV PEAK D VEL: 0.52 M/S
PV PEAK S VEL: 0.46 M/S
RBC # BLD AUTO: 4.53 M/UL (ref 4.6–6.2)
RIGHT VENTRICULAR END-DIASTOLIC DIMENSION: 3.69 CM
SODIUM SERPL-SCNC: 140 MMOL/L (ref 136–145)
SP GR UR STRIP: <=1.005 (ref 1–1.03)
TDI LATERAL: 0.06 M/S
TDI SEPTAL: 0.07 M/S
TDI: 0.07 M/S
TRICUSPID ANNULAR PLANE SYSTOLIC EXCURSION: 2.81 CM
URN SPEC COLLECT METH UR: ABNORMAL
UROBILINOGEN UR STRIP-ACNC: NEGATIVE EU/DL
WBC # BLD AUTO: 9.25 K/UL (ref 3.9–12.7)
YEAST URNS QL MICRO: NORMAL

## 2019-07-06 PROCEDURE — 25000003 PHARM REV CODE 250: Performed by: INTERNAL MEDICINE

## 2019-07-06 PROCEDURE — 63600175 PHARM REV CODE 636 W HCPCS: Performed by: NURSE PRACTITIONER

## 2019-07-06 PROCEDURE — 81000 URINALYSIS NONAUTO W/SCOPE: CPT

## 2019-07-06 PROCEDURE — 96374 THER/PROPH/DIAG INJ IV PUSH: CPT | Mod: 59 | Performed by: EMERGENCY MEDICINE

## 2019-07-06 PROCEDURE — G0378 HOSPITAL OBSERVATION PER HR: HCPCS

## 2019-07-06 PROCEDURE — 25000003 PHARM REV CODE 250: Performed by: NURSE PRACTITIONER

## 2019-07-06 PROCEDURE — 25500020 PHARM REV CODE 255: Performed by: SPECIALIST

## 2019-07-06 PROCEDURE — 96372 THER/PROPH/DIAG INJ SC/IM: CPT | Mod: 59 | Performed by: EMERGENCY MEDICINE

## 2019-07-06 PROCEDURE — 84100 ASSAY OF PHOSPHORUS: CPT

## 2019-07-06 PROCEDURE — 83036 HEMOGLOBIN GLYCOSYLATED A1C: CPT

## 2019-07-06 PROCEDURE — 80053 COMPREHEN METABOLIC PANEL: CPT

## 2019-07-06 PROCEDURE — 85025 COMPLETE CBC W/AUTO DIFF WBC: CPT

## 2019-07-06 PROCEDURE — S5571 INSULIN DISPOS PEN 3 ML: HCPCS | Performed by: NURSE PRACTITIONER

## 2019-07-06 PROCEDURE — 83735 ASSAY OF MAGNESIUM: CPT

## 2019-07-06 PROCEDURE — 94761 N-INVAS EAR/PLS OXIMETRY MLT: CPT

## 2019-07-06 PROCEDURE — 36415 COLL VENOUS BLD VENIPUNCTURE: CPT

## 2019-07-06 RX ORDER — POTASSIUM CHLORIDE 20 MEQ/15ML
60 SOLUTION ORAL
Status: DISCONTINUED | OUTPATIENT
Start: 2019-07-06 | End: 2019-07-08 | Stop reason: HOSPADM

## 2019-07-06 RX ORDER — ATORVASTATIN CALCIUM 40 MG/1
40 TABLET, FILM COATED ORAL DAILY
Status: DISCONTINUED | OUTPATIENT
Start: 2019-07-06 | End: 2019-07-08 | Stop reason: HOSPADM

## 2019-07-06 RX ORDER — ONDANSETRON 2 MG/ML
4 INJECTION INTRAMUSCULAR; INTRAVENOUS EVERY 6 HOURS PRN
Status: DISCONTINUED | OUTPATIENT
Start: 2019-07-06 | End: 2019-07-08 | Stop reason: HOSPADM

## 2019-07-06 RX ORDER — SODIUM,POTASSIUM PHOSPHATES 280-250MG
2 POWDER IN PACKET (EA) ORAL
Status: DISCONTINUED | OUTPATIENT
Start: 2019-07-06 | End: 2019-07-08 | Stop reason: HOSPADM

## 2019-07-06 RX ORDER — POTASSIUM CHLORIDE 20 MEQ/15ML
40 SOLUTION ORAL
Status: DISCONTINUED | OUTPATIENT
Start: 2019-07-06 | End: 2019-07-08 | Stop reason: HOSPADM

## 2019-07-06 RX ORDER — FUROSEMIDE 40 MG/1
40 TABLET ORAL DAILY
Status: DISCONTINUED | OUTPATIENT
Start: 2019-07-06 | End: 2019-07-08 | Stop reason: HOSPADM

## 2019-07-06 RX ORDER — IBUPROFEN 200 MG
24 TABLET ORAL
Status: DISCONTINUED | OUTPATIENT
Start: 2019-07-06 | End: 2019-07-08 | Stop reason: HOSPADM

## 2019-07-06 RX ORDER — DICYCLOMINE HYDROCHLORIDE 10 MG/1
20 CAPSULE ORAL 3 TIMES DAILY
Status: DISCONTINUED | OUTPATIENT
Start: 2019-07-06 | End: 2019-07-08 | Stop reason: HOSPADM

## 2019-07-06 RX ORDER — LANOLIN ALCOHOL/MO/W.PET/CERES
800 CREAM (GRAM) TOPICAL
Status: DISCONTINUED | OUTPATIENT
Start: 2019-07-06 | End: 2019-07-08 | Stop reason: HOSPADM

## 2019-07-06 RX ORDER — PRAZOSIN HYDROCHLORIDE 1 MG/1
2 CAPSULE ORAL 2 TIMES DAILY
Status: DISCONTINUED | OUTPATIENT
Start: 2019-07-06 | End: 2019-07-08 | Stop reason: HOSPADM

## 2019-07-06 RX ORDER — SODIUM CHLORIDE 0.9 % (FLUSH) 0.9 %
10 SYRINGE (ML) INJECTION
Status: DISCONTINUED | OUTPATIENT
Start: 2019-07-06 | End: 2019-07-08 | Stop reason: HOSPADM

## 2019-07-06 RX ORDER — CEPHALEXIN 250 MG/1
CAPSULE ORAL EVERY 12 HOURS
Status: ON HOLD | COMMUNITY
End: 2019-07-08 | Stop reason: HOSPADM

## 2019-07-06 RX ORDER — OXYCODONE AND ACETAMINOPHEN 10; 325 MG/1; MG/1
1 TABLET ORAL 3 TIMES DAILY PRN
Status: DISCONTINUED | OUTPATIENT
Start: 2019-07-06 | End: 2019-07-08 | Stop reason: HOSPADM

## 2019-07-06 RX ORDER — CYCLOBENZAPRINE HCL 5 MG
5 TABLET ORAL 2 TIMES DAILY PRN
Status: DISCONTINUED | OUTPATIENT
Start: 2019-07-06 | End: 2019-07-08 | Stop reason: HOSPADM

## 2019-07-06 RX ORDER — LAMOTRIGINE 25 MG/1
25 TABLET ORAL 2 TIMES DAILY
Status: DISCONTINUED | OUTPATIENT
Start: 2019-07-06 | End: 2019-07-07

## 2019-07-06 RX ORDER — IBUPROFEN 200 MG
16 TABLET ORAL
Status: DISCONTINUED | OUTPATIENT
Start: 2019-07-06 | End: 2019-07-08 | Stop reason: HOSPADM

## 2019-07-06 RX ORDER — ACETAMINOPHEN 325 MG/1
650 TABLET ORAL EVERY 4 HOURS PRN
Status: DISCONTINUED | OUTPATIENT
Start: 2019-07-06 | End: 2019-07-08 | Stop reason: HOSPADM

## 2019-07-06 RX ORDER — GLUCAGON 1 MG
1 KIT INJECTION
Status: DISCONTINUED | OUTPATIENT
Start: 2019-07-06 | End: 2019-07-08 | Stop reason: HOSPADM

## 2019-07-06 RX ORDER — INSULIN ASPART 100 [IU]/ML
0-5 INJECTION, SOLUTION INTRAVENOUS; SUBCUTANEOUS
Status: DISCONTINUED | OUTPATIENT
Start: 2019-07-06 | End: 2019-07-08 | Stop reason: HOSPADM

## 2019-07-06 RX ORDER — GABAPENTIN 300 MG/1
600 CAPSULE ORAL 3 TIMES DAILY
Status: DISCONTINUED | OUTPATIENT
Start: 2019-07-06 | End: 2019-07-08 | Stop reason: HOSPADM

## 2019-07-06 RX ORDER — AMOXICILLIN 250 MG
1 CAPSULE ORAL 2 TIMES DAILY
Status: DISCONTINUED | OUTPATIENT
Start: 2019-07-06 | End: 2019-07-08 | Stop reason: HOSPADM

## 2019-07-06 RX ORDER — CARVEDILOL 3.12 MG/1
3.12 TABLET ORAL 2 TIMES DAILY
Status: DISCONTINUED | OUTPATIENT
Start: 2019-07-06 | End: 2019-07-08 | Stop reason: HOSPADM

## 2019-07-06 RX ADMIN — GABAPENTIN 600 MG: 300 CAPSULE ORAL at 09:07

## 2019-07-06 RX ADMIN — PRAZOSIN HYDROCHLORIDE 2 MG: 1 CAPSULE ORAL at 08:07

## 2019-07-06 RX ADMIN — APIXABAN 5 MG: 2.5 TABLET, FILM COATED ORAL at 09:07

## 2019-07-06 RX ADMIN — CARVEDILOL 3.12 MG: 3.12 TABLET, FILM COATED ORAL at 09:07

## 2019-07-06 RX ADMIN — DICYCLOMINE HYDROCHLORIDE 20 MG: 10 CAPSULE ORAL at 08:07

## 2019-07-06 RX ADMIN — ACETAMINOPHEN 650 MG: 325 TABLET ORAL at 08:07

## 2019-07-06 RX ADMIN — LAMOTRIGINE 25 MG: 25 TABLET ORAL at 09:07

## 2019-07-06 RX ADMIN — ATORVASTATIN CALCIUM 40 MG: 40 TABLET, FILM COATED ORAL at 08:07

## 2019-07-06 RX ADMIN — INSULIN DETEMIR 50 UNITS: 100 INJECTION, SOLUTION SUBCUTANEOUS at 08:07

## 2019-07-06 RX ADMIN — OXYCODONE HYDROCHLORIDE AND ACETAMINOPHEN 1 TABLET: 10; 325 TABLET ORAL at 10:07

## 2019-07-06 RX ADMIN — FUROSEMIDE 40 MG: 40 TABLET ORAL at 08:07

## 2019-07-06 RX ADMIN — LAMOTRIGINE 25 MG: 25 TABLET ORAL at 08:07

## 2019-07-06 RX ADMIN — APIXABAN 5 MG: 2.5 TABLET, FILM COATED ORAL at 08:07

## 2019-07-06 RX ADMIN — CYCLOBENZAPRINE HYDROCHLORIDE 5 MG: 5 TABLET, FILM COATED ORAL at 04:07

## 2019-07-06 RX ADMIN — CARVEDILOL 3.12 MG: 3.12 TABLET, FILM COATED ORAL at 08:07

## 2019-07-06 RX ADMIN — DICYCLOMINE HYDROCHLORIDE 20 MG: 10 CAPSULE ORAL at 02:07

## 2019-07-06 RX ADMIN — ACETAMINOPHEN 650 MG: 325 TABLET ORAL at 04:07

## 2019-07-06 RX ADMIN — SULFUR HEXAFLUORIDE 2.4 ML: KIT at 02:07

## 2019-07-06 RX ADMIN — OXYCODONE HYDROCHLORIDE AND ACETAMINOPHEN 1 TABLET: 10; 325 TABLET ORAL at 02:07

## 2019-07-06 RX ADMIN — GABAPENTIN 600 MG: 300 CAPSULE ORAL at 02:07

## 2019-07-06 RX ADMIN — PRAZOSIN HYDROCHLORIDE 2 MG: 1 CAPSULE ORAL at 09:07

## 2019-07-06 RX ADMIN — GABAPENTIN 600 MG: 300 CAPSULE ORAL at 08:07

## 2019-07-06 RX ADMIN — DICYCLOMINE HYDROCHLORIDE 20 MG: 10 CAPSULE ORAL at 09:07

## 2019-07-06 RX ADMIN — TRAZODONE HYDROCHLORIDE 100 MG: 50 TABLET ORAL at 09:07

## 2019-07-06 NOTE — ASSESSMENT & PLAN NOTE
Etiology unclear.  CT head without contrast negative.  Consult neurology.  TTE with bubble study  MRI/MRA brain  Carotid study  EEG  Fall precautions  Neuro checks
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
## 2019-07-06 NOTE — UM SECONDARY REVIEW
Physician Advisor External    Level of Care Issue    Approved Observation/outpt for 7/5/19 per ehr md review

## 2019-07-06 NOTE — ASSESSMENT & PLAN NOTE
Chronic; controlled.  Continue chronic antihypertensives.  Monitor BP and treat as indicated for sustained control.

## 2019-07-06 NOTE — PLAN OF CARE
"Problem: Adult Inpatient Plan of Care  Goal: Plan of Care Review  Outcome: Ongoing (interventions implemented as appropriate)  Plan of care review with pt, pt states "understanding," no redness/swelling noted to IV site, pt ambulates ad chavez, gross tremors noted to hands, pt is alert, able to answer simple questions and follow simple commands, significant other at bedside, will continue to monitor, observe and note any changes, safety maintain        "
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
## 2019-07-06 NOTE — SIGNIFICANT EVENT
Results for GODFREY SUAREZ (MRN [id number]) as of 7/5/2019 22:35   Ref. Range 7/5/2019 21:56   Sample Unknown unknown   POC PTWBT Latest Ref Range: 9.7 - 14.3 sec 12.5   POC PTINR Latest Ref Range: 0.9 - 1.2  1.0       Dr Iglesias notified of results
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
## 2019-07-06 NOTE — HPI
"Kevan Colin is a 65 y/o male with a past medical history significant for DM, HTN, CAD, PE, DVT,  MI, COPD, and HLD who presented to the ED with reports of "memory lapses" which began about a month ago, initially lasting for just a few seconds or minutes, but today had an episode in which he does not remember about 4 hours of the day, from 10 am-1 pm. He recalls driving and coming to on the side of the road, sitting in his car. Per spouse, the patient was normal this morning and had dropped off his sister back home from dialysis, then left home to go to work.  Pt's wife states that she was unable to reach the patient by telephone x several hours. Pt states [place] and the next memory he has is calling his nephew to ask for directions. The patient denies history of stroke or seizures.  Denies recent fever, chest pain, vision changes, abdominal pain, nausea or vomiting.  He has had a dull headache at times and diarrhea.  Pt's wife reports that the patient has been mildly confused at times and has had difficulty identifying common items.  The case was discussed by the ED physician and the neurologist on call and admission was advised.  He will be admitted to the service of hospital medicine for further treatment.  "

## 2019-07-06 NOTE — ASSESSMENT & PLAN NOTE
Chronic; check hgb A1c to assess control.  Continue long acting insulin at a reduced dose for now and hold metformin.  CBG's AC/HS with SSI coverage as needed.  Diabetic diet.  Adjust insulin dose as needed.

## 2019-07-06 NOTE — PROGRESS NOTES
"Progress Note  Hospital Medicine  Patient Name:Kevan Colin  MRN:  [id number]  Patient Class: OP- Observation  Admit Date: 7/5/2019  Length of Stay: 0 days  Expected Discharge Date:   Attending Physician: Memo Henry MD  Primary Care Provider:  Ishmael Hernandez MD    SUBJECTIVE:     Principal Problem: Transient global amnesia  Initial history of present illness:Kevan Colin is a 65 y/o male with a past medical history significant for DM, HTN, CAD, PE, DVT,  MI, COPD, and HLD who presented to the ED with reports of "memory lapses" which began about a month ago, initially lasting for just a few seconds or minutes, but today had an episode in which he does not remember about 4 hours of the day, from 10 am-1 pm. He recalls driving and coming to on the side of the road, sitting in his car. Per spouse, the patient was normal this morning and had dropped off his sister back home from dialysis, then left home to go to work.  Pt's wife states that she was unable to reach the patient by telephone x several hours. Pt states that [place] and the next memory he has is calling his nephew to ask for directions. The patient denies history of stroke or seizures.  Denies recent fever, chest pain, vision changes, abdominal pain, nausea or vomiting.  He has had a dull headache at times and diarrhea.  Pt's wife reports that the patient has been mildly confused at times and has had difficulty identifying common items.  The case was discussed by the ED physician and the neurologist on call and admission was advised.  He will be admitted to the service of hospital medicine for further treatment.    PMH/PSH/SH/FH/Meds: reviewed.    Symptoms/Review of Systems:  Patient is doing well.  Patient is without any subjective complaints this morning.  Patient denies any focal neurological deficits or memory problems today.  No shortness of breath, cough, chest pain or headache, fever or abdominal pain.   "   Diet:  Adequate intake.    Activity level: Normal.    Pain:  Chronic pain syndrome       OBJECTIVE:   Vital Signs (Most Recent):      Temp: 97.8 °F (36.6 °C) (07/06/19 0747)  Pulse: 68 (07/06/19 0747)  Resp: 16 (07/06/19 0747)  BP: (!) 117/58 (07/06/19 0747)  SpO2: 97 % (07/06/19 0747)       Vital Signs Range (Last 24H):  Temp:  [97.4 °F (36.3 °C)-97.8 °F (36.6 °C)]   Pulse:  [57-95]   Resp:  [16-18]   BP: (112-144)/(58-85)   SpO2:  [95 %-99 %]     Weight: 115.4 kg (254 lb 6.6 oz)  Body mass index is 32.66 kg/m².  No intake or output data in the 24 hours ending 07/06/19 0857  Physical Examination:  Constitutional: He is oriented to person, place, and time. He appears well-developed and well-nourished.   HENT:   Head: Normocephalic and atraumatic.   Eyes: Pupils are equal, round, and reactive to light. Conjunctivae and EOM are normal.   Neck: Normal range of motion. Neck supple. No JVD present.   Cardiovascular: Normal rate, regular rhythm, normal heart sounds and intact distal pulses.   Pulmonary/Chest: Effort normal and breath sounds normal. No respiratory distress.   Abdominal: Soft. Bowel sounds are normal. He exhibits no distension. There is no tenderness.   Musculoskeletal: Normal range of motion. He exhibits edema (trace BLE).   Neurological: He is alert and oriented to person, place, and time. No cranial nerve deficit.   Skin: Skin is warm and dry. Chronic changes BLE around the ankles   Psychiatric: He has a normal mood and affect. His behavior is normal. Judgment and thought content normal.   Vitals reviewed.  CRANIAL NERVES   CN III, IV, VI   Pupils are equal, round, and reactive to light.  Extraocular motions are normal.      CBC:  Recent Labs   Lab 07/05/19  2138 07/06/19  0537   WBC 9.86 9.25   RBC 4.15* 4.53*   HGB 12.4* 13.7*   HCT 39.2* 42.8    230   MCV 95 95   MCH 29.9 30.2   MCHC 31.6* 32.0   BMP  Recent Labs   Lab 07/05/19  2138 07/06/19  0537   * 164*   * 140   K 3.9 4.3     105   CO2 23 23   BUN 8 9   CREATININE 1.1 0.9   CALCIUM 8.7 9.0   MG  --  2.3      Diagnostic Results:  Microbiology Results (last 7 days)     ** No results found for the last 168 hours. **         CT head without contrast:  1. Mild cortical atrophy without acute intracranial abnormality.  2. Small amount of fluid within right mastoid air cells.    Assessment/Plan:   * Transient global amnesia  Etiology unclear.  CT head without contrast negative.  Consult neurology.  TTE with bubble study  MRI/MRA brain  Carotid study  EEG  Fall precautions  Neuro checks.  Continue apixaban as before.  Check lipid profile.      Benign essential HTN  Chronic; controlled.  Continue chronic antihypertensives.  Monitor BP and treat as indicated for sustained control.     Type 2 diabetes mellitus without complication  Chronic; check hgb A1c to assess control.  Continue long acting insulin at a reduced dose for now and hold metformin.  CBG's AC/HS with SSI coverage as needed.  Diabetic diet.  Adjust insulin dose as needed.     Anticoagulant long-term use  2/2 DVT, PE, continue apixaban.    Discussed with patient's wife.  Answered all questions.    VTE Risk Mitigation (From admission, onward)        Ordered     apixaban tablet 5 mg  2 times daily      07/06/19 0256     IP VTE HIGH RISK PATIENT  Once      07/06/19 0015     Place PHILLIP hose  Until discontinued      07/06/19 0015     Place sequential compression device  Until discontinued      07/06/19 0015     Reason for No Pharmacological VTE Prophylaxis  Once      07/06/19 0015        Memo Henry MD  Department of Hospital Medicine   [facility]
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
## 2019-07-07 LAB
ALBUMIN SERPL BCP-MCNC: 3.4 G/DL (ref 3.5–5.2)
ALP SERPL-CCNC: 96 U/L (ref 55–135)
ALT SERPL W/O P-5'-P-CCNC: 15 U/L (ref 10–44)
ANION GAP SERPL CALC-SCNC: 8 MMOL/L (ref 8–16)
AST SERPL-CCNC: 10 U/L (ref 10–40)
BASOPHILS # BLD AUTO: 0.07 K/UL (ref 0–0.2)
BASOPHILS NFR BLD: 1 % (ref 0–1.9)
BILIRUB SERPL-MCNC: 0.2 MG/DL (ref 0.1–1)
BUN SERPL-MCNC: 14 MG/DL (ref 8–23)
CALCIUM SERPL-MCNC: 8.7 MG/DL (ref 8.7–10.5)
CHLORIDE SERPL-SCNC: 104 MMOL/L (ref 95–110)
CO2 SERPL-SCNC: 26 MMOL/L (ref 23–29)
CREAT SERPL-MCNC: 1 MG/DL (ref 0.5–1.4)
DIFFERENTIAL METHOD: ABNORMAL
EOSINOPHIL # BLD AUTO: 0.3 K/UL (ref 0–0.5)
EOSINOPHIL NFR BLD: 4.3 % (ref 0–8)
ERYTHROCYTE [DISTWIDTH] IN BLOOD BY AUTOMATED COUNT: 13.3 % (ref 11.5–14.5)
EST. GFR  (AFRICAN AMERICAN): >60 ML/MIN/1.73 M^2
EST. GFR  (NON AFRICAN AMERICAN): >60 ML/MIN/1.73 M^2
GLUCOSE SERPL-MCNC: 148 MG/DL (ref 70–110)
HCT VFR BLD AUTO: 39.6 % (ref 40–54)
HGB BLD-MCNC: 12.8 G/DL (ref 14–18)
IMM GRANULOCYTES # BLD AUTO: 0.03 K/UL (ref 0–0.04)
LYMPHOCYTES # BLD AUTO: 3.2 K/UL (ref 1–4.8)
LYMPHOCYTES NFR BLD: 44.2 % (ref 18–48)
MAGNESIUM SERPL-MCNC: 2.1 MG/DL (ref 1.6–2.6)
MCH RBC QN AUTO: 30.5 PG (ref 27–31)
MCHC RBC AUTO-ENTMCNC: 32.3 G/DL (ref 32–36)
MCV RBC AUTO: 94 FL (ref 82–98)
MONOCYTES # BLD AUTO: 0.7 K/UL (ref 0.3–1)
MONOCYTES NFR BLD: 9.3 % (ref 4–15)
NEUTROPHILS # BLD AUTO: 3 K/UL (ref 1.8–7.7)
NEUTROPHILS NFR BLD: 40.8 % (ref 38–73)
NRBC BLD-RTO: 0 /100 WBC
PHOSPHATE SERPL-MCNC: 3.7 MG/DL (ref 2.7–4.5)
PLATELET # BLD AUTO: 203 K/UL (ref 150–350)
PMV BLD AUTO: 9.4 FL (ref 9.2–12.9)
POCT GLUCOSE: 159 MG/DL (ref 70–110)
POCT GLUCOSE: 180 MG/DL (ref 70–110)
POCT GLUCOSE: 186 MG/DL (ref 70–110)
POTASSIUM SERPL-SCNC: 4 MMOL/L (ref 3.5–5.1)
PROT SERPL-MCNC: 6.4 G/DL (ref 6–8.4)
RBC # BLD AUTO: 4.2 M/UL (ref 4.6–6.2)
SODIUM SERPL-SCNC: 138 MMOL/L (ref 136–145)
WBC # BLD AUTO: 7.29 K/UL (ref 3.9–12.7)

## 2019-07-07 PROCEDURE — G8987 SELF CARE CURRENT STATUS: HCPCS | Mod: CH

## 2019-07-07 PROCEDURE — 84100 ASSAY OF PHOSPHORUS: CPT

## 2019-07-07 PROCEDURE — G8989 SELF CARE D/C STATUS: HCPCS | Mod: CH

## 2019-07-07 PROCEDURE — 95819 PR EEG,W/AWAKE & ASLEEP RECORD: ICD-10-PCS | Mod: 26,,, | Performed by: PSYCHIATRY & NEUROLOGY

## 2019-07-07 PROCEDURE — 25000003 PHARM REV CODE 250: Performed by: INTERNAL MEDICINE

## 2019-07-07 PROCEDURE — G8988 SELF CARE GOAL STATUS: HCPCS | Mod: CH

## 2019-07-07 PROCEDURE — 97165 OT EVAL LOW COMPLEX 30 MIN: CPT

## 2019-07-07 PROCEDURE — 25000003 PHARM REV CODE 250: Performed by: NURSE PRACTITIONER

## 2019-07-07 PROCEDURE — 83735 ASSAY OF MAGNESIUM: CPT

## 2019-07-07 PROCEDURE — 95819 EEG AWAKE AND ASLEEP: CPT | Mod: 26,,, | Performed by: PSYCHIATRY & NEUROLOGY

## 2019-07-07 PROCEDURE — 25500020 PHARM REV CODE 255: Performed by: INTERNAL MEDICINE

## 2019-07-07 PROCEDURE — 85025 COMPLETE CBC W/AUTO DIFF WBC: CPT

## 2019-07-07 PROCEDURE — 36415 COLL VENOUS BLD VENIPUNCTURE: CPT

## 2019-07-07 PROCEDURE — 97161 PT EVAL LOW COMPLEX 20 MIN: CPT

## 2019-07-07 PROCEDURE — G0378 HOSPITAL OBSERVATION PER HR: HCPCS

## 2019-07-07 PROCEDURE — 96372 THER/PROPH/DIAG INJ SC/IM: CPT | Mod: 59 | Performed by: EMERGENCY MEDICINE

## 2019-07-07 PROCEDURE — 94761 N-INVAS EAR/PLS OXIMETRY MLT: CPT

## 2019-07-07 PROCEDURE — 80053 COMPREHEN METABOLIC PANEL: CPT

## 2019-07-07 RX ORDER — LAMOTRIGINE 25 MG/1
50 TABLET ORAL 2 TIMES DAILY
Status: DISCONTINUED | OUTPATIENT
Start: 2019-07-07 | End: 2019-07-08 | Stop reason: HOSPADM

## 2019-07-07 RX ADMIN — PRAZOSIN HYDROCHLORIDE 2 MG: 1 CAPSULE ORAL at 09:07

## 2019-07-07 RX ADMIN — GABAPENTIN 600 MG: 300 CAPSULE ORAL at 03:07

## 2019-07-07 RX ADMIN — OXYCODONE HYDROCHLORIDE AND ACETAMINOPHEN 1 TABLET: 10; 325 TABLET ORAL at 08:07

## 2019-07-07 RX ADMIN — TRAZODONE HYDROCHLORIDE 100 MG: 50 TABLET ORAL at 08:07

## 2019-07-07 RX ADMIN — LAMOTRIGINE 25 MG: 25 TABLET ORAL at 09:07

## 2019-07-07 RX ADMIN — LAMOTRIGINE 50 MG: 25 TABLET ORAL at 08:07

## 2019-07-07 RX ADMIN — OXYCODONE HYDROCHLORIDE AND ACETAMINOPHEN 1 TABLET: 10; 325 TABLET ORAL at 09:07

## 2019-07-07 RX ADMIN — CARVEDILOL 3.12 MG: 3.12 TABLET, FILM COATED ORAL at 09:07

## 2019-07-07 RX ADMIN — DICYCLOMINE HYDROCHLORIDE 20 MG: 10 CAPSULE ORAL at 03:07

## 2019-07-07 RX ADMIN — APIXABAN 5 MG: 2.5 TABLET, FILM COATED ORAL at 08:07

## 2019-07-07 RX ADMIN — INSULIN DETEMIR 50 UNITS: 100 INJECTION, SOLUTION SUBCUTANEOUS at 10:07

## 2019-07-07 RX ADMIN — SENNOSIDES, DOCUSATE SODIUM 1 TABLET: 50; 8.6 TABLET, FILM COATED ORAL at 09:07

## 2019-07-07 RX ADMIN — GABAPENTIN 600 MG: 300 CAPSULE ORAL at 09:07

## 2019-07-07 RX ADMIN — DICYCLOMINE HYDROCHLORIDE 20 MG: 10 CAPSULE ORAL at 09:07

## 2019-07-07 RX ADMIN — CARVEDILOL 3.12 MG: 3.12 TABLET, FILM COATED ORAL at 08:07

## 2019-07-07 RX ADMIN — IOHEXOL 100 ML: 350 INJECTION, SOLUTION INTRAVENOUS at 12:07

## 2019-07-07 RX ADMIN — DICYCLOMINE HYDROCHLORIDE 20 MG: 10 CAPSULE ORAL at 08:07

## 2019-07-07 RX ADMIN — FUROSEMIDE 40 MG: 40 TABLET ORAL at 09:07

## 2019-07-07 RX ADMIN — ATORVASTATIN CALCIUM 40 MG: 40 TABLET, FILM COATED ORAL at 09:07

## 2019-07-07 RX ADMIN — APIXABAN 5 MG: 2.5 TABLET, FILM COATED ORAL at 09:07

## 2019-07-07 RX ADMIN — GABAPENTIN 600 MG: 300 CAPSULE ORAL at 08:07

## 2019-07-07 RX ADMIN — PRAZOSIN HYDROCHLORIDE 2 MG: 1 CAPSULE ORAL at 08:07

## 2019-07-07 NOTE — PROGRESS NOTES
"Progress Note  Hospital Medicine  Patient Name:Kevan Colin  MRN:  [id number]  Patient Class: OP- Observation  Admit Date: 7/5/2019  Length of Stay: 0 days  Expected Discharge Date:   Attending Physician: Memo Henry MD  Primary Care Provider:  Ishmael Hernandez MD    SUBJECTIVE:     Principal Problem: Transient global amnesia  Initial history of present illness:Kevan Colin is a 63 y/o male with a past medical history significant for DM, HTN, CAD, PE, DVT,  MI, COPD, and HLD who presented to the ED with reports of "memory lapses" which began about a month ago, initially lasting for just a few seconds or minutes, but today had an episode in which he does not remember about 4 hours of the day, from 10 am-1 pm. He recalls driving and coming to on the side of the road, sitting in his car. Per spouse, the patient was normal this morning and had dropped off his sister back home from dialysis, then left home to go to work.  Pt's wife states that she was unable to reach the patient by telephone x several hours. Pt states [place] and the next memory he has is calling his nephew to ask for directions. The patient denies history of stroke or seizures.  Denies recent fever, chest pain, vision changes, abdominal pain, nausea or vomiting.  He has had a dull headache at times and diarrhea.  Pt's wife reports that the patient has been mildly confused at times and has had difficulty identifying common items.  The case was discussed by the ED physician and the neurologist on call and admission was advised.  He will be admitted to the service of hospital medicine for further treatment.    PMH/PSH/SH/FH/Meds: reviewed.    Symptoms/Review of Systems:  Patient is doing well.  Patient is without any subjective complaints this morning.  Patient denies any focal neurological deficits or memory problems today.  No shortness of breath, cough, chest pain or headache, fever or abdominal pain.   "   Diet:  Adequate intake.    Activity level: Normal.    Pain:  Chronic pain syndrome       OBJECTIVE:   Vital Signs (Most Recent):      Temp: 97 °F (36.1 °C) (07/07/19 0316)  Pulse: 62 (07/07/19 0316)  Resp: 18 (07/07/19 0316)  BP: 107/62 (07/07/19 0316)  SpO2: (!) 92 % (07/07/19 0316)       Vital Signs Range (Last 24H):  Temp:  [96.8 °F (36 °C)-97.6 °F (36.4 °C)]   Pulse:  [62-74]   Resp:  [16-20]   BP: (107-127)/(62-73)   SpO2:  [92 %-99 %]     Weight: 115.2 kg (254 lb)  Body mass index is 32.61 kg/m².    Intake/Output Summary (Last 24 hours) at 7/7/2019 0812  Last data filed at 7/7/2019 0600  Gross per 24 hour   Intake 1740 ml   Output 1500 ml   Net 240 ml     Physical Examination:  Constitutional: He is oriented to person, place, and time. He appears well-developed and well-nourished.   HENT:   Head: Normocephalic and atraumatic.   Eyes: Pupils are equal, round, and reactive to light. Conjunctivae and EOM are normal.   Neck: Normal range of motion. Neck supple. No JVD present.   Cardiovascular: Normal rate, regular rhythm, normal heart sounds and intact distal pulses.   Pulmonary/Chest: Effort normal and breath sounds normal. No respiratory distress.   Abdominal: Soft. Bowel sounds are normal. He exhibits no distension. There is no tenderness.   Musculoskeletal: Normal range of motion. He exhibits edema (trace BLE).   Neurological: He is alert and oriented to person, place, and time. No cranial nerve deficit.   Skin: Skin is warm and dry. Chronic changes BLE around the ankles   Psychiatric: He has a normal mood and affect. His behavior is normal. Judgment and thought content normal.   Vitals reviewed.  CRANIAL NERVES   CN III, IV, VI   Pupils are equal, round, and reactive to light.  Extraocular motions are normal.      CBC:  Recent Labs   Lab 07/05/19  2138 07/06/19  0537 07/07/19  0436   WBC 9.86 9.25 7.29   RBC 4.15* 4.53* 4.20*   HGB 12.4* 13.7* 12.8*   HCT 39.2* 42.8 39.6*    230 203   MCV 95 95 94    MCH 29.9 30.2 30.5   MCHC 31.6* 32.0 32.3   BMP  Recent Labs   Lab 07/05/19  2138 07/06/19  0537 07/07/19  0436   * 164* 148*   * 140 138   K 3.9 4.3 4.0    105 104   CO2 23 23 26   BUN 8 9 14   CREATININE 1.1 0.9 1.0   CALCIUM 8.7 9.0 8.7   MG  --  2.3 2.1      Diagnostic Results:  Microbiology Results (last 7 days)     ** No results found for the last 168 hours. **         CT head without contrast:  1. Mild cortical atrophy without acute intracranial abnormality.  2. Small amount of fluid within right mastoid air cells.    CXR:  1. Mild emphysematous change.  2. Cardiomegaly.  3. Thoracic stimulator wires.    MRI brain:    MRA brain:    US carotids B/L:   1. Findings consistent with 0-49% (likely less than 40%) stenosis of the distal right ICA.  2. No ultrasound evidence to suggest a hemodynamically significant left carotid artery stenosis.  3. 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  Differential diagnosis includes schwannoma, neurofibroma and lymph node mass.    ECHO:  · Moderately decreased left ventricular systolic function. The estimated ejection fraction is 35%  · Grade I (mild) left ventricular diastolic dysfunction consistent with impaired relaxation.  · Elevated left atrial pressure.  · Local segmental wall motion abnormalities on contrast study  · No PFO on bubble study  · Technically suboptimal study due to body habitus    Assessment/Plan:   * Transient global amnesia  Etiology unclear.  CT head without contrast negative. ECHO results reviewed. Follow neurology recommendations.  MRI/MRA brain - pending  Carotid study  EEG  Fall precautions  Neuro checks.  Continue apixaban as before.  Check lipid profile.      Benign essential HTN  Chronic; controlled.  Continue chronic antihypertensives.  Monitor BP and treat as indicated for sustained control.     Type 2 diabetes mellitus without complication  Chronic; check hgb A1c  to assess control.  Continue long acting insulin at a reduced dose for now and hold metformin.  CBG's AC/HS with SSI coverage as needed.  Diabetic diet.  Adjust insulin dose as needed.     Anticoagulant long-term use  2/2 DVT, PE, continue apixaban.    Discussed with patient's wife.  Answered all questions.    VTE Risk Mitigation (From admission, onward)        Ordered     apixaban tablet 5 mg  2 times daily      07/06/19 0256     IP VTE HIGH RISK PATIENT  Once      07/06/19 0015     Place PHILLIP hose  Until discontinued      07/06/19 0015     Place sequential compression device  Until discontinued      07/06/19 0015     Reason for No Pharmacological VTE Prophylaxis  Once      07/06/19 0015        Memo Henry MD  Department of Hospital Medicine   [facility]

## 2019-07-07 NOTE — UM SECONDARY REVIEW
UM Secondary Reviewer    Level of Care Issue    Denied IP or OBS - not appropriate. NOTA for 7/7/19 per ehr md review..

## 2019-07-07 NOTE — PROGRESS NOTES
[facility]  Neurology  Progress Note    Patient Name: Kevan Colin  MRN: [id number]  Admission Date: 7/5/2019  Hospital Length of Stay: 0 days  Code Status: Full Code   Attending Provider: Memo Henry MD  Primary Care Physician: Ishmael Hernandez MD   Principal Problem:Transient global amnesia    Subjective:     Interval History: Patient seen and examined, no further events reported overnight, patient is a poor historian though. Nursing informed us he is unable to have MRI due to cardiac device.     Current Neurological Medications: MAR reviewed.     HPI: Pt with previous medical history as below. Presented to [facility] for acute episode of confusion that lasted over 5 hours, [place] then 5 [place] later he came to and didn't know where he was or how he got there. Denies any h/o seizures. He is AAOx4 and unremarkable exam.      Labs:  Crt: 1  HgbA1C: 8.5  LDL: 73.2     Imaging:   CT head: 1. Mild cortical atrophy without acute intracranial abnormality.  2. Small amount of fluid within right mastoid air cells.     MRI/MRA: unable to have due to cardiac device     CUS: 1. Findings consistent with 0-49% (likely less than 40%) stenosis of the distal right ICA.  2. No ultrasound evidence to suggest a hemodynamically significant left carotid artery stenosis.  3. 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  Differential diagnosis includes schwannoma, neurofibroma and lymph node mass.     ECHO: EF 35%, neg bubble study     EEG: prelim shows sharp waves    Current Facility-Administered Medications   Medication Dose Route Frequency Provider Last Rate Last Dose    acetaminophen tablet 650 mg  650 mg Oral Q4H PRN MAYNOR Gavin   650 mg at 07/06/19 1636    apixaban tablet 5 mg  5 mg Oral BID MAYNOR Gavin   5 mg at 07/07/19 0937    atorvastatin tablet 40 mg  40 mg Oral Daily  Cheryle Fischer, FNP   40 mg at 07/07/19 0937    carvedilol tablet 3.125 mg  3.125 mg Oral BID Cheryle Fischer, FNP   3.125 mg at 07/07/19 0937    cyclobenzaprine tablet 5 mg  5 mg Oral BID PRN Cheryle Fischer, FNP   5 mg at 07/06/19 1636    dextrose 50% injection 12.5 g  12.5 g Intravenous PRN Cheryle Fischer, FNP        dextrose 50% injection 25 g  25 g Intravenous PRN Cheryle Fischer, FNP        dicyclomine capsule 20 mg  20 mg Oral TID Cheryle Fischer, FNP   20 mg at 07/07/19 0936    furosemide tablet 40 mg  40 mg Oral Daily Cheryle Fischer, FNP   40 mg at 07/07/19 0937    gabapentin capsule 600 mg  600 mg Oral TID Cheryle Fischer, FNP   600 mg at 07/07/19 0937    glucagon (human recombinant) injection 1 mg  1 mg Intramuscular PRN Cheryle Fischer, FNP        glucose chewable tablet 16 g  16 g Oral PRN Cheryle Fischer, FNP        glucose chewable tablet 24 g  24 g Oral PRN Cheryle Fischer, FNP        insulin aspart U-100 pen 0-5 Units  0-5 Units Subcutaneous QID (AC + HS) PRN Cheryle Fischer, FNP        insulin detemir U-100 pen 50 Units  50 Units Subcutaneous Daily Cheryle Fischer, FNP   50 Units at 07/07/19 1000    lamoTRIgine tablet 25 mg  25 mg Oral BID Cheryle Fischer, FNP   25 mg at 07/07/19 0937    magnesium oxide tablet 800 mg  800 mg Oral PRN Cheryle Fischer, FNP        magnesium oxide tablet 800 mg  800 mg Oral PRN Cheryle Fischer, FNP        ondansetron injection 4 mg  4 mg Intravenous Q6H PRN Cheryle Fischer, FNP        oxyCODONE-acetaminophen  mg per tablet 1 tablet  1 tablet Oral TID PRN Memo Henry MD   1 tablet at 07/07/19 0935    potassium chloride 10% oral solution 40 mEq  40 mEq Oral PRN Cheryle Fischer, MORRISP        potassium chloride 10% oral solution 40 mEq  40 mEq Oral PRN Cheryle Fischer, MORRISP        potassium chloride 10% oral solution 60 mEq  60 mEq Oral PRN Cheryle OLIVER  Franceski, FNP        potassium, sodium phosphates 280-160-250 mg packet 2 packet  2 packet Oral PRN Cheryle GUAMANRichie Joyceski, FNP        potassium, sodium phosphates 280-160-250 mg packet 2 packet  2 packet Oral PRN Cheryle GUAMANRichie Joyceski, FNP        potassium, sodium phosphates 280-160-250 mg packet 2 packet  2 packet Oral PRN Cherylenaomi Cookski, FNP        prazosin capsule 2 mg  2 mg Oral BID Cherylenaomi Akbari, FNP   2 mg at 07/07/19 0936    senna-docusate 8.6-50 mg per tablet 1 tablet  1 tablet Oral BID Cherylenaomi Akbari, FNP   1 tablet at 07/07/19 0937    sodium chloride 0.9% flush 10 mL  10 mL Intravenous PRN Cherylenaomi Akbari, FNP        trazodone split tablet 100 mg  100 mg Oral QHS Cherylenaomi Cookski, FNP   100 mg at 07/06/19 2159       Review of Systems as per HPI  Objective:     Vital Signs (Most Recent):  Temp: 97.5 °F (36.4 °C) (07/07/19 0837)  Pulse: 65 (07/07/19 0837)  Resp: 16 (07/07/19 0837)  BP: 121/71 (07/07/19 0837)  SpO2: 98 % (07/07/19 0837) Vital Signs (24h Range):  Temp:  [96.8 °F (36 °C)-97.6 °F (36.4 °C)] 97.5 °F (36.4 °C)  Pulse:  [62-74] 65  Resp:  [16-20] 16  SpO2:  [92 %-99 %] 98 %  BP: (107-127)/(62-73) 121/71     Weight: 115.2 kg (254 lb)  Body mass index is 32.61 kg/m².    Physical Exam   Constitutional: He is oriented to person, place, and time. He appears well-developed and well-nourished.   HENT:   Head: Normocephalic and atraumatic.   Eyes: Pupils are equal, round, and reactive to light. EOM are normal.   Neck: Normal range of motion. Neck supple.   Cardiovascular: Normal rate, regular rhythm and normal heart sounds.   Pulmonary/Chest: Effort normal and breath sounds normal.   Abdominal: Soft.   Musculoskeletal: Normal range of motion.   Neurological: He is alert and oriented to person, place, and time. He has normal strength. He has a normal Finger-Nose-Finger Test.   Skin: Skin is warm and dry.   Psychiatric: He has a normal mood and affect. His speech is normal.    Vitals reviewed.      NEUROLOGICAL EXAMINATION:     MENTAL STATUS   Oriented to person, place, and time.   Attention: normal.   Speech: speech is normal   Level of consciousness: alert    CRANIAL NERVES   Cranial nerves II through XII intact.     CN III, IV, VI   Pupils are equal, round, and reactive to light.  Extraocular motions are normal.     MOTOR EXAM   Muscle bulk: normal  Overall muscle tone: normal    Strength   Strength 5/5 throughout.     SENSORY EXAM   Light touch normal.     GAIT AND COORDINATION      Coordination   Finger to nose coordination: normal      Significant Labs: All pertinent lab results from the past 24 hours have been reviewed.    Significant Imaging: I have reviewed all pertinent imaging results/findings within the past 24 hours.    Assessment and Plan:   Ruleout Seizure vs TIA  Loss of time - 5 hours yesterday  DM2  HTN     MRI/MRA unable to have due to cardiac device  Echo with bubble - neg bubble study  CUS - <50% stenosis  EEG: prelim sharp waves  CTA head and neck: no known allergy to IV dye and Crt 1(WNL)  Has been taking lamictal 25mg BID, unsure why... Will increase to 50mg BID  NO DRIVING    Seizure education.    No driving for now, no swimming alone, no climbing high areas, no operation of heavy machinery or working with high risk electricity equipements.  Continue to take AEDs as prescribed. If any major side effects from neurological medications go to the ED including mood changes and severe depression.  Patient and/or next of kin informed.     Stroke education was provided.  If patient has acute neurological changes including weakness, confusion, speech changes, facial droop, difficulty walking, and sensory changes immediately call 911.  Follow up Neurology in 2 weeks at [phone].  Medication side effects discussed with the patient and/or caregiver.  Active Diagnoses:    Diagnosis Date Noted POA    PRINCIPAL PROBLEM:  Transient global amnesia [G45.4] 07/05/2019 Yes     Anticoagulant long-term use [Z79.01] 03/24/2016 Not Applicable    Type 2 diabetes mellitus without complication [E11.9] 03/24/2016 Yes    Benign essential HTN [I10] 03/24/2016 Yes      Problems Resolved During this Admission:       VTE Risk Mitigation (From admission, onward)        Ordered     apixaban tablet 5 mg  2 times daily      07/06/19 0256     IP VTE HIGH RISK PATIENT  Once      07/06/19 0015     Place PHILLIP hose  Until discontinued      07/06/19 0015     Place sequential compression device  Until discontinued      07/06/19 0015     Reason for No Pharmacological VTE Prophylaxis  Once      07/06/19 0015          Valerie Vaughn, BE  Neurology  [facility]    I, Dr. Yoshi Duque, have personally seen and examined the patient with my advanced provider and agree with above. I personally did a focused exam, and reviewed all necessary clinical information. I discussed my management plan with my NP and agree with above. Patient on lamictal at home. Unclear of history of seizures. Poor historian. Increase lamictal. Seizure education provided. S/E of lamictal including severe rash explained to the patient.
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
## 2019-07-07 NOTE — NURSING
"Notified Dr. Yoshi Duque about device pt has and that he can't have an MRI per previous admission records. Dr. Yoshi Duque stated "That's okay." Discontinued order.  "

## 2019-07-07 NOTE — UM SECONDARY REVIEW
Physician Advisor External    Level of Care Issue    Approved Observation/outpt for 7/6/19 per ehr md review.
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
## 2019-07-07 NOTE — PLAN OF CARE
Problem: Adult Inpatient Plan of Care  Goal: Plan of Care Review  Plan of care reviewed with pt, pt verbalized understanding. IV site clean, dry, intact, no redness or swelling noted. Pt ambulates independently, remains free of fall/trauma. Pain is controlled with current regimen. VSS, pt remained afebrile this shift. Blood glucose and cardiac monitoring. Safety maintained throughout shift, call light in reach, bed locked and in lowest position, side rails up x2. Will continue to monitor, observe and report any changes.

## 2019-07-07 NOTE — PROGRESS NOTES
SW met with patient and wife to complete a discharge planning assessment. Patient and Caregiver presents as alert and oriented x 4, pleasant, good eye contact, well groomed, recall good, concentration/judgement good, average intelligence, calm, communicative, cooperative and asking and answering questions appropriately. SW verified all information on demographic sheet is correct. Patient reports living with wife and that he is independent with ADLs at this time and does drive. Patient reports wife will transport pt home.    Patient reports does not have home health. Patient reports that he is not receiving outside resources.  Patient reports having cane, shower chair, rolling walker, oxygen, and nebulizer. Patient reports Faiza Bishop MD as pt's PCP and has Medicare and Medicaid as their insurance. Patient reports receiving medications from [facility] and reports that he is able to afford medications at this time and at time of discharge. Patient reports that he is not on dialysis at this time.  Patient reports that he is not receiving services with the coumadin clinic. Patient reports has not been admitted to the hospital within the last 30 days.    Patient reports does not have a Living Will or Healthcare Power of . Patient denies mental health issues.    Patient expressed no other needs at this time. SW remains available.         07/07/19 7839   Discharge Assessment   Assessment Type Discharge Planning Assessment   Confirmed/corrected address and phone number on facesheet? Yes   Assessment information obtained from? Patient;Caregiver   Prior to hospitilization cognitive status: Alert/Oriented   Prior to hospitalization functional status: Assistive Equipment;Independent   Current cognitive status: Alert/Oriented   Current Functional Status: Assistive Equipment;Independent   Lives With spouse   Able to Return to Prior Arrangements yes   Is patient able to care for self after discharge? Yes    Patient's perception of discharge disposition home or selfcare   Readmission Within the Last 30 Days no previous admission in last 30 days   Patient currently being followed by outpatient case management? No   Patient currently receives any other outside agency services? No   Equipment Currently Used at Home shower chair;walker, rolling;nebulizer;oxygen;cane, quad   Do you have any problems affording any of your prescribed medications? No   Is the patient taking medications as prescribed? yes   Does the patient have transportation home? Yes   Transportation Anticipated car, drives self;family or friend will provide   Dialysis Name and Scheduled days N/A   Does the patient receive services at the Coumadin Clinic? No   Discharge Plan A Home;Home with family   Discharge Plan B Home;Home with family   DME Needed Upon Discharge  none   Patient/Family in Agreement with Plan yes

## 2019-07-07 NOTE — PLAN OF CARE
07/06/19 2053   Patient Assessment/Suction   Level of Consciousness (AVPU) alert   PRE-TX-O2   O2 Device (Oxygen Therapy) room air   SpO2 97 %   Pulse Oximetry Type Intermittent   Pulse 70   Resp 16

## 2019-07-07 NOTE — PROCEDURES
EEG  Date/Time: 7/7/2019 10:45 AM  Performed by: Jovan Davis MD  Authorized by: MAYNOR Gavin         ELECTROENCEPHALOGRAM REPORT    DATE OF SERVICE: 7/6/19  EEG NUMBER: ON   REQUESTED BY: Amado  LOCATION OF SERVICE: [facility]   Electroencephalographic (EEG) recording is with electrodes placed according to the International 10-20 placement system.  Thirty two (32) channels of digital signal (sampling rate of 512/sec) including T1 and T2 was simultaneously recorded from the scalp and may include  EKG, EMG, and/or eye monitors.  Recording band pass was 0.1 to 512 hz.  Digital video recording of the patient is simultaneously recorded with the EEG.  The patient is instructed report clinical symptoms which may occur during the recording session.  EEG and video recording is stored and archived in digital format. Activation procedures which include photic stimulation, hyperventilation and instructing patients to perform simple task are done in selected patients.    The EEG is displayed on a monitor screen and can be reviewed using different montages.  Computer assisted analysis is employed to detect spike and electrographic seizure activity.   The entire record is submitted for computer analysis.  The entire recording is visually reviewed and the times identified by computer analysis as being spikes or seizures are reviewed again.  Compresses spectral analysis (CSA) is also performed on the activity recorded from each individual channel.  This is displayed as a power display of frequencies from 0 to 30 Hz over time.   The CSA is reviewed looking for asymmetries in power between homologous areas of the scalp and then compared with the original EEG recording.     [employer] ([employer]) software was also utilized in the review of this study.  This software suite analyzes the EEG recording in multiple domains.  Coherence and rhythmicity is computed to identify EEG sections which may contain  organized seizures.  Each channel undergoes analysis to detect presence of spike and sharp waves which have special and morphological characteristic of epileptic activity.  The routine EEG recording is converted from spacial into frequency domain.  This is then displayed comparing homologous areas to identify areas of significant asymmetry.  Algorithm to identify non-cortically generated artifact is used to separate eye movement, EMG and other artifact from the EEG    EEG FINDINGS:  The record shows a good  organization at rest, consisting of a 9 Hz posterior dominant rhythm with good  reactivity. There is mild bilateral beta activity.    Drowsiness is characterized by attenuation of the background, vertex waves, and bilateral theta slowing.     Provocative maneuvers including hyperventilation and photic stimulation were performed.     EKG recording shows a sinus rhythm.    There is no push button or clinical event.      IMPRESSION:  Normal study captured in the awake, drowsy, and asleep states. No epileptiform discharges or electrographic seizures are seen.     MD Jovan Law  7/7/2019

## 2019-07-08 LAB
ALBUMIN SERPL BCP-MCNC: 3.7 G/DL (ref 3.5–5.2)
ALP SERPL-CCNC: 103 U/L (ref 55–135)
ALT SERPL W/O P-5'-P-CCNC: 15 U/L (ref 10–44)
ANION GAP SERPL CALC-SCNC: 9 MMOL/L (ref 8–16)
AST SERPL-CCNC: 12 U/L (ref 10–40)
BASOPHILS # BLD AUTO: 0.06 K/UL (ref 0–0.2)
BASOPHILS NFR BLD: 0.8 % (ref 0–1.9)
BILIRUB SERPL-MCNC: 0.2 MG/DL (ref 0.1–1)
BUN SERPL-MCNC: 15 MG/DL (ref 8–23)
CALCIUM SERPL-MCNC: 9.2 MG/DL (ref 8.7–10.5)
CHLORIDE SERPL-SCNC: 104 MMOL/L (ref 95–110)
CO2 SERPL-SCNC: 24 MMOL/L (ref 23–29)
CREAT SERPL-MCNC: 0.9 MG/DL (ref 0.5–1.4)
DIFFERENTIAL METHOD: ABNORMAL
EOSINOPHIL # BLD AUTO: 0.3 K/UL (ref 0–0.5)
EOSINOPHIL NFR BLD: 3.8 % (ref 0–8)
ERYTHROCYTE [DISTWIDTH] IN BLOOD BY AUTOMATED COUNT: 13.2 % (ref 11.5–14.5)
EST. GFR  (AFRICAN AMERICAN): >60 ML/MIN/1.73 M^2
EST. GFR  (NON AFRICAN AMERICAN): >60 ML/MIN/1.73 M^2
GLUCOSE SERPL-MCNC: 176 MG/DL (ref 70–110)
HCT VFR BLD AUTO: 40.5 % (ref 40–54)
HGB BLD-MCNC: 13.2 G/DL (ref 14–18)
IMM GRANULOCYTES # BLD AUTO: 0.03 K/UL (ref 0–0.04)
LYMPHOCYTES # BLD AUTO: 2.9 K/UL (ref 1–4.8)
LYMPHOCYTES NFR BLD: 37.4 % (ref 18–48)
MAGNESIUM SERPL-MCNC: 2 MG/DL (ref 1.6–2.6)
MCH RBC QN AUTO: 30.3 PG (ref 27–31)
MCHC RBC AUTO-ENTMCNC: 32.6 G/DL (ref 32–36)
MCV RBC AUTO: 93 FL (ref 82–98)
MONOCYTES # BLD AUTO: 0.7 K/UL (ref 0.3–1)
MONOCYTES NFR BLD: 8.4 % (ref 4–15)
NEUTROPHILS # BLD AUTO: 3.8 K/UL (ref 1.8–7.7)
NEUTROPHILS NFR BLD: 49.2 % (ref 38–73)
NRBC BLD-RTO: 0 /100 WBC
PHOSPHATE SERPL-MCNC: 3.5 MG/DL (ref 2.7–4.5)
PLATELET # BLD AUTO: 216 K/UL (ref 150–350)
PMV BLD AUTO: 9.4 FL (ref 9.2–12.9)
POCT GLUCOSE: 234 MG/DL (ref 70–110)
POTASSIUM SERPL-SCNC: 3.9 MMOL/L (ref 3.5–5.1)
PROT SERPL-MCNC: 7.1 G/DL (ref 6–8.4)
RBC # BLD AUTO: 4.35 M/UL (ref 4.6–6.2)
SODIUM SERPL-SCNC: 137 MMOL/L (ref 136–145)
WBC # BLD AUTO: 7.73 K/UL (ref 3.9–12.7)

## 2019-07-08 PROCEDURE — 36415 COLL VENOUS BLD VENIPUNCTURE: CPT

## 2019-07-08 PROCEDURE — 63600175 PHARM REV CODE 636 W HCPCS: Performed by: NURSE PRACTITIONER

## 2019-07-08 PROCEDURE — 94761 N-INVAS EAR/PLS OXIMETRY MLT: CPT

## 2019-07-08 PROCEDURE — 25000003 PHARM REV CODE 250: Performed by: NURSE PRACTITIONER

## 2019-07-08 PROCEDURE — S5571 INSULIN DISPOS PEN 3 ML: HCPCS | Performed by: NURSE PRACTITIONER

## 2019-07-08 PROCEDURE — 96372 THER/PROPH/DIAG INJ SC/IM: CPT | Mod: 59 | Performed by: EMERGENCY MEDICINE

## 2019-07-08 PROCEDURE — 80053 COMPREHEN METABOLIC PANEL: CPT

## 2019-07-08 PROCEDURE — 84100 ASSAY OF PHOSPHORUS: CPT

## 2019-07-08 PROCEDURE — 85025 COMPLETE CBC W/AUTO DIFF WBC: CPT

## 2019-07-08 PROCEDURE — G0378 HOSPITAL OBSERVATION PER HR: HCPCS

## 2019-07-08 PROCEDURE — 25000003 PHARM REV CODE 250: Performed by: INTERNAL MEDICINE

## 2019-07-08 PROCEDURE — 95816 EEG AWAKE AND DROWSY: CPT

## 2019-07-08 PROCEDURE — 83735 ASSAY OF MAGNESIUM: CPT

## 2019-07-08 RX ORDER — LAMOTRIGINE 25 MG/1
50 TABLET ORAL 2 TIMES DAILY
Qty: 120 TABLET | Refills: 0 | Status: ON HOLD | OUTPATIENT
Start: 2019-07-08 | End: 2019-08-06

## 2019-07-08 RX ADMIN — ACETAMINOPHEN 650 MG: 325 TABLET ORAL at 08:07

## 2019-07-08 RX ADMIN — DICYCLOMINE HYDROCHLORIDE 20 MG: 10 CAPSULE ORAL at 08:07

## 2019-07-08 RX ADMIN — ATORVASTATIN CALCIUM 40 MG: 40 TABLET, FILM COATED ORAL at 08:07

## 2019-07-08 RX ADMIN — FUROSEMIDE 40 MG: 40 TABLET ORAL at 08:07

## 2019-07-08 RX ADMIN — GABAPENTIN 600 MG: 300 CAPSULE ORAL at 08:07

## 2019-07-08 RX ADMIN — INSULIN ASPART 2 UNITS: 100 INJECTION, SOLUTION INTRAVENOUS; SUBCUTANEOUS at 12:07

## 2019-07-08 RX ADMIN — OXYCODONE HYDROCHLORIDE AND ACETAMINOPHEN 1 TABLET: 10; 325 TABLET ORAL at 07:07

## 2019-07-08 RX ADMIN — PRAZOSIN HYDROCHLORIDE 2 MG: 1 CAPSULE ORAL at 08:07

## 2019-07-08 RX ADMIN — INSULIN DETEMIR 50 UNITS: 100 INJECTION, SOLUTION SUBCUTANEOUS at 08:07

## 2019-07-08 RX ADMIN — CARVEDILOL 3.12 MG: 3.12 TABLET, FILM COATED ORAL at 08:07

## 2019-07-08 RX ADMIN — SENNOSIDES, DOCUSATE SODIUM 1 TABLET: 50; 8.6 TABLET, FILM COATED ORAL at 08:07

## 2019-07-08 RX ADMIN — APIXABAN 5 MG: 2.5 TABLET, FILM COATED ORAL at 08:07

## 2019-07-08 RX ADMIN — LAMOTRIGINE 50 MG: 25 TABLET ORAL at 08:07

## 2019-07-08 NOTE — PLAN OF CARE
Problem: Adult Inpatient Plan of Care  Goal: Plan of Care Review  Outcome: Ongoing (interventions implemented as appropriate)   Pt remains free from injury. Pain controlled with po prn pain medication. CTA of head and neck today. Neuro checks maintained. Ambulates independently. Glucose monitoring maintained. PT/OT today. AAOx4. Bed locked and low. Siderails raised x2. Non-skid socks on. Safety precautions maintained. Call light in reach.

## 2019-07-08 NOTE — PLAN OF CARE
07/08/19 1208   Final Note   Assessment Type Final Discharge Note   Anticipated Discharge Disposition Home   Hospital Follow Up  Appt(s) scheduled? Yes  (AVS updated)

## 2019-07-08 NOTE — PLAN OF CARE
07/08/19 0834   PRE-TX-O2   O2 Device (Oxygen Therapy) room air   SpO2 96 %   Pulse Oximetry Type Intermittent   $ Pulse Oximetry - Multiple Charge Pulse Oximetry - Multiple

## 2019-07-08 NOTE — PROGRESS NOTES
[facility]  Neurology  Progress Note    Patient Name: Kevan Colin  MRN: [id number]  Admission Date: 7/5/2019  Hospital Length of Stay: 0 days  Code Status: Full Code   Attending Provider: Memo Henry MD  Primary Care Physician: Ishmael Hernandez MD   Principal Problem:Transient global amnesia    Subjective:     Interval History: Patient seen and examined no events reported overnight, reports he is is doing well.     Current Neurological Medications: MAR reviewed.     HPI: Pt with previous medical history as below. Presented to [facility] for acute episode of confusion that lasted over 5 hours, [place] then 5 [place] later he came to and didn't know where he was or how he got there. Denies any h/o seizures. He is AAOx4 and unremarkable exam.      Labs:  Crt: 1  HgbA1C: 8.5  LDL: 73.2     Imaging:   CT head: 1. Mild cortical atrophy without acute intracranial abnormality.  2. Small amount of fluid within right mastoid air cells.     MRI/MRA: unable to have due to cardiac device    CTA head and neck: 1. Retropharyngeal course of the right and left ICA without a significant stenosis.  2. Nonvisualization of the right and left PCOM arteries otherwise normal CTA of the brain.  3. Right vertebral artery dominant.  4. 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  Differential diagnosis includes an includes a schwannoma, neurofibroma and enhancing lymph node mass.     CUS: 1. Findings consistent with 0-49% (likely less than 40%) stenosis of the distal right ICA.  2. No ultrasound evidence to suggest a hemodynamically significant left carotid artery stenosis.  3. 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  Differential diagnosis includes schwannoma, neurofibroma and lymph node mass.     ECHO: EF 35%, neg bubble study     EEG: prelim shows  sharp waves    Current Facility-Administered Medications   Medication Dose Route Frequency Provider Last Rate Last Dose    acetaminophen tablet 650 mg  650 mg Oral Q4H PRN Cheryle Fischer, MORRISP   650 mg at 07/08/19 0850    apixaban tablet 5 mg  5 mg Oral BID Cheryle Fischer, FNP   5 mg at 07/08/19 0851    atorvastatin tablet 40 mg  40 mg Oral Daily Cheryle Fischer, FNP   40 mg at 07/08/19 0851    carvedilol tablet 3.125 mg  3.125 mg Oral BID Cheryle Fischer, FNP   3.125 mg at 07/08/19 0851    cyclobenzaprine tablet 5 mg  5 mg Oral BID PRN Cheryle Fischer, MORRISP   5 mg at 07/06/19 1636    dextrose 50% injection 12.5 g  12.5 g Intravenous PRN Cheryle Fischer, MORRISP        dextrose 50% injection 25 g  25 g Intravenous PRN Cheryle Fischer, MORRISP        dicyclomine capsule 20 mg  20 mg Oral TID Cheryle Fischer, MORRISP   20 mg at 07/08/19 0851    furosemide tablet 40 mg  40 mg Oral Daily Cheryle Fischer, FNP   40 mg at 07/08/19 0851    gabapentin capsule 600 mg  600 mg Oral TID Cheryle Fischer, MORRISP   600 mg at 07/08/19 0851    glucagon (human recombinant) injection 1 mg  1 mg Intramuscular PRN Cheryle Fischer, MAYNOR        glucose chewable tablet 16 g  16 g Oral PRN Cheryle Fischer, MORRISP        glucose chewable tablet 24 g  24 g Oral PRN Cheryle Fischer, MORRISP        insulin aspart U-100 pen 0-5 Units  0-5 Units Subcutaneous QID (AC + HS) PRN Cheryle Fischer, MORRISP        insulin detemir U-100 pen 50 Units  50 Units Subcutaneous Daily MAYNOR Gavin   50 Units at 07/08/19 0853    lamoTRIgine tablet 50 mg  50 mg Oral BID Valerie Vaughn, NP   50 mg at 07/08/19 0851    magnesium oxide tablet 800 mg  800 mg Oral PRN Cheryle Fischer, MORRISP        magnesium oxide tablet 800 mg  800 mg Oral PRN Cheryle Fischer, MAYNOR        ondansetron injection 4 mg  4 mg Intravenous Q6H PRN Cheryle Fischer, MAYNOR        oxyCODONE-acetaminophen  mg  per tablet 1 tablet  1 tablet Oral TID PRN Memo Henry MD   1 tablet at 07/08/19 0721    potassium chloride 10% oral solution 40 mEq  40 mEq Oral PRN Cheryle Fischer, FNP        potassium chloride 10% oral solution 40 mEq  40 mEq Oral PRN Cheryle Akbari, FNP        potassium chloride 10% oral solution 60 mEq  60 mEq Oral PRN Cheryle Akbari, FNP        potassium, sodium phosphates 280-160-250 mg packet 2 packet  2 packet Oral PRN Cheryle Akbari, FNP        potassium, sodium phosphates 280-160-250 mg packet 2 packet  2 packet Oral PRN Cheryle Akbari, FNP        potassium, sodium phosphates 280-160-250 mg packet 2 packet  2 packet Oral PRN Cheryle Fischer, FNP        prazosin capsule 2 mg  2 mg Oral BID Cheryle Fischer, FNP   2 mg at 07/08/19 0851    senna-docusate 8.6-50 mg per tablet 1 tablet  1 tablet Oral BID Cheryle Fischer, FNP   1 tablet at 07/08/19 0851    sodium chloride 0.9% flush 10 mL  10 mL Intravenous PRN Cheryle Fiscehr, FNP        trazodone split tablet 100 mg  100 mg Oral QHS Cheryle Fischer, FNP   100 mg at 07/07/19 2041       Review of Systems as per HPI  Objective:     Vital Signs (Most Recent):  Temp: 96.1 °F (35.6 °C) (07/08/19 0722)  Pulse: 82 (07/08/19 0722)  Resp: 16 (07/08/19 0722)  BP: (!) 161/62 (07/08/19 0722)  SpO2: 96 % (07/08/19 0834) Vital Signs (24h Range):  Temp:  [96.1 °F (35.6 °C)-98 °F (36.7 °C)] 96.1 °F (35.6 °C)  Pulse:  [71-82] 82  Resp:  [16-18] 16  SpO2:  [90 %-98 %] 96 %  BP: (103-161)/(59-85) 161/62     Weight: 115.2 kg (254 lb)  Body mass index is 32.61 kg/m².    Physical Exam   Constitutional: He is oriented to person, place, and time. He appears well-developed and well-nourished.   HENT:   Head: Normocephalic and atraumatic.   Eyes: Pupils are equal, round, and reactive to light. EOM are normal.   Neck: Normal range of motion. Neck supple.   Cardiovascular: Normal rate, regular rhythm and normal heart  sounds.   Pulmonary/Chest: Effort normal and breath sounds normal.   Abdominal: Soft.   Musculoskeletal: Normal range of motion.   Neurological: He is alert and oriented to person, place, and time. He has normal strength. He has a normal Finger-Nose-Finger Test.   Skin: Skin is warm and dry.   Psychiatric: He has a normal mood and affect. His speech is normal.   Vitals reviewed.      NEUROLOGICAL EXAMINATION:     MENTAL STATUS   Oriented to person, place, and time.   Speech: speech is normal   Level of consciousness: alert    CRANIAL NERVES   Cranial nerves II through XII intact.     CN III, IV, VI   Pupils are equal, round, and reactive to light.  Extraocular motions are normal.     MOTOR EXAM   Muscle bulk: normal  Overall muscle tone: normal    Strength   Strength 5/5 throughout.     SENSORY EXAM   Light touch normal.     GAIT AND COORDINATION      Coordination   Finger to nose coordination: normal    Tremor   Resting tremor: absent  Intention tremor: absent  Action tremor: absent      Significant Labs: All pertinent lab results from the past 24 hours have been reviewed.    Significant Imaging: I have reviewed all pertinent imaging results/findings within the past 24 hours.    Assessment and Plan:   Ruleout Seizure vs TIA  Loss of time - 5 hours yesterday  DM2  HTN     MRI/MRA unable to have due to cardiac device  Echo with bubble - neg bubble study  CUS - <50% stenosis  EEG: prelim sharp waves  CTA head and neck: no known allergy to IV dye and Crt 1(WNL)  Has been taking lamictal 25mg BID, unsure why... Will increase to 50mg BID  NO DRIVING  Stable neck mass noted on CTA neck in the L carotid - f/u with general surgery outpatient     Seizure education.    No driving for now, no swimming alone, no climbing high areas, no operation of heavy machinery or working with high risk electricity equipements.  Continue to take AEDs as prescribed. If any major side effects from neurological medications go to the ED  including mood changes and severe depression.  Patient and/or next of kin informed.     Stroke education was provided.  If patient has acute neurological changes including weakness, confusion, speech changes, facial droop, difficulty walking, and sensory changes immediately call 911.  Follow up Neurology in 2 weeks at [phone].  Medication side effects discussed with the patient and/or caregiver.  Active Diagnoses:    Diagnosis Date Noted POA    PRINCIPAL PROBLEM:  Transient global amnesia [G45.4] 07/05/2019 Yes    Anticoagulant long-term use [Z79.01] 03/24/2016 Not Applicable    Type 2 diabetes mellitus without complication [E11.9] 03/24/2016 Yes    Benign essential HTN [I10] 03/24/2016 Yes      Problems Resolved During this Admission:       VTE Risk Mitigation (From admission, onward)        Ordered     apixaban tablet 5 mg  2 times daily      07/06/19 0256     IP VTE HIGH RISK PATIENT  Once      07/06/19 0015     Place PHILLIP hose  Until discontinued      07/06/19 0015     Place sequential compression device  Until discontinued      07/06/19 0015     Reason for No Pharmacological VTE Prophylaxis  Once      07/06/19 0015          Valerie Vaughn, BE  Neurology  [facility]    I, Dr. Yoshi Duque, have personally seen and examined the patient with my advanced provider and agree with above. I personally did a focused exam, and reviewed all necessary clinical information. I discussed my management plan with my NP and agree with above. Tolerating lamictal well. F/U ENT for neck mass.

## 2019-07-08 NOTE — PLAN OF CARE
"Problem: Adult Inpatient Plan of Care  Goal: Plan of Care Review  Outcome: Outcome(s) achieved Date Met: 07/08/19  Pt states "understanding," to d/c instructions, follow up visits and new medication administration, telemetry and IV removed, pt tolerated well, pt packed personal belongings per self, denies joint pain/discomfort prior to d/c, escorted to main lobby by staff, to home per private vehicle, safety maintain        "

## 2019-07-08 NOTE — PLAN OF CARE
Pt and wife state pt's PCP is Faiza Ochoa NP not Dr. Hernandez as per pt's face sheet.  Updated admissions with the correct PCP. Scheduled hospital follow up with Faiza Ochoa.   Attempted to schedule pt's hospital follow up with Dr. Yoshi Duque however the office was closed for lunch.  I left them a message requesting they call pt with a 2 week hospital follow up.  Updated the AVS, pt and pt's nurse.      07/08/19 1202   Discharge Reassessment   Assessment Type Discharge Planning Reassessment

## 2019-07-08 NOTE — DISCHARGE SUMMARY
"Discharge Summary  Hospital Medicine    Admit Date: 7/5/2019    Date and Time: 7/8/20198:41 AM    Discharge Attending Physician: Memo Henry MD    Primary Care Physician: Ishmael Hernandez MD    Diagnoses:  Active Hospital Problems    Diagnosis  POA    *Transient global amnesia [G45.4]  Yes    Anticoagulant long-term use [Z79.01]  Not Applicable    Type 2 diabetes mellitus without complication [E11.9]  Yes    Benign essential HTN [I10]  Left Carotid body tumor  Chronic pain syndrome  Yes     Discharged Condition: Good    Hospital Course:   Kevan Colin is a 63 y/o male with a past medical history significant for DM, HTN, CAD, PE, DVT,  MI, COPD, and HLD who presented to the ED with reports of "memory lapses" which began about a month ago, initially lasting for just a few seconds or minutes, but on the day of presentation, patient had an episode in which he did not remember about 4 hours of the day, from 10 am-1 pm. He recalled driving and coming to on the side of the road, sitting in his car. Per spouse, the patient was normal in the morning and had dropped off his sister back home from dialysis, then left home to go to work.  Pt's wife stated that she was unable to reach the patient by telephone for several hours. Pt stated that [place], and the next memory he has was calling his nephew to ask for directions. The patient denied history of stroke or seizures.  Denied recent fever, chest pain, vision changes, abdominal pain, nausea or vomiting.  He has had a dull headache at times and diarrhea.  Pt's wife reported that the patient had been mildly confused at times and has had difficulty identifying common items. Patient was admitted to Hospitalist medicine service. Patient was evaluated by Dr. Blackmon.  Patient could not have MRI of the brain performed due to presence of spine stimulator.  Patient underwent neurological workup as mentioned below, no acute neurological " findings were noted.  Patient has been instructed to avoid driving until cleared by Neurology.  Neurology team increased Lamictal dose to 50 mg twice daily.  Patient denies any history of hypoglycemia and will continue close monitoring of his blood sugars.  Patient also noted to have incidental finding of left carotid body tumor for which he will follow up with general surgeon in next 1-2 weeks.  Patient has been cleared for discharge by neurologist. Patient was discharged home in stable condition with following discharge plan of care.     Consults: Dr. Blackmon    Significant Diagnostic Studies:   CT head without contrast:  1. Mild cortical atrophy without acute intracranial abnormality.  2. Small amount of fluid within right mastoid air cells.     CXR:  1. Mild emphysematous change.  2. Cardiomegaly.  3. Thoracic stimulator wires.     MRI brain:     MRA brain:     US carotids B/L:   1. Findings consistent with 0-49% (likely less than 40%) stenosis of the distal right ICA.  2. No ultrasound evidence to suggest a hemodynamically significant left carotid artery stenosis.  3. 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  Differential diagnosis includes schwannoma, neurofibroma and lymph node mass.     ECHO:  · Moderately decreased left ventricular systolic function. The estimated ejection fraction is 35%  · Grade I (mild) left ventricular diastolic dysfunction consistent with impaired relaxation.  · Elevated left atrial pressure.  · Local segmental wall motion abnormalities on contrast study  · No PFO on bubble study  · Technically suboptimal study due to body habitus     CTA head and neck:  1. Retropharyngeal course of the right and left ICA without a significant stenosis.  2. Nonvisualization of the right and left PCOM arteries otherwise normal CTA of the brain.  3. Right vertebral artery dominant.  4. Chronic stable enhancing soft tissue mass of the left  carotid body most consistent with a paraganglioma.  Differential diagnosis includes an includes a schwannoma, neurofibroma and enhancing lymph node mass.     EEG:  Normal study captured in the awake, drowsy, and asleep states. No epileptiform discharges or electrographic seizures are seen.     Microbiology Results (last 7 days)     ** No results found for the last 168 hours. **        Special Treatments/Procedures: None  Disposition: Home or Self Care    Medications:  Reconciled Home Medications:   Current Discharge Medication List      CONTINUE these medications which have CHANGED    Details   lamoTRIgine (LAMICTAL) 25 MG tablet Take 2 tablets (50 mg total) by mouth 2 (two) times daily.  Qty: 120 tablet, Refills: 0         CONTINUE these medications which have NOT CHANGED    Details   albuterol 90 mcg/actuation inhaler Inhale 2 puffs into the lungs every 4 (four) hours as needed for Shortness of Breath or Wheezing.      apixaban 5 mg Tab Take 5 mg by mouth 2 (two) times daily.      atorvastatin (LIPITOR) 40 MG tablet Take 40 mg by mouth once daily.      carvedilol (COREG) 3.125 MG tablet TAKE 1 TABLET BY MOUTH TWICE DAILY  Qty: 60 tablet, Refills: 3      cyclobenzaprine (FLEXERIL) 5 MG tablet Take 1 tablet (5 mg total) by mouth 2 (two) times daily as needed for Muscle spasms.  Qty: 60 tablet, Refills: 2      dicyclomine (BENTYL) 20 mg tablet Take 20 mg by mouth 3 (three) times daily.       furosemide (LASIX) 40 MG tablet Take 40 mg by mouth once daily.       gabapentin (NEURONTIN) 300 MG capsule Take 2 capsules (600 mg total) by mouth 3 (three) times daily.  Qty: 540 capsule, Refills: 3      insulin glargine, TOUJEO, (TOUJEO SOLOSTAR U-300 INSULIN) 300 unit/mL (1.5 mL) InPn pen Inject 85 Units into the skin every evening.       insulin lispro (HUMALOG) 100 unit/mL injection Inject 20 Units into the skin 3 (three) times daily before meals.      metFORMIN (GLUCOPHAGE) 500 MG tablet Take 1,000 mg by mouth 2 (two)  times daily with meals.      oxyCODONE-acetaminophen (PERCOCET)  mg per tablet Take 1 tablet by mouth 3 (three) times daily as needed for Pain.  Qty: 90 tablet, Refills: 0      potassium chloride SA (K-DUR,KLOR-CON) 20 MEQ tablet Take 20 mEq by mouth 2 (two) times daily.      prazosin (MINIPRESS) 2 MG Cap Take 2 mg by mouth 2 (two) times daily.      SPIRIVA WITH HANDIHALER 18 mcg inhalation capsule Inhale 18 mcg into the lungs once daily.       suvorexant (BELSOMRA) 20 mg Tab Take 20 mg by mouth once daily.      traZODone (DESYREL) 100 MG tablet Take 100 mg by mouth every evening.       insulin degludec (TRESIBA FLEXTOUCH U-100) 100 unit/mL (3 mL) InPn Inject 65 Units into the skin once daily. At 2 pm every afternoon         STOP taking these medications       cephALEXin (KEFLEX) 250 MG capsule Comments:   Reason for Stopping:             Discharge Procedure Orders   Diet Cardiac     Diet diabetic     Other restrictions (specify):   Order Comments: PLEASE OBSERVE FALL PRECAUTIONS     Call MD for:   Order Comments: For worsening symptoms, chest pain, shortness of breath, increased abdominal pain, high grade fever, stroke or stroke like symptoms, immediately go to the nearest Emergency Room or call 911 as soon as possible.     Follow-up Information     Ishmael Hernandez MD In 1 week.    Specialty:  Family Medicine  Contact information:  [address] [place] [address] [address]  [facility] [address]  [facility] [address]  [phone]             Jamie Duque MD In 2 weeks.    Specialties:  Vascular Neurology, Neurology  Contact information:  [address] [facility]  NEURO CARE [facility] [address]  [phone]             Please follow up.    Contact information:  Follow up with General surgeon regarding Carotid body mass evaluation.    No driving until cleared by neurologist.                Time spent on the discharge of patient: 32 minutes  Patient was seen and examined on the date of discharge and determined to be suitable for  discharge.

## 2019-07-10 ENCOUNTER — HOSPITAL ENCOUNTER (EMERGENCY)
Facility: HOSPITAL | Age: 64
Discharge: HOME | End: 2019-07-10
Attending: EMERGENCY MEDICINE
Payer: MEDICARE

## 2019-07-10 VITALS
HEART RATE: 77 BPM | WEIGHT: 254 LBS | WEIGHT: 254 LBS | TEMPERATURE: 98 F | TEMPERATURE: 97 F | HEIGHT: 74 IN | RESPIRATION RATE: 20 BRPM | HEART RATE: 70 BPM | SYSTOLIC BLOOD PRESSURE: 107 MMHG | OXYGEN SATURATION: 95 % | BODY MASS INDEX: 32.6 KG/M2 | HEIGHT: 74 IN | BODY MASS INDEX: 32.6 KG/M2 | DIASTOLIC BLOOD PRESSURE: 65 MMHG | RESPIRATION RATE: 16 BRPM | OXYGEN SATURATION: 99 % | DIASTOLIC BLOOD PRESSURE: 66 MMHG | SYSTOLIC BLOOD PRESSURE: 104 MMHG

## 2019-07-10 DIAGNOSIS — R41.0 EPISODIC CONFUSION: Primary | ICD-10-CM

## 2019-07-10 LAB — POCT GLUCOSE: 214 MG/DL (ref 70–110)

## 2019-07-10 PROCEDURE — 99282 EMERGENCY DEPT VISIT SF MDM: CPT | Mod: 25

## 2019-07-10 PROCEDURE — 82962 GLUCOSE BLOOD TEST: CPT

## 2019-07-10 NOTE — ED NOTES
Per Dr. Farias the pt will be monitored for one hour in ED. Pt placed on cardiac monitor, BP cuff, and pulse ox. VSS. Family at bedside. Pt voices understanding.

## 2019-07-10 NOTE — PROGRESS NOTES
.07/10/39788:30 PM  I have evaluated and performed a medical screening assessment on this patient while awaiting a thorough evaluation and treatment. All of the emergency department beds are occupied at this time. When appropriate, laboratory studies will be ordered from triage. The patient has been advised of this process and care plan. Patient complains of confusion, slurred speech and right sided weakness new this week but not new today.    Orders pending: stat CT; code reach   Disposition: stable; sent to room.
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
## 2019-07-17 RX ORDER — CYCLOBENZAPRINE HCL 5 MG
TABLET ORAL
Qty: 60 TABLET | Refills: 1 | Status: ON HOLD | OUTPATIENT
Start: 2019-07-17 | End: 2019-07-31 | Stop reason: HOSPADM

## 2019-07-28 ENCOUNTER — HOSPITAL ENCOUNTER (OUTPATIENT)
Facility: HOSPITAL | Age: 64
Discharge: HOME | End: 2019-07-31
Attending: EMERGENCY MEDICINE | Admitting: INTERNAL MEDICINE
Payer: MEDICARE

## 2019-07-28 DIAGNOSIS — K85.90 ACUTE PANCREATITIS WITHOUT INFECTION OR NECROSIS, UNSPECIFIED PANCREATITIS TYPE: Primary | ICD-10-CM

## 2019-07-28 DIAGNOSIS — R19.7 NAUSEA VOMITING AND DIARRHEA: ICD-10-CM

## 2019-07-28 DIAGNOSIS — K83.8 DILATED BILE DUCT: ICD-10-CM

## 2019-07-28 DIAGNOSIS — K85.90 PANCREATITIS: ICD-10-CM

## 2019-07-28 DIAGNOSIS — R07.9 CHEST PAIN: ICD-10-CM

## 2019-07-28 DIAGNOSIS — R11.2 NAUSEA VOMITING AND DIARRHEA: ICD-10-CM

## 2019-07-28 LAB
ALBUMIN SERPL BCP-MCNC: 4.5 G/DL (ref 3.5–5.2)
ALP SERPL-CCNC: 137 U/L (ref 55–135)
ALT SERPL W/O P-5'-P-CCNC: 47 U/L (ref 10–44)
ANION GAP SERPL CALC-SCNC: 8 MMOL/L (ref 8–16)
AST SERPL-CCNC: 50 U/L (ref 10–40)
BASOPHILS # BLD AUTO: 0.11 K/UL (ref 0–0.2)
BASOPHILS NFR BLD: 1.2 % (ref 0–1.9)
BILIRUB SERPL-MCNC: 0.7 MG/DL (ref 0.1–1)
BNP SERPL-MCNC: 57 PG/ML (ref 0–99)
BUN SERPL-MCNC: 10 MG/DL (ref 8–23)
CALCIUM SERPL-MCNC: 9.3 MG/DL (ref 8.7–10.5)
CHLORIDE SERPL-SCNC: 99 MMOL/L (ref 95–110)
CO2 SERPL-SCNC: 28 MMOL/L (ref 23–29)
CREAT SERPL-MCNC: 0.9 MG/DL (ref 0.5–1.4)
DIFFERENTIAL METHOD: ABNORMAL
EOSINOPHIL # BLD AUTO: 0.4 K/UL (ref 0–0.5)
EOSINOPHIL NFR BLD: 4.6 % (ref 0–8)
ERYTHROCYTE [DISTWIDTH] IN BLOOD BY AUTOMATED COUNT: 13.4 % (ref 11.5–14.5)
EST. GFR  (AFRICAN AMERICAN): >60 ML/MIN/1.73 M^2
EST. GFR  (NON AFRICAN AMERICAN): >60 ML/MIN/1.73 M^2
GLUCOSE SERPL-MCNC: 135 MG/DL (ref 70–110)
HCT VFR BLD AUTO: 43.3 % (ref 40–54)
HGB BLD-MCNC: 14.1 G/DL (ref 14–18)
IMM GRANULOCYTES # BLD AUTO: 0.02 K/UL (ref 0–0.04)
IMM GRANULOCYTES NFR BLD AUTO: 0.2 % (ref 0–0.5)
LYMPHOCYTES # BLD AUTO: 3.1 K/UL (ref 1–4.8)
LYMPHOCYTES NFR BLD: 32.8 % (ref 18–48)
MAGNESIUM SERPL-MCNC: 2 MG/DL (ref 1.6–2.6)
MCH RBC QN AUTO: 30.7 PG (ref 27–31)
MCHC RBC AUTO-ENTMCNC: 32.6 G/DL (ref 32–36)
MCV RBC AUTO: 94 FL (ref 82–98)
MONOCYTES # BLD AUTO: 0.7 K/UL (ref 0.3–1)
MONOCYTES NFR BLD: 7.3 % (ref 4–15)
NEUTROPHILS # BLD AUTO: 5.1 K/UL (ref 1.8–7.7)
NEUTROPHILS NFR BLD: 53.9 % (ref 38–73)
NRBC BLD-RTO: 0 /100 WBC
PLATELET # BLD AUTO: 262 K/UL (ref 150–350)
PMV BLD AUTO: 9.3 FL (ref 9.2–12.9)
POTASSIUM SERPL-SCNC: 3.7 MMOL/L (ref 3.5–5.1)
PROT SERPL-MCNC: 8.1 G/DL (ref 6–8.4)
RBC # BLD AUTO: 4.59 M/UL (ref 4.6–6.2)
SODIUM SERPL-SCNC: 135 MMOL/L (ref 136–145)
TROPONIN I SERPL DL<=0.01 NG/ML-MCNC: <0.03 NG/ML (ref 0.02–0.5)
WBC # BLD AUTO: 9.44 K/UL (ref 3.9–12.7)

## 2019-07-28 PROCEDURE — 99283 EMERGENCY DEPT VISIT LOW MDM: CPT

## 2019-07-28 PROCEDURE — 80053 COMPREHEN METABOLIC PANEL: CPT

## 2019-07-28 PROCEDURE — 83880 ASSAY OF NATRIURETIC PEPTIDE: CPT

## 2019-07-28 PROCEDURE — 99285 EMERGENCY DEPT VISIT HI MDM: CPT | Mod: 25

## 2019-07-28 PROCEDURE — 84484 ASSAY OF TROPONIN QUANT: CPT

## 2019-07-28 PROCEDURE — 96361 HYDRATE IV INFUSION ADD-ON: CPT

## 2019-07-28 PROCEDURE — 96376 TX/PRO/DX INJ SAME DRUG ADON: CPT

## 2019-07-28 PROCEDURE — 83735 ASSAY OF MAGNESIUM: CPT

## 2019-07-28 PROCEDURE — 63600175 PHARM REV CODE 636 W HCPCS: Performed by: EMERGENCY MEDICINE

## 2019-07-28 PROCEDURE — 83690 ASSAY OF LIPASE: CPT

## 2019-07-28 PROCEDURE — 93005 ELECTROCARDIOGRAM TRACING: CPT

## 2019-07-28 PROCEDURE — 96375 TX/PRO/DX INJ NEW DRUG ADDON: CPT

## 2019-07-28 PROCEDURE — 85025 COMPLETE CBC W/AUTO DIFF WBC: CPT

## 2019-07-28 PROCEDURE — 96374 THER/PROPH/DIAG INJ IV PUSH: CPT | Mod: 59

## 2019-07-28 RX ORDER — HYDROMORPHONE HYDROCHLORIDE 1 MG/ML
0.5 INJECTION, SOLUTION INTRAMUSCULAR; INTRAVENOUS; SUBCUTANEOUS
Status: COMPLETED | OUTPATIENT
Start: 2019-07-28 | End: 2019-07-28

## 2019-07-28 RX ORDER — ASPIRIN 325 MG
325 TABLET ORAL
Status: DISCONTINUED | OUTPATIENT
Start: 2019-07-28 | End: 2019-07-28

## 2019-07-28 RX ORDER — SODIUM CHLORIDE 9 MG/ML
1000 INJECTION, SOLUTION INTRAVENOUS
Status: COMPLETED | OUTPATIENT
Start: 2019-07-28 | End: 2019-07-28

## 2019-07-28 RX ORDER — ONDANSETRON 2 MG/ML
4 INJECTION INTRAMUSCULAR; INTRAVENOUS
Status: COMPLETED | OUTPATIENT
Start: 2019-07-28 | End: 2019-07-28

## 2019-07-28 RX ADMIN — ONDANSETRON 4 MG: 2 INJECTION INTRAMUSCULAR; INTRAVENOUS at 10:07

## 2019-07-28 RX ADMIN — SODIUM CHLORIDE 1000 ML: 0.9 INJECTION, SOLUTION INTRAVENOUS at 10:07

## 2019-07-28 RX ADMIN — HYDROMORPHONE HYDROCHLORIDE 0.5 MG: 1 INJECTION, SOLUTION INTRAMUSCULAR; INTRAVENOUS; SUBCUTANEOUS at 10:07

## 2019-07-29 PROBLEM — E86.0 DEHYDRATION: Status: ACTIVE | Noted: 2019-07-29

## 2019-07-29 LAB
ALBUMIN SERPL BCP-MCNC: 4 G/DL (ref 3.5–5.2)
ALP SERPL-CCNC: 114 U/L (ref 55–135)
ALT SERPL W/O P-5'-P-CCNC: 37 U/L (ref 10–44)
AMYLASE SERPL-CCNC: 177 U/L (ref 20–110)
ANION GAP SERPL CALC-SCNC: 8 MMOL/L (ref 8–16)
AST SERPL-CCNC: 28 U/L (ref 10–40)
BASOPHILS # BLD AUTO: 0.1 K/UL (ref 0–0.2)
BASOPHILS NFR BLD: 1.1 % (ref 0–1.9)
BILIRUB SERPL-MCNC: 0.5 MG/DL (ref 0.1–1)
BUN SERPL-MCNC: 10 MG/DL (ref 8–23)
CALCIUM SERPL-MCNC: 9 MG/DL (ref 8.7–10.5)
CHLORIDE SERPL-SCNC: 104 MMOL/L (ref 95–110)
CO2 SERPL-SCNC: 28 MMOL/L (ref 23–29)
CREAT SERPL-MCNC: 0.7 MG/DL (ref 0.5–1.4)
DIFFERENTIAL METHOD: ABNORMAL
EOSINOPHIL # BLD AUTO: 0.4 K/UL (ref 0–0.5)
EOSINOPHIL NFR BLD: 4.4 % (ref 0–8)
ERYTHROCYTE [DISTWIDTH] IN BLOOD BY AUTOMATED COUNT: 13.3 % (ref 11.5–14.5)
EST. GFR  (AFRICAN AMERICAN): >60 ML/MIN/1.73 M^2
EST. GFR  (NON AFRICAN AMERICAN): >60 ML/MIN/1.73 M^2
GLUCOSE SERPL-MCNC: 128 MG/DL (ref 70–110)
GLUCOSE SERPL-MCNC: 98 MG/DL (ref 70–110)
HCT VFR BLD AUTO: 41.6 % (ref 40–54)
HGB BLD-MCNC: 13.4 G/DL (ref 14–18)
IMM GRANULOCYTES # BLD AUTO: 0.02 K/UL (ref 0–0.04)
IMM GRANULOCYTES NFR BLD AUTO: 0.2 % (ref 0–0.5)
LIPASE SERPL-CCNC: 100 U/L (ref 4–60)
LIPASE SERPL-CCNC: 112 U/L (ref 4–60)
LYMPHOCYTES # BLD AUTO: 3.7 K/UL (ref 1–4.8)
LYMPHOCYTES NFR BLD: 41.6 % (ref 18–48)
MAGNESIUM SERPL-MCNC: 1.9 MG/DL (ref 1.6–2.6)
MCH RBC QN AUTO: 30.9 PG (ref 27–31)
MCHC RBC AUTO-ENTMCNC: 32.2 G/DL (ref 32–36)
MCV RBC AUTO: 96 FL (ref 82–98)
MONOCYTES # BLD AUTO: 0.8 K/UL (ref 0.3–1)
MONOCYTES NFR BLD: 8.5 % (ref 4–15)
NEUTROPHILS # BLD AUTO: 4 K/UL (ref 1.8–7.7)
NEUTROPHILS NFR BLD: 44.2 % (ref 38–73)
NRBC BLD-RTO: 0 /100 WBC
PLATELET # BLD AUTO: 223 K/UL (ref 150–350)
PMV BLD AUTO: 9.3 FL (ref 9.2–12.9)
POTASSIUM SERPL-SCNC: 3.8 MMOL/L (ref 3.5–5.1)
PROT SERPL-MCNC: 7.1 G/DL (ref 6–8.4)
RBC # BLD AUTO: 4.34 M/UL (ref 4.6–6.2)
SODIUM SERPL-SCNC: 140 MMOL/L (ref 136–145)
TROPONIN I SERPL DL<=0.01 NG/ML-MCNC: <0.03 NG/ML (ref 0.02–0.5)
TROPONIN I SERPL DL<=0.01 NG/ML-MCNC: <0.03 NG/ML (ref 0.02–0.5)
WBC # BLD AUTO: 8.99 K/UL (ref 3.9–12.7)

## 2019-07-29 PROCEDURE — G0378 HOSPITAL OBSERVATION PER HR: HCPCS

## 2019-07-29 PROCEDURE — 84484 ASSAY OF TROPONIN QUANT: CPT | Mod: 91

## 2019-07-29 PROCEDURE — 63600175 PHARM REV CODE 636 W HCPCS: Performed by: INTERNAL MEDICINE

## 2019-07-29 PROCEDURE — 63600175 PHARM REV CODE 636 W HCPCS: Performed by: NURSE PRACTITIONER

## 2019-07-29 PROCEDURE — 82150 ASSAY OF AMYLASE: CPT

## 2019-07-29 PROCEDURE — 25000003 PHARM REV CODE 250: Performed by: NURSE PRACTITIONER

## 2019-07-29 PROCEDURE — 25500020 PHARM REV CODE 255: Performed by: EMERGENCY MEDICINE

## 2019-07-29 PROCEDURE — 85025 COMPLETE CBC W/AUTO DIFF WBC: CPT

## 2019-07-29 PROCEDURE — 83735 ASSAY OF MAGNESIUM: CPT

## 2019-07-29 PROCEDURE — 80053 COMPREHEN METABOLIC PANEL: CPT

## 2019-07-29 PROCEDURE — 83690 ASSAY OF LIPASE: CPT

## 2019-07-29 PROCEDURE — 94640 AIRWAY INHALATION TREATMENT: CPT

## 2019-07-29 PROCEDURE — 27000221 HC OXYGEN, UP TO 24 HOURS

## 2019-07-29 PROCEDURE — 63600175 PHARM REV CODE 636 W HCPCS: Performed by: EMERGENCY MEDICINE

## 2019-07-29 PROCEDURE — 25000242 PHARM REV CODE 250 ALT 637 W/ HCPCS: Performed by: NURSE PRACTITIONER

## 2019-07-29 PROCEDURE — 93005 ELECTROCARDIOGRAM TRACING: CPT

## 2019-07-29 RX ORDER — SODIUM CHLORIDE 0.9 % (FLUSH) 0.9 %
10 SYRINGE (ML) INJECTION
Status: DISCONTINUED | OUTPATIENT
Start: 2019-07-29 | End: 2019-07-31 | Stop reason: HOSPADM

## 2019-07-29 RX ORDER — ONDANSETRON 2 MG/ML
4 INJECTION INTRAMUSCULAR; INTRAVENOUS EVERY 8 HOURS PRN
Status: DISCONTINUED | OUTPATIENT
Start: 2019-07-29 | End: 2019-07-31 | Stop reason: HOSPADM

## 2019-07-29 RX ORDER — HYDROMORPHONE HYDROCHLORIDE 1 MG/ML
0.5 INJECTION, SOLUTION INTRAMUSCULAR; INTRAVENOUS; SUBCUTANEOUS EVERY 4 HOURS PRN
Status: DISCONTINUED | OUTPATIENT
Start: 2019-07-29 | End: 2019-07-29

## 2019-07-29 RX ORDER — GABAPENTIN 300 MG/1
600 CAPSULE ORAL 3 TIMES DAILY
Status: DISCONTINUED | OUTPATIENT
Start: 2019-07-29 | End: 2019-07-31 | Stop reason: HOSPADM

## 2019-07-29 RX ORDER — INSULIN ASPART 100 [IU]/ML
0-5 INJECTION, SOLUTION INTRAVENOUS; SUBCUTANEOUS EVERY 6 HOURS PRN
Status: DISCONTINUED | OUTPATIENT
Start: 2019-07-29 | End: 2019-07-31 | Stop reason: HOSPADM

## 2019-07-29 RX ORDER — HYDROMORPHONE HYDROCHLORIDE 1 MG/ML
0.5 INJECTION, SOLUTION INTRAMUSCULAR; INTRAVENOUS; SUBCUTANEOUS EVERY 4 HOURS PRN
Status: DISCONTINUED | OUTPATIENT
Start: 2019-07-29 | End: 2019-07-29 | Stop reason: SDUPTHER

## 2019-07-29 RX ORDER — OXYCODONE AND ACETAMINOPHEN 10; 325 MG/1; MG/1
1 TABLET ORAL 3 TIMES DAILY PRN
Status: DISCONTINUED | OUTPATIENT
Start: 2019-07-29 | End: 2019-07-31 | Stop reason: HOSPADM

## 2019-07-29 RX ORDER — TRAZODONE HYDROCHLORIDE 50 MG/1
100 TABLET ORAL NIGHTLY
Status: DISCONTINUED | OUTPATIENT
Start: 2019-07-29 | End: 2019-07-31 | Stop reason: HOSPADM

## 2019-07-29 RX ORDER — ONDANSETRON 2 MG/ML
4 INJECTION INTRAMUSCULAR; INTRAVENOUS
Status: COMPLETED | OUTPATIENT
Start: 2019-07-29 | End: 2019-07-29

## 2019-07-29 RX ORDER — PRAZOSIN HYDROCHLORIDE 1 MG/1
2 CAPSULE ORAL 2 TIMES DAILY
Status: DISCONTINUED | OUTPATIENT
Start: 2019-07-29 | End: 2019-07-31 | Stop reason: HOSPADM

## 2019-07-29 RX ORDER — LAMOTRIGINE 25 MG/1
50 TABLET ORAL 2 TIMES DAILY
Status: DISCONTINUED | OUTPATIENT
Start: 2019-07-29 | End: 2019-07-31 | Stop reason: HOSPADM

## 2019-07-29 RX ORDER — TIOTROPIUM BROMIDE 18 UG/1
18 CAPSULE ORAL; RESPIRATORY (INHALATION) DAILY
Status: DISCONTINUED | OUTPATIENT
Start: 2019-07-29 | End: 2019-07-31 | Stop reason: HOSPADM

## 2019-07-29 RX ORDER — POTASSIUM CHLORIDE 20 MEQ/1
20 TABLET, EXTENDED RELEASE ORAL 2 TIMES DAILY
Status: DISCONTINUED | OUTPATIENT
Start: 2019-07-29 | End: 2019-07-31 | Stop reason: HOSPADM

## 2019-07-29 RX ORDER — SODIUM CHLORIDE 9 MG/ML
INJECTION, SOLUTION INTRAVENOUS CONTINUOUS
Status: DISCONTINUED | OUTPATIENT
Start: 2019-07-29 | End: 2019-07-30

## 2019-07-29 RX ORDER — CARVEDILOL 3.12 MG/1
3.12 TABLET ORAL 2 TIMES DAILY
Status: DISCONTINUED | OUTPATIENT
Start: 2019-07-29 | End: 2019-07-31 | Stop reason: HOSPADM

## 2019-07-29 RX ADMIN — APIXABAN 5 MG: 5 TABLET, FILM COATED ORAL at 09:07

## 2019-07-29 RX ADMIN — APIXABAN 5 MG: 5 TABLET, FILM COATED ORAL at 08:07

## 2019-07-29 RX ADMIN — CARVEDILOL 3.12 MG: 3.12 TABLET, FILM COATED ORAL at 09:07

## 2019-07-29 RX ADMIN — POTASSIUM CHLORIDE 20 MEQ: 1500 TABLET, EXTENDED RELEASE ORAL at 09:07

## 2019-07-29 RX ADMIN — GABAPENTIN 600 MG: 300 CAPSULE ORAL at 09:07

## 2019-07-29 RX ADMIN — ONDANSETRON 4 MG: 2 INJECTION INTRAMUSCULAR; INTRAVENOUS at 03:07

## 2019-07-29 RX ADMIN — CARVEDILOL 3.12 MG: 3.12 TABLET, FILM COATED ORAL at 08:07

## 2019-07-29 RX ADMIN — LAMOTRIGINE 50 MG: 25 TABLET ORAL at 08:07

## 2019-07-29 RX ADMIN — HYDROMORPHONE HYDROCHLORIDE 0.5 MG: 1 INJECTION, SOLUTION INTRAMUSCULAR; INTRAVENOUS; SUBCUTANEOUS at 04:07

## 2019-07-29 RX ADMIN — SODIUM CHLORIDE: 0.9 INJECTION, SOLUTION INTRAVENOUS at 08:07

## 2019-07-29 RX ADMIN — HYDROMORPHONE HYDROCHLORIDE 0.5 MG: 1 INJECTION, SOLUTION INTRAMUSCULAR; INTRAVENOUS; SUBCUTANEOUS at 12:07

## 2019-07-29 RX ADMIN — OXYCODONE HYDROCHLORIDE AND ACETAMINOPHEN 1 TABLET: 10; 325 TABLET ORAL at 04:07

## 2019-07-29 RX ADMIN — GABAPENTIN 600 MG: 300 CAPSULE ORAL at 08:07

## 2019-07-29 RX ADMIN — TRAZODONE HYDROCHLORIDE 100 MG: 50 TABLET ORAL at 08:07

## 2019-07-29 RX ADMIN — IOHEXOL 100 ML: 350 INJECTION, SOLUTION INTRAVENOUS at 12:07

## 2019-07-29 RX ADMIN — LAMOTRIGINE 50 MG: 25 TABLET ORAL at 09:07

## 2019-07-29 RX ADMIN — PRAZOSIN HYDROCHLORIDE 2 MG: 1 CAPSULE ORAL at 08:07

## 2019-07-29 RX ADMIN — POTASSIUM CHLORIDE 20 MEQ: 1500 TABLET, EXTENDED RELEASE ORAL at 08:07

## 2019-07-29 RX ADMIN — ONDANSETRON 4 MG: 2 INJECTION INTRAMUSCULAR; INTRAVENOUS at 02:07

## 2019-07-29 RX ADMIN — GABAPENTIN 600 MG: 300 CAPSULE ORAL at 04:07

## 2019-07-29 RX ADMIN — SODIUM CHLORIDE: 0.9 INJECTION, SOLUTION INTRAVENOUS at 06:07

## 2019-07-29 RX ADMIN — TIOTROPIUM BROMIDE 18 MCG: 18 CAPSULE ORAL; RESPIRATORY (INHALATION) at 07:07

## 2019-07-29 RX ADMIN — PRAZOSIN HYDROCHLORIDE 2 MG: 1 CAPSULE ORAL at 09:07

## 2019-07-29 NOTE — ED NOTES
Bed: 01A Trauma  Expected date: 7/28/19  Expected time:   Means of arrival:   Comments:  64M CP/N/V/D, diaphoretic
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
## 2019-07-29 NOTE — CONSULTS
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 Method Automated    Comprehensive metabolic panel    Collection Time: 07/28/19 10:09 PM   Result Value Ref Range    Sodium 135 (L) 136 - 145 mmol/L    Potassium 3.7 3.5 - 5.1 mmol/L    Chloride 99 95 - 110 mmol/L    CO2 28 23 - 29 mmol/L    Glucose 135 (H) 70 - 110 mg/dL    BUN, Bld 10 8 - 23 mg/dL    Creatinine 0.9 0.5 - 1.4 mg/dL    Calcium 9.3 8.7 - 10.5 mg/dL    Total Protein 8.1 6.0 - 8.4 g/dL    Albumin 4.5 3.5 - 5.2 g/dL    Total Bilirubin 0.7 0.1 - 1.0 mg/dL    Alkaline Phosphatase 137 (H) 55 - 135 U/L    AST 50 (H) 10 - 40 U/L    ALT 47 (H) 10 - 44 U/L    Anion Gap 8 8 - 16 mmol/L    eGFR if African American >60.0 >60 mL/min/1.73 m^2    eGFR if non African American >60.0 >60 mL/min/1.73 m^2   Troponin I #1    Collection Time: 07/28/19 10:09 PM   Result Value Ref Range    Troponin I <0.030 0.020 - 0.500 ng/mL   B-Type natriuretic peptide (BNP)    Collection Time: 07/28/19 10:09 PM   Result Value Ref Range    BNP 57 0 - 99 pg/mL   Magnesium    Collection Time: 07/28/19 10:12 PM   Result Value Ref Range    Magnesium 2.0 1.6 - 2.6 mg/dL   Lipase    Collection Time: 07/28/19 10:12 PM   Result Value Ref Range    Lipase 112 (H) 4 - 60 U/L   Comprehensive metabolic panel    Collection Time: 07/29/19  7:13 AM   Result Value Ref Range    Sodium  140 136 - 145 mmol/L    Potassium 3.8 3.5 - 5.1 mmol/L    Chloride 104 95 - 110 mmol/L    CO2 28 23 - 29 mmol/L    Glucose 98 70 - 110 mg/dL    BUN, Bld 10 8 - 23 mg/dL    Creatinine 0.7 0.5 - 1.4 mg/dL    Calcium 9.0 8.7 - 10.5 mg/dL    Total Protein 7.1 6.0 - 8.4 g/dL    Albumin 4.0 3.5 - 5.2 g/dL    Total Bilirubin 0.5 0.1 - 1.0 mg/dL    Alkaline Phosphatase 114 55 - 135 U/L    AST 28 10 - 40 U/L    ALT 37 10 - 44 U/L    Anion Gap 8 8 - 16 mmol/L    eGFR if African American >60.0 >60 mL/min/1.73 m^2    eGFR if non African American >60.0 >60 mL/min/1.73 m^2   Magnesium    Collection Time: 07/29/19  7:13 AM   Result Value Ref Range    Magnesium 1.9 1.6 - 2.6 mg/dL   Lipase    Collection Time: 07/29/19  7:13 AM   Result Value Ref Range    Lipase 100 (H) 4 - 60 U/L   Amylase    Collection Time: 07/29/19  7:13 AM   Result Value Ref Range    Amylase 177 (H) 20 - 110 U/L   CBC auto differential    Collection Time: 07/29/19  7:13 AM   Result Value Ref Range    WBC 8.99 3.90 - 12.70 K/uL    RBC 4.34 (L) 4.60 - 6.20 M/uL    Hemoglobin 13.4 (L) 14.0 - 18.0 g/dL    Hematocrit 41.6 40.0 - 54.0 %    Mean Corpuscular Volume 96 82 - 98 fL    Mean Corpuscular Hemoglobin 30.9 27.0 - 31.0 pg    Mean Corpuscular Hemoglobin Conc 32.2 32.0 - 36.0 g/dL    RDW 13.3 11.5 - 14.5 %    Platelets 223 150 - 350 K/uL    MPV 9.3 9.2 - 12.9 fL    Immature Granulocytes 0.2 0.0 - 0.5 %    Gran # (ANC) 4.0 1.8 - 7.7 K/uL    Immature Grans (Abs) 0.02 0.00 - 0.04 K/uL    Lymph # 3.7 1.0 - 4.8 K/uL    Mono # 0.8 0.3 - 1.0 K/uL    Eos # 0.4 0.0 - 0.5 K/uL    Baso # 0.10 0.00 - 0.20 K/uL    nRBC 0 0 /100 WBC    Gran% 44.2 38.0 - 73.0 %    Lymph% 41.6 18.0 - 48.0 %    Mono% 8.5 4.0 - 15.0 %    Eosinophil% 4.4 0.0 - 8.0 %    Basophil% 1.1 0.0 - 1.9 %    Differential Method Automated    Troponin I    Collection Time: 07/29/19  7:13 AM   Result Value Ref Range    Troponin I <0.030 0.020 - 0.500 ng/mL   POCT glucose    Collection Time: 07/29/19 12:40 PM    Result Value Ref Range    POC Glucose 128 (H) 70 - 110   Troponin I    Collection Time: 07/29/19 12:49 PM   Result Value Ref Range    Troponin I <0.030 0.020 - 0.500 ng/mL        Assessment:  64 year old male with h/o pancreatitis x 4 here with epigastric pain, nausea,vomiting.  Doubt pancreatitis given normal ct and lipase not 3xuln.      Plan:  EUS recommended and ordered for dr echeverria to review given double duct sign and cbd 16 mm.  Has nerve stimulator and cant get mri  Iv hydration  Possible gastroenteritis

## 2019-07-29 NOTE — PLAN OF CARE
07/29/19 0755   Patient Assessment/Suction   Level of Consciousness (AVPU) alert   All Lung Fields Breath Sounds diminished   Cough Type good;nonproductive   PRE-TX-O2   O2 Device (Oxygen Therapy) nasal cannula   $ Is the patient on Low Flow Oxygen? Yes   Flow (L/min) 2   SpO2 96 %   Pulse 68   Resp 18   Inhaler   $ Inhaler Charges MDI (Metered Dose Inahler) Treatment   Daily Review of Necessity (Inhaler) completed   Respiratory Treatment Status (Inhaler) given   Treatment Route (Inhaler) mouthpiece   Patient Position (Inhaler) semi-Ashton's   Post Treatment Assessment (Inhaler) increased aeration   Signs of Intolerance (Inhaler) none

## 2019-07-29 NOTE — HOSPITAL COURSE
Patient evaluated in the ED for abdominal pain and chest pain. Cardiac workup thus far is negative. Elevated lipase not consistent with pancreatitis which patient has a history of. CT of abdomen with dilated CBD.   64 year old male with h/o pancreatitis x 4 here with epigastric pain, nausea,vomiting.  Doubt pancreatitis given normal ct and lipase not 3xuln.      EUS recommended and ordered for dr echeverria . Will be done as outpatient  Has nerve stimulator and cant get mri  Iv hydration  Possible gastroenteritis

## 2019-07-29 NOTE — NURSING
Patient c/o chest pain, viatls /88, HR 76, RR 20 ,O2 98%, Temp 97.4. Patient points to epigastric area states that it feels like the pain he has been having last few days.

## 2019-07-29 NOTE — PLAN OF CARE
Problem: Fall Injury Risk  Goal: Absence of Fall and Fall-Related Injury  Outcome: Ongoing (interventions implemented as appropriate)  Patient free of falls

## 2019-07-29 NOTE — HPI
Patient presents to the ED with the complaint of RUQ abdominal pain with radiation to right lower back with associated nausea, vomiting, and diarrhea. The patient reports symptoms began 3 days ago. Symptoms are worse with food. No alleviating factors. At worst, symptoms are severe.The patient reports one episode of small amount of bright red blood in stool that occurred 2 days ago. He denies hematemesis, dizziness, lightheadedness, or LOC.  Additionally the patient reports chest pain with associated SOB. Pain is described as aching to right chest with no aggravating or alleviating factors.

## 2019-07-29 NOTE — ED NOTES
Kevan Colin Sr. out of bed to bathroom, ambulatory without problem for clean catch, mid stream urine; specimen to lab/ Urine Pregnancy Test completed.

## 2019-07-29 NOTE — PROGRESS NOTES
"Progress Note  Department of Hospital Medicine    Subjective:      Follow-up For:  Pancreatitis  HPI/Interval history:Patient admitted with "pancreatitis " He states he has had previously   +/- CP. No SOB  No fever chills      PMH/FH/SH/Review of Systems: See H and P/Consult dated 7/28/2019    Objective:      Vital Signs Range (Last 24H):  Temp:  [97.7 °F (36.5 °C)-98 °F (36.7 °C)]   Pulse:  [65-82]   Resp:  [18]   BP: (114-161)/(65-96)   SpO2:  [88 %-98 %]     Physical Exam:  General: No acute distress.appears comfortable   HEENT sclera non icteric   Neck supple   Cardiovascular: RRR  Lungs: Good inspiratory effort. The lungs are clear.but coarse   Abdomen: Soft, +BS benign exam .  Extremities: No edema.   no stout   Neuro: Alert and oriented. No focal deficits.  Skin warm no rash     Lab/X-ray Results: labs reviewed       CT Abdomen Pelvis With Contrast    Narrative    Exam: CT OF THE ABDOMEN/PELVIS WITH IV CONTRAST    Clinical data: Nausea, vomiting, diarrhea.    Technique: Direct contiguous axial CT images were acquired through the abdomen and pelvis with intravenous contrast using soft tissue and bone algorithms. Oral contrast was not administered. Reformatted/MPR images were performed. Contrast: Omnipaque 350. Amount: 100 mL.      Limitations: Lack of oral contrast limits evaluation of the bowel loops.      Prior Studies: No prior studies submitted.    Findings: Lung bases:  Clear      Liver:   Unremarkable size and contour. Normal density. No evidence of mass.  Post cholecystectomy status.  Mild dilatation of the common bile duct [16 mm], and central intrahepatic biliary radicles.    Spleen:  Grossly unremarkable.      Pancreas/adrenal glands: Mild dilatation of the pancreatic duct [9 mm]in the region of the pancreatic head, neck and proximal pancreatic body.  No evidence of pancreatic calcification or features of pancreatitis.      Bilateral adrenal glands are unremarkable.      Kidneys:   In anatomic " position. Grossly unremarkable renal size, contour and density. No renal or ureteral calculi. No evidence of a renal mass or cyst.  Perinephric space is unremarkable.      Retroperitoneum: No enlarged retroperitoneal lymphadenopathy.  Scattered atherosclerotic vascular disease.  IVC appear unremarkable.      Peritoneal cavity:  No evidence of free air or ascites.      Gastrointestinal tract: No obstruction.      Appendix:  Unremarkable      Pelvis: Normal-sized prostate.  Subtle inflammatory wall thickening involving the anterior wall of the urinary bladder.  [10 mm].      Osseous structures: Post laminectomy status at L4-L5 and L5-S1 level with posterior orthopedic fixation.  No acute osseous abnormality or destructive bony pathology.      Nerve stimulator with battery pack in the left lumbar region and stimulator wire within the thoracic cavity.      IMPRESSION:      1.Post cholecystectomy status.  Mild dilatation of the common bile duct [16 mm], and central intrahepatic biliary radicles and pancreatic duct [9 mm]in the region of the pancreatic head, neck and proximal pancreatic body.      No evidence of pancreatic calcification or features of pancreatitis.  No obvious mass lesion in the second part of the duodenum or choledocholithiasis.  Suggest further evaluation with MRCP to rule out any stricture.      2.Normal-sized prostate.  Subtle inflammatory wall thickening involving the anterior wall of the urinary bladder.  [10 mm].        Recommendation: Follow up as clinically indicated.      All CT scans at this facility utilize dose modulation, iterative reconstruction, and/or weight based dosing when appropriate to reduce radiation dose to as low as reasonably achievable.            Read by:        Hayder Hawkins MD  Transcribed by: Mukund Londono  Transcribed Date: 7/29/2019 0:52:19 AM  Electronically signed by: Hayder Hawkins MD  Date signed: 7/29/2019 1:00:15 AM           Assessment:      Active Hospital Problems     Diagnosis  POA    *Pancreatitis [K85.90]  Yes    Dehydration [E86.0]  Unknown    Acute pancreatitis [K85.90]  Yes    Nausea vomiting and diarrhea [R11.2, R19.7]  Yes    Chest pain [R07.9]  Yes    Type 2 diabetes mellitus, uncontrolled [E11.65]  Yes      Resolved Hospital Problems   No resolved problems to display.     #1 Abd pain - etiology unclear . Not impressive for pancreatitis   Consult GI pending    #2 Dehydration-hydration   #3 Nausea vomiting and diarrhea - better    #4 Chest pain -atypical   Trend cardiac enzymes  EKG  Will consult Dr. Wheeler   #5 Type 2 diabetes mellitus, uncontrolled  accuchecks with SSI  Continue basal insulin  Plan:      As above
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
## 2019-07-30 PROBLEM — E86.0 DEHYDRATION: Status: RESOLVED | Noted: 2019-07-29 | Resolved: 2019-07-30

## 2019-07-30 LAB
ANION GAP SERPL CALC-SCNC: 6 MMOL/L (ref 8–16)
BUN SERPL-MCNC: 10 MG/DL (ref 8–23)
CALCIUM SERPL-MCNC: 8.8 MG/DL (ref 8.7–10.5)
CHLORIDE SERPL-SCNC: 108 MMOL/L (ref 95–110)
CO2 SERPL-SCNC: 25 MMOL/L (ref 23–29)
CREAT SERPL-MCNC: 0.7 MG/DL (ref 0.5–1.4)
ERYTHROCYTE [DISTWIDTH] IN BLOOD BY AUTOMATED COUNT: 13.2 % (ref 11.5–14.5)
EST. GFR  (AFRICAN AMERICAN): >60 ML/MIN/1.73 M^2
EST. GFR  (NON AFRICAN AMERICAN): >60 ML/MIN/1.73 M^2
GLUCOSE SERPL-MCNC: 109 MG/DL (ref 70–110)
GLUCOSE SERPL-MCNC: 174 MG/DL (ref 70–110)
GLUCOSE SERPL-MCNC: 91 MG/DL (ref 70–110)
GLUCOSE SERPL-MCNC: 98 MG/DL (ref 70–110)
GLUCOSE SERPL-MCNC: 98 MG/DL (ref 70–110)
HCT VFR BLD AUTO: 41 % (ref 40–54)
HGB BLD-MCNC: 13.2 G/DL (ref 14–18)
MCH RBC QN AUTO: 30.8 PG (ref 27–31)
MCHC RBC AUTO-ENTMCNC: 32.2 G/DL (ref 32–36)
MCV RBC AUTO: 96 FL (ref 82–98)
PLATELET # BLD AUTO: 206 K/UL (ref 150–350)
PMV BLD AUTO: 9.6 FL (ref 9.2–12.9)
POTASSIUM SERPL-SCNC: 4.1 MMOL/L (ref 3.5–5.1)
RBC # BLD AUTO: 4.29 M/UL (ref 4.6–6.2)
SODIUM SERPL-SCNC: 139 MMOL/L (ref 136–145)
WBC # BLD AUTO: 7.17 K/UL (ref 3.9–12.7)

## 2019-07-30 PROCEDURE — 25000003 PHARM REV CODE 250: Performed by: NURSE PRACTITIONER

## 2019-07-30 PROCEDURE — 27000221 HC OXYGEN, UP TO 24 HOURS

## 2019-07-30 PROCEDURE — 94640 AIRWAY INHALATION TREATMENT: CPT

## 2019-07-30 PROCEDURE — 63600175 PHARM REV CODE 636 W HCPCS: Performed by: NURSE PRACTITIONER

## 2019-07-30 PROCEDURE — 85027 COMPLETE CBC AUTOMATED: CPT

## 2019-07-30 PROCEDURE — 80048 BASIC METABOLIC PNL TOTAL CA: CPT

## 2019-07-30 PROCEDURE — 82962 GLUCOSE BLOOD TEST: CPT | Mod: 91

## 2019-07-30 PROCEDURE — 94761 N-INVAS EAR/PLS OXIMETRY MLT: CPT

## 2019-07-30 PROCEDURE — G0378 HOSPITAL OBSERVATION PER HR: HCPCS

## 2019-07-30 PROCEDURE — 25000242 PHARM REV CODE 250 ALT 637 W/ HCPCS: Performed by: NURSE PRACTITIONER

## 2019-07-30 RX ADMIN — POTASSIUM CHLORIDE 20 MEQ: 1500 TABLET, EXTENDED RELEASE ORAL at 09:07

## 2019-07-30 RX ADMIN — GABAPENTIN 600 MG: 300 CAPSULE ORAL at 03:07

## 2019-07-30 RX ADMIN — APIXABAN 5 MG: 5 TABLET, FILM COATED ORAL at 09:07

## 2019-07-30 RX ADMIN — PRAZOSIN HYDROCHLORIDE 2 MG: 1 CAPSULE ORAL at 09:07

## 2019-07-30 RX ADMIN — OXYCODONE HYDROCHLORIDE AND ACETAMINOPHEN 1 TABLET: 10; 325 TABLET ORAL at 02:07

## 2019-07-30 RX ADMIN — TIOTROPIUM BROMIDE 18 MCG: 18 CAPSULE ORAL; RESPIRATORY (INHALATION) at 08:07

## 2019-07-30 RX ADMIN — CARVEDILOL 3.12 MG: 3.12 TABLET, FILM COATED ORAL at 09:07

## 2019-07-30 RX ADMIN — OXYCODONE HYDROCHLORIDE AND ACETAMINOPHEN 1 TABLET: 10; 325 TABLET ORAL at 05:07

## 2019-07-30 RX ADMIN — LAMOTRIGINE 50 MG: 25 TABLET ORAL at 09:07

## 2019-07-30 RX ADMIN — GABAPENTIN 600 MG: 300 CAPSULE ORAL at 09:07

## 2019-07-30 RX ADMIN — TRAZODONE HYDROCHLORIDE 100 MG: 50 TABLET ORAL at 09:07

## 2019-07-30 RX ADMIN — SODIUM CHLORIDE: 0.9 INJECTION, SOLUTION INTRAVENOUS at 04:07

## 2019-07-30 RX ADMIN — ONDANSETRON 4 MG: 2 INJECTION INTRAMUSCULAR; INTRAVENOUS at 03:07

## 2019-07-30 NOTE — ASSESSMENT & PLAN NOTE
Start clears and advance as tolerated   W/U EUS as outpatient   GI will discuss with patient later today

## 2019-07-30 NOTE — CARE UPDATE
07/30/19 0800   Patient Assessment/Suction   Level of Consciousness (AVPU) alert   Respiratory Effort Normal   All Lung Fields Breath Sounds tubular;clear   Rhythm/Pattern, Respiratory unlabored   Cough Frequency no cough   Cough Type no productive sputum   PRE-TX-O2   O2 Device (Oxygen Therapy) nasal cannula   $ Is the patient on Low Flow Oxygen? Yes   Flow (L/min) 2   SpO2 98 %   $ Pulse Oximetry - Multiple Charge Pulse Oximetry - Multiple   Pulse (!) 59   Resp 18   Aerosol Therapy   Respiratory Treatment Status (SVN) given   Patient Position (SVN) Ashton's   Post Treatment Assessment (SVN) breath sounds improved   Signs of Intolerance (SVN) none   Inhaler   $ Inhaler Charges MDI (Metered Dose Inahler) Treatment   Daily Review of Necessity (Inhaler) completed   Respiratory Treatment Status (Inhaler) given   Treatment Route (Inhaler) mouthpiece   Patient Position (Inhaler) Ashton's   Post Treatment Assessment (Inhaler) breath sounds unchanged   Signs of Intolerance (Inhaler) none

## 2019-07-30 NOTE — UM SECONDARY REVIEW
SPOKE WITH DR. PERSON ABOUT PT MEETING INPATIENT WITH PANCREATITIS BUT HE WANTS TO LEAVE OBSERVATION AS HE DOESN'T BELIEVE PT HAS PANCREATITIS

## 2019-07-30 NOTE — PROGRESS NOTES
[facility] Medicine  Progress Note    Patient Name: Kevan Colin Sr.  MRN: [id number]  Patient Class: OP- Observation   Admission Date: 7/28/2019  Length of Stay: 0 days  Attending Physician: Nigel Bone DO  Primary Care Provider: Faiza Ochoa NP        Subjective:     Principal Problem:Nausea vomiting and diarrhea      HPI:  Patient presents to the ED with the complaint of RUQ abdominal pain with radiation to right lower back with associated nausea, vomiting, and diarrhea. The patient reports symptoms began 3 days ago. Symptoms are worse with food. No alleviating factors. At worst, symptoms are severe.The patient reports one episode of small amount of bright red blood in stool that occurred 2 days ago. He denies hematemesis, dizziness, lightheadedness, or LOC.  Additionally the patient reports chest pain with associated SOB. Pain is described as aching to right chest with no aggravating or alleviating factors.      Overview/Hospital Course:  Patient evaluated in the ED for abdominal pain and chest pain. Cardiac workup thus far is negative. Elevated lipase not consistent with pancreatitis which patient has a history of. CT of abdomen with dilated CBD.   64 year old male with h/o pancreatitis x 4 here with epigastric pain, nausea,vomiting.  Doubt pancreatitis given normal ct and lipase not 3xuln.      EUS recommended and ordered for dr echeverria . Will be done as outpatient  Has nerve stimulator and cant get mri  Iv hydration  Possible gastroenteritis    Interval History: No new issues   No diarrhea , no current CP Abd pain resolved  No N/V    Review of Systems   Constitutional: Negative for activity change, appetite change, fatigue and fever.   HENT: Negative for sore throat and trouble swallowing.    Respiratory: Negative for cough, chest tightness and shortness of breath.    Cardiovascular: Negative for chest pain and palpitations.   Gastrointestinal: Positive for  abdominal pain. Negative for constipation, diarrhea, nausea and vomiting.   Genitourinary: Negative for difficulty urinating and dysuria.   Musculoskeletal: Negative for back pain and gait problem.   Neurological: Negative for syncope and weakness.     Objective:     Vital Signs (Most Recent):  Temp: 97.5 °F (36.4 °C) (07/30/19 1200)  Pulse: 74 (07/30/19 1200)  Resp: 18 (07/30/19 1200)  BP: 120/78 (07/30/19 1200)  SpO2: 98 % (07/30/19 1200) Vital Signs (24h Range):  Temp:  [97.4 °F (36.3 °C)-98.8 °F (37.1 °C)] 97.5 °F (36.4 °C)  Pulse:  [59-85] 74  Resp:  [14-20] 18  SpO2:  [95 %-99 %] 98 %  BP: ()/(64-83) 120/78     Weight: 114 kg (251 lb 5.2 oz)  Body mass index is 32.27 kg/m².    Intake/Output Summary (Last 24 hours) at 7/30/2019 1358  Last data filed at 7/30/2019 1200  Gross per 24 hour   Intake 993.33 ml   Output 3200 ml   Net -2206.67 ml      Physical Exam   Constitutional: He is oriented to person, place, and time. He appears well-developed and well-nourished.   Eyes: Conjunctivae are normal. No scleral icterus.   Sclera non icteric   Neck: Normal range of motion. Neck supple.   Cardiovascular: Normal rate, regular rhythm and normal heart sounds.   Pulmonary/Chest: Effort normal and breath sounds normal.   Abdominal: Soft. Bowel sounds are normal. There is no guarding.   Musculoskeletal: Normal range of motion.   Neurological: He is alert and oriented to person, place, and time.   Skin: Skin is warm. No rash noted.   Psychiatric: He has a normal mood and affect. His behavior is normal.   Nursing note and vitals reviewed.      Significant Labs:   BMP:   Recent Labs   Lab 07/29/19  0713 07/30/19  0637   GLU 98 98    139   K 3.8 4.1    108   CO2 28 25   BUN 10 10   CREATININE 0.7 0.7   CALCIUM 9.0 8.8   MG 1.9  --      CBC:   Recent Labs   Lab 07/28/19  2209 07/29/19  0713 07/30/19  0637   WBC 9.44 8.99 7.17   HGB 14.1 13.4* 13.2*   HCT 43.3 41.6 41.0    223 206       Significant  Imaging:      Assessment/Plan:      * Nausea vomiting and diarrhea    Start clears and advance as tolerated   W/U EUS as outpatient   GI will discuss with patient later today     Chest pain    cardiac enzymes negative   EKG  no acute    Dr. Liam degroot pending     Type 2 diabetes mellitus, uncontrolled  HgA1C  Poor control ,check accuchecks with SSI        VTE Risk Mitigation (From admission, onward)        Ordered     apixaban tablet 5 mg  2 times daily      07/29/19 0617                Nigel Bone DO  Department of Hospital Medicine   [facility]

## 2019-07-30 NOTE — SUBJECTIVE & OBJECTIVE
Interval History: No new issues   No diarrhea , no current CP Abd pain resolved  No N/V    Review of Systems   Constitutional: Negative for activity change, appetite change, fatigue and fever.   HENT: Negative for sore throat and trouble swallowing.    Respiratory: Negative for cough, chest tightness and shortness of breath.    Cardiovascular: Negative for chest pain and palpitations.   Gastrointestinal: Positive for abdominal pain. Negative for constipation, diarrhea, nausea and vomiting.   Genitourinary: Negative for difficulty urinating and dysuria.   Musculoskeletal: Negative for back pain and gait problem.   Neurological: Negative for syncope and weakness.     Objective:     Vital Signs (Most Recent):  Temp: 97.5 °F (36.4 °C) (07/30/19 1200)  Pulse: 74 (07/30/19 1200)  Resp: 18 (07/30/19 1200)  BP: 120/78 (07/30/19 1200)  SpO2: 98 % (07/30/19 1200) Vital Signs (24h Range):  Temp:  [97.4 °F (36.3 °C)-98.8 °F (37.1 °C)] 97.5 °F (36.4 °C)  Pulse:  [59-85] 74  Resp:  [14-20] 18  SpO2:  [95 %-99 %] 98 %  BP: ()/(64-83) 120/78     Weight: 114 kg (251 lb 5.2 oz)  Body mass index is 32.27 kg/m².    Intake/Output Summary (Last 24 hours) at 7/30/2019 1358  Last data filed at 7/30/2019 1200  Gross per 24 hour   Intake 993.33 ml   Output 3200 ml   Net -2206.67 ml      Physical Exam   Constitutional: He is oriented to person, place, and time. He appears well-developed and well-nourished.   Eyes: Conjunctivae are normal. No scleral icterus.   Sclera non icteric   Neck: Normal range of motion. Neck supple.   Cardiovascular: Normal rate, regular rhythm and normal heart sounds.   Pulmonary/Chest: Effort normal and breath sounds normal.   Abdominal: Soft. Bowel sounds are normal. There is no guarding.   Musculoskeletal: Normal range of motion.   Neurological: He is alert and oriented to person, place, and time.   Skin: Skin is warm. No rash noted.   Psychiatric: He has a normal mood and affect. His behavior is normal.    Nursing note and vitals reviewed.      Significant Labs:   BMP:   Recent Labs   Lab 07/29/19  0713 07/30/19  0637   GLU 98 98    139   K 3.8 4.1    108   CO2 28 25   BUN 10 10   CREATININE 0.7 0.7   CALCIUM 9.0 8.8   MG 1.9  --      CBC:   Recent Labs   Lab 07/28/19  2209 07/29/19  0713 07/30/19  0637   WBC 9.44 8.99 7.17   HGB 14.1 13.4* 13.2*   HCT 43.3 41.6 41.0    223 206       Significant Imaging:

## 2019-07-30 NOTE — PROGRESS NOTES
"Patient Name: Kevan Colin .  Patient MRN: [id number]  Patient : 1955    Admit Date: 2019  Service date: 2019    Reason for Consult: abd pain w/ n/v    PCP: Faiza Ochoa NP    S: Abd pain improving. MIld n/v relieved w/ zofran. No f/c    Inpatient Medications:   apixaban  5 mg Oral BID    carvedilol  3.125 mg Oral BID    gabapentin  600 mg Oral TID    lamoTRIgine  50 mg Oral BID    potassium chloride SA  20 mEq Oral BID    prazosin  2 mg Oral BID    tiotropium  18 mcg Inhalation Daily    traZODone  100 mg Oral QHS     dextrose 50%, insulin aspart U-100, ondansetron, oxyCODONE-acetaminophen, sodium chloride 0.9%    Review of Systems:  A 10 point review of systems was performed and was normal, except as mentioned in the HPI, including constitutional, HEENT, heme, lymph, cardiovascular, respiratory, gastrointestinal, genitourinary, neurologic, endocrine, psychiatric and musculoskeletal.      OBJECTIVE:    Physical Exam:  24 Hour Vital Sign Ranges: Temp:  [97.5 °F (36.4 °C)-98.8 °F (37.1 °C)] 97.7 °F (36.5 °C)  Pulse:  [59-85] 73  Resp:  [14-18] 18  SpO2:  [95 %-99 %] 96 %  BP: ()/(64-82) 121/80  Most recent vitals: /80   Pulse 73   Temp 97.7 °F (36.5 °C) (Oral)   Resp 18   Ht 6' 2" (1.88 m)   Wt 114 kg (251 lb 5.2 oz)   SpO2 96%   BMI 32.27 kg/m²    GEN: well-developed, well-nourished, awake and alert, non-toxic appearing adult  HEENT: PERRL, sclera anicteric, oral mucosa pink and moist without lesion  NECK: trachea midline; Good ROM  CV: regular rate and rhythm, no murmurs or gallops  RESP: clear to auscultation bilaterally, no wheezes, rhonci or rales  ABD: soft, non-tender, non-distended, normal bowel sounds, no hepatosplenomegaly, no hernias  EXT: no swelling or edema, 2+ pulses distally  SKIN: no rashes or jaundice  PSYCH: normal affect    Labs:   Recent Labs     19  2209 19  0713 19  0637   WBC 9.44 8.99 7.17   MCV 94 96 96    223 " 206     Recent Labs     07/28/19  2209 07/29/19  0713 07/30/19  0637   * 140 139   K 3.7 3.8 4.1   CL 99 104 108   CO2 28 28 25   BUN 10 10 10   * 98 98     No results for input(s): ALB in the last 72 hours.    Invalid input(s): ALKP, SGOT, SGPT, TBIL, DBIL, TPRO  No results for input(s): PT, INR, PTT in the last 72 hours.      Radiology Review:  CT Abdomen Pelvis With Contrast   Final Result      X-Ray Chest AP Portable   Final Result      No acute pulmonary process.         Electronically signed by: Rashid Givens MD   Date:    07/29/2019   Time:    08:02            IMPRESSION / RECOMMENDATIONS:  65y/o M w/ h/o  COPD, CAD, DM, depression, hypertension,  PE / DVT (apixaban), cholecystectomy, pancreatic / biliary sphincterotomies and chronic pain presented to the hospital with 3 days of nausea,vomiting,diarrhea and epigastric pain. CT on admission w/ moderate dilated biliary and pancreatic ducts w/o acute issues and relatively unremarkable labs w/ only borderline lipase / LFTs which are intermittently elevated over years. Given past hx w/ prior dilation and ERCPs in past suspect benign process    -EGD / EUS w/ dilation in next couple weeks (off anticoagulation)  -If recurrent pain and EUS negative, may consider ERCP to evaluate for ampullary stenosis given abd pain w/ progressive ductal dilation      Julio César HESS Dauterive III  7/30/2019  6:30 PM

## 2019-07-30 NOTE — PLAN OF CARE
Problem: Fall Injury Risk  Goal: Absence of Fall and Fall-Related Injury  Outcome: Ongoing (interventions implemented as appropriate)  Patient remain free of falls

## 2019-07-31 VITALS
WEIGHT: 251.31 LBS | BODY MASS INDEX: 32.25 KG/M2 | RESPIRATION RATE: 18 BRPM | SYSTOLIC BLOOD PRESSURE: 128 MMHG | HEIGHT: 74 IN | DIASTOLIC BLOOD PRESSURE: 83 MMHG | HEART RATE: 74 BPM | TEMPERATURE: 98 F | OXYGEN SATURATION: 98 %

## 2019-07-31 LAB — GLUCOSE SERPL-MCNC: 159 MG/DL (ref 70–110)

## 2019-07-31 PROCEDURE — 27000221 HC OXYGEN, UP TO 24 HOURS

## 2019-07-31 PROCEDURE — 94640 AIRWAY INHALATION TREATMENT: CPT

## 2019-07-31 PROCEDURE — 94761 N-INVAS EAR/PLS OXIMETRY MLT: CPT

## 2019-07-31 PROCEDURE — 25000003 PHARM REV CODE 250: Performed by: NURSE PRACTITIONER

## 2019-07-31 PROCEDURE — G0378 HOSPITAL OBSERVATION PER HR: HCPCS

## 2019-07-31 PROCEDURE — 25000242 PHARM REV CODE 250 ALT 637 W/ HCPCS: Performed by: NURSE PRACTITIONER

## 2019-07-31 RX ADMIN — POTASSIUM CHLORIDE 20 MEQ: 1500 TABLET, EXTENDED RELEASE ORAL at 08:07

## 2019-07-31 RX ADMIN — CARVEDILOL 3.12 MG: 3.12 TABLET, FILM COATED ORAL at 08:07

## 2019-07-31 RX ADMIN — PRAZOSIN HYDROCHLORIDE 2 MG: 1 CAPSULE ORAL at 08:07

## 2019-07-31 RX ADMIN — LAMOTRIGINE 50 MG: 25 TABLET ORAL at 08:07

## 2019-07-31 RX ADMIN — OXYCODONE HYDROCHLORIDE AND ACETAMINOPHEN 1 TABLET: 10; 325 TABLET ORAL at 05:07

## 2019-07-31 RX ADMIN — TIOTROPIUM BROMIDE 18 MCG: 18 CAPSULE ORAL; RESPIRATORY (INHALATION) at 09:07

## 2019-07-31 RX ADMIN — GABAPENTIN 600 MG: 300 CAPSULE ORAL at 08:07

## 2019-07-31 RX ADMIN — LAMOTRIGINE 50 MG: 25 TABLET ORAL at 01:07

## 2019-07-31 RX ADMIN — APIXABAN 5 MG: 5 TABLET, FILM COATED ORAL at 08:07

## 2019-07-31 NOTE — PLAN OF CARE
07/31/19 1037   Final Note   Assessment Type Final Discharge Note   Anticipated Discharge Disposition Home

## 2019-07-31 NOTE — PLAN OF CARE
07/31/19 0909   Patient Assessment/Suction   Level of Consciousness (AVPU) alert   All Lung Fields Breath Sounds clear;diminished   Cough Type good;nonproductive   PRE-TX-O2   O2 Device (Oxygen Therapy) room air  (2L NC on SB)   SpO2 98 %   Pulse 74   Resp 18   Inhaler   $ Inhaler Charges MDI (Metered Dose Inahler) Treatment;Mouth rinsed post treatment   Daily Review of Necessity (Inhaler) completed   Respiratory Treatment Status (Inhaler) given;mouth rinsed post treatment   Treatment Route (Inhaler) mouthpiece   Patient Position (Inhaler) sitting in chair   Signs of Intolerance (Inhaler) none

## 2019-07-31 NOTE — NURSING
Pt meets all discharge criteria and plan of care. Pt given prescriptions, verbalizes understanding discharge instructions, and IV removed. All questions and concerns  answered. Pt leaving with wife and denied wheelchair.

## 2019-07-31 NOTE — DISCHARGE SUMMARY
[facility] Medicine  Discharge Summary      Patient Name: Kevan Colin Sr.  MRN: [id number]  Admission Date: 7/28/2019    Discharge Date and Time:  07/31/2019 9:35 AM  Attending Physician: Shama Bone DO   Discharging Provider: Shama Bone DO  Primary Care Provider: Faiza Ochoa NP      HPI:       Hospital Course:    65y/o M w/ h/o  COPD, CAD, DM, depression, hypertension,  PE / DVT (apixaban), cholecystectomy, pancreatic / biliary sphincterotomies and chronic pain presented to the hospital with 3 days of nausea,vomiting,diarrhea and epigastric pain. CT on admission w/ moderate dilated biliary and pancreatic ducts w/o acute issues and relatively unremarkable labs w/ only borderline lipase / LFTs which are intermittently elevated over years. Given past hx w/ prior dilation and ERCPs in past suspect benign process     -EGD / EUS w/ dilation in next couple weeks (off anticoagulation)  -If recurrent pain and EUS negative, may consider ERCP to evaluate for ampullary stenosis given abd pain w/ progressive ductal dilation   Patient will see his PCP next week to discuss holding anticoagulation prior to procedure for GI   He was discharged in stable condition     Consults: GI   Consults (From admission, onward)        Status Ordering Provider     Inpatient consult to Cardiology  Once     Provider:  Giancarlo Wheeler MD    Acknowledged SHAMA BONE     Inpatient consult to Gastroenterology  Once     Provider:  Mk Burgos MD    Completed TUSHAR WHITE     Inpatient consult to Gastroenterology  Once     Provider:  Mk Burgos MD    Completed SHAMA BONE     Inpatient consult to Hospitalist  Once     Provider:  (Not yet assigned)    Completed PETER ELLIOTT        Discharge Diagnoses   #1 N/V/D - doubt pancreatitis   #2 Hx CAD  #3 Hx DVT/PE - long term anticoagulation   #4 Essential HTN  #5 DM2 - insulin requiring   #6 Dyslipidemia   #7 Hx pancreatitis      Discharged Condition: good  PE appears comfortable   HEENT sclera non icteric   Neck supple NT  Lungs CTA  Heart RRR   Abd soft NT  Ext no edema venous insuff   Neuro AAOx3 nonfocal    Disposition: Home or Self Care    Follow Up:  Follow-up Information     GI.    Contact information:  Follow up with Dr coni HO                Patient Instructions:      Diet diabetic     Diet Cardiac     Activity as tolerated       Significant Diagnostic Studies: reviewed     Pending Diagnostic Studies:     Procedure Component Value Units Date/Time    EKG 12-lead [[id number]] Collected:  07/29/19 1640    Order Status:  Sent Lab Status:  In process Updated:  07/29/19 0306    Narrative:       Test Reason : R07.9,    Vent. Rate : 054 BPM     Atrial Rate : 054 BPM     P-R Int : 216 ms          QRS Dur : 096 ms      QT Int : 420 ms       P-R-T Axes : 069 049 077 degrees     QTc Int : 398 ms    Sinus bradycardia with 1st degree A-V block  Low voltage QRS  Borderline Abnormal ECG  When compared with ECG of 28-JUL-2019 21:52,  Vent. rate has decreased BY  28 BPM  QT has shortened    Referred By: [email or web address]   SELF           Confirmed By:          Medications:  Reconciled Home Medications:      Medication List      CHANGE how you take these medications    cyclobenzaprine 5 MG tablet  Commonly known as:  FLEXERIL  Take 1 tablet (5 mg total) by mouth 2 (two) times daily as needed for Muscle spasms.  What changed:  Another medication with the same name was removed. Continue taking this medication, and follow the directions you see here.        CONTINUE taking these medications    albuterol 90 mcg/actuation inhaler  Commonly known as:  PROVENTIL/VENTOLIN HFA  Inhale 2 puffs into the lungs every 4 (four) hours as needed for Shortness of Breath or Wheezing.     apixaban 5 mg Tab  Commonly known as:  ELIQUIS  Take 5 mg by mouth 2 (two) times daily.     atorvastatin 40 MG tablet  Commonly known as:  LIPITOR  Take 40 mg by mouth once daily.      carvedilol 3.125 MG tablet  Commonly known as:  COREG  TAKE 1 TABLET BY MOUTH TWICE DAILY     dicyclomine 20 mg tablet  Commonly known as:  BENTYL  Take 20 mg by mouth 3 (three) times daily.     gabapentin 300 MG capsule  Commonly known as:  NEURONTIN  Take 2 capsules (600 mg total) by mouth 3 (three) times daily.     HumaLOG U-100 Insulin 100 unit/mL injection  Generic drug:  insulin lispro  Inject 20 Units into the skin 3 (three) times daily before meals.     lamoTRIgine 25 MG tablet  Commonly known as:  LAMICTAL  Take 2 tablets (50 mg total) by mouth 2 (two) times daily.     oxyCODONE-acetaminophen  mg per tablet  Commonly known as:  PERCOCET  Take 1 tablet by mouth 3 (three) times daily as needed for Pain.  Start taking on:  9/7/2019     prazosin 2 MG Cap  Commonly known as:  MINIPRESS  Take 2 mg by mouth 2 (two) times daily.     SPIRIVA WITH HANDIHALER 18 mcg inhalation capsule  Generic drug:  tiotropium  Inhale 18 mcg into the lungs once daily.     TOUJEO SOLOSTAR U-300 INSULIN 300 unit/mL (1.5 mL) Inpn pen  Generic drug:  insulin glargine (TOUJEO)  Inject 85 Units into the skin every evening.     traZODone 100 MG tablet  Commonly known as:  DESYREL  Take 100 mg by mouth every evening.        STOP taking these medications    furosemide 40 MG tablet  Commonly known as:  LASIX     metFORMIN 500 MG tablet  Commonly known as:  GLUCOPHAGE     potassium chloride SA 20 MEQ tablet  Commonly known as:  K-DUR,KLOR-CON     suvorexant 20 mg Tab  Commonly known as:  BELSOMRA     TRESIBA FLEXTOUCH U-100 100 unit/mL (3 mL) Inpn  Generic drug:  insulin degludec            Indwelling Lines/Drains at time of discharge:   Lines/Drains/Airways          None          Time spent on the discharge of patient: 35 minutes  Patient was seen and examined on the date of discharge and determined to be suitable for discharge.         Nigel Bone DO  Department of Hospital Medicine  [facility]

## 2019-08-06 ENCOUNTER — ANESTHESIA (OUTPATIENT)
Dept: SURGERY | Facility: HOSPITAL | Age: 64
End: 2019-08-06
Payer: MEDICARE

## 2019-08-06 ENCOUNTER — HOSPITAL ENCOUNTER (OUTPATIENT)
Facility: HOSPITAL | Age: 64
Discharge: HOME | End: 2019-08-06
Attending: INTERNAL MEDICINE | Admitting: INTERNAL MEDICINE
Payer: MEDICARE

## 2019-08-06 ENCOUNTER — ANESTHESIA EVENT (OUTPATIENT)
Dept: SURGERY | Facility: HOSPITAL | Age: 64
End: 2019-08-06
Payer: MEDICARE

## 2019-08-06 VITALS
OXYGEN SATURATION: 97 % | HEIGHT: 74 IN | SYSTOLIC BLOOD PRESSURE: 152 MMHG | HEART RATE: 65 BPM | RESPIRATION RATE: 20 BRPM | WEIGHT: 255 LBS | TEMPERATURE: 98 F | BODY MASS INDEX: 32.73 KG/M2 | DIASTOLIC BLOOD PRESSURE: 95 MMHG

## 2019-08-06 DIAGNOSIS — Z01.818 PRE-OP EXAM: ICD-10-CM

## 2019-08-06 LAB — GLUCOSE SERPL-MCNC: 177 MG/DL (ref 70–110)

## 2019-08-06 PROCEDURE — 63600175 PHARM REV CODE 636 W HCPCS: Performed by: INTERNAL MEDICINE

## 2019-08-06 PROCEDURE — 37000009 HC ANESTHESIA EA ADD 15 MINS: Performed by: INTERNAL MEDICINE

## 2019-08-06 PROCEDURE — 27200043 HC FORCEPS, BIOPSY: Performed by: INTERNAL MEDICINE

## 2019-08-06 PROCEDURE — 43239 EGD BIOPSY SINGLE/MULTIPLE: CPT | Performed by: INTERNAL MEDICINE

## 2019-08-06 PROCEDURE — 63600175 PHARM REV CODE 636 W HCPCS: Performed by: NURSE ANESTHETIST, CERTIFIED REGISTERED

## 2019-08-06 PROCEDURE — 88305 TISSUE EXAM BY PATHOLOGIST: CPT | Mod: TC

## 2019-08-06 PROCEDURE — 43237 ENDOSCOPIC US EXAM ESOPH: CPT | Performed by: INTERNAL MEDICINE

## 2019-08-06 PROCEDURE — 37000008 HC ANESTHESIA 1ST 15 MINUTES: Performed by: INTERNAL MEDICINE

## 2019-08-06 PROCEDURE — 82962 GLUCOSE BLOOD TEST: CPT | Performed by: INTERNAL MEDICINE

## 2019-08-06 RX ORDER — SODIUM CHLORIDE 9 MG/ML
INJECTION, SOLUTION INTRAVENOUS CONTINUOUS
Status: DISCONTINUED | OUTPATIENT
Start: 2019-08-06 | End: 2019-08-06 | Stop reason: HOSPADM

## 2019-08-06 RX ORDER — SODIUM CHLORIDE 0.9 % (FLUSH) 0.9 %
2 SYRINGE (ML) INJECTION
Status: DISCONTINUED | OUTPATIENT
Start: 2019-08-06 | End: 2019-08-06 | Stop reason: HOSPADM

## 2019-08-06 RX ORDER — SODIUM CHLORIDE 9 MG/ML
INJECTION, SOLUTION INTRAVENOUS CONTINUOUS
Status: CANCELLED | OUTPATIENT
Start: 2019-08-06

## 2019-08-06 RX ORDER — PROPOFOL 10 MG/ML
VIAL (ML) INTRAVENOUS
Status: DISCONTINUED | OUTPATIENT
Start: 2019-08-06 | End: 2019-08-06

## 2019-08-06 RX ADMIN — PROPOFOL 300 MG: 10 INJECTION, EMULSION INTRAVENOUS at 11:08

## 2019-08-06 RX ADMIN — SODIUM CHLORIDE: 0.9 INJECTION, SOLUTION INTRAVENOUS at 11:08

## 2019-08-06 NOTE — ANESTHESIA PREPROCEDURE EVALUATION
Patient Active Problem List   Diagnosis    Acute venous embolism and thrombosis of deep vessels of proximal lower extremity    Shortness of breath    Type 2 diabetes mellitus, uncontrolled    Hypocalcemia    Venous dermatitis    Venous stasis ulcer    Edema    PVD (peripheral vascular disease)    Leg pain    DVT (deep venous thrombosis)- Hx IVC filter    CAD (coronary artery disease)    MI (mitral incompetence)    PE (pulmonary embolism) in 2002    Obesity    Ankle fracture, left    Diabetic neuropathy    Postlaminectomy syndrome of lumbar region    Chronic pain syndrome    Thoracic or lumbosacral neuritis or radiculitis    DDD (degenerative disc disease), cervical    Lumbar spondylosis    Intractable back pain    Chest pain    Chest pain    Pain of upper abdomen    Nausea vomiting and diarrhea    Chronic pancreatitis- Hx pancreatic stent    ACS (acute coronary syndrome)    Idiopathic chronic pancreatitis    Parkinson's disease    Coronary artery disease involving native coronary artery without angina pectoris    JOSE ALBERTO (obstructive sleep apnea)    COPD exacerbation    Depression    Acute gout of foot    Abdominal pain    Anticoagulant long-term use    Type 2 diabetes mellitus without complication    Benign essential HTN    Chronic pain associated with significant psychosocial dysfunction    Battery end of life of spinal cord stimulator    Acute respiratory failure with hypoxia and hypercarbia    Staphylococcal pneumonia    S/P emergency tracheotomy for assistance in breathing    Feeding difficulty    Transient global amnesia    Pre-op exam                               08/06/2019  Kevan Colin . is a 64 y.o., male.    Anesthesia Evaluation    I have reviewed the Patient Summary Reports.    I have reviewed the Nursing Notes.   I have reviewed the Medications.     Review of  Systems  Anesthesia Hx:  No problems with previous Anesthesia Denies Hx of Anesthetic complications  History of prior surgery of interest to airway management or planning: tracheostomy. Previous anesthesia: General, MAC 2017 with general anesthesia.  Procedure performed at an [facility].  2017 with MAC.  Airway issues documented on chart review include GETA    Social:  Smoker, No Alcohol Use    Hematology/Oncology:  Hematology Normal   Oncology Normal     EENT/Dental:   chronic allergic rhinitis   Cardiovascular:   Exercise tolerance: good Hypertension Past MI CAD  CABG/stent  Denies Dysrhythmias.   Denies Angina.         PVD QUIÑONES NYHA Classification I ECG has been reviewed.    Pulmonary:   Pneumonia COPD, moderate Asthma moderate Shortness of breath Sleep Apnea    Renal/:  Renal/ Normal     Hepatic/GI:   Liver Disease, No active n/v  No intest. obs,   Musculoskeletal:   Arthritis     Neurological:   TIA, CVA    Endocrine:   Diabetes, well controlled, type 2 Denies Hyperthyroidism.    Dermatological:   dermititis   Psych:   Psychiatric History anxiety depression          Physical Exam  General:  Well nourished    Airway/Jaw/Neck:  AIRWAY FINDINGS: Normal    Eyes/Ears/Nose:  EYES/EARS/NOSE FINDINGS: Normal   Dental:  Dental Findings: Upper Dentures, Periodontal disease, Severe    Chest/Lungs:  Chest/Lungs Clear    Heart/Vascular:  Heart Findings: Normal Heart murmur: negative    Abdomen:  Abdomen Findings: Normal     Skin:  Skin Findings:  Erythema     Mental Status:  Mental Status Findings: Normal        Anesthesia Plan  Type of Anesthesia, risks & benefits discussed:  Anesthesia Type:  MAC  Patient's Preference: mac  Intra-op Monitoring Plan: standard ASA monitors  Intra-op Monitoring Plan Comments:   Post Op Pain Control Plan: per primary service following discharge from PACU  Post Op Pain Control Plan Comments:   Induction:    Beta Blocker:  Patient is not currently on a Beta-Blocker (No further  documentation required).       Informed Consent: Patient understands risks and agrees with Anesthesia plan.  Questions answered. Anesthesia consent signed with patient.  ASA Score: 3     Day of Surgery Review of History & Physical: I have interviewed and examined the patient. I have reviewed the patient's H&P dated:  There are no significant changes.          Ready For Surgery From Anesthesia Perspective.

## 2019-08-06 NOTE — ANESTHESIA POSTPROCEDURE EVALUATION
Anesthesia Post Evaluation    Patient: Kevan Colin     Procedure(s) Performed: Procedure(s) (LRB):  ULTRASOUND, UPPER GI TRACT, ENDOSCOPIC (N/A)    Final Anesthesia Type: MAC  Patient location during evaluation: GI PACU  Patient participation: Yes- Able to Participate  Level of consciousness: awake and alert, oriented and awake  Post-procedure vital signs: reviewed and stable  Pain management: adequate  Airway patency: patent  PONV status at discharge: No PONV  Anesthetic complications: no      Cardiovascular status: blood pressure returned to baseline, hemodynamically stable and stable  Respiratory status: unassisted, spontaneous ventilation and room air  Hydration status: euvolemic  Follow-up not needed.          Vitals Value Taken Time   /73 8/6/2019 12:04 PM   Temp 36.4 °C (97.6 °F) 8/6/2019  9:00 AM   Pulse 70 8/6/2019  9:00 AM   Resp 20 8/6/2019  9:00 AM   SpO2 95 % 8/6/2019  9:00 AM         No case tracking events are documented in the log.      Pain/Neville Score: Neville Score: 10 (8/6/2019 12:04 PM)

## 2019-08-06 NOTE — TRANSFER OF CARE
"Anesthesia Transfer of Care Note    Patient: Kevan Colin Sr.    Procedure(s) Performed: Procedure(s) (LRB):  ULTRASOUND, UPPER GI TRACT, ENDOSCOPIC (N/A)    Patient location: PACU    Anesthesia Type: general    Transport from OR: Transported from OR on room air with adequate spontaneous ventilation    Post pain: adequate analgesia    Post assessment: no apparent anesthetic complications    Post vital signs: stable    Level of consciousness: awake, alert and oriented    Nausea/Vomiting: no nausea/vomiting    Complications: none    Transfer of care protocol was followed      Last vitals:   Visit Vitals  /69   Pulse 70   Temp 36.4 °C (97.6 °F)   Resp 20   Ht 6' 2" (1.88 m)   Wt 115.7 kg (255 lb)   SpO2 95%   BMI 32.74 kg/m²     "

## 2019-08-06 NOTE — [employer]OVATION PATIENT INSTRUCTIONS
Discharge Summary/Instructions after an Endoscopic Procedure  Patient Name: Kevan Colin  Patient MRN: [id number]  Patient YOB: 1955 Tuesday, August 06, 2019  Julio César Mcclain III, MD  RESTRICTIONS:  During your procedure today, you received medications for sedation.  These   medications may affect your judgment, balance and coordination.  Therefore,   for 24 hours, you have the following restrictions:   - DO NOT drive a car, operate machinery, make legal/financial decisions,   sign important papers or drink alcohol.    ACTIVITY:  Today: no heavy lifting, straining or running due to procedural   sedation/anesthesia.  The following day: return to full activity including work.  DIET:  Eat and drink normally unless instructed otherwise.     TREATMENT FOR COMMON SIDE EFFECTS:  - Mild abdominal pain, nausea, belching, bloating or excessive gas:  rest,   eat lightly and use a heating pad.  - Sore Throat: treat with throat lozenges and/or gargle with warm salt   water.  - Because air was used during the procedure, expelling large amounts of air   from your rectum or belching is normal.  - If a bowel prep was taken, you may not have a bowel movement for 1-3 days.    This is normal.  SYMPTOMS TO WATCH FOR AND REPORT TO YOUR PHYSICIAN:  1. Abdominal pain or bloating, other than gas cramps.  2. Chest pain.  3. Back pain.  4. Signs of infection such as: chills or fever occurring within 24 hours   after the procedure.  5. Rectal bleeding, which would show as bright red, maroon, or black stools.   (A tablespoon of blood from the rectum is not serious, especially if   hemorrhoids are present.)  6. Vomiting.  7. Weakness or dizziness.  GO DIRECTLY TO THE NEAREST EMERGENCY ROOM IF YOU HAVE ANY OF THE FOLLOWING:      Difficulty breathing              Chills and/or fever over 101 F   Persistent vomiting and/or vomiting blood   Severe abdominal pain   Severe chest pain   Black, tarry stools   Bleeding- more than one  tablespoon   Any other symptom or condition that you feel may need urgent attention  Your doctor recommends these additional instructions:  If any biopsies were taken, your doctors clinic will contact you in 1 to 2   weeks with any results.  - Discharge patient to home.   - Patient has a contact number available for emergencies.  The signs and   symptoms of potential delayed complications were discussed with the   patient.  Return to normal activities tomorrow.  Written discharge   instructions were provided to the patient.   - Resume regular diet.   - Continue present medications.   - Start PPI, f/u in clinic, and if still persistent pain, could consider   ERCP  For questions, problems or results please call your physician - Julio César Mcclain III, MD at Work:  [phone].  [facility], EMERGENCY ROOM PHONE NUMBER: [phone]  IF A COMPLICATION OR EMERGENCY SITUATION ARISES AND YOU ARE UNABLE TO REACH   YOUR PHYSICIAN - GO DIRECTLY TO THE EMERGENCY ROOM.  Julio César Mcclain III, MD  8/6/2019 11:49:59 AM  This report has been verified and signed electronically.  [employer]

## 2019-08-07 ENCOUNTER — TELEPHONE (OUTPATIENT)
Dept: SURGERY | Facility: CLINIC | Age: 64
End: 2019-08-07

## 2019-08-07 NOTE — TELEPHONE ENCOUNTER
I called patient to inform him that Dr. Granados doesn't treat a mass in the throat.  I informed him to call his Doctor to get an ENT referral.  Patient understood.  Bryan

## 2019-09-18 ENCOUNTER — OFFICE VISIT (OUTPATIENT)
Dept: SURGERY | Facility: CLINIC | Age: 64
End: 2019-09-18
Payer: MEDICARE

## 2019-09-18 VITALS
WEIGHT: 245.38 LBS | TEMPERATURE: 98 F | SYSTOLIC BLOOD PRESSURE: 106 MMHG | BODY MASS INDEX: 31.5 KG/M2 | DIASTOLIC BLOOD PRESSURE: 62 MMHG | HEART RATE: 73 BPM

## 2019-09-18 DIAGNOSIS — D44.6 CAROTID BODY TUMOR: Primary | ICD-10-CM

## 2019-09-18 PROCEDURE — 99213 OFFICE O/P EST LOW 20 MIN: CPT | Mod: PBBFAC,PO | Performed by: SURGERY

## 2019-09-18 PROCEDURE — 99999 PR PBB SHADOW E&M-EST. PATIENT-LVL III: CPT | Mod: PBBFAC,,, | Performed by: SURGERY

## 2019-09-18 PROCEDURE — 99213 PR OFFICE/OUTPT VISIT, EST, LEVL III, 20-29 MIN: ICD-10-PCS | Mod: S$PBB,,, | Performed by: SURGERY

## 2019-09-18 PROCEDURE — 99999 PR PBB SHADOW E&M-EST. PATIENT-LVL III: ICD-10-PCS | Mod: PBBFAC,,, | Performed by: SURGERY

## 2019-09-18 PROCEDURE — 99213 OFFICE O/P EST LOW 20 MIN: CPT | Mod: S$PBB,,, | Performed by: SURGERY

## 2019-09-25 ENCOUNTER — OFFICE VISIT (OUTPATIENT)
Dept: VASCULAR SURGERY | Facility: CLINIC | Age: 64
End: 2019-09-25
Payer: MEDICARE

## 2019-09-25 VITALS
SYSTOLIC BLOOD PRESSURE: 108 MMHG | WEIGHT: 240 LBS | BODY MASS INDEX: 30.8 KG/M2 | HEART RATE: 77 BPM | HEIGHT: 74 IN | DIASTOLIC BLOOD PRESSURE: 69 MMHG

## 2019-09-25 DIAGNOSIS — D44.6 CAROTID BODY TUMOR: Primary | ICD-10-CM

## 2019-09-25 DIAGNOSIS — D44.6 CAROTID BODY TUMOR: ICD-10-CM

## 2019-09-25 PROCEDURE — 99213 OFFICE O/P EST LOW 20 MIN: CPT | Mod: PBBFAC,PO | Performed by: THORACIC SURGERY (CARDIOTHORACIC VASCULAR SURGERY)

## 2019-09-25 PROCEDURE — 99999 PR PBB SHADOW E&M-EST. PATIENT-LVL III: ICD-10-PCS | Mod: PBBFAC,,, | Performed by: THORACIC SURGERY (CARDIOTHORACIC VASCULAR SURGERY)

## 2019-09-25 PROCEDURE — 99999 PR PBB SHADOW E&M-EST. PATIENT-LVL III: CPT | Mod: PBBFAC,,, | Performed by: THORACIC SURGERY (CARDIOTHORACIC VASCULAR SURGERY)

## 2019-09-25 PROCEDURE — 99204 PR OFFICE/OUTPT VISIT, NEW, LEVL IV, 45-59 MIN: ICD-10-PCS | Mod: S$PBB,,, | Performed by: THORACIC SURGERY (CARDIOTHORACIC VASCULAR SURGERY)

## 2019-09-25 PROCEDURE — 99204 OFFICE O/P NEW MOD 45 MIN: CPT | Mod: S$PBB,,, | Performed by: THORACIC SURGERY (CARDIOTHORACIC VASCULAR SURGERY)

## 2019-09-25 NOTE — H&P (VIEW-ONLY)
This patient was having discomfort on the left side of his neck.  An ultrasound was done showing a mass within the vicinity of his carotid artery bifurcation.  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  His past history is well documented he has a history of a tracheostomy that seems to be related it to a choking episode and cardiac and or respiratory arrest.  This was a number of years ago and he seems to be fully recovered from this.  He quit smoking approximately 3 years ago.  He denies any active angina coronary artery disease.  He has a cardiologist which he has seen regularly.  His problem list was reviewed.  He has no other pertinent family are social history at this time.  His medicines part of the epic record.  On physical exam he is healthy appearing in no obvious distress.  Pupils are equal and round and reactive to light.  His neck is supple.  I do not appreciate any significant appreciable carotid body mass.  His chest is clear to auscultation.  His heart is in irregular rate and rhythm.  His abdomen is benign.  Perfusion to the legs and feet seems to be satisfactory.    At this point he has an asymptomatic carotid body tumor.  Recommendation is for removal.  We will arrange for this in the near future.
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
## 2019-10-07 ENCOUNTER — OFFICE VISIT (OUTPATIENT)
Dept: PAIN MEDICINE | Facility: CLINIC | Age: 64
End: 2019-10-07
Payer: MEDICARE

## 2019-10-07 VITALS
DIASTOLIC BLOOD PRESSURE: 54 MMHG | HEART RATE: 86 BPM | WEIGHT: 248.25 LBS | RESPIRATION RATE: 20 BRPM | SYSTOLIC BLOOD PRESSURE: 108 MMHG | OXYGEN SATURATION: 95 % | TEMPERATURE: 96 F | BODY MASS INDEX: 31.87 KG/M2

## 2019-10-07 DIAGNOSIS — M96.1 POSTLAMINECTOMY SYNDROME OF LUMBAR REGION: Primary | ICD-10-CM

## 2019-10-07 DIAGNOSIS — M51.36 DDD (DEGENERATIVE DISC DISEASE), LUMBAR: ICD-10-CM

## 2019-10-07 DIAGNOSIS — Z79.891 OPIOID CONTRACT EXISTS: ICD-10-CM

## 2019-10-07 PROCEDURE — 99213 PR OFFICE/OUTPT VISIT, EST, LEVL III, 20-29 MIN: ICD-10-PCS | Mod: S$PBB,,, | Performed by: PHYSICIAN ASSISTANT

## 2019-10-07 PROCEDURE — 99214 OFFICE O/P EST MOD 30 MIN: CPT | Mod: PBBFAC,PN | Performed by: PHYSICIAN ASSISTANT

## 2019-10-07 PROCEDURE — 99213 OFFICE O/P EST LOW 20 MIN: CPT | Mod: S$PBB,,, | Performed by: PHYSICIAN ASSISTANT

## 2019-10-07 PROCEDURE — 99999 PR PBB SHADOW E&M-EST. PATIENT-LVL IV: CPT | Mod: PBBFAC,,, | Performed by: PHYSICIAN ASSISTANT

## 2019-10-07 PROCEDURE — 80307 DRUG TEST PRSMV CHEM ANLYZR: CPT

## 2019-10-07 PROCEDURE — 99999 PR PBB SHADOW E&M-EST. PATIENT-LVL IV: ICD-10-PCS | Mod: PBBFAC,,, | Performed by: PHYSICIAN ASSISTANT

## 2019-10-07 RX ORDER — CYCLOBENZAPRINE HCL 5 MG
5 TABLET ORAL 2 TIMES DAILY PRN
Qty: 60 TABLET | Refills: 2 | Status: SHIPPED | OUTPATIENT
Start: 2019-10-07 | End: 2020-02-03 | Stop reason: SDUPTHER

## 2019-10-07 RX ORDER — OXYCODONE AND ACETAMINOPHEN 10; 325 MG/1; MG/1
1 TABLET ORAL 3 TIMES DAILY PRN
Qty: 90 TABLET | Refills: 0 | Status: SHIPPED | OUTPATIENT
Start: 2019-11-06 | End: 2019-12-05 | Stop reason: SDUPTHER

## 2019-10-07 RX ORDER — OXYCODONE AND ACETAMINOPHEN 10; 325 MG/1; MG/1
1 TABLET ORAL 3 TIMES DAILY PRN
Qty: 90 TABLET | Refills: 0 | Status: ON HOLD | OUTPATIENT
Start: 2019-12-06 | End: 2019-10-11 | Stop reason: SDUPTHER

## 2019-10-07 RX ORDER — OXYCODONE AND ACETAMINOPHEN 10; 325 MG/1; MG/1
1 TABLET ORAL 3 TIMES DAILY PRN
Qty: 90 TABLET | Refills: 0 | Status: ON HOLD | OUTPATIENT
Start: 2019-10-07 | End: 2019-10-11 | Stop reason: SDUPTHER

## 2019-10-07 RX ORDER — GABAPENTIN 300 MG/1
600 CAPSULE ORAL 3 TIMES DAILY
Qty: 540 CAPSULE | Refills: 3 | Status: SHIPPED | OUTPATIENT
Start: 2019-10-07 | End: 2020-10-07 | Stop reason: DRUGHIGH

## 2019-10-09 DIAGNOSIS — I65.23 BILATERAL CAROTID ARTERY STENOSIS: Primary | ICD-10-CM

## 2019-10-09 DIAGNOSIS — I65.23 CAROTID STENOSIS, BILATERAL: ICD-10-CM

## 2019-10-09 RX ORDER — CHLORHEXIDINE GLUCONATE ORAL RINSE 1.2 MG/ML
10 SOLUTION DENTAL
Status: CANCELLED | OUTPATIENT
Start: 2019-10-09

## 2019-10-09 RX ORDER — MUPIROCIN 20 MG/G
OINTMENT TOPICAL
Status: CANCELLED | OUTPATIENT
Start: 2019-10-09

## 2019-10-09 RX ORDER — SODIUM CHLORIDE 0.9 % (FLUSH) 0.9 %
10 SYRINGE (ML) INJECTION
Status: CANCELLED | OUTPATIENT
Start: 2019-10-09

## 2019-10-10 ENCOUNTER — HOSPITAL ENCOUNTER (OUTPATIENT)
Dept: RADIOLOGY | Facility: HOSPITAL | Age: 64
Discharge: HOME | DRG: 983 | End: 2019-10-10
Attending: THORACIC SURGERY (CARDIOTHORACIC VASCULAR SURGERY)
Payer: MEDICARE

## 2019-10-10 ENCOUNTER — HOSPITAL ENCOUNTER (OUTPATIENT)
Dept: PREADMISSION TESTING | Facility: HOSPITAL | Age: 64
Discharge: HOME | DRG: 983 | End: 2019-10-10
Attending: THORACIC SURGERY (CARDIOTHORACIC VASCULAR SURGERY)
Payer: MEDICARE

## 2019-10-10 VITALS
HEART RATE: 68 BPM | WEIGHT: 255.75 LBS | TEMPERATURE: 98 F | OXYGEN SATURATION: 96 % | HEIGHT: 74 IN | SYSTOLIC BLOOD PRESSURE: 100 MMHG | BODY MASS INDEX: 32.82 KG/M2 | RESPIRATION RATE: 20 BRPM | DIASTOLIC BLOOD PRESSURE: 68 MMHG

## 2019-10-10 DIAGNOSIS — Z01.818 PRE-OP TESTING: ICD-10-CM

## 2019-10-10 DIAGNOSIS — Z01.818 PRE-OP TESTING: Primary | ICD-10-CM

## 2019-10-10 DIAGNOSIS — Z41.9 SURGERY, ELECTIVE: ICD-10-CM

## 2019-10-10 PROCEDURE — 86920 COMPATIBILITY TEST SPIN: CPT

## 2019-10-10 PROCEDURE — 71046 X-RAY EXAM CHEST 2 VIEWS: CPT | Mod: TC

## 2019-10-10 PROCEDURE — 93005 ELECTROCARDIOGRAM TRACING: CPT

## 2019-10-10 RX ORDER — FUROSEMIDE 40 MG/1
40 TABLET ORAL DAILY
Status: ON HOLD | COMMUNITY
End: 2023-01-24 | Stop reason: HOSPADM

## 2019-10-10 RX ORDER — PANTOPRAZOLE SODIUM 40 MG/1
40 TABLET, DELAYED RELEASE ORAL 2 TIMES DAILY
COMMUNITY
End: 2020-06-29 | Stop reason: SDUPTHER

## 2019-10-10 RX ORDER — METFORMIN HYDROCHLORIDE 500 MG/1
500 TABLET ORAL 2 TIMES DAILY WITH MEALS
COMMUNITY
End: 2020-07-24 | Stop reason: SDUPTHER

## 2019-10-10 RX ORDER — TEMAZEPAM 15 MG/1
15 CAPSULE ORAL NIGHTLY PRN
COMMUNITY
End: 2022-04-26

## 2019-10-10 NOTE — PROGRESS NOTES
CC:back pain    HPI: The patient is a 63-year-old man with a history of peripheral vascular disease, diabetes, cervical DDD, lumbar postlaminectomy syndrome who presents in followup.  He returns in follow-up today with back and leg pain. He describes pain in his back that radiates throughout his legs and significant bilateral foot pain. He has not met with the spinal cord stimulator at her presented of since last visit.  States he has been busy.  He was found to have a right carotid mass and is having surgery later this week.  He continues to take pain medication with relief of his back pain. He denies bladder or bowel incontinence.  He denies weakness or numbness.    Pain intervention history: He has seen several other providers including Dr. Victor and Dr. Win Mckeon.  He has a cervical dorsal column stimulator which provides relief of neck pain.He had taken higher dose pain medication in the past but presented to us taking 60 mg of MS Contin t.i.d. and oxycodone 15 mg one to 2 tablets t.i.d.  He is taking gabapentin 300 mg t.i.d.  Baclofen 20 mg t.i.d.  Amitriptyline 25 mg q.h.s. He had undergone fusion L4-S1 in 2012.    ROS:He reports shortness of breath, weight gain, back pain, joint stiffness, and loss of balance.  Balance of review of systems is negative.    Medical, surgical, family and social history reviewed elsewhere in record.    Medications/Allergies: See med card    Vitals:    10/07/19 1414   BP: (!) 108/54   Pulse: 86   Resp: 20   Temp: 96.4 °F (35.8 °C)   TempSrc: Oral   SpO2: 95%   Weight: 112.6 kg (248 lb 3.8 oz)   PainSc:   4   PainLoc: Back         Physical exam:  Gen: A and O x3, pleasant, well-groomed  Skin:  Taut skin in legs with purplish hue bilateral lower legs.   HEENT: PERRLA  CVS: Regular rate and rhythm, normal S1 and S2, no murmurs.  Resp: Clear to auscultation bilaterally, no wheezes or rales.  Abdomen: Soft, NT/ND, normal bowel sounds present.  Musculoskeletal:  Slow, cautious  gait.    Neuro:  Upper extremities: 5/5 strength bilaterally  Lower extremities: 5/5 strength bilaterally   Reflexes: Brachioradialis 0+, Bicep 0+, Tricep 0+, Patellar 0+, Achilles 0+ bilaterally.  Sensory:  Intact and symmetrical to light touch and pinprick in C2-T1 dermatomes bilaterally.  Intact and symmetrical to light touch and pinprick in L2-S1 dermatomes bilaterally, except for L4-S1 distribution right leg decreased sensation.     Lumbar spine:  Range of motion is mildly limited with flexion and extension with increased left low back and left buttock pain during each maneuver but especially with flexion.  Straight leg raise causes left buttock and posterior thigh pain.  Internal and external rotation of hip is negative bilaterally.  Matteo's test is negative bilaterally.  Myofascial exam:  No tenderness to palpation to the lumbar paraspinous muscles.      Imagin12 Xray L-spine  There is disk desiccation and disk space narrowing at L5-S1. There is mild diffuse disk space narrowing elsewhere throughout the lumbar spine. Osteophytes project from vertebral bodies and there is sclerosis and posterior facets. There is no acute compression or subluxation.    CT L-spine 7/15/13  T12-L1: Left posterior lateral disk bulging and osteophyte formation is present causing mild left foraminal narrowing the central canal and right foraminal intact.  L1/2 Mild annular disk bulge and osteophyte formation is present causing a mild canal and left foraminal narrowing.  L2/3: Degenerative facet changes combine with disk bulging to produce mild left greater than right foraminal narrowing. Central canal is mildly distorted.  L3/4: There is mild annular disk bulging and degenerative facet changes are present. There is mild canal and foraminal distortion.  L4/5: At this level levels of prior laminectomy canal canal visualization is limited. The bony foramina appear grossly intact and there is no evidence of  malalignment.  L5/S1: Moderate degenerative facet changes are present and there is some bony foraminal narrowing on the left. There is evidence of prior laminectomy and the right foramen is intact. The bony canal soft tissue visualization is limited but it is grossly intact the degenerative changes are seen in the sacroiliac joints.    X-ray thoracic spine 12/18/14  Evidence of anterior cervical fusion is noted in the lower cervical spine. Neurostimulator appears to be present entering at approximately the T10-T11 level with its tip at the T8 level. Vertebral body heights appear well preserved. Alignment appears adequate. Osseous demineralization is noted.   T7-T8 T8-T9 and T9-T10 mild intervertebral disk height loss appears to be present.     X-ray thoracic and lumbar spine 12/28/15  Comparison: Thoracic spine radiographs-12/18/2014  Thoracic spine: There has been interval development of a new minor superior endplate compression fracture at the superior endplate of T7. There are two neurostimulator leads present within the posterior aspect of the thoracic spinal canal which terminating at approximately T7. No retropulsion. There is multilevel degenerative change of the thoracic spine in the form of marginal osteophyte formation and intervertebral disc space narrowing. There is suddenly observed postoperative change of ACDF at C5-C7. The heads of the C7 vertebral body screws do not appear firmly apposed to the adjacent metallic fixation plate.  Lumbar spine:There are postoperative changes of posterior intermittent fusion at L4-S1 with interbody device is noted at L4-L5 and L5-S1. No hardware complication. There is an IVC filter present. There is degenerative change of the lumbar spine in the form of marginal osteophyte formation and disc space narrowing.     CT cervical spine 3/7/16  Anterior plate fusion is noted at the C5, C6, and C7 levels with intervertebral disk prostheses at the C5-C6 C6-C7 levels.  There is  loss of the normal cervical lordosis which may be positional or related to muscular strain.  Vertebral body heights appear well-preserved.  At the C2-C3 level, mild disk osteophyte spurring is noted of approximately 2-3 mm.  No significant neuroforaminal stenosis is noted, minimal central canal narrowing is noted.  At the C3-C4 level mild disk osteophyte spurring is noted of approximately 2 mm.  No significant nerve foraminal narrowing is noted.  Minimal central canal narrowing is noted.  At the C4-C5 level, mild disk osteophyte bulge is noted centrally of up to 3 mm.  Facet arthropathy is noted bilaterally.  Moderate bilateral neuroforaminal narrowing appears to be present.  Mild central canal narrowing is noted.  At the C5-C6 level, a disk prosthesis is noted with metallic artifact hindering ideal evaluation.  Uncovertebral spurring and facet arthropathy appears to be present right greater than left with moderate right neural foraminal stenosis and mild to moderate left neural foraminal stenosis.  Mild to moderate central canal narrowing is suspected but not ideally evaluated  At the C6-C7 level, uncovertebral spurring is noted bilaterally.  Moderate to severe left-sided neuroforaminal stenosis is noted with moderate right-sided neuroforaminal stenosis.  Mild to moderate central canals narrowing is suspected.  Evaluation is hindered by metallic artifact.  At the C7-T1 level, uncovertebral spurring is noted to the right greater than left with severe right neural foraminal stenosis suspected and mild left neural foraminal stenosis with mild central canal is stenosis suspected.  Evaluation of this level is hindered by metallic artifact      Assessment:  The patient is a 64-year-old man with a history of peripheral vascular disease, diabetes, cervical DDD, lumbar postlaminectomy syndrome who presents in followup.      1. Postlaminectomy syndrome of lumbar region    2. DDD (degenerative disc disease), lumbar    3.  Opioid contract exists        Plan:  1.  Dr. Buckley provided prescriptions for oxycodone-acetaminophen 10/325 mg up to 3 times daily as needed for pain.  I have reviewed the [place] Board of Pharmacy website and there are no abberancies.  A urine drug screen was completed today.  2.  I advised him to meet with the spinal cord stimulator representative after he recovers from his surgery later this week.  We may consider IPG exchange if necessary.  3.  Follow-up in 3 months or sooner as needed.

## 2019-10-10 NOTE — DISCHARGE INSTRUCTIONS
To confirm, Your doctor has instructed you that surgery is scheduled for: Friday October 12     Pre-Op will call the afternoon prior to surgery between 4:00 and 6:00 PM with the final arrival time.   If arrival time is before 5:30 am - please arrive at Emergency Department    Please report to [facility] on [address] the morning of surgery.     Do not eat or drink anything after midnight the night before your surgery - THIS INCLUDES  WATER, GUM, MINTS AND CANDY.  YOU MAY BRUSH YOUR TEETH BUT DO NOT SWALLOW     TAKE ONLY THESE MEDICATIONS WITH A SMALL SIP OF WATER THE MORNING OF YOUR PROCEDURE:,Coreg, Gabapentin, Furosemide, Protonix  - Can use inhalers   Do not take Metformin morning of surgery .   Eliquis is STOPPED   If you are diabetic, check your sugar in the morning before your procedure.       Do not take any diabetic medicines or insulin the morning of surgery .     PLEASE NOTE:  The surgery schedule has many variables which may affect the time of your surgery case.  Family members should be available if your surgery time changes.  Plan to be here the day of your procedure between 4-6 hours.      DO NOT TAKE THESE MEDICATIONS 5-7 DAYS PRIOR to your procedure or per your surgeon's request: ASPIRIN, ALEVE, ADVIL, IBUPROFEN,  GRZEGORZ SELTZER, BC , FISH OIL , VITAMIN E, HERBALS  (May take Tylenol)      ONLY if you are prescribed any types of blood thinners such as:  Aspirin, Coumadin, Plavix, Pradaxa, Xarelto, Aggrenox, Effient, Eliquis, Savasya, Brilinta, or any other, ask your surgeon whether you should stop taking them and how long before surgery you should stop.  You may also need to verify with the prescribing physician if it is ok to stop your medication.                                                        IMPORTANT INSTRUCTIONS      · Do not smoke, vape or drink alcoholic beverages 24 hours prior to your procedure.  · Shower the night before AND the morning of your procedure with a Chlorhexidine  wash such as Hibiclens or Dial antibacterial soap from the neck down.   ·  Do not get it on your face or in your eyes.  You may use your own shampoo and face wash. This helps your skin to be as bacteria free as possible.   ·  DO NOT remove hair from the surgery site.  Do not shave the incision site unless you are given specific instructions to do so.    · Sleep in a bed with clean sheets.  Do not sleep with a pet in the bed.   · If you wear contact lenses, dentures, hearing aids or glasses, bring a container to put them in during surgery and give to a family member for safe keeping.    · Please leave all jewelry, piercing's and valuables at home.   · ONLY if you have been diagnosed with sleep apnea please bring your C-PAP machine.  · ONLY if you wear home oxygen please bring your portable oxygen tank the day of your procedure.   · ONLY for patients requiring bowel prep, written instructions will be given by your doctor's office.  · ONLY if you have a neuro stimulator, please bring the controller with you the morning of surgery  · Do NOT remove GREEN blood band   · If your doctor has scheduled you for an overnight stay, bring a small overnight bag with any personal items you need.    · Make arrangements in advance for transportation home by a responsible adult.      · You must make arrangements for transportation, TAXI'S, UBER'S OR LYFTS ARE NOT ALLOWED.        If you have any questions about these instructions, call Pre-Op Admit  Nursing at [phone] or the Pre-Op Day Surgery Unit at [phone].

## 2019-10-11 ENCOUNTER — ANESTHESIA (OUTPATIENT)
Dept: SURGERY | Facility: HOSPITAL | Age: 64
DRG: 983 | End: 2019-10-11
Payer: MEDICARE

## 2019-10-11 ENCOUNTER — ANESTHESIA EVENT (OUTPATIENT)
Dept: SURGERY | Facility: HOSPITAL | Age: 64
DRG: 983 | End: 2019-10-11
Payer: MEDICARE

## 2019-10-11 ENCOUNTER — DOCUMENTATION ONLY (OUTPATIENT)
Dept: PAIN MEDICINE | Facility: CLINIC | Age: 64
End: 2019-10-11

## 2019-10-11 ENCOUNTER — HOSPITAL ENCOUNTER (INPATIENT)
Facility: HOSPITAL | Age: 64
LOS: 1 days | Discharge: HOME | DRG: 983 | End: 2019-10-12
Attending: THORACIC SURGERY (CARDIOTHORACIC VASCULAR SURGERY) | Admitting: THORACIC SURGERY (CARDIOTHORACIC VASCULAR SURGERY)
Payer: MEDICARE

## 2019-10-11 ENCOUNTER — TELEPHONE (OUTPATIENT)
Dept: PAIN MEDICINE | Facility: CLINIC | Age: 64
End: 2019-10-11

## 2019-10-11 DIAGNOSIS — I65.23 BILATERAL CAROTID ARTERY STENOSIS: ICD-10-CM

## 2019-10-11 DIAGNOSIS — I65.23 CAROTID STENOSIS, BILATERAL: ICD-10-CM

## 2019-10-11 DIAGNOSIS — D44.6 CAROTID BODY TUMOR: Primary | ICD-10-CM

## 2019-10-11 LAB
6MAM UR QL: NOT DETECTED
7AMINOCLONAZEPAM UR QL: NOT DETECTED
A-OH ALPRAZ UR QL: NOT DETECTED
ALPRAZ UR QL: NOT DETECTED
AMPHET UR QL SCN: NOT DETECTED
BARBITURATES UR QL: NOT DETECTED
BUPRENORPHINE UR QL: NOT DETECTED
BZE UR QL: NOT DETECTED
CARBOXYTHC UR QL: PRESENT
CARISOPRODOL UR QL: NOT DETECTED
CLONAZEPAM UR QL: NOT DETECTED
CODEINE UR QL: NOT DETECTED
CREAT UR-MCNC: 38.6 MG/DL (ref 20–400)
DIAZEPAM UR QL: NOT DETECTED
ETHYL GLUCURONIDE UR QL: NOT DETECTED
FENTANYL UR QL: NOT DETECTED
GLUCOSE SERPL-MCNC: 188 MG/DL (ref 70–110)
GLUCOSE SERPL-MCNC: 191 MG/DL (ref 70–110)
GLUCOSE SERPL-MCNC: 222 MG/DL (ref 70–110)
GLUCOSE SERPL-MCNC: 228 MG/DL (ref 70–110)
HYDROCODONE UR QL: NOT DETECTED
HYDROMORPHONE UR QL: NOT DETECTED
LORAZEPAM UR QL: NOT DETECTED
MDA UR QL: NOT DETECTED
MDEA UR QL: NOT DETECTED
MDMA UR QL: NOT DETECTED
ME-PHENIDATE UR QL: NOT DETECTED
MEPERIDINE UR QL: NOT DETECTED
METHADONE UR QL: NOT DETECTED
METHAMPHET UR QL: NOT DETECTED
MIDAZOLAM UR QL SCN: NOT DETECTED
MORPHINE UR QL: NOT DETECTED
NORBUPRENORPHINE UR QL CFM: NOT DETECTED
NORDIAZEPAM UR QL: NOT DETECTED
NORFENTANYL UR QL: NOT DETECTED
NORHYDROCODONE UR QL CFM: NOT DETECTED
NOROXYCODONE UR QL CFM: NOT DETECTED
NOROXYMORPHONE: NOT DETECTED
OXAZEPAM UR QL: PRESENT
OXYCODONE UR QL: NOT DETECTED
OXYMORPHONE UR QL: NOT DETECTED
PATHOLOGY STUDY: NORMAL
PCP UR QL: NOT DETECTED
PHENTERMINE UR QL: NOT DETECTED
POC ACTIVATED CLOTTING TIME K: 202 SEC (ref 74–137)
PROPOXYPH UR QL: NOT DETECTED
SAMPLE: ABNORMAL
SERVICE CMNT-IMP: NORMAL
TAPENTADOL UR QL SCN: NOT DETECTED
TAPENTADOL-O-SULF: NOT DETECTED
TEMAZEPAM UR QL: PRESENT
TRAMADOL UR QL: NOT DETECTED
ZOLPIDEM UR QL: NOT DETECTED

## 2019-10-11 PROCEDURE — 25000003 PHARM REV CODE 250: Performed by: THORACIC SURGERY (CARDIOTHORACIC VASCULAR SURGERY)

## 2019-10-11 PROCEDURE — 88342 IMHCHEM/IMCYTCHM 1ST ANTB: CPT | Mod: TC

## 2019-10-11 PROCEDURE — 27000676 HC TUBING PRIMARY PLUMSET: Performed by: STUDENT IN AN ORGANIZED HEALTH CARE EDUCATION/TRAINING PROGRAM

## 2019-10-11 PROCEDURE — 27000673 HC TUBING BLOOD Y: Performed by: STUDENT IN AN ORGANIZED HEALTH CARE EDUCATION/TRAINING PROGRAM

## 2019-10-11 PROCEDURE — 27000658 HC ARTERIAL LINE ALL: Performed by: STUDENT IN AN ORGANIZED HEALTH CARE EDUCATION/TRAINING PROGRAM

## 2019-10-11 PROCEDURE — 27800903 OPTIME MED/SURG SUP & DEVICES OTHER IMPLANTS: Performed by: THORACIC SURGERY (CARDIOTHORACIC VASCULAR SURGERY)

## 2019-10-11 PROCEDURE — 63600175 PHARM REV CODE 636 W HCPCS: Performed by: NURSE ANESTHETIST, CERTIFIED REGISTERED

## 2019-10-11 PROCEDURE — C1729 CATH, DRAINAGE: HCPCS | Performed by: THORACIC SURGERY (CARDIOTHORACIC VASCULAR SURGERY)

## 2019-10-11 PROCEDURE — 27000656 HC EYE GOGGLES: Performed by: STUDENT IN AN ORGANIZED HEALTH CARE EDUCATION/TRAINING PROGRAM

## 2019-10-11 PROCEDURE — 27000671 HC TUBING MICROBORE EXT: Performed by: STUDENT IN AN ORGANIZED HEALTH CARE EDUCATION/TRAINING PROGRAM

## 2019-10-11 PROCEDURE — 36000706: Performed by: THORACIC SURGERY (CARDIOTHORACIC VASCULAR SURGERY)

## 2019-10-11 PROCEDURE — 37000008 HC ANESTHESIA 1ST 15 MINUTES: Performed by: THORACIC SURGERY (CARDIOTHORACIC VASCULAR SURGERY)

## 2019-10-11 PROCEDURE — 27000675 HC TUBING MICRODRIP: Performed by: STUDENT IN AN ORGANIZED HEALTH CARE EDUCATION/TRAINING PROGRAM

## 2019-10-11 PROCEDURE — 63600175 PHARM REV CODE 636 W HCPCS: Performed by: THORACIC SURGERY (CARDIOTHORACIC VASCULAR SURGERY)

## 2019-10-11 PROCEDURE — 94761 N-INVAS EAR/PLS OXIMETRY MLT: CPT

## 2019-10-11 PROCEDURE — 37000009 HC ANESTHESIA EA ADD 15 MINS: Performed by: THORACIC SURGERY (CARDIOTHORACIC VASCULAR SURGERY)

## 2019-10-11 PROCEDURE — C9248 INJ, CLEVIDIPINE BUTYRATE: HCPCS | Performed by: THORACIC SURGERY (CARDIOTHORACIC VASCULAR SURGERY)

## 2019-10-11 PROCEDURE — 63600175 PHARM REV CODE 636 W HCPCS: Performed by: ANESTHESIOLOGY

## 2019-10-11 PROCEDURE — 27200677 HC TRANSDUCER MONITOR KIT SINGLE: Performed by: STUDENT IN AN ORGANIZED HEALTH CARE EDUCATION/TRAINING PROGRAM

## 2019-10-11 PROCEDURE — 63600175 PHARM REV CODE 636 W HCPCS: Performed by: STUDENT IN AN ORGANIZED HEALTH CARE EDUCATION/TRAINING PROGRAM

## 2019-10-11 PROCEDURE — 60600 REMOVE CAROTID BODY LESION: CPT | Mod: LT,,, | Performed by: THORACIC SURGERY (CARDIOTHORACIC VASCULAR SURGERY)

## 2019-10-11 PROCEDURE — 25000003 PHARM REV CODE 250: Performed by: STUDENT IN AN ORGANIZED HEALTH CARE EDUCATION/TRAINING PROGRAM

## 2019-10-11 PROCEDURE — 20000000 HC ICU ROOM

## 2019-10-11 PROCEDURE — 25000003 PHARM REV CODE 250: Performed by: NURSE ANESTHETIST, CERTIFIED REGISTERED

## 2019-10-11 PROCEDURE — C9248 INJ, CLEVIDIPINE BUTYRATE: HCPCS | Performed by: NURSE ANESTHETIST, CERTIFIED REGISTERED

## 2019-10-11 PROCEDURE — 27201423 OPTIME MED/SURG SUP & DEVICES STERILE SUPPLY: Performed by: THORACIC SURGERY (CARDIOTHORACIC VASCULAR SURGERY)

## 2019-10-11 PROCEDURE — 71000039 HC RECOVERY, EACH ADD'L HOUR: Performed by: THORACIC SURGERY (CARDIOTHORACIC VASCULAR SURGERY)

## 2019-10-11 PROCEDURE — 25000003 PHARM REV CODE 250

## 2019-10-11 PROCEDURE — 36000707: Performed by: THORACIC SURGERY (CARDIOTHORACIC VASCULAR SURGERY)

## 2019-10-11 PROCEDURE — 25000003 PHARM REV CODE 250: Performed by: ANESTHESIOLOGY

## 2019-10-11 PROCEDURE — C9399 UNCLASSIFIED DRUGS OR BIOLOG: HCPCS | Performed by: THORACIC SURGERY (CARDIOTHORACIC VASCULAR SURGERY)

## 2019-10-11 PROCEDURE — 71000033 HC RECOVERY, INTIAL HOUR: Performed by: THORACIC SURGERY (CARDIOTHORACIC VASCULAR SURGERY)

## 2019-10-11 PROCEDURE — 99900035 HC TECH TIME PER 15 MIN (STAT)

## 2019-10-11 PROCEDURE — 82962 GLUCOSE BLOOD TEST: CPT

## 2019-10-11 PROCEDURE — 60600: ICD-10-PCS | Mod: LT,,, | Performed by: THORACIC SURGERY (CARDIOTHORACIC VASCULAR SURGERY)

## 2019-10-11 PROCEDURE — 27000221 HC OXYGEN, UP TO 24 HOURS

## 2019-10-11 PROCEDURE — 27202107 HC XP QUATRO SENSOR: Performed by: STUDENT IN AN ORGANIZED HEALTH CARE EDUCATION/TRAINING PROGRAM

## 2019-10-11 PROCEDURE — 27201107 HC STYLET, STANDARD: Performed by: STUDENT IN AN ORGANIZED HEALTH CARE EDUCATION/TRAINING PROGRAM

## 2019-10-11 RX ORDER — HEPARIN SODIUM 10000 [USP'U]/ML
INJECTION, SOLUTION INTRAVENOUS; SUBCUTANEOUS
Status: DISCONTINUED | OUTPATIENT
Start: 2019-10-11 | End: 2019-10-11

## 2019-10-11 RX ORDER — HYDROMORPHONE HYDROCHLORIDE 1 MG/ML
0.2 INJECTION, SOLUTION INTRAMUSCULAR; INTRAVENOUS; SUBCUTANEOUS EVERY 5 MIN PRN
Status: DISCONTINUED | OUTPATIENT
Start: 2019-10-11 | End: 2019-10-11 | Stop reason: HOSPADM

## 2019-10-11 RX ORDER — PROPOFOL 10 MG/ML
VIAL (ML) INTRAVENOUS
Status: DISCONTINUED | OUTPATIENT
Start: 2019-10-11 | End: 2019-10-11

## 2019-10-11 RX ORDER — GABAPENTIN 300 MG/1
600 CAPSULE ORAL 3 TIMES DAILY
Status: DISCONTINUED | OUTPATIENT
Start: 2019-10-11 | End: 2019-10-12 | Stop reason: HOSPADM

## 2019-10-11 RX ORDER — ONDANSETRON 2 MG/ML
4 INJECTION INTRAMUSCULAR; INTRAVENOUS DAILY PRN
Status: DISCONTINUED | OUTPATIENT
Start: 2019-10-11 | End: 2019-10-11 | Stop reason: HOSPADM

## 2019-10-11 RX ORDER — OXYCODONE HYDROCHLORIDE 5 MG/1
5 TABLET ORAL
Status: DISCONTINUED | OUTPATIENT
Start: 2019-10-11 | End: 2019-10-11 | Stop reason: HOSPADM

## 2019-10-11 RX ORDER — ONDANSETRON 2 MG/ML
INJECTION INTRAMUSCULAR; INTRAVENOUS
Status: DISCONTINUED | OUTPATIENT
Start: 2019-10-11 | End: 2019-10-11

## 2019-10-11 RX ORDER — ASPIRIN 325 MG
325 TABLET ORAL DAILY
Status: DISCONTINUED | OUTPATIENT
Start: 2019-10-11 | End: 2019-10-12 | Stop reason: HOSPADM

## 2019-10-11 RX ORDER — ALBUTEROL SULFATE 90 UG/1
2 AEROSOL, METERED RESPIRATORY (INHALATION) EVERY 4 HOURS PRN
Status: DISCONTINUED | OUTPATIENT
Start: 2019-10-11 | End: 2019-10-12 | Stop reason: HOSPADM

## 2019-10-11 RX ORDER — CARVEDILOL 3.12 MG/1
3.12 TABLET ORAL 2 TIMES DAILY
Status: DISCONTINUED | OUTPATIENT
Start: 2019-10-11 | End: 2019-10-12 | Stop reason: HOSPADM

## 2019-10-11 RX ORDER — HYDROCODONE BITARTRATE AND ACETAMINOPHEN 5; 325 MG/1; MG/1
1 TABLET ORAL EVERY 4 HOURS PRN
Status: DISCONTINUED | OUTPATIENT
Start: 2019-10-11 | End: 2019-10-12 | Stop reason: HOSPADM

## 2019-10-11 RX ORDER — NEOSTIGMINE METHYLSULFATE 1 MG/ML
INJECTION, SOLUTION INTRAVENOUS
Status: DISCONTINUED | OUTPATIENT
Start: 2019-10-11 | End: 2019-10-11

## 2019-10-11 RX ORDER — GLUCAGON 1 MG
1 KIT INJECTION
Status: DISCONTINUED | OUTPATIENT
Start: 2019-10-11 | End: 2019-10-12 | Stop reason: HOSPADM

## 2019-10-11 RX ORDER — SUCCINYLCHOLINE CHLORIDE 20 MG/ML
INJECTION INTRAMUSCULAR; INTRAVENOUS
Status: DISCONTINUED | OUTPATIENT
Start: 2019-10-11 | End: 2019-10-11

## 2019-10-11 RX ORDER — MUPIROCIN 20 MG/G
1 OINTMENT TOPICAL 2 TIMES DAILY
Status: DISCONTINUED | OUTPATIENT
Start: 2019-10-11 | End: 2019-10-12 | Stop reason: HOSPADM

## 2019-10-11 RX ORDER — PANTOPRAZOLE SODIUM 40 MG/1
40 TABLET, DELAYED RELEASE ORAL DAILY
Status: DISCONTINUED | OUTPATIENT
Start: 2019-10-12 | End: 2019-10-12 | Stop reason: HOSPADM

## 2019-10-11 RX ORDER — ONDANSETRON 2 MG/ML
4 INJECTION INTRAMUSCULAR; INTRAVENOUS EVERY 12 HOURS PRN
Status: DISCONTINUED | OUTPATIENT
Start: 2019-10-11 | End: 2019-10-12 | Stop reason: HOSPADM

## 2019-10-11 RX ORDER — PRAZOSIN HYDROCHLORIDE 1 MG/1
2 CAPSULE ORAL 2 TIMES DAILY
Status: DISCONTINUED | OUTPATIENT
Start: 2019-10-11 | End: 2019-10-12 | Stop reason: HOSPADM

## 2019-10-11 RX ORDER — PROTAMINE SULFATE 10 MG/ML
INJECTION, SOLUTION INTRAVENOUS
Status: DISCONTINUED | OUTPATIENT
Start: 2019-10-11 | End: 2019-10-11

## 2019-10-11 RX ORDER — FENTANYL CITRATE 50 UG/ML
INJECTION, SOLUTION INTRAMUSCULAR; INTRAVENOUS
Status: DISCONTINUED | OUTPATIENT
Start: 2019-10-11 | End: 2019-10-11

## 2019-10-11 RX ORDER — INSULIN ASPART 100 [IU]/ML
0-5 INJECTION, SOLUTION INTRAVENOUS; SUBCUTANEOUS
Status: DISCONTINUED | OUTPATIENT
Start: 2019-10-11 | End: 2019-10-12 | Stop reason: HOSPADM

## 2019-10-11 RX ORDER — SODIUM CHLORIDE 9 MG/ML
INJECTION, SOLUTION INTRAVENOUS CONTINUOUS PRN
Status: DISCONTINUED | OUTPATIENT
Start: 2019-10-11 | End: 2019-10-11

## 2019-10-11 RX ORDER — OXYCODONE HYDROCHLORIDE 5 MG/1
5 TABLET ORAL ONCE
Status: COMPLETED | OUTPATIENT
Start: 2019-10-11 | End: 2019-10-11

## 2019-10-11 RX ORDER — SODIUM CHLORIDE 0.9 G/100ML
IRRIGANT IRRIGATION
Status: DISCONTINUED | OUTPATIENT
Start: 2019-10-11 | End: 2019-10-11 | Stop reason: HOSPADM

## 2019-10-11 RX ORDER — TRAZODONE HYDROCHLORIDE 50 MG/1
100 TABLET ORAL NIGHTLY
Status: DISCONTINUED | OUTPATIENT
Start: 2019-10-11 | End: 2019-10-12 | Stop reason: HOSPADM

## 2019-10-11 RX ORDER — BACITRACIN 50000 [IU]/1
INJECTION, POWDER, FOR SOLUTION INTRAMUSCULAR
Status: DISCONTINUED | OUTPATIENT
Start: 2019-10-11 | End: 2019-10-11 | Stop reason: HOSPADM

## 2019-10-11 RX ORDER — ACETAMINOPHEN 10 MG/ML
INJECTION, SOLUTION INTRAVENOUS
Status: DISCONTINUED | OUTPATIENT
Start: 2019-10-11 | End: 2019-10-11

## 2019-10-11 RX ORDER — SODIUM CHLORIDE, SODIUM LACTATE, POTASSIUM CHLORIDE, CALCIUM CHLORIDE 600; 310; 30; 20 MG/100ML; MG/100ML; MG/100ML; MG/100ML
INJECTION, SOLUTION INTRAVENOUS CONTINUOUS PRN
Status: DISCONTINUED | OUTPATIENT
Start: 2019-10-11 | End: 2019-10-11

## 2019-10-11 RX ORDER — ATORVASTATIN CALCIUM 40 MG/1
40 TABLET, FILM COATED ORAL DAILY
Status: DISCONTINUED | OUTPATIENT
Start: 2019-10-11 | End: 2019-10-12 | Stop reason: HOSPADM

## 2019-10-11 RX ORDER — SODIUM CHLORIDE 0.9 % (FLUSH) 0.9 %
10 SYRINGE (ML) INJECTION
Status: DISCONTINUED | OUTPATIENT
Start: 2019-10-11 | End: 2019-10-11 | Stop reason: HOSPADM

## 2019-10-11 RX ORDER — CEFAZOLIN SODIUM 2 G/50ML
2 SOLUTION INTRAVENOUS
Status: DISPENSED | OUTPATIENT
Start: 2019-10-11 | End: 2019-10-12

## 2019-10-11 RX ORDER — LIDOCAINE HYDROCHLORIDE 10 MG/ML
INJECTION, SOLUTION EPIDURAL; INFILTRATION; INTRACAUDAL; PERINEURAL
Status: DISCONTINUED | OUTPATIENT
Start: 2019-10-11 | End: 2019-10-11 | Stop reason: HOSPADM

## 2019-10-11 RX ORDER — MIDAZOLAM HYDROCHLORIDE 1 MG/ML
INJECTION, SOLUTION INTRAMUSCULAR; INTRAVENOUS
Status: DISCONTINUED | OUTPATIENT
Start: 2019-10-11 | End: 2019-10-11

## 2019-10-11 RX ORDER — HYDROMORPHONE HYDROCHLORIDE 1 MG/ML
1 INJECTION, SOLUTION INTRAMUSCULAR; INTRAVENOUS; SUBCUTANEOUS EVERY 4 HOURS PRN
Status: DISCONTINUED | OUTPATIENT
Start: 2019-10-11 | End: 2019-10-12 | Stop reason: HOSPADM

## 2019-10-11 RX ORDER — ROCURONIUM BROMIDE 10 MG/ML
INJECTION, SOLUTION INTRAVENOUS
Status: DISCONTINUED | OUTPATIENT
Start: 2019-10-11 | End: 2019-10-11

## 2019-10-11 RX ORDER — CYCLOBENZAPRINE HCL 5 MG
5 TABLET ORAL 2 TIMES DAILY PRN
Status: DISCONTINUED | OUTPATIENT
Start: 2019-10-11 | End: 2019-10-12 | Stop reason: HOSPADM

## 2019-10-11 RX ORDER — IBUPROFEN 200 MG
24 TABLET ORAL
Status: DISCONTINUED | OUTPATIENT
Start: 2019-10-11 | End: 2019-10-12 | Stop reason: HOSPADM

## 2019-10-11 RX ORDER — OXYCODONE HYDROCHLORIDE 5 MG/1
5 TABLET ORAL ONCE
Status: DISCONTINUED | OUTPATIENT
Start: 2019-10-11 | End: 2019-10-11 | Stop reason: HOSPADM

## 2019-10-11 RX ORDER — HYDROMORPHONE HYDROCHLORIDE 1 MG/ML
0.2 INJECTION, SOLUTION INTRAMUSCULAR; INTRAVENOUS; SUBCUTANEOUS EVERY 5 MIN PRN
Status: COMPLETED | OUTPATIENT
Start: 2019-10-11 | End: 2019-10-11

## 2019-10-11 RX ORDER — GLYCOPYRROLATE 0.2 MG/ML
INJECTION INTRAMUSCULAR; INTRAVENOUS
Status: DISCONTINUED | OUTPATIENT
Start: 2019-10-11 | End: 2019-10-11

## 2019-10-11 RX ORDER — LIDOCAINE HCL/PF 100 MG/5ML
SYRINGE (ML) INTRAVENOUS
Status: DISCONTINUED | OUTPATIENT
Start: 2019-10-11 | End: 2019-10-11

## 2019-10-11 RX ORDER — IBUPROFEN 200 MG
16 TABLET ORAL
Status: DISCONTINUED | OUTPATIENT
Start: 2019-10-11 | End: 2019-10-12 | Stop reason: HOSPADM

## 2019-10-11 RX ORDER — TIOTROPIUM BROMIDE 18 UG/1
18 CAPSULE ORAL; RESPIRATORY (INHALATION) DAILY
Status: DISCONTINUED | OUTPATIENT
Start: 2019-10-11 | End: 2019-10-12 | Stop reason: HOSPADM

## 2019-10-11 RX ORDER — METFORMIN HYDROCHLORIDE 500 MG/1
500 TABLET ORAL 2 TIMES DAILY WITH MEALS
Status: DISCONTINUED | OUTPATIENT
Start: 2019-10-11 | End: 2019-10-12 | Stop reason: HOSPADM

## 2019-10-11 RX ORDER — LIDOCAINE HYDROCHLORIDE 20 MG/ML
JELLY TOPICAL
Status: DISCONTINUED | OUTPATIENT
Start: 2019-10-11 | End: 2019-10-11

## 2019-10-11 RX ORDER — FUROSEMIDE 40 MG/1
40 TABLET ORAL DAILY
Status: DISCONTINUED | OUTPATIENT
Start: 2019-10-11 | End: 2019-10-12 | Stop reason: HOSPADM

## 2019-10-11 RX ORDER — DIPHENHYDRAMINE HYDROCHLORIDE 50 MG/ML
25 INJECTION INTRAMUSCULAR; INTRAVENOUS EVERY 6 HOURS PRN
Status: DISCONTINUED | OUTPATIENT
Start: 2019-10-11 | End: 2019-10-11 | Stop reason: HOSPADM

## 2019-10-11 RX ADMIN — PROPOFOL 50 MG: 10 INJECTION, EMULSION INTRAVENOUS at 11:10

## 2019-10-11 RX ADMIN — PROPOFOL 100 MG: 10 INJECTION, EMULSION INTRAVENOUS at 10:10

## 2019-10-11 RX ADMIN — SUCCINYLCHOLINE CHLORIDE 140 MG: 20 INJECTION, SOLUTION INTRAMUSCULAR; INTRAVENOUS at 10:10

## 2019-10-11 RX ADMIN — NEOSTIGMINE METHYLSULFATE 5 MG: 1 INJECTION INTRAVENOUS at 11:10

## 2019-10-11 RX ADMIN — HYDROMORPHONE HYDROCHLORIDE 0.2 MG: 1 INJECTION, SOLUTION INTRAMUSCULAR; INTRAVENOUS; SUBCUTANEOUS at 12:10

## 2019-10-11 RX ADMIN — HUMAN INSULIN 4 UNITS: 100 INJECTION, SOLUTION SUBCUTANEOUS at 01:10

## 2019-10-11 RX ADMIN — CLEVIPIDINE 2 MG/HR: 0.5 EMULSION INTRAVENOUS at 03:10

## 2019-10-11 RX ADMIN — ONDANSETRON 4 MG: 2 INJECTION INTRAMUSCULAR; INTRAVENOUS at 05:10

## 2019-10-11 RX ADMIN — CEFAZOLIN SODIUM 2 G: 2 SOLUTION INTRAVENOUS at 04:10

## 2019-10-11 RX ADMIN — OXYCODONE HYDROCHLORIDE 5 MG: 5 TABLET ORAL at 01:10

## 2019-10-11 RX ADMIN — CARVEDILOL 3.12 MG: 3.12 TABLET, FILM COATED ORAL at 09:10

## 2019-10-11 RX ADMIN — INSULIN DETEMIR 85 UNITS: 100 INJECTION, SOLUTION SUBCUTANEOUS at 09:10

## 2019-10-11 RX ADMIN — OXYCODONE HYDROCHLORIDE 5 MG: 5 TABLET ORAL at 05:10

## 2019-10-11 RX ADMIN — PRAZOSIN HYDROCHLORIDE 2 MG: 1 CAPSULE ORAL at 09:10

## 2019-10-11 RX ADMIN — ACETAMINOPHEN 1000 MG: 10 INJECTION, SOLUTION INTRAVENOUS at 10:10

## 2019-10-11 RX ADMIN — HYDROMORPHONE HYDROCHLORIDE 0.2 MG: 1 INJECTION, SOLUTION INTRAMUSCULAR; INTRAVENOUS; SUBCUTANEOUS at 11:10

## 2019-10-11 RX ADMIN — CLEVIPIDINE 4 MG/HR: 0.5 EMULSION INTRAVENOUS at 10:10

## 2019-10-11 RX ADMIN — LIDOCAINE HYDROCHLORIDE 100 MG: 20 INJECTION, SOLUTION INTRAVENOUS at 10:10

## 2019-10-11 RX ADMIN — MIDAZOLAM 1 MG: 1 INJECTION INTRAMUSCULAR; INTRAVENOUS at 10:10

## 2019-10-11 RX ADMIN — VANCOMYCIN HYDROCHLORIDE 2000 MG: 1 INJECTION, POWDER, LYOPHILIZED, FOR SOLUTION INTRAVENOUS at 09:10

## 2019-10-11 RX ADMIN — FUROSEMIDE 40 MG: 40 TABLET ORAL at 03:10

## 2019-10-11 RX ADMIN — PROTAMINE SULFATE 25 MG: 10 INJECTION, SOLUTION INTRAVENOUS at 11:10

## 2019-10-11 RX ADMIN — SODIUM CHLORIDE, SODIUM LACTATE, POTASSIUM CHLORIDE, AND CALCIUM CHLORIDE: .6; .31; .03; .02 INJECTION, SOLUTION INTRAVENOUS at 09:10

## 2019-10-11 RX ADMIN — CEFAZOLIN SODIUM 2 G: 2 SOLUTION INTRAVENOUS at 10:10

## 2019-10-11 RX ADMIN — ASPIRIN 325 MG ORAL TABLET 325 MG: 325 PILL ORAL at 03:10

## 2019-10-11 RX ADMIN — TRAZODONE HYDROCHLORIDE 100 MG: 50 TABLET ORAL at 09:10

## 2019-10-11 RX ADMIN — GLYCOPYRROLATE 0.6 MG: 0.2 INJECTION INTRAMUSCULAR; INTRAVENOUS at 11:10

## 2019-10-11 RX ADMIN — HYDROMORPHONE HYDROCHLORIDE 1 MG: 1 INJECTION, SOLUTION INTRAMUSCULAR; INTRAVENOUS; SUBCUTANEOUS at 11:10

## 2019-10-11 RX ADMIN — ROCURONIUM BROMIDE 45 MG: 10 INJECTION, SOLUTION INTRAVENOUS at 10:10

## 2019-10-11 RX ADMIN — HYDROMORPHONE HYDROCHLORIDE 1 MG: 1 INJECTION, SOLUTION INTRAMUSCULAR; INTRAVENOUS; SUBCUTANEOUS at 07:10

## 2019-10-11 RX ADMIN — METFORMIN HYDROCHLORIDE 500 MG: 500 TABLET, FILM COATED ORAL at 05:10

## 2019-10-11 RX ADMIN — APIXABAN 5 MG: 5 TABLET, FILM COATED ORAL at 09:10

## 2019-10-11 RX ADMIN — LIDOCAINE HYDROCHLORIDE 3 ML: 20 JELLY TOPICAL at 10:10

## 2019-10-11 RX ADMIN — SODIUM CHLORIDE: 900 INJECTION, SOLUTION INTRAVENOUS at 10:10

## 2019-10-11 RX ADMIN — CLEVIPIDINE 14 MG/HR: 0.5 EMULSION INTRAVENOUS at 12:10

## 2019-10-11 RX ADMIN — ROCURONIUM BROMIDE 5 MG: 10 INJECTION, SOLUTION INTRAVENOUS at 10:10

## 2019-10-11 RX ADMIN — ATORVASTATIN CALCIUM 40 MG: 40 TABLET, FILM COATED ORAL at 03:10

## 2019-10-11 RX ADMIN — CLEVIPIDINE 3 MG/HR: 0.5 EMULSION INTRAVENOUS at 10:10

## 2019-10-11 RX ADMIN — MUPIROCIN 1 G: 20 OINTMENT TOPICAL at 09:10

## 2019-10-11 RX ADMIN — OXYCODONE HYDROCHLORIDE 5 MG: 5 TABLET ORAL at 12:10

## 2019-10-11 RX ADMIN — FENTANYL CITRATE 100 MCG: 50 INJECTION INTRAMUSCULAR; INTRAVENOUS at 10:10

## 2019-10-11 RX ADMIN — ONDANSETRON 4 MG: 2 INJECTION INTRAMUSCULAR; INTRAVENOUS at 10:10

## 2019-10-11 RX ADMIN — GABAPENTIN 600 MG: 300 CAPSULE ORAL at 03:10

## 2019-10-11 RX ADMIN — GABAPENTIN 600 MG: 300 CAPSULE ORAL at 09:10

## 2019-10-11 RX ADMIN — HEPARIN SODIUM 5000 UNITS: 10000 INJECTION, SOLUTION INTRAVENOUS; SUBCUTANEOUS at 11:10

## 2019-10-11 NOTE — OP NOTE
This is Dr. Rueda the surgeon with the date of service being the 11th October 2019.  Preoperative diagnosis:  left-sided carotid body tumor.  Postoperative diagnosis:  Same.  Operation:  resection of carotid body tumor.  Operation in detail:  The patient was prepped and draped in the usual sterile fashion.  A left-sided neck incision was made anterior to the sternocleidomastoid muscle.  The incision was carried down to and through the subcutaneous tissue and platysma muscle with the cautery.  Self-retaining retractors were placed to maintain exposure.  The carotid sheath was incised at the base of the neck.  The common carotid artery was encircled with a vessel loop.  It was followed distally to its bifurcation into the internal and external carotid arteries.  It was evident that the patient had fairly large carotid body tumor within the base of the junction of the carotid artery with the  bifurcation into the external and internal carotid arteries.  The dissection continued superiorly to control the internal carotid artery above the tumor.  A vessel loop was used to secure the internal carotid artery.  The external carotid artery was carefully dissected as well away from the tumor.  There were crossing vessels that were clipped and where necessary cauterized and tied.  The internal carotid artery was traced from where the vessel loop surrounded the vessel superiorly down to the carotid body tumor.  The tumor was carefully reflected off of the internal carotid artery back to the carotid bifurcation.  Cautery and sharp dissection was used to do this.  It was evident at this point that the carotid body tumor could be removed from the bifurcation without entering either the external or internal carotid arteries.  It was carefully mobilized off of the carotid bifurcation from the internal to the external carotid artery.  Attachments were clipped and cauterized as necessary.  In this fashion the tumor was able to be  completely excised.  It was sent down to pathology for analysis.  Hemostasis seemed to be satisfactory at this point.  A TEO drain was inserted through separate stab wound and secured the skin with 2 0 silk suture.  Closure was then undertaken layers with running 2 0 Monocryl stitch on the subcutaneous tissue and platysma muscle in running fashion and the skin was closed with running 3 0 subcuticular Monocryl stitch.  Overall the patient tolerated the procedure well and there appeared to be no major obvious intraoperative complications.  Estimated blood loss was approximately 100 cc and the patient was brought to the ICU in satisfactory condition.

## 2019-10-11 NOTE — ANESTHESIA PREPROCEDURE EVALUATION
10/11/2019  Kevan Colin Sr. is a 64 y.o., male.    Patient Active Problem List   Diagnosis    Acute venous embolism and thrombosis of deep vessels of proximal lower extremity    Shortness of breath    Type 2 diabetes mellitus, uncontrolled    Hypocalcemia    Venous dermatitis    Venous stasis ulcer    Edema    PVD (peripheral vascular disease)    Leg pain    DVT (deep venous thrombosis)- Hx IVC filter    CAD (coronary artery disease)    MI (mitral incompetence)    PE (pulmonary embolism) in 2002    Obesity    Ankle fracture, left    Diabetic neuropathy    Postlaminectomy syndrome of lumbar region    Chronic pain syndrome    Thoracic or lumbosacral neuritis or radiculitis    DDD (degenerative disc disease), cervical    Lumbar spondylosis    Intractable back pain    Chest pain    Chest pain    Pain of upper abdomen    Nausea vomiting and diarrhea    Chronic pancreatitis- Hx pancreatic stent    ACS (acute coronary syndrome)    Idiopathic chronic pancreatitis    Parkinson's disease    Coronary artery disease involving native coronary artery without angina pectoris    JOSE ALBERTO (obstructive sleep apnea)    COPD exacerbation    Depression    Acute gout of foot    Abdominal pain    Anticoagulant long-term use    Type 2 diabetes mellitus without complication    Benign essential HTN    Chronic pain associated with significant psychosocial dysfunction    Battery end of life of spinal cord stimulator    Acute respiratory failure with hypoxia and hypercarbia    Staphylococcal pneumonia    S/P emergency tracheotomy for assistance in breathing    Feeding difficulty    Transient global amnesia    Pre-op exam    Carotid body tumor    Carotid stenosis, bilateral       Past Surgical History:   Procedure Laterality Date    AMPUTATION      left index and third finger tips     ANGIOGRAM, CORONARY, WITH LEFT HEART CATHETERIZATION  2019    BACK SURGERY      bone spur      excision bone spurs right foot    CARDIAC CATHETERIZATION  2014 and 2015    negative by DR Wheeler    CHOLECYSTECTOMY      ENDOSCOPIC ULTRASOUND OF UPPER GASTROINTESTINAL TRACT N/A 8/6/2019    Procedure: ULTRASOUND, UPPER GI TRACT, ENDOSCOPIC;  Surgeon: Julio César Mcclain III, MD;  Location: [facility] ENDO;  Service: Endoscopy;  Laterality: N/A;    JOINT REPLACEMENT      bilateral knee    knees replaced      LUMBAR LAMINECTOMY      NECK SURGERY      SPINAL CORD STIMULATOR IMPLANT      TONSILLECTOMY      vena cave filter      WRIST FUSION Left         Tobacco Use:  The patient  reports that he quit smoking about 2 years ago. His smoking use included cigarettes. He has a 11.25 pack-year smoking history. He has never used smokeless tobacco.     Results for orders placed or performed during the hospital encounter of 10/10/19   EKG 12-lead    Collection Time: 10/10/19 10:19 AM    Narrative    Test Reason : Z01.818,    Vent. Rate : 065 BPM     Atrial Rate : 065 BPM     P-R Int : 224 ms          QRS Dur : 104 ms      QT Int : 406 ms       P-R-T Axes : 071 073 055 degrees     QTc Int : 422 ms    Sinus rhythm with 1st degree A-V block  Low voltage QRS  Cannot rule out Inferior infarct ,age undetermined  Abnormal ECG  When compared with ECG of 29-JUL-2019 16:40,  Minimal criteria for Inferior infarct are now Present    Referred By: JP YUN           Confirmed By:       · JAMES:  Moderately decreased left ventricular systolic function. The estimated ejection fraction is 35%  · Grade I (mild) left ventricular diastolic dysfunction consistent with impaired relaxation.  · Elevated left atrial pressure.  · Local segmental wall motion abnormalities on contrast study  · No PFO on bubble study  Technically suboptimal study due to body habitus       Lab Results   Component Value Date    WBC 9.46 10/10/2019    HGB 11.8 (L)  10/10/2019    HCT 36.5 (L) 10/10/2019    MCV 95 10/10/2019     10/10/2019     BMP  Lab Results   Component Value Date     10/10/2019    K 4.0 10/10/2019     10/10/2019    CO2 24 10/10/2019    BUN 14 10/10/2019    CREATININE 0.8 10/10/2019    CALCIUM 8.5 (L) 10/10/2019    ANIONGAP 12 10/10/2019    ESTGFRAFRICA >60.0 10/10/2019    EGFRNONAA >60.0 10/10/2019           Anesthesia Evaluation    I have reviewed the Patient Summary Reports.     I have reviewed the Medications.     Review of Systems  Anesthesia Hx:  No problems with previous Anesthesia Denies Hx of Anesthetic complications  Denies Family Hx of Anesthesia complications.   Denies Personal Hx of Anesthesia complications.   Social:  Former Smoker    Hematology/Oncology:  Hematology Normal   Oncology Normal     EENT/Dental:EENT/Dental Normal   Cardiovascular:   Exercise tolerance: good Hypertension Past MI CAD   QUIÑONES    Pulmonary:   COPD Asthma Sleep Apnea    Renal/:  Renal/ Normal     Hepatic/GI:   GERD    Musculoskeletal:   Arthritis   Spine Disorders: (spinal cord stimulator) Degenerative disease    Neurological:   CVA, no residual symptoms   Peripheral Neuropathy    Endocrine:   Diabetes    Psych:   Psychiatric History          Physical Exam  General:  Well nourished, Obesity    Airway/Jaw/Neck:  Airway Findings: Mouth Opening: Normal Tongue: Normal  General Airway Assessment: Adult  Mallampati: II  TM Distance: Normal, at least 6 cm  Jaw/Neck Findings:  Neck ROM: Extension Decreased, Mild      Dental:  Dental Findings: Edentulous   Chest/Lungs:  Chest/Lungs Findings: Clear to auscultation, Normal Respiratory Rate     Heart/Vascular:  Heart Findings: Rate: Normal  Rhythm: Regular Rhythm  Sounds: Normal        Mental Status:  Mental Status Findings:  Alert and Oriented         Anesthesia Plan  Type of Anesthesia, risks & benefits discussed:  Anesthesia Type:  general  Patient's Preference:   Intra-op Monitoring Plan: standard ASA  monitors and arterial line  Intra-op Monitoring Plan Comments:   Post Op Pain Control Plan: multimodal analgesia  Post Op Pain Control Plan Comments:   Induction:   IV  Beta Blocker:  Patient is on a Beta-Blocker and has received one dose within the past 24 hours (No further documentation required).       Informed Consent: Patient understands risks and agrees with Anesthesia plan.  Questions answered. Anesthesia consent signed with patient.  ASA Score: 3     Day of Surgery Review of History & Physical:    H&P update referred to the surgeon.         Ready For Surgery From Anesthesia Perspective.     [address]

## 2019-10-11 NOTE — ANESTHESIA PROCEDURE NOTES
Arterial    Diagnosis: left carotid body tumor    Patient location during procedure: done in OR  Procedure start time: 10/11/2019 10:33 AM  Timeout: 10/11/2019 10:33 AM  Procedure end time: 10/11/2019 10:33 AM    Staffing  Authorizing Provider: Noe Butts MD  Performing Provider: Noe Butts MD    Anesthesiologist was present at the time of the procedure.    Preanesthetic Checklist  Completed: patient identified, site marked, surgical consent, pre-op evaluation, timeout performed, IV checked, risks and benefits discussed, monitors and equipment checked and anesthesia consent givenArterial  Skin Prep: chlorhexidine gluconate  Local Infiltration: lidocaine  Orientation: left  Location: radial  Catheter Size: 20 G  Catheter placement by Ultrasound guidance. Heme positive aspiration all ports.  Vessel Caliber: medium, patent, compressibility normal  Needle advanced into vessel with real time Ultrasound guidance.  Sterile sheath used.Insertion Attempts: 1  Assessment  Dressing: secured with tape and tegaderm and sutured in place and taped  Patient: Tolerated well

## 2019-10-11 NOTE — TRANSFER OF CARE
"Anesthesia Transfer of Care Note    Patient: Kevan Colin Sr.    Procedure(s) Performed: Procedure(s) (LRB):  EXCISION, MASS (CAROTID BODY TUMOR) (Left)    Patient location: PACU    Anesthesia Type: general    Transport from OR: Transported from OR on 6-10 L/min O2 by face mask with adequate spontaneous ventilation    Post pain: adequate analgesia    Post assessment: no apparent anesthetic complications    Post vital signs: stable    Level of consciousness: awake and alert    Nausea/Vomiting: no nausea/vomiting    Complications: none    Transfer of care protocol was followed      Last vitals:   Visit Vitals  /67 (BP Location: Left arm, Patient Position: Lying)   Pulse 83   Temp 36.6 °C (97.8 °F) (Oral)   Resp 20   Ht 6' 2" (1.88 m)   Wt 116 kg (255 lb 11.7 oz)   SpO2 96%   BMI 32.83 kg/m²     "

## 2019-10-11 NOTE — ANESTHESIA POSTPROCEDURE EVALUATION
Anesthesia Post Evaluation    Patient: Kevan Colin .    Procedure(s) Performed: Procedure(s) (LRB):  EXCISION, MASS (CAROTID BODY TUMOR) (Left)    Final Anesthesia Type: general  Patient location during evaluation: PACU  Patient participation: Yes- Able to Participate  Level of consciousness: awake and alert  Post-procedure vital signs: reviewed and stable  Pain management: adequate  Airway patency: patent  PONV status at discharge: No PONV  Anesthetic complications: no      Cardiovascular status: stable  Respiratory status: unassisted and spontaneous ventilation  Hydration status: euvolemic  Follow-up not needed.          Vitals Value Taken Time   /68 10/11/2019  1:45 PM   Temp 36.9 °C (98.4 °F) 10/11/2019  1:30 PM   Pulse 83 10/11/2019  1:53 PM   Resp 12 10/11/2019  1:53 PM   SpO2 95 % 10/11/2019  1:53 PM   Vitals shown include unvalidated device data.      No case tracking events are documented in the log.      Pain/Neville Score: Pain Rating Prior to Med Admin: 9 (10/11/2019 12:41 PM)  Neville Score: 7 (10/11/2019 12:30 PM)

## 2019-10-11 NOTE — PROGRESS NOTES
10/07/2019 urine drug screen  Negative and inconsistent for oxycodone and its metabolite  Positive and consistent for temazepam and its metabolite  Positive for marijuana

## 2019-10-11 NOTE — TELEPHONE ENCOUNTER
Please contact the patient and make a follow-up with me in 1 month.  Please advise him that he has to take his medication as prescribed and he cannot smoke marijuana or use other substances while being prescribed opioids even including alcohol.

## 2019-10-11 NOTE — PROGRESS NOTES
VANCOMYCIN PHARMACOKINETIC NOTE:  Vancomycin Day # 1    Objective/Assessment:    Diagnosis/Indication for Vancomycin: Surgical Prophylaxis     64 y.o., male; Actual Body Weight = 116 kg (255 lb 11.7 oz).    The patient has the following labs:     10/11/2019 Estimated Creatinine Clearance: 124.6 mL/min (based on SCr of 0.8 mg/dL). Lab Results   Component Value Date    BUN 14 10/10/2019       Lab Results   Component Value Date    WBC 9.46 10/10/2019          Plan:  Adjust vancomycin dose and/or frequency based on the patient's actual weight and renal function:  Initiate Vancomycin 2000 mg IV x 1 dose on call to procedure.  Orders have been entered into patient's chart.    Vancomycin dose = 17.2 mg/kg actual body weight    No vancomycin labs ordered (one time dose only).    Thank you for allowing us to participate in this patient's care.     Eduardo Cantrell 10/11/2019 7:35 AM  Department of Pharmacy  Ext [phone]

## 2019-10-12 VITALS
OXYGEN SATURATION: 98 % | TEMPERATURE: 98 F | RESPIRATION RATE: 22 BRPM | DIASTOLIC BLOOD PRESSURE: 92 MMHG | SYSTOLIC BLOOD PRESSURE: 144 MMHG | HEIGHT: 74 IN | BODY MASS INDEX: 32.9 KG/M2 | HEART RATE: 72 BPM | WEIGHT: 256.38 LBS

## 2019-10-12 LAB
ANION GAP SERPL CALC-SCNC: 9 MMOL/L (ref 8–16)
BUN SERPL-MCNC: 8 MG/DL (ref 8–23)
CALCIUM SERPL-MCNC: 8.2 MG/DL (ref 8.7–10.5)
CHLORIDE SERPL-SCNC: 102 MMOL/L (ref 95–110)
CO2 SERPL-SCNC: 25 MMOL/L (ref 23–29)
CREAT SERPL-MCNC: 0.6 MG/DL (ref 0.5–1.4)
ERYTHROCYTE [DISTWIDTH] IN BLOOD BY AUTOMATED COUNT: 13.3 % (ref 11.5–14.5)
EST. GFR  (AFRICAN AMERICAN): >60 ML/MIN/1.73 M^2
EST. GFR  (NON AFRICAN AMERICAN): >60 ML/MIN/1.73 M^2
GLUCOSE SERPL-MCNC: 140 MG/DL (ref 70–110)
GLUCOSE SERPL-MCNC: 220 MG/DL (ref 70–110)
HCT VFR BLD AUTO: 36.7 % (ref 40–54)
HGB BLD-MCNC: 12.4 G/DL (ref 14–18)
MCH RBC QN AUTO: 31.8 PG (ref 27–31)
MCHC RBC AUTO-ENTMCNC: 33.8 G/DL (ref 32–36)
MCV RBC AUTO: 94 FL (ref 82–98)
PLATELET # BLD AUTO: 208 K/UL (ref 150–350)
PMV BLD AUTO: 10.1 FL (ref 9.2–12.9)
POTASSIUM SERPL-SCNC: 3.8 MMOL/L (ref 3.5–5.1)
RBC # BLD AUTO: 3.9 M/UL (ref 4.6–6.2)
SODIUM SERPL-SCNC: 136 MMOL/L (ref 136–145)
WBC # BLD AUTO: 10.05 K/UL (ref 3.9–12.7)

## 2019-10-12 PROCEDURE — 94761 N-INVAS EAR/PLS OXIMETRY MLT: CPT

## 2019-10-12 PROCEDURE — 25000242 PHARM REV CODE 250 ALT 637 W/ HCPCS: Performed by: THORACIC SURGERY (CARDIOTHORACIC VASCULAR SURGERY)

## 2019-10-12 PROCEDURE — 80048 BASIC METABOLIC PNL TOTAL CA: CPT

## 2019-10-12 PROCEDURE — 85027 COMPLETE CBC AUTOMATED: CPT

## 2019-10-12 PROCEDURE — 63600175 PHARM REV CODE 636 W HCPCS: Performed by: THORACIC SURGERY (CARDIOTHORACIC VASCULAR SURGERY)

## 2019-10-12 PROCEDURE — 25000003 PHARM REV CODE 250: Performed by: THORACIC SURGERY (CARDIOTHORACIC VASCULAR SURGERY)

## 2019-10-12 PROCEDURE — 94640 AIRWAY INHALATION TREATMENT: CPT

## 2019-10-12 PROCEDURE — 27000221 HC OXYGEN, UP TO 24 HOURS

## 2019-10-12 PROCEDURE — 82962 GLUCOSE BLOOD TEST: CPT

## 2019-10-12 RX ORDER — HYDROCODONE BITARTRATE AND ACETAMINOPHEN 5; 325 MG/1; MG/1
1 TABLET ORAL EVERY 4 HOURS PRN
Qty: 30 TABLET | Refills: 0 | Status: SHIPPED | OUTPATIENT
Start: 2019-10-12 | End: 2020-01-06 | Stop reason: CLARIF

## 2019-10-12 RX ORDER — CHLORHEXIDINE GLUCONATE ORAL RINSE 1.2 MG/ML
15 SOLUTION DENTAL 2 TIMES DAILY
Status: DISCONTINUED | OUTPATIENT
Start: 2019-10-12 | End: 2019-10-12 | Stop reason: HOSPADM

## 2019-10-12 RX ADMIN — PRAZOSIN HYDROCHLORIDE 2 MG: 1 CAPSULE ORAL at 08:10

## 2019-10-12 RX ADMIN — TIOTROPIUM BROMIDE 18 MCG: 18 CAPSULE ORAL; RESPIRATORY (INHALATION) at 09:10

## 2019-10-12 RX ADMIN — GABAPENTIN 600 MG: 300 CAPSULE ORAL at 02:10

## 2019-10-12 RX ADMIN — MUPIROCIN 1 G: 20 OINTMENT TOPICAL at 09:10

## 2019-10-12 RX ADMIN — INSULIN ASPART 2 UNITS: 100 INJECTION, SOLUTION INTRAVENOUS; SUBCUTANEOUS at 04:10

## 2019-10-12 RX ADMIN — HYDROMORPHONE HYDROCHLORIDE 1 MG: 1 INJECTION, SOLUTION INTRAMUSCULAR; INTRAVENOUS; SUBCUTANEOUS at 03:10

## 2019-10-12 RX ADMIN — CARVEDILOL 3.12 MG: 3.12 TABLET, FILM COATED ORAL at 08:10

## 2019-10-12 RX ADMIN — HYDROCODONE BITARTRATE AND ACETAMINOPHEN 1 TABLET: 5; 325 TABLET ORAL at 08:10

## 2019-10-12 RX ADMIN — GABAPENTIN 600 MG: 300 CAPSULE ORAL at 08:10

## 2019-10-12 RX ADMIN — FUROSEMIDE 40 MG: 40 TABLET ORAL at 08:10

## 2019-10-12 RX ADMIN — ASPIRIN 325 MG ORAL TABLET 325 MG: 325 PILL ORAL at 08:10

## 2019-10-12 RX ADMIN — METFORMIN HYDROCHLORIDE 500 MG: 500 TABLET, FILM COATED ORAL at 08:10

## 2019-10-12 RX ADMIN — ATORVASTATIN CALCIUM 40 MG: 40 TABLET, FILM COATED ORAL at 08:10

## 2019-10-12 RX ADMIN — ONDANSETRON 4 MG: 2 INJECTION INTRAMUSCULAR; INTRAVENOUS at 08:10

## 2019-10-12 RX ADMIN — APIXABAN 5 MG: 5 TABLET, FILM COATED ORAL at 08:10

## 2019-10-12 RX ADMIN — METFORMIN HYDROCHLORIDE 500 MG: 500 TABLET, FILM COATED ORAL at 04:10

## 2019-10-12 RX ADMIN — PANTOPRAZOLE SODIUM 40 MG: 40 TABLET, DELAYED RELEASE ORAL at 08:10

## 2019-10-12 RX ADMIN — HYDROCODONE BITARTRATE AND ACETAMINOPHEN 1 TABLET: 5; 325 TABLET ORAL at 02:10

## 2019-10-12 NOTE — PLAN OF CARE
POC reviewed with patient: Pain control, monitor for bleeding, safety, ambulation, fall precautions and dietary needs.

## 2019-10-12 NOTE — DISCHARGE SUMMARY
[facility]  Discharge Note  Short Stay    Admit Date: 10/11/2019    Discharge Date and Time: No discharge date for patient encounter.     Attending Physician: Jamari Rueda MD     Discharge Provider: John Feliz    Diagnoses:  Active Hospital Problems    Diagnosis  POA    *Carotid stenosis, bilateral [I65.23]  Yes      Resolved Hospital Problems   No resolved problems to display.       Discharged Condition: stable    Hospital Course: Patient was admitted for a carotid body tumor resection and tolerated the procedure well with no complications.    Final Diagnoses: Same as principal problem.    Disposition: Home or Self Care    Follow up/Patient Instructions:  2 weeks  Medications:  Reconciled Home Medications:      Medication List      START taking these medications    HYDROcodone-acetaminophen 5-325 mg per tablet  Commonly known as:  NORCO  Take 1 tablet by mouth every 4 (four) hours as needed.        CONTINUE taking these medications    albuterol 90 mcg/actuation inhaler  Commonly known as:  PROVENTIL/VENTOLIN HFA  Inhale 2 puffs into the lungs every 4 (four) hours as needed for Shortness of Breath or Wheezing.     apixaban 5 mg Tab  Commonly known as:  ELIQUIS  Take 5 mg by mouth 2 (two) times daily. May resume 8/6/19 in the evening     atorvastatin 40 MG tablet  Commonly known as:  LIPITOR  Take 40 mg by mouth once daily.     carvedilol 3.125 MG tablet  Commonly known as:  COREG  TAKE 1 TABLET BY MOUTH TWICE DAILY     cyclobenzaprine 5 MG tablet  Commonly known as:  FLEXERIL  Take 1 tablet (5 mg total) by mouth 2 (two) times daily as needed for Muscle spasms.     dicyclomine 20 mg tablet  Commonly known as:  BENTYL  Take 20 mg by mouth 3 (three) times daily.     furosemide 40 MG tablet  Commonly known as:  LASIX  Take 40 mg by mouth once daily.     gabapentin 300 MG capsule  Commonly known as:  NEURONTIN  Take 2 capsules (600 mg total) by mouth 3 (three) times daily.     HumaLOG U-100 Insulin 100  unit/mL injection  Generic drug:  insulin lispro  Inject 20 Units into the skin 3 (three) times daily before meals.     metFORMIN 1000 MG tablet  Commonly known as:  GLUCOPHAGE  Take 500 mg by mouth 2 (two) times daily with meals.     oxyCODONE-acetaminophen  mg per tablet  Commonly known as:  PERCOCET  Take 1 tablet by mouth 3 (three) times daily as needed for Pain.  Start taking on:  November 6, 2019     pantoprazole 40 MG tablet  Commonly known as:  PROTONIX  Take 40 mg by mouth once daily.     prazosin 2 MG Cap  Commonly known as:  MINIPRESS  Take 2 mg by mouth 2 (two) times daily.     SPIRIVA WITH HANDIHALER 18 mcg inhalation capsule  Generic drug:  tiotropium  Inhale 18 mcg into the lungs once daily.     temazepam 7.5 MG Cap  Commonly known as:  RESTORIL  Take 15 mg by mouth nightly as needed.     TOUJEO SOLOSTAR U-300 INSULIN 300 unit/mL (1.5 mL) Inpn pen  Generic drug:  insulin glargine (TOUJEO)  Inject 85 Units into the skin every evening.     traZODone 100 MG tablet  Commonly known as:  DESYREL  Take 100 mg by mouth every evening.          Discharge Procedure Orders   Diet diabetic     Remove dressing in 24 hours     Activity as tolerated     Shower on day dressing removed (No bath)     Follow-up Information     Jamari Rueda MD In 2 weeks.    Specialties:  Cardiothoracic Surgery, Vascular Surgery, Cardiovascular Disease, Cardiology  Contact information:  [address] [place] [address]  [phone]                   Discharge Procedure Orders (must include Diet, Follow-up, Activity):   Discharge Procedure Orders (must include Diet, Follow-up, Activity)   Diet diabetic     Remove dressing in 24 hours     Activity as tolerated     Shower on day dressing removed (No bath)

## 2019-10-12 NOTE — PLAN OF CARE
Monitoring neuro signs as ordered with no changes. Receiving pain medication for pain with good results. Reminded of safety precautions. Verbalizes understanding.

## 2019-10-12 NOTE — DISCHARGE INSTRUCTIONS
You may shower today, No baths/do not submerge incision.  You can take your first dose of Norco 5mg/325 at 7PM.  Please see Dr. Rueda in 2-3 weeks.

## 2019-10-12 NOTE — PLAN OF CARE
10/12/19 0926   Patient Assessment/Suction   Level of Consciousness (AVPU) alert   All Lung Fields Breath Sounds clear   PRE-TX-O2   O2 Device (Oxygen Therapy) room air  (nc on sb)   $ Is the patient on Low Flow Oxygen? Yes   SpO2 (!) 92 %   Pulse Oximetry Type Continuous   $ Pulse Oximetry - Multiple Charge Pulse Oximetry - Multiple   Pulse 86   Resp 15   Inhaler   $ Inhaler Charges MDI (Metered Dose Inahler) Treatment   Daily Review of Necessity (Inhaler) completed   Respiratory Treatment Status (Inhaler) given   Treatment Route (Inhaler) mouthpiece   Patient Position (Inhaler) sitting in chair   Post Treatment Assessment (Inhaler) breath sounds unchanged   Signs of Intolerance (Inhaler) none   Breath Sounds Post-Respiratory Treatment   Throughout All Fields Post-Treatment All Fields   Throughout All Fields Post-Treatment clear   Post-treatment Heart Rate (beats/min) 86   Post-treatment Resp Rate (breaths/min) 15

## 2019-10-12 NOTE — PROGRESS NOTES
Progress Note  Cardiothoracic Surgery    Admit Date: 10/11/2019  Post-operative Day: 1 Day Post-Op  Hospital Day: 2    SUBJECTIVE: No complaints     Follow-up For: Procedure(s) (LRB):  EXCISION, MASS (CAROTID BODY TUMOR) (Left)    Scheduled Meds:   apixaban  5 mg Oral BID    aspirin  325 mg Oral Daily    atorvastatin  40 mg Oral Daily    carvedilol  3.125 mg Oral BID    chlorhexidine  15 mL Mouth/Throat BID    furosemide  40 mg Oral Daily    gabapentin  600 mg Oral TID    insulin detemir U-100  85 Units Subcutaneous QHS    metFORMIN  500 mg Oral BID WM    mupirocin  1 g Nasal BID    pantoprazole  40 mg Oral Daily    prazosin  2 mg Oral BID    tiotropium  18 mcg Inhalation Daily    traZODone  100 mg Oral QHS     Infusions/Drips:   clevidipine Stopped (10/12/19 0200)     PRN Meds: albuterol, cyclobenzaprine, dextrose 50%, dextrose 50%, glucagon (human recombinant), glucose, glucose, HYDROcodone-acetaminophen, HYDROmorphone, insulin aspart U-100, ondansetron    Review of patient's allergies indicates:   Allergen Reactions    Bee pollens Anaphylaxis     Bee stings     Penicillins Nausea Only     Other reaction(s): Unknown    Codeine Rash     Other reaction(s): Unknown    Morphine Rash       OBJECTIVE:     Vital Signs (Most Recent)  Temp: 98.1 °F (36.7 °C) (10/12/19 1200)  Pulse: 81 (10/12/19 1500)  Resp: 13 (10/12/19 1500)  BP: (!) 147/107 (10/12/19 1500)  SpO2: (!) 94 % (10/12/19 1500)    Admission Weight: 116 kg (255 lb 11.7 oz) (10/11/19 0714)   Most Recent Weight: 116.3 kg (256 lb 6.3 oz) (10/12/19 0600)    Vital Signs Range (Last 24H):  Temp:  [98 °F (36.7 °C)-98.6 °F (37 °C)]   Pulse:  []   Resp:  [12-19]   BP: (119-151)/()   SpO2:  [92 %-100 %]   Arterial Line BP: ()/(60-84)     I & O (Last 24H):    Intake/Output Summary (Last 24 hours) at 10/12/2019 1759  Last data filed at 10/12/2019 1400  Gross per 24 hour   Intake 180 ml   Output 3490 ml   Net -3310 ml     Physical  Exam:  Awake and alert  Heart RRR  Lungs clear  Neck without hematoma  Neuro intact    Wound/Incision:  clean, dry, intact    Laboratory:  CBC:   Recent Labs   Lab 10/12/19  0405   WBC 10.05   RBC 3.90*   HGB 12.4*   HCT 36.7*      MCV 94   MCH 31.8*   MCHC 33.8     BMP:   Recent Labs   Lab 10/12/19  0405   *      K 3.8      CO2 25   BUN 8   CREATININE 0.6   CALCIUM 8.2*       ASSESSMENT/PLAN:     Assessment: Doing well.    Plan: D?C to home.

## 2019-10-15 LAB
BLD PROD TYP BPU: NORMAL
BLD PROD TYP BPU: NORMAL
BLOOD UNIT EXPIRATION DATE: NORMAL
BLOOD UNIT EXPIRATION DATE: NORMAL
BLOOD UNIT TYPE CODE: 600
BLOOD UNIT TYPE CODE: 600
BLOOD UNIT TYPE: NORMAL
BLOOD UNIT TYPE: NORMAL
CODING SYSTEM: NORMAL
CODING SYSTEM: NORMAL
DISPENSE STATUS: NORMAL
DISPENSE STATUS: NORMAL
NUM UNITS TRANS PACKED RBC: NORMAL
NUM UNITS TRANS PACKED RBC: NORMAL

## 2019-11-06 ENCOUNTER — OFFICE VISIT (OUTPATIENT)
Dept: VASCULAR SURGERY | Facility: CLINIC | Age: 64
End: 2019-11-06
Payer: MEDICARE

## 2019-11-06 VITALS
WEIGHT: 256 LBS | HEART RATE: 83 BPM | BODY MASS INDEX: 32.85 KG/M2 | SYSTOLIC BLOOD PRESSURE: 125 MMHG | DIASTOLIC BLOOD PRESSURE: 80 MMHG | HEIGHT: 74 IN

## 2019-11-06 DIAGNOSIS — D35.5: Primary | ICD-10-CM

## 2019-11-06 PROCEDURE — 99999 PR PBB SHADOW E&M-EST. PATIENT-LVL III: ICD-10-PCS | Mod: PBBFAC,,, | Performed by: THORACIC SURGERY (CARDIOTHORACIC VASCULAR SURGERY)

## 2019-11-06 PROCEDURE — 99213 OFFICE O/P EST LOW 20 MIN: CPT | Mod: PBBFAC,PO | Performed by: THORACIC SURGERY (CARDIOTHORACIC VASCULAR SURGERY)

## 2019-11-06 PROCEDURE — 99024 PR POST-OP FOLLOW-UP VISIT: ICD-10-PCS | Mod: S$PBB,,, | Performed by: THORACIC SURGERY (CARDIOTHORACIC VASCULAR SURGERY)

## 2019-11-06 PROCEDURE — 99999 PR PBB SHADOW E&M-EST. PATIENT-LVL III: CPT | Mod: PBBFAC,,, | Performed by: THORACIC SURGERY (CARDIOTHORACIC VASCULAR SURGERY)

## 2019-11-06 PROCEDURE — 99024 POSTOP FOLLOW-UP VISIT: CPT | Mod: S$PBB,,, | Performed by: THORACIC SURGERY (CARDIOTHORACIC VASCULAR SURGERY)

## 2019-11-06 NOTE — PROGRESS NOTES
This patient is status post resection of carotid body tumor.  He comes back to the office in follow-up.  He has done well and has no major complaints.  On exam vital signs are stable.  His surgical wounds clean and dry.  There is no evidence of infection and healing is proceeding without complication.  Neurologically he is intact.  At this point I explained to the patient he should have no further trouble with this issue.  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  A carotid ultrasound is recommended in 3-4 months.  Based on this further recommendations will be made.

## 2019-12-02 ENCOUNTER — HOSPITAL ENCOUNTER (EMERGENCY)
Facility: HOSPITAL | Age: 64
Discharge: HOME | End: 2019-12-03
Attending: EMERGENCY MEDICINE
Payer: MEDICARE

## 2019-12-02 DIAGNOSIS — R11.10 NON-INTRACTABLE VOMITING, PRESENCE OF NAUSEA NOT SPECIFIED, UNSPECIFIED VOMITING TYPE: ICD-10-CM

## 2019-12-02 DIAGNOSIS — K29.00 ACUTE GASTRITIS WITHOUT HEMORRHAGE, UNSPECIFIED GASTRITIS TYPE: Primary | ICD-10-CM

## 2019-12-02 DIAGNOSIS — R11.10 EMESIS: ICD-10-CM

## 2019-12-02 LAB
ALBUMIN SERPL BCP-MCNC: 4.2 G/DL (ref 3.5–5.2)
ALP SERPL-CCNC: 110 U/L (ref 55–135)
ALT SERPL W/O P-5'-P-CCNC: 23 U/L (ref 10–44)
ANION GAP SERPL CALC-SCNC: 15 MMOL/L (ref 8–16)
AST SERPL-CCNC: 19 U/L (ref 10–40)
BASOPHILS # BLD AUTO: 0.05 K/UL (ref 0–0.2)
BASOPHILS NFR BLD: 0.6 % (ref 0–1.9)
BILIRUB SERPL-MCNC: 0.6 MG/DL (ref 0.1–1)
BUN SERPL-MCNC: 11 MG/DL (ref 8–23)
CALCIUM SERPL-MCNC: 9.6 MG/DL (ref 8.7–10.5)
CHLORIDE SERPL-SCNC: 99 MMOL/L (ref 95–110)
CO2 SERPL-SCNC: 22 MMOL/L (ref 23–29)
CREAT SERPL-MCNC: 1.1 MG/DL (ref 0.5–1.4)
DIFFERENTIAL METHOD: ABNORMAL
EOSINOPHIL # BLD AUTO: 0.1 K/UL (ref 0–0.5)
EOSINOPHIL NFR BLD: 1 % (ref 0–8)
ERYTHROCYTE [DISTWIDTH] IN BLOOD BY AUTOMATED COUNT: 13.2 % (ref 11.5–14.5)
EST. GFR  (AFRICAN AMERICAN): >60 ML/MIN/1.73 M^2
EST. GFR  (NON AFRICAN AMERICAN): >60 ML/MIN/1.73 M^2
GLUCOSE SERPL-MCNC: 197 MG/DL (ref 70–110)
HCT VFR BLD AUTO: 42.5 % (ref 40–54)
HGB BLD-MCNC: 13.7 G/DL (ref 14–18)
IMM GRANULOCYTES # BLD AUTO: 0.02 K/UL (ref 0–0.04)
LIPASE SERPL-CCNC: 18 U/L (ref 4–60)
LYMPHOCYTES # BLD AUTO: 1.3 K/UL (ref 1–4.8)
LYMPHOCYTES NFR BLD: 17.2 % (ref 18–48)
MCH RBC QN AUTO: 30.6 PG (ref 27–31)
MCHC RBC AUTO-ENTMCNC: 32.2 G/DL (ref 32–36)
MCV RBC AUTO: 95 FL (ref 82–98)
MONOCYTES # BLD AUTO: 1 K/UL (ref 0.3–1)
MONOCYTES NFR BLD: 12.2 % (ref 4–15)
NEUTROPHILS # BLD AUTO: 5.4 K/UL (ref 1.8–7.7)
NEUTROPHILS NFR BLD: 68.7 % (ref 38–73)
NRBC BLD-RTO: 0 /100 WBC
PLATELET # BLD AUTO: 182 K/UL (ref 150–350)
PMV BLD AUTO: 9.2 FL (ref 9.2–12.9)
POTASSIUM SERPL-SCNC: 3.7 MMOL/L (ref 3.5–5.1)
PROT SERPL-MCNC: 7.8 G/DL (ref 6–8.4)
RBC # BLD AUTO: 4.48 M/UL (ref 4.6–6.2)
SODIUM SERPL-SCNC: 136 MMOL/L (ref 136–145)
WBC # BLD AUTO: 7.81 K/UL (ref 3.9–12.7)

## 2019-12-02 PROCEDURE — 63600175 PHARM REV CODE 636 W HCPCS: Performed by: EMERGENCY MEDICINE

## 2019-12-02 PROCEDURE — 85025 COMPLETE CBC W/AUTO DIFF WBC: CPT

## 2019-12-02 PROCEDURE — 80053 COMPREHEN METABOLIC PANEL: CPT

## 2019-12-02 PROCEDURE — 81003 URINALYSIS AUTO W/O SCOPE: CPT

## 2019-12-02 PROCEDURE — 25500020 PHARM REV CODE 255: Performed by: EMERGENCY MEDICINE

## 2019-12-02 PROCEDURE — 99285 EMERGENCY DEPT VISIT HI MDM: CPT | Mod: 25

## 2019-12-02 PROCEDURE — 96374 THER/PROPH/DIAG INJ IV PUSH: CPT | Mod: 59

## 2019-12-02 PROCEDURE — 93005 ELECTROCARDIOGRAM TRACING: CPT

## 2019-12-02 PROCEDURE — 36415 COLL VENOUS BLD VENIPUNCTURE: CPT

## 2019-12-02 PROCEDURE — 83690 ASSAY OF LIPASE: CPT

## 2019-12-02 PROCEDURE — 25000003 PHARM REV CODE 250: Performed by: EMERGENCY MEDICINE

## 2019-12-02 RX ORDER — ONDANSETRON 2 MG/ML
4 INJECTION INTRAMUSCULAR; INTRAVENOUS
Status: COMPLETED | OUTPATIENT
Start: 2019-12-02 | End: 2019-12-02

## 2019-12-02 RX ADMIN — IOHEXOL 100 ML: 350 INJECTION, SOLUTION INTRAVENOUS at 11:12

## 2019-12-02 RX ADMIN — ONDANSETRON 4 MG: 2 INJECTION INTRAMUSCULAR; INTRAVENOUS at 10:12

## 2019-12-02 RX ADMIN — LIDOCAINE HYDROCHLORIDE: 20 SOLUTION ORAL; TOPICAL at 10:12

## 2019-12-03 VITALS
DIASTOLIC BLOOD PRESSURE: 83 MMHG | HEIGHT: 74 IN | WEIGHT: 245 LBS | TEMPERATURE: 98 F | SYSTOLIC BLOOD PRESSURE: 144 MMHG | OXYGEN SATURATION: 93 % | HEART RATE: 93 BPM | RESPIRATION RATE: 22 BRPM | BODY MASS INDEX: 31.44 KG/M2

## 2019-12-03 LAB
BILIRUB UR QL STRIP: NEGATIVE
CLARITY UR: CLEAR
COLOR UR: YELLOW
GLUCOSE UR QL STRIP: NEGATIVE
HGB UR QL STRIP: NEGATIVE
KETONES UR QL STRIP: NEGATIVE
LEUKOCYTE ESTERASE UR QL STRIP: NEGATIVE
NITRITE UR QL STRIP: NEGATIVE
PH UR STRIP: 6 [PH] (ref 5–8)
PROT UR QL STRIP: NEGATIVE
SP GR UR STRIP: 1.01 (ref 1–1.03)
URN SPEC COLLECT METH UR: NORMAL
UROBILINOGEN UR STRIP-ACNC: 1 EU/DL

## 2019-12-03 RX ORDER — ONDANSETRON 4 MG/1
4 TABLET, ORALLY DISINTEGRATING ORAL EVERY 8 HOURS PRN
Qty: 12 TABLET | Refills: 0 | OUTPATIENT
Start: 2019-12-03 | End: 2020-01-06

## 2019-12-03 RX ORDER — DICYCLOMINE HYDROCHLORIDE 20 MG/1
20 TABLET ORAL 2 TIMES DAILY
Qty: 20 TABLET | Refills: 0 | Status: SHIPPED | OUTPATIENT
Start: 2019-12-03 | End: 2020-01-02

## 2019-12-03 NOTE — ED NOTES
Pt presents to the ED with complaints of non bloody emesis which has been ongoing x 4 days and is associated with mid abdominal cramping. Pt reports he had diarrhea a week ago but has been having normal bowel movement recently. He denies other complaints. Bed rails are up and call light is within patient reach. Will continue to monitor.
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
## 2019-12-04 ENCOUNTER — TELEPHONE (OUTPATIENT)
Dept: PAIN MEDICINE | Facility: CLINIC | Age: 64
End: 2019-12-04
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
## 2019-12-05 RX ORDER — OXYCODONE AND ACETAMINOPHEN 10; 325 MG/1; MG/1
1 TABLET ORAL 3 TIMES DAILY PRN
Qty: 90 TABLET | Refills: 0 | Status: SHIPPED | OUTPATIENT
Start: 2019-12-05 | End: 2020-01-02 | Stop reason: SDUPTHER

## 2019-12-06 ENCOUNTER — TELEPHONE (OUTPATIENT)
Dept: PAIN MEDICINE | Facility: CLINIC | Age: 64
End: 2019-12-06

## 2019-12-06 NOTE — TELEPHONE ENCOUNTER
----- Message from Melissa Arroyo sent at 12/6/2019 10:09 AM CST -----  Contact: self [phone]  Patient requesting same day appointment due to need pain med refill.  Please call patient at [phone]. Thanks!

## 2020-01-02 ENCOUNTER — OFFICE VISIT (OUTPATIENT)
Dept: PAIN MEDICINE | Facility: CLINIC | Age: 65
End: 2020-01-02
Payer: MEDICARE

## 2020-01-02 VITALS
HEART RATE: 68 BPM | WEIGHT: 265.56 LBS | OXYGEN SATURATION: 97 % | BODY MASS INDEX: 34.09 KG/M2 | RESPIRATION RATE: 18 BRPM | DIASTOLIC BLOOD PRESSURE: 63 MMHG | SYSTOLIC BLOOD PRESSURE: 135 MMHG | TEMPERATURE: 96 F

## 2020-01-02 DIAGNOSIS — M51.36 DDD (DEGENERATIVE DISC DISEASE), LUMBAR: Primary | ICD-10-CM

## 2020-01-02 DIAGNOSIS — Z79.891 OPIOID CONTRACT EXISTS: ICD-10-CM

## 2020-01-02 DIAGNOSIS — M96.1 POSTLAMINECTOMY SYNDROME OF LUMBAR REGION: ICD-10-CM

## 2020-01-02 PROCEDURE — 99213 PR OFFICE/OUTPT VISIT, EST, LEVL III, 20-29 MIN: ICD-10-PCS | Mod: S$PBB,,, | Performed by: PHYSICIAN ASSISTANT

## 2020-01-02 PROCEDURE — 99213 OFFICE O/P EST LOW 20 MIN: CPT | Mod: S$PBB,,, | Performed by: PHYSICIAN ASSISTANT

## 2020-01-02 PROCEDURE — 99999 PR PBB SHADOW E&M-EST. PATIENT-LVL V: CPT | Mod: PBBFAC,,, | Performed by: PHYSICIAN ASSISTANT

## 2020-01-02 PROCEDURE — 99999 PR PBB SHADOW E&M-EST. PATIENT-LVL V: ICD-10-PCS | Mod: PBBFAC,,, | Performed by: PHYSICIAN ASSISTANT

## 2020-01-02 PROCEDURE — 99215 OFFICE O/P EST HI 40 MIN: CPT | Mod: PBBFAC,PN | Performed by: PHYSICIAN ASSISTANT

## 2020-01-02 PROCEDURE — 80307 DRUG TEST PRSMV CHEM ANLYZR: CPT

## 2020-01-02 RX ORDER — OXYCODONE AND ACETAMINOPHEN 10; 325 MG/1; MG/1
1 TABLET ORAL 3 TIMES DAILY PRN
Qty: 90 TABLET | Refills: 0 | Status: SHIPPED | OUTPATIENT
Start: 2020-01-05 | End: 2020-02-03 | Stop reason: SDUPTHER

## 2020-01-06 ENCOUNTER — HOSPITAL ENCOUNTER (EMERGENCY)
Facility: HOSPITAL | Age: 65
Discharge: HOME | End: 2020-01-06
Attending: EMERGENCY MEDICINE
Payer: MEDICARE

## 2020-01-06 VITALS
BODY MASS INDEX: 34.01 KG/M2 | OXYGEN SATURATION: 87 % | WEIGHT: 265 LBS | HEART RATE: 89 BPM | RESPIRATION RATE: 18 BRPM | HEIGHT: 74 IN | DIASTOLIC BLOOD PRESSURE: 74 MMHG | TEMPERATURE: 97 F | SYSTOLIC BLOOD PRESSURE: 159 MMHG

## 2020-01-06 DIAGNOSIS — R10.13 EPIGASTRIC PAIN: ICD-10-CM

## 2020-01-06 DIAGNOSIS — R11.2 NON-INTRACTABLE VOMITING WITH NAUSEA, UNSPECIFIED VOMITING TYPE: Primary | ICD-10-CM

## 2020-01-06 LAB
6MAM UR QL: NOT DETECTED
7AMINOCLONAZEPAM UR QL: NOT DETECTED
A-OH ALPRAZ UR QL: NOT DETECTED
ALBUMIN SERPL BCP-MCNC: 4.2 G/DL (ref 3.5–5.2)
ALP SERPL-CCNC: 96 U/L (ref 55–135)
ALPRAZ UR QL: NOT DETECTED
ALT SERPL W/O P-5'-P-CCNC: 20 U/L (ref 10–44)
AMPHET UR QL SCN: NOT DETECTED
ANION GAP SERPL CALC-SCNC: 14 MMOL/L (ref 8–16)
ANNOTATION COMMENT IMP: NORMAL
ANNOTATION COMMENT IMP: NORMAL
AST SERPL-CCNC: 18 U/L (ref 10–40)
BARBITURATES UR QL: NOT DETECTED
BASOPHILS # BLD AUTO: 0.07 K/UL (ref 0–0.2)
BASOPHILS NFR BLD: 0.6 % (ref 0–1.9)
BILIRUB SERPL-MCNC: 0.4 MG/DL (ref 0.1–1)
BUN SERPL-MCNC: 15 MG/DL (ref 8–23)
BUPRENORPHINE UR QL: NOT DETECTED
BZE UR QL: NOT DETECTED
CALCIUM SERPL-MCNC: 9.6 MG/DL (ref 8.7–10.5)
CARBOXYTHC UR QL: PRESENT
CARISOPRODOL UR QL: NOT DETECTED
CHLORIDE SERPL-SCNC: 103 MMOL/L (ref 95–110)
CLONAZEPAM UR QL: NOT DETECTED
CO2 SERPL-SCNC: 21 MMOL/L (ref 23–29)
CODEINE UR QL: NOT DETECTED
CREAT SERPL-MCNC: 1 MG/DL (ref 0.5–1.4)
CREAT UR-MCNC: 27.8 MG/DL (ref 20–400)
DIAZEPAM UR QL: NOT DETECTED
DIFFERENTIAL METHOD: ABNORMAL
EOSINOPHIL # BLD AUTO: 0.3 K/UL (ref 0–0.5)
EOSINOPHIL NFR BLD: 2.3 % (ref 0–8)
ERYTHROCYTE [DISTWIDTH] IN BLOOD BY AUTOMATED COUNT: 13.8 % (ref 11.5–14.5)
EST. GFR  (AFRICAN AMERICAN): >60 ML/MIN/1.73 M^2
EST. GFR  (NON AFRICAN AMERICAN): >60 ML/MIN/1.73 M^2
ETHYL GLUCURONIDE UR QL: NOT DETECTED
FENTANYL UR QL: NOT DETECTED
GLUCOSE SERPL-MCNC: 139 MG/DL (ref 70–110)
HCT VFR BLD AUTO: 43.1 % (ref 40–54)
HGB BLD-MCNC: 14 G/DL (ref 14–18)
HYDROCODONE UR QL: PRESENT
HYDROMORPHONE UR QL: NOT DETECTED
IMM GRANULOCYTES # BLD AUTO: 0.04 K/UL (ref 0–0.04)
LIPASE SERPL-CCNC: 30 U/L (ref 4–60)
LORAZEPAM UR QL: NOT DETECTED
LYMPHOCYTES # BLD AUTO: 2.5 K/UL (ref 1–4.8)
LYMPHOCYTES NFR BLD: 23.5 % (ref 18–48)
MCH RBC QN AUTO: 30.8 PG (ref 27–31)
MCHC RBC AUTO-ENTMCNC: 32.5 G/DL (ref 32–36)
MCV RBC AUTO: 95 FL (ref 82–98)
MDA UR QL: NOT DETECTED
MDEA UR QL: NOT DETECTED
MDMA UR QL: NOT DETECTED
ME-PHENIDATE UR QL: NOT DETECTED
MEPERIDINE UR QL: NOT DETECTED
METHADONE UR QL: NOT DETECTED
METHAMPHET UR QL: NOT DETECTED
MIDAZOLAM UR QL SCN: NOT DETECTED
MONOCYTES # BLD AUTO: 0.7 K/UL (ref 0.3–1)
MONOCYTES NFR BLD: 6.6 % (ref 4–15)
MORPHINE UR QL: NOT DETECTED
NEUTROPHILS # BLD AUTO: 7.2 K/UL (ref 1.8–7.7)
NEUTROPHILS NFR BLD: 66.6 % (ref 38–73)
NORBUPRENORPHINE UR QL CFM: NOT DETECTED
NORDIAZEPAM UR QL: NOT DETECTED
NORFENTANYL UR QL: NOT DETECTED
NORHYDROCODONE UR QL CFM: PRESENT
NOROXYCODONE UR QL CFM: NOT DETECTED
NOROXYMORPHONE: NOT DETECTED
NRBC BLD-RTO: 0 /100 WBC
OXAZEPAM UR QL: PRESENT
OXYCODONE UR QL: NOT DETECTED
OXYMORPHONE UR QL: NOT DETECTED
PATHOLOGY STUDY: NORMAL
PCP UR QL: NOT DETECTED
PHENTERMINE UR QL: NOT DETECTED
PLATELET # BLD AUTO: 261 K/UL (ref 150–350)
PMV BLD AUTO: 9.7 FL (ref 9.2–12.9)
POTASSIUM SERPL-SCNC: 4.7 MMOL/L (ref 3.5–5.1)
PROPOXYPH UR QL: NOT DETECTED
PROT SERPL-MCNC: 8.2 G/DL (ref 6–8.4)
RBC # BLD AUTO: 4.54 M/UL (ref 4.6–6.2)
SERVICE CMNT-IMP: NORMAL
SODIUM SERPL-SCNC: 138 MMOL/L (ref 136–145)
TAPENTADOL UR QL SCN: NOT DETECTED
TAPENTADOL-O-SULF: NOT DETECTED
TEMAZEPAM UR QL: PRESENT
TRAMADOL UR QL: NOT DETECTED
WBC # BLD AUTO: 10.8 K/UL (ref 3.9–12.7)
ZOLPIDEM UR QL: NOT DETECTED

## 2020-01-06 PROCEDURE — 25500020 PHARM REV CODE 255: Performed by: EMERGENCY MEDICINE

## 2020-01-06 PROCEDURE — 36415 COLL VENOUS BLD VENIPUNCTURE: CPT

## 2020-01-06 PROCEDURE — 96361 HYDRATE IV INFUSION ADD-ON: CPT

## 2020-01-06 PROCEDURE — 25000003 PHARM REV CODE 250: Performed by: EMERGENCY MEDICINE

## 2020-01-06 PROCEDURE — 99285 EMERGENCY DEPT VISIT HI MDM: CPT | Mod: 25

## 2020-01-06 PROCEDURE — 85025 COMPLETE CBC W/AUTO DIFF WBC: CPT

## 2020-01-06 PROCEDURE — 93005 ELECTROCARDIOGRAM TRACING: CPT

## 2020-01-06 PROCEDURE — 83690 ASSAY OF LIPASE: CPT

## 2020-01-06 PROCEDURE — 80053 COMPREHEN METABOLIC PANEL: CPT

## 2020-01-06 PROCEDURE — 63600175 PHARM REV CODE 636 W HCPCS: Performed by: EMERGENCY MEDICINE

## 2020-01-06 PROCEDURE — 96374 THER/PROPH/DIAG INJ IV PUSH: CPT

## 2020-01-06 RX ORDER — ONDANSETRON 4 MG/1
4 TABLET, ORALLY DISINTEGRATING ORAL EVERY 8 HOURS PRN
Qty: 12 TABLET | Refills: 0 | Status: SHIPPED | OUTPATIENT
Start: 2020-01-06 | End: 2021-05-18 | Stop reason: SDUPTHER

## 2020-01-06 RX ORDER — MAG HYDROX/ALUMINUM HYD/SIMETH 200-200-20
30 SUSPENSION, ORAL (FINAL DOSE FORM) ORAL
Status: COMPLETED | OUTPATIENT
Start: 2020-01-06 | End: 2020-01-06

## 2020-01-06 RX ORDER — ONDANSETRON 2 MG/ML
4 INJECTION INTRAMUSCULAR; INTRAVENOUS
Status: COMPLETED | OUTPATIENT
Start: 2020-01-06 | End: 2020-01-06

## 2020-01-06 RX ORDER — QUETIAPINE FUMARATE 50 MG/1
50 TABLET, FILM COATED ORAL NIGHTLY
COMMUNITY
End: 2020-06-29

## 2020-01-06 RX ADMIN — ALUMINUM HYDROXIDE, MAGNESIUM HYDROXIDE, AND SIMETHICONE 30 ML: 200; 200; 20 SUSPENSION ORAL at 05:01

## 2020-01-06 RX ADMIN — ONDANSETRON 4 MG: 2 INJECTION INTRAMUSCULAR; INTRAVENOUS at 05:01

## 2020-01-06 RX ADMIN — SODIUM CHLORIDE 1000 ML: 0.9 INJECTION, SOLUTION INTRAVENOUS at 05:01

## 2020-01-06 RX ADMIN — IOHEXOL 100 ML: 350 INJECTION, SOLUTION INTRAVENOUS at 06:01
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
## 2020-01-06 NOTE — PROGRESS NOTES
CC:back pain    HPI: The patient is a 63-year-old man with a history of peripheral vascular disease, diabetes, cervical DDD, lumbar postlaminectomy syndrome who presents in followup.  He returns in follow-up today with severe left low back pain radiating to the left lateral hip and lateral thigh.  He describes this as sharp, worse with standing and walking, improved with sitting.  He reports mild relief with his pain medication.  He denies weakness, numbness, bladder or bowel incontinence.    Pain intervention history: He has seen several other providers including Dr. Victor and Dr. Win Mckeon.  He has a cervical dorsal column stimulator which provides relief of neck pain.He had taken higher dose pain medication in the past but presented to us taking 60 mg of MS Contin t.i.d. and oxycodone 15 mg one to 2 tablets t.i.d.  He is taking gabapentin 300 mg t.i.d.  Baclofen 20 mg t.i.d.  Amitriptyline 25 mg q.h.s. He had undergone fusion L4-S1 in 2012.    ROS:He reports shortness of breath, weight gain, back pain, joint stiffness, and loss of balance.  Balance of review of systems is negative.    Medical, surgical, family and social history reviewed elsewhere in record.    Medications/Allergies: See med card    Vitals:    01/02/20 1056   BP: 135/63   Pulse: 68   Resp: 18   Temp: 96.2 °F (35.7 °C)   TempSrc: Oral   SpO2: 97%   Weight: 120.5 kg (265 lb 8.7 oz)   PainSc:   8   PainLoc: Back         Physical exam:  Gen: A and O x3, pleasant, well-groomed  Skin:  Taut skin in legs with purplish hue bilateral lower legs.   HEENT: PERRLA  CVS: Regular rate and rhythm, normal S1 and S2, no murmurs.  Resp: Clear to auscultation bilaterally, no wheezes or rales.  Abdomen: Soft, NT/ND, normal bowel sounds present.  Musculoskeletal:  Slow, cautious gait.    Neuro:  Upper extremities: 5/5 strength bilaterally  Lower extremities: 5/5 strength bilaterally   Reflexes: Brachioradialis 0+, Bicep 0+, Tricep 0+, Patellar 0+, Achilles  0+ bilaterally.  Sensory:  Intact and symmetrical to light touch and pinprick in C2-T1 dermatomes bilaterally.  Intact and symmetrical to light touch and pinprick in L2-S1 dermatomes bilaterally, except for L4-S1 distribution right leg decreased sensation.     Lumbar spine:  Range of motion is mildly limited with flexion and extension with increased left low back and left buttock pain during each maneuver but especially with flexion.  Straight leg raise causes left buttock and left lateral thigh pain.  Internal and external rotation of hip is negative bilaterally.  Matteo's test is negative bilaterally.  Myofascial exam:  No tenderness to palpation to the lumbar paraspinous muscles.      Imagin12 Xray L-spine  There is disk desiccation and disk space narrowing at L5-S1. There is mild diffuse disk space narrowing elsewhere throughout the lumbar spine. Osteophytes project from vertebral bodies and there is sclerosis and posterior facets. There is no acute compression or subluxation.    CT L-spine 7/15/13  T12-L1: Left posterior lateral disk bulging and osteophyte formation is present causing mild left foraminal narrowing the central canal and right foraminal intact.  L1/2 Mild annular disk bulge and osteophyte formation is present causing a mild canal and left foraminal narrowing.  L2/3: Degenerative facet changes combine with disk bulging to produce mild left greater than right foraminal narrowing. Central canal is mildly distorted.  L3/4: There is mild annular disk bulging and degenerative facet changes are present. There is mild canal and foraminal distortion.  L4/5: At this level levels of prior laminectomy canal canal visualization is limited. The bony foramina appear grossly intact and there is no evidence of malalignment.  L5/S1: Moderate degenerative facet changes are present and there is some bony foraminal narrowing on the left. There is evidence of prior laminectomy and the right foramen is intact.  The bony canal soft tissue visualization is limited but it is grossly intact the degenerative changes are seen in the sacroiliac joints.    X-ray thoracic spine 12/18/14  Evidence of anterior cervical fusion is noted in the lower cervical spine. Neurostimulator appears to be present entering at approximately the T10-T11 level with its tip at the T8 level. Vertebral body heights appear well preserved. Alignment appears adequate. Osseous demineralization is noted.   T7-T8 T8-T9 and T9-T10 mild intervertebral disk height loss appears to be present.     X-ray thoracic and lumbar spine 12/28/15  Comparison: Thoracic spine radiographs-12/18/2014  Thoracic spine: There has been interval development of a new minor superior endplate compression fracture at the superior endplate of T7. There are two neurostimulator leads present within the posterior aspect of the thoracic spinal canal which terminating at approximately T7. No retropulsion. There is multilevel degenerative change of the thoracic spine in the form of marginal osteophyte formation and intervertebral disc space narrowing. There is suddenly observed postoperative change of ACDF at C5-C7. The heads of the C7 vertebral body screws do not appear firmly apposed to the adjacent metallic fixation plate.  Lumbar spine:There are postoperative changes of posterior intermittent fusion at L4-S1 with interbody device is noted at L4-L5 and L5-S1. No hardware complication. There is an IVC filter present. There is degenerative change of the lumbar spine in the form of marginal osteophyte formation and disc space narrowing.     CT cervical spine 3/7/16  Anterior plate fusion is noted at the C5, C6, and C7 levels with intervertebral disk prostheses at the C5-C6 C6-C7 levels.  There is loss of the normal cervical lordosis which may be positional or related to muscular strain.  Vertebral body heights appear well-preserved.  At the C2-C3 level, mild disk osteophyte spurring is noted  of approximately 2-3 mm.  No significant neuroforaminal stenosis is noted, minimal central canal narrowing is noted.  At the C3-C4 level mild disk osteophyte spurring is noted of approximately 2 mm.  No significant nerve foraminal narrowing is noted.  Minimal central canal narrowing is noted.  At the C4-C5 level, mild disk osteophyte bulge is noted centrally of up to 3 mm.  Facet arthropathy is noted bilaterally.  Moderate bilateral neuroforaminal narrowing appears to be present.  Mild central canal narrowing is noted.  At the C5-C6 level, a disk prosthesis is noted with metallic artifact hindering ideal evaluation.  Uncovertebral spurring and facet arthropathy appears to be present right greater than left with moderate right neural foraminal stenosis and mild to moderate left neural foraminal stenosis.  Mild to moderate central canal narrowing is suspected but not ideally evaluated  At the C6-C7 level, uncovertebral spurring is noted bilaterally.  Moderate to severe left-sided neuroforaminal stenosis is noted with moderate right-sided neuroforaminal stenosis.  Mild to moderate central canals narrowing is suspected.  Evaluation is hindered by metallic artifact.  At the C7-T1 level, uncovertebral spurring is noted to the right greater than left with severe right neural foraminal stenosis suspected and mild left neural foraminal stenosis with mild central canal is stenosis suspected.  Evaluation of this level is hindered by metallic artifact      Assessment:  The patient is a 64-year-old man with a history of peripheral vascular disease, diabetes, cervical DDD, lumbar postlaminectomy syndrome who presents in followup.      1. DDD (degenerative disc disease), lumbar    2. Postlaminectomy syndrome of lumbar region    3. Opioid contract exists        Plan:  1.  We discussed his urine drug screen results and he admitted to running out of his medication and smoking marijuana.  He states he quit smoking marijuana 4 days  ago.  He verbalizes understanding that he must take his medication as prescribed and not use other substances.  This has been reviewed with Dr. Buckley and he provided a prescription for oxycodone-acetaminophen 10/325 milligrams up to 3 times a day as needed for pain.  2.  He still has not met with the [employer] representative.  I encouraged him to do so and I also ordered a new lumbar spine CT to further evaluate his left leg pain. He is going to have his A1c checked next week and we may consider an injection if appropriate.  3.  Follow-up in 1 month or sooner as needed.  A new urine drug screen will be completed at that time.

## 2020-01-07 ENCOUNTER — DOCUMENTATION ONLY (OUTPATIENT)
Dept: PAIN MEDICINE | Facility: CLINIC | Age: 65
End: 2020-01-07

## 2020-01-07 NOTE — PROGRESS NOTES
01/02/2020 urine drug screen  Negative and inconsistent for oxycodone  Positive and inconsistent for hydrocodone however was prescribed hydrocodone in October for a carotid mass excision.  Positive for marijuana.

## 2020-01-07 NOTE — ED NOTES
Pt in room with no s/sx fof distress noted. VSS. Pt denies needs at present. VSS. ED work up in progress. Will monitor prn.

## 2020-01-12 ENCOUNTER — HOSPITAL ENCOUNTER (EMERGENCY)
Facility: HOSPITAL | Age: 65
Discharge: HOME | End: 2020-01-12
Attending: EMERGENCY MEDICINE
Payer: MEDICARE

## 2020-01-12 VITALS
SYSTOLIC BLOOD PRESSURE: 173 MMHG | DIASTOLIC BLOOD PRESSURE: 87 MMHG | BODY MASS INDEX: 34.01 KG/M2 | RESPIRATION RATE: 16 BRPM | HEART RATE: 91 BPM | HEIGHT: 74 IN | TEMPERATURE: 98 F | OXYGEN SATURATION: 98 % | WEIGHT: 265 LBS
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
## 2020-01-12 DIAGNOSIS — R52 PAIN: ICD-10-CM

## 2020-01-12 PROCEDURE — 99283 EMERGENCY DEPT VISIT LOW MDM: CPT | Mod: 25

## 2020-01-12 RX ORDER — SULFAMETHOXAZOLE AND TRIMETHOPRIM 800; 160 MG/1; MG/1
1 TABLET ORAL 2 TIMES DAILY
Qty: 14 TABLET | Refills: 0 | Status: SHIPPED | OUTPATIENT
Start: 2020-01-12 | End: 2020-01-19
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
## 2020-01-12 NOTE — ED NOTES
Pt in room 14 for evaluation of left leg pain from hitting with shopping cart.  Pt is awake, alert and oriented. Resp even and unlabored. Pt denies any other complaints. Reddened area noted to left shin.

## 2020-01-24 RX ORDER — POTASSIUM CHLORIDE 20 MEQ/1
20 TABLET, EXTENDED RELEASE ORAL DAILY
COMMUNITY
Start: 2019-12-26

## 2020-01-24 RX ORDER — LAMOTRIGINE 25 MG/1
25 TABLET ORAL 4 TIMES DAILY
COMMUNITY
Start: 2019-12-26 | End: 2022-04-26

## 2020-01-24 RX ORDER — VORTIOXETINE 10 MG/1
1 TABLET, FILM COATED ORAL DAILY
COMMUNITY
Start: 2019-12-26 | End: 2022-04-26

## 2020-01-24 RX ORDER — ARIPIPRAZOLE 400 MG
400 KIT INTRAMUSCULAR
COMMUNITY
Start: 2019-12-26 | End: 2022-03-14 | Stop reason: SDUPTHER

## 2020-01-27 ENCOUNTER — INITIAL CONSULT (OUTPATIENT)
Dept: PULMONOLOGY | Facility: CLINIC | Age: 65
End: 2020-01-27
Payer: MEDICARE

## 2020-01-27 VITALS
HEIGHT: 74 IN | OXYGEN SATURATION: 96 % | DIASTOLIC BLOOD PRESSURE: 74 MMHG | SYSTOLIC BLOOD PRESSURE: 124 MMHG | BODY MASS INDEX: 33.62 KG/M2 | WEIGHT: 262 LBS | HEART RATE: 97 BPM

## 2020-01-27 DIAGNOSIS — Z72.0 TOBACCO USE: ICD-10-CM

## 2020-01-27 DIAGNOSIS — R91.1 PULMONARY NODULE: ICD-10-CM

## 2020-01-27 DIAGNOSIS — I50.22 CHRONIC SYSTOLIC HEART FAILURE: ICD-10-CM

## 2020-01-27 DIAGNOSIS — J44.9 COPD SUGGESTED BY INITIAL EVALUATION: Primary | ICD-10-CM

## 2020-01-27 DIAGNOSIS — Z93.0 S/P EMERGENCY TRACHEOTOMY FOR ASSISTANCE IN BREATHING: ICD-10-CM

## 2020-01-27 PROCEDURE — 99204 PR OFFICE/OUTPT VISIT, NEW, LEVL IV, 45-59 MIN: ICD-10-PCS | Mod: S$GLB,,, | Performed by: INTERNAL MEDICINE

## 2020-01-27 PROCEDURE — 99204 OFFICE O/P NEW MOD 45 MIN: CPT | Mod: S$GLB,,, | Performed by: INTERNAL MEDICINE
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
## 2020-01-27 NOTE — PATIENT INSTRUCTIONS
Get CT chest, PFTs and 6 min walk test.  Continue spiriva and albuterol.  Continue nightly oxygen.  Follow up in 2 months.    Jamir Gomez MD

## 2020-01-29 ENCOUNTER — HOSPITAL ENCOUNTER (OUTPATIENT)
Dept: RADIOLOGY | Facility: HOSPITAL | Age: 65
Discharge: HOME | End: 2020-01-29
Attending: INTERNAL MEDICINE
Payer: MEDICARE

## 2020-01-29 DIAGNOSIS — Z72.0 TOBACCO USE: ICD-10-CM

## 2020-01-29 PROCEDURE — 71250 CT THORAX DX C-: CPT | Mod: TC,PO

## 2020-02-03 ENCOUNTER — OFFICE VISIT (OUTPATIENT)
Dept: PAIN MEDICINE | Facility: CLINIC | Age: 65
End: 2020-02-03
Payer: MEDICARE

## 2020-02-03 VITALS
DIASTOLIC BLOOD PRESSURE: 60 MMHG | WEIGHT: 258.19 LBS | RESPIRATION RATE: 18 BRPM | BODY MASS INDEX: 33.15 KG/M2 | TEMPERATURE: 96 F | HEART RATE: 86 BPM | OXYGEN SATURATION: 95 % | SYSTOLIC BLOOD PRESSURE: 127 MMHG

## 2020-02-03 DIAGNOSIS — M51.36 DDD (DEGENERATIVE DISC DISEASE), LUMBAR: ICD-10-CM

## 2020-02-03 DIAGNOSIS — M96.1 POSTLAMINECTOMY SYNDROME OF LUMBAR REGION: Primary | ICD-10-CM

## 2020-02-03 DIAGNOSIS — M54.16 LUMBAR RADICULOPATHY: ICD-10-CM

## 2020-02-03 DIAGNOSIS — Z79.891 OPIOID CONTRACT EXISTS: ICD-10-CM

## 2020-02-03 PROCEDURE — 80307 DRUG TEST PRSMV CHEM ANLYZR: CPT

## 2020-02-03 PROCEDURE — 99999 PR PBB SHADOW E&M-EST. PATIENT-LVL V: ICD-10-PCS | Mod: PBBFAC,,, | Performed by: PHYSICIAN ASSISTANT

## 2020-02-03 PROCEDURE — 99213 PR OFFICE/OUTPT VISIT, EST, LEVL III, 20-29 MIN: ICD-10-PCS | Mod: S$PBB,,, | Performed by: PHYSICIAN ASSISTANT

## 2020-02-03 PROCEDURE — 99215 OFFICE O/P EST HI 40 MIN: CPT | Mod: PBBFAC,PN | Performed by: PHYSICIAN ASSISTANT

## 2020-02-03 PROCEDURE — 99999 PR PBB SHADOW E&M-EST. PATIENT-LVL V: CPT | Mod: PBBFAC,,, | Performed by: PHYSICIAN ASSISTANT

## 2020-02-03 PROCEDURE — 99213 OFFICE O/P EST LOW 20 MIN: CPT | Mod: S$PBB,,, | Performed by: PHYSICIAN ASSISTANT

## 2020-02-03 RX ORDER — OXYCODONE AND ACETAMINOPHEN 10; 325 MG/1; MG/1
1 TABLET ORAL 3 TIMES DAILY PRN
Qty: 90 TABLET | Refills: 0 | Status: SHIPPED | OUTPATIENT
Start: 2020-02-04 | End: 2020-03-05

## 2020-02-03 RX ORDER — CYCLOBENZAPRINE HCL 5 MG
5 TABLET ORAL 2 TIMES DAILY PRN
Qty: 60 TABLET | Refills: 2 | Status: SHIPPED | OUTPATIENT
Start: 2020-02-03 | End: 2020-10-07 | Stop reason: SDUPTHER

## 2020-02-03 NOTE — PROGRESS NOTES
CC:back pain    HPI: The patient is a 63-year-old man with a history of peripheral vascular disease, diabetes, cervical DDD, lumbar postlaminectomy syndrome who presents in followup.  He returns in follow-up today with back pain.  He complains of left low back pain radiating to the left lateral thigh.  This is worse with walking and standing, improved with sitting.  It has improved slightly since his last visit and he did not get the lumbar spine CT that I ordered.  He denies smoking any marijuana and reports taking his medication as prescribed.  He denies weakness, numbness, bladder or bowel incontinence.    Pain intervention history: He has seen several other providers including Dr. Victor and Dr. Win Mckeon.  He has a cervical dorsal column stimulator which provides relief of neck pain.He had taken higher dose pain medication in the past but presented to us taking 60 mg of MS Contin t.i.d. and oxycodone 15 mg one to 2 tablets t.i.d.  He is taking gabapentin 300 mg t.i.d.  Baclofen 20 mg t.i.d.  Amitriptyline 25 mg q.h.s. He had undergone fusion L4-S1 in 2012.    ROS:He reports shortness of breath, weight gain, back pain, joint stiffness, and loss of balance.  Balance of review of systems is negative.    Medical, surgical, family and social history reviewed elsewhere in record.    Medications/Allergies: See med card    Vitals:    02/03/20 1037   BP: 127/60   Pulse: 86   Resp: 18   Temp: 96.2 °F (35.7 °C)   TempSrc: Oral   SpO2: 95%   Weight: 117.1 kg (258 lb 2.5 oz)   PainSc:   6   PainLoc: Back         Physical exam:  Gen: A and O x3, pleasant, well-groomed  Skin:  Taut skin in legs with purplish hue bilateral lower legs.   HEENT: PERRLA  CVS: Regular rate and rhythm, normal S1 and S2, no murmurs.  Resp: Clear to auscultation bilaterally, no wheezes or rales.  Abdomen: Soft, NT/ND, normal bowel sounds present.  Musculoskeletal:  Slow, cautious gait.    Neuro:  Upper extremities: 5/5 strength  bilaterally  Lower extremities: 5/5 strength bilaterally   Reflexes: Brachioradialis 0+, Bicep 0+, Tricep 0+, Patellar 0+, Achilles 0+ bilaterally.  Sensory:  Intact and symmetrical to light touch and pinprick in C2-T1 dermatomes bilaterally.  Intact and symmetrical to light touch and pinprick in L2-S1 dermatomes bilaterally, except for L4-S1 distribution right leg decreased sensation.     Lumbar spine:  Range of motion is mildly limited with flexion and extension with increased left low back and left buttock pain during each maneuver but especially with flexion.  Straight leg raise causes left buttock and left lateral thigh pain.  Internal and external rotation of hip is negative bilaterally.  Matteo's test is negative bilaterally.  Myofascial exam:  No tenderness to palpation to the lumbar paraspinous muscles.      Imagin12 Xray L-spine  There is disk desiccation and disk space narrowing at L5-S1. There is mild diffuse disk space narrowing elsewhere throughout the lumbar spine. Osteophytes project from vertebral bodies and there is sclerosis and posterior facets. There is no acute compression or subluxation.    CT L-spine 7/15/13  T12-L1: Left posterior lateral disk bulging and osteophyte formation is present causing mild left foraminal narrowing the central canal and right foraminal intact.  L1/2 Mild annular disk bulge and osteophyte formation is present causing a mild canal and left foraminal narrowing.  L2/3: Degenerative facet changes combine with disk bulging to produce mild left greater than right foraminal narrowing. Central canal is mildly distorted.  L3/4: There is mild annular disk bulging and degenerative facet changes are present. There is mild canal and foraminal distortion.  L4/5: At this level levels of prior laminectomy canal canal visualization is limited. The bony foramina appear grossly intact and there is no evidence of malalignment.  L5/S1: Moderate degenerative facet changes are  present and there is some bony foraminal narrowing on the left. There is evidence of prior laminectomy and the right foramen is intact. The bony canal soft tissue visualization is limited but it is grossly intact the degenerative changes are seen in the sacroiliac joints.    X-ray thoracic spine 12/18/14  Evidence of anterior cervical fusion is noted in the lower cervical spine. Neurostimulator appears to be present entering at approximately the T10-T11 level with its tip at the T8 level. Vertebral body heights appear well preserved. Alignment appears adequate. Osseous demineralization is noted.   T7-T8 T8-T9 and T9-T10 mild intervertebral disk height loss appears to be present.     X-ray thoracic and lumbar spine 12/28/15  Comparison: Thoracic spine radiographs-12/18/2014  Thoracic spine: There has been interval development of a new minor superior endplate compression fracture at the superior endplate of T7. There are two neurostimulator leads present within the posterior aspect of the thoracic spinal canal which terminating at approximately T7. No retropulsion. There is multilevel degenerative change of the thoracic spine in the form of marginal osteophyte formation and intervertebral disc space narrowing. There is suddenly observed postoperative change of ACDF at C5-C7. The heads of the C7 vertebral body screws do not appear firmly apposed to the adjacent metallic fixation plate.  Lumbar spine:There are postoperative changes of posterior intermittent fusion at L4-S1 with interbody device is noted at L4-L5 and L5-S1. No hardware complication. There is an IVC filter present. There is degenerative change of the lumbar spine in the form of marginal osteophyte formation and disc space narrowing.     CT cervical spine 3/7/16  Anterior plate fusion is noted at the C5, C6, and C7 levels with intervertebral disk prostheses at the C5-C6 C6-C7 levels.  There is loss of the normal cervical lordosis which may be positional or  related to muscular strain.  Vertebral body heights appear well-preserved.  At the C2-C3 level, mild disk osteophyte spurring is noted of approximately 2-3 mm.  No significant neuroforaminal stenosis is noted, minimal central canal narrowing is noted.  At the C3-C4 level mild disk osteophyte spurring is noted of approximately 2 mm.  No significant nerve foraminal narrowing is noted.  Minimal central canal narrowing is noted.  At the C4-C5 level, mild disk osteophyte bulge is noted centrally of up to 3 mm.  Facet arthropathy is noted bilaterally.  Moderate bilateral neuroforaminal narrowing appears to be present.  Mild central canal narrowing is noted.  At the C5-C6 level, a disk prosthesis is noted with metallic artifact hindering ideal evaluation.  Uncovertebral spurring and facet arthropathy appears to be present right greater than left with moderate right neural foraminal stenosis and mild to moderate left neural foraminal stenosis.  Mild to moderate central canal narrowing is suspected but not ideally evaluated  At the C6-C7 level, uncovertebral spurring is noted bilaterally.  Moderate to severe left-sided neuroforaminal stenosis is noted with moderate right-sided neuroforaminal stenosis.  Mild to moderate central canals narrowing is suspected.  Evaluation is hindered by metallic artifact.  At the C7-T1 level, uncovertebral spurring is noted to the right greater than left with severe right neural foraminal stenosis suspected and mild left neural foraminal stenosis with mild central canal is stenosis suspected.  Evaluation of this level is hindered by metallic artifact      Assessment:  The patient is a 64-year-old man with a history of peripheral vascular disease, diabetes, cervical DDD, lumbar postlaminectomy syndrome who presents in followup.      1. Postlaminectomy syndrome of lumbar region    2. DDD (degenerative disc disease), lumbar    3. Lumbar radiculopathy    4. Opioid contract exists        Plan:  1.   Dr. Buckley provided a prescription for oxycodone-acetaminophen 10/325 milligrams up to 3 times daily as needed for pain.  Another urine drug screening  was collected today.  If he tests negative for his medication or positive for marijuana, Dr. Buckley will no longer provide controlled substances to him.  He is going to call for his next 2 prescriptions.  I refilled Flexeril.  I have reviewed the [place] Board of Pharmacy website and there are no abberancies.    2.  Follow-up in 3 months or sooner as needed.

## 2020-02-07 ENCOUNTER — HOSPITAL ENCOUNTER (OUTPATIENT)
Dept: PULMONOLOGY | Facility: HOSPITAL | Age: 65
Discharge: HOME | End: 2020-02-07
Attending: INTERNAL MEDICINE
Payer: MEDICARE

## 2020-02-07 VITALS — WEIGHT: 250 LBS | HEIGHT: 74 IN | BODY MASS INDEX: 32.08 KG/M2

## 2020-02-07 DIAGNOSIS — J44.9 COPD SUGGESTED BY INITIAL EVALUATION: ICD-10-CM

## 2020-02-07 LAB
6MAM UR QL: NOT DETECTED
7AMINOCLONAZEPAM UR QL: NOT DETECTED
A-OH ALPRAZ UR QL: NOT DETECTED
ALPRAZ UR QL: NOT DETECTED
AMPHET UR QL SCN: NOT DETECTED
ANNOTATION COMMENT IMP: NORMAL
ANNOTATION COMMENT IMP: NORMAL
BARBITURATES UR QL: NOT DETECTED
BUPRENORPHINE UR QL: NOT DETECTED
BZE UR QL: NOT DETECTED
CARBOXYTHC UR QL: PRESENT
CARISOPRODOL UR QL: NOT DETECTED
CLONAZEPAM UR QL: NOT DETECTED
CODEINE UR QL: NOT DETECTED
CREAT UR-MCNC: 58.8 MG/DL (ref 20–400)
DIAZEPAM UR QL: NOT DETECTED
ETHYL GLUCURONIDE UR QL: NOT DETECTED
FENTANYL UR QL: NOT DETECTED
HYDROCODONE UR QL: NOT DETECTED
HYDROMORPHONE UR QL: NOT DETECTED
LORAZEPAM UR QL: NOT DETECTED
MDA UR QL: NOT DETECTED
MDEA UR QL: NOT DETECTED
MDMA UR QL: NOT DETECTED
ME-PHENIDATE UR QL: NOT DETECTED
MEPERIDINE UR QL: NOT DETECTED
METHADONE UR QL: NOT DETECTED
METHAMPHET UR QL: NOT DETECTED
MIDAZOLAM UR QL SCN: NOT DETECTED
MORPHINE UR QL: NOT DETECTED
NORBUPRENORPHINE UR QL CFM: NOT DETECTED
NORDIAZEPAM UR QL: NOT DETECTED
NORFENTANYL UR QL: NOT DETECTED
NORHYDROCODONE UR QL CFM: NOT DETECTED
NOROXYCODONE UR QL CFM: PRESENT
NOROXYMORPHONE: NOT DETECTED
OXAZEPAM UR QL: PRESENT
OXYCODONE UR QL: PRESENT
OXYMORPHONE UR QL: NOT DETECTED
PATHOLOGY STUDY: NORMAL
PCP UR QL: NOT DETECTED
PHENTERMINE UR QL: NOT DETECTED
PROPOXYPH UR QL: NOT DETECTED
SERVICE CMNT-IMP: NORMAL
TAPENTADOL UR QL SCN: NOT DETECTED
TAPENTADOL-O-SULF: NOT DETECTED
TEMAZEPAM UR QL: PRESENT
TRAMADOL UR QL: NOT DETECTED
ZOLPIDEM UR QL: NOT DETECTED

## 2020-02-07 PROCEDURE — 94060 EVALUATION OF WHEEZING: CPT

## 2020-02-07 PROCEDURE — 94727 GAS DIL/WSHOT DETER LNG VOL: CPT

## 2020-02-07 PROCEDURE — 94010 BREATHING CAPACITY TEST: CPT | Mod: XB

## 2020-02-07 PROCEDURE — 94729 DIFFUSING CAPACITY: CPT

## 2020-02-07 PROCEDURE — 94618 PULMONARY STRESS TESTING: CPT

## 2020-02-07 NOTE — CARE UPDATE
"   02/07/20 1234   6MW Test   Ordering Provider Dr. Jamir Gomez   Diagnosis Qualify for Oxygen   Height 6' 2" (1.88 m)   Weight 113.4 kg (250 lb)   BMI (Calculated) 32.1   Predicted Distance 355.82   Patient Race    Patient Reported Dizziness;Lightheadedness;Dyspnea;Leg pain   Was O2 used? Yes   Delivery Method Cannula;Pull Tank   6MW Distance walked (feet) 1000 feet   Distance walked (meters) 304.8 meters   Did patient stop? Yes   How many times? 1   Stop Time 1 4 seconds   Restart Time 1 0 seconds   Did patient restart? No   Type of assistive device(s) used? no assistive devices   Is extra documentation required for this patient? Yes   Pre-Exercise   Oxygen Saturation 95 %   Supplemental Oxygen Room Air   Heart Rate 86 bpm   [place] Dyspnea Rating  very light   Exercise 1 Minute   Oxygen Saturation 94 %   Supplemental Oxygen Room Air   Heart Rate 94 bpm   Exercise 2 Minutes   Oxygen Saturation 87 %   Supplemental Oxygen Room Air   Heart Rate 120 bpm   Exercise 3 Minutes   Oxygen Saturation 89 %   Supplemental Oxygen 2 L/M   Heart Rate 114 bpm   Exercise 4 Minutes   Oxygen Saturation 95 %   Supplemental Oxygen 2 L/M   Heart Rate 123 bpm   Post Exercise   Oxygen Saturation 95 %   Supplemental Oxygen 2 L/M   Heart Rate 90 bpm   [place] Dyspnea Rating  heavy   Interpretation   Is procedure ready for interpretation? Yes   Did the patient stop or pause? Yes   How many times did the patient stop or pause? 1   Stop Time 1 4   Restart Time 1 0   Pause Time 1 0 seconds   Total Time Walked (Calculated) 360 seconds   Total Laps Walked 5   Final Partial Lap Distance (feet) 0 feet   Total Distance Feet (Calculated) 1000 feet   Total Distance Meters (Calculated) 304.8 meters   Predicted Distance Meters (Calculated) 588.28 meters   Percentage of Predicted (Calculated) 51.81   Peak VO2 (Calculated) 13.12   Mets 3.75   Comments This is a hypoxemic 6 min. walk. Patient requires 2lpm of supplemental oxygen with ambulation.    Oxygen " Qualification   Oxygen Qualification? Yes   Pre-Exercise (For Oxygen Qualification Only)   Oxygen Saturation 95 %   Supplemental Oxygen Room Air   Heart Rate 86 bpm   Recovery (For Oxygen Qualification Only)   Oxygen Saturation 95 %   Supplemental Oxygen 2 L/M   Heart Rate 90 bpm   [place] Dyspnea Rating  somewhat heavy

## 2020-02-10 ENCOUNTER — TELEPHONE (OUTPATIENT)
Dept: PAIN MEDICINE | Facility: CLINIC | Age: 65
End: 2020-02-10

## 2020-02-10 NOTE — TELEPHONE ENCOUNTER
Please let the patient know that we received his urine drug screen and he tested positive for marijuana again.  For this reason, Dr. Buckley will no longer provide controlled substances for him and he will need to seek care elsewhere.  Please mail letter.

## 2020-02-10 NOTE — LETTER
February 10, 2020    Kevan Calderon Colin Sr.  [address]  [place] [address]             [place] [facility]  [address]  [place] [address]  Phone: [phone]  Fax: [phone] Due to you testing positive for marijuana, I will no longer be prescribing any narcotics to you. Please contact me if you have any questions or concerns.    Sincerely,      Edward Buckley MD

## 2020-02-17 DIAGNOSIS — J44.9 COPD SUGGESTED BY INITIAL EVALUATION: Primary | ICD-10-CM

## 2020-03-05 RX ORDER — ONDANSETRON 4 MG/1
TABLET, FILM COATED ORAL
COMMUNITY
Start: 2020-02-10 | End: 2020-06-08 | Stop reason: SDUPTHER

## 2020-03-09 ENCOUNTER — OFFICE VISIT (OUTPATIENT)
Dept: PULMONOLOGY | Facility: CLINIC | Age: 65
End: 2020-03-09
Payer: MEDICARE

## 2020-03-09 VITALS
BODY MASS INDEX: 34.52 KG/M2 | OXYGEN SATURATION: 99 % | DIASTOLIC BLOOD PRESSURE: 67 MMHG | WEIGHT: 269 LBS | HEART RATE: 91 BPM | HEIGHT: 74 IN | SYSTOLIC BLOOD PRESSURE: 120 MMHG

## 2020-03-09 DIAGNOSIS — E66.01 MORBID OBESITY: ICD-10-CM

## 2020-03-09 DIAGNOSIS — I26.99 PULMONARY EMBOLISM, UNSPECIFIED CHRONICITY, UNSPECIFIED PULMONARY EMBOLISM TYPE, UNSPECIFIED WHETHER ACUTE COR PULMONALE PRESENT: ICD-10-CM

## 2020-03-09 DIAGNOSIS — G47.33 OSA (OBSTRUCTIVE SLEEP APNEA): ICD-10-CM

## 2020-03-09 DIAGNOSIS — J44.9 COPD, GROUP B, BY GOLD 2017 CLASSIFICATION: Primary | ICD-10-CM

## 2020-03-09 DIAGNOSIS — J96.11 CHRONIC HYPOXEMIC RESPIRATORY FAILURE: ICD-10-CM

## 2020-03-09 PROCEDURE — 99213 PR OFFICE/OUTPT VISIT, EST, LEVL III, 20-29 MIN: ICD-10-PCS | Mod: S$GLB,,, | Performed by: INTERNAL MEDICINE

## 2020-03-09 PROCEDURE — 99213 OFFICE O/P EST LOW 20 MIN: CPT | Mod: S$GLB,,, | Performed by: INTERNAL MEDICINE

## 2020-03-09 RX ORDER — BUDESONIDE AND FORMOTEROL FUMARATE DIHYDRATE 80; 4.5 UG/1; UG/1
2 AEROSOL RESPIRATORY (INHALATION) 2 TIMES DAILY
Qty: 10.2 G | Refills: 5 | Status: SHIPPED | OUTPATIENT
Start: 2020-03-09 | End: 2022-04-26

## 2020-03-09 RX ORDER — ALBUTEROL SULFATE 90 UG/1
2 AEROSOL, METERED RESPIRATORY (INHALATION) EVERY 4 HOURS PRN
Qty: 18 G | Refills: 3 | Status: SHIPPED | OUTPATIENT
Start: 2020-03-09 | End: 2020-09-25 | Stop reason: SDUPTHER

## 2020-03-09 NOTE — PATIENT INSTRUCTIONS
What is COPD?  COPD stands for chronic obstructive pulmonary disease. It means the airways in your lungs are blocked (obstructed). Because of this, it is hard to breathe. You may have trouble with daily activities because of shortness of breath. Over time the shortness of breath usually worsens making it more and more difficult to take care of yourself and take part in activities. Chronic bronchitis and emphysema are two common types of COPD.  What happens in chronic bronchitis?    The cells in the airways make more mucus than normal. The mucus builds up, narrowing the airways. This means less air travels into and out of the lungs. The lining of the airways may also become inflamed (swollen) and causes the airways to narrow even more.        What happens in emphysema?    The small airways are damaged and lose their stretchiness. The airways collapse when you exhale, causing air to get trapped in the air sacs. This means that less oxygen enters the blood vessels and less oxygen is delivered to all of the cells of your body. This makes it hard to breathe.     Damage to cilia    Cilia are small hairs that line and protect the airways. Smoking damages the cilia. Damaged cilia cant sweep mucus and particles away. Some of the cilia are destroyed. This damage worsens COPD.  How did I get COPD?  Most people get COPD from smoking. Cigarette smoke damages lungs, which can develop into COPD over many years.  How COPD affects you  COPD makes you work harder to breathe. Air may get trapped in the lungs, which prevents your lungs from filling completely with fresh oxygen-filled air when you inhale (breathe in). It's harder to take deep breaths especially when you are active and start breathing faster. Over time, your lungs may become enlarged filling the lung with air that does not transfer oxygen into the blood. These problems cause you to have shortness of breath (also called dyspnea). Wheezing (hoarse, whistling breathing),  chronic cough, and fatigue (feeling tired and worn out) are also common.   Date Last Reviewed: 5/1/2016  © 2914-2153 [employer]. [address] [place] [address], [place] [address]. All rights reserved. This information is not intended as a substitute for professional medical care. Always follow your healthcare professional's instructions.          Treatment for COPD    Your healthcare provider will prescribe the best treatments for your COPD.  Treatment  Recommendations include the following:  · Medicines. Some medicines help relieve symptoms when you have them. Others are taken daily to control inflammation in the lungs. Always take your medicines as prescribed. Learn the names of your medicines, as well as how and when to use them.  · Oxygen therapy. Oxygen may be prescribed if tests show that your blood contains too little oxygen.  · Smoking. If you smoke, quit. Smoking is the main cause of COPD. Quitting will help you be able to better manage your COPD. Ask your healthcare provider about ways to help you quit smoking.  · Avoiding infections. Infections, like a cold or the flu, can cause your symptoms to worsen. Try to stay away from people who are sick. Wash your hands often. And, ask your healthcare provider about vaccines for the flu and pneumonia.  Coping with shortness of breath  Coping tips include the following:  · Exercise. Try to be as active as possible. This will improve energy levels and strengthen your muscles, so you can do more.  · Breathing techniques. Ask your healthcare provider or nurse to show you how to do pursed-lip breathing.  · Balance rest and activity. Each day, try to balance rest periods with activity. For example, you might start the day with getting dressed and eating breakfast, then relax and read the paper. After that, take a brief walk. And then sit with your feet up for a while.  · Pulmonary rehabilitation. Ask your provider, or call your local hospital to find out about  pulmonary rehab programs. The programs help with managing your disease, breathing techniques, exercise, support and counseling.  · Healthy eating. Eating a healthy, balanced diet and making an effort to maintain your ideal weight are important to staying as healthy as possible. Make sure you have a lot of fruit and vegetables every day, as well as balanced portions of whole grains, lean meats and fish, and low-fat dairy products.  Date Last Reviewed: 5/1/2016  © 7335-5335 [employer], [employer]. [address] [place] [address]. All rights reserved. This information is not intended as a substitute for professional medical care. Always follow your healthcare professional's instructions.

## 2020-03-09 NOTE — PROGRESS NOTES
[facility]  Pulmonology  Follow-Up Clinic Visit    Subjective:     Reason for Visit:    Kevan Colin Sr. is a 64 y.o. male followed for COPD.    Chief Complaint   Patient presents with    Follow-up    COPD      COPD:   He is here for follow-up for confirmed COPD.   His weight has decreased 3 pounds over last 2 weeks.  His appetite has been unchanged.  The patient is having no constitutional symptoms, denying fever, chills, anorexia, or weight loss..    He is currently prescribed albuterol rescue inhaler and Spiriva.  The patient reports adherence to this regimen. He has not had any adverse reactions or side effects to medications.    He is on oxygen at 2 L/min per nasal cannula..  He has a history of 60 pack years..   The patient is experiencing exercise intolerance (difficulty walking 1 blocks on flat ground)..  He report 0 exacerbations and has never been hospitalized in the past year.      Review of Systems   Constitutional: Positive for weight loss. Negative for fever, chills and night sweats.   HENT: Negative for postnasal drip, rhinorrhea, sinus pressure and congestion.    Eyes: Negative for redness and itching.   Respiratory: Positive for cough, shortness of breath, wheezing and dyspnea on extertion. Negative for hemoptysis, sputum production and orthopnea.    Cardiovascular: Positive for leg swelling. Negative for chest pain and palpitations.   Genitourinary: Negative for difficulty urinating and hematuria.   Endocrine: Negative for polydipsia, polyphagia and polyuria.    Musculoskeletal: Negative for gait problem, joint swelling and myalgias.   Skin: Negative for rash.   Gastrointestinal: Negative for nausea, vomiting, abdominal pain and acid reflux.   Neurological: Negative for syncope, weakness and headaches.   Hematological: Negative for adenopathy. Does not bruise/bleed easily and no excessive bruising.   Psychiatric/Behavioral: Negative for confusion and sleep disturbance. The patient  "is not nervous/anxious.    All other systems reviewed and are negative.     Outpatient Medications as of 3/9/2020   Medication    ABILIFY MAINTENA 400 mg sers syringe    albuterol (PROVENTIL/VENTOLIN HFA) 90 mcg/actuation inhaler    apixaban 5 mg Tab    atorvastatin (LIPITOR) 40 MG tablet    carvedilol (COREG) 3.125 MG tablet    cyclobenzaprine (FLEXERIL) 5 MG tablet    dicyclomine (BENTYL) 20 mg tablet    furosemide (LASIX) 40 MG tablet    gabapentin (NEURONTIN) 300 MG capsule    insulin glargine, TOUJEO, (TOUJEO SOLOSTAR U-300 INSULIN) 300 unit/mL (1.5 mL) InPn pen    insulin lispro (HUMALOG) 100 unit/mL injection    lamoTRIgine (LAMICTAL) 25 MG tablet    metFORMIN (GLUCOPHAGE) 500 MG tablet    ondansetron (ZOFRAN) 4 MG tablet    ondansetron (ZOFRAN-ODT) 4 MG TbDL    pantoprazole (PROTONIX) 40 MG tablet    potassium chloride SA (K-DUR,KLOR-CON) 20 MEQ tablet    prazosin (MINIPRESS) 2 MG Cap    QUEtiapine (SEROQUEL) 50 MG tablet    SPIRIVA WITH HANDIHALER 18 mcg inhalation capsule    temazepam (RESTORIL) 15 mg Cap    TRINTELLIX 10 mg Tab    budesonide-formoterol 80-4.5 mcg (SYMBICORT) 80-4.5 mcg/actuation HFAA     No current facility-administered medications on file as of 3/9/2020.       Previous Reports Reviewed:   ER records, historical medical records, lab reports, nursing home notes, office notes, operative reports, radiology reports, referral letter/letters and x-ray reports   The following portions of the patient's history were reviewed and updated as appropriate: allergies, current medications, past family history, past medical history, past social history, past surgical history and problem list.      Objective:     /67 (BP Location: Left arm, Patient Position: Sitting, BP Method: Large (Automatic))   Pulse 91   Ht 6' 2" (1.88 m)   Wt 122 kg (269 lb)   SpO2 99%   BMI 34.54 kg/m²   Body mass index is 34.54 kg/m².     Physical Exam   Constitutional: He is oriented to person, " place, and time. He appears well-developed and well-nourished. He appears not cachectic.  Non-toxic appearance. No distress. He is not obese.   HENT:   Head: Normocephalic and atraumatic.   Right Ear: External ear normal.   Left Ear: External ear normal.   Nose: Nose normal.   Mouth/Throat: Uvula is midline, oropharynx is clear and moist and mucous membranes are normal. Mallampati Score: II.   Neck: Trachea normal and normal range of motion. Neck supple. No JVD present. No tracheal deviation present. No thyromegaly present.   Cardiovascular: Normal rate, regular rhythm, normal heart sounds and intact distal pulses. Exam reveals no gallop and no friction rub.   No murmur heard.  Pulmonary/Chest: Normal expansion, symmetric chest wall expansion, effort normal and breath sounds normal. No stridor. He has no wheezes. He has no rhonchi. He has no rales.   Abdominal: Soft. Bowel sounds are normal. He exhibits no distension. There is no tenderness. There is no guarding.   Musculoskeletal: Normal range of motion. He exhibits no edema, tenderness or deformity.   Lymphadenopathy: No supraclavicular adenopathy is present.     He has no cervical adenopathy.     He has no axillary adenopathy.   Neurological: He is alert and oriented to person, place, and time. He has normal strength. No cranial nerve deficit or sensory deficit. He exhibits normal muscle tone. GCS eye subscore is 4. GCS verbal subscore is 5. GCS motor subscore is 6.   Skin: Skin is warm, dry and intact. No rash noted. He is not diaphoretic. No cyanosis. Nails show no clubbing.        Psychiatric: He has a normal mood and affect. His speech is normal and behavior is normal.   Nursing note and vitals reviewed.     Personal Review of Relevant Diagnostic Studies:  I have personally reviewed and interpreted the following labs/studies/images.   CT of chest  o 2/2020:  - 1. No pulmonary nodules identified.  - 2. Focus of atelectasis versus consolidation noted at the  left posterior costophrenic angle.  Recommend clinical correlation and radiographic follow-up.  - 3. Centrilobular emphysematous changes, worse in the upper lobes.   Pulmonary function tests:   o FEV1: 3.50  (95 % predicted), FVC:  5.57 (114 % predicted), FEV1/FVC:  63, T.98 (129 % predicted), RV/TLVC: 45 (112 % predicted), DLCO: 19.00 (65 % predicted), Mild obstruction with gas trapping; mild diffusion impaiment   Pulse oximetry, exercise 82% RA   Pulse oximetry, restin% RA      Assessment:     1. COPD, group B, by GOLD 2017 classification    2. JOSE ALBERTO (obstructive sleep apnea)    3. Pulmonary embolism, unspecified chronicity, unspecified pulmonary embolism type, unspecified whether acute cor pulmonale present    4. Morbid obesity    5. Chronic hypoxemic respiratory failure      Impression:  COPD GOLD B      Plan:     · Start symbicort.  · Continue spiriva and albuterol  · Continue 2L NC with exercise.  · Continue to work on losing weight.  · Refer to pulmonary rehab.     I informed the patient of my working diagnosis, it's etiology, risk factors, expected symptoms, diagnostic work up, treatment options and prognosis., I personally reviewed the results of relevant imaging/labs/studies with the patient, and discussed their clinical significance., I spent >10 minutes counseling the patient about their condition., Plan discussed with the patient, who is in agreement., Opportunity provided for the the patient to voice any additional questions or concerns., All questions were answered to the patient's satisfaction., Educational material provided and Patient given date for next visit.    No follow-ups on file.    Orders Placed This Visit:  Orders Placed This Encounter   Procedures    Ambulatory referral/consult to Pulmonary Rehab     Standing Status:   Future     Standing Expiration Date:   2021     Referral Priority:   Routine     Referral Type:   Consultation     Referral Reason:   Specialty Services  Required     Requested Specialty:   Pulmonary Disease     Number of Visits Requested:   1       Jamir Gomez MD  Pulmonary / Critical Care Medicine  [facility]

## 2020-03-11 ENCOUNTER — TELEPHONE (OUTPATIENT)
Dept: CARDIAC REHAB | Facility: HOSPITAL | Age: 65
End: 2020-03-11

## 2020-04-28 ENCOUNTER — HOSPITAL ENCOUNTER (EMERGENCY)
Facility: HOSPITAL | Age: 65
Discharge: HOME | End: 2020-04-28
Attending: EMERGENCY MEDICINE
Payer: MEDICARE

## 2020-04-28 VITALS
DIASTOLIC BLOOD PRESSURE: 87 MMHG | RESPIRATION RATE: 20 BRPM | HEIGHT: 74 IN | OXYGEN SATURATION: 97 % | WEIGHT: 269 LBS | HEART RATE: 95 BPM | TEMPERATURE: 98 F | BODY MASS INDEX: 34.52 KG/M2 | SYSTOLIC BLOOD PRESSURE: 123 MMHG

## 2020-04-28 DIAGNOSIS — S81.811A LACERATION OF RIGHT LOWER EXTREMITY, INITIAL ENCOUNTER: Primary | ICD-10-CM

## 2020-04-28 PROCEDURE — 12002 RPR S/N/AX/GEN/TRNK2.6-7.5CM: CPT

## 2020-04-28 PROCEDURE — 25000003 PHARM REV CODE 250: Performed by: EMERGENCY MEDICINE

## 2020-04-28 PROCEDURE — 99283 EMERGENCY DEPT VISIT LOW MDM: CPT | Mod: 25

## 2020-04-28 RX ORDER — IBUPROFEN 600 MG/1
600 TABLET ORAL
Status: COMPLETED | OUTPATIENT
Start: 2020-04-28 | End: 2020-04-28

## 2020-04-28 RX ORDER — CEPHALEXIN 500 MG/1
500 CAPSULE ORAL EVERY 6 HOURS
Qty: 28 CAPSULE | Refills: 0 | Status: SHIPPED | OUTPATIENT
Start: 2020-04-28 | End: 2020-05-05

## 2020-04-28 RX ORDER — LIDOCAINE HYDROCHLORIDE AND EPINEPHRINE 10; 10 MG/ML; UG/ML
10 INJECTION, SOLUTION INFILTRATION; PERINEURAL ONCE
Status: COMPLETED | OUTPATIENT
Start: 2020-04-28 | End: 2020-04-28

## 2020-04-28 RX ORDER — CEPHALEXIN 250 MG/1
500 CAPSULE ORAL
Status: COMPLETED | OUTPATIENT
Start: 2020-04-28 | End: 2020-04-28

## 2020-04-28 RX ADMIN — IBUPROFEN 600 MG: 600 TABLET, FILM COATED ORAL at 02:04

## 2020-04-28 RX ADMIN — LIDOCAINE HYDROCHLORIDE,EPINEPHRINE BITARTRATE 10 ML: 10; .01 INJECTION, SOLUTION INFILTRATION; PERINEURAL at 02:04

## 2020-04-28 RX ADMIN — CEPHALEXIN 500 MG: 250 CAPSULE ORAL at 02:04
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
## 2020-05-05 ENCOUNTER — HOSPITAL ENCOUNTER (EMERGENCY)
Facility: HOSPITAL | Age: 65
Discharge: HOME | End: 2020-05-05
Attending: EMERGENCY MEDICINE
Payer: MEDICARE

## 2020-05-05 VITALS
TEMPERATURE: 98 F | SYSTOLIC BLOOD PRESSURE: 116 MMHG | HEART RATE: 94 BPM | WEIGHT: 269 LBS | RESPIRATION RATE: 16 BRPM | DIASTOLIC BLOOD PRESSURE: 62 MMHG | BODY MASS INDEX: 34.54 KG/M2 | OXYGEN SATURATION: 95 %

## 2020-05-05 DIAGNOSIS — Z48.02 VISIT FOR SUTURE REMOVAL: Primary | ICD-10-CM

## 2020-05-05 DIAGNOSIS — L03.119 CELLULITIS OF LOWER EXTREMITY, UNSPECIFIED LATERALITY: ICD-10-CM

## 2020-05-05 PROCEDURE — 25000003 PHARM REV CODE 250: Performed by: EMERGENCY MEDICINE

## 2020-05-05 PROCEDURE — 99284 EMERGENCY DEPT VISIT MOD MDM: CPT

## 2020-05-05 RX ORDER — SULFAMETHOXAZOLE AND TRIMETHOPRIM 800; 160 MG/1; MG/1
1 TABLET ORAL 2 TIMES DAILY
Qty: 14 TABLET | Refills: 0 | Status: SHIPPED | OUTPATIENT
Start: 2020-05-05 | End: 2020-05-12

## 2020-05-05 RX ORDER — DICLOFENAC SODIUM 50 MG/1
50 TABLET, DELAYED RELEASE ORAL 3 TIMES DAILY
Qty: 15 TABLET | Refills: 0 | Status: SHIPPED | OUTPATIENT
Start: 2020-05-05 | End: 2020-06-29

## 2020-05-05 RX ORDER — SULFAMETHOXAZOLE AND TRIMETHOPRIM 800; 160 MG/1; MG/1
1 TABLET ORAL
Status: COMPLETED | OUTPATIENT
Start: 2020-05-05 | End: 2020-05-05

## 2020-05-05 RX ADMIN — BACITRACIN ZINC, POLYMYXIN B SULFATE, NEOMYCIN SULFATE 1 EACH: 400; 5000; 3.5 OINTMENT TOPICAL at 12:05

## 2020-05-05 RX ADMIN — SULFAMETHOXAZOLE AND TRIMETHOPRIM 1 TABLET: 800; 160 TABLET ORAL at 12:05
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
## 2020-05-11 DIAGNOSIS — I65.23 BILATERAL CAROTID ARTERY STENOSIS: Primary | ICD-10-CM

## 2020-06-08 ENCOUNTER — OFFICE VISIT (OUTPATIENT)
Dept: PULMONOLOGY | Facility: CLINIC | Age: 65
End: 2020-06-08
Payer: MEDICARE

## 2020-06-08 ENCOUNTER — HOSPITAL ENCOUNTER (INPATIENT)
Facility: HOSPITAL | Age: 65
LOS: 3 days | Discharge: HOME | DRG: 287 | End: 2020-06-13
Attending: EMERGENCY MEDICINE | Admitting: FAMILY MEDICINE
Payer: MEDICARE

## 2020-06-08 VITALS
SYSTOLIC BLOOD PRESSURE: 110 MMHG | DIASTOLIC BLOOD PRESSURE: 70 MMHG | OXYGEN SATURATION: 93 % | WEIGHT: 261 LBS | BODY MASS INDEX: 33.51 KG/M2 | HEART RATE: 81 BPM

## 2020-06-08 DIAGNOSIS — I25.10 CORONARY ARTERY DISEASE, ANGINA PRESENCE UNSPECIFIED, UNSPECIFIED VESSEL OR LESION TYPE, UNSPECIFIED WHETHER NATIVE OR TRANSPLANTED HEART: ICD-10-CM

## 2020-06-08 DIAGNOSIS — R07.9 CHEST PAIN, UNSPECIFIED TYPE: Primary | ICD-10-CM

## 2020-06-08 DIAGNOSIS — J44.9 COPD, GROUP B, BY GOLD 2017 CLASSIFICATION: ICD-10-CM

## 2020-06-08 DIAGNOSIS — R07.9 CHEST PAIN: ICD-10-CM

## 2020-06-08 DIAGNOSIS — I50.22 CHRONIC SYSTOLIC HEART FAILURE: ICD-10-CM

## 2020-06-08 DIAGNOSIS — I20.0 UNSTABLE ANGINA: ICD-10-CM

## 2020-06-08 DIAGNOSIS — I25.9 CHEST PAIN DUE TO MYOCARDIAL ISCHEMIA: ICD-10-CM

## 2020-06-08 PROBLEM — J96.02 ACUTE RESPIRATORY FAILURE WITH HYPOXIA AND HYPERCARBIA: Status: RESOLVED | Noted: 2017-08-14 | Resolved: 2020-06-08

## 2020-06-08 PROBLEM — F11.90 CHRONIC, CONTINUOUS USE OF OPIOIDS: Status: ACTIVE | Noted: 2020-06-08

## 2020-06-08 PROBLEM — J15.20 STAPHYLOCOCCAL PNEUMONIA: Status: RESOLVED | Noted: 2017-08-14 | Resolved: 2020-06-08

## 2020-06-08 PROBLEM — J96.01 ACUTE RESPIRATORY FAILURE WITH HYPOXIA AND HYPERCARBIA: Status: RESOLVED | Noted: 2017-08-14 | Resolved: 2020-06-08

## 2020-06-08 LAB
ALBUMIN SERPL BCP-MCNC: 4.2 G/DL (ref 3.5–5.2)
ALP SERPL-CCNC: 94 U/L (ref 55–135)
ALT SERPL W/O P-5'-P-CCNC: 28 U/L (ref 10–44)
ANION GAP SERPL CALC-SCNC: 14 MMOL/L (ref 8–16)
AST SERPL-CCNC: 21 U/L (ref 10–40)
BASOPHILS # BLD AUTO: 0.09 K/UL (ref 0–0.2)
BASOPHILS NFR BLD: 1 % (ref 0–1.9)
BILIRUB SERPL-MCNC: 0.5 MG/DL (ref 0.1–1)
BNP SERPL-MCNC: 43 PG/ML (ref 0–99)
BUN SERPL-MCNC: 15 MG/DL (ref 8–23)
CALCIUM SERPL-MCNC: 9.1 MG/DL (ref 8.7–10.5)
CHLORIDE SERPL-SCNC: 97 MMOL/L (ref 95–110)
CO2 SERPL-SCNC: 25 MMOL/L (ref 23–29)
CREAT SERPL-MCNC: 0.9 MG/DL (ref 0.5–1.4)
DIFFERENTIAL METHOD: ABNORMAL
EOSINOPHIL # BLD AUTO: 0.4 K/UL (ref 0–0.5)
EOSINOPHIL NFR BLD: 5.1 % (ref 0–8)
ERYTHROCYTE [DISTWIDTH] IN BLOOD BY AUTOMATED COUNT: 13.9 % (ref 11.5–14.5)
EST. GFR  (AFRICAN AMERICAN): >60 ML/MIN/1.73 M^2
EST. GFR  (NON AFRICAN AMERICAN): >60 ML/MIN/1.73 M^2
GLUCOSE SERPL-MCNC: 198 MG/DL (ref 70–110)
GLUCOSE SERPL-MCNC: 220 MG/DL (ref 70–110)
HCT VFR BLD AUTO: 40.2 % (ref 40–54)
HGB BLD-MCNC: 13 G/DL (ref 14–18)
IMM GRANULOCYTES # BLD AUTO: 0.04 K/UL (ref 0–0.04)
IMM GRANULOCYTES NFR BLD AUTO: 0.5 % (ref 0–0.5)
LYMPHOCYTES # BLD AUTO: 2.4 K/UL (ref 1–4.8)
LYMPHOCYTES NFR BLD: 28.2 % (ref 18–48)
MCH RBC QN AUTO: 30 PG (ref 27–31)
MCHC RBC AUTO-ENTMCNC: 32.3 G/DL (ref 32–36)
MCV RBC AUTO: 93 FL (ref 82–98)
MONOCYTES # BLD AUTO: 0.7 K/UL (ref 0.3–1)
MONOCYTES NFR BLD: 7.9 % (ref 4–15)
NEUTROPHILS # BLD AUTO: 5 K/UL (ref 1.8–7.7)
NEUTROPHILS NFR BLD: 57.3 % (ref 38–73)
NRBC BLD-RTO: 0 /100 WBC
PLATELET # BLD AUTO: 235 K/UL (ref 150–350)
PMV BLD AUTO: 9.9 FL (ref 9.2–12.9)
POTASSIUM SERPL-SCNC: 4.3 MMOL/L (ref 3.5–5.1)
PROT SERPL-MCNC: 7.6 G/DL (ref 6–8.4)
RBC # BLD AUTO: 4.33 M/UL (ref 4.6–6.2)
SARS-COV-2 RDRP RESP QL NAA+PROBE: NEGATIVE
SODIUM SERPL-SCNC: 136 MMOL/L (ref 136–145)
TROPONIN I SERPL DL<=0.01 NG/ML-MCNC: <0.03 NG/ML
TROPONIN I SERPL DL<=0.01 NG/ML-MCNC: <0.03 NG/ML
WBC # BLD AUTO: 8.63 K/UL (ref 3.9–12.7)

## 2020-06-08 PROCEDURE — U0002 COVID-19 LAB TEST NON-CDC: HCPCS

## 2020-06-08 PROCEDURE — G0378 HOSPITAL OBSERVATION PER HR: HCPCS

## 2020-06-08 PROCEDURE — 83880 ASSAY OF NATRIURETIC PEPTIDE: CPT

## 2020-06-08 PROCEDURE — 93005 ELECTROCARDIOGRAM TRACING: CPT | Performed by: INTERNAL MEDICINE

## 2020-06-08 PROCEDURE — 96374 THER/PROPH/DIAG INJ IV PUSH: CPT

## 2020-06-08 PROCEDURE — 84484 ASSAY OF TROPONIN QUANT: CPT | Mod: 91

## 2020-06-08 PROCEDURE — 99214 OFFICE O/P EST MOD 30 MIN: CPT | Mod: S$GLB,,, | Performed by: INTERNAL MEDICINE

## 2020-06-08 PROCEDURE — C9399 UNCLASSIFIED DRUGS OR BIOLOG: HCPCS | Performed by: NURSE PRACTITIONER

## 2020-06-08 PROCEDURE — 25000003 PHARM REV CODE 250: Performed by: NURSE PRACTITIONER

## 2020-06-08 PROCEDURE — 99214 PR OFFICE/OUTPT VISIT, EST, LEVL IV, 30-39 MIN: ICD-10-PCS | Mod: S$GLB,,, | Performed by: INTERNAL MEDICINE

## 2020-06-08 PROCEDURE — 80053 COMPREHEN METABOLIC PANEL: CPT

## 2020-06-08 PROCEDURE — 63600175 PHARM REV CODE 636 W HCPCS: Performed by: STUDENT IN AN ORGANIZED HEALTH CARE EDUCATION/TRAINING PROGRAM

## 2020-06-08 PROCEDURE — 99285 EMERGENCY DEPT VISIT HI MDM: CPT | Mod: 25

## 2020-06-08 PROCEDURE — 36415 COLL VENOUS BLD VENIPUNCTURE: CPT

## 2020-06-08 PROCEDURE — 25000003 PHARM REV CODE 250: Performed by: PHYSICIAN ASSISTANT

## 2020-06-08 PROCEDURE — 85025 COMPLETE CBC W/AUTO DIFF WBC: CPT

## 2020-06-08 RX ORDER — ALBUTEROL SULFATE 90 UG/1
2 AEROSOL, METERED RESPIRATORY (INHALATION) EVERY 4 HOURS PRN
Status: DISCONTINUED | OUTPATIENT
Start: 2020-06-08 | End: 2020-06-09

## 2020-06-08 RX ORDER — POTASSIUM CHLORIDE 7.45 MG/ML
40 INJECTION INTRAVENOUS
Status: DISCONTINUED | OUTPATIENT
Start: 2020-06-08 | End: 2020-06-13 | Stop reason: HOSPADM

## 2020-06-08 RX ORDER — MAGNESIUM SULFATE HEPTAHYDRATE 40 MG/ML
4 INJECTION, SOLUTION INTRAVENOUS
Status: DISCONTINUED | OUTPATIENT
Start: 2020-06-08 | End: 2020-06-13 | Stop reason: HOSPADM

## 2020-06-08 RX ORDER — IBUPROFEN 200 MG
24 TABLET ORAL
Status: DISCONTINUED | OUTPATIENT
Start: 2020-06-08 | End: 2020-06-13 | Stop reason: HOSPADM

## 2020-06-08 RX ORDER — ONDANSETRON 2 MG/ML
4 INJECTION INTRAMUSCULAR; INTRAVENOUS EVERY 8 HOURS PRN
Status: DISCONTINUED | OUTPATIENT
Start: 2020-06-08 | End: 2020-06-13 | Stop reason: HOSPADM

## 2020-06-08 RX ORDER — ONDANSETRON 2 MG/ML
4 INJECTION INTRAMUSCULAR; INTRAVENOUS ONCE
Status: COMPLETED | OUTPATIENT
Start: 2020-06-08 | End: 2020-06-08

## 2020-06-08 RX ORDER — FUROSEMIDE 40 MG/1
40 TABLET ORAL DAILY
Status: DISCONTINUED | OUTPATIENT
Start: 2020-06-09 | End: 2020-06-13 | Stop reason: HOSPADM

## 2020-06-08 RX ORDER — TALC
9 POWDER (GRAM) TOPICAL NIGHTLY PRN
Status: DISCONTINUED | OUTPATIENT
Start: 2020-06-08 | End: 2020-06-13 | Stop reason: HOSPADM

## 2020-06-08 RX ORDER — GLUCAGON 1 MG
1 KIT INJECTION
Status: DISCONTINUED | OUTPATIENT
Start: 2020-06-08 | End: 2020-06-13 | Stop reason: HOSPADM

## 2020-06-08 RX ORDER — SODIUM CHLORIDE 0.9 % (FLUSH) 0.9 %
10 SYRINGE (ML) INJECTION
Status: DISCONTINUED | OUTPATIENT
Start: 2020-06-08 | End: 2020-06-13 | Stop reason: HOSPADM

## 2020-06-08 RX ORDER — MAGNESIUM SULFATE HEPTAHYDRATE 40 MG/ML
2 INJECTION, SOLUTION INTRAVENOUS
Status: DISCONTINUED | OUTPATIENT
Start: 2020-06-08 | End: 2020-06-13 | Stop reason: HOSPADM

## 2020-06-08 RX ORDER — IBUPROFEN 200 MG
16 TABLET ORAL
Status: DISCONTINUED | OUTPATIENT
Start: 2020-06-08 | End: 2020-06-13 | Stop reason: HOSPADM

## 2020-06-08 RX ORDER — POTASSIUM CHLORIDE 20 MEQ/1
20 TABLET, EXTENDED RELEASE ORAL
Status: DISCONTINUED | OUTPATIENT
Start: 2020-06-08 | End: 2020-06-13 | Stop reason: HOSPADM

## 2020-06-08 RX ORDER — POTASSIUM CHLORIDE 20 MEQ/1
40 TABLET, EXTENDED RELEASE ORAL
Status: DISCONTINUED | OUTPATIENT
Start: 2020-06-08 | End: 2020-06-13 | Stop reason: HOSPADM

## 2020-06-08 RX ORDER — OXYCODONE AND ACETAMINOPHEN 7.5; 325 MG/1; MG/1
1 TABLET ORAL EVERY 4 HOURS PRN
Status: DISCONTINUED | OUTPATIENT
Start: 2020-06-08 | End: 2020-06-13 | Stop reason: HOSPADM

## 2020-06-08 RX ORDER — POTASSIUM CHLORIDE 7.45 MG/ML
20 INJECTION INTRAVENOUS
Status: DISCONTINUED | OUTPATIENT
Start: 2020-06-08 | End: 2020-06-13 | Stop reason: HOSPADM

## 2020-06-08 RX ORDER — LAMOTRIGINE 25 MG/1
25 TABLET ORAL 4 TIMES DAILY
Status: DISCONTINUED | OUTPATIENT
Start: 2020-06-08 | End: 2020-06-13 | Stop reason: HOSPADM

## 2020-06-08 RX ORDER — INSULIN ASPART 100 [IU]/ML
0-5 INJECTION, SOLUTION INTRAVENOUS; SUBCUTANEOUS
Status: DISCONTINUED | OUTPATIENT
Start: 2020-06-08 | End: 2020-06-13 | Stop reason: HOSPADM

## 2020-06-08 RX ORDER — LANOLIN ALCOHOL/MO/W.PET/CERES
800 CREAM (GRAM) TOPICAL
Status: DISCONTINUED | OUTPATIENT
Start: 2020-06-08 | End: 2020-06-13 | Stop reason: HOSPADM

## 2020-06-08 RX ORDER — PRAZOSIN HYDROCHLORIDE 1 MG/1
2 CAPSULE ORAL 2 TIMES DAILY
Status: DISCONTINUED | OUTPATIENT
Start: 2020-06-08 | End: 2020-06-13 | Stop reason: HOSPADM

## 2020-06-08 RX ORDER — AMOXICILLIN 250 MG
1 CAPSULE ORAL 2 TIMES DAILY
Status: DISCONTINUED | OUTPATIENT
Start: 2020-06-08 | End: 2020-06-13 | Stop reason: HOSPADM

## 2020-06-08 RX ORDER — ASPIRIN 325 MG
325 TABLET ORAL
Status: COMPLETED | OUTPATIENT
Start: 2020-06-08 | End: 2020-06-08

## 2020-06-08 RX ORDER — OXYCODONE AND ACETAMINOPHEN 7.5; 325 MG/1; MG/1
1 TABLET ORAL EVERY 4 HOURS PRN
COMMUNITY
End: 2020-06-29

## 2020-06-08 RX ORDER — CALCIUM CHLORIDE IN 0.9 % NACL 1 G/100 ML
1 INTRAVENOUS SOLUTION, PIGGYBACK (ML) INTRAVENOUS
Status: DISCONTINUED | OUTPATIENT
Start: 2020-06-08 | End: 2020-06-13 | Stop reason: HOSPADM

## 2020-06-08 RX ORDER — CARVEDILOL 3.12 MG/1
3.12 TABLET ORAL 2 TIMES DAILY
Status: DISCONTINUED | OUTPATIENT
Start: 2020-06-08 | End: 2020-06-13 | Stop reason: HOSPADM

## 2020-06-08 RX ORDER — TIOTROPIUM BROMIDE 18 UG/1
18 CAPSULE ORAL; RESPIRATORY (INHALATION) DAILY
Status: DISCONTINUED | OUTPATIENT
Start: 2020-06-09 | End: 2020-06-09

## 2020-06-08 RX ORDER — CYCLOBENZAPRINE HCL 5 MG
5 TABLET ORAL 2 TIMES DAILY PRN
Status: DISCONTINUED | OUTPATIENT
Start: 2020-06-08 | End: 2020-06-13 | Stop reason: HOSPADM

## 2020-06-08 RX ORDER — ACETAMINOPHEN 325 MG/1
650 TABLET ORAL EVERY 4 HOURS PRN
Status: DISCONTINUED | OUTPATIENT
Start: 2020-06-08 | End: 2020-06-13 | Stop reason: HOSPADM

## 2020-06-08 RX ORDER — GABAPENTIN 300 MG/1
600 CAPSULE ORAL 3 TIMES DAILY
Status: DISCONTINUED | OUTPATIENT
Start: 2020-06-08 | End: 2020-06-13 | Stop reason: HOSPADM

## 2020-06-08 RX ORDER — FLUTICASONE FUROATE AND VILANTEROL 100; 25 UG/1; UG/1
1 POWDER RESPIRATORY (INHALATION) DAILY
Status: DISCONTINUED | OUTPATIENT
Start: 2020-06-09 | End: 2020-06-09

## 2020-06-08 RX ORDER — INSULIN ASPART 100 [IU]/ML
15 INJECTION, SOLUTION INTRAVENOUS; SUBCUTANEOUS
Status: DISCONTINUED | OUTPATIENT
Start: 2020-06-09 | End: 2020-06-13 | Stop reason: HOSPADM

## 2020-06-08 RX ORDER — DICYCLOMINE HYDROCHLORIDE 10 MG/1
20 CAPSULE ORAL 3 TIMES DAILY
Status: DISCONTINUED | OUTPATIENT
Start: 2020-06-08 | End: 2020-06-13 | Stop reason: HOSPADM

## 2020-06-08 RX ORDER — ATORVASTATIN CALCIUM 40 MG/1
40 TABLET, FILM COATED ORAL NIGHTLY
Status: DISCONTINUED | OUTPATIENT
Start: 2020-06-08 | End: 2020-06-13 | Stop reason: HOSPADM

## 2020-06-08 RX ORDER — MAGNESIUM SULFATE 1 G/100ML
1 INJECTION INTRAVENOUS
Status: DISCONTINUED | OUTPATIENT
Start: 2020-06-08 | End: 2020-06-13 | Stop reason: HOSPADM

## 2020-06-08 RX ORDER — PANTOPRAZOLE SODIUM 40 MG/1
40 TABLET, DELAYED RELEASE ORAL 2 TIMES DAILY
Status: DISCONTINUED | OUTPATIENT
Start: 2020-06-08 | End: 2020-06-13 | Stop reason: HOSPADM

## 2020-06-08 RX ADMIN — PANTOPRAZOLE SODIUM 40 MG: 40 TABLET, DELAYED RELEASE ORAL at 10:06

## 2020-06-08 RX ADMIN — OXYCODONE AND ACETAMINOPHEN 1 TABLET: 7.5; 325 TABLET ORAL at 06:06

## 2020-06-08 RX ADMIN — INSULIN DETEMIR 85 UNITS: 100 INJECTION, SOLUTION SUBCUTANEOUS at 10:06

## 2020-06-08 RX ADMIN — SENNOSIDES AND DOCUSATE SODIUM 1 TABLET: 8.6; 5 TABLET ORAL at 10:06

## 2020-06-08 RX ADMIN — ASPIRIN 325 MG ORAL TABLET 325 MG: 325 PILL ORAL at 04:06

## 2020-06-08 RX ADMIN — GABAPENTIN 600 MG: 300 CAPSULE ORAL at 10:06

## 2020-06-08 RX ADMIN — PRAZOSIN HYDROCHLORIDE 2 MG: 1 CAPSULE ORAL at 10:06

## 2020-06-08 RX ADMIN — ATORVASTATIN CALCIUM 40 MG: 40 TABLET, FILM COATED ORAL at 10:06

## 2020-06-08 RX ADMIN — DICYCLOMINE HYDROCHLORIDE 20 MG: 10 CAPSULE ORAL at 10:06

## 2020-06-08 RX ADMIN — APIXABAN 5 MG: 5 TABLET, FILM COATED ORAL at 10:06

## 2020-06-08 RX ADMIN — ONDANSETRON 4 MG: 2 INJECTION INTRAMUSCULAR; INTRAVENOUS at 04:06

## 2020-06-08 RX ADMIN — LAMOTRIGINE 25 MG: 25 TABLET ORAL at 10:06

## 2020-06-08 NOTE — Clinical Note
Catheter is inserted into the ostium   right coronary artery. Angiography performed of the right coronary arteries. Angiography performed via power injection with . JR4

## 2020-06-08 NOTE — PROGRESS NOTES
"[facility]  Pulmonology  Follow-Up Clinic Visit    Subjective:     Reason for Visit:    Kevan Colin Sr. is a 65 y.o. male followed for COPD.    Chief Complaint   Patient presents with    COPD     follow up       3/9/2020:  He is here for follow-up for confirmed COPD.   His weight has decreased 3 pounds over last 2 weeks.  His appetite has been unchanged.  The patient is having no constitutional symptoms, denying fever, chills, anorexia, or weight loss..    He is currently prescribed albuterol rescue inhaler and Spiriva.  The patient reports adherence to this regimen. He has not had any adverse reactions or side effects to medications.    He is on oxygen at 2 L/min per nasal cannula..  He has a history of 60 pack years..   The patient is experiencing exercise intolerance (difficulty walking 1 blocks on flat ground)..  He report 0 exacerbations and has never been hospitalized in the past year.     6/8/2020:  No major issues since our last visit.  Exertional dyspnea is about the same.  No recent exacerbations.  No recent hospitalizations.  Describing unstable angina.  Chest tightness with exertion.  Describes it as "like somebody is sitting on my chest."  Worse with exertion.  Started a week ago. Becoming more severe, lasting longer and precipitated by less activity.  Associated with dyspnea.  No cough/sputum production.     Review of Systems   Constitutional: Positive for weight loss. Negative for fever and night sweats.   HENT: Negative for postnasal drip, rhinorrhea and sinus pressure.    Eyes: Negative for redness and itching.   Respiratory: Positive for shortness of breath, wheezing and dyspnea on extertion. Negative for hemoptysis, sputum production and orthopnea.    Cardiovascular: Positive for chest pain. Negative for palpitations and leg swelling.   Genitourinary: Negative for difficulty urinating and hematuria.   Endocrine: Negative for polydipsia, polyphagia and polyuria.    Musculoskeletal: " Negative for gait problem.   Gastrointestinal: Negative for acid reflux.   Neurological: Negative for syncope.   Hematological: Negative for adenopathy. Does not bruise/bleed easily and no excessive bruising.   Psychiatric/Behavioral: Negative for confusion and sleep disturbance. The patient is not nervous/anxious.    All other systems reviewed and are negative.     Outpatient Medications as of 6/8/2020   Medication    ABILIFY MAINTENA 400 mg sers syringe    albuterol (PROVENTIL/VENTOLIN HFA) 90 mcg/actuation inhaler    apixaban 5 mg Tab    atorvastatin (LIPITOR) 40 MG tablet    budesonide-formoterol 80-4.5 mcg (SYMBICORT) 80-4.5 mcg/actuation HFAA    carvedilol (COREG) 3.125 MG tablet    cyclobenzaprine (FLEXERIL) 5 MG tablet    diclofenac (VOLTAREN) 50 MG EC tablet    dicyclomine (BENTYL) 20 mg tablet    furosemide (LASIX) 40 MG tablet    gabapentin (NEURONTIN) 300 MG capsule    insulin glargine, TOUJEO, (TOUJEO SOLOSTAR U-300 INSULIN) 300 unit/mL (1.5 mL) InPn pen    insulin lispro (HUMALOG) 100 unit/mL injection    lamoTRIgine (LAMICTAL) 25 MG tablet    metFORMIN (GLUCOPHAGE) 500 MG tablet    ondansetron (ZOFRAN-ODT) 4 MG TbDL    potassium chloride SA (K-DUR,KLOR-CON) 20 MEQ tablet    prazosin (MINIPRESS) 2 MG Cap    SPIRIVA WITH HANDIHALER 18 mcg inhalation capsule    temazepam (RESTORIL) 15 mg Cap    TRINTELLIX 10 mg Tab    ondansetron (ZOFRAN) 4 MG tablet    pantoprazole (PROTONIX) 40 MG tablet    QUEtiapine (SEROQUEL) 50 MG tablet     No current facility-administered medications on file as of 6/8/2020.       Previous Reports Reviewed:   ER records, historical medical records, lab reports, nursing home notes, office notes, operative reports, radiology reports, referral letter/letters and x-ray reports   The following portions of the patient's history were reviewed and updated as appropriate: allergies, current medications, past family history, past medical history, past social history,  past surgical history and problem list.      Objective:     /70 (BP Location: Left arm, Patient Position: Sitting)   Pulse 81   Wt 118.4 kg (261 lb)   SpO2 (!) 93%   BMI 33.51 kg/m²   Body mass index is 33.51 kg/m².     Physical Exam   Constitutional: He is oriented to person, place, and time. He appears well-developed and well-nourished. He appears not cachectic.  Non-toxic appearance. No distress. He is not obese.   HENT:   Head: Normocephalic and atraumatic.   Right Ear: External ear normal.   Left Ear: External ear normal.   Nose: Nose normal.   Mouth/Throat: Uvula is midline, oropharynx is clear and moist and mucous membranes are normal. Mallampati Score: II.   Neck: Trachea normal and normal range of motion. Neck supple. No JVD present. No tracheal deviation present. No thyromegaly present.   Cardiovascular: Normal rate, regular rhythm, normal heart sounds and intact distal pulses. Exam reveals no gallop and no friction rub.   No murmur heard.  Pulmonary/Chest: Normal expansion, symmetric chest wall expansion, effort normal and breath sounds normal. No stridor. He has no wheezes. He has no rhonchi. He has no rales.   Abdominal: Soft. Bowel sounds are normal. He exhibits no distension. There is no tenderness. There is no guarding.   Musculoskeletal: Normal range of motion. He exhibits no edema, tenderness or deformity.   Lymphadenopathy: No supraclavicular adenopathy is present.     He has no cervical adenopathy.     He has no axillary adenopathy.   Neurological: He is alert and oriented to person, place, and time. He has normal strength. No cranial nerve deficit or sensory deficit. He exhibits normal muscle tone. GCS eye subscore is 4. GCS verbal subscore is 5. GCS motor subscore is 6.   Skin: Skin is warm, dry and intact. No rash noted. He is not diaphoretic. No cyanosis. Nails show no clubbing.        Psychiatric: He has a normal mood and affect. His speech is normal and behavior is normal.    Nursing note and vitals reviewed.     Personal Review of Relevant Diagnostic Studies:  I have personally reviewed and interpreted the following labs/studies/images.   CT of chest  o 2020:  - 1. No pulmonary nodules identified.  - 2. Focus of atelectasis versus consolidation noted at the left posterior costophrenic angle.  Recommend clinical correlation and radiographic follow-up.  - 3. Centrilobular emphysematous changes, worse in the upper lobes.   Pulmonary function tests:   o FEV1: 3.50  (95 % predicted)  o FVC:  5.57 (114 % predicted)  o FEV1/FVC:  63  o T.98 (129 % predicted)  o RV/TLVC: 45 (112 % predicted)  o DLCO: 19.00 (65 % predicted)  o Mild obstruction with gas trapping; mild diffusion impaiment   Pulse oximetry, exercise 82% RA   Pulse oximetry, restin% RA      Assessment:     1. Chest pain, unspecified type    2. Coronary artery disease, angina presence unspecified, unspecified vessel or lesion type, unspecified whether native or transplanted heart    3. COPD, group B, by GOLD 2017 classification    4. Chronic systolic heart failure      Impression:  Here for COPD GOLD B but with worsening exertional chest pain very concerning for Unstable angina.      Plan:     · Send to ED for cardiac evaluation.  · Start symbicort.  · Continue spiriva and albuterol  · Continue 2L NC with exercise.  · Continue to work on losing weight.  · Refered pulmonary rehab.     I informed the patient of my working diagnosis, it's etiology, risk factors, expected symptoms, diagnostic work up, treatment options and prognosis., I personally reviewed the results of relevant imaging/labs/studies with the patient, and discussed their clinical significance., I spent >10 minutes counseling the patient about their condition., Plan discussed with the patient, who is in agreement., Opportunity provided for the the patient to voice any additional questions or concerns., All questions were answered to the patient's  satisfaction., Educational material provided and Patient given date for next visit.    Follow up in about 3 months (around 9/8/2020).    Orders Placed This Visit:  No orders of the defined types were placed in this encounter.      Jamir Gomez MD  Pulmonary / Critical Care Medicine  [facility]
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
## 2020-06-08 NOTE — Clinical Note
Angiography performed of the right coronary arteries in multiple views. Angiography performed via power injection with 6 mL contrast at 3 mL/sPower injection PSI was 200.. JR4

## 2020-06-08 NOTE — PATIENT INSTRUCTIONS
What is COPD?  COPD stands for chronic obstructive pulmonary disease. It means the airways in your lungs are blocked (obstructed). Because of this, it is hard to breathe. You may have trouble with daily activities because of shortness of breath. Over time the shortness of breath usually worsens making it more and more difficult to take care of yourself and take part in activities. Chronic bronchitis and emphysema are two common types of COPD.  What happens in chronic bronchitis?    The cells in the airways make more mucus than normal. The mucus builds up, narrowing the airways. This means less air travels into and out of the lungs. The lining of the airways may also become inflamed (swollen) and causes the airways to narrow even more.        What happens in emphysema?    The small airways are damaged and lose their stretchiness. The airways collapse when you exhale, causing air to get trapped in the air sacs. This means that less oxygen enters the blood vessels and less oxygen is delivered to all of the cells of your body. This makes it hard to breathe.     Damage to cilia    Cilia are small hairs that line and protect the airways. Smoking damages the cilia. Damaged cilia cant sweep mucus and particles away. Some of the cilia are destroyed. This damage worsens COPD.  How did I get COPD?  Most people get COPD from smoking. Cigarette smoke damages lungs, which can develop into COPD over many years.  How COPD affects you  COPD makes you work harder to breathe. Air may get trapped in the lungs, which prevents your lungs from filling completely with fresh oxygen-filled air when you inhale (breathe in). It's harder to take deep breaths especially when you are active and start breathing faster. Over time, your lungs may become enlarged filling the lung with air that does not transfer oxygen into the blood. These problems cause you to have shortness of breath (also called dyspnea). Wheezing (hoarse, whistling breathing),  chronic cough, and fatigue (feeling tired and worn out) are also common.   Date Last Reviewed: 5/1/2016  © 4790-1936 [employer]. [address] [place] [address], [place] [address]. All rights reserved. This information is not intended as a substitute for professional medical care. Always follow your healthcare professional's instructions.          Treatment for COPD    Your healthcare provider will prescribe the best treatments for your COPD.  Treatment  Recommendations include the following:  · Medicines. Some medicines help relieve symptoms when you have them. Others are taken daily to control inflammation in the lungs. Always take your medicines as prescribed. Learn the names of your medicines, as well as how and when to use them.  · Oxygen therapy. Oxygen may be prescribed if tests show that your blood contains too little oxygen.  · Smoking. If you smoke, quit. Smoking is the main cause of COPD. Quitting will help you be able to better manage your COPD. Ask your healthcare provider about ways to help you quit smoking.  · Avoiding infections. Infections, like a cold or the flu, can cause your symptoms to worsen. Try to stay away from people who are sick. Wash your hands often. And, ask your healthcare provider about vaccines for the flu and pneumonia.  Coping with shortness of breath  Coping tips include the following:  · Exercise. Try to be as active as possible. This will improve energy levels and strengthen your muscles, so you can do more.  · Breathing techniques. Ask your healthcare provider or nurse to show you how to do pursed-lip breathing.  · Balance rest and activity. Each day, try to balance rest periods with activity. For example, you might start the day with getting dressed and eating breakfast, then relax and read the paper. After that, take a brief walk. And then sit with your feet up for a while.  · Pulmonary rehabilitation. Ask your provider, or call your local hospital to find out about  pulmonary rehab programs. The programs help with managing your disease, breathing techniques, exercise, support and counseling.  · Healthy eating. Eating a healthy, balanced diet and making an effort to maintain your ideal weight are important to staying as healthy as possible. Make sure you have a lot of fruit and vegetables every day, as well as balanced portions of whole grains, lean meats and fish, and low-fat dairy products.  Date Last Reviewed: 5/1/2016  © 1468-1232 [employer]. [address] [place] [address]. All rights reserved. This information is not intended as a substitute for professional medical care. Always follow your healthcare professional's instructions.

## 2020-06-09 ENCOUNTER — CLINICAL SUPPORT (OUTPATIENT)
Dept: CARDIOLOGY | Facility: HOSPITAL | Age: 65
DRG: 287 | End: 2020-06-09
Payer: MEDICARE

## 2020-06-09 ENCOUNTER — HOSPITAL ENCOUNTER (OUTPATIENT)
Dept: RADIOLOGY | Facility: HOSPITAL | Age: 65
Discharge: HOME | DRG: 287 | End: 2020-06-09
Payer: MEDICARE

## 2020-06-09 LAB
ANION GAP SERPL CALC-SCNC: 10 MMOL/L (ref 8–16)
BUN SERPL-MCNC: 15 MG/DL (ref 8–23)
CALCIUM SERPL-MCNC: 8.8 MG/DL (ref 8.7–10.5)
CHLORIDE SERPL-SCNC: 100 MMOL/L (ref 95–110)
CO2 SERPL-SCNC: 27 MMOL/L (ref 23–29)
CREAT SERPL-MCNC: 0.8 MG/DL (ref 0.5–1.4)
CV PHARM DOSE: 0.4 MG
CV STRESS BASE HR: 69 BPM
DIASTOLIC BLOOD PRESSURE: 75 MMHG
EST. GFR  (AFRICAN AMERICAN): >60 ML/MIN/1.73 M^2
EST. GFR  (NON AFRICAN AMERICAN): >60 ML/MIN/1.73 M^2
GLUCOSE SERPL-MCNC: 151 MG/DL (ref 70–110)
GLUCOSE SERPL-MCNC: 226 MG/DL (ref 70–110)
GLUCOSE SERPL-MCNC: 228 MG/DL (ref 70–110)
GLUCOSE SERPL-MCNC: 259 MG/DL (ref 70–110)
GLUCOSE SERPL-MCNC: 261 MG/DL (ref 70–110)
MAGNESIUM SERPL-MCNC: 2 MG/DL (ref 1.6–2.6)
OHS CV CPX 85 PERCENT MAX PREDICTED HEART RATE MALE: 131
OHS CV CPX MAX PREDICTED HEART RATE: 154
OHS CV CPX PATIENT IS FEMALE: 0
OHS CV CPX PATIENT IS MALE: 1
OHS CV CPX PEAK DIASTOLIC BLOOD PRESSURE: 71 MMHG
OHS CV CPX PEAK HEAR RATE: 106 BPM
OHS CV CPX PEAK RATE PRESSURE PRODUCT: NORMAL
OHS CV CPX PEAK SYSTOLIC BLOOD PRESSURE: 133 MMHG
OHS CV CPX PERCENT MAX PREDICTED HEART RATE ACHIEVED: 69
OHS CV CPX RATE PRESSURE PRODUCT PRESENTING: 7659
POTASSIUM SERPL-SCNC: 3.5 MMOL/L (ref 3.5–5.1)
SODIUM SERPL-SCNC: 137 MMOL/L (ref 136–145)
STRESS ECHO TARGET HR: 131 BPM
SYSTOLIC BLOOD PRESSURE: 111 MMHG
TROPONIN I SERPL DL<=0.01 NG/ML-MCNC: <0.03 NG/ML

## 2020-06-09 PROCEDURE — 25000242 PHARM REV CODE 250 ALT 637 W/ HCPCS: Performed by: FAMILY MEDICINE

## 2020-06-09 PROCEDURE — 36415 COLL VENOUS BLD VENIPUNCTURE: CPT

## 2020-06-09 PROCEDURE — 93017 CV STRESS TEST TRACING ONLY: CPT

## 2020-06-09 PROCEDURE — 25000003 PHARM REV CODE 250: Performed by: NURSE PRACTITIONER

## 2020-06-09 PROCEDURE — A9502 TC99M TETROFOSMIN: HCPCS

## 2020-06-09 PROCEDURE — 83735 ASSAY OF MAGNESIUM: CPT

## 2020-06-09 PROCEDURE — 63600175 PHARM REV CODE 636 W HCPCS: Performed by: NURSE PRACTITIONER

## 2020-06-09 PROCEDURE — 82962 GLUCOSE BLOOD TEST: CPT

## 2020-06-09 PROCEDURE — 94761 N-INVAS EAR/PLS OXIMETRY MLT: CPT

## 2020-06-09 PROCEDURE — 94640 AIRWAY INHALATION TREATMENT: CPT

## 2020-06-09 PROCEDURE — 27000221 HC OXYGEN, UP TO 24 HOURS

## 2020-06-09 PROCEDURE — 99900035 HC TECH TIME PER 15 MIN (STAT)

## 2020-06-09 PROCEDURE — G0378 HOSPITAL OBSERVATION PER HR: HCPCS

## 2020-06-09 PROCEDURE — 80048 BASIC METABOLIC PNL TOTAL CA: CPT

## 2020-06-09 RX ORDER — FLUTICASONE FUROATE AND VILANTEROL 100; 25 UG/1; UG/1
1 POWDER RESPIRATORY (INHALATION) DAILY
Status: DISCONTINUED | OUTPATIENT
Start: 2020-06-09 | End: 2020-06-13 | Stop reason: HOSPADM

## 2020-06-09 RX ORDER — TIOTROPIUM BROMIDE 18 UG/1
1 CAPSULE ORAL; RESPIRATORY (INHALATION) DAILY
Status: DISCONTINUED | OUTPATIENT
Start: 2020-06-09 | End: 2020-06-13 | Stop reason: HOSPADM

## 2020-06-09 RX ORDER — ALBUTEROL SULFATE 90 UG/1
2 AEROSOL, METERED RESPIRATORY (INHALATION) EVERY 4 HOURS PRN
Status: DISCONTINUED | OUTPATIENT
Start: 2020-06-09 | End: 2020-06-13 | Stop reason: HOSPADM

## 2020-06-09 RX ORDER — REGADENOSON 0.08 MG/ML
0.4 INJECTION, SOLUTION INTRAVENOUS ONCE
Status: COMPLETED | OUTPATIENT
Start: 2020-06-09 | End: 2020-06-09

## 2020-06-09 RX ADMIN — CARVEDILOL 3.12 MG: 3.12 TABLET, FILM COATED ORAL at 09:06

## 2020-06-09 RX ADMIN — LAMOTRIGINE 25 MG: 25 TABLET ORAL at 09:06

## 2020-06-09 RX ADMIN — APIXABAN 5 MG: 5 TABLET, FILM COATED ORAL at 10:06

## 2020-06-09 RX ADMIN — REGADENOSON 0.4 MG: 0.08 INJECTION, SOLUTION INTRAVENOUS at 10:06

## 2020-06-09 RX ADMIN — DICYCLOMINE HYDROCHLORIDE 20 MG: 10 CAPSULE ORAL at 04:06

## 2020-06-09 RX ADMIN — GABAPENTIN 600 MG: 300 CAPSULE ORAL at 09:06

## 2020-06-09 RX ADMIN — DICYCLOMINE HYDROCHLORIDE 20 MG: 10 CAPSULE ORAL at 09:06

## 2020-06-09 RX ADMIN — OXYCODONE AND ACETAMINOPHEN 1 TABLET: 7.5; 325 TABLET ORAL at 07:06

## 2020-06-09 RX ADMIN — GABAPENTIN 600 MG: 300 CAPSULE ORAL at 04:06

## 2020-06-09 RX ADMIN — SENNOSIDES AND DOCUSATE SODIUM 1 TABLET: 8.6; 5 TABLET ORAL at 10:06

## 2020-06-09 RX ADMIN — LAMOTRIGINE 25 MG: 25 TABLET ORAL at 01:06

## 2020-06-09 RX ADMIN — PANTOPRAZOLE SODIUM 40 MG: 40 TABLET, DELAYED RELEASE ORAL at 05:06

## 2020-06-09 RX ADMIN — POTASSIUM CHLORIDE 20 MEQ: 20 TABLET, EXTENDED RELEASE ORAL at 07:06

## 2020-06-09 RX ADMIN — LAMOTRIGINE 25 MG: 25 TABLET ORAL at 05:06

## 2020-06-09 RX ADMIN — SENNOSIDES AND DOCUSATE SODIUM 1 TABLET: 8.6; 5 TABLET ORAL at 09:06

## 2020-06-09 RX ADMIN — OXYCODONE AND ACETAMINOPHEN 1 TABLET: 7.5; 325 TABLET ORAL at 12:06

## 2020-06-09 RX ADMIN — INSULIN DETEMIR 85 UNITS: 100 INJECTION, SOLUTION SUBCUTANEOUS at 09:06

## 2020-06-09 RX ADMIN — ATORVASTATIN CALCIUM 40 MG: 40 TABLET, FILM COATED ORAL at 09:06

## 2020-06-09 RX ADMIN — TIOTROPIUM BROMIDE 18 MCG: 18 CAPSULE ORAL; RESPIRATORY (INHALATION) at 08:06

## 2020-06-09 RX ADMIN — PRAZOSIN HYDROCHLORIDE 2 MG: 1 CAPSULE ORAL at 10:06

## 2020-06-09 RX ADMIN — INSULIN ASPART 15 UNITS: 100 INJECTION, SOLUTION INTRAVENOUS; SUBCUTANEOUS at 11:06

## 2020-06-09 RX ADMIN — FLUTICASONE FUROATE AND VILANTEROL TRIFENATATE 1 PUFF: 100; 25 POWDER RESPIRATORY (INHALATION) at 08:06

## 2020-06-09 RX ADMIN — APIXABAN 5 MG: 5 TABLET, FILM COATED ORAL at 09:06

## 2020-06-09 RX ADMIN — CARVEDILOL 3.12 MG: 3.12 TABLET, FILM COATED ORAL at 10:06

## 2020-06-09 RX ADMIN — PRAZOSIN HYDROCHLORIDE 2 MG: 1 CAPSULE ORAL at 09:06

## 2020-06-09 RX ADMIN — FUROSEMIDE 40 MG: 40 TABLET ORAL at 10:06

## 2020-06-09 RX ADMIN — INSULIN ASPART 15 UNITS: 100 INJECTION, SOLUTION INTRAVENOUS; SUBCUTANEOUS at 04:06

## 2020-06-09 NOTE — PLAN OF CARE
06/09/20 1601   HUFFMAN Message   Medicare Outpatient and Observation Notification regarding financial responsibility Given to patient/caregiver;Explained to patient/caregiver;Signed/date by patient/caregiver   Date HUFFMAN was signed 06/09/20   Time HUFFMAN was signed 1200     Introduced self and asked pt if ok to take few min to discuss Medicare form, and ok with pt. Explained that pt in observation status and Medicare wants to inform them of the MOON form, pt verbalized an understanding to form, form signed by pt and form scanned into CM notes. Copy made of signed form for pt and copy delivered to pt.

## 2020-06-09 NOTE — PROGRESS NOTES
@ 10:45  OBTAINED STRESS IMAGES.     NUCLEAR MEDICINE MYOPERFUSION STUDY     NOT COMPLETED.    2DAY PROTOCOL.    MONICA RAMAN

## 2020-06-09 NOTE — PROGRESS NOTES
@ 10:31 PATIENT INJECTED WITH 26.5mCi Tc99m MYOVIEW FOR STRESS IMAGES.    LEXISCAN STRESS        RELEASE NPO STATUS FROM NUCLEAR MEDICINE.      2 DAY PROTOCOL.                     MONICA RAMAN

## 2020-06-09 NOTE — HOSPITAL COURSE
6/9/2020  I have had a little chest pain today that lasted a short time and 5/10 pain.   Pt will have his second part of the stress test tomorrow    6/10/2020  Mr Colin continues to has some 2/10 chest pain at rest and 6/10 with movement. I just feel that something is very wrong and I cannot continue like this.    6/11/2020  Pt has no complaints at present. He will have an EGD tomorrow Re dysphagia and an angiogram Re his abnormal Nuclear medicine study    6/12/2020  Dr Mcclain stretched my esophagus so that I could swallow. I will have my angiogram at 2 PM. Pt has no complaints at present    6/13/2020 Pt has normal coronaries per Dr Nye and may go home. Pt's cath site is without erythema, exudate, hematoma or bleeding. ROS:  negative

## 2020-06-09 NOTE — PLAN OF CARE
06/09/20 0841   Patient Assessment/Suction   Level of Consciousness (AVPU) alert   Respiratory Effort Normal;Unlabored   All Lung Fields Breath Sounds clear   PRE-TX-O2   O2 Device (Oxygen Therapy) nasal cannula   $ Is the patient on Low Flow Oxygen? Yes   Flow (L/min) 2   SpO2 98 %   Pulse Oximetry Type Intermittent   $ Pulse Oximetry - Multiple Charge Pulse Oximetry - Multiple   Pulse 76   Resp 18   Inhaler   $ Inhaler Charges MDI (Metered Dose Inahler) Treatment   Daily Review of Necessity (Inhaler) completed   Respiratory Treatment Status (Inhaler) given;mouth rinsed post treatment   Treatment Route (Inhaler) mouthpiece   Patient Position (Inhaler) HOB elevated   Post Treatment Assessment (Inhaler) breath sounds improved   Signs of Intolerance (Inhaler) none   Respiratory Evaluation   $ Care Plan Tech Time 15 min

## 2020-06-09 NOTE — PLAN OF CARE
Cardiac,vital signs, and lab monitoring. First part of stress test completed today. Possible Discharge in Am.  Increase activity as tolerated. Bed alarm on. Watch for falls . Second part of stress test in Am. Accuchecks per order.  I&Os monitored.  Daily weights

## 2020-06-09 NOTE — PLAN OF CARE
06/09/20 1444   Discharge Assessment   Assessment Type Discharge Planning Assessment   Confirmed/corrected address and phone number on facesheet? Yes   Assessment information obtained from? Patient   Expected Length of Stay (days) 1   Communicated expected length of stay with patient/caregiver yes   Prior to hospitilization cognitive status: Alert/Oriented   Prior to hospitalization functional status: Independent   Current cognitive status: Alert/Oriented   Current Functional Status: Independent   Facility Arrived From: Home   Lives With significant other   Able to Return to Prior Arrangements yes   Is patient able to care for self after discharge? Yes   Who are your caregiver(s) and their phone number(s)? Cheryle Significant Other   Patient's perception of discharge disposition home or selfcare   Readmission Within the Last 30 Days no previous admission in last 30 days   Patient currently being followed by outpatient case management? No   Patient currently receives any other outside agency services? No   Equipment Currently Used at Home oxygen;nebulizer;walker, rolling;rollator;cane, straight;cane, quad;shower chair;glucometer   Part D Coverage Medicare and Medicaid   Do you have any problems affording any of your prescribed medications? No   Is the patient taking medications as prescribed? yes   Does the patient have transportation home? Yes   Transportation Anticipated family or friend will provide   Discharge Plan A Home with family   Discharge Plan B Home with family   DME Needed Upon Discharge  none   Patient/Family in Agreement with Plan yes
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
## 2020-06-09 NOTE — PLAN OF CARE
06/09/20 0005   Patient Assessment/Suction   Level of Consciousness (AVPU) alert   Respiratory Effort Normal;Unlabored   Expansion/Accessory Muscles/Retractions no use of accessory muscles   All Lung Fields Breath Sounds clear   PRE-TX-O2   O2 Device (Oxygen Therapy) nasal cannula   Flow (L/min) 2   SpO2 97 %   Pulse Oximetry Type Intermittent   $ Pulse Oximetry - Multiple Charge Pulse Oximetry - Multiple   Pulse 66   Resp 20   Inhaler   $ Inhaler Charges PRN treatment not required   Daily Review of Necessity (Inhaler) completed   Respiratory Treatment Status (Inhaler) PRN treatment not required   continue tx as ordered

## 2020-06-09 NOTE — PROGRESS NOTES
"[facility] Medicine  Progress Note    Patient Name: Kevan Colin Sr.  MRN: [id number]  Patient Class: OP- Observation   Admission Date: 6/8/2020  Length of Stay: 0 days  Attending Physician: Marixa Jon MD  Primary Care Provider: Faiza Ochoa NP        Subjective:     Principal Problem:Chest pain        HPI:  Mr. Colin presents today with complaints of chest pain for the last 2 weeks. It is moderate. It is associated with QUIÑONES, nausea, sweats, and dizziness. He denies fever, chills, V/D, cough, or LOC. Chest pain is absent at rest and described as a diffuse "tightening" with minimal exertion, walking a few steps or carrying heavy things. It does not radiate and subsides with rest. He has a history of HTN, IDDM2, CAD, COPD, HFrEF 35%, carotid stenosis, PE/DVT on eliquis, and chronic back pain on chronic opioids. He is a patient of Dr. Wheeler and had an angiogram in 2014 with patent coronaries and NM stress test in 2016. He has been out of his bentyl and protonix for about 2 weeks as well. Today, he went to see his pulmonologist for COPD and reported this pain and was sent to the ED for further work up. He has a healing ulcer to the left leg he states looks much better.    Overview/Hospital Course:  6/9/2020  I have had a little chest pain today that lasted a short time and 5/10 pain.   Pt will have his second part of the stress test tomorrow    Interval History: Pt had a little chest pain today that resolved. He will have the second part of his stress test tomorrow    Review of Systems   Constitutional: Positive for diaphoresis and fatigue. Negative for chills and fever.   HENT: Negative.    Eyes: Negative.    Respiratory: Positive for chest tightness and shortness of breath. Negative for wheezing.    Cardiovascular: Positive for chest pain.   Gastrointestinal: Negative.    Endocrine: Negative.    Genitourinary: Negative.    Musculoskeletal: Positive for arthralgias and " myalgias.   Neurological: Positive for weakness.   Hematological: Negative.    Psychiatric/Behavioral: Negative.      Objective:     Vital Signs (Most Recent):  Temp: 97.8 °F (36.6 °C) (06/09/20 1110)  Pulse: 60 (06/09/20 1110)  Resp: 15 (06/09/20 1110)  BP: (!) 128/59 (06/09/20 1110)  SpO2: (!) 91 % (06/09/20 1110) Vital Signs (24h Range):  Temp:  [97.6 °F (36.4 °C)-98.7 °F (37.1 °C)] 97.8 °F (36.6 °C)  Pulse:  [60-81] 60  Resp:  [15-21] 15  SpO2:  [91 %-99 %] 91 %  BP: ()/(53-71) 128/59     Weight: 118.7 kg (261 lb 11 oz)  Body mass index is 33.6 kg/m².  No intake or output data in the 24 hours ending 06/09/20 1419   Physical Exam   Constitutional: He is oriented to person, place, and time. No distress.   HENT:   Head: Normocephalic and atraumatic.   Eyes: Pupils are equal, round, and reactive to light. Conjunctivae and EOM are normal.   Neck: Normal range of motion. Neck supple.   Cardiovascular: Normal rate and regular rhythm.   Pulmonary/Chest: Effort normal and breath sounds normal.   Abdominal: Soft. Bowel sounds are normal.   Musculoskeletal: Normal range of motion.   Neurological: He is alert and oriented to person, place, and time.   Skin: Skin is warm. He is not diaphoretic.   Vascular ulcer calf   Psychiatric: He has a normal mood and affect.       Significant Labs:   Blood Culture: No results for input(s): LABBLOO in the last 48 hours.  BMP:   Recent Labs   Lab 06/09/20  0453   *      K 3.5      CO2 27   BUN 15   CREATININE 0.8   CALCIUM 8.8   MG 2.0     CBC:   Recent Labs   Lab 06/08/20  1625   WBC 8.63   HGB 13.0*   HCT 40.2        CMP:   Recent Labs   Lab 06/08/20  1625 06/09/20  0453    137   K 4.3 3.5   CL 97 100   CO2 25 27   * 228*   BUN 15 15   CREATININE 0.9 0.8   CALCIUM 9.1 8.8   PROT 7.6  --    ALBUMIN 4.2  --    BILITOT 0.5  --    ALKPHOS 94  --    AST 21  --    ALT 28  --    ANIONGAP 14 10   EGFRNONAA >60.0 >60.0     Cardiac Markers:   Recent  Labs   Lab 06/08/20  1625   BNP 43     Coagulation: No results for input(s): PT, INR, APTT in the last 48 hours.  Lactic Acid: No results for input(s): LACTATE in the last 48 hours.  Magnesium:   Recent Labs   Lab 06/09/20  0453   MG 2.0     POCT Glucose: No results for input(s): POCTGLUCOSE in the last 48 hours.  Troponin:   Recent Labs   Lab 06/08/20  1625 06/08/20  1946 06/08/20  2343   TROPONINI <0.030 <0.030 <0.030     Urine Studies: No results for input(s): COLORU, APPEARANCEUA, PHUR, SPECGRAV, PROTEINUA, GLUCUA, KETONESU, BILIRUBINUA, OCCULTUA, NITRITE, UROBILINOGEN, LEUKOCYTESUR, RBCUA, WBCUA, BACTERIA, SQUAMEPITHEL, HYALINECASTS in the last 48 hours.    Invalid input(s): WRIGHTSUR    Significant Imaging: I have reviewed all pertinent imaging results/findings within the past 24 hours.      Assessment/Plan:      * Chest pain  Admit to cardiology B  Trend troponins  Continue ASA  NM stress test in AM     6/9/2020  Pt had a short episode of chest pain today that resolved. He will have the second part of his stress test in the AM        Chronic, continuous use of opioids  Continue home opioids      Benign essential HTN  Continue home meds  monitor      Venous stasis ulcer  Consult wound care      Type 2 diabetes mellitus, uncontrolled  accuchecks ACHS with low dose ISS        VTE Risk Mitigation (From admission, onward)         Ordered     apixaban tablet 5 mg  2 times daily      06/08/20 1752     IP VTE HIGH RISK PATIENT  Once      06/08/20 1752     Place sequential compression device  Until discontinued      06/08/20 1752                      Beto Verma MD  Department of Hospital Medicine   [facility]

## 2020-06-09 NOTE — SUBJECTIVE & OBJECTIVE
Interval History: Pt had a little chest pain today that resolved. He will have the second part of his stress test tomorrow    Review of Systems   Constitutional: Positive for diaphoresis and fatigue. Negative for chills and fever.   HENT: Negative.    Eyes: Negative.    Respiratory: Positive for chest tightness and shortness of breath. Negative for wheezing.    Cardiovascular: Positive for chest pain.   Gastrointestinal: Negative.    Endocrine: Negative.    Genitourinary: Negative.    Musculoskeletal: Positive for arthralgias and myalgias.   Neurological: Positive for weakness.   Hematological: Negative.    Psychiatric/Behavioral: Negative.      Objective:     Vital Signs (Most Recent):  Temp: 97.8 °F (36.6 °C) (06/09/20 1110)  Pulse: 60 (06/09/20 1110)  Resp: 15 (06/09/20 1110)  BP: (!) 128/59 (06/09/20 1110)  SpO2: (!) 91 % (06/09/20 1110) Vital Signs (24h Range):  Temp:  [97.6 °F (36.4 °C)-98.7 °F (37.1 °C)] 97.8 °F (36.6 °C)  Pulse:  [60-81] 60  Resp:  [15-21] 15  SpO2:  [91 %-99 %] 91 %  BP: ()/(53-71) 128/59     Weight: 118.7 kg (261 lb 11 oz)  Body mass index is 33.6 kg/m².  No intake or output data in the 24 hours ending 06/09/20 1419   Physical Exam   Constitutional: He is oriented to person, place, and time. No distress.   HENT:   Head: Normocephalic and atraumatic.   Eyes: Pupils are equal, round, and reactive to light. Conjunctivae and EOM are normal.   Neck: Normal range of motion. Neck supple.   Cardiovascular: Normal rate and regular rhythm.   Pulmonary/Chest: Effort normal and breath sounds normal.   Abdominal: Soft. Bowel sounds are normal.   Musculoskeletal: Normal range of motion.   Neurological: He is alert and oriented to person, place, and time.   Skin: Skin is warm. He is not diaphoretic.   Vascular ulcer calf   Psychiatric: He has a normal mood and affect.       Significant Labs:   Blood Culture: No results for input(s): LABBLOO in the last 48 hours.  BMP:   Recent Labs   Lab  06/09/20  0453   *      K 3.5      CO2 27   BUN 15   CREATININE 0.8   CALCIUM 8.8   MG 2.0     CBC:   Recent Labs   Lab 06/08/20  1625   WBC 8.63   HGB 13.0*   HCT 40.2        CMP:   Recent Labs   Lab 06/08/20  1625 06/09/20  0453    137   K 4.3 3.5   CL 97 100   CO2 25 27   * 228*   BUN 15 15   CREATININE 0.9 0.8   CALCIUM 9.1 8.8   PROT 7.6  --    ALBUMIN 4.2  --    BILITOT 0.5  --    ALKPHOS 94  --    AST 21  --    ALT 28  --    ANIONGAP 14 10   EGFRNONAA >60.0 >60.0     Cardiac Markers:   Recent Labs   Lab 06/08/20  1625   BNP 43     Coagulation: No results for input(s): PT, INR, APTT in the last 48 hours.  Lactic Acid: No results for input(s): LACTATE in the last 48 hours.  Magnesium:   Recent Labs   Lab 06/09/20  0453   MG 2.0     POCT Glucose: No results for input(s): POCTGLUCOSE in the last 48 hours.  Troponin:   Recent Labs   Lab 06/08/20  1625 06/08/20  1946 06/08/20  2343   TROPONINI <0.030 <0.030 <0.030     Urine Studies: No results for input(s): COLORU, APPEARANCEUA, PHUR, SPECGRAV, PROTEINUA, GLUCUA, KETONESU, BILIRUBINUA, OCCULTUA, NITRITE, UROBILINOGEN, LEUKOCYTESUR, RBCUA, WBCUA, BACTERIA, SQUAMEPITHEL, HYALINECASTS in the last 48 hours.    Invalid input(s): SUZIE    Significant Imaging: I have reviewed all pertinent imaging results/findings within the past 24 hours.

## 2020-06-09 NOTE — PROGRESS NOTES
@  13:15   Per Radiologist Dr. Russo  Resting Study is needed and will be completed tomorrow 06/10/2020.    No Restrictions for Resting Study.    MONICA RAMAN

## 2020-06-09 NOTE — NURSING
65 yr old male  Alert oriented   Injury to the lt lower leg at home     1.5x1 thick soft scab     Lt lower leg Clean with chlorhexidine/ns.  Pat dry. Apply Medihoney and cover with band aid when needed.

## 2020-06-09 NOTE — HPI
"Mr. Colin presents today with complaints of chest pain for the last 2 weeks. It is moderate. It is associated with QUIÑONES, nausea, sweats, and dizziness. He denies fever, chills, V/D, cough, or LOC. Chest pain is absent at rest and described as a diffuse "tightening" with minimal exertion, walking a few steps or carrying heavy things. It does not radiate and subsides with rest. He has a history of HTN, IDDM2, CAD, COPD, HFrEF 35%, carotid stenosis, PE/DVT on eliquis, and chronic back pain on chronic opioids. He is a patient of Dr. Wheeler and had an angiogram in 2014 with patent coronaries and NM stress test in 2016. He has been out of his bentyl and protonix for about 2 weeks as well. Today, he went to see his pulmonologist for COPD and reported this pain and was sent to the ED for further work up. He has a healing ulcer to the left leg he states looks much better.  "
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
## 2020-06-10 ENCOUNTER — HOSPITAL ENCOUNTER (OUTPATIENT)
Dept: RADIOLOGY | Facility: HOSPITAL | Age: 65
Discharge: HOME | DRG: 287 | End: 2020-06-10
Payer: MEDICARE

## 2020-06-10 PROBLEM — I20.0 UNSTABLE ANGINA: Status: ACTIVE | Noted: 2020-06-10

## 2020-06-10 LAB
ANION GAP SERPL CALC-SCNC: 10 MMOL/L (ref 8–16)
BUN SERPL-MCNC: 9 MG/DL (ref 8–23)
CALCIUM SERPL-MCNC: 9 MG/DL (ref 8.7–10.5)
CHLORIDE SERPL-SCNC: 103 MMOL/L (ref 95–110)
CO2 SERPL-SCNC: 29 MMOL/L (ref 23–29)
CREAT SERPL-MCNC: 0.8 MG/DL (ref 0.5–1.4)
EST. GFR  (AFRICAN AMERICAN): >60 ML/MIN/1.73 M^2
EST. GFR  (NON AFRICAN AMERICAN): >60 ML/MIN/1.73 M^2
GLUCOSE SERPL-MCNC: 134 MG/DL (ref 70–110)
GLUCOSE SERPL-MCNC: 135 MG/DL (ref 70–110)
GLUCOSE SERPL-MCNC: 145 MG/DL (ref 70–110)
GLUCOSE SERPL-MCNC: 159 MG/DL (ref 70–110)
GLUCOSE SERPL-MCNC: 174 MG/DL (ref 70–110)
MAGNESIUM SERPL-MCNC: 2.1 MG/DL (ref 1.6–2.6)
POTASSIUM SERPL-SCNC: 3.7 MMOL/L (ref 3.5–5.1)
SODIUM SERPL-SCNC: 142 MMOL/L (ref 136–145)

## 2020-06-10 PROCEDURE — 25000242 PHARM REV CODE 250 ALT 637 W/ HCPCS: Performed by: FAMILY MEDICINE

## 2020-06-10 PROCEDURE — 36415 COLL VENOUS BLD VENIPUNCTURE: CPT

## 2020-06-10 PROCEDURE — 83735 ASSAY OF MAGNESIUM: CPT

## 2020-06-10 PROCEDURE — 80048 BASIC METABOLIC PNL TOTAL CA: CPT

## 2020-06-10 PROCEDURE — 27000221 HC OXYGEN, UP TO 24 HOURS

## 2020-06-10 PROCEDURE — 25000242 PHARM REV CODE 250 ALT 637 W/ HCPCS: Performed by: INTERNAL MEDICINE

## 2020-06-10 PROCEDURE — 82962 GLUCOSE BLOOD TEST: CPT

## 2020-06-10 PROCEDURE — 94640 AIRWAY INHALATION TREATMENT: CPT

## 2020-06-10 PROCEDURE — 25000003 PHARM REV CODE 250: Performed by: INTERNAL MEDICINE

## 2020-06-10 PROCEDURE — 21400001 HC TELEMETRY ROOM

## 2020-06-10 PROCEDURE — 63600175 PHARM REV CODE 636 W HCPCS: Performed by: NURSE PRACTITIONER

## 2020-06-10 PROCEDURE — 25000003 PHARM REV CODE 250: Performed by: NURSE PRACTITIONER

## 2020-06-10 RX ORDER — NAPROXEN SODIUM 220 MG/1
81 TABLET, FILM COATED ORAL DAILY
Status: DISCONTINUED | OUTPATIENT
Start: 2020-06-10 | End: 2020-06-13 | Stop reason: HOSPADM

## 2020-06-10 RX ORDER — ASPIRIN 325 MG
325 TABLET, DELAYED RELEASE (ENTERIC COATED) ORAL DAILY
Status: DISCONTINUED | OUTPATIENT
Start: 2020-06-10 | End: 2020-06-10

## 2020-06-10 RX ORDER — NITROGLYCERIN 0.4 MG/1
0.4 TABLET SUBLINGUAL EVERY 5 MIN PRN
Status: DISCONTINUED | OUTPATIENT
Start: 2020-06-10 | End: 2020-06-13 | Stop reason: HOSPADM

## 2020-06-10 RX ADMIN — LAMOTRIGINE 25 MG: 25 TABLET ORAL at 08:06

## 2020-06-10 RX ADMIN — INSULIN ASPART 15 UNITS: 100 INJECTION, SOLUTION INTRAVENOUS; SUBCUTANEOUS at 12:06

## 2020-06-10 RX ADMIN — LAMOTRIGINE 25 MG: 25 TABLET ORAL at 09:06

## 2020-06-10 RX ADMIN — FUROSEMIDE 40 MG: 40 TABLET ORAL at 09:06

## 2020-06-10 RX ADMIN — OXYCODONE AND ACETAMINOPHEN 1 TABLET: 7.5; 325 TABLET ORAL at 03:06

## 2020-06-10 RX ADMIN — NITROGLYCERIN 0.4 MG: 0.4 TABLET SUBLINGUAL at 05:06

## 2020-06-10 RX ADMIN — GABAPENTIN 600 MG: 300 CAPSULE ORAL at 02:06

## 2020-06-10 RX ADMIN — GABAPENTIN 600 MG: 300 CAPSULE ORAL at 08:06

## 2020-06-10 RX ADMIN — OXYCODONE AND ACETAMINOPHEN 1 TABLET: 7.5; 325 TABLET ORAL at 05:06

## 2020-06-10 RX ADMIN — ONDANSETRON 4 MG: 2 INJECTION INTRAMUSCULAR; INTRAVENOUS at 08:06

## 2020-06-10 RX ADMIN — TIOTROPIUM BROMIDE 18 MCG: 18 CAPSULE ORAL; RESPIRATORY (INHALATION) at 09:06

## 2020-06-10 RX ADMIN — CARVEDILOL 3.12 MG: 3.12 TABLET, FILM COATED ORAL at 09:06

## 2020-06-10 RX ADMIN — APIXABAN 5 MG: 5 TABLET, FILM COATED ORAL at 09:06

## 2020-06-10 RX ADMIN — INSULIN ASPART 15 UNITS: 100 INJECTION, SOLUTION INTRAVENOUS; SUBCUTANEOUS at 05:06

## 2020-06-10 RX ADMIN — DICYCLOMINE HYDROCHLORIDE 20 MG: 10 CAPSULE ORAL at 09:06

## 2020-06-10 RX ADMIN — PANTOPRAZOLE SODIUM 40 MG: 40 TABLET, DELAYED RELEASE ORAL at 05:06

## 2020-06-10 RX ADMIN — DICYCLOMINE HYDROCHLORIDE 20 MG: 10 CAPSULE ORAL at 02:06

## 2020-06-10 RX ADMIN — LAMOTRIGINE 25 MG: 25 TABLET ORAL at 02:06

## 2020-06-10 RX ADMIN — INSULIN DETEMIR 85 UNITS: 100 INJECTION, SOLUTION SUBCUTANEOUS at 08:06

## 2020-06-10 RX ADMIN — PRAZOSIN HYDROCHLORIDE 2 MG: 1 CAPSULE ORAL at 09:06

## 2020-06-10 RX ADMIN — SENNOSIDES AND DOCUSATE SODIUM 1 TABLET: 8.6; 5 TABLET ORAL at 09:06

## 2020-06-10 RX ADMIN — OXYCODONE AND ACETAMINOPHEN 1 TABLET: 7.5; 325 TABLET ORAL at 07:06

## 2020-06-10 RX ADMIN — CARVEDILOL 3.12 MG: 3.12 TABLET, FILM COATED ORAL at 08:06

## 2020-06-10 RX ADMIN — LAMOTRIGINE 25 MG: 25 TABLET ORAL at 05:06

## 2020-06-10 RX ADMIN — FLUTICASONE FUROATE AND VILANTEROL TRIFENATATE 1 PUFF: 100; 25 POWDER RESPIRATORY (INHALATION) at 09:06

## 2020-06-10 RX ADMIN — OXYCODONE AND ACETAMINOPHEN 1 TABLET: 7.5; 325 TABLET ORAL at 11:06

## 2020-06-10 RX ADMIN — PRAZOSIN HYDROCHLORIDE 2 MG: 1 CAPSULE ORAL at 08:06

## 2020-06-10 RX ADMIN — POTASSIUM CHLORIDE 20 MEQ: 20 TABLET, EXTENDED RELEASE ORAL at 05:06

## 2020-06-10 RX ADMIN — GABAPENTIN 600 MG: 300 CAPSULE ORAL at 09:06

## 2020-06-10 RX ADMIN — ATORVASTATIN CALCIUM 40 MG: 40 TABLET, FILM COATED ORAL at 08:06

## 2020-06-10 RX ADMIN — SENNOSIDES AND DOCUSATE SODIUM 1 TABLET: 8.6; 5 TABLET ORAL at 08:06

## 2020-06-10 RX ADMIN — DICYCLOMINE HYDROCHLORIDE 20 MG: 10 CAPSULE ORAL at 08:06

## 2020-06-10 RX ADMIN — OXYCODONE AND ACETAMINOPHEN 1 TABLET: 7.5; 325 TABLET ORAL at 12:06

## 2020-06-10 RX ADMIN — ASPIRIN 81 MG 81 MG: 81 TABLET ORAL at 05:06

## 2020-06-10 NOTE — PLAN OF CARE
06/09/20 4160   Patient Assessment/Suction   Level of Consciousness (AVPU) alert   Respiratory Effort Unlabored;Normal   Expansion/Accessory Muscles/Retractions no use of accessory muscles   All Lung Fields Breath Sounds clear;diminished   PRE-TX-O2   O2 Device (Oxygen Therapy) nasal cannula   Flow (L/min) 2   SpO2 96 %   Pulse Oximetry Type Intermittent   $ Pulse Oximetry - Multiple Charge Pulse Oximetry - Multiple   Pulse (!) 56   Inhaler   $ Inhaler Charges PRN treatment not required   Daily Review of Necessity (Inhaler) completed   Respiratory Treatment Status (Inhaler) PRN treatment not required   continue tx. As ordered

## 2020-06-10 NOTE — PLAN OF CARE
Vital signs, cardiac and lab monitoring.   MD consult in am. Increase activity as tolerated. Bed alarm on. Watch for falls. Watch for sign and symptoms of bleeding.  Accuchecks per order.Breathing treatments and adjust oxygen as needed. Strict I & O. Daily weights.

## 2020-06-10 NOTE — PROGRESS NOTES
"[facility]  Progress Note    Patient Name: Kevan Colin Sr.  MRN: [id number]  Patient Class: OP- Observation   Admission Date: 6/8/2020  Length of Stay: 0 days  Attending Physician: Marixa Jon MD  Primary Care Provider: Faiza Ochoa NP        Subjective:     Principal Problem:Chest pain due to myocardial ischemia        HPI:  Mr. Colin presents today with complaints of chest pain for the last 2 weeks. It is moderate. It is associated with QUIÑONES, nausea, sweats, and dizziness. He denies fever, chills, V/D, cough, or LOC. Chest pain is absent at rest and described as a diffuse "tightening" with minimal exertion, walking a few steps or carrying heavy things. It does not radiate and subsides with rest. He has a history of HTN, IDDM2, CAD, COPD, HFrEF 35%, carotid stenosis, PE/DVT on eliquis, and chronic back pain on chronic opioids. He is a patient of Dr. Wheeler and had an angiogram in 2014 with patent coronaries and NM stress test in 2016. He has been out of his bentyl and protonix for about 2 weeks as well. Today, he went to see his pulmonologist for COPD and reported this pain and was sent to the ED for further work up. He has a healing ulcer to the left leg he states looks much better.    Overview/Hospital Course:  6/9/2020  I have had a little chest pain today that lasted a short time and 5/10 pain.   Pt will have his second part of the stress test tomorrow    6/10/2020  Mr Colin continues to has some 2/10 chest pain at rest and 6/10 with movement. I just feel that something is very wrong and I cannot continue like this.    Interval History: Pt has minor chest pain at rest and moderate chst pain with activity.    Review of Systems   Constitutional: Positive for diaphoresis and fatigue. Negative for chills and fever.   HENT: Negative.    Eyes: Negative.    Respiratory: Positive for chest tightness.    Cardiovascular: Positive for chest pain.   Gastrointestinal: " Negative.    Endocrine: Negative.    Genitourinary: Negative.    Musculoskeletal: Positive for arthralgias and myalgias.   Allergic/Immunologic: Negative.    Neurological: Positive for weakness and light-headedness.   Hematological: Negative.    Psychiatric/Behavioral: Positive for decreased concentration.     Objective:     Vital Signs (Most Recent):  Temp: 97.9 °F (36.6 °C) (06/10/20 1125)  Pulse: 66 (06/10/20 1125)  Resp: 18 (06/10/20 1125)  BP: 117/65 (06/10/20 1125)  SpO2: 97 % (06/10/20 1125) Vital Signs (24h Range):  Temp:  [97.6 °F (36.4 °C)-98.1 °F (36.7 °C)] 97.9 °F (36.6 °C)  Pulse:  [56-77] 66  Resp:  [12-21] 18  SpO2:  [91 %-97 %] 97 %  BP: (109-142)/(52-80) 117/65     Weight: 120.1 kg (264 lb 12.4 oz)  Body mass index is 33.99 kg/m².  No intake or output data in the 24 hours ending 06/10/20 1550   Physical Exam   Constitutional: He is oriented to person, place, and time. No distress.   HENT:   Head: Normocephalic and atraumatic.   Eyes: Pupils are equal, round, and reactive to light.   Neck: Normal range of motion. Neck supple.   Cardiovascular: Normal rate and regular rhythm. Exam reveals gallop.   S 4   Pulmonary/Chest: Effort normal and breath sounds normal.   Abdominal: Soft. Bowel sounds are normal.   Musculoskeletal: Normal range of motion.   Neurological: He is alert and oriented to person, place, and time.   Skin: Skin is warm. He is not diaphoretic.   Psychiatric: He has a normal mood and affect.       Significant Labs:   BMP:   Recent Labs   Lab 06/10/20  0452   *      K 3.7      CO2 29   BUN 9   CREATININE 0.8   CALCIUM 9.0   MG 2.1     CBC:   Recent Labs   Lab 06/08/20  1625   WBC 8.63   HGB 13.0*   HCT 40.2        CMP:   Recent Labs   Lab 06/08/20  1625 06/09/20  0453 06/10/20  0452    137 142   K 4.3 3.5 3.7   CL 97 100 103   CO2 25 27 29   * 228* 134*   BUN 15 15 9   CREATININE 0.9 0.8 0.8   CALCIUM 9.1 8.8 9.0   PROT 7.6  --   --    ALBUMIN 4.2  --    --    BILITOT 0.5  --   --    ALKPHOS 94  --   --    AST 21  --   --    ALT 28  --   --    ANIONGAP 14 10 10   EGFRNONAA >60.0 >60.0 >60.0     Cardiac Markers:   Recent Labs   Lab 06/08/20  1625   BNP 43     Coagulation: No results for input(s): PT, INR, APTT in the last 48 hours.  Lipid Panel: No results for input(s): CHOL, HDL, LDLCALC, TRIG, CHOLHDL in the last 48 hours.  Magnesium:   Recent Labs   Lab 06/09/20  0453 06/10/20  0452   MG 2.0 2.1     POCT Glucose: No results for input(s): POCTGLUCOSE in the last 48 hours.  Troponin:   Recent Labs   Lab 06/08/20  1625 06/08/20  1946 06/08/20  2343   TROPONINI <0.030 <0.030 <0.030     Urine Studies: No results for input(s): COLORU, APPEARANCEUA, PHUR, SPECGRAV, PROTEINUA, GLUCUA, KETONESU, BILIRUBINUA, OCCULTUA, NITRITE, UROBILINOGEN, LEUKOCYTESUR, RBCUA, WBCUA, BACTERIA, SQUAMEPITHEL, HYALINECASTS in the last 48 hours.    Invalid input(s): WRIGHTVERAR    Significant Imaging: I have reviewed all pertinent imaging results/findings within the past 24 hours.      Assessment/Plan:      * Chest pain due to myocardial ischemia  Admit to cardiology B  Trend troponins  Continue ASA  NM stress test in AM     Pt has a positive ETT  With a reversible deficit and pain at rest.    P)  NTG .4 prn  Cardiology consulted        Chronic, continuous use of opioids  Continue home opioids      Benign essential HTN  Continue home meds  monitor      Venous stasis ulcer  Consult wound care      Type 2 diabetes mellitus, uncontrolled  accuchecks ACHS with low dose ISS        VTE Risk Mitigation (From admission, onward)         Ordered     apixaban tablet 5 mg  2 times daily      06/08/20 1752     IP VTE HIGH RISK PATIENT  Once      06/08/20 1752     Place sequential compression device  Until discontinued      06/08/20 1752                      Beto Verma MD  Department of Hospital Medicine   [facility]

## 2020-06-10 NOTE — PLAN OF CARE
06/10/20 1524   Discharge Assessment   Assessment Type Discharge Planning Assessment   Confirmed/corrected address and phone number on facesheet? Yes   Assessment information obtained from? Patient   Communicated expected length of stay with patient/caregiver yes   Prior to hospitilization cognitive status: Alert/Oriented   Prior to hospitalization functional status: Independent   Current cognitive status: Alert/Oriented   Current Functional Status: Independent   Lives With significant other   Able to Return to Prior Arrangements yes   Is patient able to care for self after discharge? Yes   Patient's perception of discharge disposition home or selfcare   Readmission Within the Last 30 Days no previous admission in last 30 days   Patient currently being followed by outpatient case management? No   Patient currently receives any other outside agency services? No   Equipment Currently Used at Home oxygen;nebulizer   Do you have any problems affording any of your prescribed medications? No   Is the patient taking medications as prescribed? yes   Does the patient have transportation home? Yes   Transportation Anticipated family or friend will provide   Dialysis Name and Scheduled days N/A   Does the patient receive services at the [facility]? No   Discharge Plan A Home   Discharge Plan B Home   DME Needed Upon Discharge  none   Patient/Family in Agreement with Plan yes

## 2020-06-10 NOTE — PROGRESS NOTES
@ 06:58 PATIENT INJECTED WITH     27.5mCi Tc99m MYOVIEW FOR RESTING IMAGES.    2 DAY PROTOCOL    NO RESTRICTIONS.    MONICA RAMAN

## 2020-06-10 NOTE — ASSESSMENT & PLAN NOTE
Admit to cardiology B  Trend troponins  Continue ASA  NM stress test in AM     Pt has a positive ETT  With a reversible deficit and pain at rest.    P)  NTG .4 prn  Cardiology consulted

## 2020-06-10 NOTE — CARE UPDATE
06/10/20 0935   Patient Assessment/Suction   Level of Consciousness (AVPU) alert   Respiratory Effort Normal   All Lung Fields Breath Sounds diminished

## 2020-06-11 LAB
ANION GAP SERPL CALC-SCNC: 9 MMOL/L (ref 8–16)
BUN SERPL-MCNC: 10 MG/DL (ref 8–23)
CALCIUM SERPL-MCNC: 8.6 MG/DL (ref 8.7–10.5)
CHLORIDE SERPL-SCNC: 105 MMOL/L (ref 95–110)
CO2 SERPL-SCNC: 27 MMOL/L (ref 23–29)
CREAT SERPL-MCNC: 0.9 MG/DL (ref 0.5–1.4)
EST. GFR  (AFRICAN AMERICAN): >60 ML/MIN/1.73 M^2
EST. GFR  (NON AFRICAN AMERICAN): >60 ML/MIN/1.73 M^2
GLUCOSE SERPL-MCNC: 101 MG/DL (ref 70–110)
GLUCOSE SERPL-MCNC: 139 MG/DL (ref 70–110)
GLUCOSE SERPL-MCNC: 142 MG/DL (ref 70–110)
GLUCOSE SERPL-MCNC: 178 MG/DL (ref 70–110)
GLUCOSE SERPL-MCNC: 183 MG/DL (ref 70–110)
MAGNESIUM SERPL-MCNC: 2 MG/DL (ref 1.6–2.6)
POTASSIUM SERPL-SCNC: 3.7 MMOL/L (ref 3.5–5.1)
SODIUM SERPL-SCNC: 141 MMOL/L (ref 136–145)

## 2020-06-11 PROCEDURE — 25000242 PHARM REV CODE 250 ALT 637 W/ HCPCS: Performed by: FAMILY MEDICINE

## 2020-06-11 PROCEDURE — 36415 COLL VENOUS BLD VENIPUNCTURE: CPT

## 2020-06-11 PROCEDURE — 82962 GLUCOSE BLOOD TEST: CPT

## 2020-06-11 PROCEDURE — 99900035 HC TECH TIME PER 15 MIN (STAT)

## 2020-06-11 PROCEDURE — 83735 ASSAY OF MAGNESIUM: CPT

## 2020-06-11 PROCEDURE — 27000221 HC OXYGEN, UP TO 24 HOURS

## 2020-06-11 PROCEDURE — 25000003 PHARM REV CODE 250: Performed by: INTERNAL MEDICINE

## 2020-06-11 PROCEDURE — 94640 AIRWAY INHALATION TREATMENT: CPT

## 2020-06-11 PROCEDURE — 21400001 HC TELEMETRY ROOM

## 2020-06-11 PROCEDURE — 94761 N-INVAS EAR/PLS OXIMETRY MLT: CPT

## 2020-06-11 PROCEDURE — 25000003 PHARM REV CODE 250: Performed by: NURSE PRACTITIONER

## 2020-06-11 PROCEDURE — 80048 BASIC METABOLIC PNL TOTAL CA: CPT

## 2020-06-11 PROCEDURE — 63600175 PHARM REV CODE 636 W HCPCS: Performed by: NURSE PRACTITIONER

## 2020-06-11 PROCEDURE — 63600175 PHARM REV CODE 636 W HCPCS: Performed by: INTERNAL MEDICINE

## 2020-06-11 RX ORDER — ENOXAPARIN SODIUM 100 MG/ML
1 INJECTION SUBCUTANEOUS ONCE
Status: COMPLETED | OUTPATIENT
Start: 2020-06-11 | End: 2020-06-11

## 2020-06-11 RX ORDER — ENOXAPARIN SODIUM 100 MG/ML
1 INJECTION SUBCUTANEOUS ONCE
Status: DISCONTINUED | OUTPATIENT
Start: 2020-06-11 | End: 2020-06-11

## 2020-06-11 RX ORDER — SODIUM CHLORIDE 450 MG/100ML
INJECTION, SOLUTION INTRAVENOUS CONTINUOUS
Status: DISCONTINUED | OUTPATIENT
Start: 2020-06-11 | End: 2020-06-13 | Stop reason: HOSPADM

## 2020-06-11 RX ADMIN — FLUTICASONE FUROATE AND VILANTEROL TRIFENATATE 1 PUFF: 100; 25 POWDER RESPIRATORY (INHALATION) at 07:06

## 2020-06-11 RX ADMIN — CARVEDILOL 3.12 MG: 3.12 TABLET, FILM COATED ORAL at 08:06

## 2020-06-11 RX ADMIN — OXYCODONE AND ACETAMINOPHEN 1 TABLET: 7.5; 325 TABLET ORAL at 02:06

## 2020-06-11 RX ADMIN — PRAZOSIN HYDROCHLORIDE 2 MG: 1 CAPSULE ORAL at 08:06

## 2020-06-11 RX ADMIN — OXYCODONE AND ACETAMINOPHEN 1 TABLET: 7.5; 325 TABLET ORAL at 10:06

## 2020-06-11 RX ADMIN — SENNOSIDES AND DOCUSATE SODIUM 1 TABLET: 8.6; 5 TABLET ORAL at 08:06

## 2020-06-11 RX ADMIN — GABAPENTIN 600 MG: 300 CAPSULE ORAL at 08:06

## 2020-06-11 RX ADMIN — PANTOPRAZOLE SODIUM 40 MG: 40 TABLET, DELAYED RELEASE ORAL at 05:06

## 2020-06-11 RX ADMIN — GABAPENTIN 600 MG: 300 CAPSULE ORAL at 02:06

## 2020-06-11 RX ADMIN — ASPIRIN 81 MG 81 MG: 81 TABLET ORAL at 08:06

## 2020-06-11 RX ADMIN — LAMOTRIGINE 25 MG: 25 TABLET ORAL at 12:06

## 2020-06-11 RX ADMIN — INSULIN ASPART 15 UNITS: 100 INJECTION, SOLUTION INTRAVENOUS; SUBCUTANEOUS at 05:06

## 2020-06-11 RX ADMIN — OXYCODONE AND ACETAMINOPHEN 1 TABLET: 7.5; 325 TABLET ORAL at 08:06

## 2020-06-11 RX ADMIN — OXYCODONE AND ACETAMINOPHEN 1 TABLET: 7.5; 325 TABLET ORAL at 05:06

## 2020-06-11 RX ADMIN — LAMOTRIGINE 25 MG: 25 TABLET ORAL at 05:06

## 2020-06-11 RX ADMIN — ATORVASTATIN CALCIUM 40 MG: 40 TABLET, FILM COATED ORAL at 08:06

## 2020-06-11 RX ADMIN — LAMOTRIGINE 25 MG: 25 TABLET ORAL at 08:06

## 2020-06-11 RX ADMIN — DICYCLOMINE HYDROCHLORIDE 20 MG: 10 CAPSULE ORAL at 02:06

## 2020-06-11 RX ADMIN — DICYCLOMINE HYDROCHLORIDE 20 MG: 10 CAPSULE ORAL at 08:06

## 2020-06-11 RX ADMIN — POTASSIUM CHLORIDE 20 MEQ: 20 TABLET, EXTENDED RELEASE ORAL at 05:06

## 2020-06-11 RX ADMIN — FUROSEMIDE 40 MG: 40 TABLET ORAL at 08:06

## 2020-06-11 RX ADMIN — INSULIN ASPART 15 UNITS: 100 INJECTION, SOLUTION INTRAVENOUS; SUBCUTANEOUS at 07:06

## 2020-06-11 RX ADMIN — TIOTROPIUM BROMIDE 18 MCG: 18 CAPSULE ORAL; RESPIRATORY (INHALATION) at 07:06

## 2020-06-11 RX ADMIN — ENOXAPARIN SODIUM 120 MG: 60 INJECTION SUBCUTANEOUS at 05:06

## 2020-06-11 RX ADMIN — SODIUM CHLORIDE: 0.45 INJECTION, SOLUTION INTRAVENOUS at 05:06

## 2020-06-11 RX ADMIN — ONDANSETRON 4 MG: 2 INJECTION INTRAMUSCULAR; INTRAVENOUS at 07:06
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
## 2020-06-11 NOTE — HOSPITAL COURSE
Patient was admitted to the hospital and myocardial perfusion scan was performed that shows a small area of reversible ischemia of the inferolateral wall.

## 2020-06-11 NOTE — SUBJECTIVE & OBJECTIVE
Interval History: Pt will have an EGD and an angiogram, tomorrow?    Review of Systems   Constitutional: Positive for fatigue. Negative for diaphoresis.   HENT: Positive for trouble swallowing.    Eyes: Negative.    Respiratory: Positive for cough. Negative for shortness of breath and wheezing.    Cardiovascular: Positive for chest pain.   Gastrointestinal: Positive for abdominal pain. Negative for nausea and vomiting.   Endocrine: Negative.    Genitourinary: Negative.    Musculoskeletal: Positive for arthralgias and myalgias.   Neurological: Positive for weakness.   Hematological: Negative.    Psychiatric/Behavioral: Negative.      Objective:     Vital Signs (Most Recent):  Temp: 97.5 °F (36.4 °C) (06/11/20 1515)  Pulse: 63 (06/11/20 1515)  Resp: 20 (06/11/20 1515)  BP: 125/68 (06/11/20 1515)  SpO2: 96 % (06/11/20 1515) Vital Signs (24h Range):  Temp:  [97.5 °F (36.4 °C)-98.2 °F (36.8 °C)] 97.5 °F (36.4 °C)  Pulse:  [63-95] 63  Resp:  [16-20] 20  SpO2:  [90 %-98 %] 96 %  BP: (115-144)/(68-85) 125/68     Weight: 116.8 kg (257 lb 8 oz)  Body mass index is 33.06 kg/m².    Intake/Output Summary (Last 24 hours) at 6/11/2020 1645  Last data filed at 6/11/2020 1200  Gross per 24 hour   Intake 1080 ml   Output 5 ml   Net 1075 ml      Physical Exam   Constitutional: He is oriented to person, place, and time. No distress.   HENT:   Head: Normocephalic and atraumatic.   Eyes: Pupils are equal, round, and reactive to light.   Neck: Normal range of motion. Neck supple.   Cardiovascular: Normal rate and regular rhythm. Exam reveals gallop.   Pulmonary/Chest: Effort normal and breath sounds normal. He has no wheezes.   Abdominal: Soft. Bowel sounds are normal. He exhibits no distension. There is no tenderness.   Musculoskeletal: Normal range of motion.   Neurological: He is alert and oriented to person, place, and time.   Skin: Skin is warm. He is not diaphoretic.   Psychiatric: He has a normal mood and affect.       Significant  Labs:   BMP:   Recent Labs   Lab 06/11/20  0449   *      K 3.7      CO2 27   BUN 10   CREATININE 0.9   CALCIUM 8.6*   MG 2.0     CBC: No results for input(s): WBC, HGB, HCT, PLT in the last 48 hours.  CMP:   Recent Labs   Lab 06/10/20  0452 06/11/20  0449    141   K 3.7 3.7    105   CO2 29 27   * 142*   BUN 9 10   CREATININE 0.8 0.9   CALCIUM 9.0 8.6*   ANIONGAP 10 9   EGFRNONAA >60.0 >60.0     Cardiac Markers: No results for input(s): CKMB, MYOGLOBIN, BNP, TROPISTAT in the last 48 hours.  Coagulation: No results for input(s): PT, INR, APTT in the last 48 hours.  Lactic Acid: No results for input(s): LACTATE in the last 48 hours.  Lipid Panel: No results for input(s): CHOL, HDL, LDLCALC, TRIG, CHOLHDL in the last 48 hours.  POCT Glucose: No results for input(s): POCTGLUCOSE in the last 48 hours.  Troponin: No results for input(s): TROPONINI in the last 48 hours.  Urine Studies: No results for input(s): COLORU, APPEARANCEUA, PHUR, SPECGRAV, PROTEINUA, GLUCUA, KETONESU, BILIRUBINUA, OCCULTUA, NITRITE, UROBILINOGEN, LEUKOCYTESUR, RBCUA, WBCUA, BACTERIA, SQUAMEPITHEL, HYALINECASTS in the last 48 hours.    Invalid input(s): SUZIE    Significant Imaging: I have reviewed all pertinent imaging results/findings within the past 24 hours.

## 2020-06-11 NOTE — UM SECONDARY REVIEW
Secondary Review Outcome:     Per Dr. Inder Ibarra MD with [employer] EHR recommendation 06- to 06- outpatient.  06- inpatient.

## 2020-06-11 NOTE — PROGRESS NOTES
[facility]  Cardiology  Progress Note    Patient Name: Kevan Colin Sr.  MRN: [id number]  Admission Date: 6/8/2020  Hospital Length of Stay: 1 days  Code Status: Full Code   Attending Physician: Marixa Jon MD   Primary Care Physician: Faiza Ochoa NP  Expected Discharge Date:   Principal Problem:Chest pain due to myocardial ischemia    Subjective:     Hospital Course:   Patient was admitted to the hospital and myocardial perfusion scan was performed that shows a small area of reversible ischemia of the inferolateral wall.    Interval History:  He is feeling better today.  He was nauseated earlier and received medication.  Denies any chest pains.    Review of Systems   Constitution: Negative. Negative for fever and weight gain.   Cardiovascular: Negative.  Negative for chest pain and leg swelling.   Respiratory: Negative.  Negative for cough and shortness of breath.    Skin: Negative.  Negative for flushing and rash.   Musculoskeletal: Negative.  Negative for back pain and muscle cramps.   Gastrointestinal: Positive for abdominal pain and nausea. Negative for constipation.   Genitourinary: Negative for bladder incontinence and flank pain.   Neurological: Negative.  Negative for dizziness and weakness.   Psychiatric/Behavioral: Negative.  Negative for altered mental status and hallucinations.     Objective:     Vital Signs (Most Recent):  Temp: 97.6 °F (36.4 °C) (06/11/20 0300)  Pulse: 76 (06/11/20 0756)  Resp: 16 (06/11/20 0756)  BP: 127/78 (06/11/20 0300)  SpO2: 97 % (06/11/20 0756) Vital Signs (24h Range):  Temp:  [97.5 °F (36.4 °C)-97.9 °F (36.6 °C)] 97.6 °F (36.4 °C)  Pulse:  [66-95] 76  Resp:  [16-21] 16  SpO2:  [90 %-99 %] 97 %  BP: (117-144)/(64-84) 127/78     Weight: 116.8 kg (257 lb 8 oz)  Body mass index is 33.06 kg/m².     SpO2: 97 %  O2 Device (Oxygen Therapy): nasal cannula      Intake/Output Summary (Last 24 hours) at 6/11/2020 0846  Last data filed at 6/10/2020  1800  Gross per 24 hour   Intake 1320 ml   Output 5 ml   Net 1315 ml       Lines/Drains/Airways     Peripheral Intravenous Line                 Peripheral IV - Single Lumen 06/08/20 1625 20 G;1 3/4 in Anterior;Left Forearm 2 days                Physical Exam   Constitutional: He is oriented to person, place, and time.   Overweight male in not acute distress   HENT:   Head: Normocephalic and atraumatic.   Eyes: Conjunctivae and EOM are normal.   Cardiovascular: Normal rate, regular rhythm and normal heart sounds.   Pulmonary/Chest: Effort normal and breath sounds normal.   Abdominal: Soft. Bowel sounds are normal. There is tenderness (Mild epigastric tenderness).   Musculoskeletal: He exhibits no edema.   Neurological: He is alert and oriented to person, place, and time.   Skin: Skin is warm and dry.   Psychiatric: He has a normal mood and affect. His behavior is normal. Judgment and thought content normal.   Nursing note and vitals reviewed.      Significant Labs:   BMP:   Recent Labs   Lab 06/10/20  0452 06/11/20  0449   * 142*    141   K 3.7 3.7    105   CO2 29 27   BUN 9 10   CREATININE 0.8 0.9   CALCIUM 9.0 8.6*   MG 2.1 2.0   , CMP   Recent Labs   Lab 06/10/20  0452 06/11/20  0449    141   K 3.7 3.7    105   CO2 29 27   * 142*   BUN 9 10   CREATININE 0.8 0.9   CALCIUM 9.0 8.6*   ANIONGAP 10 9   ESTGFRAFRICA >60.0 >60.0   EGFRNONAA >60.0 >60.0   , CBC No results for input(s): WBC, HGB, HCT, PLT in the last 48 hours., Lipid Panel No results for input(s): CHOL, HDL, LDLCALC, TRIG, CHOLHDL in the last 48 hours. and Troponin No results for input(s): TROPONINI in the last 48 hours.    Significant Imaging: X-Ray: CXR: X-Ray Chest 1 View (CXR): No results found for this visit on 06/08/20.    Assessment and Plan:         * Chest pain due to myocardial ischemia  He is having chest pains suggestive of myocardial ischemia although he does have significant dyspepsia.  Will discontinue  the Eliquis meanwhile get a GI evaluation.  GI consult today.  Possible coronary arteriogram tomorrow afternoon.  After GI evaluation give 1 dose of Lovenox    Abdominal pain  Nausea vomiting with epigastric pain as well as difficulty swallowing.  Undergoing GI evaluation        VTE Risk Mitigation (From admission, onward)         Ordered     IP VTE HIGH RISK PATIENT  Once      06/08/20 1752     Place sequential compression device  Until discontinued      06/08/20 1752                Óscar Garcia MD  Cardiology  [facility]

## 2020-06-11 NOTE — PLAN OF CARE
Vital signs, cardiac and lab monitoring. IV hydration    Increase activity as tolerated. Bed alarm on. Watch for falls. Watch for sign and symptoms of bleeding. Blood administration.    Npo after midnight. Procedure in am. Accuchecks per order.Breathing treatments and adjust oxygen as needed. Strict I & O. Daily weights.

## 2020-06-11 NOTE — ASSESSMENT & PLAN NOTE
He is having chest pains suggestive of myocardial ischemia although he does have significant dyspepsia.  Will discontinue the Eliquis meanwhile get a GI evaluation.

## 2020-06-11 NOTE — ASSESSMENT & PLAN NOTE
He is having chest pains suggestive of myocardial ischemia although he does have significant dyspepsia.  Will discontinue the Eliquis meanwhile get a GI evaluation.  GI consult today.  Possible coronary arteriogram tomorrow afternoon.  After GI evaluation give 1 dose of Lovenox

## 2020-06-11 NOTE — SUBJECTIVE & OBJECTIVE
Interval History:  He is feeling better today.  He was nauseated earlier and received medication.  Denies any chest pains.    Review of Systems   Constitution: Negative. Negative for fever and weight gain.   Cardiovascular: Negative.  Negative for chest pain and leg swelling.   Respiratory: Negative.  Negative for cough and shortness of breath.    Skin: Negative.  Negative for flushing and rash.   Musculoskeletal: Negative.  Negative for back pain and muscle cramps.   Gastrointestinal: Positive for abdominal pain and nausea. Negative for constipation.   Genitourinary: Negative for bladder incontinence and flank pain.   Neurological: Negative.  Negative for dizziness and weakness.   Psychiatric/Behavioral: Negative.  Negative for altered mental status and hallucinations.     Objective:     Vital Signs (Most Recent):  Temp: 97.6 °F (36.4 °C) (06/11/20 0300)  Pulse: 76 (06/11/20 0756)  Resp: 16 (06/11/20 0756)  BP: 127/78 (06/11/20 0300)  SpO2: 97 % (06/11/20 0756) Vital Signs (24h Range):  Temp:  [97.5 °F (36.4 °C)-97.9 °F (36.6 °C)] 97.6 °F (36.4 °C)  Pulse:  [66-95] 76  Resp:  [16-21] 16  SpO2:  [90 %-99 %] 97 %  BP: (117-144)/(64-84) 127/78     Weight: 116.8 kg (257 lb 8 oz)  Body mass index is 33.06 kg/m².     SpO2: 97 %  O2 Device (Oxygen Therapy): nasal cannula      Intake/Output Summary (Last 24 hours) at 6/11/2020 0846  Last data filed at 6/10/2020 1800  Gross per 24 hour   Intake 1320 ml   Output 5 ml   Net 1315 ml       Lines/Drains/Airways     Peripheral Intravenous Line                 Peripheral IV - Single Lumen 06/08/20 1625 20 G;1 3/4 in Anterior;Left Forearm 2 days                Physical Exam   Constitutional: He is oriented to person, place, and time.   Overweight male in not acute distress   HENT:   Head: Normocephalic and atraumatic.   Eyes: Conjunctivae and EOM are normal.   Cardiovascular: Normal rate, regular rhythm and normal heart sounds.   Pulmonary/Chest: Effort normal and breath sounds  normal.   Abdominal: Soft. Bowel sounds are normal. There is tenderness (Mild epigastric tenderness).   Musculoskeletal: He exhibits no edema.   Neurological: He is alert and oriented to person, place, and time.   Skin: Skin is warm and dry.   Psychiatric: He has a normal mood and affect. His behavior is normal. Judgment and thought content normal.   Nursing note and vitals reviewed.      Significant Labs:   BMP:   Recent Labs   Lab 06/10/20  0452 06/11/20  0449   * 142*    141   K 3.7 3.7    105   CO2 29 27   BUN 9 10   CREATININE 0.8 0.9   CALCIUM 9.0 8.6*   MG 2.1 2.0   , CMP   Recent Labs   Lab 06/10/20  0452 06/11/20  0449    141   K 3.7 3.7    105   CO2 29 27   * 142*   BUN 9 10   CREATININE 0.8 0.9   CALCIUM 9.0 8.6*   ANIONGAP 10 9   ESTGFRAFRICA >60.0 >60.0   EGFRNONAA >60.0 >60.0   , CBC No results for input(s): WBC, HGB, HCT, PLT in the last 48 hours., Lipid Panel No results for input(s): CHOL, HDL, LDLCALC, TRIG, CHOLHDL in the last 48 hours. and Troponin No results for input(s): TROPONINI in the last 48 hours.    Significant Imaging: X-Ray: CXR: X-Ray Chest 1 View (CXR): No results found for this visit on 06/08/20.

## 2020-06-11 NOTE — PLAN OF CARE
06/11/20 0756   Patient Assessment/Suction   Level of Consciousness (AVPU) alert   Respiratory Effort Unlabored   Expansion/Accessory Muscles/Retractions no use of accessory muscles   All Lung Fields Breath Sounds diminished   Rhythm/Pattern, Respiratory no shortness of breath reported   PRE-TX-O2   O2 Device (Oxygen Therapy) nasal cannula   $ Is the patient on Low Flow Oxygen? Yes   Flow (L/min) 2   SpO2 97 %   Pulse Oximetry Type Intermittent   $ Pulse Oximetry - Multiple Charge Pulse Oximetry - Multiple   Pulse 76   Resp 16   Inhaler   $ Inhaler Charges MDI (Metered Dose Inahler) Treatment;Mouth rinsed post treatment   Respiratory Treatment Status (Inhaler) given   Treatment Route (Inhaler) mouthpiece   Patient Position (Inhaler) semi-Ashton's   Post Treatment Assessment (Inhaler) breath sounds unchanged   Signs of Intolerance (Inhaler) none   Respiratory Evaluation   $ Care Plan Tech Time 15 min
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
## 2020-06-11 NOTE — HPI
The patient is a 65-year-old male that presented for evaluation of chest pain.  He describes the pain like a pinch and then a tightness 8 lightening sup and comes and goes.  He does describe if having a constant chest pain and epigastric pain.  This has been going on for the past 2 or 3 weeks but he has been progressively getting worse.  He describes having episodes of nausea and vomiting usually in the mornings.  He also describes having dyspepsia.  He has difficulty swallowing food.  He has history of previous embolus on Eliquis.  He has history of diabetes denies any hypertension denies any smoking his cholesterol is on now on he does have family history of coronary artery disease.

## 2020-06-12 LAB
ANION GAP SERPL CALC-SCNC: 9 MMOL/L (ref 8–16)
BUN SERPL-MCNC: 8 MG/DL (ref 8–23)
CALCIUM SERPL-MCNC: 8.9 MG/DL (ref 8.7–10.5)
CHLORIDE SERPL-SCNC: 105 MMOL/L (ref 95–110)
CO2 SERPL-SCNC: 26 MMOL/L (ref 23–29)
CREAT SERPL-MCNC: 0.8 MG/DL (ref 0.5–1.4)
EST. GFR  (AFRICAN AMERICAN): >60 ML/MIN/1.73 M^2
EST. GFR  (NON AFRICAN AMERICAN): >60 ML/MIN/1.73 M^2
GLUCOSE SERPL-MCNC: 130 MG/DL (ref 70–110)
GLUCOSE SERPL-MCNC: 163 MG/DL (ref 70–110)
GLUCOSE SERPL-MCNC: 164 MG/DL (ref 70–110)
GLUCOSE SERPL-MCNC: 177 MG/DL (ref 70–110)
GLUCOSE SERPL-MCNC: 201 MG/DL (ref 70–110)
GLUCOSE SERPL-MCNC: 279 MG/DL (ref 70–110)
GLUCOSE SERPL-MCNC: 56 MG/DL (ref 70–110)
MAGNESIUM SERPL-MCNC: 2.1 MG/DL (ref 1.6–2.6)
POTASSIUM SERPL-SCNC: 4.2 MMOL/L (ref 3.5–5.1)
SODIUM SERPL-SCNC: 140 MMOL/L (ref 136–145)

## 2020-06-12 PROCEDURE — C1725 CATH, TRANSLUMIN NON-LASER: HCPCS | Performed by: INTERNAL MEDICINE

## 2020-06-12 PROCEDURE — 25000003 PHARM REV CODE 250: Performed by: INTERNAL MEDICINE

## 2020-06-12 PROCEDURE — 99900035 HC TECH TIME PER 15 MIN (STAT)

## 2020-06-12 PROCEDURE — 83735 ASSAY OF MAGNESIUM: CPT

## 2020-06-12 PROCEDURE — 63600175 PHARM REV CODE 636 W HCPCS: Performed by: INTERNAL MEDICINE

## 2020-06-12 PROCEDURE — 43248 EGD GUIDE WIRE INSERTION: CPT | Performed by: INTERNAL MEDICINE

## 2020-06-12 PROCEDURE — 94761 N-INVAS EAR/PLS OXIMETRY MLT: CPT

## 2020-06-12 PROCEDURE — 93454 CORONARY ARTERY ANGIO S&I: CPT

## 2020-06-12 PROCEDURE — C1769 GUIDE WIRE: HCPCS | Performed by: INTERNAL MEDICINE

## 2020-06-12 PROCEDURE — 99152 MOD SED SAME PHYS/QHP 5/>YRS: CPT | Performed by: INTERNAL MEDICINE

## 2020-06-12 PROCEDURE — 25500020 PHARM REV CODE 255: Performed by: INTERNAL MEDICINE

## 2020-06-12 PROCEDURE — 94640 AIRWAY INHALATION TREATMENT: CPT

## 2020-06-12 PROCEDURE — C1760 CLOSURE DEV, VASC: HCPCS | Performed by: INTERNAL MEDICINE

## 2020-06-12 PROCEDURE — C1894 INTRO/SHEATH, NON-LASER: HCPCS | Performed by: INTERNAL MEDICINE

## 2020-06-12 PROCEDURE — 36415 COLL VENOUS BLD VENIPUNCTURE: CPT

## 2020-06-12 PROCEDURE — 25000242 PHARM REV CODE 250 ALT 637 W/ HCPCS: Performed by: FAMILY MEDICINE

## 2020-06-12 PROCEDURE — 80048 BASIC METABOLIC PNL TOTAL CA: CPT

## 2020-06-12 PROCEDURE — 25000003 PHARM REV CODE 250: Performed by: NURSE PRACTITIONER

## 2020-06-12 PROCEDURE — 21400001 HC TELEMETRY ROOM

## 2020-06-12 DEVICE — IMPLANTABLE DEVICE: Type: IMPLANTABLE DEVICE | Site: GROIN | Status: FUNCTIONAL

## 2020-06-12 RX ORDER — SODIUM CHLORIDE 9 MG/ML
INJECTION, SOLUTION INTRAVENOUS CONTINUOUS
Status: DISCONTINUED | OUTPATIENT
Start: 2020-06-12 | End: 2020-06-13 | Stop reason: HOSPADM

## 2020-06-12 RX ORDER — DIPHENHYDRAMINE HYDROCHLORIDE 50 MG/ML
50 INJECTION INTRAMUSCULAR; INTRAVENOUS ONCE AS NEEDED
Status: DISCONTINUED | OUTPATIENT
Start: 2020-06-12 | End: 2020-06-12 | Stop reason: HOSPADM

## 2020-06-12 RX ORDER — LIDOCAINE HYDROCHLORIDE 10 MG/ML
INJECTION, SOLUTION EPIDURAL; INFILTRATION; INTRACAUDAL; PERINEURAL
Status: DISCONTINUED | OUTPATIENT
Start: 2020-06-12 | End: 2020-06-12 | Stop reason: HOSPADM

## 2020-06-12 RX ORDER — SODIUM CHLORIDE 450 MG/100ML
INJECTION, SOLUTION INTRAVENOUS CONTINUOUS
Status: DISCONTINUED | OUTPATIENT
Start: 2020-06-12 | End: 2020-06-13 | Stop reason: HOSPADM

## 2020-06-12 RX ORDER — FENTANYL CITRATE 50 UG/ML
INJECTION, SOLUTION INTRAMUSCULAR; INTRAVENOUS
Status: DISCONTINUED | OUTPATIENT
Start: 2020-06-12 | End: 2020-06-12 | Stop reason: HOSPADM

## 2020-06-12 RX ORDER — MIDAZOLAM HYDROCHLORIDE 1 MG/ML
INJECTION INTRAMUSCULAR; INTRAVENOUS
Status: DISCONTINUED | OUTPATIENT
Start: 2020-06-12 | End: 2020-06-12 | Stop reason: HOSPADM

## 2020-06-12 RX ORDER — SODIUM CHLORIDE 0.9 % (FLUSH) 0.9 %
2 SYRINGE (ML) INJECTION
Status: DISCONTINUED | OUTPATIENT
Start: 2020-06-12 | End: 2020-06-12 | Stop reason: HOSPADM

## 2020-06-12 RX ORDER — MIDAZOLAM HYDROCHLORIDE 1 MG/ML
INJECTION INTRAMUSCULAR; INTRAVENOUS
Status: COMPLETED | OUTPATIENT
Start: 2020-06-12 | End: 2020-06-12

## 2020-06-12 RX ORDER — SODIUM CHLORIDE 9 MG/ML
INJECTION, SOLUTION INTRAVENOUS CONTINUOUS PRN
Status: COMPLETED | OUTPATIENT
Start: 2020-06-12 | End: 2020-06-12

## 2020-06-12 RX ORDER — FENTANYL CITRATE 50 UG/ML
INJECTION, SOLUTION INTRAMUSCULAR; INTRAVENOUS
Status: COMPLETED | OUTPATIENT
Start: 2020-06-12 | End: 2020-06-12

## 2020-06-12 RX ADMIN — SODIUM CHLORIDE: 0.45 INJECTION, SOLUTION INTRAVENOUS at 11:06

## 2020-06-12 RX ADMIN — OXYCODONE AND ACETAMINOPHEN 1 TABLET: 7.5; 325 TABLET ORAL at 10:06

## 2020-06-12 RX ADMIN — INSULIN ASPART 15 UNITS: 100 INJECTION, SOLUTION INTRAVENOUS; SUBCUTANEOUS at 11:06

## 2020-06-12 RX ADMIN — PRAZOSIN HYDROCHLORIDE 2 MG: 1 CAPSULE ORAL at 08:06

## 2020-06-12 RX ADMIN — FENTANYL CITRATE 50 MCG: 50 INJECTION, SOLUTION INTRAMUSCULAR; INTRAVENOUS at 08:06

## 2020-06-12 RX ADMIN — DICYCLOMINE HYDROCHLORIDE 20 MG: 10 CAPSULE ORAL at 08:06

## 2020-06-12 RX ADMIN — GABAPENTIN 600 MG: 300 CAPSULE ORAL at 09:06

## 2020-06-12 RX ADMIN — SODIUM CHLORIDE: 0.45 INJECTION, SOLUTION INTRAVENOUS at 03:06

## 2020-06-12 RX ADMIN — LAMOTRIGINE 25 MG: 25 TABLET ORAL at 09:06

## 2020-06-12 RX ADMIN — MIDAZOLAM HYDROCHLORIDE 1 MG: 1 INJECTION, SOLUTION INTRAMUSCULAR; INTRAVENOUS at 08:06

## 2020-06-12 RX ADMIN — PANTOPRAZOLE SODIUM 40 MG: 40 TABLET, DELAYED RELEASE ORAL at 05:06

## 2020-06-12 RX ADMIN — GABAPENTIN 600 MG: 300 CAPSULE ORAL at 08:06

## 2020-06-12 RX ADMIN — CARVEDILOL 3.12 MG: 3.12 TABLET, FILM COATED ORAL at 09:06

## 2020-06-12 RX ADMIN — PRAZOSIN HYDROCHLORIDE 2 MG: 1 CAPSULE ORAL at 09:06

## 2020-06-12 RX ADMIN — MIDAZOLAM HYDROCHLORIDE 2 MG: 1 INJECTION, SOLUTION INTRAMUSCULAR; INTRAVENOUS at 08:06

## 2020-06-12 RX ADMIN — INSULIN DETEMIR 85 UNITS: 100 INJECTION, SOLUTION SUBCUTANEOUS at 10:06

## 2020-06-12 RX ADMIN — FLUTICASONE FUROATE AND VILANTEROL TRIFENATATE 1 PUFF: 100; 25 POWDER RESPIRATORY (INHALATION) at 10:06

## 2020-06-12 RX ADMIN — DICYCLOMINE HYDROCHLORIDE 20 MG: 10 CAPSULE ORAL at 09:06

## 2020-06-12 RX ADMIN — FENTANYL CITRATE 25 MCG: 50 INJECTION, SOLUTION INTRAMUSCULAR; INTRAVENOUS at 08:06

## 2020-06-12 RX ADMIN — OXYCODONE AND ACETAMINOPHEN 1 TABLET: 7.5; 325 TABLET ORAL at 06:06

## 2020-06-12 RX ADMIN — FUROSEMIDE 40 MG: 40 TABLET ORAL at 09:06

## 2020-06-12 RX ADMIN — LAMOTRIGINE 25 MG: 25 TABLET ORAL at 05:06

## 2020-06-12 RX ADMIN — ASPIRIN 81 MG 81 MG: 81 TABLET ORAL at 09:06

## 2020-06-12 RX ADMIN — SENNOSIDES AND DOCUSATE SODIUM 1 TABLET: 8.6; 5 TABLET ORAL at 09:06

## 2020-06-12 RX ADMIN — TIOTROPIUM BROMIDE 18 MCG: 18 CAPSULE ORAL; RESPIRATORY (INHALATION) at 10:06

## 2020-06-12 RX ADMIN — INSULIN ASPART 15 UNITS: 100 INJECTION, SOLUTION INTRAVENOUS; SUBCUTANEOUS at 09:06

## 2020-06-12 RX ADMIN — OXYCODONE AND ACETAMINOPHEN 1 TABLET: 7.5; 325 TABLET ORAL at 05:06

## 2020-06-12 RX ADMIN — ATORVASTATIN CALCIUM 40 MG: 40 TABLET, FILM COATED ORAL at 08:06

## 2020-06-12 RX ADMIN — OXYCODONE AND ACETAMINOPHEN 1 TABLET: 7.5; 325 TABLET ORAL at 12:06

## 2020-06-12 RX ADMIN — OXYCODONE AND ACETAMINOPHEN 1 TABLET: 7.5; 325 TABLET ORAL at 09:06

## 2020-06-12 RX ADMIN — LAMOTRIGINE 25 MG: 25 TABLET ORAL at 08:06

## 2020-06-12 RX ADMIN — SENNOSIDES AND DOCUSATE SODIUM 1 TABLET: 8.6; 5 TABLET ORAL at 08:06

## 2020-06-12 RX ADMIN — CARVEDILOL 3.12 MG: 3.12 TABLET, FILM COATED ORAL at 08:06

## 2020-06-12 RX ADMIN — DEXTROSE MONOHYDRATE 25 G: 25 INJECTION, SOLUTION INTRAVENOUS at 02:06

## 2020-06-12 RX ADMIN — LAMOTRIGINE 25 MG: 25 TABLET ORAL at 01:06

## 2020-06-12 RX ADMIN — SODIUM CHLORIDE 25 ML/HR: 0.9 INJECTION, SOLUTION INTRAVENOUS at 07:06

## 2020-06-12 NOTE — PROGRESS NOTES
[facility]  Cardiology  Progress Note    Patient Name: Kevan Colin Sr.  MRN: [id number]  Admission Date: 6/8/2020  Hospital Length of Stay: 2 days  Code Status: Full Code   Attending Physician: Beto Verma MD   Primary Care Physician: Faiza Ochoa NP  Expected Discharge Date:   Principal Problem:Chest pain due to myocardial ischemia    Subjective:     Hospital Course:   Patient was admitted to the hospital and myocardial perfusion scan was performed that shows a small area of reversible ischemia of the inferolateral wall.    Interval History:  He is feeling better today    Review of Systems   Constitution: Negative. Negative for fever and weight gain.   Cardiovascular: Negative.  Negative for chest pain and leg swelling.   Respiratory: Negative.  Negative for cough and shortness of breath.    Skin: Negative.  Negative for flushing and rash.   Musculoskeletal: Negative.  Negative for back pain and muscle cramps.   Gastrointestinal: Negative.  Negative for abdominal pain and constipation.   Neurological: Negative.  Negative for dizziness and weakness.   Psychiatric/Behavioral: Negative.  Negative for altered mental status and hallucinations.     Objective:     Vital Signs (Most Recent):  Temp: 97.2 °F (36.2 °C) (06/12/20 1611)  Pulse: 76 (06/12/20 1611)  Resp: 20 (06/12/20 1611)  BP: 96/69 (06/12/20 1611)  SpO2: (!) 93 % (06/12/20 1611) Vital Signs (24h Range):  Temp:  [33.8 °F (1 °C)-98.3 °F (36.8 °C)] 97.2 °F (36.2 °C)  Pulse:  [20-82] 76  Resp:  [10-20] 20  SpO2:  [91 %-97 %] 93 %  BP: ()/(54-86) 96/69     Weight: 118.7 kg (261 lb 11 oz)  Body mass index is 33.6 kg/m².     SpO2: (!) 93 %  O2 Device (Oxygen Therapy): nasal cannula      Intake/Output Summary (Last 24 hours) at 6/12/2020 1820  Last data filed at 6/12/2020 1800  Gross per 24 hour   Intake 1200 ml   Output 500 ml   Net 700 ml       Lines/Drains/Airways     Peripheral Intravenous Line                 Peripheral IV -  Single Lumen 06/08/20 1625 20 G;1 3/4 in Anterior;Left Forearm 4 days         Midline Catheter Insertion/Assessment  - Single Lumen 06/12/20 1015 Left basilic vein (medial side of arm) 18g x 8cm less than 1 day                Physical Exam   Constitutional: He is oriented to person, place, and time.   Overweight male in not acute distress   HENT:   Head: Normocephalic and atraumatic.   Eyes: Pupils are equal, round, and reactive to light. Conjunctivae and EOM are normal.   Cardiovascular: Normal rate, regular rhythm and normal heart sounds.   Pulmonary/Chest: Effort normal and breath sounds normal.   Abdominal: Soft. Bowel sounds are normal.   Musculoskeletal: He exhibits no edema.   Neurological: He is alert and oriented to person, place, and time.   Skin: Skin is warm and dry.   Psychiatric: He has a normal mood and affect. His behavior is normal. Judgment and thought content normal.   Nursing note and vitals reviewed.      Significant Labs:   BMP:   Recent Labs   Lab 06/11/20  0449 06/12/20  0320   * 177*    140   K 3.7 4.2    105   CO2 27 26   BUN 10 8   CREATININE 0.9 0.8   CALCIUM 8.6* 8.9   MG 2.0 2.1   , CMP   Recent Labs   Lab 06/11/20  0449 06/12/20  0320    140   K 3.7 4.2    105   CO2 27 26   * 177*   BUN 10 8   CREATININE 0.9 0.8   CALCIUM 8.6* 8.9   ANIONGAP 9 9   ESTGFRAFRICA >60.0 >60.0   EGFRNONAA >60.0 >60.0   , CBC No results for input(s): WBC, HGB, HCT, PLT in the last 48 hours., INR No results for input(s): INR, PROTIME in the last 48 hours., Lipid Panel No results for input(s): CHOL, HDL, LDLCALC, TRIG, CHOLHDL in the last 48 hours. and Troponin No results for input(s): TROPONINI in the last 48 hours.    Significant Imaging: X-Ray: CXR: X-Ray Chest 1 View (CXR): No results found for this visit on 06/08/20.    Assessment and Plan:         * Chest pain due to myocardial ischemia  Coronary arteriogram revealed normal coronaries.  He can be discharged home  tomorrow.  Resume the Eliquis tomorrow morning.    Abdominal pain  EGD performed today.  He had dilatation of the esophagus.  There was no evidence of peptic ulcer disease.        VTE Risk Mitigation (From admission, onward)         Ordered     IP VTE HIGH RISK PATIENT  Once      06/08/20 1752     Place sequential compression device  Until discontinued      06/08/20 1752                Óscar Garcia MD  Cardiology  [facility]

## 2020-06-12 NOTE — ASSESSMENT & PLAN NOTE
Coronary arteriogram revealed normal coronaries.  He can be discharged home tomorrow.  Resume the Eliquis tomorrow morning.

## 2020-06-12 NOTE — ASSESSMENT & PLAN NOTE
EGD performed today.  He had dilatation of the esophagus.  There was no evidence of peptic ulcer disease.

## 2020-06-12 NOTE — SUBJECTIVE & OBJECTIVE
Interval History:  He is feeling better today    Review of Systems   Constitution: Negative. Negative for fever and weight gain.   Cardiovascular: Negative.  Negative for chest pain and leg swelling.   Respiratory: Negative.  Negative for cough and shortness of breath.    Skin: Negative.  Negative for flushing and rash.   Musculoskeletal: Negative.  Negative for back pain and muscle cramps.   Gastrointestinal: Negative.  Negative for abdominal pain and constipation.   Neurological: Negative.  Negative for dizziness and weakness.   Psychiatric/Behavioral: Negative.  Negative for altered mental status and hallucinations.     Objective:     Vital Signs (Most Recent):  Temp: 97.2 °F (36.2 °C) (06/12/20 1611)  Pulse: 76 (06/12/20 1611)  Resp: 20 (06/12/20 1611)  BP: 96/69 (06/12/20 1611)  SpO2: (!) 93 % (06/12/20 1611) Vital Signs (24h Range):  Temp:  [33.8 °F (1 °C)-98.3 °F (36.8 °C)] 97.2 °F (36.2 °C)  Pulse:  [20-82] 76  Resp:  [10-20] 20  SpO2:  [91 %-97 %] 93 %  BP: ()/(54-86) 96/69     Weight: 118.7 kg (261 lb 11 oz)  Body mass index is 33.6 kg/m².     SpO2: (!) 93 %  O2 Device (Oxygen Therapy): nasal cannula      Intake/Output Summary (Last 24 hours) at 6/12/2020 1820  Last data filed at 6/12/2020 1800  Gross per 24 hour   Intake 1200 ml   Output 500 ml   Net 700 ml       Lines/Drains/Airways     Peripheral Intravenous Line                 Peripheral IV - Single Lumen 06/08/20 1625 20 G;1 3/4 in Anterior;Left Forearm 4 days         Midline Catheter Insertion/Assessment  - Single Lumen 06/12/20 1015 Left basilic vein (medial side of arm) 18g x 8cm less than 1 day                Physical Exam   Constitutional: He is oriented to person, place, and time.   Overweight male in not acute distress   HENT:   Head: Normocephalic and atraumatic.   Eyes: Pupils are equal, round, and reactive to light. Conjunctivae and EOM are normal.   Cardiovascular: Normal rate, regular rhythm and normal heart sounds.    Pulmonary/Chest: Effort normal and breath sounds normal.   Abdominal: Soft. Bowel sounds are normal.   Musculoskeletal: He exhibits no edema.   Neurological: He is alert and oriented to person, place, and time.   Skin: Skin is warm and dry.   Psychiatric: He has a normal mood and affect. His behavior is normal. Judgment and thought content normal.   Nursing note and vitals reviewed.      Significant Labs:   BMP:   Recent Labs   Lab 06/11/20  0449 06/12/20  0320   * 177*    140   K 3.7 4.2    105   CO2 27 26   BUN 10 8   CREATININE 0.9 0.8   CALCIUM 8.6* 8.9   MG 2.0 2.1   , CMP   Recent Labs   Lab 06/11/20  0449 06/12/20  0320    140   K 3.7 4.2    105   CO2 27 26   * 177*   BUN 10 8   CREATININE 0.9 0.8   CALCIUM 8.6* 8.9   ANIONGAP 9 9   ESTGFRAFRICA >60.0 >60.0   EGFRNONAA >60.0 >60.0   , CBC No results for input(s): WBC, HGB, HCT, PLT in the last 48 hours., INR No results for input(s): INR, PROTIME in the last 48 hours., Lipid Panel No results for input(s): CHOL, HDL, LDLCALC, TRIG, CHOLHDL in the last 48 hours. and Troponin No results for input(s): TROPONINI in the last 48 hours.    Significant Imaging: X-Ray: CXR: X-Ray Chest 1 View (CXR): No results found for this visit on 06/08/20.

## 2020-06-12 NOTE — PROGRESS NOTES
Cardiac Rehab     Kevan Colin Sr.   [id number]   6/12/2020         Cardiac Rehab Phase Taught: Phase 1    Teaching Method: Verbal    Handouts: None    Educational Videos: None    Understanding:  Learning indicated by feedback and Verbalize understanding    Comments: pre and postop angiogram education including stents, meds, s/s to report. Pt voiced understanding. Questions answered. Will follow for further education    Total Time Spent:15mins            Madison Wheeler RN

## 2020-06-12 NOTE — NURSING
Pt returned from cath lab, vss. On 5L o2.  Right groin dressing CDI with no hematoma. Palpable pulses throughout. Patient is extremely diaphoretic, asleep but responds to voice. Glucose is 56, dr doe notified and new orders received.  IV d50 given to patient, recheck glucose is 164.  Pt more arousable but still sleepy, linen and patient are saturated with sweat. Will continue to monitor. 1/2 NS @ 100 ml/hr

## 2020-06-12 NOTE — PLAN OF CARE
06/12/20 1008   Patient Assessment/Suction   Level of Consciousness (AVPU) alert   Respiratory Effort Unlabored   All Lung Fields Breath Sounds clear   PRE-TX-O2   O2 Device (Oxygen Therapy) room air   SpO2 (!) 91 %   Pulse Oximetry Type Intermittent   $ Pulse Oximetry - Multiple Charge Pulse Oximetry - Multiple   Pulse 66   Resp 16   Inhaler   $ Inhaler Charges MDI (Metered Dose Inahler) Treatment   Daily Review of Necessity (Inhaler) completed   Respiratory Treatment Status (Inhaler) given   Treatment Route (Inhaler) mouthpiece   Patient Position (Inhaler) HOB elevated   Post Treatment Assessment (Inhaler) breath sounds unchanged   Signs of Intolerance (Inhaler) none

## 2020-06-12 NOTE — [employer] PATIENT INSTRUCTIONS
Discharge Summary/Instructions after an Endoscopic Procedure  Patient Name: Kevan Colin  Patient MRN: [id number]  Patient YOB: 1955 Friday, June 12, 2020  Julio César Mcclain III, MD  RESTRICTIONS:  During your procedure today, you received medications for sedation.  These   medications may affect your judgment, balance and coordination.  Therefore,   for 24 hours, you have the following restrictions:   - DO NOT drive a car, operate machinery, make legal/financial decisions,   sign important papers or drink alcohol.    ACTIVITY:  Today: no heavy lifting, straining or running due to procedural   sedation/anesthesia.  The following day: return to full activity including work.  DIET:  Eat and drink normally unless instructed otherwise.     TREATMENT FOR COMMON SIDE EFFECTS:  - Mild abdominal pain, nausea, belching, bloating or excessive gas:  rest,   eat lightly and use a heating pad.  - Sore Throat: treat with throat lozenges and/or gargle with warm salt   water.  - Because air was used during the procedure, expelling large amounts of air   from your rectum or belching is normal.  - If a bowel prep was taken, you may not have a bowel movement for 1-3 days.    This is normal.  SYMPTOMS TO WATCH FOR AND REPORT TO YOUR PHYSICIAN:  1. Abdominal pain or bloating, other than gas cramps.  2. Chest pain.  3. Back pain.  4. Signs of infection such as: chills or fever occurring within 24 hours   after the procedure.  5. Rectal bleeding, which would show as bright red, maroon, or black stools.   (A tablespoon of blood from the rectum is not serious, especially if   hemorrhoids are present.)  6. Vomiting.  7. Weakness or dizziness.  GO DIRECTLY TO THE NEAREST EMERGENCY ROOM IF YOU HAVE ANY OF THE FOLLOWING:      Difficulty breathing              Chills and/or fever over 101 F   Persistent vomiting and/or vomiting blood   Severe abdominal pain   Severe chest pain   Black, tarry stools   Bleeding- more than one  tablespoon   Any other symptom or condition that you feel may need urgent attention  Your doctor recommends these additional instructions:  If any biopsies were taken, your doctors clinic will contact you in 1 to 2   weeks with any results.  - Return patient to hospital [facility] for ongoing care.   - Ok to proceed w/ LHC and any blood thinners as warranted  For questions, problems or results please call your physician - Julio César Mcclain III, MD at Work:  [phone].  [facility], EMERGENCY ROOM PHONE NUMBER: [phone]  IF A COMPLICATION OR EMERGENCY SITUATION ARISES AND YOU ARE UNABLE TO REACH   YOUR PHYSICIAN - GO DIRECTLY TO THE EMERGENCY ROOM.  Julio César Mcclain III, MD  6/12/2020 8:23:04 AM  This report has been verified and signed electronically.  [employer]

## 2020-06-12 NOTE — SUBJECTIVE & OBJECTIVE
Interval History: I am feling better after he stretched my esophagus. Cardiac cath at 2 PM.    Review of Systems   Constitutional: Positive for diaphoresis and fatigue.   HENT: Negative for trouble swallowing.    Eyes: Negative.    Respiratory: Positive for shortness of breath and wheezing.    Cardiovascular: Positive for chest pain.   Gastrointestinal: Negative.    Genitourinary: Negative.    Musculoskeletal: Positive for arthralgias and myalgias.   Neurological: Positive for weakness.   Hematological: Negative.      Objective:     Vital Signs (Most Recent):  Temp: 97.5 °F (36.4 °C) (06/12/20 1100)  Pulse: 66 (06/12/20 1100)  Resp: 18 (06/12/20 1100)  BP: 100/66 (06/12/20 1100)  SpO2: (!) 91 % (06/12/20 1100) Vital Signs (24h Range):  Temp:  [33.8 °F (1 °C)-98.3 °F (36.8 °C)] 97.5 °F (36.4 °C)  Pulse:  [20-82] 66  Resp:  [10-20] 18  SpO2:  [91 %-97 %] 91 %  BP: (100-148)/(56-86) 100/66     Weight: 118.7 kg (261 lb 11 oz)  Body mass index is 33.6 kg/m².    Intake/Output Summary (Last 24 hours) at 6/12/2020 1227  Last data filed at 6/12/2020 0856  Gross per 24 hour   Intake 450 ml   Output --   Net 450 ml      Physical Exam   Constitutional: He is oriented to person, place, and time. No distress.   HENT:   Head: Normocephalic and atraumatic.   Eyes: Pupils are equal, round, and reactive to light. Conjunctivae and EOM are normal.   Neck: Normal range of motion. Neck supple.   Cardiovascular: Normal rate and regular rhythm.   Pulmonary/Chest: Effort normal and breath sounds normal.   Abdominal: Soft. Bowel sounds are normal.   Musculoskeletal: Normal range of motion.   Neurological: He is alert and oriented to person, place, and time.   Skin: Skin is warm. He is not diaphoretic.   Psychiatric: He has a normal mood and affect.       Significant Labs:   BMP:   Recent Labs   Lab 06/12/20  0320   *      K 4.2      CO2 26   BUN 8   CREATININE 0.8   CALCIUM 8.9   MG 2.1     CBC: No results for input(s):  WBC, HGB, HCT, PLT in the last 48 hours.  CMP:   Recent Labs   Lab 06/11/20  0449 06/12/20  0320    140   K 3.7 4.2    105   CO2 27 26   * 177*   BUN 10 8   CREATININE 0.9 0.8   CALCIUM 8.6* 8.9   ANIONGAP 9 9   EGFRNONAA >60.0 >60.0     Cardiac Markers: No results for input(s): CKMB, MYOGLOBIN, BNP, TROPISTAT in the last 48 hours.  Coagulation: No results for input(s): PT, INR, APTT in the last 48 hours.  POCT Glucose: No results for input(s): POCTGLUCOSE in the last 48 hours.  Troponin: No results for input(s): TROPONINI in the last 48 hours.    Significant Imaging: I have reviewed all pertinent imaging results/findings within the past 24 hours.

## 2020-06-12 NOTE — PLAN OF CARE
06/11/20 2200   Patient Assessment/Suction   Level of Consciousness (AVPU) responds to voice   Respiratory Effort Normal;Unlabored   Expansion/Accessory Muscles/Retractions no use of accessory muscles   PRE-TX-O2   O2 Device (Oxygen Therapy) nasal cannula   SpO2 97 %   Pulse Oximetry Type Intermittent   $ Pulse Oximetry - Multiple Charge Pulse Oximetry - Multiple   Pulse 66   Resp 18   Positioning   Body Position position maintained   Head of Bed (HOB) HOB elevated   Positioning/Transfer Devices pillows;in use   Respiratory Evaluation   $ Care Plan Tech Time 15 min

## 2020-06-12 NOTE — CONSULTS
"[facility]  Adult Nutrition   Consult Note (Nutrition Education)     SUMMARY     Recommendations/Interventions:  1. Once no longer NPO, advance to Cardiac/Diabetic Diet once medically able per MD.  2. Educated patient on Heart Healthy/DM Diet-patient showed understanding and stated he has been following this diet for years.  3. Recommend obtaining A1C-last one was 2019 (8.5%, Av mg/dL) .    Goals:  1. Patient to meet at least 75% of estimated needs via PO intake of meals.   2. Patient to show understanding of Heart Healthy/DM diet education-MET   3. A1C obtained.  Nutrition Goal Status: new  Communication of RD Recs: reviewed with RN    Dietitian Rounds Brief:  · Seen 2' LOS. Patient presented with chest pain x 2 weeks. Per MD, "Chest pain is absent at rest and described as a diffuse "tightening" with minimal exertion, walking a few steps or carrying heavy things. Pt has no complaints at present. He will have an EGD tomorrow Re dysphagia and an angiogram" Angiogram planned for today. Educated patient-showed understanding  · Per Cardiologist:"Patient was admitted to the hospital and myocardial perfusion scan was performed that shows a small area of reversible ischemia of the inferolateral wall." .  Reason for Assessment  Reason For Assessment: consult  Diagnosis: cardiac disease  Relevant Medical History: Long-term Anticoagulant use, COPD, CAD, T2DM, HLD, HTN, MI, pancreatitis, stroke  Interdisciplinary Rounds: attended    Nutrition Risk Screen  Nutrition Risk Screen: no indicators present       Wound 20 0710 Abrasion(s) Left distal Leg-Wound Image: Images linked    Nutrition/Diet History  Food Allergies: (Bee Pollen)  Factors Affecting Nutritional Intake: NPO    Anthropometrics  Temp: 97.5 °F (36.4 °C)  Height Method: Stated  Height: 6' 2" (188 cm)  Height (inches): 74 in  Weight Method: Standard Scale  Weight: 118.7 kg (261 lb 11 oz)  Weight (lb): 261.69 lb  Ideal Body Weight (IBW), " Male: 190 lb  % Ideal Body Weight, Male (lb): 137.73 %  BMI (Calculated): 33.6  BMI Grade: 30 - 34.9- obesity - grade I     Weight History:  Wt Readings from Last 10 Encounters:   06/12/20 118.7 kg (261 lb 11 oz)   06/08/20 118.4 kg (261 lb)   05/05/20 122 kg (269 lb)   04/28/20 122 kg (269 lb)   03/09/20 122 kg (269 lb)   02/07/20 113.4 kg (250 lb)   02/03/20 117.1 kg (258 lb 2.5 oz)   01/27/20 118.8 kg (262 lb)   01/12/20 120.2 kg (265 lb)   01/06/20 120.2 kg (265 lb)     Lab/Procedures/Meds: Pertinent Labs Reviewed  Clinical Chemistry:  Recent Labs   Lab 06/08/20  1625 06/12/20  0320    140   K 4.3 4.2   CL 97 105   CO2 25 26   * 177*   BUN 15 8   CREATININE 0.9 0.8   CALCIUM 9.1 8.9   PROT 7.6  --    ALBUMIN 4.2  --    BILITOT 0.5  --    ALKPHOS 94  --    AST 21  --    ALT 28  --    ANIONGAP 14 9   ESTGFRAFRICA >60.0 >60.0   EGFRNONAA >60.0 >60.0   MG  --  2.1    < > = values in this interval not displayed.     CBC:   Recent Labs   Lab 06/08/20  1625   WBC 8.63   RBC 4.33*   HGB 13.0*   HCT 40.2      MCV 93   MCH 30.0   MCHC 32.3     Cardiac Profile:  Recent Labs   Lab 06/08/20  1625 06/08/20  1946 06/08/20  2343   BNP 43  --   --    TROPONINI <0.030 <0.030 <0.030     Medications: Pertinent Medications reviewed  Scheduled Meds:   aspirin  81 mg Oral Daily    atorvastatin  40 mg Oral QHS    carvediloL  3.125 mg Oral BID    dicyclomine  20 mg Oral TID    fluticasone furoate-vilanteroL  1 puff Inhalation Daily    furosemide  40 mg Oral Daily    gabapentin  600 mg Oral TID    insulin aspart U-100  15 Units Subcutaneous TIDWM    insulin detemir U-100  85 Units Subcutaneous QHS    lamoTRIgine  25 mg Oral QID    pantoprazole  40 mg Oral BID    prazosin  2 mg Oral BID    senna-docusate 8.6-50 mg  1 tablet Oral BID    tiotropium  1 capsule Inhalation Daily     Continuous Infusions:   sodium chloride 0.45% 75 mL/hr at 06/12/20 1143    sodium chloride 0.9%       PRN Meds:.acetaminophen,  albuterol **AND** MDI Q4H PRN, calcium chloride IVPB, calcium chloride IVPB, calcium chloride IVPB, cyclobenzaprine, dextrose 50%, dextrose 50%, diphenhydrAMINE, glucagon (human recombinant), glucose, glucose, insulin aspart U-100, magnesium oxide, magnesium sulfate IVPB, magnesium sulfate IVPB, magnesium sulfate IVPB, magnesium sulfate IVPB, melatonin, nitroGLYCERIN, ondansetron, oxyCODONE-acetaminophen, potassium chloride in water, potassium chloride in water, potassium chloride in water, potassium chloride in water, potassium chloride, potassium chloride, potassium chloride, potassium chloride, sodium chloride 0.9%, sodium chloride 0.9%, sodium phosphate IVPB, sodium phosphate IVPB, sodium phosphate IVPB, sodium phosphate IVPB, sodium phosphate IVPB    Estimated/Assessed Needs    Weight Used For Calorie Calculations: 118.4 kg (261 lb)  Energy Calorie Requirements (kcal): 2367 kcals/day (20 kcals/kg)  Energy Need Method: Kcal/kg  Protein Requirements: 130 g/day (1.5 g/kg IBW)  Weight Used For Protein Calculations: 86.2 kg (190 lb)     Estimated Fluid Requirement Method: RDA Method    Nutrition Prescription Ordered    Current Diet Order: NPO    Evaluation of Received Nutrient/Fluid Intake    Energy Calories Required: not meeting needs  Protein Required: not meeting needs  Fluid Required: meeting needs  Tolerance: tolerating  % Intake of Estimated Energy Needs: 50 - 75 %  % Meal Intake: 50 - 75 %    Intake/Output Summary (Last 24 hours) at 6/12/2020 1253  Last data filed at 6/12/2020 0856  Gross per 24 hour   Intake 450 ml   Output --   Net 450 ml      Nutrition Risk    Level of Risk/Frequency of Follow-up: moderate - high   Monitor and Evaluation    Food and Nutrient Intake: energy intake, food and beverage intake  Food and Nutrient Adminstration: diet order  Knowledge/Beliefs/Attitudes: food and nutrition knowledge/skill, beliefs and attitudes  Physical Activity and Function: nutrition-related ADLs and IADLs,  factors affecting access to physical activity  Anthropometric Measurements: weight, weight change, body mass index  Biochemical Data, Medical Tests and Procedures: electrolyte and renal panel, lipid profile, gastrointestinal profile, inflammatory profile, glucose/endocrine profile  Nutrition-Focused Physical Findings: overall appearance     Nutrition Follow-Up    RD Follow-up?: Yes  Lizzette Sawyer RD 06/12/2020 1:00 PM

## 2020-06-13 VITALS
DIASTOLIC BLOOD PRESSURE: 56 MMHG | HEIGHT: 74 IN | OXYGEN SATURATION: 96 % | WEIGHT: 261.69 LBS | HEART RATE: 75 BPM | SYSTOLIC BLOOD PRESSURE: 114 MMHG | TEMPERATURE: 98 F | RESPIRATION RATE: 18 BRPM | BODY MASS INDEX: 33.58 KG/M2

## 2020-06-13 PROBLEM — Z98.890 STATUS POST BALLOON DILATATION OF ESOPHAGEAL STRICTURE: Status: ACTIVE | Noted: 2020-06-13

## 2020-06-13 LAB
ANION GAP SERPL CALC-SCNC: 10 MMOL/L (ref 8–16)
ANION GAP SERPL CALC-SCNC: 10 MMOL/L (ref 8–16)
BASOPHILS # BLD AUTO: 0.07 K/UL (ref 0–0.2)
BASOPHILS NFR BLD: 1 % (ref 0–1.9)
BUN SERPL-MCNC: 7 MG/DL (ref 8–23)
BUN SERPL-MCNC: 7 MG/DL (ref 8–23)
CALCIUM SERPL-MCNC: 8.4 MG/DL (ref 8.7–10.5)
CALCIUM SERPL-MCNC: 8.4 MG/DL (ref 8.7–10.5)
CHLORIDE SERPL-SCNC: 104 MMOL/L (ref 95–110)
CHLORIDE SERPL-SCNC: 104 MMOL/L (ref 95–110)
CO2 SERPL-SCNC: 26 MMOL/L (ref 23–29)
CO2 SERPL-SCNC: 26 MMOL/L (ref 23–29)
CREAT SERPL-MCNC: 0.8 MG/DL (ref 0.5–1.4)
CREAT SERPL-MCNC: 0.8 MG/DL (ref 0.5–1.4)
DIFFERENTIAL METHOD: ABNORMAL
EOSINOPHIL # BLD AUTO: 0.4 K/UL (ref 0–0.5)
EOSINOPHIL NFR BLD: 6.2 % (ref 0–8)
ERYTHROCYTE [DISTWIDTH] IN BLOOD BY AUTOMATED COUNT: 14 % (ref 11.5–14.5)
ERYTHROCYTE [DISTWIDTH] IN BLOOD BY AUTOMATED COUNT: 14 % (ref 11.5–14.5)
EST. GFR  (AFRICAN AMERICAN): >60 ML/MIN/1.73 M^2
EST. GFR  (AFRICAN AMERICAN): >60 ML/MIN/1.73 M^2
EST. GFR  (NON AFRICAN AMERICAN): >60 ML/MIN/1.73 M^2
EST. GFR  (NON AFRICAN AMERICAN): >60 ML/MIN/1.73 M^2
GLUCOSE SERPL-MCNC: 153 MG/DL (ref 70–110)
GLUCOSE SERPL-MCNC: 153 MG/DL (ref 70–110)
GLUCOSE SERPL-MCNC: 166 MG/DL (ref 70–110)
HCT VFR BLD AUTO: 36.9 % (ref 40–54)
HCT VFR BLD AUTO: 36.9 % (ref 40–54)
HGB BLD-MCNC: 11.7 G/DL (ref 14–18)
HGB BLD-MCNC: 11.7 G/DL (ref 14–18)
IMM GRANULOCYTES # BLD AUTO: 0.01 K/UL (ref 0–0.04)
IMM GRANULOCYTES NFR BLD AUTO: 0.1 % (ref 0–0.5)
LYMPHOCYTES # BLD AUTO: 2.1 K/UL (ref 1–4.8)
LYMPHOCYTES NFR BLD: 30.6 % (ref 18–48)
MAGNESIUM SERPL-MCNC: 1.9 MG/DL (ref 1.6–2.6)
MCH RBC QN AUTO: 29.8 PG (ref 27–31)
MCH RBC QN AUTO: 29.8 PG (ref 27–31)
MCHC RBC AUTO-ENTMCNC: 31.7 G/DL (ref 32–36)
MCHC RBC AUTO-ENTMCNC: 31.7 G/DL (ref 32–36)
MCV RBC AUTO: 94 FL (ref 82–98)
MCV RBC AUTO: 94 FL (ref 82–98)
MONOCYTES # BLD AUTO: 0.6 K/UL (ref 0.3–1)
MONOCYTES NFR BLD: 9.1 % (ref 4–15)
NEUTROPHILS # BLD AUTO: 3.6 K/UL (ref 1.8–7.7)
NEUTROPHILS NFR BLD: 53 % (ref 38–73)
NRBC BLD-RTO: 0 /100 WBC
PLATELET # BLD AUTO: 207 K/UL (ref 150–350)
PLATELET # BLD AUTO: 207 K/UL (ref 150–350)
PMV BLD AUTO: 9.8 FL (ref 9.2–12.9)
PMV BLD AUTO: 9.8 FL (ref 9.2–12.9)
POTASSIUM SERPL-SCNC: 3.7 MMOL/L (ref 3.5–5.1)
POTASSIUM SERPL-SCNC: 3.7 MMOL/L (ref 3.5–5.1)
RBC # BLD AUTO: 3.92 M/UL (ref 4.6–6.2)
RBC # BLD AUTO: 3.92 M/UL (ref 4.6–6.2)
SODIUM SERPL-SCNC: 140 MMOL/L (ref 136–145)
SODIUM SERPL-SCNC: 140 MMOL/L (ref 136–145)
WBC # BLD AUTO: 6.79 K/UL (ref 3.9–12.7)
WBC # BLD AUTO: 6.79 K/UL (ref 3.9–12.7)

## 2020-06-13 PROCEDURE — 85025 COMPLETE CBC W/AUTO DIFF WBC: CPT

## 2020-06-13 PROCEDURE — 94761 N-INVAS EAR/PLS OXIMETRY MLT: CPT

## 2020-06-13 PROCEDURE — 36415 COLL VENOUS BLD VENIPUNCTURE: CPT

## 2020-06-13 PROCEDURE — 94640 AIRWAY INHALATION TREATMENT: CPT

## 2020-06-13 PROCEDURE — 80048 BASIC METABOLIC PNL TOTAL CA: CPT

## 2020-06-13 PROCEDURE — 25000003 PHARM REV CODE 250: Performed by: INTERNAL MEDICINE

## 2020-06-13 PROCEDURE — 99900035 HC TECH TIME PER 15 MIN (STAT)

## 2020-06-13 PROCEDURE — 83735 ASSAY OF MAGNESIUM: CPT

## 2020-06-13 PROCEDURE — 25000003 PHARM REV CODE 250: Performed by: NURSE PRACTITIONER

## 2020-06-13 RX ADMIN — DICYCLOMINE HYDROCHLORIDE 20 MG: 10 CAPSULE ORAL at 08:06

## 2020-06-13 RX ADMIN — OXYCODONE AND ACETAMINOPHEN 1 TABLET: 7.5; 325 TABLET ORAL at 08:06

## 2020-06-13 RX ADMIN — POTASSIUM CHLORIDE 20 MEQ: 20 TABLET, EXTENDED RELEASE ORAL at 06:06

## 2020-06-13 RX ADMIN — OXYCODONE AND ACETAMINOPHEN 1 TABLET: 7.5; 325 TABLET ORAL at 04:06

## 2020-06-13 RX ADMIN — LAMOTRIGINE 25 MG: 25 TABLET ORAL at 08:06

## 2020-06-13 RX ADMIN — GABAPENTIN 600 MG: 300 CAPSULE ORAL at 08:06

## 2020-06-13 RX ADMIN — SENNOSIDES AND DOCUSATE SODIUM 1 TABLET: 8.6; 5 TABLET ORAL at 08:06

## 2020-06-13 RX ADMIN — CARVEDILOL 3.12 MG: 3.12 TABLET, FILM COATED ORAL at 08:06

## 2020-06-13 RX ADMIN — FLUTICASONE FUROATE AND VILANTEROL TRIFENATATE 1 PUFF: 100; 25 POWDER RESPIRATORY (INHALATION) at 09:06

## 2020-06-13 RX ADMIN — PANTOPRAZOLE SODIUM 40 MG: 40 TABLET, DELAYED RELEASE ORAL at 04:06

## 2020-06-13 RX ADMIN — INSULIN ASPART 15 UNITS: 100 INJECTION, SOLUTION INTRAVENOUS; SUBCUTANEOUS at 08:06

## 2020-06-13 RX ADMIN — ASPIRIN 81 MG 81 MG: 81 TABLET ORAL at 08:06

## 2020-06-13 RX ADMIN — PRAZOSIN HYDROCHLORIDE 2 MG: 1 CAPSULE ORAL at 08:06

## 2020-06-13 RX ADMIN — SODIUM CHLORIDE: 0.45 INJECTION, SOLUTION INTRAVENOUS at 12:06

## 2020-06-13 RX ADMIN — FUROSEMIDE 40 MG: 40 TABLET ORAL at 08:06

## 2020-06-13 NOTE — DISCHARGE SUMMARY
"[facility] Medicine  Discharge Summary      Patient Name: Kevan Colin Sr.  MRN: [id number]  Admission Date: 6/8/2020  Hospital Length of Stay: 3 days  Discharge Date and Time:  06/13/2020 8:52 AM  Attending Physician: Beto Verma MD   Discharging Provider: Beto Verma MD  Primary Care Provider: Faiza Ochoa NP      HPI:   Mr. Colin presents today with complaints of chest pain for the last 2 weeks. It is moderate. It is associated with QUIÑONES, nausea, sweats, and dizziness. He denies fever, chills, V/D, cough, or LOC. Chest pain is absent at rest and described as a diffuse "tightening" with minimal exertion, walking a few steps or carrying heavy things. It does not radiate and subsides with rest. He has a history of HTN, IDDM2, CAD, COPD, HFrEF 35%, carotid stenosis, PE/DVT on eliquis, and chronic back pain on chronic opioids. He is a patient of Dr. Wheeler and had an angiogram in 2014 with patent coronaries and NM stress test in 2016. He has been out of his bentyl and protonix for about 2 weeks as well. Today, he went to see his pulmonologist for COPD and reported this pain and was sent to the ED for further work up. He has a healing ulcer to the left leg he states looks much better.    Procedure(s) (LRB):  Coronary arteriogram (Left)  ANGIOGRAM, CORONARY ARTERY (N/A)      Hospital Course:   6/9/2020  I have had a little chest pain today that lasted a short time and 5/10 pain.   Pt will have his second part of the stress test tomorrow    6/10/2020  Mr Colin continues to has some 2/10 chest pain at rest and 6/10 with movement. I just feel that something is very wrong and I cannot continue like this.    6/11/2020  Pt has no complaints at present. He will have an EGD tomorrow Re dysphagia and an angiogram Re his abnormal Nuclear medicine study    6/12/2020  Dr Mcclain stretched my esophagus so that I could swallow. I will have my angiogram at 2 PM. Pt has no complaints at " present    6/13/2020 Pt has normal coronaries per Dr Nye and may go home. Pt's cath site is without erythema, exudate, hematoma or bleeding. ROS:  negative     Consults:   Consults (From admission, onward)        Status Ordering Provider     Inpatient consult to Cardiology  Once     Provider:  Óscar Jose MD    Acknowledged SAIGE HASSAN     Inpatient consult to Gastroenterology  Once     Provider:  kM Burgos MD    Acknowledged ÓSCAR JOSE     Inpatient consult to Registered Dietitian/Nutritionist  Once     Provider:  (Not yet assigned)    JESSICA Mart          No new Assessment & Plan notes have been filed under this hospital service since the last note was generated.  Service: Hospital Medicine  Additional dx esophageal stricture S/P balloon dilitation  Final Active Diagnoses:    Diagnosis Date Noted POA    PRINCIPAL PROBLEM:  Chest pain due to myocardial ischemia [I25.9]  Yes    Unstable angina [I20.0] 06/10/2020 Yes    Chronic, continuous use of opioids [F11.90] 06/08/2020 Yes    Benign essential HTN [I10] 03/24/2016 Yes    Abdominal pain [R10.9] 03/24/2016 Yes    Venous stasis ulcer [I83.009, L97.909] 04/16/2013 Yes    Type 2 diabetes mellitus, uncontrolled [E11.65] 05/23/2012 Yes      Problems Resolved During this Admission:       Discharged Condition: good    Disposition: Home or Self Care    Follow Up:  Follow-up Information     Faiza Ochoa NP In 2 weeks.    Specialties: Cardiology, Internal Medicine  Contact information:  [address] [place] [address]  [address]  [facility] [address]  [phone]                 Patient Instructions:      Diet Cardiac     Diet diabetic     Notify your health care provider if you experience any of the following:  temperature >100.4     Notify your health care provider if you experience any of the following:  persistent nausea and vomiting or diarrhea     Notify your health care provider if you experience any  of the following:  severe uncontrolled pain     Notify your health care provider if you experience any of the following:  redness, tenderness, or signs of infection (pain, swelling, redness, odor or green/yellow discharge around incision site)     Notify your health care provider if you experience any of the following:  difficulty breathing or increased cough     Notify your health care provider if you experience any of the following:  severe persistent headache     Notify your health care provider if you experience any of the following:  worsening rash     Notify your health care provider if you experience any of the following:  persistent dizziness, light-headedness, or visual disturbances     Notify your health care provider if you experience any of the following:  increased confusion or weakness     Activity as tolerated       Significant Diagnostic Studies: Labs:   BMP:   Recent Labs   Lab 06/12/20  0320 06/13/20  0508   * 153*  153*    140  140   K 4.2 3.7  3.7    104  104   CO2 26 26  26   BUN 8 7*  7*   CREATININE 0.8 0.8  0.8   CALCIUM 8.9 8.4*  8.4*   MG 2.1 1.9   , CMP   Recent Labs   Lab 06/12/20  0320 06/13/20  0508    140  140   K 4.2 3.7  3.7    104  104   CO2 26 26  26   * 153*  153*   BUN 8 7*  7*   CREATININE 0.8 0.8  0.8   CALCIUM 8.9 8.4*  8.4*   ANIONGAP 9 10  10   ESTGFRAFRICA >60.0 >60.0  >60.0   EGFRNONAA >60.0 >60.0  >60.0   , CBC   Recent Labs   Lab 06/13/20  0508   WBC 6.79  6.79   HGB 11.7*  11.7*   HCT 36.9*  36.9*     207   , INR   Lab Results   Component Value Date    INR 1.0 07/05/2019    INR 1.1 08/21/2017    INR 1.7 (H) 08/05/2016   , Lipid Panel   Lab Results   Component Value Date    CHOL 150 07/05/2019    HDL 41 07/05/2019    LDLCALC 73.2 07/05/2019    TRIG 179 (H) 07/05/2019    CHOLHDL 27.3 07/05/2019   , Troponin   Recent Labs   Lab 06/08/20  2343   TROPONINI <0.030    and A1C: No results for input(s): HGBA1C  in the last 4320 hours.  Angiography: Normal per Dr Nye    Pending Diagnostic Studies:     None         Medications:  Reconciled Home Medications:      Medication List      CONTINUE taking these medications    ABILIFY MAINTENA 400 mg Sers syringe  Generic drug: ARIPiprazole  Inject 400 mg into the muscle every 28 days.     albuterol 90 mcg/actuation inhaler  Commonly known as: PROVENTIL/VENTOLIN HFA  Inhale 2 puffs into the lungs every 4 (four) hours as needed for Shortness of Breath or Wheezing.     ANTIBIOTIC CREAM TOP  Apply 1 application topically daily as needed.     apixaban 5 mg Tab  Commonly known as: ELIQUIS  Take 5 mg by mouth 2 (two) times daily. May resume 8/6/19 in the evening     atorvastatin 40 MG tablet  Commonly known as: LIPITOR  Take 40 mg by mouth once daily.     budesonide-formoterol 80-4.5 mcg 80-4.5 mcg/actuation Hfaa  Commonly known as: SYMBICORT  Inhale 2 puffs into the lungs 2 (two) times daily. Controller  Please rinse after you use it     carvediloL 3.125 MG tablet  Commonly known as: COREG  TAKE 1 TABLET BY MOUTH TWICE DAILY     cyclobenzaprine 5 MG tablet  Commonly known as: FLEXERIL  Take 1 tablet (5 mg total) by mouth 2 (two) times daily as needed for Muscle spasms.     diclofenac 50 MG EC tablet  Commonly known as: VOLTAREN  Take 1 tablet (50 mg total) by mouth 3 (three) times daily.     dicyclomine 20 mg tablet  Commonly known as: BENTYL  Take 20 mg by mouth 3 (three) times daily.     furosemide 40 MG tablet  Commonly known as: LASIX  Take 40 mg by mouth once daily.     gabapentin 300 MG capsule  Commonly known as: NEURONTIN  Take 2 capsules (600 mg total) by mouth 3 (three) times daily.     HumaLOG U-100 Insulin 100 unit/mL injection  Generic drug: insulin lispro  Inject 15 Units into the skin 3 (three) times daily before meals.     lamoTRIgine 25 MG tablet  Commonly known as: LAMICTAL  Take 25 mg by mouth 4 (four) times daily.     metFORMIN 500 MG tablet  Commonly known as:  GLUCOPHAGE  Take 500 mg by mouth 2 (two) times daily with meals.     ondansetron 4 MG Tbdl  Commonly known as: ZOFRAN-ODT  Take 1 tablet (4 mg total) by mouth every 8 (eight) hours as needed.     oxyCODONE-acetaminophen 7.5-325 mg per tablet  Commonly known as: PERCOCET  Take 1 tablet by mouth every 4 (four) hours as needed for Pain.     pantoprazole 40 MG tablet  Commonly known as: PROTONIX  Take 40 mg by mouth 2 (two) times daily.     potassium chloride SA 20 MEQ tablet  Commonly known as: K-DUR,KLOR-CON  Take 20 mEq by mouth once daily.     prazosin 2 MG Cap  Commonly known as: MINIPRESS  Take 2 mg by mouth 2 (two) times daily.     QUEtiapine 50 MG tablet  Commonly known as: SEROQUEL  Take 50 mg by mouth every evening.     SPIRIVA WITH HANDIHALER 18 mcg inhalation capsule  Generic drug: tiotropium  Inhale 18 mcg into the lungs once daily.     temazepam 15 mg Cap  Commonly known as: RESTORIL  Take 15 mg by mouth nightly as needed (insomnia).     TOUJEO SOLOSTAR U-300 INSULIN 300 unit/mL (1.5 mL) Inpn pen  Generic drug: insulin glargine (TOUJEO)  Inject 85 Units into the skin every evening.     TRINTELLIX 10 mg Tab  Generic drug: vortioxetine  Take 1 tablet by mouth once daily.            Indwelling Lines/Drains at time of discharge:   Lines/Drains/Airways     None                 Time spent on the discharge of patient: 28 minutes  Patient was seen and examined on the date of discharge and determined to be suitable for discharge.         Beto Verma MD  Department of Hospital Medicine  [facility]

## 2020-06-13 NOTE — NURSING
Pt verbalized understanding of discharge education and instructions, no prescriptions required at discharge. Tele box returned to monitor tech, pt wheeled off unit with no signs of distress

## 2020-06-13 NOTE — PLAN OF CARE
06/13/20 0911   Patient Assessment/Suction   Level of Consciousness (AVPU) alert   Respiratory Effort Normal;Unlabored   All Lung Fields Breath Sounds clear   PRE-TX-O2   O2 Device (Oxygen Therapy) room air   SpO2 96 %   Pulse Oximetry Type Intermittent   $ Pulse Oximetry - Multiple Charge Pulse Oximetry - Multiple   Pulse 75   Resp 18   Inhaler   $ Inhaler Charges MDI (Metered Dose Inahler) Treatment   Daily Review of Necessity (Inhaler) completed   Respiratory Treatment Status (Inhaler) given   Treatment Route (Inhaler) mouthpiece   Patient Position (Inhaler) sitting on edge of bed   Post Treatment Assessment (Inhaler) breath sounds improved   Signs of Intolerance (Inhaler) none   Respiratory Evaluation   $ Care Plan Tech Time 15 min

## 2020-06-13 NOTE — PLAN OF CARE
Pt's angiogram negative, right groin dressing CDI no hematoma. 1/2NS @ 100 ml/hr  Glucose WNL  Bed alarm on, patient educated on not moving right LE and bedrest until 2020

## 2020-06-13 NOTE — HPI
"HPI:  Mr. Colin presents today with complaints of chest pain for the last 2 weeks. It is moderate. It is associated with QUIÑONES, nausea, sweats, and dizziness. He denies fever, chills, V/D, cough, or LOC. Chest pain is absent at rest and described as a diffuse "tightening" with minimal exertion, walking a few steps or carrying heavy things. It does not radiate and subsides with rest. He has a history of HTN, IDDM2, CAD, COPD, HFrEF 35%, carotid stenosis, PE/DVT on eliquis, and chronic back pain on chronic opioids. He is a patient of Dr. Wheeler and had an angiogram in 2014 with patent coronaries and NM stress test in 2016. He has been out of his bentyl and protonix for about 2 weeks as well. Today, he went to see his pulmonologist for COPD and reported this pain and was sent to the ED for further work up. He has a healing ulcer to the left leg he states looks much better  "

## 2020-06-25 ENCOUNTER — TELEPHONE (OUTPATIENT)
Dept: CASE MANAGEMENT | Facility: HOSPITAL | Age: 65
End: 2020-06-25

## 2020-06-25 NOTE — TELEPHONE ENCOUNTER
SW spoke to Pt again this date re: discharge follow up appointment.  Pt was a previous Pt of Dr. Russo's NP, and PARDEEP contacted office and was informed that Pt would be considered a new Pt and Dr. Russo is no longer taking new Pt's.  Contacted [phone] and spoke to Divina, who was able to schedule appointment with BE Magallanes on 6/29/2020 at 1300.  Pt was in agreement with scheduling appointment with provider other than Dr. Russo.  He was informed of appointment date and time, and current COVID-19 guidelines (Pt to come to appointment alone, wear mask and not arrive more than 15 minutes early).  No further needs addressed at this time.

## 2020-06-29 ENCOUNTER — OFFICE VISIT (OUTPATIENT)
Dept: FAMILY MEDICINE | Facility: CLINIC | Age: 65
End: 2020-06-29
Payer: MEDICARE

## 2020-06-29 VITALS
OXYGEN SATURATION: 95 % | SYSTOLIC BLOOD PRESSURE: 114 MMHG | HEIGHT: 74 IN | DIASTOLIC BLOOD PRESSURE: 70 MMHG | BODY MASS INDEX: 33.73 KG/M2 | RESPIRATION RATE: 16 BRPM | TEMPERATURE: 98 F | HEART RATE: 84 BPM | WEIGHT: 262.81 LBS

## 2020-06-29 DIAGNOSIS — E11.9 TYPE 2 DIABETES MELLITUS WITHOUT COMPLICATION, WITH LONG-TERM CURRENT USE OF INSULIN: ICD-10-CM

## 2020-06-29 DIAGNOSIS — Z09 HOSPITAL DISCHARGE FOLLOW-UP: ICD-10-CM

## 2020-06-29 DIAGNOSIS — I10 BENIGN ESSENTIAL HTN: ICD-10-CM

## 2020-06-29 DIAGNOSIS — Z12.5 SCREENING FOR PROSTATE CANCER: ICD-10-CM

## 2020-06-29 DIAGNOSIS — K21.9 GASTROESOPHAGEAL REFLUX DISEASE WITHOUT ESOPHAGITIS: ICD-10-CM

## 2020-06-29 DIAGNOSIS — I25.10 CORONARY ARTERY DISEASE INVOLVING NATIVE CORONARY ARTERY OF NATIVE HEART WITHOUT ANGINA PECTORIS: ICD-10-CM

## 2020-06-29 DIAGNOSIS — Z76.89 ENCOUNTER TO ESTABLISH CARE: Primary | ICD-10-CM

## 2020-06-29 DIAGNOSIS — E66.09 CLASS 1 OBESITY DUE TO EXCESS CALORIES WITH SERIOUS COMORBIDITY AND BODY MASS INDEX (BMI) OF 33.0 TO 33.9 IN ADULT: ICD-10-CM

## 2020-06-29 DIAGNOSIS — Z79.4 TYPE 2 DIABETES MELLITUS WITHOUT COMPLICATION, WITH LONG-TERM CURRENT USE OF INSULIN: ICD-10-CM

## 2020-06-29 DIAGNOSIS — S91.002S WOUND OF LEFT ANKLE, SEQUELA: ICD-10-CM

## 2020-06-29 PROCEDURE — 99204 PR OFFICE/OUTPT VISIT, NEW, LEVL IV, 45-59 MIN: ICD-10-PCS | Mod: S$GLB,,, | Performed by: NURSE PRACTITIONER

## 2020-06-29 PROCEDURE — 99204 OFFICE O/P NEW MOD 45 MIN: CPT | Mod: S$GLB,,, | Performed by: NURSE PRACTITIONER

## 2020-06-29 RX ORDER — PANTOPRAZOLE SODIUM 40 MG/1
40 TABLET, DELAYED RELEASE ORAL 2 TIMES DAILY
Qty: 180 TABLET | Refills: 1 | Status: SHIPPED | OUTPATIENT
Start: 2020-06-29 | End: 2021-01-04

## 2020-06-29 RX ORDER — DICLOFENAC SODIUM 50 MG/1
50 TABLET, DELAYED RELEASE ORAL 3 TIMES DAILY
Qty: 15 TABLET | Refills: 0 | Status: CANCELLED | OUTPATIENT
Start: 2020-06-29 | End: 2021-06-29

## 2020-06-29 NOTE — PROGRESS NOTES
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 movements intact.      Conjunctiva/sclera: Conjunctivae normal.      Pupils: Pupils are equal, round, and reactive to light.   Neck:      Musculoskeletal: Full passive range of motion without pain, normal range of motion and neck supple. No muscular tenderness.      Thyroid: No thyromegaly.      Vascular: No carotid bruit.   Cardiovascular:      Rate and Rhythm: Normal rate and regular rhythm.      Pulses:           Dorsalis pedis pulses are 1+ on the right side and 1+ on the left side.        Posterior tibial pulses are 1+ on the right side and 1+ on the left side.      Heart sounds: Normal heart sounds. No murmur. No friction rub. No gallop.    Pulmonary:      Effort: Pulmonary effort is normal. No respiratory distress.      Breath sounds: Normal breath sounds. No stridor. No wheezing, rhonchi or rales.   Abdominal:      General: Bowel sounds are normal. There is no distension.      Palpations: Abdomen is soft.      Tenderness: There is no abdominal tenderness. There is no guarding.   Musculoskeletal: Normal range of motion.         General: No tenderness.      Right lower le+ Edema present.      Left lower le+ Edema present.      Right foot: No deformity.      Left foot: No deformity.   Feet:       Right foot:      Protective Sensation: 10 sites tested. 10 sites sensed.      Skin integrity: Callus (to heel) present. No ulcer, blister, skin breakdown or erythema.      Left foot:      Protective Sensation: 10 sites tested. 10 sites sensed.      Skin integrity: Callus (to heel) present. No ulcer, blister, skin breakdown or erythema.   Lymphadenopathy:      Cervical: No cervical adenopathy.   Skin:     General: Skin is warm and dry.      Capillary Refill: Capillary refill takes less than 2 seconds.      Findings: Wound present. No erythema or rash.          Neurological:      Mental Status: He is alert and oriented to person, place, and time. Mental status is at baseline.      Motor: Tremor (left hand) present.   Psychiatric:         Mood and Affect: Mood normal.         Behavior: Behavior normal. Behavior is cooperative.         Thought Content: Thought content normal.         Judgment: Judgment normal.        Assessment:       1. Encounter to establish care    2. Hospital discharge follow-up    3. Type 2 diabetes mellitus without complication, with long-term current use of insulin    4. Gastroesophageal reflux disease without esophagitis    5. Wound of left ankle, sequela    6. Class 1 obesity due to excess calories with serious comorbidity and body mass index (BMI) of 33.0 to 33.9 in adult    7. Benign essential HTN    8. Coronary artery disease involving native coronary artery of native heart without angina pectoris    9. Screening for prostate cancer        Plan:       Encounter to establish care   New patient with a complex medical history.  Will start getting his routine care and overdue health maintenance completed.  Patient is doing well today with no complaints.  He sees his specialists routinely throughout the year.  I have independently reviewed his labs, EKGs, imaging, procedures, notes, surgeries, and medications.    Hospital discharge follow-up   Patient is doing well after being discharged.   He  denies having chest pain. He has a follow-up appointment with his cardiologist Dr. Wheeler.     Type 2 diabetes mellitus without complication, with long-term current use of insulin   He reports compliance with his medications.  Will get a hemoglobin A1c and microalbumin/creatinine urine ratio.  Continue current medications at this time.  Will make dose adjustments once labs become available.    -     Microalbumin/creatinine urine ratio; Future; Expected date: 06/29/2020  -     Lipid Panel; Future; Expected date: 06/29/2020  -     Hemoglobin A1C; Future; Expected date: 06/29/2020  -     Glucose, fasting; Future; Expected date: 06/29/2020  -     Foot Exam Performed    Gastroesophageal reflux disease without esophagitis   Recent EGD reviewed.  Continue to take Protonix as prescribed.  -     pantoprazole (PROTONIX) 40 MG tablet; Take 1 tablet (40 mg total) by mouth 2 (two) times daily.  Dispense: 180 tablet; Refill: 1    Wound of left ankle, sequela   Wound left ankle appears to be healing.  I do not see any signs of infection at this time.  Advised patient to maintain tighter glucose control at home to ensure optimal healing.  Continue current treatment regimen.  If the wound fails to heal completely or becomes worse will refer to wound care.     Class 1 obesity due to excess calories with serious comorbidity and body mass index (BMI) of 33.0 to 33.9 in adult   Patient is overweight and has multiple comorbidities.  Informed patient he needs to implement diet and exercise changes to decrease weight and optimize blood glucose.    Benign essential HTN   Blood pressure is controlled, continue current treatment.  -     Lipid Panel; Future; Expected date: 06/29/2020    Coronary artery disease involving native coronary artery of native heart without angina pectoris   It has been almost a year since his last cholesterol was checked.  Lipid panel ordered.  Continue current statin therapy.   -     Lipid Panel; Future; Expected  date: 06/29/2020    Screening for prostate cancer  -     PSA, Screening; Future; Expected date: 06/29/2020    This note was created using Dragon voice recognition software that occasionally misinterpreted phrases or words.    Follow up in about 3 months (around 9/29/2020) for Diabetes.      6/29/2020 EDE Williamson, FNP

## 2020-06-29 NOTE — PATIENT INSTRUCTIONS
4 Steps for Eating Healthier  Changing the way you eat can improve your health. It can lower your cholesterol and blood pressure, and help you stay at a healthy weight. Your diet doesnt have to be bland and boring to be healthy. Just watch your calories and follow these steps:    1. Eat fewer unhealthy fats  · Choose more fish and lean meats instead of fatty cuts of meat.  · Skip butter and lard, and use less margarine.  · Pass on foods that have palm, coconut, or hydrogenated oils.  · Eat fewer high-fat dairy foods like cheese, ice cream, and whole milk.  · Get a heart-healthy cookbook and try some low-fat recipes.  2. Go light on salt  · Keep the saltshaker off the table.  · Limit high-salt ingredients, such as soy sauce, bouillon, and garlic salt.  · Instead of adding salt when cooking, season your food with herbs and flavorings. Try lemon, garlic, and onion.  · Limit convenience foods, such as boxed or canned foods and restaurant food.  · Read food labels and choose lower-sodium options.  3. Limit sugar  · Pause before you add sugars to pancakes, cereal, coffee, or tea. This includes white and brown table sugar, syrup, honey, and molasses. Cut your usual amount by half.  · Use non-sugar sweeteners. Stevia, aspartame, and sucralose can satisfy a sweet tooth without adding calories.  · Swap out sugar-filled soda and other drinks. Buy sugar-free or low-calorie beverages. Remember water is always the best choice.  · Read labels and choose foods with less added sugar. Keep in mind that dairy foods and foods with fruit will have some natural sugar.  · Cut the sugar in recipes by 1/3 to 1/2. Boost the flavor with extracts like almond, vanilla, or orange. Or add spices such as cinnamon or nutmeg.  4. Eat more fiber  · Eat fresh fruits and vegetables every day.  · Boost your diet with whole grains. Go for oats, whole-grain rice, and bran.  · Add beans and lentils to your meals.  · Drink more water to match your fiber  increase. This is to help prevent constipation.  Date Last Reviewed: 5/11/2015  © 9824-4227 [employer]. [address] [place] [address], [place] [address]. All rights reserved. This information is not intended as a substitute for professional medical care. Always follow your healthcare professional's instructions.

## 2020-07-24 RX ORDER — FUROSEMIDE 40 MG/1
40 TABLET ORAL DAILY
Qty: 90 TABLET | Refills: 0 | OUTPATIENT
Start: 2020-07-24

## 2020-07-24 RX ORDER — POTASSIUM CHLORIDE 20 MEQ/1
20 TABLET, EXTENDED RELEASE ORAL DAILY
Qty: 90 TABLET | Refills: 0 | OUTPATIENT
Start: 2020-07-24

## 2020-07-24 RX ORDER — CARVEDILOL 3.12 MG/1
3.12 TABLET ORAL 2 TIMES DAILY
Qty: 60 TABLET | Refills: 3 | Status: SHIPPED | OUTPATIENT
Start: 2020-07-24 | End: 2020-10-26 | Stop reason: SDUPTHER

## 2020-07-24 RX ORDER — METFORMIN HYDROCHLORIDE 1000 MG/1
1000 TABLET ORAL 2 TIMES DAILY WITH MEALS
Qty: 180 TABLET | Refills: 0 | Status: SHIPPED | OUTPATIENT
Start: 2020-07-24 | End: 2020-12-08 | Stop reason: SDUPTHER

## 2020-07-24 RX ORDER — ATORVASTATIN CALCIUM 40 MG/1
40 TABLET, FILM COATED ORAL DAILY
Qty: 90 TABLET | Refills: 0 | Status: SHIPPED | OUTPATIENT
Start: 2020-07-24 | End: 2020-12-08 | Stop reason: SDUPTHER

## 2020-07-24 NOTE — TELEPHONE ENCOUNTER
Received call from [facility] requesting Rx refill for routine maintenance medications.

## 2020-09-25 ENCOUNTER — HOSPITAL ENCOUNTER (EMERGENCY)
Facility: HOSPITAL | Age: 65
Discharge: HOME | End: 2020-09-25
Attending: EMERGENCY MEDICINE
Payer: MEDICARE

## 2020-09-25 VITALS
TEMPERATURE: 98 F | WEIGHT: 262 LBS | RESPIRATION RATE: 18 BRPM | HEART RATE: 73 BPM | OXYGEN SATURATION: 100 % | BODY MASS INDEX: 33.64 KG/M2 | DIASTOLIC BLOOD PRESSURE: 81 MMHG | SYSTOLIC BLOOD PRESSURE: 139 MMHG

## 2020-09-25 DIAGNOSIS — J18.9 COMMUNITY ACQUIRED PNEUMONIA OF LEFT LOWER LOBE OF LUNG: Primary | ICD-10-CM

## 2020-09-25 LAB
ALBUMIN SERPL BCP-MCNC: 3.8 G/DL (ref 3.5–5.2)
ALP SERPL-CCNC: 95 U/L (ref 55–135)
ALT SERPL W/O P-5'-P-CCNC: 15 U/L (ref 10–44)
ANION GAP SERPL CALC-SCNC: 14 MMOL/L (ref 8–16)
AST SERPL-CCNC: 13 U/L (ref 10–40)
BASOPHILS # BLD AUTO: 0.09 K/UL (ref 0–0.2)
BASOPHILS NFR BLD: 1 % (ref 0–1.9)
BILIRUB SERPL-MCNC: 0.2 MG/DL (ref 0.1–1)
BNP SERPL-MCNC: 22 PG/ML (ref 0–99)
BUN SERPL-MCNC: 11 MG/DL (ref 8–23)
CALCIUM SERPL-MCNC: 9.6 MG/DL (ref 8.7–10.5)
CHLORIDE SERPL-SCNC: 104 MMOL/L (ref 95–110)
CO2 SERPL-SCNC: 25 MMOL/L (ref 23–29)
CREAT SERPL-MCNC: 0.8 MG/DL (ref 0.5–1.4)
DIFFERENTIAL METHOD: ABNORMAL
EOSINOPHIL # BLD AUTO: 0.6 K/UL (ref 0–0.5)
EOSINOPHIL NFR BLD: 6.1 % (ref 0–8)
ERYTHROCYTE [DISTWIDTH] IN BLOOD BY AUTOMATED COUNT: 14.1 % (ref 11.5–14.5)
EST. GFR  (AFRICAN AMERICAN): >60 ML/MIN/1.73 M^2
EST. GFR  (NON AFRICAN AMERICAN): >60 ML/MIN/1.73 M^2
GLUCOSE SERPL-MCNC: 147 MG/DL (ref 70–110)
HCT VFR BLD AUTO: 40.6 % (ref 40–54)
HGB BLD-MCNC: 12.9 G/DL (ref 14–18)
IMM GRANULOCYTES # BLD AUTO: 0.05 K/UL (ref 0–0.04)
IMM GRANULOCYTES NFR BLD AUTO: 0.5 % (ref 0–0.5)
LYMPHOCYTES # BLD AUTO: 2 K/UL (ref 1–4.8)
LYMPHOCYTES NFR BLD: 21.5 % (ref 18–48)
MCH RBC QN AUTO: 30.1 PG (ref 27–31)
MCHC RBC AUTO-ENTMCNC: 31.8 G/DL (ref 32–36)
MCV RBC AUTO: 95 FL (ref 82–98)
MONOCYTES # BLD AUTO: 0.7 K/UL (ref 0.3–1)
MONOCYTES NFR BLD: 7.7 % (ref 4–15)
NEUTROPHILS # BLD AUTO: 5.9 K/UL (ref 1.8–7.7)
NEUTROPHILS NFR BLD: 63.2 % (ref 38–73)
NRBC BLD-RTO: 0 /100 WBC
PLATELET # BLD AUTO: 183 K/UL (ref 150–350)
PMV BLD AUTO: 10.3 FL (ref 9.2–12.9)
POTASSIUM SERPL-SCNC: 3.8 MMOL/L (ref 3.5–5.1)
PROT SERPL-MCNC: 7.2 G/DL (ref 6–8.4)
RBC # BLD AUTO: 4.29 M/UL (ref 4.6–6.2)
SARS-COV-2 RDRP RESP QL NAA+PROBE: NEGATIVE
SODIUM SERPL-SCNC: 143 MMOL/L (ref 136–145)
WBC # BLD AUTO: 9.37 K/UL (ref 3.9–12.7)

## 2020-09-25 PROCEDURE — 80053 COMPREHEN METABOLIC PANEL: CPT

## 2020-09-25 PROCEDURE — U0002 COVID-19 LAB TEST NON-CDC: HCPCS

## 2020-09-25 PROCEDURE — 94640 AIRWAY INHALATION TREATMENT: CPT

## 2020-09-25 PROCEDURE — 27000221 HC OXYGEN, UP TO 24 HOURS

## 2020-09-25 PROCEDURE — 83880 ASSAY OF NATRIURETIC PEPTIDE: CPT

## 2020-09-25 PROCEDURE — 25000242 PHARM REV CODE 250 ALT 637 W/ HCPCS: Performed by: EMERGENCY MEDICINE

## 2020-09-25 PROCEDURE — 85025 COMPLETE CBC W/AUTO DIFF WBC: CPT

## 2020-09-25 PROCEDURE — 36415 COLL VENOUS BLD VENIPUNCTURE: CPT

## 2020-09-25 PROCEDURE — 99284 EMERGENCY DEPT VISIT MOD MDM: CPT | Mod: 25

## 2020-09-25 PROCEDURE — 94761 N-INVAS EAR/PLS OXIMETRY MLT: CPT

## 2020-09-25 RX ORDER — LEVOFLOXACIN 500 MG/1
500 TABLET, FILM COATED ORAL DAILY
Qty: 5 TABLET | Refills: 0 | Status: SHIPPED | OUTPATIENT
Start: 2020-09-25 | End: 2020-09-30

## 2020-09-25 RX ORDER — ALBUTEROL SULFATE 90 UG/1
2 AEROSOL, METERED RESPIRATORY (INHALATION) EVERY 4 HOURS PRN
Qty: 18 G | Refills: 3 | Status: SHIPPED | OUTPATIENT
Start: 2020-09-25

## 2020-09-25 RX ORDER — IPRATROPIUM BROMIDE AND ALBUTEROL SULFATE 2.5; .5 MG/3ML; MG/3ML
3 SOLUTION RESPIRATORY (INHALATION) EVERY 6 HOURS PRN
Qty: 1 BOX | Refills: 0 | Status: SHIPPED | OUTPATIENT
Start: 2020-09-25 | End: 2024-02-29

## 2020-09-25 RX ORDER — IPRATROPIUM BROMIDE AND ALBUTEROL SULFATE 2.5; .5 MG/3ML; MG/3ML
3 SOLUTION RESPIRATORY (INHALATION)
Status: COMPLETED | OUTPATIENT
Start: 2020-09-25 | End: 2020-09-25

## 2020-09-25 RX ADMIN — IPRATROPIUM BROMIDE AND ALBUTEROL SULFATE 3 ML: .5; 2.5 SOLUTION RESPIRATORY (INHALATION) at 03:09

## 2020-09-25 NOTE — ED NOTES
Pt in rm with no s/sx of distress noted. VSS. Awaiting lab results. Pt updated on status of care and verbalizes understanding. No other needs are identified. Will monitor prn.
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
## 2020-10-02 NOTE — PLAN OF CARE
SURGERY POST-OP NOTE      OPERATION ADMIT DATE POST-OP DAY    Cholecystectomy, Laparoscopic 9/30/2020 1 Day Post-Op     SUBJECTIVE    Ms. Elizabeth is a 41 year old female reports doing well post-operation day 1. She reports mild abdominal pain. She has not passed gas or had a bowel movement yet. Hasn't eaten breakfast yet and admits to no improvement in appetite. WBC is elevated.     PHYSICAL EXAM    Vital Signs   Last Value 24 Hour Range   Temperature 98.3 °F (36.8 °C) (10/02/20 0345) Temp  Min: 97.1 °F (36.2 °C)  Max: 98.6 °F (37 °C)   Pulse 65 (10/02/20 0345) Pulse  Min: 57  Max: 83   Respiratory 18 (10/02/20 0345) Resp  Min: 16  Max: 29   Blood Pressure 133/70 (10/02/20 0345) BP  Min: 105/52  Max: 164/79   Pulse Oximetry 99 % (10/02/20 0345) SpO2  Min: 99 %  Max: 100 %   CVP   No data recorded     Weight: 78.5 kg (10/01/20 0059)  Admit weight:Weight: 78.5 kg (10/01/20 0059)  Cardiovascular:  Normal heart rate. Normal rhythm.  Respiratory:  Normal breath sounds. No respiratory distress. No wheezing. No chest tenderness.    Abdomen:  Mild tenderness, Soft, ND, incisions intact with skin glue. No erythema or ecchymosis.     INTAKE & OUTPUT    This Shift 24 Hour Range   No intake/output data recorded. I/O last 3 completed shifts:  In: 2220 [P.O.:1020; I.V.:1200]  Out: 2350 [Urine:2350]     Last Stool Occurrence:      LABS    Recent Labs   Lab 10/02/20  0404 10/01/20  0346 09/30/20  1644   WBC 15.9* 11.5* 11.2*   HCT 36.2 38.0 41.4   HGB 11.7* 12.3 13.1    280 343   SODIUM 138 138 140   POTASSIUM 4.6 3.7 3.6   CHLORIDE 105 107 108*   CO2 24 24 29   CALCIUM 8.2* 8.2* 8.6   GLUCOSE 105* 96 96   BUN 5* 8 8   CREATININE 0.58 0.64 0.68   AST 20  --  14   GPT 37  --  26   ALKPT 60  --  69   BILIRUBIN 0.4  --  0.2   ALBUMIN 3.2*  --  3.8   LIPA  --   --  113       ASSESSMENT    S/P Cholecystectomy - laparoscopic for right Upper quadrant pain/biliary colic and cholelithiasis. POD: 1 Day Post-Op      PLAN    1. Pain  Spoke with the pt's daughter, Katherine to discuss the pt's discharge plan for LTAC. I explained the closest facility was in [place], she does not have preference of which facility in [place]. I told her he would possibly discharged tomorrow after his PEG was placed or Wednesday.   Katherine asked that I call the pt's significant other, Cheryle to explain the discharge plan to LTAC. I called Cheryle and left her message with my call back number to discuss the above discharge plan.....AARTI Lenz CM    control: with PO tylenol, ibuprofen, or Norco  2. Diet: Transition diet/ Regular diet on discharge.  3. Medications: resume home medications  4. Antibiotics: None as pt has no identified or suspected infections  5. Villatoro: Removed and patient has subsequently been able to void without significant difficulty  6. Activity: Encourage pt to be out of bed and ambulate with assistance at least TID  7. Wound care: May shower with regular soap and water, but no soaking underwater (in pools, tubs, etc.) until wounds fully healed and cleared by MD in clinic and Skin glue: Allow the skin glue over your incisions to come off on its own. Glue will typically start to come off after 1 - 2 weeks. Ok to shower and lightly wash incisions but do not pick at them or peel the glue off prematurely.   8. Drains: None  9. DVT prophy: SCDs, ambulation, lovenox  10. Dispo: Will discuss with physician.     Ofe Gonzalez   Medical Student  Supervising Physician: Dr. Shepard     I have seen and examined the patient myself. The above findings and plan were discussed with the above writer.  I am in agreement with the above documentation.     Doing well, pain is well controlled.  Ready to be discharged    Follow-up with me as outpatient in 2 weeks.  No heavy lifting for 4 weeks    Flaco Shepard DO  10/2/2020    Pager: [phone]

## 2020-10-07 ENCOUNTER — OFFICE VISIT (OUTPATIENT)
Dept: FAMILY MEDICINE | Facility: CLINIC | Age: 65
End: 2020-10-07
Payer: MEDICARE

## 2020-10-07 VITALS
HEIGHT: 74 IN | BODY MASS INDEX: 32.08 KG/M2 | TEMPERATURE: 98 F | WEIGHT: 250 LBS | HEART RATE: 91 BPM | SYSTOLIC BLOOD PRESSURE: 110 MMHG | DIASTOLIC BLOOD PRESSURE: 66 MMHG | OXYGEN SATURATION: 93 %

## 2020-10-07 DIAGNOSIS — Z79.4 TYPE 2 DIABETES MELLITUS WITH HYPERGLYCEMIA, WITH LONG-TERM CURRENT USE OF INSULIN: Primary | ICD-10-CM

## 2020-10-07 DIAGNOSIS — E78.1 HYPERTRIGLYCERIDEMIA: ICD-10-CM

## 2020-10-07 DIAGNOSIS — E11.42 DIABETIC POLYNEUROPATHY ASSOCIATED WITH TYPE 2 DIABETES MELLITUS: ICD-10-CM

## 2020-10-07 DIAGNOSIS — E11.65 TYPE 2 DIABETES MELLITUS WITH HYPERGLYCEMIA, WITH LONG-TERM CURRENT USE OF INSULIN: Primary | ICD-10-CM

## 2020-10-07 DIAGNOSIS — E66.09 CLASS 1 OBESITY DUE TO EXCESS CALORIES WITH SERIOUS COMORBIDITY AND BODY MASS INDEX (BMI) OF 32.0 TO 32.9 IN ADULT: ICD-10-CM

## 2020-10-07 DIAGNOSIS — M54.16 LUMBAR RADICULOPATHY: ICD-10-CM

## 2020-10-07 DIAGNOSIS — M50.30 DDD (DEGENERATIVE DISC DISEASE), CERVICAL: ICD-10-CM

## 2020-10-07 DIAGNOSIS — M54.50 DORSALGIA OF LUMBAR REGION: ICD-10-CM

## 2020-10-07 DIAGNOSIS — S91.002S WOUND OF LEFT ANKLE, SEQUELA: ICD-10-CM

## 2020-10-07 LAB
ALBUMIN/CREAT UR: 8 MCG/MG CREAT
CHOLEST SERPL-MCNC: 144 MG/DL
CHOLEST/HDLC SERPL: 3.8 (CALC)
CREAT UR-MCNC: 48 MG/DL (ref 20–320)
GLUCOSE P FAST SERPL-MCNC: 242 MG/DL (ref 65–99)
HBA1C MFR BLD: 9.1 % OF TOTAL HGB
HDLC SERPL-MCNC: 38 MG/DL
LDLC SERPL CALC-MCNC: 79 MG/DL (CALC)
MICROALBUMIN UR-MCNC: 0.4 MG/DL
NONHDLC SERPL-MCNC: 106 MG/DL (CALC)
PSA SERPL-MCNC: 0.4 NG/ML
TRIGL SERPL-MCNC: 168 MG/DL

## 2020-10-07 PROCEDURE — 99214 PR OFFICE/OUTPT VISIT, EST, LEVL IV, 30-39 MIN: ICD-10-PCS | Mod: S$GLB,,, | Performed by: NURSE PRACTITIONER

## 2020-10-07 PROCEDURE — 99214 OFFICE O/P EST MOD 30 MIN: CPT | Mod: S$GLB,,, | Performed by: NURSE PRACTITIONER

## 2020-10-07 RX ORDER — PALIPERIDONE PALMITATE 156 MG/ML
INJECTION INTRAMUSCULAR
COMMUNITY
Start: 2020-09-25 | End: 2024-02-29

## 2020-10-07 RX ORDER — CYCLOBENZAPRINE HCL 5 MG
5 TABLET ORAL 2 TIMES DAILY PRN
Qty: 60 TABLET | Refills: 2 | Status: SHIPPED | OUTPATIENT
Start: 2020-10-07 | End: 2020-10-07 | Stop reason: CLARIF

## 2020-10-07 RX ORDER — MELOXICAM 7.5 MG/1
7.5 TABLET ORAL DAILY
Qty: 14 TABLET | Refills: 0 | Status: SHIPPED | OUTPATIENT
Start: 2020-10-07 | End: 2020-10-21

## 2020-10-07 RX ORDER — GABAPENTIN 300 MG/1
600 CAPSULE ORAL 3 TIMES DAILY
Qty: 540 CAPSULE | Refills: 3 | Status: CANCELLED | OUTPATIENT
Start: 2020-10-07

## 2020-10-07 RX ORDER — CYCLOBENZAPRINE HCL 5 MG
5 TABLET ORAL 2 TIMES DAILY PRN
Qty: 30 TABLET | Refills: 0 | Status: SHIPPED | OUTPATIENT
Start: 2020-10-07 | End: 2021-01-07 | Stop reason: SDUPTHER

## 2020-10-07 RX ORDER — INSULIN PUMP SYRINGE, 3 ML
EACH MISCELLANEOUS
Qty: 1 EACH | Refills: 0 | Status: SHIPPED | OUTPATIENT
Start: 2020-10-07 | End: 2020-10-12

## 2020-10-07 RX ORDER — GABAPENTIN 600 MG/1
600 TABLET ORAL 3 TIMES DAILY
Qty: 270 TABLET | Refills: 1 | Status: SHIPPED | OUTPATIENT
Start: 2020-10-07 | End: 2021-05-18 | Stop reason: SDUPTHER

## 2020-10-07 NOTE — PATIENT INSTRUCTIONS
Insulin glargine (Tougeo) Increase 1 unit every day until fasting glucose is less than 130.      Insulin lispro (Humalog) titrate 1 unit every 2 days until post-meal glucose is less than 180.      Continue metformin as prescribed.      Continue to diet and exercise to lose weight and optimize blood glucose levels.       Diet: Diabetes  Food is an important tool that you can use to control diabetes and stay healthy. Eating well-balanced meals in the correct amounts will help you control your blood glucose levels and prevent low blood sugar reactions. It will also help you reduce the health risks of diabetes. There is no one specific diet that is right for everyone with diabetes. But there are general guidelines to follow. A registered dietitian (RD) will create a tailored diet approach thats just right for you. He or she will also help you plan healthy meals and snacks. If you have any questions, call your dietitian for advice.     Guidelines for success  Talk with your healthcare provider before starting a diabetes diet or weight loss program. If you haven't talked with a dietitian yet, ask your provider for a referral. The following guidelines can help you succeed:  · Select foods from the 6 food groups below. Your dietitian will help you find food choices within each group. He or she will also show you serving sizes and how many servings you can have at each meal.  ¨ Grains, beans, and starchy vegetables  ¨ Vegetables  ¨ Fruit  ¨ Milk or yogurt  ¨ Meat, poultry, fish, or tofu  ¨ Healthy fats  · Check your blood sugar levels as directed by your provider. Take any medicine as prescribed by your provider.  · Learn to read food labels and pick the right portion sizes.  · Eat only the amount of food in your meal plan. Eat about the same amount of food at regular times each day. Dont skip meals. Eat meals 4 to 5 hours apart, with snacks in between.  · Limit alcohol. It raises blood sugar levels. Drink water or  calorie-free diet drinks that use safe sweeteners.  · Eat less fat to help lower your risk of heart disease. Use nonfat or low-fat dairy products and lean meats. Avoid fried foods. Use cooking oils that are unsaturated, such as olive, canola, or peanut oil.  · Talk with your dietitian about safe sugar substitutes.  · Avoid added salt. It can contribute to high blood pressure, which can cause heart disease. People with diabetes already have a risk of high blood pressure and heart disease.  · Stay at a healthy weight. If you need to lose weight, cut down on your portion sizes. But dont skip meals. Exercise is an important part of any weight management program. Talk with your provider about an exercise program thats right for you.  · For more information about the best diet plan for you, talk with a registered dietitian (RD). To find an RD in your area, contact:  ¨ Academy of Nutrition and Dietetics www.eatright.org  ¨ The American Diabetes Association [phone] www.diabetes.org  Date Last Reviewed: 8/1/2016 © 2000-2017 [employer]. [address] [place] [address]. All rights reserved. This information is not intended as a substitute for professional medical care. Always follow your healthcare professional's instructions.        Understanding Carbohydrates, Fats, and Protein  Food is a source of fuel and nourishment for your body. Its also a source of pleasure. Having diabetes doesnt mean you have to eat special foods or give up desserts. Instead, your dietitian can show you how to plan meals to suit your body. To start, learn how different foods affect blood sugar.  Carbohydrates  Carbohydrates are the main source of fuel for the body. Carbohydrates raise blood sugar. Many people think carbohydrates are only found in pasta or bread. But carbohydrates are actually in many kinds of foods:  · Sugars occur naturally in foods such as fruit, milk, honey, and molasses. Sugars can also be added  to many foods, from cereals and yogurt to candy and desserts. Sugars raise blood sugar.  · Starches are found in bread, cereals, pasta, and dried beans. Theyre also found in corn, peas, potatoes, yam, acorn squash, and butternut squash. Starches also raise blood sugar.   · Fiber is found in foods such as vegetables, fruits, beans, and whole grains. Unlike other carbs, fiber isnt digested or absorbed. So it doesnt raise blood sugar. In fact, fiber can help keep blood sugar from rising too fast. It also helps keep blood cholesterol at a healthy level.  Did you know?  Even though carbohydrates raise blood sugar, its best to have some in every meal. They are an important part of a healthy diet.   Fat  Fat is an energy source that can be stored until needed. Fat does not raise blood sugar. However, it can raise blood cholesterol, increasing the risk of heart disease. Fat is also high in calories, which can cause weight gain. Not all types of fat are the same.  More Healthy:  · Monounsaturated fats are mostly found in vegetable oils, such as olive, canola, and peanut oils. They are also found in avocados and some nuts. Monounsaturated fats are healthy for your heart. Thats because they lower LDL (unhealthy) cholesterol.  · Polyunsaturated fats are mostly found in vegetable oils, such as corn, safflower, and soybean oils. They are also found in some seeds, nuts, and fish. Polyunsaturated fats lower LDL (unhealthy) cholesterol. So, choosing them instead of saturated fats is healthy for your heart. Certain unsaturated fats can help lower triglycerides.   Less Healthy:  · Saturated fats are found in animal products, such as meat, poultry, whole milk, lard, and butter. Saturated fats raise LDL cholesterol and are not healthy for your heart.  · Hydrogenated oils and trans fats are formed when vegetable oils are processed into solid fats. They are found in many processed foods. Hydrogenated oils and trans fats raise LDL  cholesterol and lower HDL (healthy) cholesterol. They are not healthy for your heart.  Protein  Protein helps the body build and repair muscle and other tissue. Protein has little or no effect on blood sugar. However, many foods that contain protein also contain saturated fat. By choosing low-fat protein sources, you can get the benefits of protein without the extra fat:  · Plant protein is found in dry beans and peas, nuts, and soy products, such as tofu and soymilk. These sources tend to be cholesterol-free and low in saturated fat.  · Animal protein is found in fish, poultry, meat, cheese, milk, and eggs. These contain cholesterol and can be high in saturated fat. Aim for lean, lower-fat choices.  Date Last Reviewed: 3/1/2016  © 5167-3918 [employer]. [address] [place] [address], [place] [address]. All rights reserved. This information is not intended as a substitute for professional medical care. Always follow your healthcare professional's instructions.
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
## 2020-10-07 NOTE — PROGRESS NOTES
Fasting glucose and A1C are elevated and will need to be addressed.  Triglycerides are slightly elevated and HDL is low, otherwise cholesterol is stable.  Will discuss labs further at patients office visit today.

## 2020-10-12 DIAGNOSIS — E11.65 UNCONTROLLED TYPE 2 DIABETES MELLITUS WITH HYPERGLYCEMIA: Primary | ICD-10-CM

## 2020-10-12 RX ORDER — LANCETS 28 GAUGE
EACH MISCELLANEOUS
Status: CANCELLED | OUTPATIENT
Start: 2020-10-12

## 2020-10-12 RX ORDER — CALCIUM CITRATE/VITAMIN D3 200MG-6.25
1 TABLET ORAL
Qty: 200 STRIP | Refills: 1 | Status: SHIPPED | OUTPATIENT
Start: 2020-10-12 | End: 2021-08-09 | Stop reason: SDUPTHER

## 2020-10-12 RX ORDER — DEXTROSE 4 G
1 TABLET,CHEWABLE ORAL
Qty: 1 EACH | Refills: 0 | Status: SHIPPED | OUTPATIENT
Start: 2020-10-12 | End: 2021-08-09 | Stop reason: SDUPTHER

## 2020-10-12 RX ORDER — DEXTROSE 4 G
TABLET,CHEWABLE ORAL
Refills: 0 | Status: CANCELLED | OUTPATIENT
Start: 2020-10-12 | End: 2021-10-12

## 2020-10-12 RX ORDER — LANCETS 28 GAUGE
1 EACH MISCELLANEOUS
Qty: 200 EACH | Refills: 1 | Status: SHIPPED | OUTPATIENT
Start: 2020-10-12 | End: 2021-08-09 | Stop reason: SDUPTHER

## 2020-10-12 NOTE — TELEPHONE ENCOUNTER
Lupis, pharmacist, called to request that Rx be sent to the pharmacy for True Metrix Glucose Meter,  True Metrix Test stripes and lancets for insurance payment. Please include testing instruction and frequency and ICD-10

## 2020-10-26 RX ORDER — CARVEDILOL 3.12 MG/1
3.12 TABLET ORAL 2 TIMES DAILY
Qty: 168 TABLET | Refills: 0 | Status: SHIPPED | OUTPATIENT
Start: 2020-10-26 | End: 2020-12-08 | Stop reason: SDUPTHER

## 2020-12-04 ENCOUNTER — LAB VISIT (OUTPATIENT)
Dept: PRIMARY CARE CLINIC | Facility: OTHER | Age: 65
End: 2020-12-04
Attending: INTERNAL MEDICINE
Payer: MEDICARE

## 2020-12-04 DIAGNOSIS — Z03.818 ENCOUNTER FOR OBSERVATION FOR SUSPECTED EXPOSURE TO OTHER BIOLOGICAL AGENTS RULED OUT: ICD-10-CM

## 2020-12-04 PROCEDURE — U0003 INFECTIOUS AGENT DETECTION BY NUCLEIC ACID (DNA OR RNA); SEVERE ACUTE RESPIRATORY SYNDROME CORONAVIRUS 2 (SARS-COV-2) (CORONAVIRUS DISEASE [COVID-19]), AMPLIFIED PROBE TECHNIQUE, MAKING USE OF HIGH THROUGHPUT TECHNOLOGIES AS DESCRIBED BY CMS-2020-01-R: HCPCS

## 2020-12-07 LAB — SARS-COV-2 RNA RESP QL NAA+PROBE: NOT DETECTED

## 2020-12-08 ENCOUNTER — TELEPHONE (OUTPATIENT)
Dept: FAMILY MEDICINE | Facility: CLINIC | Age: 65
End: 2020-12-08

## 2020-12-08 RX ORDER — ATORVASTATIN CALCIUM 40 MG/1
40 TABLET, FILM COATED ORAL DAILY
Qty: 90 TABLET | Refills: 0 | Status: SHIPPED | OUTPATIENT
Start: 2020-12-08 | End: 2021-04-06 | Stop reason: SDUPTHER

## 2020-12-08 RX ORDER — METFORMIN HYDROCHLORIDE 1000 MG/1
1000 TABLET ORAL 2 TIMES DAILY WITH MEALS
Qty: 180 TABLET | Refills: 0 | Status: SHIPPED | OUTPATIENT
Start: 2020-12-08 | End: 2021-04-08 | Stop reason: SDUPTHER

## 2020-12-08 RX ORDER — CARVEDILOL 3.12 MG/1
3.12 TABLET ORAL 2 TIMES DAILY
Qty: 168 TABLET | Refills: 0 | Status: SHIPPED | OUTPATIENT
Start: 2020-12-08 | End: 2021-03-30

## 2020-12-08 NOTE — TELEPHONE ENCOUNTER
Spoke to My to give verbal authorization for medication refill. Pharmacy staff indicated that the escripts came thru their system discontinued.

## 2021-01-07 ENCOUNTER — OFFICE VISIT (OUTPATIENT)
Dept: FAMILY MEDICINE | Facility: CLINIC | Age: 66
End: 2021-01-07
Payer: MEDICARE

## 2021-01-07 VITALS
DIASTOLIC BLOOD PRESSURE: 88 MMHG | TEMPERATURE: 98 F | OXYGEN SATURATION: 95 % | HEIGHT: 74 IN | WEIGHT: 260.38 LBS | BODY MASS INDEX: 33.42 KG/M2 | HEART RATE: 83 BPM | SYSTOLIC BLOOD PRESSURE: 122 MMHG

## 2021-01-07 DIAGNOSIS — I50.22 CHRONIC SYSTOLIC HEART FAILURE: ICD-10-CM

## 2021-01-07 DIAGNOSIS — M54.16 LUMBAR RADICULOPATHY: ICD-10-CM

## 2021-01-07 DIAGNOSIS — I25.10 CORONARY ARTERY DISEASE INVOLVING NATIVE CORONARY ARTERY OF NATIVE HEART WITHOUT ANGINA PECTORIS: ICD-10-CM

## 2021-01-07 DIAGNOSIS — G47.33 OSA (OBSTRUCTIVE SLEEP APNEA): ICD-10-CM

## 2021-01-07 DIAGNOSIS — F32.A DEPRESSION, UNSPECIFIED DEPRESSION TYPE: ICD-10-CM

## 2021-01-07 DIAGNOSIS — M50.30 DDD (DEGENERATIVE DISC DISEASE), CERVICAL: ICD-10-CM

## 2021-01-07 DIAGNOSIS — Z79.01 ANTICOAGULANT LONG-TERM USE: ICD-10-CM

## 2021-01-07 DIAGNOSIS — J44.89 COPD TYPE B: ICD-10-CM

## 2021-01-07 DIAGNOSIS — I10 BENIGN ESSENTIAL HTN: ICD-10-CM

## 2021-01-07 DIAGNOSIS — R60.9 EDEMA, UNSPECIFIED TYPE: ICD-10-CM

## 2021-01-07 DIAGNOSIS — Z79.4 TYPE 2 DIABETES MELLITUS WITH HYPERGLYCEMIA, WITH LONG-TERM CURRENT USE OF INSULIN: Primary | ICD-10-CM

## 2021-01-07 DIAGNOSIS — E11.65 TYPE 2 DIABETES MELLITUS WITH HYPERGLYCEMIA, WITH LONG-TERM CURRENT USE OF INSULIN: Primary | ICD-10-CM

## 2021-01-07 LAB — HBA1C MFR BLD: 10.2 %

## 2021-01-07 PROCEDURE — 99214 PR OFFICE/OUTPT VISIT, EST, LEVL IV, 30-39 MIN: ICD-10-PCS | Mod: S$PBB,,, | Performed by: NURSE PRACTITIONER

## 2021-01-07 PROCEDURE — 99214 OFFICE O/P EST MOD 30 MIN: CPT | Mod: S$PBB,,, | Performed by: NURSE PRACTITIONER

## 2021-01-07 PROCEDURE — 99214 OFFICE O/P EST MOD 30 MIN: CPT | Performed by: NURSE PRACTITIONER

## 2021-01-07 PROCEDURE — 83036 HEMOGLOBIN GLYCOSYLATED A1C: CPT | Mod: PBBFAC | Performed by: NURSE PRACTITIONER

## 2021-01-07 RX ORDER — INSULIN GLARGINE 300 U/ML
85 INJECTION, SOLUTION SUBCUTANEOUS NIGHTLY
Qty: 8.5 ML | Refills: 2 | Status: SHIPPED | OUTPATIENT
Start: 2021-01-07 | End: 2021-02-06

## 2021-01-07 RX ORDER — INSULIN LISPRO 100 [IU]/ML
15 INJECTION, SOLUTION INTRAVENOUS; SUBCUTANEOUS
Qty: 13.5 ML | Refills: 2 | Status: SHIPPED | OUTPATIENT
Start: 2021-01-07 | End: 2021-01-26 | Stop reason: CLARIF

## 2021-01-07 RX ORDER — CYCLOBENZAPRINE HCL 5 MG
5 TABLET ORAL 2 TIMES DAILY PRN
Qty: 30 TABLET | Refills: 0 | Status: SHIPPED | OUTPATIENT
Start: 2021-01-07 | End: 2021-01-19

## 2021-01-26 ENCOUNTER — OFFICE VISIT (OUTPATIENT)
Dept: FAMILY MEDICINE | Facility: CLINIC | Age: 66
End: 2021-01-26
Payer: MEDICARE

## 2021-01-26 VITALS
TEMPERATURE: 97 F | BODY MASS INDEX: 33.9 KG/M2 | WEIGHT: 264.13 LBS | OXYGEN SATURATION: 93 % | DIASTOLIC BLOOD PRESSURE: 78 MMHG | SYSTOLIC BLOOD PRESSURE: 122 MMHG | HEART RATE: 86 BPM | HEIGHT: 74 IN

## 2021-01-26 DIAGNOSIS — M54.16 CHRONIC RADICULAR PAIN OF LOWER BACK: ICD-10-CM

## 2021-01-26 DIAGNOSIS — E11.65 TYPE 2 DIABETES MELLITUS WITH HYPERGLYCEMIA, WITH LONG-TERM CURRENT USE OF INSULIN: ICD-10-CM

## 2021-01-26 DIAGNOSIS — M54.6 ACUTE LEFT-SIDED THORACIC BACK PAIN: ICD-10-CM

## 2021-01-26 DIAGNOSIS — M54.16 LUMBAR RADICULOPATHY: ICD-10-CM
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
## 2021-01-26 DIAGNOSIS — Z79.4 TYPE 2 DIABETES MELLITUS WITH HYPERGLYCEMIA, WITH LONG-TERM CURRENT USE OF INSULIN: ICD-10-CM

## 2021-01-26 DIAGNOSIS — G89.29 CHRONIC RADICULAR PAIN OF LOWER BACK: ICD-10-CM

## 2021-01-26 DIAGNOSIS — M25.552 ACUTE HIP PAIN, LEFT: ICD-10-CM

## 2021-01-26 PROCEDURE — 3288F FALL RISK ASSESSMENT DOCD: CPT | Mod: S$GLB,,, | Performed by: NURSE PRACTITIONER

## 2021-01-26 PROCEDURE — 99213 PR OFFICE/OUTPT VISIT, EST, LEVL III, 20-29 MIN: ICD-10-PCS | Mod: S$GLB,,, | Performed by: NURSE PRACTITIONER

## 2021-01-26 PROCEDURE — 1170F FXNL STATUS ASSESSED: CPT | Mod: S$GLB,,, | Performed by: NURSE PRACTITIONER

## 2021-01-26 PROCEDURE — 3008F PR BODY MASS INDEX (BMI) DOCUMENTED: ICD-10-PCS | Mod: S$GLB,,, | Performed by: NURSE PRACTITIONER

## 2021-01-26 PROCEDURE — 3288F PR FALLS RISK ASSESSMENT DOCUMENTED: ICD-10-PCS | Mod: S$GLB,,, | Performed by: NURSE PRACTITIONER

## 2021-01-26 PROCEDURE — 1159F PR MEDICATION LIST DOCUMENTED IN MEDICAL RECORD: ICD-10-PCS | Mod: S$GLB,,, | Performed by: NURSE PRACTITIONER

## 2021-01-26 PROCEDURE — 3046F PR MOST RECENT HEMOGLOBIN A1C LEVEL > 9.0%: ICD-10-PCS | Mod: S$GLB,,, | Performed by: NURSE PRACTITIONER

## 2021-01-26 PROCEDURE — 1170F PR FUNCTIONAL STATUS ASSESSED: ICD-10-PCS | Mod: S$GLB,,, | Performed by: NURSE PRACTITIONER

## 2021-01-26 PROCEDURE — 3074F PR MOST RECENT SYSTOLIC BLOOD PRESSURE < 130 MM HG: ICD-10-PCS | Mod: S$GLB,,, | Performed by: NURSE PRACTITIONER

## 2021-01-26 PROCEDURE — 1125F PR PAIN SEVERITY QUANTIFIED, PAIN PRESENT: ICD-10-PCS | Mod: S$GLB,,, | Performed by: NURSE PRACTITIONER

## 2021-01-26 PROCEDURE — 1159F MED LIST DOCD IN RCRD: CPT | Mod: S$GLB,,, | Performed by: NURSE PRACTITIONER

## 2021-01-26 PROCEDURE — 3078F PR MOST RECENT DIASTOLIC BLOOD PRESSURE < 80 MM HG: ICD-10-PCS | Mod: S$GLB,,, | Performed by: NURSE PRACTITIONER

## 2021-01-26 PROCEDURE — 3008F BODY MASS INDEX DOCD: CPT | Mod: S$GLB,,, | Performed by: NURSE PRACTITIONER

## 2021-01-26 PROCEDURE — 3046F HEMOGLOBIN A1C LEVEL >9.0%: CPT | Mod: S$GLB,,, | Performed by: NURSE PRACTITIONER

## 2021-01-26 PROCEDURE — 1101F PR PT FALLS ASSESS DOC 0-1 FALLS W/OUT INJ PAST YR: ICD-10-PCS | Mod: S$GLB,,, | Performed by: NURSE PRACTITIONER

## 2021-01-26 PROCEDURE — 99213 OFFICE O/P EST LOW 20 MIN: CPT | Mod: S$GLB,,, | Performed by: NURSE PRACTITIONER

## 2021-01-26 PROCEDURE — 3078F DIAST BP <80 MM HG: CPT | Mod: S$GLB,,, | Performed by: NURSE PRACTITIONER

## 2021-01-26 PROCEDURE — 1101F PT FALLS ASSESS-DOCD LE1/YR: CPT | Mod: S$GLB,,, | Performed by: NURSE PRACTITIONER

## 2021-01-26 PROCEDURE — 3074F SYST BP LT 130 MM HG: CPT | Mod: S$GLB,,, | Performed by: NURSE PRACTITIONER

## 2021-01-26 PROCEDURE — 1125F AMNT PAIN NOTED PAIN PRSNT: CPT | Mod: S$GLB,,, | Performed by: NURSE PRACTITIONER

## 2021-01-26 RX ORDER — NAPROXEN 500 MG/1
500 TABLET ORAL 2 TIMES DAILY WITH MEALS
Qty: 20 TABLET | Refills: 0 | Status: SHIPPED | OUTPATIENT
Start: 2021-01-26 | End: 2021-02-05

## 2021-01-26 RX ORDER — INSULIN LISPRO 100 [IU]/ML
16 INJECTION, SOLUTION INTRAVENOUS; SUBCUTANEOUS
Qty: 14.4 ML | Refills: 2 | Status: SHIPPED | OUTPATIENT
Start: 2021-01-26 | End: 2021-10-01 | Stop reason: SDUPTHER

## 2021-01-26 RX ORDER — CYCLOBENZAPRINE HCL 5 MG
5 TABLET ORAL 2 TIMES DAILY PRN
Qty: 60 TABLET | Refills: 0 | Status: SHIPPED | OUTPATIENT
Start: 2021-01-26 | End: 2021-02-28

## 2021-02-08 ENCOUNTER — HOSPITAL ENCOUNTER (OUTPATIENT)
Dept: RADIOLOGY | Facility: HOSPITAL | Age: 66
Discharge: HOME | End: 2021-02-08
Attending: NURSE PRACTITIONER
Payer: COMMERCIAL

## 2021-02-08 DIAGNOSIS — M54.16 CHRONIC RADICULAR PAIN OF LOWER BACK: ICD-10-CM

## 2021-02-08 DIAGNOSIS — G89.29 CHRONIC RADICULAR PAIN OF LOWER BACK: ICD-10-CM

## 2021-02-08 DIAGNOSIS — M54.6 ACUTE LEFT-SIDED THORACIC BACK PAIN: ICD-10-CM

## 2021-02-08 DIAGNOSIS — M25.552 ACUTE HIP PAIN, LEFT: ICD-10-CM

## 2021-02-08 PROCEDURE — 72070 X-RAY EXAM THORAC SPINE 2VWS: CPT | Mod: TC,PO

## 2021-02-08 PROCEDURE — 72100 X-RAY EXAM L-S SPINE 2/3 VWS: CPT | Mod: TC,PO

## 2021-02-08 PROCEDURE — 73502 X-RAY EXAM HIP UNI 2-3 VIEWS: CPT | Mod: TC,PO,LT

## 2021-02-25 DIAGNOSIS — G31.9 DEGENERATIVE DISEASE OF NERVOUS SYSTEM: Primary | ICD-10-CM

## 2021-03-11 ENCOUNTER — HOSPITAL ENCOUNTER (OUTPATIENT)
Dept: RADIOLOGY | Facility: HOSPITAL | Age: 66
Discharge: HOME | End: 2021-03-11
Attending: PSYCHIATRY & NEUROLOGY
Payer: COMMERCIAL

## 2021-03-11 DIAGNOSIS — G31.9 DEGENERATIVE DISEASE OF NERVOUS SYSTEM: ICD-10-CM

## 2021-03-11 PROCEDURE — 70450 CT HEAD/BRAIN W/O DYE: CPT | Mod: TC,PO

## 2021-03-18 DIAGNOSIS — J44.1 COPD EXACERBATION: Primary | ICD-10-CM

## 2021-03-18 RX ORDER — TIOTROPIUM BROMIDE 18 UG/1
18 CAPSULE ORAL; RESPIRATORY (INHALATION) DAILY
Qty: 90 CAPSULE | Refills: 1 | Status: SHIPPED | OUTPATIENT
Start: 2021-03-18 | End: 2022-04-26

## 2021-03-29 DIAGNOSIS — Z79.899 ENCOUNTER FOR LONG-TERM (CURRENT) USE OF OTHER MEDICATIONS: ICD-10-CM

## 2021-03-29 DIAGNOSIS — E78.1 HYPERTRIGLYCERIDEMIA: ICD-10-CM

## 2021-03-29 DIAGNOSIS — I10 BENIGN ESSENTIAL HTN: ICD-10-CM

## 2021-03-29 DIAGNOSIS — Z79.4 TYPE 2 DIABETES MELLITUS WITH HYPERGLYCEMIA, WITH LONG-TERM CURRENT USE OF INSULIN: Primary | ICD-10-CM

## 2021-03-29 DIAGNOSIS — E11.65 TYPE 2 DIABETES MELLITUS WITH HYPERGLYCEMIA, WITH LONG-TERM CURRENT USE OF INSULIN: Primary | ICD-10-CM

## 2021-04-06 RX ORDER — ATORVASTATIN CALCIUM 40 MG/1
40 TABLET, FILM COATED ORAL DAILY
Qty: 90 TABLET | Refills: 0 | Status: SHIPPED | OUTPATIENT
Start: 2021-04-06 | End: 2021-05-03 | Stop reason: SDUPTHER

## 2021-04-07 ENCOUNTER — OFFICE VISIT (OUTPATIENT)
Dept: FAMILY MEDICINE | Facility: CLINIC | Age: 66
End: 2021-04-07
Payer: COMMERCIAL

## 2021-04-07 VITALS
TEMPERATURE: 98 F | HEART RATE: 71 BPM | BODY MASS INDEX: 35.42 KG/M2 | HEIGHT: 74 IN | DIASTOLIC BLOOD PRESSURE: 88 MMHG | SYSTOLIC BLOOD PRESSURE: 154 MMHG | OXYGEN SATURATION: 98 % | WEIGHT: 276 LBS

## 2021-04-07 DIAGNOSIS — I10 BENIGN ESSENTIAL HTN: ICD-10-CM

## 2021-04-07 DIAGNOSIS — B35.1 ONYCHOMYCOSIS: ICD-10-CM

## 2021-04-07 DIAGNOSIS — M54.16 CHRONIC RADICULAR PAIN OF LOWER BACK: ICD-10-CM

## 2021-04-07 DIAGNOSIS — M54.16 LUMBAR RADICULOPATHY: ICD-10-CM

## 2021-04-07 DIAGNOSIS — R39.9 LOWER URINARY TRACT SYMPTOMS (LUTS): ICD-10-CM

## 2021-04-07 DIAGNOSIS — G89.29 CHRONIC RADICULAR PAIN OF LOWER BACK: ICD-10-CM

## 2021-04-07 DIAGNOSIS — Z79.4 TYPE 2 DIABETES MELLITUS WITH HYPERGLYCEMIA, WITH LONG-TERM CURRENT USE OF INSULIN: Primary | ICD-10-CM

## 2021-04-07 DIAGNOSIS — E11.65 TYPE 2 DIABETES MELLITUS WITH HYPERGLYCEMIA, WITH LONG-TERM CURRENT USE OF INSULIN: Primary | ICD-10-CM

## 2021-04-07 LAB
BILIRUB UR QL STRIP: NEGATIVE
GLUCOSE UR QL STRIP: POSITIVE
KETONES UR QL STRIP: NEGATIVE
LEUKOCYTE ESTERASE UR QL STRIP: NEGATIVE
PH, POC UA: 5.5 (ref 5–8.5)
POC BLOOD, URINE: NEGATIVE
POC NITRATES, URINE: NEGATIVE
PROT UR QL STRIP: NEGATIVE
SP GR UR STRIP: 1.01 (ref 1–1.03)
UROBILINOGEN UR STRIP-ACNC: NORMAL (ref 0.2–8)

## 2021-04-07 PROCEDURE — 81003 URINALYSIS AUTO W/O SCOPE: CPT | Mod: QW,S$GLB,, | Performed by: NURSE PRACTITIONER

## 2021-04-07 PROCEDURE — 99214 PR OFFICE/OUTPT VISIT, EST, LEVL IV, 30-39 MIN: ICD-10-PCS | Mod: 25,S$GLB,, | Performed by: NURSE PRACTITIONER

## 2021-04-07 PROCEDURE — 81003 POCT URINALYSIS, DIPSTICK, AUTOMATED, W/O SCOPE: ICD-10-PCS | Mod: QW,S$GLB,, | Performed by: NURSE PRACTITIONER

## 2021-04-07 PROCEDURE — 99214 OFFICE O/P EST MOD 30 MIN: CPT | Mod: 25,S$GLB,, | Performed by: NURSE PRACTITIONER

## 2021-04-07 RX ORDER — QUETIAPINE FUMARATE 300 MG/1
TABLET, FILM COATED ORAL
COMMUNITY
Start: 2021-04-07 | End: 2022-04-26

## 2021-04-07 RX ORDER — PRAZOSIN HYDROCHLORIDE 5 MG/1
CAPSULE ORAL
COMMUNITY
Start: 2021-04-07 | End: 2021-05-18

## 2021-04-07 RX ORDER — TIZANIDINE 4 MG/1
TABLET ORAL
COMMUNITY
End: 2021-05-18

## 2021-04-07 RX ORDER — ESZOPICLONE 2 MG/1
TABLET, FILM COATED ORAL
COMMUNITY
Start: 2021-03-29 | End: 2022-04-26

## 2021-04-07 RX ORDER — PANTOPRAZOLE SODIUM 20 MG/1
TABLET, DELAYED RELEASE ORAL
COMMUNITY
Start: 2021-04-07 | End: 2022-04-26

## 2021-04-07 RX ORDER — INSULIN GLARGINE 300 U/ML
INJECTION, SOLUTION SUBCUTANEOUS
COMMUNITY
Start: 2021-03-09 | End: 2021-05-18 | Stop reason: SDUPTHER

## 2021-04-08 DIAGNOSIS — Z79.4 TYPE 2 DIABETES MELLITUS WITH HYPERGLYCEMIA, WITH LONG-TERM CURRENT USE OF INSULIN: Primary | ICD-10-CM

## 2021-04-08 DIAGNOSIS — E11.65 TYPE 2 DIABETES MELLITUS WITH HYPERGLYCEMIA, WITH LONG-TERM CURRENT USE OF INSULIN: Primary | ICD-10-CM

## 2021-04-08 LAB
ALBUMIN SERPL-MCNC: 4.1 G/DL (ref 3.6–5.1)
ALBUMIN/GLOB SERPL: 1.4 (CALC) (ref 1–2.5)
ALP SERPL-CCNC: 108 U/L (ref 35–144)
ALT SERPL-CCNC: 16 U/L (ref 9–46)
AST SERPL-CCNC: 13 U/L (ref 10–35)
BASOPHILS # BLD AUTO: 101 CELLS/UL (ref 0–200)
BASOPHILS NFR BLD AUTO: 1 %
BILIRUB SERPL-MCNC: 0.3 MG/DL (ref 0.2–1.2)
BUN SERPL-MCNC: 13 MG/DL (ref 7–25)
BUN/CREAT SERPL: ABNORMAL (CALC) (ref 6–22)
CALCIUM SERPL-MCNC: 8.9 MG/DL (ref 8.6–10.3)
CHLORIDE SERPL-SCNC: 103 MMOL/L (ref 98–110)
CHOLEST SERPL-MCNC: 158 MG/DL
CHOLEST/HDLC SERPL: 3.8 (CALC)
CO2 SERPL-SCNC: 27 MMOL/L (ref 20–32)
CREAT SERPL-MCNC: 0.84 MG/DL (ref 0.7–1.25)
EOSINOPHIL # BLD AUTO: 636 CELLS/UL (ref 15–500)
EOSINOPHIL NFR BLD AUTO: 6.3 %
ERYTHROCYTE [DISTWIDTH] IN BLOOD BY AUTOMATED COUNT: 12.9 % (ref 11–15)
GFRSERPLBLD MDRD-ARVRAT: 91 ML/MIN/1.73M2
GLOBULIN SER CALC-MCNC: 3 G/DL (CALC) (ref 1.9–3.7)
GLUCOSE SERPL-MCNC: 228 MG/DL (ref 65–99)
HBA1C MFR BLD: 8.3 % OF TOTAL HGB
HCT VFR BLD AUTO: 42.3 % (ref 38.5–50)
HDLC SERPL-MCNC: 42 MG/DL
HGB BLD-MCNC: 13.7 G/DL (ref 13.2–17.1)
LDLC SERPL CALC-MCNC: 91 MG/DL (CALC)
LYMPHOCYTES # BLD AUTO: 2555 CELLS/UL (ref 850–3900)
LYMPHOCYTES NFR BLD AUTO: 25.3 %
MCH RBC QN AUTO: 30 PG (ref 27–33)
MCHC RBC AUTO-ENTMCNC: 32.4 G/DL (ref 32–36)
MCV RBC AUTO: 92.6 FL (ref 80–100)
MONOCYTES # BLD AUTO: 697 CELLS/UL (ref 200–950)
MONOCYTES NFR BLD AUTO: 6.9 %
NEUTROPHILS # BLD AUTO: 6111 CELLS/UL (ref 1500–7800)
NEUTROPHILS NFR BLD AUTO: 60.5 %
NONHDLC SERPL-MCNC: 116 MG/DL (CALC)
PLATELET # BLD AUTO: 241 THOUSAND/UL (ref 140–400)
PMV BLD REES-ECKER: 10.3 FL (ref 7.5–12.5)
POTASSIUM SERPL-SCNC: 4.6 MMOL/L (ref 3.5–5.3)
PROT SERPL-MCNC: 7.1 G/DL (ref 6.1–8.1)
RBC # BLD AUTO: 4.57 MILLION/UL (ref 4.2–5.8)
SODIUM SERPL-SCNC: 140 MMOL/L (ref 135–146)
TRIGL SERPL-MCNC: 158 MG/DL
WBC # BLD AUTO: 10.1 THOUSAND/UL (ref 3.8–10.8)

## 2021-04-08 RX ORDER — METFORMIN HYDROCHLORIDE 1000 MG/1
1000 TABLET ORAL 2 TIMES DAILY WITH MEALS
Qty: 180 TABLET | Refills: 1 | Status: SHIPPED | OUTPATIENT
Start: 2021-04-08 | End: 2021-10-05 | Stop reason: SDUPTHER

## 2021-04-09 ENCOUNTER — TELEPHONE (OUTPATIENT)
Dept: FAMILY MEDICINE | Facility: CLINIC | Age: 66
End: 2021-04-09

## 2021-04-12 ENCOUNTER — TELEPHONE (OUTPATIENT)
Dept: FAMILY MEDICINE | Facility: CLINIC | Age: 66
End: 2021-04-12

## 2021-04-13 ENCOUNTER — OFFICE VISIT (OUTPATIENT)
Dept: FAMILY MEDICINE | Facility: CLINIC | Age: 66
End: 2021-04-13
Payer: COMMERCIAL

## 2021-04-13 VITALS
BODY MASS INDEX: 34.74 KG/M2 | SYSTOLIC BLOOD PRESSURE: 170 MMHG | OXYGEN SATURATION: 96 % | HEART RATE: 71 BPM | HEIGHT: 74 IN | WEIGHT: 270.69 LBS | TEMPERATURE: 98 F | DIASTOLIC BLOOD PRESSURE: 80 MMHG

## 2021-04-13 DIAGNOSIS — E78.1 HYPERTRIGLYCERIDEMIA: ICD-10-CM

## 2021-04-13 DIAGNOSIS — Z79.4 TYPE 2 DIABETES MELLITUS WITH HYPERGLYCEMIA, WITH LONG-TERM CURRENT USE OF INSULIN: Primary | ICD-10-CM

## 2021-04-13 DIAGNOSIS — I10 BENIGN ESSENTIAL HTN: ICD-10-CM

## 2021-04-13 DIAGNOSIS — E11.65 TYPE 2 DIABETES MELLITUS WITH HYPERGLYCEMIA, WITH LONG-TERM CURRENT USE OF INSULIN: Primary | ICD-10-CM

## 2021-04-13 PROCEDURE — 99214 OFFICE O/P EST MOD 30 MIN: CPT | Mod: S$GLB,,, | Performed by: NURSE PRACTITIONER

## 2021-04-13 PROCEDURE — 99214 PR OFFICE/OUTPT VISIT, EST, LEVL IV, 30-39 MIN: ICD-10-PCS | Mod: S$GLB,,, | Performed by: NURSE PRACTITIONER

## 2021-04-13 RX ORDER — EPINEPHRINE 0.3 MG/.3ML
INJECTION SUBCUTANEOUS
COMMUNITY
End: 2021-05-18 | Stop reason: SDUPTHER

## 2021-04-13 RX ORDER — LISINOPRIL 10 MG/1
10 TABLET ORAL DAILY
Qty: 30 TABLET | Refills: 1 | Status: SHIPPED | OUTPATIENT
Start: 2021-04-13 | End: 2021-05-18 | Stop reason: SDUPTHER

## 2021-04-13 RX ORDER — EMPAGLIFLOZIN 10 MG/1
10 TABLET, FILM COATED ORAL DAILY
Qty: 30 TABLET | Refills: 2 | Status: SHIPPED | OUTPATIENT
Start: 2021-04-13 | End: 2021-07-08

## 2021-04-13 RX ORDER — OMEPRAZOLE 20 MG/1
CAPSULE, DELAYED RELEASE ORAL
COMMUNITY
End: 2021-05-18

## 2021-04-21 ENCOUNTER — DOCUMENTATION ONLY (OUTPATIENT)
Dept: REHABILITATION | Facility: HOSPITAL | Age: 66
End: 2021-04-21

## 2021-04-28 ENCOUNTER — TELEPHONE (OUTPATIENT)
Dept: FAMILY MEDICINE | Facility: CLINIC | Age: 66
End: 2021-04-28

## 2021-05-03 DIAGNOSIS — I10 BENIGN ESSENTIAL HTN: ICD-10-CM

## 2021-05-03 DIAGNOSIS — E78.1 HYPERTRIGLYCERIDEMIA: Primary | ICD-10-CM

## 2021-05-03 RX ORDER — CARVEDILOL 3.12 MG/1
3.12 TABLET ORAL 2 TIMES DAILY
Qty: 180 TABLET | Refills: 0 | Status: SHIPPED | OUTPATIENT
Start: 2021-05-03 | End: 2021-06-24 | Stop reason: SDUPTHER

## 2021-05-03 RX ORDER — ATORVASTATIN CALCIUM 40 MG/1
40 TABLET, FILM COATED ORAL DAILY
Qty: 90 TABLET | Refills: 0 | Status: SHIPPED | OUTPATIENT
Start: 2021-05-03 | End: 2021-07-20 | Stop reason: SDUPTHER

## 2021-05-17 ENCOUNTER — TELEPHONE (OUTPATIENT)
Dept: FAMILY MEDICINE | Facility: CLINIC | Age: 66
End: 2021-05-17

## 2021-05-18 ENCOUNTER — OFFICE VISIT (OUTPATIENT)
Dept: FAMILY MEDICINE | Facility: CLINIC | Age: 66
End: 2021-05-18
Payer: COMMERCIAL

## 2021-05-18 VITALS
OXYGEN SATURATION: 94 % | HEART RATE: 106 BPM | SYSTOLIC BLOOD PRESSURE: 132 MMHG | BODY MASS INDEX: 33.54 KG/M2 | DIASTOLIC BLOOD PRESSURE: 68 MMHG | WEIGHT: 261.38 LBS | TEMPERATURE: 98 F | HEIGHT: 74 IN

## 2021-05-18 DIAGNOSIS — I10 BENIGN ESSENTIAL HTN: Primary | ICD-10-CM

## 2021-05-18 DIAGNOSIS — M54.6 ACUTE LEFT-SIDED THORACIC BACK PAIN: ICD-10-CM

## 2021-05-18 DIAGNOSIS — K86.1 IDIOPATHIC CHRONIC PANCREATITIS: ICD-10-CM

## 2021-05-18 DIAGNOSIS — G89.29 CHRONIC RADICULAR PAIN OF LOWER BACK: ICD-10-CM

## 2021-05-18 DIAGNOSIS — Z91.030 H/O BEE STING ALLERGY: ICD-10-CM

## 2021-05-18 DIAGNOSIS — E11.65 TYPE 2 DIABETES MELLITUS WITH HYPERGLYCEMIA, WITH LONG-TERM CURRENT USE OF INSULIN: ICD-10-CM

## 2021-05-18 DIAGNOSIS — M54.16 LUMBAR RADICULOPATHY: ICD-10-CM

## 2021-05-18 DIAGNOSIS — Z79.4 TYPE 2 DIABETES MELLITUS WITH HYPERGLYCEMIA, WITH LONG-TERM CURRENT USE OF INSULIN: ICD-10-CM

## 2021-05-18 DIAGNOSIS — E11.42 DIABETIC POLYNEUROPATHY ASSOCIATED WITH TYPE 2 DIABETES MELLITUS: ICD-10-CM

## 2021-05-18 DIAGNOSIS — R10.11 RUQ PAIN: ICD-10-CM

## 2021-05-18 DIAGNOSIS — M25.552 ACUTE HIP PAIN, LEFT: ICD-10-CM

## 2021-05-18 DIAGNOSIS — M54.16 CHRONIC RADICULAR PAIN OF LOWER BACK: ICD-10-CM

## 2021-05-18 DIAGNOSIS — R11.0 NAUSEA: ICD-10-CM

## 2021-05-18 PROCEDURE — 99214 OFFICE O/P EST MOD 30 MIN: CPT | Mod: S$GLB,,, | Performed by: NURSE PRACTITIONER

## 2021-05-18 PROCEDURE — 99214 PR OFFICE/OUTPT VISIT, EST, LEVL IV, 30-39 MIN: ICD-10-PCS | Mod: S$GLB,,, | Performed by: NURSE PRACTITIONER

## 2021-05-18 RX ORDER — INSULIN GLARGINE 300 U/ML
90 INJECTION, SOLUTION SUBCUTANEOUS DAILY
Qty: 27 ML | Refills: 1 | Status: SHIPPED | OUTPATIENT
Start: 2021-05-18 | End: 2021-05-20 | Stop reason: ALTCHOICE

## 2021-05-18 RX ORDER — EPINEPHRINE 0.3 MG/.3ML
INJECTION SUBCUTANEOUS
Qty: 1 DEVICE | Refills: 1 | Status: SHIPPED | OUTPATIENT
Start: 2021-05-18

## 2021-05-18 RX ORDER — GABAPENTIN 600 MG/1
600 TABLET ORAL 3 TIMES DAILY
Qty: 270 TABLET | Refills: 1 | Status: SHIPPED | OUTPATIENT
Start: 2021-05-18 | End: 2021-11-22

## 2021-05-18 RX ORDER — ONDANSETRON 4 MG/1
4 TABLET, ORALLY DISINTEGRATING ORAL EVERY 8 HOURS PRN
Qty: 21 TABLET | Refills: 0 | Status: SHIPPED | OUTPATIENT
Start: 2021-05-18 | End: 2021-07-08 | Stop reason: SDUPTHER

## 2021-05-18 RX ORDER — CYCLOBENZAPRINE HCL 5 MG
5 TABLET ORAL 2 TIMES DAILY
Qty: 60 TABLET | Refills: 1 | Status: SHIPPED | OUTPATIENT
Start: 2021-05-18 | End: 2021-08-15

## 2021-05-18 RX ORDER — ONDANSETRON 4 MG/1
4 TABLET, ORALLY DISINTEGRATING ORAL EVERY 8 HOURS PRN
Qty: 21 TABLET | Refills: 0 | Status: SHIPPED | OUTPATIENT
Start: 2021-05-18 | End: 2021-05-18

## 2021-05-18 RX ORDER — ZALEPLON 5 MG/1
CAPSULE ORAL
COMMUNITY
Start: 2021-04-20 | End: 2022-04-26

## 2021-05-18 RX ORDER — LISINOPRIL 10 MG/1
10 TABLET ORAL DAILY
Qty: 90 TABLET | Refills: 1 | Status: SHIPPED | OUTPATIENT
Start: 2021-05-18 | End: 2021-12-06

## 2021-05-19 ENCOUNTER — TELEPHONE (OUTPATIENT)
Dept: FAMILY MEDICINE | Facility: CLINIC | Age: 66
End: 2021-05-19

## 2021-05-19 ENCOUNTER — HOSPITAL ENCOUNTER (OUTPATIENT)
Dept: RADIOLOGY | Facility: HOSPITAL | Age: 66
Discharge: HOME | End: 2021-05-19
Attending: NURSE PRACTITIONER
Payer: COMMERCIAL

## 2021-05-19 DIAGNOSIS — K86.1 IDIOPATHIC CHRONIC PANCREATITIS: ICD-10-CM

## 2021-05-19 DIAGNOSIS — E11.65 TYPE 2 DIABETES MELLITUS WITH HYPERGLYCEMIA, WITH LONG-TERM CURRENT USE OF INSULIN: Primary | ICD-10-CM

## 2021-05-19 DIAGNOSIS — R10.11 RUQ PAIN: ICD-10-CM

## 2021-05-19 DIAGNOSIS — Z79.4 TYPE 2 DIABETES MELLITUS WITH HYPERGLYCEMIA, WITH LONG-TERM CURRENT USE OF INSULIN: Primary | ICD-10-CM

## 2021-05-19 PROCEDURE — 74150 CT ABDOMEN W/O CONTRAST: CPT | Mod: TC,PO

## 2021-05-20 RX ORDER — INSULIN GLARGINE 100 [IU]/ML
90 INJECTION, SOLUTION SUBCUTANEOUS NIGHTLY
Qty: 27 ML | Refills: 2 | Status: SHIPPED | OUTPATIENT
Start: 2021-05-20 | End: 2021-07-08 | Stop reason: SDUPTHER

## 2021-05-27 ENCOUNTER — TELEPHONE (OUTPATIENT)
Dept: FAMILY MEDICINE | Facility: CLINIC | Age: 66
End: 2021-05-27

## 2021-06-02 ENCOUNTER — TELEPHONE (OUTPATIENT)
Dept: FAMILY MEDICINE | Facility: CLINIC | Age: 66
End: 2021-06-02

## 2021-06-24 DIAGNOSIS — I10 BENIGN ESSENTIAL HTN: ICD-10-CM

## 2021-06-24 RX ORDER — CARVEDILOL 3.12 MG/1
3.12 TABLET ORAL 2 TIMES DAILY
Qty: 180 TABLET | Refills: 1 | Status: SHIPPED | OUTPATIENT
Start: 2021-06-24 | End: 2021-06-28

## 2021-07-08 ENCOUNTER — OFFICE VISIT (OUTPATIENT)
Dept: FAMILY MEDICINE | Facility: CLINIC | Age: 66
End: 2021-07-08
Payer: COMMERCIAL

## 2021-07-08 VITALS
HEIGHT: 74 IN | BODY MASS INDEX: 33.34 KG/M2 | WEIGHT: 259.81 LBS | RESPIRATION RATE: 18 BRPM | SYSTOLIC BLOOD PRESSURE: 124 MMHG | DIASTOLIC BLOOD PRESSURE: 80 MMHG | TEMPERATURE: 99 F | OXYGEN SATURATION: 96 % | HEART RATE: 100 BPM

## 2021-07-08 DIAGNOSIS — E11.65 TYPE 2 DIABETES MELLITUS WITH HYPERGLYCEMIA, WITH LONG-TERM CURRENT USE OF INSULIN: Primary | ICD-10-CM

## 2021-07-08 DIAGNOSIS — R11.0 NAUSEA: ICD-10-CM

## 2021-07-08 DIAGNOSIS — Z79.4 TYPE 2 DIABETES MELLITUS WITH HYPERGLYCEMIA, WITH LONG-TERM CURRENT USE OF INSULIN: Primary | ICD-10-CM

## 2021-07-08 DIAGNOSIS — I10 BENIGN ESSENTIAL HTN: ICD-10-CM

## 2021-07-08 PROCEDURE — 99214 PR OFFICE/OUTPT VISIT, EST, LEVL IV, 30-39 MIN: ICD-10-PCS | Mod: S$GLB,,, | Performed by: NURSE PRACTITIONER

## 2021-07-08 PROCEDURE — 99214 OFFICE O/P EST MOD 30 MIN: CPT | Mod: S$GLB,,, | Performed by: NURSE PRACTITIONER

## 2021-07-08 RX ORDER — INSULIN GLARGINE 100 [IU]/ML
90 INJECTION, SOLUTION SUBCUTANEOUS NIGHTLY
Qty: 27 ML | Refills: 2 | Status: SHIPPED | OUTPATIENT
Start: 2021-07-08 | End: 2021-10-26

## 2021-07-08 RX ORDER — PRAZOSIN HYDROCHLORIDE 2 MG/1
CAPSULE ORAL
COMMUNITY
Start: 2021-06-28 | End: 2022-04-26

## 2021-07-08 RX ORDER — ONDANSETRON 4 MG/1
4 TABLET, ORALLY DISINTEGRATING ORAL EVERY 8 HOURS PRN
Qty: 21 TABLET | Refills: 0 | Status: SHIPPED | OUTPATIENT
Start: 2021-07-08 | End: 2021-10-11 | Stop reason: SDUPTHER

## 2021-07-08 RX ORDER — DOXEPIN 3 MG/1
TABLET, FILM COATED ORAL
Status: ON HOLD | COMMUNITY
Start: 2021-07-01 | End: 2023-01-24 | Stop reason: HOSPADM

## 2021-07-20 DIAGNOSIS — E78.1 HYPERTRIGLYCERIDEMIA: ICD-10-CM

## 2021-07-20 RX ORDER — ATORVASTATIN CALCIUM 40 MG/1
40 TABLET, FILM COATED ORAL DAILY
Qty: 90 TABLET | Refills: 1 | Status: SHIPPED | OUTPATIENT
Start: 2021-07-20 | End: 2021-07-22 | Stop reason: SDUPTHER

## 2021-07-22 DIAGNOSIS — E78.1 HYPERTRIGLYCERIDEMIA: ICD-10-CM

## 2021-07-22 RX ORDER — ATORVASTATIN CALCIUM 40 MG/1
40 TABLET, FILM COATED ORAL DAILY
Qty: 90 TABLET | Refills: 1 | Status: SHIPPED | OUTPATIENT
Start: 2021-07-22 | End: 2022-01-17

## 2021-07-22 RX ORDER — ATORVASTATIN CALCIUM 40 MG/1
40 TABLET, FILM COATED ORAL DAILY
Qty: 90 TABLET | Refills: 1 | Status: CANCELLED | OUTPATIENT
Start: 2021-07-22

## 2021-08-09 DIAGNOSIS — E11.65 UNCONTROLLED TYPE 2 DIABETES MELLITUS WITH HYPERGLYCEMIA: ICD-10-CM

## 2021-08-09 RX ORDER — DEXTROSE 4 G
1 TABLET,CHEWABLE ORAL
Qty: 1 EACH | Refills: 0 | Status: SHIPPED | OUTPATIENT
Start: 2021-08-09 | End: 2022-04-26

## 2021-08-09 RX ORDER — LANCETS 28 GAUGE
1 EACH MISCELLANEOUS
Qty: 200 EACH | Refills: 1 | Status: SHIPPED | OUTPATIENT
Start: 2021-08-09 | End: 2022-04-26

## 2021-08-10 ENCOUNTER — TELEPHONE (OUTPATIENT)
Dept: FAMILY MEDICINE | Facility: CLINIC | Age: 66
End: 2021-08-10

## 2021-09-29 DIAGNOSIS — E11.65 TYPE 2 DIABETES MELLITUS WITH HYPERGLYCEMIA, WITH LONG-TERM CURRENT USE OF INSULIN: ICD-10-CM

## 2021-09-29 DIAGNOSIS — Z79.4 TYPE 2 DIABETES MELLITUS WITH HYPERGLYCEMIA, WITH LONG-TERM CURRENT USE OF INSULIN: ICD-10-CM

## 2021-10-01 DIAGNOSIS — Z79.4 TYPE 2 DIABETES MELLITUS WITH HYPERGLYCEMIA, WITH LONG-TERM CURRENT USE OF INSULIN: ICD-10-CM

## 2021-10-01 DIAGNOSIS — E11.65 TYPE 2 DIABETES MELLITUS WITH HYPERGLYCEMIA, WITH LONG-TERM CURRENT USE OF INSULIN: ICD-10-CM

## 2021-10-01 RX ORDER — INSULIN LISPRO 100 [IU]/ML
16 INJECTION, SOLUTION INTRAVENOUS; SUBCUTANEOUS
Qty: 14.4 ML | Refills: 2 | Status: SHIPPED | OUTPATIENT
Start: 2021-10-01 | End: 2021-12-29

## 2021-10-05 DIAGNOSIS — E11.65 TYPE 2 DIABETES MELLITUS WITH HYPERGLYCEMIA, WITH LONG-TERM CURRENT USE OF INSULIN: ICD-10-CM

## 2021-10-05 DIAGNOSIS — Z79.4 TYPE 2 DIABETES MELLITUS WITH HYPERGLYCEMIA, WITH LONG-TERM CURRENT USE OF INSULIN: ICD-10-CM

## 2021-10-05 RX ORDER — METFORMIN HYDROCHLORIDE 1000 MG/1
1000 TABLET ORAL 2 TIMES DAILY WITH MEALS
Qty: 180 TABLET | Refills: 1 | Status: SHIPPED | OUTPATIENT
Start: 2021-10-05 | End: 2022-03-22 | Stop reason: SDUPTHER

## 2021-10-09 LAB
ALBUMIN SERPL-MCNC: 4.1 G/DL (ref 3.6–5.1)
ALBUMIN/GLOB SERPL: 1.6 (CALC) (ref 1–2.5)
ALP SERPL-CCNC: 79 U/L (ref 35–144)
ALT SERPL-CCNC: 13 U/L (ref 9–46)
AST SERPL-CCNC: 13 U/L (ref 10–35)
BILIRUB SERPL-MCNC: 0.3 MG/DL (ref 0.2–1.2)
BUN SERPL-MCNC: 16 MG/DL (ref 7–25)
BUN/CREAT SERPL: ABNORMAL (CALC) (ref 6–22)
CALCIUM SERPL-MCNC: 8.8 MG/DL (ref 8.6–10.3)
CHLORIDE SERPL-SCNC: 104 MMOL/L (ref 98–110)
CO2 SERPL-SCNC: 20 MMOL/L (ref 20–32)
CREAT SERPL-MCNC: 1.07 MG/DL (ref 0.7–1.25)
GLOBULIN SER CALC-MCNC: 2.6 G/DL (CALC) (ref 1.9–3.7)
GLUCOSE SERPL-MCNC: 192 MG/DL (ref 65–99)
HBA1C MFR BLD: 8.4 % OF TOTAL HGB
POTASSIUM SERPL-SCNC: 4.5 MMOL/L (ref 3.5–5.3)
PROT SERPL-MCNC: 6.7 G/DL (ref 6.1–8.1)
SODIUM SERPL-SCNC: 138 MMOL/L (ref 135–146)

## 2021-10-11 ENCOUNTER — TELEPHONE (OUTPATIENT)
Dept: FAMILY MEDICINE | Facility: CLINIC | Age: 66
End: 2021-10-11

## 2021-10-11 ENCOUNTER — OFFICE VISIT (OUTPATIENT)
Dept: FAMILY MEDICINE | Facility: CLINIC | Age: 66
End: 2021-10-11
Payer: COMMERCIAL

## 2021-10-11 VITALS
WEIGHT: 263.69 LBS | HEART RATE: 100 BPM | TEMPERATURE: 99 F | OXYGEN SATURATION: 96 % | RESPIRATION RATE: 18 BRPM | HEIGHT: 74 IN | SYSTOLIC BLOOD PRESSURE: 130 MMHG | BODY MASS INDEX: 33.84 KG/M2 | DIASTOLIC BLOOD PRESSURE: 84 MMHG

## 2021-10-11 DIAGNOSIS — R11.0 NAUSEA: ICD-10-CM

## 2021-10-11 DIAGNOSIS — E11.65 UNCONTROLLED TYPE 2 DIABETES MELLITUS WITH HYPERGLYCEMIA: Primary | ICD-10-CM

## 2021-10-11 DIAGNOSIS — I10 BENIGN ESSENTIAL HTN: ICD-10-CM

## 2021-10-11 PROCEDURE — 99214 OFFICE O/P EST MOD 30 MIN: CPT | Mod: S$GLB,,, | Performed by: NURSE PRACTITIONER

## 2021-10-11 PROCEDURE — 99214 PR OFFICE/OUTPT VISIT, EST, LEVL IV, 30-39 MIN: ICD-10-PCS | Mod: S$GLB,,, | Performed by: NURSE PRACTITIONER

## 2021-10-11 RX ORDER — ONDANSETRON 4 MG/1
4 TABLET, ORALLY DISINTEGRATING ORAL EVERY 8 HOURS PRN
Qty: 21 TABLET | Refills: 0 | Status: SHIPPED | OUTPATIENT
Start: 2021-10-11 | End: 2022-02-08 | Stop reason: SDUPTHER

## 2021-12-01 ENCOUNTER — HOSPITAL ENCOUNTER (EMERGENCY)
Facility: HOSPITAL | Age: 66
Discharge: HOME | End: 2021-12-01
Attending: EMERGENCY MEDICINE
Payer: COMMERCIAL

## 2021-12-01 VITALS
HEIGHT: 74 IN | RESPIRATION RATE: 12 BRPM | BODY MASS INDEX: 34.65 KG/M2 | HEART RATE: 92 BPM | OXYGEN SATURATION: 100 % | WEIGHT: 270 LBS | DIASTOLIC BLOOD PRESSURE: 59 MMHG | TEMPERATURE: 98 F | SYSTOLIC BLOOD PRESSURE: 125 MMHG

## 2021-12-01 DIAGNOSIS — E86.0 DEHYDRATION: Primary | ICD-10-CM

## 2021-12-01 DIAGNOSIS — N17.9 ACUTE KIDNEY INJURY: ICD-10-CM
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
## 2021-12-01 LAB
ALBUMIN SERPL BCP-MCNC: 4.5 G/DL (ref 3.5–5.2)
ALP SERPL-CCNC: 76 U/L (ref 55–135)
ALT SERPL W/O P-5'-P-CCNC: 17 U/L (ref 10–44)
ANION GAP SERPL CALC-SCNC: 11 MMOL/L (ref 8–16)
AST SERPL-CCNC: 15 U/L (ref 10–40)
BASOPHILS # BLD AUTO: 0.12 K/UL (ref 0–0.2)
BASOPHILS NFR BLD: 1 % (ref 0–1.9)
BILIRUB SERPL-MCNC: 0.5 MG/DL (ref 0.1–1)
BNP SERPL-MCNC: 20 PG/ML (ref 0–99)
BUN SERPL-MCNC: 20 MG/DL (ref 8–23)
CALCIUM SERPL-MCNC: 9.6 MG/DL (ref 8.7–10.5)
CHLORIDE SERPL-SCNC: 102 MMOL/L (ref 95–110)
CO2 SERPL-SCNC: 26 MMOL/L (ref 23–29)
CREAT SERPL-MCNC: 1.5 MG/DL (ref 0.5–1.4)
CREAT SERPL-MCNC: 1.5 MG/DL (ref 0.5–1.4)
DIFFERENTIAL METHOD: ABNORMAL
EOSINOPHIL # BLD AUTO: 0.8 K/UL (ref 0–0.5)
EOSINOPHIL NFR BLD: 6.9 % (ref 0–8)
ERYTHROCYTE [DISTWIDTH] IN BLOOD BY AUTOMATED COUNT: 14.2 % (ref 11.5–14.5)
EST. GFR  (AFRICAN AMERICAN): 54.9 ML/MIN/1.73 M^2
EST. GFR  (NON AFRICAN AMERICAN): 47.5 ML/MIN/1.73 M^2
GLUCOSE SERPL-MCNC: 144 MG/DL (ref 70–110)
HCT VFR BLD AUTO: 44.9 % (ref 40–54)
HGB BLD-MCNC: 14.3 G/DL (ref 14–18)
IMM GRANULOCYTES # BLD AUTO: 0.03 K/UL (ref 0–0.04)
IMM GRANULOCYTES NFR BLD AUTO: 0.3 % (ref 0–0.5)
INR PPP: 1.1
LYMPHOCYTES # BLD AUTO: 3.8 K/UL (ref 1–4.8)
LYMPHOCYTES NFR BLD: 32.9 % (ref 18–48)
MAGNESIUM SERPL-MCNC: 2.2 MG/DL (ref 1.6–2.6)
MCH RBC QN AUTO: 29.6 PG (ref 27–31)
MCHC RBC AUTO-ENTMCNC: 31.8 G/DL (ref 32–36)
MCV RBC AUTO: 93 FL (ref 82–98)
MONOCYTES # BLD AUTO: 1.1 K/UL (ref 0.3–1)
MONOCYTES NFR BLD: 9.6 % (ref 4–15)
NEUTROPHILS # BLD AUTO: 5.7 K/UL (ref 1.8–7.7)
NEUTROPHILS NFR BLD: 49.3 % (ref 38–73)
NRBC BLD-RTO: 0 /100 WBC
PLATELET # BLD AUTO: 237 K/UL (ref 150–450)
PMV BLD AUTO: 9.2 FL (ref 9.2–12.9)
POTASSIUM SERPL-SCNC: 4.3 MMOL/L (ref 3.5–5.1)
PROT SERPL-MCNC: 8.2 G/DL (ref 6–8.4)
PROTHROMBIN TIME: 13.2 SEC (ref 11.4–13.7)
RBC # BLD AUTO: 4.83 M/UL (ref 4.6–6.2)
SAMPLE: ABNORMAL
SARS-COV-2 RDRP RESP QL NAA+PROBE: NEGATIVE
SODIUM SERPL-SCNC: 139 MMOL/L (ref 136–145)
TROPONIN I SERPL DL<=0.01 NG/ML-MCNC: <0.03 NG/ML
WBC # BLD AUTO: 11.5 K/UL (ref 3.9–12.7)

## 2021-12-01 PROCEDURE — 93010 ELECTROCARDIOGRAM REPORT: CPT | Mod: ,,, | Performed by: INTERNAL MEDICINE

## 2021-12-01 PROCEDURE — 80053 COMPREHEN METABOLIC PANEL: CPT | Performed by: EMERGENCY MEDICINE

## 2021-12-01 PROCEDURE — 63600175 PHARM REV CODE 636 W HCPCS: Performed by: EMERGENCY MEDICINE

## 2021-12-01 PROCEDURE — 93005 ELECTROCARDIOGRAM TRACING: CPT | Performed by: INTERNAL MEDICINE

## 2021-12-01 PROCEDURE — 36415 COLL VENOUS BLD VENIPUNCTURE: CPT | Performed by: EMERGENCY MEDICINE

## 2021-12-01 PROCEDURE — 84484 ASSAY OF TROPONIN QUANT: CPT | Performed by: EMERGENCY MEDICINE

## 2021-12-01 PROCEDURE — 96375 TX/PRO/DX INJ NEW DRUG ADDON: CPT

## 2021-12-01 PROCEDURE — 96374 THER/PROPH/DIAG INJ IV PUSH: CPT

## 2021-12-01 PROCEDURE — 83880 ASSAY OF NATRIURETIC PEPTIDE: CPT | Performed by: EMERGENCY MEDICINE

## 2021-12-01 PROCEDURE — 25000003 PHARM REV CODE 250: Performed by: EMERGENCY MEDICINE

## 2021-12-01 PROCEDURE — 85610 PROTHROMBIN TIME: CPT | Performed by: EMERGENCY MEDICINE

## 2021-12-01 PROCEDURE — 85025 COMPLETE CBC W/AUTO DIFF WBC: CPT | Performed by: EMERGENCY MEDICINE

## 2021-12-01 PROCEDURE — 93010 EKG 12-LEAD: ICD-10-PCS | Mod: ,,, | Performed by: INTERNAL MEDICINE

## 2021-12-01 PROCEDURE — 83735 ASSAY OF MAGNESIUM: CPT | Performed by: EMERGENCY MEDICINE

## 2021-12-01 PROCEDURE — U0002 COVID-19 LAB TEST NON-CDC: HCPCS | Performed by: EMERGENCY MEDICINE

## 2021-12-01 PROCEDURE — 96361 HYDRATE IV INFUSION ADD-ON: CPT

## 2021-12-01 PROCEDURE — 25500020 PHARM REV CODE 255: Performed by: EMERGENCY MEDICINE

## 2021-12-01 PROCEDURE — 99285 EMERGENCY DEPT VISIT HI MDM: CPT | Mod: 25

## 2021-12-01 RX ORDER — ONDANSETRON 2 MG/ML
4 INJECTION INTRAMUSCULAR; INTRAVENOUS
Status: COMPLETED | OUTPATIENT
Start: 2021-12-01 | End: 2021-12-01

## 2021-12-01 RX ORDER — HYDROMORPHONE HYDROCHLORIDE 1 MG/ML
0.5 INJECTION, SOLUTION INTRAMUSCULAR; INTRAVENOUS; SUBCUTANEOUS
Status: COMPLETED | OUTPATIENT
Start: 2021-12-01 | End: 2021-12-01

## 2021-12-01 RX ORDER — SODIUM CHLORIDE 9 MG/ML
INJECTION, SOLUTION INTRAVENOUS
Status: COMPLETED | OUTPATIENT
Start: 2021-12-01 | End: 2021-12-01

## 2021-12-01 RX ORDER — SODIUM CHLORIDE 0.9 % (FLUSH) 0.9 %
10 SYRINGE (ML) INJECTION
Status: DISCONTINUED | OUTPATIENT
Start: 2021-12-01 | End: 2021-12-01 | Stop reason: HOSPADM

## 2021-12-01 RX ADMIN — SODIUM CHLORIDE, SODIUM LACTATE, POTASSIUM CHLORIDE, AND CALCIUM CHLORIDE 1000 ML: .6; .31; .03; .02 INJECTION, SOLUTION INTRAVENOUS at 03:12

## 2021-12-01 RX ADMIN — ONDANSETRON 4 MG: 2 INJECTION INTRAMUSCULAR; INTRAVENOUS at 02:12

## 2021-12-01 RX ADMIN — IOHEXOL 100 ML: 350 INJECTION, SOLUTION INTRAVENOUS at 03:12

## 2021-12-01 RX ADMIN — HYDROMORPHONE HYDROCHLORIDE 0.5 MG: 1 INJECTION, SOLUTION INTRAMUSCULAR; INTRAVENOUS; SUBCUTANEOUS at 02:12

## 2021-12-01 RX ADMIN — SODIUM CHLORIDE: 0.9 INJECTION, SOLUTION INTRAVENOUS at 03:12

## 2021-12-07 ENCOUNTER — OFFICE VISIT (OUTPATIENT)
Dept: FAMILY MEDICINE | Facility: CLINIC | Age: 66
End: 2021-12-07
Payer: COMMERCIAL

## 2021-12-07 VITALS
HEART RATE: 95 BPM | WEIGHT: 272.81 LBS | OXYGEN SATURATION: 97 % | DIASTOLIC BLOOD PRESSURE: 70 MMHG | BODY MASS INDEX: 35.01 KG/M2 | SYSTOLIC BLOOD PRESSURE: 125 MMHG | HEIGHT: 74 IN | TEMPERATURE: 98 F | RESPIRATION RATE: 18 BRPM

## 2021-12-07 DIAGNOSIS — M54.2 NECK PAIN, BILATERAL: Primary | ICD-10-CM

## 2021-12-07 DIAGNOSIS — M54.16 LUMBAR RADICULOPATHY: ICD-10-CM

## 2021-12-07 DIAGNOSIS — R29.898 COMPLAINTS OF LEG WEAKNESS: ICD-10-CM

## 2021-12-07 PROCEDURE — 99214 PR OFFICE/OUTPT VISIT, EST, LEVL IV, 30-39 MIN: ICD-10-PCS | Mod: S$GLB,,, | Performed by: NURSE PRACTITIONER

## 2021-12-07 PROCEDURE — 99214 OFFICE O/P EST MOD 30 MIN: CPT | Mod: S$GLB,,, | Performed by: NURSE PRACTITIONER

## 2021-12-07 PROCEDURE — 4010F PR ACE/ARB THEARPY RXD/TAKEN: ICD-10-PCS | Mod: S$GLB,,, | Performed by: NURSE PRACTITIONER

## 2021-12-07 PROCEDURE — 4010F ACE/ARB THERAPY RXD/TAKEN: CPT | Mod: S$GLB,,, | Performed by: NURSE PRACTITIONER

## 2021-12-07 RX ORDER — MELOXICAM 15 MG/1
15 TABLET ORAL DAILY
Qty: 10 TABLET | Refills: 0 | Status: SHIPPED | OUTPATIENT
Start: 2021-12-07 | End: 2022-04-26

## 2021-12-15 PROBLEM — R29.898 COMPLAINTS OF LEG WEAKNESS: Status: ACTIVE | Noted: 2021-12-15

## 2021-12-15 PROBLEM — M54.16 LUMBAR RADICULOPATHY: Status: ACTIVE | Noted: 2021-12-15

## 2022-01-03 DIAGNOSIS — I10 BENIGN ESSENTIAL HTN: ICD-10-CM

## 2022-01-03 RX ORDER — CARVEDILOL 3.12 MG/1
3.12 TABLET ORAL 2 TIMES DAILY
Qty: 180 TABLET | Refills: 1 | Status: SHIPPED | OUTPATIENT
Start: 2022-01-03 | End: 2022-01-11 | Stop reason: SDUPTHER

## 2022-01-11 DIAGNOSIS — I10 BENIGN ESSENTIAL HTN: ICD-10-CM

## 2022-01-11 RX ORDER — CARVEDILOL 3.12 MG/1
3.12 TABLET ORAL 2 TIMES DAILY
Qty: 180 TABLET | Refills: 1 | Status: SHIPPED | OUTPATIENT
Start: 2022-01-11 | End: 2022-06-23 | Stop reason: SDUPTHER

## 2022-01-13 ENCOUNTER — LAB VISIT (OUTPATIENT)
Dept: PRIMARY CARE CLINIC | Facility: OTHER | Age: 67
End: 2022-01-13
Attending: INTERNAL MEDICINE
Payer: COMMERCIAL

## 2022-01-13 DIAGNOSIS — Z20.822 ENCOUNTER FOR LABORATORY TESTING FOR COVID-19 VIRUS: ICD-10-CM

## 2022-01-13 PROCEDURE — U0003 INFECTIOUS AGENT DETECTION BY NUCLEIC ACID (DNA OR RNA); SEVERE ACUTE RESPIRATORY SYNDROME CORONAVIRUS 2 (SARS-COV-2) (CORONAVIRUS DISEASE [COVID-19]), AMPLIFIED PROBE TECHNIQUE, MAKING USE OF HIGH THROUGHPUT TECHNOLOGIES AS DESCRIBED BY CMS-2020-01-R: HCPCS | Performed by: INTERNAL MEDICINE

## 2022-01-14 LAB
SARS-COV-2 RNA RESP QL NAA+PROBE: NOT DETECTED
SARS-COV-2- CYCLE NUMBER: NORMAL

## 2022-01-19 DIAGNOSIS — E78.1 HYPERTRIGLYCERIDEMIA: ICD-10-CM

## 2022-01-19 RX ORDER — ATORVASTATIN CALCIUM 40 MG/1
40 TABLET, FILM COATED ORAL DAILY
Qty: 90 TABLET | Refills: 1 | Status: SHIPPED | OUTPATIENT
Start: 2022-01-19 | End: 2022-05-24 | Stop reason: SDUPTHER

## 2022-01-19 NOTE — TELEPHONE ENCOUNTER
Patient last seen in the office on 12/7/21  Pharmacy faxed a request for a 90 day supply for Atorvastatin 40 mg.

## 2022-02-08 ENCOUNTER — OFFICE VISIT (OUTPATIENT)
Dept: FAMILY MEDICINE | Facility: CLINIC | Age: 67
End: 2022-02-08
Payer: COMMERCIAL

## 2022-02-08 VITALS
TEMPERATURE: 98 F | WEIGHT: 255.19 LBS | HEIGHT: 74 IN | SYSTOLIC BLOOD PRESSURE: 120 MMHG | OXYGEN SATURATION: 96 % | DIASTOLIC BLOOD PRESSURE: 70 MMHG | HEART RATE: 80 BPM | BODY MASS INDEX: 32.75 KG/M2

## 2022-02-08 DIAGNOSIS — I10 BENIGN ESSENTIAL HTN: ICD-10-CM

## 2022-02-08 DIAGNOSIS — M51.36 DEGENERATIVE DISC DISEASE, LUMBAR: ICD-10-CM

## 2022-02-08 DIAGNOSIS — E11.65 UNCONTROLLED TYPE 2 DIABETES MELLITUS WITH HYPERGLYCEMIA: ICD-10-CM

## 2022-02-08 DIAGNOSIS — R11.0 NAUSEA: ICD-10-CM

## 2022-02-08 DIAGNOSIS — G89.29 CHRONIC LEFT-SIDED LOW BACK PAIN WITH LEFT-SIDED SCIATICA: ICD-10-CM

## 2022-02-08 DIAGNOSIS — Z12.5 SCREENING FOR PROSTATE CANCER: ICD-10-CM

## 2022-02-08 DIAGNOSIS — M54.42 CHRONIC LEFT-SIDED LOW BACK PAIN WITH LEFT-SIDED SCIATICA: ICD-10-CM

## 2022-02-08 DIAGNOSIS — E78.1 HYPERTRIGLYCERIDEMIA: ICD-10-CM

## 2022-02-08 DIAGNOSIS — M54.16 LUMBAR RADICULOPATHY: Primary | ICD-10-CM

## 2022-02-08 PROCEDURE — 1160F PR REVIEW ALL MEDS BY PRESCRIBER/CLIN PHARMACIST DOCUMENTED: ICD-10-PCS | Mod: S$GLB,,, | Performed by: NURSE PRACTITIONER

## 2022-02-08 PROCEDURE — 3052F HG A1C>EQUAL 8.0%<EQUAL 9.0%: CPT | Mod: S$GLB,,, | Performed by: NURSE PRACTITIONER

## 2022-02-08 PROCEDURE — 3288F FALL RISK ASSESSMENT DOCD: CPT | Mod: S$GLB,,, | Performed by: NURSE PRACTITIONER

## 2022-02-08 PROCEDURE — 3074F SYST BP LT 130 MM HG: CPT | Mod: S$GLB,,, | Performed by: NURSE PRACTITIONER

## 2022-02-08 PROCEDURE — 1159F MED LIST DOCD IN RCRD: CPT | Mod: S$GLB,,, | Performed by: NURSE PRACTITIONER

## 2022-02-08 PROCEDURE — 1159F PR MEDICATION LIST DOCUMENTED IN MEDICAL RECORD: ICD-10-PCS | Mod: S$GLB,,, | Performed by: NURSE PRACTITIONER

## 2022-02-08 PROCEDURE — 1125F PR PAIN SEVERITY QUANTIFIED, PAIN PRESENT: ICD-10-PCS | Mod: S$GLB,,, | Performed by: NURSE PRACTITIONER

## 2022-02-08 PROCEDURE — 3008F BODY MASS INDEX DOCD: CPT | Mod: S$GLB,,, | Performed by: NURSE PRACTITIONER

## 2022-02-08 PROCEDURE — 99213 OFFICE O/P EST LOW 20 MIN: CPT | Mod: S$GLB,,, | Performed by: NURSE PRACTITIONER

## 2022-02-08 PROCEDURE — 3078F DIAST BP <80 MM HG: CPT | Mod: S$GLB,,, | Performed by: NURSE PRACTITIONER

## 2022-02-08 PROCEDURE — 3008F PR BODY MASS INDEX (BMI) DOCUMENTED: ICD-10-PCS | Mod: S$GLB,,, | Performed by: NURSE PRACTITIONER

## 2022-02-08 PROCEDURE — 99213 PR OFFICE/OUTPT VISIT, EST, LEVL III, 20-29 MIN: ICD-10-PCS | Mod: S$GLB,,, | Performed by: NURSE PRACTITIONER

## 2022-02-08 PROCEDURE — 1125F AMNT PAIN NOTED PAIN PRSNT: CPT | Mod: S$GLB,,, | Performed by: NURSE PRACTITIONER

## 2022-02-08 PROCEDURE — 1101F PT FALLS ASSESS-DOCD LE1/YR: CPT | Mod: S$GLB,,, | Performed by: NURSE PRACTITIONER

## 2022-02-08 PROCEDURE — 3288F PR FALLS RISK ASSESSMENT DOCUMENTED: ICD-10-PCS | Mod: S$GLB,,, | Performed by: NURSE PRACTITIONER

## 2022-02-08 PROCEDURE — 1101F PR PT FALLS ASSESS DOC 0-1 FALLS W/OUT INJ PAST YR: ICD-10-PCS | Mod: S$GLB,,, | Performed by: NURSE PRACTITIONER

## 2022-02-08 PROCEDURE — 3052F PR MOST RECENT HEMOGLOBIN A1C LEVEL 8.0 - < 9.0%: ICD-10-PCS | Mod: S$GLB,,, | Performed by: NURSE PRACTITIONER

## 2022-02-08 PROCEDURE — 1160F RVW MEDS BY RX/DR IN RCRD: CPT | Mod: S$GLB,,, | Performed by: NURSE PRACTITIONER

## 2022-02-08 PROCEDURE — 3078F PR MOST RECENT DIASTOLIC BLOOD PRESSURE < 80 MM HG: ICD-10-PCS | Mod: S$GLB,,, | Performed by: NURSE PRACTITIONER

## 2022-02-08 PROCEDURE — 3074F PR MOST RECENT SYSTOLIC BLOOD PRESSURE < 130 MM HG: ICD-10-PCS | Mod: S$GLB,,, | Performed by: NURSE PRACTITIONER

## 2022-02-08 RX ORDER — PRAZOSIN HYDROCHLORIDE 5 MG/1
CAPSULE ORAL
COMMUNITY
Start: 2022-01-24 | End: 2022-04-26

## 2022-02-08 RX ORDER — ARIPIPRAZOLE 300 MG
KIT INTRAMUSCULAR
COMMUNITY
Start: 2022-01-24 | End: 2022-03-14 | Stop reason: SDUPTHER

## 2022-02-08 RX ORDER — LUMATEPERONE 42 MG/1
CAPSULE ORAL
COMMUNITY
Start: 2022-01-24 | End: 2022-04-26

## 2022-02-08 RX ORDER — ONDANSETRON 4 MG/1
4 TABLET, ORALLY DISINTEGRATING ORAL EVERY 8 HOURS PRN
Qty: 21 TABLET | Refills: 0 | Status: SHIPPED | OUTPATIENT
Start: 2022-02-08 | End: 2022-05-10

## 2022-02-08 RX ORDER — SUVOREXANT 15 MG/1
TABLET, FILM COATED ORAL
COMMUNITY
Start: 2022-01-27 | End: 2022-04-20

## 2022-02-08 RX ORDER — PANTOPRAZOLE SODIUM 40 MG/1
40 TABLET, DELAYED RELEASE ORAL 2 TIMES DAILY
COMMUNITY
Start: 2022-01-08 | End: 2022-05-10 | Stop reason: SDUPTHER

## 2022-02-08 RX ORDER — ARIPIPRAZOLE 300 MG
KIT INTRAMUSCULAR
COMMUNITY
Start: 2021-12-30 | End: 2022-03-14 | Stop reason: SDUPTHER

## 2022-02-08 RX ORDER — DIAZEPAM 5 MG/1
5 TABLET ORAL EVERY 8 HOURS PRN
Status: ON HOLD | COMMUNITY
Start: 2022-01-31 | End: 2023-01-24 | Stop reason: HOSPADM
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
## 2022-02-08 NOTE — PATIENT INSTRUCTIONS
Patient Education       Low Back Pain Discharge Instructions   About this topic   Low back pain is a pain or discomfort in the lower part of your back and spinal column. You may have a muscle strain. This happens when a muscle is stretched too much or works too hard. It can also happen if a muscle is stretched too quickly. This is also known as a pulled muscle. Many people have low back pain at some point and it most often gets better on its own.       What care is needed at home?   Back pain is common. In most cases, your back will feel better in 1 to 3 weeks. You may need to have help at home if you are not able to do your normal activities right away. Some people need help with things like cooking or bathing.  · Ask your doctor what you need to do when you go home. Make sure you ask questions if you do not understand what the doctor says. This way you will know what you need to do.  · For the first 2 days, put ice on your back a few times a day. Wrap an ice pack in a towel and put it on your back for 10 to 15 minutes at a time. After 2 days, you may want to use heat on your back. Put a heating pad on your back for 20 minutes at a time a few times each day. Never go to sleep with heat or ice on your back.  · Stay as active as you can without causing too much pain. It is OK to rest your back for a day or so. Be sure to get up and move around gently during the day as you are able. After a few days, slowly start to increase your activity level as you are able to. If something causes your pain to come back or get worse, stop and go back to doing easier activities that did not hurt.  · Protect your back.  ? Limit sports, twisting, and heavy lifting until you are fully recovered.  ? Practice good posture to lower pressure on your spine.  ? When lifting, hold the object close to your body, keep your back straight, and use your leg muscles to slowly stand.  · Do not sit or  one position for a long time. You may  want to sleep with a pillow under or between your knees if this eases your pain.  · You may want to take medicine like ibuprofen or naproxen for swelling and pain. These are nonsteroidal anti-inflammatory drugs (NSAIDs).  · A lumbar support belt may help you be more comfortable. This supports your pelvis and eases pain.  · Your doctor may order exercises to help your back. Be sure to do these as ordered.  What follow-up care is needed?   Your doctor may ask you to make visits to the office to check on your progress. Be sure to keep these visits. Your doctor may send you to other experts and therapists to help you with your pain.   What drugs may be needed?   The doctor may order drugs to:   · Help with pain and swelling  · Relax your muscles  Will physical activity be limited?   You may have to limit your activity. Talk to your doctor about the right amount of activity for you.   When do I need to call the doctor?   · You are unable to walk or cannot control your bowels or bladder.  · You develop a fever of 100.4°F (38°C) or higher, chills, or night sweats  · Your legs are numb, weak, or tingly.  · Your pain is getting worse, even with medicines and rest.  · You feel weak and light-headed.  · You develop any of the following:  ? Belly pain.  ? Throwing up.  ? Pain with urination or need to urinate more often.  ? Vaginal pain or discharge.  ? Rash.  Teach Back: Helping You Understand   The Teach Back Method helps you understand the information we are giving you. After you talk with the staff, tell them in your own words what you learned. This helps to make sure the staff has described each thing clearly. It also helps to explain things that may have been confusing. Before going home, make sure you can do these:  · I can tell you about my pain.  · I can tell you what may help ease my pain.  · I can tell you what I will do if I have numbness or tingling in my legs, feet, or genitals.  Where can I learn more?   American  [employer]  https://familydoctor.org/condition/low-back-pain/   National Heath of Arthritis and Musculoskeletal and Skin Diseases  http://www.niams.nih.gov/Health_Info/Back_Pain/back_pain_ff.asp   NHS Choices  https://www.nhs.uk/Conditions/Back-pain/   Last Reviewed Date   2021-06-04  Consumer Information Use and Disclaimer   This information is not specific medical advice and does not replace information you receive from your health care provider. This is only a brief summary of general information. It does NOT include all information about conditions, illnesses, injuries, tests, procedures, treatments, therapies, discharge instructions or life-style choices that may apply to you. You must talk with your health care provider for complete information about your health and treatment options. This information should not be used to decide whether or not to accept your health care providers advice, instructions or recommendations. Only your health care provider has the knowledge and training to provide advice that is right for you.   Copyright   Copyright © 2021 [employer] and its affiliates and/or licensors. All rights reserved.

## 2022-02-11 LAB
ALBUMIN SERPL-MCNC: 4 G/DL (ref 3.6–5.1)
ALBUMIN/GLOB SERPL: 1.5 (CALC) (ref 1–2.5)
ALP SERPL-CCNC: 81 U/L (ref 35–144)
ALT SERPL-CCNC: 10 U/L (ref 9–46)
AST SERPL-CCNC: 8 U/L (ref 10–35)
BILIRUB SERPL-MCNC: 0.3 MG/DL (ref 0.2–1.2)
BUN SERPL-MCNC: 12 MG/DL (ref 7–25)
BUN/CREAT SERPL: ABNORMAL (CALC) (ref 6–22)
CALCIUM SERPL-MCNC: 9.1 MG/DL (ref 8.6–10.3)
CHLORIDE SERPL-SCNC: 102 MMOL/L (ref 98–110)
CHOLEST SERPL-MCNC: 114 MG/DL
CHOLEST/HDLC SERPL: 3.4 (CALC)
CO2 SERPL-SCNC: 28 MMOL/L (ref 20–32)
CREAT SERPL-MCNC: 0.93 MG/DL (ref 0.7–1.25)
GLOBULIN SER CALC-MCNC: 2.7 G/DL (CALC) (ref 1.9–3.7)
GLUCOSE SERPL-MCNC: 222 MG/DL (ref 65–99)
HBA1C MFR BLD: 7.7 % OF TOTAL HGB
HDLC SERPL-MCNC: 34 MG/DL
LDLC SERPL CALC-MCNC: 55 MG/DL (CALC)
NONHDLC SERPL-MCNC: 80 MG/DL (CALC)
POTASSIUM SERPL-SCNC: 4.2 MMOL/L (ref 3.5–5.3)
PROT SERPL-MCNC: 6.7 G/DL (ref 6.1–8.1)
PSA SERPL-MCNC: 0.34 NG/ML
SODIUM SERPL-SCNC: 139 MMOL/L (ref 135–146)
TRIGL SERPL-MCNC: 174 MG/DL
TSH SERPL-ACNC: 2.17 MIU/L (ref 0.4–4.5)

## 2022-02-13 LAB
ALBUMIN/CREAT UR: 10 MCG/MG CREAT
APPEARANCE UR: CLEAR
BILIRUB UR QL STRIP: NEGATIVE
COLOR UR: YELLOW
CREAT UR-MCNC: 40 MG/DL (ref 20–320)
GLUCOSE UR QL STRIP: ABNORMAL
HGB UR QL STRIP: NEGATIVE
KETONES UR QL STRIP: NEGATIVE
LEUKOCYTE ESTERASE UR QL STRIP: NEGATIVE
MICROALBUMIN UR-MCNC: 0.4 MG/DL
NITRITE UR QL STRIP: NEGATIVE
PH UR STRIP: ABNORMAL [PH] (ref 5–8)
PROT UR QL STRIP: NEGATIVE
SP GR UR STRIP: 1.02 (ref 1–1.03)

## 2022-03-14 ENCOUNTER — OFFICE VISIT (OUTPATIENT)
Dept: FAMILY MEDICINE | Facility: CLINIC | Age: 67
End: 2022-03-14
Payer: COMMERCIAL

## 2022-03-14 VITALS
BODY MASS INDEX: 31.56 KG/M2 | HEIGHT: 74 IN | TEMPERATURE: 98 F | DIASTOLIC BLOOD PRESSURE: 78 MMHG | OXYGEN SATURATION: 95 % | HEART RATE: 86 BPM | WEIGHT: 245.88 LBS | SYSTOLIC BLOOD PRESSURE: 146 MMHG

## 2022-03-14 DIAGNOSIS — E11.42 DIABETIC POLYNEUROPATHY ASSOCIATED WITH TYPE 2 DIABETES MELLITUS: ICD-10-CM

## 2022-03-14 DIAGNOSIS — I10 BENIGN ESSENTIAL HTN: Primary | ICD-10-CM

## 2022-03-14 DIAGNOSIS — E78.1 HYPERTRIGLYCERIDEMIA: ICD-10-CM

## 2022-03-14 DIAGNOSIS — E11.65 TYPE 2 DIABETES MELLITUS WITH HYPERGLYCEMIA, WITH LONG-TERM CURRENT USE OF INSULIN: ICD-10-CM

## 2022-03-14 DIAGNOSIS — M54.16 LUMBAR RADICULOPATHY: ICD-10-CM

## 2022-03-14 DIAGNOSIS — M51.36 DEGENERATIVE DISC DISEASE, LUMBAR: ICD-10-CM

## 2022-03-14 DIAGNOSIS — Z79.4 TYPE 2 DIABETES MELLITUS WITH HYPERGLYCEMIA, WITH LONG-TERM CURRENT USE OF INSULIN: ICD-10-CM

## 2022-03-14 PROCEDURE — 3061F PR NEG MICROALBUMINURIA RESULT DOCUMENTED/REVIEW: ICD-10-PCS | Mod: S$GLB,,, | Performed by: NURSE PRACTITIONER

## 2022-03-14 PROCEDURE — 3078F PR MOST RECENT DIASTOLIC BLOOD PRESSURE < 80 MM HG: ICD-10-PCS | Mod: S$GLB,,, | Performed by: NURSE PRACTITIONER

## 2022-03-14 PROCEDURE — 1125F AMNT PAIN NOTED PAIN PRSNT: CPT | Mod: S$GLB,,, | Performed by: NURSE PRACTITIONER

## 2022-03-14 PROCEDURE — 3288F PR FALLS RISK ASSESSMENT DOCUMENTED: ICD-10-PCS | Mod: S$GLB,,, | Performed by: NURSE PRACTITIONER

## 2022-03-14 PROCEDURE — 4010F PR ACE/ARB THEARPY RXD/TAKEN: ICD-10-PCS | Mod: S$GLB,,, | Performed by: NURSE PRACTITIONER

## 2022-03-14 PROCEDURE — 3051F HG A1C>EQUAL 7.0%<8.0%: CPT | Mod: S$GLB,,, | Performed by: NURSE PRACTITIONER

## 2022-03-14 PROCEDURE — 4010F ACE/ARB THERAPY RXD/TAKEN: CPT | Mod: S$GLB,,, | Performed by: NURSE PRACTITIONER

## 2022-03-14 PROCEDURE — 3061F NEG MICROALBUMINURIA REV: CPT | Mod: S$GLB,,, | Performed by: NURSE PRACTITIONER

## 2022-03-14 PROCEDURE — 99214 OFFICE O/P EST MOD 30 MIN: CPT | Mod: S$GLB,,, | Performed by: NURSE PRACTITIONER

## 2022-03-14 PROCEDURE — 1159F PR MEDICATION LIST DOCUMENTED IN MEDICAL RECORD: ICD-10-PCS | Mod: S$GLB,,, | Performed by: NURSE PRACTITIONER

## 2022-03-14 PROCEDURE — 3066F PR DOCUMENTATION OF TREATMENT FOR NEPHROPATHY: ICD-10-PCS | Mod: S$GLB,,, | Performed by: NURSE PRACTITIONER

## 2022-03-14 PROCEDURE — 1160F PR REVIEW ALL MEDS BY PRESCRIBER/CLIN PHARMACIST DOCUMENTED: ICD-10-PCS | Mod: S$GLB,,, | Performed by: NURSE PRACTITIONER

## 2022-03-14 PROCEDURE — 1125F PR PAIN SEVERITY QUANTIFIED, PAIN PRESENT: ICD-10-PCS | Mod: S$GLB,,, | Performed by: NURSE PRACTITIONER

## 2022-03-14 PROCEDURE — 3077F PR MOST RECENT SYSTOLIC BLOOD PRESSURE >= 140 MM HG: ICD-10-PCS | Mod: S$GLB,,, | Performed by: NURSE PRACTITIONER

## 2022-03-14 PROCEDURE — 3066F NEPHROPATHY DOC TX: CPT | Mod: S$GLB,,, | Performed by: NURSE PRACTITIONER

## 2022-03-14 PROCEDURE — 1159F MED LIST DOCD IN RCRD: CPT | Mod: S$GLB,,, | Performed by: NURSE PRACTITIONER

## 2022-03-14 PROCEDURE — 1101F PR PT FALLS ASSESS DOC 0-1 FALLS W/OUT INJ PAST YR: ICD-10-PCS | Mod: S$GLB,,, | Performed by: NURSE PRACTITIONER

## 2022-03-14 PROCEDURE — 3008F BODY MASS INDEX DOCD: CPT | Mod: S$GLB,,, | Performed by: NURSE PRACTITIONER

## 2022-03-14 PROCEDURE — 3078F DIAST BP <80 MM HG: CPT | Mod: S$GLB,,, | Performed by: NURSE PRACTITIONER

## 2022-03-14 PROCEDURE — 99214 PR OFFICE/OUTPT VISIT, EST, LEVL IV, 30-39 MIN: ICD-10-PCS | Mod: S$GLB,,, | Performed by: NURSE PRACTITIONER

## 2022-03-14 PROCEDURE — 1101F PT FALLS ASSESS-DOCD LE1/YR: CPT | Mod: S$GLB,,, | Performed by: NURSE PRACTITIONER

## 2022-03-14 PROCEDURE — 3051F PR MOST RECENT HEMOGLOBIN A1C LEVEL 7.0 - < 8.0%: ICD-10-PCS | Mod: S$GLB,,, | Performed by: NURSE PRACTITIONER

## 2022-03-14 PROCEDURE — 1160F RVW MEDS BY RX/DR IN RCRD: CPT | Mod: S$GLB,,, | Performed by: NURSE PRACTITIONER

## 2022-03-14 PROCEDURE — 3008F PR BODY MASS INDEX (BMI) DOCUMENTED: ICD-10-PCS | Mod: S$GLB,,, | Performed by: NURSE PRACTITIONER

## 2022-03-14 PROCEDURE — 3288F FALL RISK ASSESSMENT DOCD: CPT | Mod: S$GLB,,, | Performed by: NURSE PRACTITIONER

## 2022-03-14 PROCEDURE — 3077F SYST BP >= 140 MM HG: CPT | Mod: S$GLB,,, | Performed by: NURSE PRACTITIONER
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
## 2022-03-22 DIAGNOSIS — E11.65 TYPE 2 DIABETES MELLITUS WITH HYPERGLYCEMIA, WITH LONG-TERM CURRENT USE OF INSULIN: ICD-10-CM

## 2022-03-22 DIAGNOSIS — Z79.4 TYPE 2 DIABETES MELLITUS WITH HYPERGLYCEMIA, WITH LONG-TERM CURRENT USE OF INSULIN: ICD-10-CM

## 2022-03-23 RX ORDER — METFORMIN HYDROCHLORIDE 1000 MG/1
1000 TABLET ORAL 2 TIMES DAILY WITH MEALS
Qty: 180 TABLET | Refills: 1 | Status: SHIPPED | OUTPATIENT
Start: 2022-03-23 | End: 2022-10-18 | Stop reason: SDUPTHER

## 2022-04-03 ENCOUNTER — HOSPITAL ENCOUNTER (EMERGENCY)
Facility: HOSPITAL | Age: 67
Discharge: HOME | End: 2022-04-03
Attending: EMERGENCY MEDICINE
Payer: COMMERCIAL

## 2022-04-03 VITALS
WEIGHT: 245 LBS | HEIGHT: 74 IN | SYSTOLIC BLOOD PRESSURE: 140 MMHG | TEMPERATURE: 98 F | OXYGEN SATURATION: 95 % | DIASTOLIC BLOOD PRESSURE: 76 MMHG | BODY MASS INDEX: 31.44 KG/M2 | RESPIRATION RATE: 20 BRPM | HEART RATE: 65 BPM

## 2022-04-03 DIAGNOSIS — R07.9 CHEST PAIN: ICD-10-CM

## 2022-04-03 DIAGNOSIS — S20.212A CHEST WALL CONTUSION, LEFT, INITIAL ENCOUNTER: Primary | ICD-10-CM

## 2022-04-03 DIAGNOSIS — R07.81 RIB PAIN ON LEFT SIDE: ICD-10-CM

## 2022-04-03 PROCEDURE — 93005 ELECTROCARDIOGRAM TRACING: CPT

## 2022-04-03 PROCEDURE — 99284 EMERGENCY DEPT VISIT MOD MDM: CPT | Mod: 25

## 2022-04-03 PROCEDURE — 93010 EKG 12-LEAD: ICD-10-PCS | Mod: ,,, | Performed by: GENERAL PRACTICE

## 2022-04-03 PROCEDURE — 93010 ELECTROCARDIOGRAM REPORT: CPT | Mod: ,,, | Performed by: GENERAL PRACTICE

## 2022-04-03 RX ORDER — TRAMADOL HYDROCHLORIDE 50 MG/1
50 TABLET ORAL EVERY 6 HOURS PRN
Qty: 12 TABLET | Refills: 0 | Status: SHIPPED | OUTPATIENT
Start: 2022-04-03 | End: 2022-04-06
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
## 2022-04-20 ENCOUNTER — OFFICE VISIT (OUTPATIENT)
Dept: ORTHOPEDICS | Facility: CLINIC | Age: 67
End: 2022-04-20
Payer: COMMERCIAL

## 2022-04-20 VITALS — WEIGHT: 250 LBS | HEIGHT: 74 IN | BODY MASS INDEX: 32.08 KG/M2

## 2022-04-20 DIAGNOSIS — Z98.1 HISTORY OF LUMBAR SPINAL FUSION: Primary | ICD-10-CM

## 2022-04-20 DIAGNOSIS — Z96.89 SPINAL CORD STIMULATOR STATUS: ICD-10-CM

## 2022-04-20 DIAGNOSIS — M54.16 LUMBAR RADICULITIS: ICD-10-CM

## 2022-04-20 DIAGNOSIS — M51.36 DISC DEGENERATION, LUMBAR: ICD-10-CM

## 2022-04-20 PROCEDURE — 1100F PR PT FALLS ASSESS DOC 2+ FALLS/FALL W/INJURY/YR: ICD-10-PCS | Mod: S$GLB,,, | Performed by: ORTHOPAEDIC SURGERY

## 2022-04-20 PROCEDURE — 3288F PR FALLS RISK ASSESSMENT DOCUMENTED: ICD-10-PCS | Mod: S$GLB,,, | Performed by: ORTHOPAEDIC SURGERY

## 2022-04-20 PROCEDURE — 1159F MED LIST DOCD IN RCRD: CPT | Mod: S$GLB,,, | Performed by: ORTHOPAEDIC SURGERY

## 2022-04-20 PROCEDURE — 99203 OFFICE O/P NEW LOW 30 MIN: CPT | Mod: S$GLB,,, | Performed by: ORTHOPAEDIC SURGERY

## 2022-04-20 PROCEDURE — 1125F PR PAIN SEVERITY QUANTIFIED, PAIN PRESENT: ICD-10-PCS | Mod: S$GLB,,, | Performed by: ORTHOPAEDIC SURGERY

## 2022-04-20 PROCEDURE — 3008F PR BODY MASS INDEX (BMI) DOCUMENTED: ICD-10-PCS | Mod: S$GLB,,, | Performed by: ORTHOPAEDIC SURGERY

## 2022-04-20 PROCEDURE — 1125F AMNT PAIN NOTED PAIN PRSNT: CPT | Mod: S$GLB,,, | Performed by: ORTHOPAEDIC SURGERY

## 2022-04-20 PROCEDURE — 3061F PR NEG MICROALBUMINURIA RESULT DOCUMENTED/REVIEW: ICD-10-PCS | Mod: S$GLB,,, | Performed by: ORTHOPAEDIC SURGERY

## 2022-04-20 PROCEDURE — 4010F PR ACE/ARB THEARPY RXD/TAKEN: ICD-10-PCS | Mod: S$GLB,,, | Performed by: ORTHOPAEDIC SURGERY

## 2022-04-20 PROCEDURE — 3008F BODY MASS INDEX DOCD: CPT | Mod: S$GLB,,, | Performed by: ORTHOPAEDIC SURGERY

## 2022-04-20 PROCEDURE — 99203 PR OFFICE/OUTPT VISIT, NEW, LEVL III, 30-44 MIN: ICD-10-PCS | Mod: S$GLB,,, | Performed by: ORTHOPAEDIC SURGERY

## 2022-04-20 PROCEDURE — 3066F NEPHROPATHY DOC TX: CPT | Mod: S$GLB,,, | Performed by: ORTHOPAEDIC SURGERY

## 2022-04-20 PROCEDURE — 3051F HG A1C>EQUAL 7.0%<8.0%: CPT | Mod: S$GLB,,, | Performed by: ORTHOPAEDIC SURGERY

## 2022-04-20 PROCEDURE — 1159F PR MEDICATION LIST DOCUMENTED IN MEDICAL RECORD: ICD-10-PCS | Mod: S$GLB,,, | Performed by: ORTHOPAEDIC SURGERY

## 2022-04-20 PROCEDURE — 1160F PR REVIEW ALL MEDS BY PRESCRIBER/CLIN PHARMACIST DOCUMENTED: ICD-10-PCS | Mod: S$GLB,,, | Performed by: ORTHOPAEDIC SURGERY

## 2022-04-20 PROCEDURE — 4010F ACE/ARB THERAPY RXD/TAKEN: CPT | Mod: S$GLB,,, | Performed by: ORTHOPAEDIC SURGERY

## 2022-04-20 PROCEDURE — 3066F PR DOCUMENTATION OF TREATMENT FOR NEPHROPATHY: ICD-10-PCS | Mod: S$GLB,,, | Performed by: ORTHOPAEDIC SURGERY

## 2022-04-20 PROCEDURE — 1100F PTFALLS ASSESS-DOCD GE2>/YR: CPT | Mod: S$GLB,,, | Performed by: ORTHOPAEDIC SURGERY

## 2022-04-20 PROCEDURE — 3051F PR MOST RECENT HEMOGLOBIN A1C LEVEL 7.0 - < 8.0%: ICD-10-PCS | Mod: S$GLB,,, | Performed by: ORTHOPAEDIC SURGERY

## 2022-04-20 PROCEDURE — 3288F FALL RISK ASSESSMENT DOCD: CPT | Mod: S$GLB,,, | Performed by: ORTHOPAEDIC SURGERY

## 2022-04-20 PROCEDURE — 3061F NEG MICROALBUMINURIA REV: CPT | Mod: S$GLB,,, | Performed by: ORTHOPAEDIC SURGERY

## 2022-04-20 PROCEDURE — 1160F RVW MEDS BY RX/DR IN RCRD: CPT | Mod: S$GLB,,, | Performed by: ORTHOPAEDIC SURGERY

## 2022-04-20 RX ORDER — TRAMADOL HYDROCHLORIDE 50 MG/1
50 TABLET ORAL EVERY 6 HOURS PRN
Qty: 28 TABLET | Refills: 0 | Status: SHIPPED | OUTPATIENT
Start: 2022-04-20 | End: 2022-04-26

## 2022-04-20 RX ORDER — SUVOREXANT 20 MG/1
1 TABLET, FILM COATED ORAL NIGHTLY PRN
COMMUNITY
Start: 2022-04-18 | End: 2022-04-26
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
## 2022-04-26 ENCOUNTER — HOSPITAL ENCOUNTER (INPATIENT)
Facility: HOSPITAL | Age: 67
LOS: 3 days | Discharge: HOME | DRG: 440 | End: 2022-04-30
Attending: EMERGENCY MEDICINE | Admitting: INTERNAL MEDICINE
Payer: COMMERCIAL

## 2022-04-26 DIAGNOSIS — K85.90 PANCREATITIS: ICD-10-CM

## 2022-04-26 DIAGNOSIS — R11.10 VOMITING: ICD-10-CM

## 2022-04-26 DIAGNOSIS — K85.90 ACUTE PANCREATITIS: ICD-10-CM

## 2022-04-26 LAB
ALBUMIN SERPL BCP-MCNC: 4.1 G/DL (ref 3.5–5.2)
ALP SERPL-CCNC: 119 U/L (ref 55–135)
ALT SERPL W/O P-5'-P-CCNC: 16 U/L (ref 10–44)
ANION GAP SERPL CALC-SCNC: 12 MMOL/L (ref 8–16)
AST SERPL-CCNC: 13 U/L (ref 10–40)
BASOPHILS # BLD AUTO: 0.08 K/UL (ref 0–0.2)
BASOPHILS NFR BLD: 1.2 % (ref 0–1.9)
BILIRUB SERPL-MCNC: 0.5 MG/DL (ref 0.1–1)
BILIRUB UR QL STRIP: NEGATIVE
BUN SERPL-MCNC: 12 MG/DL (ref 8–23)
CALCIUM SERPL-MCNC: 9.1 MG/DL (ref 8.7–10.5)
CHLORIDE SERPL-SCNC: 100 MMOL/L (ref 95–110)
CLARITY UR: CLEAR
CO2 SERPL-SCNC: 26 MMOL/L (ref 23–29)
COLOR UR: YELLOW
CREAT SERPL-MCNC: 0.9 MG/DL (ref 0.5–1.4)
DIFFERENTIAL METHOD: ABNORMAL
EOSINOPHIL # BLD AUTO: 0.5 K/UL (ref 0–0.5)
EOSINOPHIL NFR BLD: 6.8 % (ref 0–8)
ERYTHROCYTE [DISTWIDTH] IN BLOOD BY AUTOMATED COUNT: 13.2 % (ref 11.5–14.5)
EST. GFR  (AFRICAN AMERICAN): >60 ML/MIN/1.73 M^2
EST. GFR  (NON AFRICAN AMERICAN): >60 ML/MIN/1.73 M^2
GLUCOSE SERPL-MCNC: 178 MG/DL (ref 70–110)
GLUCOSE UR QL STRIP: NEGATIVE
HCT VFR BLD AUTO: 42.6 % (ref 40–54)
HGB BLD-MCNC: 13.8 G/DL (ref 14–18)
HGB UR QL STRIP: NEGATIVE
IMM GRANULOCYTES # BLD AUTO: 0.01 K/UL (ref 0–0.04)
IMM GRANULOCYTES NFR BLD AUTO: 0.2 % (ref 0–0.5)
KETONES UR QL STRIP: ABNORMAL
LEUKOCYTE ESTERASE UR QL STRIP: NEGATIVE
LIPASE SERPL-CCNC: 353 U/L (ref 4–60)
LYMPHOCYTES # BLD AUTO: 1.7 K/UL (ref 1–4.8)
LYMPHOCYTES NFR BLD: 25.8 % (ref 18–48)
MCH RBC QN AUTO: 30.7 PG (ref 27–31)
MCHC RBC AUTO-ENTMCNC: 32.4 G/DL (ref 32–36)
MCV RBC AUTO: 95 FL (ref 82–98)
MONOCYTES # BLD AUTO: 0.7 K/UL (ref 0.3–1)
MONOCYTES NFR BLD: 10.6 % (ref 4–15)
NEUTROPHILS # BLD AUTO: 3.7 K/UL (ref 1.8–7.7)
NEUTROPHILS NFR BLD: 55.4 % (ref 38–73)
NITRITE UR QL STRIP: NEGATIVE
NRBC BLD-RTO: 0 /100 WBC
PH UR STRIP: 7 [PH] (ref 5–8)
PLATELET # BLD AUTO: 195 K/UL (ref 150–450)
PMV BLD AUTO: 10.2 FL (ref 9.2–12.9)
POTASSIUM SERPL-SCNC: 4.2 MMOL/L (ref 3.5–5.1)
PROT SERPL-MCNC: 8 G/DL (ref 6–8.4)
PROT UR QL STRIP: NEGATIVE
RBC # BLD AUTO: 4.5 M/UL (ref 4.6–6.2)
SARS-COV-2 RDRP RESP QL NAA+PROBE: NEGATIVE
SODIUM SERPL-SCNC: 138 MMOL/L (ref 136–145)
SP GR UR STRIP: 1.02 (ref 1–1.03)
TROPONIN I SERPL DL<=0.01 NG/ML-MCNC: 0.01 NG/ML (ref 0–0.03)
URN SPEC COLLECT METH UR: ABNORMAL
UROBILINOGEN UR STRIP-ACNC: NEGATIVE EU/DL
WBC # BLD AUTO: 6.62 K/UL (ref 3.9–12.7)

## 2022-04-26 PROCEDURE — 80053 COMPREHEN METABOLIC PANEL: CPT | Performed by: EMERGENCY MEDICINE

## 2022-04-26 PROCEDURE — 96374 THER/PROPH/DIAG INJ IV PUSH: CPT

## 2022-04-26 PROCEDURE — G0378 HOSPITAL OBSERVATION PER HR: HCPCS

## 2022-04-26 PROCEDURE — 25000003 PHARM REV CODE 250: Performed by: EMERGENCY MEDICINE

## 2022-04-26 PROCEDURE — 36415 COLL VENOUS BLD VENIPUNCTURE: CPT | Performed by: EMERGENCY MEDICINE

## 2022-04-26 PROCEDURE — 81003 URINALYSIS AUTO W/O SCOPE: CPT | Performed by: EMERGENCY MEDICINE

## 2022-04-26 PROCEDURE — U0002 COVID-19 LAB TEST NON-CDC: HCPCS | Performed by: EMERGENCY MEDICINE

## 2022-04-26 PROCEDURE — 96376 TX/PRO/DX INJ SAME DRUG ADON: CPT

## 2022-04-26 PROCEDURE — 83690 ASSAY OF LIPASE: CPT | Performed by: EMERGENCY MEDICINE

## 2022-04-26 PROCEDURE — 63600175 PHARM REV CODE 636 W HCPCS

## 2022-04-26 PROCEDURE — 93005 ELECTROCARDIOGRAM TRACING: CPT

## 2022-04-26 PROCEDURE — 85025 COMPLETE CBC W/AUTO DIFF WBC: CPT | Performed by: EMERGENCY MEDICINE

## 2022-04-26 PROCEDURE — 25000003 PHARM REV CODE 250

## 2022-04-26 PROCEDURE — 99285 EMERGENCY DEPT VISIT HI MDM: CPT | Mod: 25

## 2022-04-26 PROCEDURE — 96375 TX/PRO/DX INJ NEW DRUG ADDON: CPT

## 2022-04-26 PROCEDURE — 93010 ELECTROCARDIOGRAM REPORT: CPT | Mod: ,,, | Performed by: INTERNAL MEDICINE

## 2022-04-26 PROCEDURE — 96361 HYDRATE IV INFUSION ADD-ON: CPT

## 2022-04-26 PROCEDURE — 87389 HIV-1 AG W/HIV-1&-2 AB AG IA: CPT | Performed by: EMERGENCY MEDICINE

## 2022-04-26 PROCEDURE — 86803 HEPATITIS C AB TEST: CPT | Performed by: EMERGENCY MEDICINE

## 2022-04-26 PROCEDURE — 63600175 PHARM REV CODE 636 W HCPCS: Performed by: EMERGENCY MEDICINE

## 2022-04-26 PROCEDURE — 84484 ASSAY OF TROPONIN QUANT: CPT | Performed by: EMERGENCY MEDICINE

## 2022-04-26 PROCEDURE — 93010 EKG 12-LEAD: ICD-10-PCS | Mod: ,,, | Performed by: INTERNAL MEDICINE

## 2022-04-26 RX ORDER — IPRATROPIUM BROMIDE AND ALBUTEROL SULFATE 2.5; .5 MG/3ML; MG/3ML
3 SOLUTION RESPIRATORY (INHALATION) EVERY 6 HOURS PRN
Status: DISCONTINUED | OUTPATIENT
Start: 2022-04-26 | End: 2022-04-30 | Stop reason: HOSPADM

## 2022-04-26 RX ORDER — TALC
9 POWDER (GRAM) TOPICAL NIGHTLY PRN
Status: DISCONTINUED | OUTPATIENT
Start: 2022-04-26 | End: 2022-04-30 | Stop reason: HOSPADM

## 2022-04-26 RX ORDER — LANOLIN ALCOHOL/MO/W.PET/CERES
800 CREAM (GRAM) TOPICAL
Status: DISCONTINUED | OUTPATIENT
Start: 2022-04-26 | End: 2022-04-30 | Stop reason: HOSPADM

## 2022-04-26 RX ORDER — CARVEDILOL 3.12 MG/1
3.12 TABLET ORAL 2 TIMES DAILY
Status: DISCONTINUED | OUTPATIENT
Start: 2022-04-26 | End: 2022-04-30 | Stop reason: HOSPADM

## 2022-04-26 RX ORDER — AMOXICILLIN 250 MG
1 CAPSULE ORAL 2 TIMES DAILY
Status: DISCONTINUED | OUTPATIENT
Start: 2022-04-26 | End: 2022-04-30 | Stop reason: HOSPADM

## 2022-04-26 RX ORDER — SODIUM CHLORIDE 0.9 % (FLUSH) 0.9 %
3 SYRINGE (ML) INJECTION
Status: DISCONTINUED | OUTPATIENT
Start: 2022-04-26 | End: 2022-04-30 | Stop reason: HOSPADM

## 2022-04-26 RX ORDER — LISINOPRIL 2.5 MG/1
10 TABLET ORAL DAILY
Status: DISCONTINUED | OUTPATIENT
Start: 2022-04-27 | End: 2022-04-30 | Stop reason: HOSPADM

## 2022-04-26 RX ORDER — IBUPROFEN 200 MG
16 TABLET ORAL
Status: DISCONTINUED | OUTPATIENT
Start: 2022-04-26 | End: 2022-04-30 | Stop reason: HOSPADM

## 2022-04-26 RX ORDER — ATORVASTATIN CALCIUM 40 MG/1
40 TABLET, FILM COATED ORAL DAILY
Status: DISCONTINUED | OUTPATIENT
Start: 2022-04-27 | End: 2022-04-30 | Stop reason: HOSPADM

## 2022-04-26 RX ORDER — GLUCAGON 1 MG
1 KIT INJECTION
Status: DISCONTINUED | OUTPATIENT
Start: 2022-04-26 | End: 2022-04-30 | Stop reason: HOSPADM

## 2022-04-26 RX ORDER — HYDROMORPHONE HYDROCHLORIDE 2 MG/ML
0.5 INJECTION, SOLUTION INTRAMUSCULAR; INTRAVENOUS; SUBCUTANEOUS EVERY 4 HOURS PRN
Status: DISCONTINUED | OUTPATIENT
Start: 2022-04-26 | End: 2022-04-30 | Stop reason: HOSPADM

## 2022-04-26 RX ORDER — TRAMADOL HYDROCHLORIDE 50 MG/1
50 TABLET ORAL EVERY 6 HOURS PRN
Status: DISCONTINUED | OUTPATIENT
Start: 2022-04-26 | End: 2022-04-30 | Stop reason: HOSPADM

## 2022-04-26 RX ORDER — HYDROMORPHONE HYDROCHLORIDE 2 MG/ML
0.5 INJECTION, SOLUTION INTRAMUSCULAR; INTRAVENOUS; SUBCUTANEOUS
Status: COMPLETED | OUTPATIENT
Start: 2022-04-26 | End: 2022-04-26

## 2022-04-26 RX ORDER — ONDANSETRON 2 MG/ML
4 INJECTION INTRAMUSCULAR; INTRAVENOUS
Status: COMPLETED | OUTPATIENT
Start: 2022-04-26 | End: 2022-04-26

## 2022-04-26 RX ORDER — SODIUM CHLORIDE 9 MG/ML
INJECTION, SOLUTION INTRAVENOUS CONTINUOUS
Status: DISCONTINUED | OUTPATIENT
Start: 2022-04-26 | End: 2022-04-30 | Stop reason: HOSPADM

## 2022-04-26 RX ORDER — NALOXONE HCL 0.4 MG/ML
0.02 VIAL (ML) INJECTION
Status: DISCONTINUED | OUTPATIENT
Start: 2022-04-26 | End: 2022-04-30 | Stop reason: HOSPADM

## 2022-04-26 RX ORDER — ACETAMINOPHEN 325 MG/1
650 TABLET ORAL EVERY 6 HOURS PRN
Status: DISCONTINUED | OUTPATIENT
Start: 2022-04-26 | End: 2022-04-30 | Stop reason: HOSPADM

## 2022-04-26 RX ORDER — ONDANSETRON 2 MG/ML
4 INJECTION INTRAMUSCULAR; INTRAVENOUS EVERY 6 HOURS PRN
Status: DISCONTINUED | OUTPATIENT
Start: 2022-04-26 | End: 2022-04-30 | Stop reason: HOSPADM

## 2022-04-26 RX ORDER — ACETAMINOPHEN 325 MG/1
650 TABLET ORAL EVERY 4 HOURS PRN
Status: DISCONTINUED | OUTPATIENT
Start: 2022-04-26 | End: 2022-04-30 | Stop reason: HOSPADM

## 2022-04-26 RX ORDER — INSULIN ASPART 100 [IU]/ML
0-5 INJECTION, SOLUTION INTRAVENOUS; SUBCUTANEOUS
Status: DISCONTINUED | OUTPATIENT
Start: 2022-04-26 | End: 2022-04-30 | Stop reason: HOSPADM

## 2022-04-26 RX ORDER — PANTOPRAZOLE SODIUM 40 MG/1
40 TABLET, DELAYED RELEASE ORAL 2 TIMES DAILY
Status: DISCONTINUED | OUTPATIENT
Start: 2022-04-26 | End: 2022-04-30 | Stop reason: HOSPADM

## 2022-04-26 RX ORDER — FUROSEMIDE 40 MG/1
40 TABLET ORAL DAILY
Status: DISCONTINUED | OUTPATIENT
Start: 2022-04-27 | End: 2022-04-30 | Stop reason: HOSPADM

## 2022-04-26 RX ORDER — IBUPROFEN 200 MG
24 TABLET ORAL
Status: DISCONTINUED | OUTPATIENT
Start: 2022-04-26 | End: 2022-04-30 | Stop reason: HOSPADM

## 2022-04-26 RX ADMIN — ONDANSETRON 4 MG: 2 INJECTION INTRAMUSCULAR; INTRAVENOUS at 07:04

## 2022-04-26 RX ADMIN — SENNOSIDES AND DOCUSATE SODIUM 1 TABLET: 8.6; 5 TABLET ORAL at 11:04

## 2022-04-26 RX ADMIN — CARVEDILOL 3.12 MG: 3.12 TABLET, FILM COATED ORAL at 11:04

## 2022-04-26 RX ADMIN — SODIUM CHLORIDE: 0.9 INJECTION, SOLUTION INTRAVENOUS at 11:04

## 2022-04-26 RX ADMIN — SODIUM CHLORIDE 1000 ML: 0.9 INJECTION, SOLUTION INTRAVENOUS at 07:04

## 2022-04-26 RX ADMIN — HYDROMORPHONE HYDROCHLORIDE 0.5 MG: 2 INJECTION, SOLUTION INTRAMUSCULAR; INTRAVENOUS; SUBCUTANEOUS at 11:04

## 2022-04-26 RX ADMIN — PANTOPRAZOLE SODIUM 40 MG: 40 TABLET, DELAYED RELEASE ORAL at 11:04

## 2022-04-26 RX ADMIN — HYDROMORPHONE HYDROCHLORIDE 0.5 MG: 2 INJECTION, SOLUTION INTRAMUSCULAR; INTRAVENOUS; SUBCUTANEOUS at 07:04

## 2022-04-26 RX ADMIN — APIXABAN 5 MG: 2.5 TABLET, FILM COATED ORAL at 11:04
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
## 2022-04-27 LAB
ALBUMIN SERPL BCP-MCNC: 3.4 G/DL (ref 3.5–5.2)
ALP SERPL-CCNC: 177 U/L (ref 55–135)
ALT SERPL W/O P-5'-P-CCNC: 187 U/L (ref 10–44)
ANION GAP SERPL CALC-SCNC: 10 MMOL/L (ref 8–16)
AST SERPL-CCNC: 283 U/L (ref 10–40)
BASOPHILS # BLD AUTO: 0.07 K/UL (ref 0–0.2)
BASOPHILS NFR BLD: 1 % (ref 0–1.9)
BILIRUB SERPL-MCNC: 0.5 MG/DL (ref 0.1–1)
BUN SERPL-MCNC: 14 MG/DL (ref 8–23)
CALCIUM SERPL-MCNC: 8.4 MG/DL (ref 8.7–10.5)
CHLORIDE SERPL-SCNC: 103 MMOL/L (ref 95–110)
CO2 SERPL-SCNC: 24 MMOL/L (ref 23–29)
CREAT SERPL-MCNC: 0.8 MG/DL (ref 0.5–1.4)
DIFFERENTIAL METHOD: ABNORMAL
EOSINOPHIL # BLD AUTO: 0.3 K/UL (ref 0–0.5)
EOSINOPHIL NFR BLD: 4.2 % (ref 0–8)
ERYTHROCYTE [DISTWIDTH] IN BLOOD BY AUTOMATED COUNT: 13.2 % (ref 11.5–14.5)
EST. GFR  (AFRICAN AMERICAN): >60 ML/MIN/1.73 M^2
EST. GFR  (NON AFRICAN AMERICAN): >60 ML/MIN/1.73 M^2
GLUCOSE SERPL-MCNC: 180 MG/DL (ref 70–110)
HCT VFR BLD AUTO: 38.8 % (ref 40–54)
HCV AB SERPL QL IA: NEGATIVE
HGB BLD-MCNC: 12.5 G/DL (ref 14–18)
HIV 1+2 AB+HIV1 P24 AG SERPL QL IA: NEGATIVE
IMM GRANULOCYTES # BLD AUTO: 0.01 K/UL (ref 0–0.04)
IMM GRANULOCYTES NFR BLD AUTO: 0.1 % (ref 0–0.5)
LYMPHOCYTES # BLD AUTO: 1.6 K/UL (ref 1–4.8)
LYMPHOCYTES NFR BLD: 22.6 % (ref 18–48)
MAGNESIUM SERPL-MCNC: 1.7 MG/DL (ref 1.6–2.6)
MCH RBC QN AUTO: 30.7 PG (ref 27–31)
MCHC RBC AUTO-ENTMCNC: 32.2 G/DL (ref 32–36)
MCV RBC AUTO: 95 FL (ref 82–98)
MONOCYTES # BLD AUTO: 0.9 K/UL (ref 0.3–1)
MONOCYTES NFR BLD: 13.2 % (ref 4–15)
NEUTROPHILS # BLD AUTO: 4.1 K/UL (ref 1.8–7.7)
NEUTROPHILS NFR BLD: 58.9 % (ref 38–73)
NRBC BLD-RTO: 0 /100 WBC
PHOSPHATE SERPL-MCNC: 2.6 MG/DL (ref 2.7–4.5)
PLATELET # BLD AUTO: 157 K/UL (ref 150–450)
PMV BLD AUTO: 10.2 FL (ref 9.2–12.9)
POCT GLUCOSE: 142 MG/DL (ref 70–110)
POCT GLUCOSE: 179 MG/DL (ref 70–110)
POCT GLUCOSE: 184 MG/DL (ref 70–110)
POCT GLUCOSE: 217 MG/DL (ref 70–110)
POTASSIUM SERPL-SCNC: 4 MMOL/L (ref 3.5–5.1)
PROT SERPL-MCNC: 6.7 G/DL (ref 6–8.4)
RBC # BLD AUTO: 4.07 M/UL (ref 4.6–6.2)
SODIUM SERPL-SCNC: 137 MMOL/L (ref 136–145)
WBC # BLD AUTO: 6.91 K/UL (ref 3.9–12.7)

## 2022-04-27 PROCEDURE — 99900035 HC TECH TIME PER 15 MIN (STAT)

## 2022-04-27 PROCEDURE — 36415 COLL VENOUS BLD VENIPUNCTURE: CPT

## 2022-04-27 PROCEDURE — 80053 COMPREHEN METABOLIC PANEL: CPT

## 2022-04-27 PROCEDURE — 27000221 HC OXYGEN, UP TO 24 HOURS

## 2022-04-27 PROCEDURE — 96361 HYDRATE IV INFUSION ADD-ON: CPT

## 2022-04-27 PROCEDURE — 84100 ASSAY OF PHOSPHORUS: CPT

## 2022-04-27 PROCEDURE — 25000003 PHARM REV CODE 250

## 2022-04-27 PROCEDURE — 94761 N-INVAS EAR/PLS OXIMETRY MLT: CPT

## 2022-04-27 PROCEDURE — 83735 ASSAY OF MAGNESIUM: CPT

## 2022-04-27 PROCEDURE — 85025 COMPLETE CBC W/AUTO DIFF WBC: CPT

## 2022-04-27 PROCEDURE — 96376 TX/PRO/DX INJ SAME DRUG ADON: CPT

## 2022-04-27 PROCEDURE — 12000002 HC ACUTE/MED SURGE SEMI-PRIVATE ROOM

## 2022-04-27 PROCEDURE — 25500020 PHARM REV CODE 255

## 2022-04-27 PROCEDURE — 63600175 PHARM REV CODE 636 W HCPCS

## 2022-04-27 PROCEDURE — 80074 ACUTE HEPATITIS PANEL: CPT

## 2022-04-27 PROCEDURE — 94760 N-INVAS EAR/PLS OXIMETRY 1: CPT

## 2022-04-27 RX ADMIN — HYDROMORPHONE HYDROCHLORIDE 0.5 MG: 2 INJECTION, SOLUTION INTRAMUSCULAR; INTRAVENOUS; SUBCUTANEOUS at 04:04

## 2022-04-27 RX ADMIN — ONDANSETRON 4 MG: 2 INJECTION INTRAMUSCULAR; INTRAVENOUS at 01:04

## 2022-04-27 RX ADMIN — HYDROMORPHONE HYDROCHLORIDE 0.5 MG: 2 INJECTION, SOLUTION INTRAMUSCULAR; INTRAVENOUS; SUBCUTANEOUS at 05:04

## 2022-04-27 RX ADMIN — ONDANSETRON 4 MG: 2 INJECTION INTRAMUSCULAR; INTRAVENOUS at 09:04

## 2022-04-27 RX ADMIN — SENNOSIDES AND DOCUSATE SODIUM 1 TABLET: 8.6; 5 TABLET ORAL at 09:04

## 2022-04-27 RX ADMIN — HYDROMORPHONE HYDROCHLORIDE 0.5 MG: 2 INJECTION, SOLUTION INTRAMUSCULAR; INTRAVENOUS; SUBCUTANEOUS at 10:04

## 2022-04-27 RX ADMIN — PANTOPRAZOLE SODIUM 40 MG: 40 TABLET, DELAYED RELEASE ORAL at 08:04

## 2022-04-27 RX ADMIN — FUROSEMIDE 40 MG: 40 TABLET ORAL at 09:04

## 2022-04-27 RX ADMIN — Medication 9 MG: at 08:04

## 2022-04-27 RX ADMIN — HYDROMORPHONE HYDROCHLORIDE 0.5 MG: 2 INJECTION, SOLUTION INTRAMUSCULAR; INTRAVENOUS; SUBCUTANEOUS at 08:04

## 2022-04-27 RX ADMIN — PROMETHAZINE HYDROCHLORIDE 12.5 MG: 25 INJECTION INTRAMUSCULAR; INTRAVENOUS at 02:04

## 2022-04-27 RX ADMIN — APIXABAN 5 MG: 2.5 TABLET, FILM COATED ORAL at 09:04

## 2022-04-27 RX ADMIN — SODIUM CHLORIDE: 0.9 INJECTION, SOLUTION INTRAVENOUS at 09:04

## 2022-04-27 RX ADMIN — SENNOSIDES AND DOCUSATE SODIUM 1 TABLET: 8.6; 5 TABLET ORAL at 08:04

## 2022-04-27 RX ADMIN — CARVEDILOL 3.12 MG: 3.12 TABLET, FILM COATED ORAL at 08:04

## 2022-04-27 RX ADMIN — IOHEXOL 100 ML: 350 INJECTION, SOLUTION INTRAVENOUS at 01:04

## 2022-04-27 RX ADMIN — ONDANSETRON 4 MG: 2 INJECTION INTRAMUSCULAR; INTRAVENOUS at 08:04

## 2022-04-27 RX ADMIN — PANTOPRAZOLE SODIUM 40 MG: 40 TABLET, DELAYED RELEASE ORAL at 09:04

## 2022-04-27 RX ADMIN — ATORVASTATIN CALCIUM 40 MG: 40 TABLET, FILM COATED ORAL at 09:04

## 2022-04-27 RX ADMIN — SODIUM CHLORIDE: 0.9 INJECTION, SOLUTION INTRAVENOUS at 08:04

## 2022-04-27 RX ADMIN — CARVEDILOL 3.12 MG: 3.12 TABLET, FILM COATED ORAL at 09:04

## 2022-04-27 RX ADMIN — APIXABAN 5 MG: 2.5 TABLET, FILM COATED ORAL at 08:04

## 2022-04-27 RX ADMIN — LISINOPRIL 10 MG: 2.5 TABLET ORAL at 09:04

## 2022-04-27 NOTE — PLAN OF CARE
POC reviewed with pt, pt verbalizes understanding. Prn pain medication given as ordered. Accuchecks done. N/v, prn medication given. IVF infusing. NSR/araceli on tele [id number]. SCDs. Clear liquid diet. Safety maintained. q2h rounding done. All needs attended to. Bed locked and low, call light in reach.

## 2022-04-27 NOTE — PROGRESS NOTES
"[facility]  Progress Note    Patient Name: Kevan Colin Sr.  MRN: [id number]  Patient Class: IP- Inpatient   Admission Date: 4/26/2022  Length of Stay: 0 days  Attending Physician: Memo Henry MD  Primary Care Provider: Nikita Magallanes NP        Subjective:     Principal Problem:Acute pancreatitis        HPI:  Kevan Colin Sr. is a 66 y.o. y.o. male with a PMHx of pancreatitis, T2DM, HTN, and HLD who presented to the ED with generalized abdominal pain onset x 3 days with associated n/v. Describes pain as a aching sensation that is constant. Denies alleviating factors. Exacerbating factors include eating. Reports pain 8/10. States he had similar pain approximately 6 years ago when he had pancreatitis which required a "pancreatic stent." He also reports chills, fatigue, cough, sore throat, and chronic HA. Denies fevers. ED workup was significant for elevated lipase, hyperglycemia, and HTN. The patient was placed in observation under the acre of [facility].       Overview/Hospital Course:  No notes on file    Interval History: Notes reviewed, no acute events overnight. Patient seen resting in bed with no acute distress. Patient reports he still has nausea with 2 episodes of emesis this morning. He denies any hematemesis. Patient reports his pain is moderately controlled on prn pain medication. Plan to check CT abdomen due to spike in liver enzymes and history of pancreatitis. Will continue to monitor.      Review of Systems   Constitutional:  Negative for chills, fatigue and fever.   HENT:  Negative for congestion, rhinorrhea, sneezing and sore throat.    Respiratory:  Negative for cough, chest tightness and shortness of breath.    Gastrointestinal:  Positive for abdominal pain, nausea and vomiting.   Endocrine: Negative for polydipsia and polyuria.   Genitourinary:  Negative for difficulty urinating, dysuria and frequency.   Musculoskeletal:  Negative " for arthralgias, back pain and gait problem.   Skin:  Negative for color change and pallor.   Neurological:  Negative for dizziness, weakness and light-headedness.   Psychiatric/Behavioral:  Negative for agitation, behavioral problems and confusion.    All other systems reviewed and are negative.  Objective:     Vital Signs (Most Recent):  Temp: 97.5 °F (36.4 °C) (04/27/22 0737)  Pulse: 64 (04/27/22 0737)  Resp: 16 (04/27/22 1042)  BP: 137/74 (04/27/22 0737)  SpO2: 99 % (04/27/22 0737) Vital Signs (24h Range):  Temp:  [96.2 °F (35.7 °C)-98 °F (36.7 °C)] 97.5 °F (36.4 °C)  Pulse:  [58-79] 64  Resp:  [16-20] 16  SpO2:  [94 %-100 %] 99 %  BP: (132-206)/(64-90) 137/74     Weight: 110.7 kg (244 lb 0.8 oz)  Body mass index is 31.33 kg/m².    Intake/Output Summary (Last 24 hours) at 4/27/2022 1057  Last data filed at 4/27/2022 0419  Gross per 24 hour   Intake 1000 ml   Output 175 ml   Net 825 ml      Physical Exam  Vitals and nursing note reviewed.   Constitutional:       General: He is not in acute distress.     Appearance: Normal appearance. He is obese.   HENT:      Head: Normocephalic and atraumatic.      Right Ear: External ear normal.      Left Ear: External ear normal.      Nose: Nose normal. No congestion.      Mouth/Throat:      Mouth: Mucous membranes are moist.      Pharynx: No oropharyngeal exudate or posterior oropharyngeal erythema.   Eyes:      Extraocular Movements: Extraocular movements intact.      Conjunctiva/sclera: Conjunctivae normal.      Pupils: Pupils are equal, round, and reactive to light.   Cardiovascular:      Rate and Rhythm: Normal rate and regular rhythm.      Pulses: Normal pulses.      Heart sounds: Normal heart sounds.   Pulmonary:      Effort: Pulmonary effort is normal. No respiratory distress.      Breath sounds: Normal breath sounds. No wheezing or rales.   Abdominal:      General: Bowel sounds are normal.      Tenderness: There is abdominal tenderness.      Comments: Diffuse,  generalized abdominal tenderness L>R   Musculoskeletal:         General: No swelling or tenderness. Normal range of motion.      Cervical back: Normal range of motion and neck supple.   Skin:     General: Skin is warm.      Capillary Refill: Capillary refill takes 2 to 3 seconds.      Coloration: Skin is not pale.      Findings: No bruising.   Neurological:      General: No focal deficit present.      Mental Status: He is alert and oriented to person, place, and time.      Cranial Nerves: No cranial nerve deficit.      Motor: No weakness.   Psychiatric:         Mood and Affect: Mood normal.         Behavior: Behavior normal.       Significant Labs: All pertinent labs within the past 24 hours have been reviewed.  CBC:   Recent Labs   Lab 04/26/22 1858 04/27/22  0440   WBC 6.62 6.91   HGB 13.8* 12.5*   HCT 42.6 38.8*    157     CMP:   Recent Labs   Lab 04/26/22 1858 04/27/22  0440    137   K 4.2 4.0    103   CO2 26 24   * 180*   BUN 12 14   CREATININE 0.9 0.8   CALCIUM 9.1 8.4*   PROT 8.0 6.7   ALBUMIN 4.1 3.4*   BILITOT 0.5 0.5   ALKPHOS 119 177*   AST 13 283*   ALT 16 187*   ANIONGAP 12 10   EGFRNONAA >60 >60     Lipase:   Recent Labs   Lab 04/26/22 1858   LIPASE 353*       Significant Imaging: I have reviewed all pertinent imaging results/findings within the past 24 hours.      Assessment/Plan:      * Acute pancreatitis  Acute:  - Lipase: 353  - IVFH  - prn pain medication  - Clear liquids  - prn antiemetics  - CT abdomen ordered  - AP: 177, AST: 283, ALT: 187      Benign essential HTN  Chronic, controlled.  Latest blood pressure and vitals reviewed-   Temp:  [96.2 °F (35.7 °C)-98 °F (36.7 °C)]   Pulse:  [58-79]   Resp:  [16-20]   BP: (132-206)/(64-90)   SpO2:  [94 %-100 %] .   Home meds for hypertension were reviewed and noted below.   Hypertension Medications             carvediloL (COREG) 3.125 MG tablet Take 1 tablet (3.125 mg total) by mouth 2 (two) times daily.    furosemide  (LASIX) 40 MG tablet Take 40 mg by mouth once daily.    lisinopriL 10 MG tablet TAKE ONE TABLET BY MOUTH ONCE DAILY          While in the hospital, will manage blood pressure as follows; Continue home antihypertensive regimen    Will utilize p.r.n. blood pressure medication only if patient's blood pressure greater than  180/110 and he develops symptoms such as worsening chest pain or shortness of breath.        Obesity  Body mass index is 31.33 kg/m². Morbid obesity complicates all aspects of disease management from diagnostic modalities to treatment. Weight loss encouraged and health benefits explained to patient.        Type 2 diabetes mellitus, uncontrolled  Patient's FSGs are controlled on current medication regimen.  Last A1c reviewed-   Lab Results   Component Value Date    HGBA1C 7.7 (H) 02/10/2022     Most recent fingerstick glucose reviewed-   Recent Labs   Lab 04/27/22  0733   POCTGLUCOSE 184*     Current correctional scale  Low  Maintain anti-hyperglycemic dose as follows-   Antihyperglycemics (From admission, onward)            Start     Stop Route Frequency Ordered    04/26/22 2252  insulin aspart U-100 pen 0-5 Units         -- SubQ Before meals & nightly PRN 04/26/22 2235        Hold Oral hypoglycemics while patient is in the hospital.          VTE Risk Mitigation (From admission, onward)         Ordered     apixaban tablet 5 mg  2 times daily         04/26/22 2235     IP VTE HIGH RISK PATIENT  Once         04/26/22 2235     Place sequential compression device  Until discontinued         04/26/22 2235     Reason for No Pharmacological VTE Prophylaxis  Once        Question:  Reasons:  Answer:  Already adequately anticoagulated on oral Anticoagulants    04/26/22 2235                Discharge Planning   HOMAR:      Code Status: Full Code   Is the patient medically ready for discharge?:     Reason for patient still in hospital (select all that apply): Patient trending condition, Treatment and Imaging                      Anuel Duvall PA-C  Department of Hospital Medicine   [facility]

## 2022-04-27 NOTE — ASSESSMENT & PLAN NOTE
Chronic, controlled.  Latest blood pressure and vitals reviewed-   Temp:  [96.2 °F (35.7 °C)-98 °F (36.7 °C)]   Pulse:  [58-79]   Resp:  [16-20]   BP: (132-206)/(64-90)   SpO2:  [94 %-100 %] .   Home meds for hypertension were reviewed and noted below.   Hypertension Medications             carvediloL (COREG) 3.125 MG tablet Take 1 tablet (3.125 mg total) by mouth 2 (two) times daily.    furosemide (LASIX) 40 MG tablet Take 40 mg by mouth once daily.    lisinopriL 10 MG tablet TAKE ONE TABLET BY MOUTH ONCE DAILY          While in the hospital, will manage blood pressure as follows; Continue home antihypertensive regimen    Will utilize p.r.n. blood pressure medication only if patient's blood pressure greater than  180/110 and he develops symptoms such as worsening chest pain or shortness of breath.

## 2022-04-27 NOTE — ASSESSMENT & PLAN NOTE
Acute:  - Lipase: 353  - IVFH  - prn pain medication  - Clear liquids  - prn antiemetics  - CT abdomen ordered  - AP: 177, AST: 283, ALT: 187

## 2022-04-27 NOTE — NURSING
Pt . Pt stated he would like to hold off on taking prn insulin because he is barely eating and vomiting.
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
## 2022-04-27 NOTE — SUBJECTIVE & OBJECTIVE
Interval History: Notes reviewed, no acute events overnight. Patient seen resting in bed with no acute distress. Patient reports he still has nausea with 2 episodes of emesis this morning. He denies any hematemesis. Patient reports his pain is moderately controlled on prn pain medication. Plan to check CT abdomen due to spike in liver enzymes and history of pancreatitis. Will continue to monitor.      Review of Systems   Constitutional:  Negative for chills, fatigue and fever.   HENT:  Negative for congestion, rhinorrhea, sneezing and sore throat.    Respiratory:  Negative for cough, chest tightness and shortness of breath.    Gastrointestinal:  Positive for abdominal pain, nausea and vomiting.   Endocrine: Negative for polydipsia and polyuria.   Genitourinary:  Negative for difficulty urinating, dysuria and frequency.   Musculoskeletal:  Negative for arthralgias, back pain and gait problem.   Skin:  Negative for color change and pallor.   Neurological:  Negative for dizziness, weakness and light-headedness.   Psychiatric/Behavioral:  Negative for agitation, behavioral problems and confusion.    All other systems reviewed and are negative.  Objective:     Vital Signs (Most Recent):  Temp: 97.5 °F (36.4 °C) (04/27/22 0737)  Pulse: 64 (04/27/22 0737)  Resp: 16 (04/27/22 1042)  BP: 137/74 (04/27/22 0737)  SpO2: 99 % (04/27/22 0737) Vital Signs (24h Range):  Temp:  [96.2 °F (35.7 °C)-98 °F (36.7 °C)] 97.5 °F (36.4 °C)  Pulse:  [58-79] 64  Resp:  [16-20] 16  SpO2:  [94 %-100 %] 99 %  BP: (132-206)/(64-90) 137/74     Weight: 110.7 kg (244 lb 0.8 oz)  Body mass index is 31.33 kg/m².    Intake/Output Summary (Last 24 hours) at 4/27/2022 1057  Last data filed at 4/27/2022 0419  Gross per 24 hour   Intake 1000 ml   Output 175 ml   Net 825 ml      Physical Exam  Vitals and nursing note reviewed.   Constitutional:       General: He is not in acute distress.     Appearance: Normal appearance. He is obese.   HENT:      Head:  Normocephalic and atraumatic.      Right Ear: External ear normal.      Left Ear: External ear normal.      Nose: Nose normal. No congestion.      Mouth/Throat:      Mouth: Mucous membranes are moist.      Pharynx: No oropharyngeal exudate or posterior oropharyngeal erythema.   Eyes:      Extraocular Movements: Extraocular movements intact.      Conjunctiva/sclera: Conjunctivae normal.      Pupils: Pupils are equal, round, and reactive to light.   Cardiovascular:      Rate and Rhythm: Normal rate and regular rhythm.      Pulses: Normal pulses.      Heart sounds: Normal heart sounds.   Pulmonary:      Effort: Pulmonary effort is normal. No respiratory distress.      Breath sounds: Normal breath sounds. No wheezing or rales.   Abdominal:      General: Bowel sounds are normal.      Tenderness: There is abdominal tenderness.      Comments: Diffuse, generalized abdominal tenderness L>R   Musculoskeletal:         General: No swelling or tenderness. Normal range of motion.      Cervical back: Normal range of motion and neck supple.   Skin:     General: Skin is warm.      Capillary Refill: Capillary refill takes 2 to 3 seconds.      Coloration: Skin is not pale.      Findings: No bruising.   Neurological:      General: No focal deficit present.      Mental Status: He is alert and oriented to person, place, and time.      Cranial Nerves: No cranial nerve deficit.      Motor: No weakness.   Psychiatric:         Mood and Affect: Mood normal.         Behavior: Behavior normal.       Significant Labs: All pertinent labs within the past 24 hours have been reviewed.  CBC:   Recent Labs   Lab 04/26/22 1858 04/27/22  0440   WBC 6.62 6.91   HGB 13.8* 12.5*   HCT 42.6 38.8*    157     CMP:   Recent Labs   Lab 04/26/22 1858 04/27/22  0440    137   K 4.2 4.0    103   CO2 26 24   * 180*   BUN 12 14   CREATININE 0.9 0.8   CALCIUM 9.1 8.4*   PROT 8.0 6.7   ALBUMIN 4.1 3.4*   BILITOT 0.5 0.5   ALKPHOS 119 177*    AST 13 283*   ALT 16 187*   ANIONGAP 12 10   EGFRNONAA >60 >60     Lipase:   Recent Labs   Lab 04/26/22  1858   LIPASE 353*       Significant Imaging: I have reviewed all pertinent imaging results/findings within the past 24 hours.

## 2022-04-27 NOTE — ASSESSMENT & PLAN NOTE
Patient's FSGs are controlled on current medication regimen.  Last A1c reviewed-   Lab Results   Component Value Date    HGBA1C 7.7 (H) 02/10/2022     Most recent fingerstick glucose reviewed-   Recent Labs   Lab 04/27/22  0733   POCTGLUCOSE 184*     Current correctional scale  Low  Maintain anti-hyperglycemic dose as follows-   Antihyperglycemics (From admission, onward)            Start     Stop Route Frequency Ordered    04/26/22 2252  insulin aspart U-100 pen 0-5 Units         -- SubQ Before meals & nightly PRN 04/26/22 2235        Hold Oral hypoglycemics while patient is in the hospital.

## 2022-04-27 NOTE — HPI
"Kevan Colin Sr. is a 66 y.o. y.o. male with a PMHx of pancreatitis, T2DM, HTN, and HLD who presented to the ED with generalized abdominal pain onset x 3 days with associated n/v. Describes pain as a aching sensation that is constant. Denies alleviating factors. Exacerbating factors include eating. Reports pain 8/10. States he had similar pain approximately 6 years ago when he had pancreatitis which required a "pancreatic stent." He also reports chills, fatigue, cough, sore throat, and chronic HA. Denies fevers. ED workup was significant for elevated lipase, hyperglycemia, and HTN. The patient was placed in observation under the acre of [facility].   "

## 2022-04-27 NOTE — ASSESSMENT & PLAN NOTE
Body mass index is 31.33 kg/m². Morbid obesity complicates all aspects of disease management from diagnostic modalities to treatment. Weight loss encouraged and health benefits explained to patient.

## 2022-04-27 NOTE — ASSESSMENT & PLAN NOTE
Chronic, controlled.  Latest blood pressure and vitals reviewed-   Temp:  [97.6 °F (36.4 °C)-98 °F (36.7 °C)]   Pulse:  [67-79]   Resp:  [18-20]   BP: (132-206)/(64-90)   SpO2:  [94 %-100 %] .   Home meds for hypertension were reviewed and noted below.   Hypertension Medications             carvediloL (COREG) 3.125 MG tablet Take 1 tablet (3.125 mg total) by mouth 2 (two) times daily.    furosemide (LASIX) 40 MG tablet Take 40 mg by mouth once daily.    lisinopriL 10 MG tablet TAKE ONE TABLET BY MOUTH ONCE DAILY          While in the hospital, will manage blood pressure as follows; Continue home antihypertensive regimen    Will utilize p.r.n. blood pressure medication only if patient's blood pressure greater than  180/110 and he develops symptoms such as worsening chest pain or shortness of breath.

## 2022-04-27 NOTE — PHARMACY MED REC
"Admission Medication History     The home medication history was taken by Anabella Morrison CPhT.    Medication history obtained from Patient     You may go to "Admission" then "Reconcile Home Medications" tabs to review and/or act upon these items.      The home medication list has been updated by the Pharmacy department.    Please read ALL comments highlighted in yellow.    Please address this information as you see fit.     Feel free to contact us if you have any questions or require assistance.      The medications listed below were removed from the home medication list.  Please reorder if appropriate:  Patient reports no longer taking the following medication(s):   Symbicory   Caplyta 42mg   Eszopiclone 2mg   Jardiance 25mg   Lamotrigine 25mg   Meloxicam 15mg   Prazosin 2mg   Quetiapine 300mg   Spiriva   Temazepam    Tramadol 50mg   Trintellix 10mg   Zaleplon 5mg      Anabella Morrison CPhT.  [phone]                  .        "

## 2022-04-27 NOTE — ED NOTES
Pt Presents to the ED with complaints of n/v  and abd pain for the past few days.  Pain is 8/10.  Pt denies H/A, cough. SOB, fever, or chills. SO at the bedside.  No other complaints at this time.

## 2022-04-27 NOTE — PLAN OF CARE
04/27/22 0825   HUFFMAN Message   Medicare Outpatient and Observation Notification regarding financial responsibility Explained to patient/caregiver;Signed/date by patient/caregiver   Date HUFFMAN was signed 04/27/22   Time HUFFMAN was signed 0825

## 2022-04-27 NOTE — PLAN OF CARE
[facility]  Initial Discharge Assessment       Primary Care Provider: Nikita Magallanes NP    Admission Diagnosis: Pancreatitis [K85.90]  Vomiting [R11.10]    Admission Date: 4/26/2022  Expected Discharge Date: to be determined    SW met with pt at bedside to complete discharge assessment, verified PCP, pharmacy and demographic information.  See DME below.  No HH, dialysis and doesn't take coumadin.  Significant otherCheryle will provide transportation home.   No needs identified at this time.    Discharge Barriers Identified: None    Payor: [employer] / Plan: [employer] MEDICARE HMO / Product Type: Medicare Advantage /     Extended Emergency Contact Information  Primary Emergency Contact: Cheryle Villafana  Address: [address]           [place] [address] [facility]  Home Phone: [phone]  Mobile Phone: [phone]  Relation: Significant other  Preferred language: English   needed? No  Secondary Emergency Contact: Lisette Monsivais  Address: UNKNOWN   [place]  Mobile Phone: [phone]  Relation: Sister  Preferred language: English   needed? No    Discharge Plan A: Home  Discharge Plan B: Home      [id number] [place] - [address]  [address] [place] [address]  Phone: [phone] Fax: [phone]      Initial Assessment (most recent)     Adult Discharge Assessment - 04/27/22 1340        Discharge Assessment    Assessment Type Discharge Planning Assessment     Confirmed/corrected address, phone number and insurance Yes     Confirmed Demographics Correct on Facesheet     Source of Information patient     Communicated HOMAR with patient/caregiver No     Lives With significant other     Do you expect to return to your current living situation? Yes     Prior to hospitilization cognitive status: Alert/Oriented     Current cognitive status: Alert/Oriented     Walking or Climbing Stairs Difficulty  none     Dressing/Bathing Difficulty none     Home Accessibility wheelchair accessible     Home Layout Able to live on 1st floor     Equipment Currently Used at Home oxygen;nebulizer;shower chair;glucometer;cane, straight     Readmission within 30 days? No     Patient currently being followed by outpatient case management? Yes     If yes, name of outpatient case management following: insurance company assigned oupatient case management     Do you currently have service(s) that help you manage your care at home? No     Do you take prescription medications? Yes     Do you have prescription coverage? Yes     Do you have any problems affording any of your prescribed medications? No     Is the patient taking medications as prescribed? yes     Who is going to help you get home at discharge? Cheryle, significant other     How do you get to doctors appointments? car, drives self     Are you on dialysis? No     Do you take coumadin? No     Discharge Plan A Home     Discharge Plan B Home     DME Needed Upon Discharge  none     Discharge Plan discussed with: Patient     Discharge Barriers Identified None

## 2022-04-27 NOTE — ASSESSMENT & PLAN NOTE
Patient's FSGs are controlled on current medication regimen.  Last A1c reviewed-   Lab Results   Component Value Date    HGBA1C 7.7 (H) 02/10/2022     Most recent fingerstick glucose reviewed- No results for input(s): POCTGLUCOSE in the last 24 hours.  Current correctional scale  Low  Maintain anti-hyperglycemic dose as follows-   Antihyperglycemics (From admission, onward)            Start     Stop Route Frequency Ordered    04/26/22 2252  insulin aspart U-100 pen 0-5 Units         -- SubQ Before meals & nightly PRN 04/26/22 2235        Hold Oral hypoglycemics while patient is in the hospital.
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
## 2022-04-28 LAB
ALBUMIN SERPL BCP-MCNC: 3.5 G/DL (ref 3.5–5.2)
ALP SERPL-CCNC: 212 U/L (ref 55–135)
ALT SERPL W/O P-5'-P-CCNC: 239 U/L (ref 10–44)
ANION GAP SERPL CALC-SCNC: 12 MMOL/L (ref 8–16)
AST SERPL-CCNC: 160 U/L (ref 10–40)
BASOPHILS # BLD AUTO: 0.07 K/UL (ref 0–0.2)
BASOPHILS # BLD AUTO: 0.07 K/UL (ref 0–0.2)
BASOPHILS NFR BLD: 0.9 % (ref 0–1.9)
BASOPHILS NFR BLD: 1 % (ref 0–1.9)
BILIRUB SERPL-MCNC: 0.5 MG/DL (ref 0.1–1)
BUN SERPL-MCNC: 9 MG/DL (ref 8–23)
CALCIUM SERPL-MCNC: 8.6 MG/DL (ref 8.7–10.5)
CHLORIDE SERPL-SCNC: 103 MMOL/L (ref 95–110)
CO2 SERPL-SCNC: 24 MMOL/L (ref 23–29)
CREAT SERPL-MCNC: 0.8 MG/DL (ref 0.5–1.4)
DIFFERENTIAL METHOD: ABNORMAL
DIFFERENTIAL METHOD: ABNORMAL
EOSINOPHIL # BLD AUTO: 0.5 K/UL (ref 0–0.5)
EOSINOPHIL # BLD AUTO: 0.5 K/UL (ref 0–0.5)
EOSINOPHIL NFR BLD: 6.1 % (ref 0–8)
EOSINOPHIL NFR BLD: 7.4 % (ref 0–8)
ERYTHROCYTE [DISTWIDTH] IN BLOOD BY AUTOMATED COUNT: 13.2 % (ref 11.5–14.5)
ERYTHROCYTE [DISTWIDTH] IN BLOOD BY AUTOMATED COUNT: 13.2 % (ref 11.5–14.5)
EST. GFR  (AFRICAN AMERICAN): >60 ML/MIN/1.73 M^2
EST. GFR  (NON AFRICAN AMERICAN): >60 ML/MIN/1.73 M^2
GLUCOSE SERPL-MCNC: 149 MG/DL (ref 70–110)
GLUCOSE SERPL-MCNC: 151 MG/DL (ref 70–110)
HCT VFR BLD AUTO: 42.5 % (ref 40–54)
HCT VFR BLD AUTO: 45.6 % (ref 40–54)
HGB BLD-MCNC: 13.9 G/DL (ref 14–18)
HGB BLD-MCNC: 14.7 G/DL (ref 14–18)
IMM GRANULOCYTES # BLD AUTO: 0.02 K/UL (ref 0–0.04)
IMM GRANULOCYTES # BLD AUTO: 0.03 K/UL (ref 0–0.04)
IMM GRANULOCYTES NFR BLD AUTO: 0.3 % (ref 0–0.5)
IMM GRANULOCYTES NFR BLD AUTO: 0.4 % (ref 0–0.5)
LYMPHOCYTES # BLD AUTO: 2.2 K/UL (ref 1–4.8)
LYMPHOCYTES # BLD AUTO: 2.6 K/UL (ref 1–4.8)
LYMPHOCYTES NFR BLD: 27.7 % (ref 18–48)
LYMPHOCYTES NFR BLD: 37.7 % (ref 18–48)
MAGNESIUM SERPL-MCNC: 1.9 MG/DL (ref 1.6–2.6)
MCH RBC QN AUTO: 30.4 PG (ref 27–31)
MCH RBC QN AUTO: 30.7 PG (ref 27–31)
MCHC RBC AUTO-ENTMCNC: 32.2 G/DL (ref 32–36)
MCHC RBC AUTO-ENTMCNC: 32.7 G/DL (ref 32–36)
MCV RBC AUTO: 94 FL (ref 82–98)
MCV RBC AUTO: 94 FL (ref 82–98)
MONOCYTES # BLD AUTO: 1 K/UL (ref 0.3–1)
MONOCYTES # BLD AUTO: 1.1 K/UL (ref 0.3–1)
MONOCYTES NFR BLD: 12.8 % (ref 4–15)
MONOCYTES NFR BLD: 15.6 % (ref 4–15)
NEUTROPHILS # BLD AUTO: 2.7 K/UL (ref 1.8–7.7)
NEUTROPHILS # BLD AUTO: 4.2 K/UL (ref 1.8–7.7)
NEUTROPHILS NFR BLD: 38 % (ref 38–73)
NEUTROPHILS NFR BLD: 52.1 % (ref 38–73)
NRBC BLD-RTO: 0 /100 WBC
NRBC BLD-RTO: 0 /100 WBC
PHOSPHATE SERPL-MCNC: 3 MG/DL (ref 2.7–4.5)
PLATELET # BLD AUTO: 150 K/UL (ref 150–450)
PLATELET # BLD AUTO: 165 K/UL (ref 150–450)
PMV BLD AUTO: 10.4 FL (ref 9.2–12.9)
PMV BLD AUTO: 10.8 FL (ref 9.2–12.9)
POCT GLUCOSE: 151 MG/DL (ref 70–110)
POCT GLUCOSE: 177 MG/DL (ref 70–110)
POTASSIUM SERPL-SCNC: 4.1 MMOL/L (ref 3.5–5.1)
POTASSIUM SERPL-SCNC: 5.2 MMOL/L (ref 3.5–5.1)
PROT SERPL-MCNC: 7.2 G/DL (ref 6–8.4)
RBC # BLD AUTO: 4.53 M/UL (ref 4.6–6.2)
RBC # BLD AUTO: 4.83 M/UL (ref 4.6–6.2)
SODIUM SERPL-SCNC: 139 MMOL/L (ref 136–145)
WBC # BLD AUTO: 7 K/UL (ref 3.9–12.7)
WBC # BLD AUTO: 8.04 K/UL (ref 3.9–12.7)

## 2022-04-28 PROCEDURE — 94761 N-INVAS EAR/PLS OXIMETRY MLT: CPT

## 2022-04-28 PROCEDURE — 84132 ASSAY OF SERUM POTASSIUM: CPT

## 2022-04-28 PROCEDURE — 85025 COMPLETE CBC W/AUTO DIFF WBC: CPT | Mod: 91 | Performed by: INTERNAL MEDICINE

## 2022-04-28 PROCEDURE — 84100 ASSAY OF PHOSPHORUS: CPT

## 2022-04-28 PROCEDURE — 27000221 HC OXYGEN, UP TO 24 HOURS

## 2022-04-28 PROCEDURE — 36415 COLL VENOUS BLD VENIPUNCTURE: CPT

## 2022-04-28 PROCEDURE — 80053 COMPREHEN METABOLIC PANEL: CPT

## 2022-04-28 PROCEDURE — 85025 COMPLETE CBC W/AUTO DIFF WBC: CPT

## 2022-04-28 PROCEDURE — 99223 1ST HOSP IP/OBS HIGH 75: CPT | Mod: ,,, | Performed by: INTERNAL MEDICINE

## 2022-04-28 PROCEDURE — 12000002 HC ACUTE/MED SURGE SEMI-PRIVATE ROOM

## 2022-04-28 PROCEDURE — 83735 ASSAY OF MAGNESIUM: CPT

## 2022-04-28 PROCEDURE — 36415 COLL VENOUS BLD VENIPUNCTURE: CPT | Performed by: INTERNAL MEDICINE

## 2022-04-28 PROCEDURE — 63600175 PHARM REV CODE 636 W HCPCS

## 2022-04-28 PROCEDURE — 99900035 HC TECH TIME PER 15 MIN (STAT)

## 2022-04-28 PROCEDURE — 99223 PR INITIAL HOSPITAL CARE,LEVL III: ICD-10-PCS | Mod: ,,, | Performed by: INTERNAL MEDICINE

## 2022-04-28 PROCEDURE — 25000003 PHARM REV CODE 250: Performed by: INTERNAL MEDICINE

## 2022-04-28 PROCEDURE — 25000003 PHARM REV CODE 250

## 2022-04-28 RX ORDER — MUPIROCIN 20 MG/G
OINTMENT TOPICAL 2 TIMES DAILY
Status: DISCONTINUED | OUTPATIENT
Start: 2022-04-28 | End: 2022-04-30 | Stop reason: HOSPADM

## 2022-04-28 RX ADMIN — CARVEDILOL 3.12 MG: 3.12 TABLET, FILM COATED ORAL at 08:04

## 2022-04-28 RX ADMIN — SENNOSIDES AND DOCUSATE SODIUM 1 TABLET: 8.6; 5 TABLET ORAL at 08:04

## 2022-04-28 RX ADMIN — MUPIROCIN: 20 OINTMENT TOPICAL at 08:04

## 2022-04-28 RX ADMIN — PROMETHAZINE HYDROCHLORIDE 12.5 MG: 25 INJECTION INTRAMUSCULAR; INTRAVENOUS at 12:04

## 2022-04-28 RX ADMIN — ONDANSETRON 4 MG: 2 INJECTION INTRAMUSCULAR; INTRAVENOUS at 08:04

## 2022-04-28 RX ADMIN — PANTOPRAZOLE SODIUM 40 MG: 40 TABLET, DELAYED RELEASE ORAL at 08:04

## 2022-04-28 RX ADMIN — HYDROMORPHONE HYDROCHLORIDE 0.5 MG: 2 INJECTION, SOLUTION INTRAMUSCULAR; INTRAVENOUS; SUBCUTANEOUS at 05:04

## 2022-04-28 RX ADMIN — LISINOPRIL 10 MG: 2.5 TABLET ORAL at 08:04

## 2022-04-28 RX ADMIN — Medication 9 MG: at 09:04

## 2022-04-28 RX ADMIN — ACETAMINOPHEN 650 MG: 325 TABLET ORAL at 08:04

## 2022-04-28 RX ADMIN — ATORVASTATIN CALCIUM 40 MG: 40 TABLET, FILM COATED ORAL at 08:04

## 2022-04-28 RX ADMIN — APIXABAN 5 MG: 2.5 TABLET, FILM COATED ORAL at 08:04

## 2022-04-28 RX ADMIN — HYDROMORPHONE HYDROCHLORIDE 0.5 MG: 2 INJECTION, SOLUTION INTRAMUSCULAR; INTRAVENOUS; SUBCUTANEOUS at 09:04

## 2022-04-28 RX ADMIN — TRAMADOL HYDROCHLORIDE 50 MG: 50 TABLET ORAL at 10:04

## 2022-04-28 RX ADMIN — FUROSEMIDE 40 MG: 40 TABLET ORAL at 08:04

## 2022-04-28 RX ADMIN — ONDANSETRON 4 MG: 2 INJECTION INTRAMUSCULAR; INTRAVENOUS at 09:04

## 2022-04-28 NOTE — CARE UPDATE
04/28/22 0703   Patient Assessment/Suction   Level of Consciousness (AVPU) alert   Respiratory Effort Normal;Unlabored   Expansion/Accessory Muscles/Retractions no use of accessory muscles   All Lung Fields Breath Sounds clear;diminished   Rhythm/Pattern, Respiratory unlabored;pattern regular;depth regular   Cough Frequency no cough   PRE-TX-O2   O2 Device (Oxygen Therapy) nasal cannula   $ Is the patient on Low Flow Oxygen? Yes   Flow (L/min) 3  (home regimin)   SpO2 (!) 94 %   Pulse Oximetry Type Intermittent   $ Pulse Oximetry - Multiple Charge Pulse Oximetry - Multiple   Pulse 60   Resp 18   Aerosol Therapy   $ Aerosol Therapy Charges PRN treatment not required   Respiratory Treatment Status (SVN) PRN treatment not required

## 2022-04-28 NOTE — ASSESSMENT & PLAN NOTE
Chronic, controlled.  Latest blood pressure and vitals reviewed-   Temp:  [97 °F (36.1 °C)-98.6 °F (37 °C)]   Pulse:  [53-65]   Resp:  [16-18]   BP: (128-169)/(60-81)   SpO2:  [92 %-99 %] .   Home meds for hypertension were reviewed and noted below.   Hypertension Medications             carvediloL (COREG) 3.125 MG tablet Take 1 tablet (3.125 mg total) by mouth 2 (two) times daily.    furosemide (LASIX) 40 MG tablet Take 40 mg by mouth once daily.    lisinopriL 10 MG tablet TAKE ONE TABLET BY MOUTH ONCE DAILY          While in the hospital, will manage blood pressure as follows; Continue home antihypertensive regimen    Will utilize p.r.n. blood pressure medication only if patient's blood pressure greater than  180/110 and he develops symptoms such as worsening chest pain or shortness of breath.
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
## 2022-04-28 NOTE — PLAN OF CARE
Problem: Adult Inpatient Plan of Care  Goal: Plan of Care Review  Outcome: Ongoing, Progressing  Goal: Patient-Specific Goal (Individualized)  Outcome: Ongoing, Progressing  Goal: Absence of Hospital-Acquired Illness or Injury  Outcome: Ongoing, Progressing  Goal: Optimal Comfort and Wellbeing  Outcome: Ongoing, Progressing  Goal: Readiness for Transition of Care  Outcome: Ongoing, Progressing     Problem: Diabetes Comorbidity  Goal: Blood Glucose Level Within Targeted Range  Outcome: Ongoing, Progressing     Problem: Impaired Wound Healing  Goal: Optimal Wound Healing  Outcome: Ongoing, Progressing   Assessment completed per flow sheet. Skin warm and dry to touch with respiration even and unlabored. Pt observed in room resting quietly with eyes closed. No s/s of distress discomfort or adverse reactions noted at this time. Able to verbalize demands as needed. Pt remains on telemetry with box 8634. Normal sinus rhythm at this time. PT/OT with patient today ambulating independent. Tolerated well. Meds administered by mouth with no adverse reactions noted. IV 20g noted to left forearm. Continues on clear liquid diet. Edema noted to left lower extremity. Bowel sounds active in all quads. Pt verbalized no pain or SOB at this time. Educated pt to use call light for any transfers or ambulation assist. Verbalized understanding. Will continue to monitor for change of condition not noted at this time. C/o nausea/vomiting administered prn Zofran as ordered tolerated well.

## 2022-04-28 NOTE — PROGRESS NOTES
"[facility]  Progress Note    Patient Name: Kevan Colin Sr.  MRN: [id number]  Patient Class: IP- Inpatient   Admission Date: 4/26/2022  Length of Stay: 1 days  Attending Physician: Memo Henry MD  Primary Care Provider: Nikita Magallanes NP        Subjective:     Principal Problem:Acute pancreatitis        HPI:  Kevan Colin Sr. is a 66 y.o. y.o. male with a PMHx of pancreatitis, T2DM, HTN, and HLD who presented to the ED with generalized abdominal pain onset x 3 days with associated n/v. Describes pain as a aching sensation that is constant. Denies alleviating factors. Exacerbating factors include eating. Reports pain 8/10. States he had similar pain approximately 6 years ago when he had pancreatitis which required a "pancreatic stent." He also reports chills, fatigue, cough, sore throat, and chronic HA. Denies fevers. ED workup was significant for elevated lipase, hyperglycemia, and HTN. The patient was placed in observation under the acre of [facility].       Overview/Hospital Course:  No notes on file    Interval History: Notes reviewed, no acute events overnight. Patient seen this morning resting in bed in no acute distress with family member at bedside. He states he continues to have abdominal pain with mild to moderate relief with prn pain medication. He rates his pain a 5/10. He continues to have N/V while trying to eat clear liquid diet. He states he noticed some bright red blood in his last vomit episode. Awaiting GI consult. CT abdomen demonstrated features of mild acute pancreatitis. No peripancreatic fluid collection. Will continue to monitor.    Review of Systems   Constitutional:  Positive for appetite change. Negative for chills, fatigue and fever.   HENT:  Negative for ear pain, sinus pressure, sinus pain and sore throat.    Respiratory:  Positive for shortness of breath. Negative for cough, chest tightness and wheezing.         " Chronically on 3L NC   Cardiovascular:  Negative for chest pain, palpitations and leg swelling.   Gastrointestinal:  Positive for abdominal pain, constipation, nausea and vomiting.   Endocrine: Negative for polydipsia and polyuria.   Genitourinary:  Negative for difficulty urinating, dysuria and frequency.   Musculoskeletal:  Negative for arthralgias, back pain and gait problem.   Neurological:  Negative for dizziness, syncope, light-headedness and headaches.   Hematological:  Negative for adenopathy.   Psychiatric/Behavioral:  Negative for behavioral problems and confusion.    All other systems reviewed and are negative.  Objective:     Vital Signs (Most Recent):  Temp: 97 °F (36.1 °C) (04/28/22 0732)  Pulse: (!) 59 (04/28/22 0732)  Resp: 18 (04/28/22 1007)  BP: (!) 165/75 (04/28/22 0732)  SpO2: 99 % (04/28/22 0732)   Vital Signs (24h Range):  Temp:  [97 °F (36.1 °C)-98.6 °F (37 °C)] 97 °F (36.1 °C)  Pulse:  [53-65] 59  Resp:  [16-18] 18  SpO2:  [92 %-99 %] 99 %  BP: (128-169)/(60-81) 165/75     Weight: 110.7 kg (244 lb 0.8 oz)  Body mass index is 31.33 kg/m².    Intake/Output Summary (Last 24 hours) at 4/28/2022 1041  Last data filed at 4/28/2022 0052  Gross per 24 hour   Intake 1549.56 ml   Output 2150 ml   Net -600.44 ml      Physical Exam  Vitals and nursing note reviewed.   Constitutional:       General: He is not in acute distress.     Appearance: Normal appearance.   HENT:      Head: Normocephalic and atraumatic.      Right Ear: External ear normal.      Left Ear: External ear normal.      Nose: Nose normal. No congestion or rhinorrhea.      Mouth/Throat:      Pharynx: No oropharyngeal exudate or posterior oropharyngeal erythema.   Eyes:      Conjunctiva/sclera: Conjunctivae normal.      Pupils: Pupils are equal, round, and reactive to light.   Cardiovascular:      Rate and Rhythm: Normal rate and regular rhythm.      Pulses: Normal pulses.      Heart sounds: Normal heart sounds. No murmur heard.    No  friction rub. No gallop.   Pulmonary:      Effort: Pulmonary effort is normal. No respiratory distress.      Breath sounds: Normal breath sounds. No wheezing or rales.   Abdominal:      General: Bowel sounds are normal.      Tenderness: There is abdominal tenderness. There is no guarding.      Comments: Diffuse tenderness to palpation    Musculoskeletal:         General: No swelling or tenderness. Normal range of motion.   Skin:     General: Skin is warm and dry.      Capillary Refill: Capillary refill takes 2 to 3 seconds.   Neurological:      General: No focal deficit present.      Mental Status: He is alert and oriented to person, place, and time.      Cranial Nerves: No cranial nerve deficit.      Motor: No weakness.      Gait: Gait normal.   Psychiatric:         Mood and Affect: Mood normal.         Behavior: Behavior normal.       Significant Labs: All pertinent labs within the past 24 hours have been reviewed.  CBC:   Recent Labs   Lab 04/26/22 1858 04/27/22 0440 04/28/22  0527   WBC 6.62 6.91 8.04   HGB 13.8* 12.5* 13.9*   HCT 42.6 38.8* 42.5    157 150     CMP:   Recent Labs   Lab 04/26/22 1858 04/27/22 0440 04/28/22  0527    137 139   K 4.2 4.0 5.2*    103 103   CO2 26 24 24   * 180* 149*   BUN 12 14 9   CREATININE 0.9 0.8 0.8   CALCIUM 9.1 8.4* 8.6*   PROT 8.0 6.7 7.2   ALBUMIN 4.1 3.4* 3.5   BILITOT 0.5 0.5 0.5   ALKPHOS 119 177* 212*   AST 13 283* 160*   ALT 16 187* 239*   ANIONGAP 12 10 12   EGFRNONAA >60 >60 >60       Significant Imaging: I have reviewed all pertinent imaging results/findings within the past 24 hours.      Assessment/Plan:      * Acute pancreatitis  Acute:  - Initial Lipase: 353  - IVFH  - prn pain medication  - Clear liquids  - prn antiemetics  - GI consulted  - CT abdomen - Features of mild acute pancreatitis.  No peripancreatic fluid collection.         Benign essential HTN  Chronic, controlled.  Latest blood pressure and vitals reviewed-   Temp:  [97  °F (36.1 °C)-98.6 °F (37 °C)]   Pulse:  [53-65]   Resp:  [16-18]   BP: (128-169)/(60-81)   SpO2:  [92 %-99 %] .   Home meds for hypertension were reviewed and noted below.   Hypertension Medications             carvediloL (COREG) 3.125 MG tablet Take 1 tablet (3.125 mg total) by mouth 2 (two) times daily.    furosemide (LASIX) 40 MG tablet Take 40 mg by mouth once daily.    lisinopriL 10 MG tablet TAKE ONE TABLET BY MOUTH ONCE DAILY          While in the hospital, will manage blood pressure as follows; Continue home antihypertensive regimen    Will utilize p.r.n. blood pressure medication only if patient's blood pressure greater than  180/110 and he develops symptoms such as worsening chest pain or shortness of breath.        Obesity  Body mass index is 31.33 kg/m². Morbid obesity complicates all aspects of disease management from diagnostic modalities to treatment. Weight loss encouraged and health benefits explained to patient.        Type 2 diabetes mellitus, uncontrolled  Patient's FSGs are controlled on current medication regimen.  Last A1c reviewed-   Lab Results   Component Value Date    HGBA1C 7.7 (H) 02/10/2022     Most recent fingerstick glucose reviewed-   Recent Labs   Lab 04/27/22  1715 04/27/22  2040 04/28/22  0743   POCTGLUCOSE 179* 142* 151*     Current correctional scale  Low  Maintain anti-hyperglycemic dose as follows-   Antihyperglycemics (From admission, onward)            Start     Stop Route Frequency Ordered    04/26/22 2252  insulin aspart U-100 pen 0-5 Units         -- SubQ Before meals & nightly PRN 04/26/22 2235        Hold Oral hypoglycemics while patient is in the hospital.          VTE Risk Mitigation (From admission, onward)         Ordered     apixaban tablet 5 mg  2 times daily         04/26/22 2235     IP VTE HIGH RISK PATIENT  Once         04/26/22 2235     Place sequential compression device  Until discontinued         04/26/22 2235     Reason for No Pharmacological VTE  Prophylaxis  Once        Question:  Reasons:  Answer:  Already adequately anticoagulated on oral Anticoagulants    04/26/22 2231                Discharge Planning   HOMAR:      Code Status: Full Code   Is the patient medically ready for discharge?:     Reason for patient still in hospital (select all that apply): Patient trending condition, Treatment and Consult recommendations  Discharge Plan A: Home                  Anuel Duvall PA-C  Department of Hospital Medicine   [facility]

## 2022-04-28 NOTE — ASSESSMENT & PLAN NOTE
Patient's FSGs are controlled on current medication regimen.  Last A1c reviewed-   Lab Results   Component Value Date    HGBA1C 7.7 (H) 02/10/2022     Most recent fingerstick glucose reviewed-   Recent Labs   Lab 04/27/22  1715 04/27/22 2040 04/28/22  0743   POCTGLUCOSE 179* 142* 151*     Current correctional scale  Low  Maintain anti-hyperglycemic dose as follows-   Antihyperglycemics (From admission, onward)            Start     Stop Route Frequency Ordered    04/26/22 2252  insulin aspart U-100 pen 0-5 Units         -- SubQ Before meals & nightly PRN 04/26/22 2235        Hold Oral hypoglycemics while patient is in the hospital.

## 2022-04-28 NOTE — SUBJECTIVE & OBJECTIVE
Interval History: Notes reviewed, no acute events overnight. Patient seen this morning resting in bed in no acute distress with family member at bedside. He states he continues to have abdominal pain with mild to moderate relief with prn pain medication. He rates his pain a 5/10. He continues to have N/V while trying to eat clear liquid diet. He states he noticed some bright red blood in his last vomit episode. Awaiting GI consult. CT abdomen demonstrated features of mild acute pancreatitis. No peripancreatic fluid collection. Will continue to monitor.    Review of Systems   Constitutional:  Positive for appetite change. Negative for chills, fatigue and fever.   HENT:  Negative for ear pain, sinus pressure, sinus pain and sore throat.    Respiratory:  Positive for shortness of breath. Negative for cough, chest tightness and wheezing.         Chronically on 3L NC   Cardiovascular:  Negative for chest pain, palpitations and leg swelling.   Gastrointestinal:  Positive for abdominal pain, constipation, nausea and vomiting.   Endocrine: Negative for polydipsia and polyuria.   Genitourinary:  Negative for difficulty urinating, dysuria and frequency.   Musculoskeletal:  Negative for arthralgias, back pain and gait problem.   Neurological:  Negative for dizziness, syncope, light-headedness and headaches.   Hematological:  Negative for adenopathy.   Psychiatric/Behavioral:  Negative for behavioral problems and confusion.    All other systems reviewed and are negative.  Objective:     Vital Signs (Most Recent):  Temp: 97 °F (36.1 °C) (04/28/22 0732)  Pulse: (!) 59 (04/28/22 0732)  Resp: 18 (04/28/22 1007)  BP: (!) 165/75 (04/28/22 0732)  SpO2: 99 % (04/28/22 0732)   Vital Signs (24h Range):  Temp:  [97 °F (36.1 °C)-98.6 °F (37 °C)] 97 °F (36.1 °C)  Pulse:  [53-65] 59  Resp:  [16-18] 18  SpO2:  [92 %-99 %] 99 %  BP: (128-169)/(60-81) 165/75     Weight: 110.7 kg (244 lb 0.8 oz)  Body mass index is 31.33 kg/m².    Intake/Output  Summary (Last 24 hours) at 4/28/2022 1041  Last data filed at 4/28/2022 0052  Gross per 24 hour   Intake 1549.56 ml   Output 2150 ml   Net -600.44 ml      Physical Exam  Vitals and nursing note reviewed.   Constitutional:       General: He is not in acute distress.     Appearance: Normal appearance.   HENT:      Head: Normocephalic and atraumatic.      Right Ear: External ear normal.      Left Ear: External ear normal.      Nose: Nose normal. No congestion or rhinorrhea.      Mouth/Throat:      Pharynx: No oropharyngeal exudate or posterior oropharyngeal erythema.   Eyes:      Conjunctiva/sclera: Conjunctivae normal.      Pupils: Pupils are equal, round, and reactive to light.   Cardiovascular:      Rate and Rhythm: Normal rate and regular rhythm.      Pulses: Normal pulses.      Heart sounds: Normal heart sounds. No murmur heard.    No friction rub. No gallop.   Pulmonary:      Effort: Pulmonary effort is normal. No respiratory distress.      Breath sounds: Normal breath sounds. No wheezing or rales.   Abdominal:      General: Bowel sounds are normal.      Tenderness: There is abdominal tenderness. There is no guarding.      Comments: Diffuse tenderness to palpation    Musculoskeletal:         General: No swelling or tenderness. Normal range of motion.   Skin:     General: Skin is warm and dry.      Capillary Refill: Capillary refill takes 2 to 3 seconds.   Neurological:      General: No focal deficit present.      Mental Status: He is alert and oriented to person, place, and time.      Cranial Nerves: No cranial nerve deficit.      Motor: No weakness.      Gait: Gait normal.   Psychiatric:         Mood and Affect: Mood normal.         Behavior: Behavior normal.       Significant Labs: All pertinent labs within the past 24 hours have been reviewed.  CBC:   Recent Labs   Lab 04/26/22  1858 04/27/22  0440 04/28/22  0527   WBC 6.62 6.91 8.04   HGB 13.8* 12.5* 13.9*   HCT 42.6 38.8* 42.5    157 150     CMP:    Recent Labs   Lab 04/26/22  1858 04/27/22  0440 04/28/22  0527    137 139   K 4.2 4.0 5.2*    103 103   CO2 26 24 24   * 180* 149*   BUN 12 14 9   CREATININE 0.9 0.8 0.8   CALCIUM 9.1 8.4* 8.6*   PROT 8.0 6.7 7.2   ALBUMIN 4.1 3.4* 3.5   BILITOT 0.5 0.5 0.5   ALKPHOS 119 177* 212*   AST 13 283* 160*   ALT 16 187* 239*   ANIONGAP 12 10 12   EGFRNONAA >60 >60 >60       Significant Imaging: I have reviewed all pertinent imaging results/findings within the past 24 hours.

## 2022-04-28 NOTE — ASSESSMENT & PLAN NOTE
Acute:  - Initial Lipase: 353  - IVFH  - prn pain medication  - Clear liquids  - prn antiemetics  - GI consulted  - CT abdomen - Features of mild acute pancreatitis.  No peripancreatic fluid collection.

## 2022-04-29 ENCOUNTER — ANESTHESIA EVENT (OUTPATIENT)
Dept: ENDOSCOPY | Facility: HOSPITAL | Age: 67
DRG: 440 | End: 2022-04-29
Payer: COMMERCIAL

## 2022-04-29 ENCOUNTER — ANESTHESIA (OUTPATIENT)
Dept: ENDOSCOPY | Facility: HOSPITAL | Age: 67
DRG: 440 | End: 2022-04-29
Payer: COMMERCIAL

## 2022-04-29 LAB
ALBUMIN SERPL BCP-MCNC: 3.6 G/DL (ref 3.5–5.2)
ALP SERPL-CCNC: 198 U/L (ref 55–135)
ALT SERPL W/O P-5'-P-CCNC: 157 U/L (ref 10–44)
ANION GAP SERPL CALC-SCNC: 11 MMOL/L (ref 8–16)
AST SERPL-CCNC: 55 U/L (ref 10–40)
BASOPHILS # BLD AUTO: 0.09 K/UL (ref 0–0.2)
BASOPHILS NFR BLD: 1.1 % (ref 0–1.9)
BILIRUB SERPL-MCNC: 0.6 MG/DL (ref 0.1–1)
BUN SERPL-MCNC: 7 MG/DL (ref 8–23)
CALCIUM SERPL-MCNC: 9 MG/DL (ref 8.7–10.5)
CHLORIDE SERPL-SCNC: 102 MMOL/L (ref 95–110)
CO2 SERPL-SCNC: 26 MMOL/L (ref 23–29)
CREAT SERPL-MCNC: 0.8 MG/DL (ref 0.5–1.4)
DIFFERENTIAL METHOD: ABNORMAL
EOSINOPHIL # BLD AUTO: 0.6 K/UL (ref 0–0.5)
EOSINOPHIL NFR BLD: 7.1 % (ref 0–8)
ERYTHROCYTE [DISTWIDTH] IN BLOOD BY AUTOMATED COUNT: 13.2 % (ref 11.5–14.5)
EST. GFR  (AFRICAN AMERICAN): >60 ML/MIN/1.73 M^2
EST. GFR  (NON AFRICAN AMERICAN): >60 ML/MIN/1.73 M^2
GLUCOSE SERPL-MCNC: 202 MG/DL (ref 70–110)
HCT VFR BLD AUTO: 41.1 % (ref 40–54)
HGB BLD-MCNC: 13.3 G/DL (ref 14–18)
IMM GRANULOCYTES # BLD AUTO: 0.03 K/UL (ref 0–0.04)
IMM GRANULOCYTES NFR BLD AUTO: 0.4 % (ref 0–0.5)
LYMPHOCYTES # BLD AUTO: 1.9 K/UL (ref 1–4.8)
LYMPHOCYTES NFR BLD: 22.8 % (ref 18–48)
MAGNESIUM SERPL-MCNC: 1.6 MG/DL (ref 1.6–2.6)
MCH RBC QN AUTO: 30.3 PG (ref 27–31)
MCHC RBC AUTO-ENTMCNC: 32.4 G/DL (ref 32–36)
MCV RBC AUTO: 94 FL (ref 82–98)
MONOCYTES # BLD AUTO: 0.8 K/UL (ref 0.3–1)
MONOCYTES NFR BLD: 10.1 % (ref 4–15)
NEUTROPHILS # BLD AUTO: 4.8 K/UL (ref 1.8–7.7)
NEUTROPHILS NFR BLD: 58.5 % (ref 38–73)
NRBC BLD-RTO: 0 /100 WBC
PHOSPHATE SERPL-MCNC: 2.6 MG/DL (ref 2.7–4.5)
PLATELET # BLD AUTO: 183 K/UL (ref 150–450)
PMV BLD AUTO: 10.3 FL (ref 9.2–12.9)
POCT GLUCOSE: 164 MG/DL (ref 70–110)
POCT GLUCOSE: 195 MG/DL (ref 70–110)
POCT GLUCOSE: 201 MG/DL (ref 70–110)
POCT GLUCOSE: 207 MG/DL (ref 70–110)
POTASSIUM SERPL-SCNC: 4.4 MMOL/L (ref 3.5–5.1)
PROT SERPL-MCNC: 7.2 G/DL (ref 6–8.4)
RBC # BLD AUTO: 4.39 M/UL (ref 4.6–6.2)
SODIUM SERPL-SCNC: 139 MMOL/L (ref 136–145)
WBC # BLD AUTO: 8.12 K/UL (ref 3.9–12.7)

## 2022-04-29 PROCEDURE — 43239 EGD BIOPSY SINGLE/MULTIPLE: CPT | Mod: ,,, | Performed by: INTERNAL MEDICINE

## 2022-04-29 PROCEDURE — 36410 VNPNXR 3YR/> PHY/QHP DX/THER: CPT

## 2022-04-29 PROCEDURE — 43239 EGD BIOPSY SINGLE/MULTIPLE: CPT | Performed by: INTERNAL MEDICINE

## 2022-04-29 PROCEDURE — 88305 TISSUE EXAM BY PATHOLOGIST: ICD-10-PCS | Mod: 26,,, | Performed by: PATHOLOGY

## 2022-04-29 PROCEDURE — 37000008 HC ANESTHESIA 1ST 15 MINUTES: Performed by: INTERNAL MEDICINE

## 2022-04-29 PROCEDURE — 83735 ASSAY OF MAGNESIUM: CPT

## 2022-04-29 PROCEDURE — 85025 COMPLETE CBC W/AUTO DIFF WBC: CPT

## 2022-04-29 PROCEDURE — 80053 COMPREHEN METABOLIC PANEL: CPT

## 2022-04-29 PROCEDURE — 99900035 HC TECH TIME PER 15 MIN (STAT)

## 2022-04-29 PROCEDURE — 63600175 PHARM REV CODE 636 W HCPCS

## 2022-04-29 PROCEDURE — D9220A PRA ANESTHESIA: ICD-10-PCS | Mod: ANES,,, | Performed by: ANESTHESIOLOGY

## 2022-04-29 PROCEDURE — 25000003 PHARM REV CODE 250

## 2022-04-29 PROCEDURE — 27201012 HC FORCEPS, HOT/COLD, DISP: Performed by: INTERNAL MEDICINE

## 2022-04-29 PROCEDURE — 76937 US GUIDE VASCULAR ACCESS: CPT

## 2022-04-29 PROCEDURE — 63600175 PHARM REV CODE 636 W HCPCS: Performed by: NURSE ANESTHETIST, CERTIFIED REGISTERED

## 2022-04-29 PROCEDURE — D9220A PRA ANESTHESIA: ICD-10-PCS | Mod: CRNA,,, | Performed by: NURSE ANESTHETIST, CERTIFIED REGISTERED

## 2022-04-29 PROCEDURE — D9220A PRA ANESTHESIA: Mod: ANES,,, | Performed by: ANESTHESIOLOGY

## 2022-04-29 PROCEDURE — 88305 TISSUE EXAM BY PATHOLOGIST: CPT | Mod: 26,,, | Performed by: PATHOLOGY

## 2022-04-29 PROCEDURE — 25000003 PHARM REV CODE 250: Performed by: NURSE ANESTHETIST, CERTIFIED REGISTERED

## 2022-04-29 PROCEDURE — 94761 N-INVAS EAR/PLS OXIMETRY MLT: CPT

## 2022-04-29 PROCEDURE — 12000002 HC ACUTE/MED SURGE SEMI-PRIVATE ROOM

## 2022-04-29 PROCEDURE — D9220A PRA ANESTHESIA: Mod: CRNA,,, | Performed by: NURSE ANESTHETIST, CERTIFIED REGISTERED

## 2022-04-29 PROCEDURE — 27000221 HC OXYGEN, UP TO 24 HOURS

## 2022-04-29 PROCEDURE — C1751 CATH, INF, PER/CENT/MIDLINE: HCPCS

## 2022-04-29 PROCEDURE — 88305 TISSUE EXAM BY PATHOLOGIST: CPT | Performed by: PATHOLOGY

## 2022-04-29 PROCEDURE — 25000003 PHARM REV CODE 250: Performed by: INTERNAL MEDICINE

## 2022-04-29 PROCEDURE — 84100 ASSAY OF PHOSPHORUS: CPT

## 2022-04-29 PROCEDURE — 36415 COLL VENOUS BLD VENIPUNCTURE: CPT

## 2022-04-29 PROCEDURE — 43239 PR EGD, FLEX, W/BIOPSY, SGL/MULTI: ICD-10-PCS | Mod: ,,, | Performed by: INTERNAL MEDICINE

## 2022-04-29 RX ORDER — ONDANSETRON 2 MG/ML
INJECTION INTRAMUSCULAR; INTRAVENOUS
Status: DISCONTINUED | OUTPATIENT
Start: 2022-04-29 | End: 2022-04-29

## 2022-04-29 RX ORDER — LIDOCAINE HCL/PF 100 MG/5ML
SYRINGE (ML) INTRAVENOUS
Status: DISCONTINUED | OUTPATIENT
Start: 2022-04-29 | End: 2022-04-29

## 2022-04-29 RX ORDER — SODIUM CHLORIDE 9 MG/ML
INJECTION, SOLUTION INTRAVENOUS CONTINUOUS
Status: DISCONTINUED | OUTPATIENT
Start: 2022-04-29 | End: 2022-04-30 | Stop reason: HOSPADM

## 2022-04-29 RX ORDER — PROPOFOL 10 MG/ML
VIAL (ML) INTRAVENOUS
Status: DISCONTINUED | OUTPATIENT
Start: 2022-04-29 | End: 2022-04-29

## 2022-04-29 RX ADMIN — ONDANSETRON 4 MG: 2 INJECTION INTRAMUSCULAR; INTRAVENOUS at 05:04

## 2022-04-29 RX ADMIN — CARVEDILOL 3.12 MG: 3.12 TABLET, FILM COATED ORAL at 08:04

## 2022-04-29 RX ADMIN — SODIUM CHLORIDE: 0.9 INJECTION, SOLUTION INTRAVENOUS at 02:04

## 2022-04-29 RX ADMIN — HYDROMORPHONE HYDROCHLORIDE 0.5 MG: 2 INJECTION, SOLUTION INTRAMUSCULAR; INTRAVENOUS; SUBCUTANEOUS at 05:04

## 2022-04-29 RX ADMIN — ONDANSETRON 4 MG: 2 INJECTION INTRAMUSCULAR; INTRAVENOUS at 02:04

## 2022-04-29 RX ADMIN — PROPOFOL 30 MG: 10 INJECTION, EMULSION INTRAVENOUS at 02:04

## 2022-04-29 RX ADMIN — LISINOPRIL 10 MG: 2.5 TABLET ORAL at 09:04

## 2022-04-29 RX ADMIN — MUPIROCIN: 20 OINTMENT TOPICAL at 08:04

## 2022-04-29 RX ADMIN — Medication 9 MG: at 08:04

## 2022-04-29 RX ADMIN — HYDROMORPHONE HYDROCHLORIDE 0.5 MG: 2 INJECTION, SOLUTION INTRAMUSCULAR; INTRAVENOUS; SUBCUTANEOUS at 08:04

## 2022-04-29 RX ADMIN — GLYCOPYRROLATE 0.1 MG: 0.2 INJECTION, SOLUTION INTRAMUSCULAR; INTRAVITREAL at 02:04

## 2022-04-29 RX ADMIN — PANTOPRAZOLE SODIUM 40 MG: 40 TABLET, DELAYED RELEASE ORAL at 09:04

## 2022-04-29 RX ADMIN — SODIUM CHLORIDE: 0.9 INJECTION, SOLUTION INTRAVENOUS at 01:04

## 2022-04-29 RX ADMIN — MUPIROCIN: 20 OINTMENT TOPICAL at 09:04

## 2022-04-29 RX ADMIN — PANTOPRAZOLE SODIUM 40 MG: 40 TABLET, DELAYED RELEASE ORAL at 08:04

## 2022-04-29 RX ADMIN — SENNOSIDES AND DOCUSATE SODIUM 1 TABLET: 8.6; 5 TABLET ORAL at 09:04

## 2022-04-29 RX ADMIN — ATORVASTATIN CALCIUM 40 MG: 40 TABLET, FILM COATED ORAL at 08:04

## 2022-04-29 RX ADMIN — FUROSEMIDE 40 MG: 40 TABLET ORAL at 09:04

## 2022-04-29 RX ADMIN — CARVEDILOL 3.12 MG: 3.12 TABLET, FILM COATED ORAL at 09:04

## 2022-04-29 RX ADMIN — PROPOFOL 150 MG: 10 INJECTION, EMULSION INTRAVENOUS at 02:04

## 2022-04-29 RX ADMIN — SENNOSIDES AND DOCUSATE SODIUM 1 TABLET: 8.6; 5 TABLET ORAL at 08:04

## 2022-04-29 RX ADMIN — LIDOCAINE HYDROCHLORIDE 100 MG: 20 INJECTION INTRAVENOUS at 02:04

## 2022-04-29 NOTE — CARE UPDATE
04/29/22 0746   Patient Assessment/Suction   Level of Consciousness (AVPU) alert   Respiratory Effort Normal;Unlabored   All Lung Fields Breath Sounds clear   Rhythm/Pattern, Respiratory unlabored   PRE-TX-O2   O2 Device (Oxygen Therapy) nasal cannula   $ Is the patient on Low Flow Oxygen? Yes   Flow (L/min) 2.5   SpO2 98 %   Pulse Oximetry Type Intermittent   $ Pulse Oximetry - Multiple Charge Pulse Oximetry - Multiple   Pulse 61   Resp 18   Aerosol Therapy   $ Aerosol Therapy Charges PRN treatment not required

## 2022-04-29 NOTE — PROVATION PATIENT INSTRUCTIONS
Discharge Summary/Instructions after an Endoscopic Procedure  Patient Name: Kevan Colin  Patient MRN: [id number]  Patient YOB: 1955 Friday, April 29, 2022  Eli Christian MD  Dear patient,  As a result of recent federal legislation (The Federal Cures Act), you may   receive lab or pathology results from your procedure in your [facility]   account before your physician is able to contact you. Your physician or   their representative will relay the results to you with their   recommendations at their soonest availability.  Thank you,  RESTRICTIONS:  During your procedure today, you received medications for sedation.  These   medications may affect your judgment, balance and coordination.  Therefore,   for 24 hours, you have the following restrictions:   - DO NOT drive a car, operate machinery, make legal/financial decisions,   sign important papers or drink alcohol.    ACTIVITY:  Today: no heavy lifting, straining or running due to procedural   sedation/anesthesia.  The following day: return to full activity including work.  DIET:  Eat and drink normally unless instructed otherwise.     TREATMENT FOR COMMON SIDE EFFECTS:  - Mild abdominal pain, nausea, belching, bloating or excessive gas:  rest,   eat lightly and use a heating pad.  - Sore Throat: treat with throat lozenges and/or gargle with warm salt   water.  - Because air was used during the procedure, expelling large amounts of air   from your rectum or belching is normal.  - If a bowel prep was taken, you may not have a bowel movement for 1-3 days.    This is normal.  SYMPTOMS TO WATCH FOR AND REPORT TO YOUR PHYSICIAN:  1. Abdominal pain or bloating, other than gas cramps.  2. Chest pain.  3. Back pain.  4. Signs of infection such as: chills or fever occurring within 24 hours   after the procedure.  5. Rectal bleeding, which would show as bright red, maroon, or black stools.   (A tablespoon of blood from the rectum is not serious,  especially if   hemorrhoids are present.)  6. Vomiting.  7. Weakness or dizziness.  GO DIRECTLY TO THE NEAREST EMERGENCY ROOM IF YOU HAVE ANY OF THE FOLLOWING:      Difficulty breathing              Chills and/or fever over 101 F   Persistent vomiting and/or vomiting blood   Severe abdominal pain   Severe chest pain   Black, tarry stools   Bleeding- more than one tablespoon   Any other symptom or condition that you feel may need urgent attention  Your doctor recommends these additional instructions:  If any biopsies were taken, your doctors clinic will contact you in 1 to 2   weeks with any results.  - Await pathology results.   - Return patient to hospital [facility] for ongoing care.   - Full liquid diet.   - PPI BID while inpatient then PPI daily x 12 weeks  - Resume Eliquis (apixaban) at prior dose today.  For questions, problems or results please call your physician - Eli Christian MD at Work:  [phone].  [facility], EMERGENCY ROOM PHONE NUMBER: [phone]  IF A COMPLICATION OR EMERGENCY SITUATION ARISES AND YOU ARE UNABLE TO REACH   YOUR PHYSICIAN - GO DIRECTLY TO THE EMERGENCY ROOM.  Eli Christian MD  4/29/2022 2:25:44 PM  This report has been verified and signed electronically.  Dear patient,  As a result of recent federal legislation (The Federal Cures Act), you may   receive lab or pathology results from your procedure in your [facility]   account before your physician is able to contact you. Your physician or   their representative will relay the results to you with their   recommendations at their soonest availability.  Thank you,  PROVATION

## 2022-04-29 NOTE — ANESTHESIA POSTPROCEDURE EVALUATION
Anesthesia Post Evaluation    Patient: Kevan Colin Sr.    Procedure(s) Performed: Procedure(s) (LRB):  EGD (ESOPHAGOGASTRODUODENOSCOPY) (N/A)    Final Anesthesia Type: general      Patient location during evaluation: PACU  Patient participation: Yes- Able to Participate  Level of consciousness: awake and alert and oriented  Post-procedure vital signs: reviewed and stable  Pain management: adequate  Airway patency: patent    PONV status at discharge: No PONV  Anesthetic complications: no      Cardiovascular status: blood pressure returned to baseline  Respiratory status: unassisted, spontaneous ventilation and room air  Hydration status: euvolemic  Follow-up not needed.          Vitals Value Taken Time   /68 04/29/22 1438   Temp  04/29/22 1440   Pulse 75 04/29/22 1438   Resp 16 04/29/22 1438   SpO2 96 % 04/29/22 1438         No case tracking events are documented in the log.      Pain/Neville Score: Pain Rating Prior to Med Admin: 8 (4/29/2022  5:11 AM)  Pain Rating Post Med Admin: 3 (4/29/2022  5:41 AM)  Neville Score: 10 (4/29/2022  2:38 PM)

## 2022-04-29 NOTE — ASSESSMENT & PLAN NOTE
Chronic, controlled.  Latest blood pressure and vitals reviewed-   Temp:  [96.1 °F (35.6 °C)-98.4 °F (36.9 °C)]   Pulse:  [54-68]   Resp:  [16-18]   BP: (139-174)/(70-98)   SpO2:  [97 %-99 %] .   Home meds for hypertension were reviewed and noted below.   Hypertension Medications             carvediloL (COREG) 3.125 MG tablet Take 1 tablet (3.125 mg total) by mouth 2 (two) times daily.    furosemide (LASIX) 40 MG tablet Take 40 mg by mouth once daily.    lisinopriL 10 MG tablet TAKE ONE TABLET BY MOUTH ONCE DAILY          While in the hospital, will manage blood pressure as follows; Continue home antihypertensive regimen    Will utilize p.r.n. blood pressure medication only if patient's blood pressure greater than  180/110 and he develops symptoms such as worsening chest pain or shortness of breath.

## 2022-04-29 NOTE — PLAN OF CARE
EGD:  -Mild gastritis and duodenitis with duodenal erosions, biopsied    Recommendation:  -Full liquid diet and advance as tolerated  -PPI BID while inpatient. Discharge on daily PPI x 12 weeks  -Ok to resume Eliquis today  -Ok for discharge from GI perspective once tolerating diet and pain controlled (possibly tomorrow)  -Please call with questions    Eli Christian MD

## 2022-04-29 NOTE — SUBJECTIVE & OBJECTIVE
Interval History: Notes reviewed, no acute events overnight. Patient seen this morning sitting on edge of bed with family member at bedside. He states his abdominal pain has improved mildly. He states his nausea is controlled with prn nausea medication. He endorses one episode of emesis yesterday with minute bright red blood. He states he also has poor dentition which could explain the bright blood. Liver enzymes trending downward. Midline inserted due to poor peripheral access. Scheduled for EGD later this evening. GI consult appreciated.     Review of Systems   Constitutional:  Negative for chills, fatigue and fever.   HENT:  Negative for congestion, rhinorrhea, sneezing and sore throat.    Respiratory:  Negative for cough, chest tightness and shortness of breath.    Cardiovascular:  Negative for chest pain and palpitations.   Gastrointestinal:  Positive for abdominal pain, nausea and vomiting. Negative for abdominal distention.   Endocrine: Negative for polydipsia and polyuria.   Genitourinary:  Negative for difficulty urinating, dysuria and frequency.   Musculoskeletal:  Positive for back pain.   Neurological:  Negative for dizziness, weakness and headaches.   Hematological:  Negative for adenopathy.   Psychiatric/Behavioral:  Negative for agitation, behavioral problems and confusion.    All other systems reviewed and are negative.  Objective:     Vital Signs (Most Recent):  Temp: 98.4 °F (36.9 °C) (04/29/22 0751)  Pulse: 60 (04/29/22 0751)  Resp: 18 (04/29/22 0751)  BP: (!) 167/74 (04/29/22 0751)  SpO2: 98 % (04/29/22 0751)   Vital Signs (24h Range):  Temp:  [96.1 °F (35.6 °C)-98.4 °F (36.9 °C)] 98.4 °F (36.9 °C)  Pulse:  [54-68] 60  Resp:  [16-18] 18  SpO2:  [97 %-99 %] 98 %  BP: (139-174)/(70-98) 167/74     Weight: 110.7 kg (244 lb 0.8 oz)  Body mass index is 31.33 kg/m².    Intake/Output Summary (Last 24 hours) at 4/29/2022 1057  Last data filed at 4/28/2022 1904  Gross per 24 hour   Intake 2072.51 ml    Output --   Net 2072.51 ml      Physical Exam  Vitals and nursing note reviewed.   Constitutional:       General: He is not in acute distress.     Appearance: Normal appearance. He is not ill-appearing.   HENT:      Head: Normocephalic and atraumatic.      Right Ear: External ear normal.      Left Ear: External ear normal.      Nose: Nose normal. No congestion or rhinorrhea.      Mouth/Throat:      Mouth: Mucous membranes are moist.      Pharynx: Oropharynx is clear. No oropharyngeal exudate or posterior oropharyngeal erythema.   Eyes:      Extraocular Movements: Extraocular movements intact.      Conjunctiva/sclera: Conjunctivae normal.      Pupils: Pupils are equal, round, and reactive to light.   Cardiovascular:      Rate and Rhythm: Normal rate and regular rhythm.      Pulses: Normal pulses.      Heart sounds: Normal heart sounds. No murmur heard.    No friction rub.   Pulmonary:      Effort: Pulmonary effort is normal. No respiratory distress.      Breath sounds: Normal breath sounds. No wheezing or rales.   Abdominal:      General: Abdomen is flat. Bowel sounds are normal.      Tenderness: There is abdominal tenderness. There is no guarding.      Comments: Diffuse abdominal tenderness, less severe than previous exam   Musculoskeletal:         General: No swelling or tenderness. Normal range of motion.      Cervical back: Normal range of motion and neck supple.   Skin:     General: Skin is warm and dry.      Capillary Refill: Capillary refill takes 2 to 3 seconds.      Coloration: Skin is not jaundiced.   Neurological:      General: No focal deficit present.      Mental Status: He is alert and oriented to person, place, and time.      Cranial Nerves: No cranial nerve deficit.      Sensory: No sensory deficit.      Motor: No weakness.   Psychiatric:         Mood and Affect: Mood normal.         Behavior: Behavior normal.       Significant Labs: All pertinent labs within the past 24 hours have been  reviewed.  CBC:   Recent Labs   Lab 04/28/22  0527 04/28/22  1801 04/29/22  0611   WBC 8.04 7.00 8.12   HGB 13.9* 14.7 13.3*   HCT 42.5 45.6 41.1    165 183     CMP:   Recent Labs   Lab 04/28/22  0527 04/28/22  1355 04/29/22  0611     --  139   K 5.2* 4.1 4.4     --  102   CO2 24  --  26   *  --  202*   BUN 9  --  7*   CREATININE 0.8  --  0.8   CALCIUM 8.6*  --  9.0   PROT 7.2  --  7.2   ALBUMIN 3.5  --  3.6   BILITOT 0.5  --  0.6   ALKPHOS 212*  --  198*   *  --  55*   *  --  157*   ANIONGAP 12  --  11   EGFRNONAA >60  --  >60       Significant Imaging: I have reviewed all pertinent imaging results/findings within the past 24 hours.

## 2022-04-29 NOTE — CONSULTS
18 Gx 10cm PowerGlide Midline placed to pts Left Basilic vein with the use of ultrasound guidance.    Ultrasound guidance: yes  Vessel Caliber: large and patent, compressibility normal  Needle advanced into vessel with real time Ultrasound guidance.  Guidewire confirmed in vessel.  Image recorded and saved.  Sterile sheath used.  Sterile dressing applied  Arm circumference- 30 cm  Dressing dated   Education provided to patient re: proper maintenance of line- pt verbalized understanding  OC

## 2022-04-29 NOTE — ASSESSMENT & PLAN NOTE
Acute:  - Initial Lipase: 353  - IVFH  - prn pain medication  - Npo for EGD  - EGD scheduled this afternoon  - prn antiemetics  - GI consult appreciated  - liver enzymes trending dowward  - CT abdomen - Features of mild acute pancreatitis.  No peripancreatic fluid collection.

## 2022-04-29 NOTE — PROGRESS NOTES
"[facility] Medicine  Progress Note    Patient Name: Kevan Colin Sr.  MRN: [id number]  Patient Class: IP- Inpatient   Admission Date: 4/26/2022  Length of Stay: 2 days  Attending Physician: Memo Henry MD  Primary Care Provider: Nikita Magallanes NP        Subjective:     Principal Problem:Acute pancreatitis        HPI:  Kevan Colin Sr. is a 66 y.o. y.o. male with a PMHx of pancreatitis, T2DM, HTN, and HLD who presented to the ED with generalized abdominal pain onset x 3 days with associated n/v. Describes pain as a aching sensation that is constant. Denies alleviating factors. Exacerbating factors include eating. Reports pain 8/10. States he had similar pain approximately 6 years ago when he had pancreatitis which required a "pancreatic stent." He also reports chills, fatigue, cough, sore throat, and chronic HA. Denies fevers. ED workup was significant for elevated lipase, hyperglycemia, and HTN. The patient was placed in observation under the acre of Hospital Medicine.       Overview/Hospital Course:  No notes on file    Interval History: Notes reviewed, no acute events overnight. Patient seen this morning sitting on edge of bed with family member at bedside. He states his abdominal pain has improved mildly. He states his nausea is controlled with prn nausea medication. He endorses one episode of emesis yesterday with minute bright red blood. He states he also has poor dentition which could explain the bright blood. Liver enzymes trending downward. Midline inserted due to poor peripheral access. Scheduled for EGD later this evening. GI consult appreciated.     Review of Systems   Constitutional:  Negative for chills, fatigue and fever.   HENT:  Negative for congestion, rhinorrhea, sneezing and sore throat.    Respiratory:  Negative for cough, chest tightness and shortness of breath.    Cardiovascular:  Negative for chest pain and palpitations.   Gastrointestinal:  " Positive for abdominal pain, nausea and vomiting. Negative for abdominal distention.   Endocrine: Negative for polydipsia and polyuria.   Genitourinary:  Negative for difficulty urinating, dysuria and frequency.   Musculoskeletal:  Positive for back pain.   Neurological:  Negative for dizziness, weakness and headaches.   Hematological:  Negative for adenopathy.   Psychiatric/Behavioral:  Negative for agitation, behavioral problems and confusion.    All other systems reviewed and are negative.  Objective:     Vital Signs (Most Recent):  Temp: 98.4 °F (36.9 °C) (04/29/22 0751)  Pulse: 60 (04/29/22 0751)  Resp: 18 (04/29/22 0751)  BP: (!) 167/74 (04/29/22 0751)  SpO2: 98 % (04/29/22 0751)   Vital Signs (24h Range):  Temp:  [96.1 °F (35.6 °C)-98.4 °F (36.9 °C)] 98.4 °F (36.9 °C)  Pulse:  [54-68] 60  Resp:  [16-18] 18  SpO2:  [97 %-99 %] 98 %  BP: (139-174)/(70-98) 167/74     Weight: 110.7 kg (244 lb 0.8 oz)  Body mass index is 31.33 kg/m².    Intake/Output Summary (Last 24 hours) at 4/29/2022 1057  Last data filed at 4/28/2022 1904  Gross per 24 hour   Intake 2072.51 ml   Output --   Net 2072.51 ml      Physical Exam  Vitals and nursing note reviewed.   Constitutional:       General: He is not in acute distress.     Appearance: Normal appearance. He is not ill-appearing.   HENT:      Head: Normocephalic and atraumatic.      Right Ear: External ear normal.      Left Ear: External ear normal.      Nose: Nose normal. No congestion or rhinorrhea.      Mouth/Throat:      Mouth: Mucous membranes are moist.      Pharynx: Oropharynx is clear. No oropharyngeal exudate or posterior oropharyngeal erythema.   Eyes:      Extraocular Movements: Extraocular movements intact.      Conjunctiva/sclera: Conjunctivae normal.      Pupils: Pupils are equal, round, and reactive to light.   Cardiovascular:      Rate and Rhythm: Normal rate and regular rhythm.      Pulses: Normal pulses.      Heart sounds: Normal heart sounds. No murmur  heard.    No friction rub.   Pulmonary:      Effort: Pulmonary effort is normal. No respiratory distress.      Breath sounds: Normal breath sounds. No wheezing or rales.   Abdominal:      General: Abdomen is flat. Bowel sounds are normal.      Tenderness: There is abdominal tenderness. There is no guarding.      Comments: Diffuse abdominal tenderness, less severe than previous exam   Musculoskeletal:         General: No swelling or tenderness. Normal range of motion.      Cervical back: Normal range of motion and neck supple.   Skin:     General: Skin is warm and dry.      Capillary Refill: Capillary refill takes 2 to 3 seconds.      Coloration: Skin is not jaundiced.   Neurological:      General: No focal deficit present.      Mental Status: He is alert and oriented to person, place, and time.      Cranial Nerves: No cranial nerve deficit.      Sensory: No sensory deficit.      Motor: No weakness.   Psychiatric:         Mood and Affect: Mood normal.         Behavior: Behavior normal.       Significant Labs: All pertinent labs within the past 24 hours have been reviewed.  CBC:   Recent Labs   Lab 04/28/22  0527 04/28/22  1801 04/29/22  0611   WBC 8.04 7.00 8.12   HGB 13.9* 14.7 13.3*   HCT 42.5 45.6 41.1    165 183     CMP:   Recent Labs   Lab 04/28/22  0527 04/28/22  1355 04/29/22  0611     --  139   K 5.2* 4.1 4.4     --  102   CO2 24  --  26   *  --  202*   BUN 9  --  7*   CREATININE 0.8  --  0.8   CALCIUM 8.6*  --  9.0   PROT 7.2  --  7.2   ALBUMIN 3.5  --  3.6   BILITOT 0.5  --  0.6   ALKPHOS 212*  --  198*   *  --  55*   *  --  157*   ANIONGAP 12  --  11   EGFRNONAA >60  --  >60       Significant Imaging: I have reviewed all pertinent imaging results/findings within the past 24 hours.      Assessment/Plan:      * Acute pancreatitis  Acute:  - Initial Lipase: 353  - IVFH  - prn pain medication  - Npo for EGD  - EGD scheduled this afternoon  - prn antiemetics  - GI consult  appreciated  - liver enzymes trending dowward  - CT abdomen - Features of mild acute pancreatitis.  No peripancreatic fluid collection.         Benign essential HTN  Chronic, controlled.  Latest blood pressure and vitals reviewed-   Temp:  [96.1 °F (35.6 °C)-98.4 °F (36.9 °C)]   Pulse:  [54-68]   Resp:  [16-18]   BP: (139-174)/(70-98)   SpO2:  [97 %-99 %] .   Home meds for hypertension were reviewed and noted below.   Hypertension Medications             carvediloL (COREG) 3.125 MG tablet Take 1 tablet (3.125 mg total) by mouth 2 (two) times daily.    furosemide (LASIX) 40 MG tablet Take 40 mg by mouth once daily.    lisinopriL 10 MG tablet TAKE ONE TABLET BY MOUTH ONCE DAILY          While in the hospital, will manage blood pressure as follows; Continue home antihypertensive regimen    Will utilize p.r.n. blood pressure medication only if patient's blood pressure greater than  180/110 and he develops symptoms such as worsening chest pain or shortness of breath.        Obesity  Body mass index is 31.33 kg/m². Morbid obesity complicates all aspects of disease management from diagnostic modalities to treatment. Weight loss encouraged and health benefits explained to patient.        Type 2 diabetes mellitus, uncontrolled  Patient's FSGs are controlled on current medication regimen.  Last A1c reviewed-   Lab Results   Component Value Date    HGBA1C 7.7 (H) 02/10/2022     Most recent fingerstick glucose reviewed-   Recent Labs   Lab 04/28/22 2012 04/29/22  0807   POCTGLUCOSE 177* 201*     Current correctional scale  Low  Maintain anti-hyperglycemic dose as follows-   Antihyperglycemics (From admission, onward)            Start     Stop Route Frequency Ordered    04/26/22 2252  insulin aspart U-100 pen 0-5 Units         -- SubQ Before meals & nightly PRN 04/26/22 2235        Hold Oral hypoglycemics while patient is in the hospital.          VTE Risk Mitigation (From admission, onward)         Ordered     IP VTE HIGH RISK  PATIENT  Once         04/26/22 2235     Place sequential compression device  Until discontinued         04/26/22 2235     Reason for No Pharmacological VTE Prophylaxis  Once        Question:  Reasons:  Answer:  Already adequately anticoagulated on oral Anticoagulants    04/26/22 2235                Discharge Planning   HOMAR:      Code Status: Full Code   Is the patient medically ready for discharge?:     Reason for patient still in hospital (select all that apply): Patient trending condition, Treatment and Imaging  Discharge Plan A: Home                  Anuel Duvall PA-C  Department of Hospital Medicine   [facility]

## 2022-04-29 NOTE — CARE UPDATE
04/28/22 1926   Patient Assessment/Suction   Level of Consciousness (AVPU) alert   Respiratory Effort Normal;Unlabored   Expansion/Accessory Muscles/Retractions expansion symmetric;no retractions;no use of accessory muscles   All Lung Fields Breath Sounds clear   PRE-TX-O2   O2 Device (Oxygen Therapy) nasal cannula   Flow (L/min) 3   Oxygen Concentration (%) 32   SpO2 98 %   Pulse Oximetry Type Intermittent   Pulse (!) 58   Resp 16   Aerosol Therapy   $ Aerosol Therapy Charges PRN treatment not required   Respiratory Treatment Status (SVN) PRN treatment not required   Ready to Wean/Extubation Screen   FIO2<=50 (chart decimal) 0.32

## 2022-04-29 NOTE — TRANSFER OF CARE
"Anesthesia Transfer of Care Note    Patient: Kevan Colin Sr.    Procedure(s) Performed: Procedure(s) (LRB):  EGD (ESOPHAGOGASTRODUODENOSCOPY) (N/A)    Patient location: PACU    Anesthesia Type: general    Transport from OR: Transported from OR on room air with adequate spontaneous ventilation    Post pain: adequate analgesia    Post assessment: no apparent anesthetic complications and tolerated procedure well    Post vital signs: stable    Level of consciousness: sedated    Nausea/Vomiting: no nausea/vomiting    Complications: none    Transfer of care protocol was followed      Last vitals:   Visit Vitals  BP (!) 172/75   Pulse (!) 57   Temp 36.5 °C (97.7 °F) (Skin)   Resp 16   Ht 6' 2" (1.88 m)   Wt 110.7 kg (244 lb 0.8 oz)   SpO2 96%   BMI 31.33 kg/m²     "

## 2022-04-29 NOTE — ASSESSMENT & PLAN NOTE
Patient's FSGs are controlled on current medication regimen.  Last A1c reviewed-   Lab Results   Component Value Date    HGBA1C 7.7 (H) 02/10/2022     Most recent fingerstick glucose reviewed-   Recent Labs   Lab 04/28/22 2012 04/29/22  0807   POCTGLUCOSE 177* 201*     Current correctional scale  Low  Maintain anti-hyperglycemic dose as follows-   Antihyperglycemics (From admission, onward)            Start     Stop Route Frequency Ordered    04/26/22 2252  insulin aspart U-100 pen 0-5 Units         -- SubQ Before meals & nightly PRN 04/26/22 2235        Hold Oral hypoglycemics while patient is in the hospital.

## 2022-04-29 NOTE — ANESTHESIA PREPROCEDURE EVALUATION
04/29/2022  Kevan Colin Sr. is a 66 y.o., male.    Pre-op Assessment    I have reviewed the Patient Summary Reports.    I have reviewed the Nursing Notes. I have reviewed the NPO Status.   I have reviewed the Medications.     Review of Systems  Social:  Former Smoker    Cardiovascular:   Hypertension Past MI CAD   Angina QUIÑONES    Pulmonary:   COPD, moderate Asthma Shortness of breath Sleep Apnea    Renal/:  Renal/ Normal     Musculoskeletal:   Arthritis   Spine Disorders: Degenerative disease    Neurological:   Peripheral Neuropathy    Endocrine:   Diabetes, type 2    Psych:   Psychiatric History depression          Physical Exam  General:  Obesity      Airway/Jaw/Neck:  Airway Findings: Mouth Opening: Normal   Tongue: Normal   Pre-Existing Airway Tube(s): Tracheostomy tube       Eyes/Ears/Nose:  EYES/EARS/NOSE FINDINGS: Normal   Dental:  DENTAL FINDINGS: Normal Edentulous, Dentures     Chest/Lungs:  Chest/Lungs Findings: Clear to auscultation, Normal Respiratory Rate      Heart/Vascular:  Heart Findings: Rate: Normal  Rhythm: Regular Rhythm  Sounds: Normal        Mental Status:  Mental Status Findings:  Unconscious         Anesthesia Plan  Type of Anesthesia, risks & benefits discussed:  Anesthesia Type:  Gen Natural Airway    Patient's Preference:   Plan Factors:          Intra-op Monitoring Plan: standard ASA monitors  Intra-op Monitoring Plan Comments:   Post Op Pain Control Plan:   Post Op Pain Control Plan Comments:     Induction:   IV  Beta Blocker:  Patient is not currently on a Beta-Blocker (No further documentation required).       Informed Consent: Informed consent signed with the Patient and all parties understand the risks and agree with anesthesia plan.  All questions answered.  Anesthesia consent signed with patient representative.  ASA Score: 3     Day of Surgery Review of History &  Physical:    H&P Update referred to the surgeon/provider.          Ready For Surgery From Anesthesia Perspective.           Physical Exam  General: Obesity    Airway:  Mouth Opening: Normal  Tongue: Normal  Pre-Existing Airway: Tracheostomy tube    Dental:  Edentulous, Dentures    Chest/Lungs:  Clear to auscultation, Normal Respiratory Rate    Heart:  Rate: Normal  Rhythm: Regular Rhythm  Sounds: Normal          Anesthesia Plan  Type of Anesthesia, risks & benefits discussed:    Anesthesia Type: Gen Natural Airway  Intra-op Monitoring Plan: standard ASA monitors  Induction:  IV  Informed Consent: Informed consent signed with the Patient and all parties understand the risks and agree with anesthesia plan.  All questions answered.   ASA Score: 3  Day of Surgery Review of History & Physical: H&P Update referred to the surgeon/provider.    Ready For Surgery From Anesthesia Perspective.       .

## 2022-04-29 NOTE — PLAN OF CARE
To room via wheelchair accompanied by nurse. Patient awake. Alert, and oriented. Report given to Deep

## 2022-04-30 VITALS
TEMPERATURE: 98 F | HEART RATE: 52 BPM | WEIGHT: 244.06 LBS | RESPIRATION RATE: 18 BRPM | BODY MASS INDEX: 31.32 KG/M2 | OXYGEN SATURATION: 99 % | DIASTOLIC BLOOD PRESSURE: 70 MMHG | SYSTOLIC BLOOD PRESSURE: 148 MMHG | HEIGHT: 74 IN

## 2022-04-30 LAB
ALBUMIN SERPL BCP-MCNC: 3.4 G/DL (ref 3.5–5.2)
ALP SERPL-CCNC: 180 U/L (ref 55–135)
ALT SERPL W/O P-5'-P-CCNC: 115 U/L (ref 10–44)
ANION GAP SERPL CALC-SCNC: 13 MMOL/L (ref 8–16)
AST SERPL-CCNC: 33 U/L (ref 10–40)
BASOPHILS # BLD AUTO: 0.09 K/UL (ref 0–0.2)
BASOPHILS NFR BLD: 1.2 % (ref 0–1.9)
BILIRUB SERPL-MCNC: 0.5 MG/DL (ref 0.1–1)
BUN SERPL-MCNC: 7 MG/DL (ref 8–23)
CALCIUM SERPL-MCNC: 8.7 MG/DL (ref 8.7–10.5)
CHLORIDE SERPL-SCNC: 101 MMOL/L (ref 95–110)
CO2 SERPL-SCNC: 26 MMOL/L (ref 23–29)
CREAT SERPL-MCNC: 0.9 MG/DL (ref 0.5–1.4)
DIFFERENTIAL METHOD: ABNORMAL
EOSINOPHIL # BLD AUTO: 0.6 K/UL (ref 0–0.5)
EOSINOPHIL NFR BLD: 7.7 % (ref 0–8)
ERYTHROCYTE [DISTWIDTH] IN BLOOD BY AUTOMATED COUNT: 13.3 % (ref 11.5–14.5)
EST. GFR  (AFRICAN AMERICAN): >60 ML/MIN/1.73 M^2
EST. GFR  (NON AFRICAN AMERICAN): >60 ML/MIN/1.73 M^2
GLUCOSE SERPL-MCNC: 168 MG/DL (ref 70–110)
HCT VFR BLD AUTO: 39.7 % (ref 40–54)
HGB BLD-MCNC: 13.1 G/DL (ref 14–18)
IMM GRANULOCYTES # BLD AUTO: 0.02 K/UL (ref 0–0.04)
IMM GRANULOCYTES NFR BLD AUTO: 0.3 % (ref 0–0.5)
LYMPHOCYTES # BLD AUTO: 2.3 K/UL (ref 1–4.8)
LYMPHOCYTES NFR BLD: 29.6 % (ref 18–48)
MAGNESIUM SERPL-MCNC: 1.7 MG/DL (ref 1.6–2.6)
MCH RBC QN AUTO: 30.5 PG (ref 27–31)
MCHC RBC AUTO-ENTMCNC: 33 G/DL (ref 32–36)
MCV RBC AUTO: 93 FL (ref 82–98)
MONOCYTES # BLD AUTO: 0.8 K/UL (ref 0.3–1)
MONOCYTES NFR BLD: 10.2 % (ref 4–15)
NEUTROPHILS # BLD AUTO: 4 K/UL (ref 1.8–7.7)
NEUTROPHILS NFR BLD: 51 % (ref 38–73)
NRBC BLD-RTO: 0 /100 WBC
PHOSPHATE SERPL-MCNC: 3.2 MG/DL (ref 2.7–4.5)
PLATELET # BLD AUTO: 175 K/UL (ref 150–450)
PMV BLD AUTO: 10.9 FL (ref 9.2–12.9)
POTASSIUM SERPL-SCNC: 3.8 MMOL/L (ref 3.5–5.1)
PROT SERPL-MCNC: 7 G/DL (ref 6–8.4)
RBC # BLD AUTO: 4.29 M/UL (ref 4.6–6.2)
SODIUM SERPL-SCNC: 140 MMOL/L (ref 136–145)
WBC # BLD AUTO: 7.76 K/UL (ref 3.9–12.7)

## 2022-04-30 PROCEDURE — 27000221 HC OXYGEN, UP TO 24 HOURS

## 2022-04-30 PROCEDURE — 85025 COMPLETE CBC W/AUTO DIFF WBC: CPT

## 2022-04-30 PROCEDURE — 36415 COLL VENOUS BLD VENIPUNCTURE: CPT

## 2022-04-30 PROCEDURE — 83735 ASSAY OF MAGNESIUM: CPT

## 2022-04-30 PROCEDURE — 94761 N-INVAS EAR/PLS OXIMETRY MLT: CPT

## 2022-04-30 PROCEDURE — 99900035 HC TECH TIME PER 15 MIN (STAT)

## 2022-04-30 PROCEDURE — 80053 COMPREHEN METABOLIC PANEL: CPT

## 2022-04-30 PROCEDURE — 25000003 PHARM REV CODE 250

## 2022-04-30 PROCEDURE — 84100 ASSAY OF PHOSPHORUS: CPT

## 2022-04-30 RX ADMIN — CARVEDILOL 3.12 MG: 3.12 TABLET, FILM COATED ORAL at 08:04

## 2022-04-30 RX ADMIN — LISINOPRIL 10 MG: 2.5 TABLET ORAL at 08:04

## 2022-04-30 RX ADMIN — PANTOPRAZOLE SODIUM 40 MG: 40 TABLET, DELAYED RELEASE ORAL at 08:04

## 2022-04-30 RX ADMIN — ATORVASTATIN CALCIUM 40 MG: 40 TABLET, FILM COATED ORAL at 08:04

## 2022-04-30 RX ADMIN — SENNOSIDES AND DOCUSATE SODIUM 1 TABLET: 8.6; 5 TABLET ORAL at 08:04

## 2022-04-30 RX ADMIN — FUROSEMIDE 40 MG: 40 TABLET ORAL at 08:04

## 2022-04-30 RX ADMIN — APIXABAN 5 MG: 2.5 TABLET, FILM COATED ORAL at 10:04

## 2022-04-30 NOTE — CARE UPDATE
04/29/22 2010   Patient Assessment/Suction   Level of Consciousness (AVPU) alert   Respiratory Effort Normal;Unlabored   Expansion/Accessory Muscles/Retractions expansion symmetric;no retractions;no use of accessory muscles   All Lung Fields Breath Sounds diminished   PRE-TX-O2   O2 Device (Oxygen Therapy) nasal cannula   Flow (L/min) 3   Oxygen Concentration (%) 32   SpO2 99 %   Pulse Oximetry Type Intermittent   Aerosol Therapy   $ Aerosol Therapy Charges PRN treatment not required   Respiratory Treatment Status (SVN) PRN treatment not required   Ready to Wean/Extubation Screen   FIO2<=50 (chart decimal) 0.32

## 2022-04-30 NOTE — CARE UPDATE
04/30/22 0809   Patient Assessment/Suction   Level of Consciousness (AVPU) alert   Respiratory Effort Unlabored   Expansion/Accessory Muscles/Retractions expansion symmetric   GHULAM Breath Sounds clear   LLL Breath Sounds diminished   RUL Breath Sounds clear   RML Breath Sounds diminished   RLL Breath Sounds diminished   Rhythm/Pattern, Respiratory unlabored   Cough Frequency infrequent   Cough Type nonproductive   PRE-TX-O2   O2 Device (Oxygen Therapy) nasal cannula   $ Is the patient on Low Flow Oxygen? Yes   Flow (L/min) 3   SpO2 99 %   Pulse Oximetry Type Intermittent   $ Pulse Oximetry - Multiple Charge Pulse Oximetry - Multiple   Pulse 62   Resp 18   Aerosol Therapy   $ Aerosol Therapy Charges PRN treatment not required   Respiratory Treatment Status (SVN) PRN treatment not required

## 2022-04-30 NOTE — DISCHARGE SUMMARY
"[facility] Medicine  Discharge Summary      Patient Name: Kevan Colin Sr.  MRN: [id number]  Patient Class: IP- Inpatient  Admission Date: 4/26/2022  Hospital Length of Stay: 3 days  Discharge Date and Time: 4/30/2022 11:55 AM  Attending Physician: Memo Henry M.D.   Discharging Provider: Anuel Duvall PA-C  Primary Care Provider: Nikita Magallanes NP      HPI:   Kevan Colin Sr. is a 66 y.o. y.o. male with a PMHx of pancreatitis, T2DM, HTN, and HLD who presented to the ED with generalized abdominal pain onset x 3 days with associated n/v. Describes pain as a aching sensation that is constant. Denies alleviating factors. Exacerbating factors include eating. Reports pain 8/10. States he had similar pain approximately 6 years ago when he had pancreatitis which required a "pancreatic stent." He also reports chills, fatigue, cough, sore throat, and chronic HA. Denies fevers. ED workup was significant for elevated lipase, hyperglycemia, and HTN. The patient was placed in observation under the acre of [facility] Medicine.       Procedure(s) (LRB):  EGD (ESOPHAGOGASTRODUODENOSCOPY) (N/A)      Hospital Course:   Kevan Colin in a 66 year old white male with a PMHx significant for T2DM, HTN, and HLD presenting for abdominal pain with N/V. Patient was monitored closely throughout the course of hospital stay by hospital medicine team. Patient's found to have elevated lipase on admission. Patient's pain and nausea controlled on prn pain medications and antiemetics. Patient intitated on IVF. CT abdomen demonstrated acute pancreatitis without any complications. GI consulted for pancreatitis with spike in liver enzymes. GI performed EGD which showed evidence of mild gastritis and duodenitis with duodenal erosions which were biopsied. Plan to send home on daily PPI for 12 weeks per GI recommendations. Slowly advanced diet as tolerated without any difficulties. Patient's N/V and " abdominal pain gradually resolved over course of hospital stay. Plan to discharge home with follow up scheduled with outpatient GI services. Patient verbalized understanding of discharge instructions and return precautions.     Physical Exam:  General- Patient alert and oriented x3 in NAD  HEENT- PERRLA, EOMI, OP clear, MMM  Neck- No JVD, Lymphadenopathy, Thyromegaly  CV- Regular rate and rhythm, No Murmur/vasiliy/rubs  Resp- Lungs CTA Bilaterally, No increased WOB, chronically on 3L Home O2  GI- Non tender/non-distended, BS normoactive x4 quads, no HSM  Extrem- No cyanosis, clubbing, edema. Pulses 2+ and symmetric  Neuro- Strength 5/5 flexors/extensors, DTRs 2+ and symmetric, Intact sensation to light touch grossly  Skin-  No masses, rashes or lesions noted on cursory skin exam.         Goals of Care Treatment Preferences:  Code Status: Full Code      Consults:   Consults (From admission, onward)        Status Ordering Provider     Inpatient consult to Midline team  Once        Provider:  (Not yet assigned)    Completed FALGUNI HAGEN     Inpatient consult to Gastroenterology  Once        Provider:  Eli Christian MD    Completed FALGUNI HAGEN          No new Assessment & Plan notes have been filed under this hospital service since the last note was generated.  Service: Hospital Medicine    Final Active Diagnoses:    Diagnosis Date Noted POA    PRINCIPAL PROBLEM:  Acute pancreatitis [K85.90] 01/27/2016 Yes    Benign essential HTN [I10] 03/24/2016 Yes    Obesity [E66.9] 06/06/2013 Yes    Type 2 diabetes mellitus, uncontrolled [E11.65] 05/23/2012 Yes      Problems Resolved During this Admission:       Discharged Condition: good    Disposition: Home or Self Care    Follow Up:   Follow-up Information     Nikita Magallanes NP Follow up in 3 day(s).    Specialty: Family Medicine  Why: hospital follow up. Please call for f/u appt  Contact information:  [address]  [address]  [place]  [address]  [phone]             Eli Christian MD Follow up in 2 week(s).    Specialties: Gastroenterology, Internal Medicine  Why: hospital follow up. Please call for f/u appt  Contact information:  [address]  [place] [address]  [phone]                       Patient Instructions:      Diet diabetic     Notify your health care provider if you experience any of the following:  temperature >100.4     Notify your health care provider if you experience any of the following:  persistent nausea and vomiting or diarrhea     Notify your health care provider if you experience any of the following:  severe uncontrolled pain     Notify your health care provider if you experience any of the following:  redness, tenderness, or signs of infection (pain, swelling, redness, odor or green/yellow discharge around incision site)     Notify your health care provider if you experience any of the following:  increased confusion or weakness     Notify your health care provider if you experience any of the following:  difficulty breathing or increased cough     Notify your health care provider if you experience any of the following:  severe persistent headache     Activity as tolerated       Significant Diagnostic Studies: Labs:   CMP   Recent Labs   Lab 04/29/22  0611 04/30/22  0258    140   K 4.4 3.8    101   CO2 26 26   * 168*   BUN 7* 7*   CREATININE 0.8 0.9   CALCIUM 9.0 8.7   PROT 7.2 7.0   ALBUMIN 3.6 3.4*   BILITOT 0.6 0.5   ALKPHOS 198* 180*   AST 55* 33   * 115*   ANIONGAP 11 13   ESTGFRAFRICA >60 >60   EGFRNONAA >60 >60    and CBC   Recent Labs   Lab 04/28/22  1801 04/29/22  0611 04/30/22  0258   WBC 7.00 8.12 7.76   HGB 14.7 13.3* 13.1*   HCT 45.6 41.1 39.7*    183 175       Pending Diagnostic Studies:     Procedure Component Value Units Date/Time    Hepatitis Panel, Acute [[id number]] Collected: 04/27/22 1546    Order Status: Sent Lab Status: In process Updated: 04/27/22  2133    Specimen: Blood     Specimen to Pathology, Surgery Gastrointestinal tract [[id number]] Collected: 04/29/22 1431    Order Status: Sent Lab Status: In process Updated: 04/29/22 1451    Specimen: Tissue     Specimen to Pathology, Surgery Gastrointestinal tract [[id number]] Collected: 04/29/22 1426    Order Status: Sent Lab Status: In process Updated: 04/29/22 1426    Specimen: Tissue          Medications:  Reconciled Home Medications:      Medication List      CHANGE how you take these medications    HumaLOG KwikPen Insulin 100 unit/mL pen  Generic drug: insulin lispro  INJECT 16 UNITS INTO THE SKIN 3 (THREE) TIMES DAILY BEFORE MEALS.  What changed:   · how much to take  · how to take this  · when to take this  · additional instructions        CONTINUE taking these medications    albuterol 90 mcg/actuation inhaler  Commonly known as: PROVENTIL/VENTOLIN HFA  Inhale 2 puffs into the lungs every 4 (four) hours as needed for Shortness of Breath or Wheezing.     albuterol-ipratropium 2.5 mg-0.5 mg/3 mL nebulizer solution  Commonly known as: DUO-NEB  Take 3 mLs by nebulization every 6 (six) hours as needed for Wheezing. Rescue     apixaban 5 mg Tab  Commonly known as: ELIQUIS  Take 5 mg by mouth 2 (two) times daily. May resume 8/6/19 in the evening     atorvastatin 40 MG tablet  Commonly known as: LIPITOR  Take 1 tablet (40 mg total) by mouth once daily.     BASAGLAR KWIKPEN U-100 INSULIN glargine 100 units/mL (3mL) SubQ pen  Generic drug: insulin  Inject 90 Units into the skin every evening.     carvediloL 3.125 MG tablet  Commonly known as: COREG  Take 1 tablet (3.125 mg total) by mouth 2 (two) times daily.     cyclobenzaprine 5 MG tablet  Commonly known as: FLEXERIL  TAKE ONE TABLET BY MOUTH TWICE DAILY     diazePAM 5 MG tablet  Commonly known as: VALIUM  Take 5 mg by mouth every 8 (eight) hours as needed.     doxepin 3 mg Tab  Commonly known as: SILENOR     EPINEPHrine 0.3 mg/0.3 mL Atin  Commonly known as:  EPIPEN  EpiPen 2-Helio 0.3 mg/0.3 mL injection, auto-injector     furosemide 40 MG tablet  Commonly known as: LASIX  Take 40 mg by mouth once daily.     gabapentin 600 MG tablet  Commonly known as: NEURONTIN  TAKE ONE TABLET BY MOUTH THREE TIMES DAILY.     INVEGA SUSTENNA 156 mg/mL Syrg injection  Generic drug: paliperidone palmitate     lisinopriL 10 MG tablet  TAKE ONE TABLET BY MOUTH ONCE DAILY     metFORMIN 1000 MG tablet  Commonly known as: GLUCOPHAGE  Take 1 tablet (1,000 mg total) by mouth 2 (two) times daily with meals.     ondansetron 4 MG Tbdl  Commonly known as: ZOFRAN-ODT  Take 1 tablet (4 mg total) by mouth every 8 (eight) hours as needed (nausea).     pantoprazole 40 MG tablet  Commonly known as: PROTONIX  Take 40 mg by mouth 2 (two) times daily.     potassium chloride SA 20 MEQ tablet  Commonly known as: K-DUR,KLOR-CON  Take 20 mEq by mouth once daily.            Indwelling Lines/Drains at time of discharge:   Lines/Drains/Airways     None                 Time spent on the discharge of patient: 45 minutes         Anuel Duvall PA-C  Department of Hospital Medicine  [facility]

## 2022-04-30 NOTE — DISCHARGE INSTRUCTIONS
Discharge Instructions, [facility] Medicine    Continue to take Protonix twice a day per GI recommendations. Follow up with GI doctor in 2 weeks. Avoid greasy/fatty foods to prevent recurrence of acute pancreatitis.     Thank you for choosing [facility] for your medical care.     You were admitted to the hospital with Acute pancreatitis.     Please note your discharge instructions, including diet/activity restrictions, follow-up appointments, and medication changes.  If you have any questions about your medical issues, prescriptions, or any other questions, please feel free to contact the [facility] Medicine Dept at [phone] and we will help.    If you are previously with Home health, outpatient PT/OT or under a therapy program, you are cleared to return to those programs.    Please direct all long term medication refills and follow up to your primary care provider, Nikita Magallanes NP. Thank you again for letting us take care of your health care needs.    Please note the following discharge instructions per your discharging physician-  Anuel Duvall PA-C

## 2022-04-30 NOTE — PLAN OF CARE
Pt is cleared from case  manager for discharge.       04/30/22 1032   Final Note   Assessment Type Final Discharge Note   Anticipated Discharge Disposition Home

## 2022-04-30 NOTE — PLAN OF CARE
POC discussed with pt.  Pt discharge instructions given. Pt voiced understanding.  Pt ate breakfast without any nausea or vomiting.  Pt had no complaints of stomach pain during shift.  Midline removed with catheter tip intact. Pt tolerated well. Pt instructed to call 24 hour nurse phone line with any concerns and to also return to the ER with any needs.  Pt left via wheelchair to POV

## 2022-04-30 NOTE — HOSPITAL COURSE
Kevan Kvng Colin in a 66 year old white male with a PMHx significant for T2DM, HTN, and HLD presenting for abdominal pain with N/V. Patient was monitored closely throughout the course of hospital stay by hospital medicine team. Patient's found to have elevated lipase on admission. Patient's pain and nausea controlled on prn pain medications and antiemetics. Patient intitated on IVF. CT abdomen demonstrated acute pancreatitis without any complications. GI consulted for pancreatitis with spike in liver enzymes. GI performed EGD which showed evidence of mild gastritis and duodenitis with duodenal erosions which were biopsied. Plan to send home on daily PPI for 12 weeks per GI recommendations. Slowly advanced diet as tolerated without any difficulties. Patient's N/V and abdominal pain gradually resolved over course of hospital stay. Plan to discharge home with follow up scheduled with outpatient GI services. Patient verbalized understanding of discharge instructions and return precautions.     Physical Exam:  General- Patient alert and oriented x3 in NAD  HEENT- PERRLA, EOMI, OP clear, MMM  Neck- No JVD, Lymphadenopathy, Thyromegaly  CV- Regular rate and rhythm, No Murmur/vasiliy/rubs  Resp- Lungs CTA Bilaterally, No increased WOB, chronically on 3L Home O2  GI- Non tender/non-distended, BS normoactive x4 quads, no HSM  Extrem- No cyanosis, clubbing, edema. Pulses 2+ and symmetric  Neuro- Strength 5/5 flexors/extensors, DTRs 2+ and symmetric, Intact sensation to light touch grossly  Skin-  No masses, rashes or lesions noted on cursory skin exam.

## 2022-05-02 LAB
HAV IGM SERPL QL IA: NEGATIVE
HBV CORE IGM SERPL QL IA: NEGATIVE
HBV SURFACE AG SERPL QL IA: NEGATIVE
HCV AB SERPL QL IA: NEGATIVE

## 2022-05-03 ENCOUNTER — TELEPHONE (OUTPATIENT)
Dept: ORTHOPEDICS | Facility: CLINIC | Age: 67
End: 2022-05-03

## 2022-05-03 ENCOUNTER — PATIENT OUTREACH (OUTPATIENT)
Dept: FAMILY MEDICINE | Facility: CLINIC | Age: 67
End: 2022-05-03

## 2022-05-03 NOTE — TELEPHONE ENCOUNTER
Discharge Information     Discharge Date:  4-30-22    Primary Discharge Diagnosis:  Acute pancreatitis      Discharge Summary:  Reviewed      Medication & Order Review     Were medication changes made or new medications added?   No    If so, has the patient filled the prescriptions?       Was Home Health ordered? No    If so, has Home Health contacted patient and/or initiated services?      Name of Home Health Agency? N/A    Durable Medical Equipment ordered?  No     If so, has the DME provider contacted patient and delivered equipment?      Follow Up               Any problems since discharge? No    How is the patient feeling since returning home?  Still feeling sick    Have you set up recommended follow up appointments?  (cardiology, surgery, etc.)    Schedule Hospital Follow-up appointment within 7-14 days (preferably 7).      Notes: Patient has appointment with Amado Magallanes NP 5-3-22 at 1 pm.               Kusum Croft

## 2022-05-03 NOTE — TELEPHONE ENCOUNTER
Called patient three times and left voicemail regarding his appointment on 05/04/2022. Patient needs to reschedule his appointment with our office after he gets his CT.

## 2022-05-05 LAB
FINAL PATHOLOGIC DIAGNOSIS: NORMAL
GROSS: NORMAL
Lab: NORMAL

## 2022-05-09 NOTE — PHYSICIAN QUERY
PT Name: Kevan Colin Sr.  MR #: [id number]     DOCUMENTATION CLARIFICATION      CDS/: Moni Murray RN, CDI            Contact information:[email or web address]  This form is a permanent document in the medical record.     Query Date: May 9, 2022    By submitting this query, we are merely seeking further clarification of documentation.  Please utilize your independent clinical judgment when addressing the question(s) below.     The Medical Record contains the following:    Clinical Information Location in Medical Record   66 y.o. male with history of DVT and CVA on Eliquis, admitted with acute on chronic pancreatitis as well as complaints of hematemesis x 1 episode this morning. He is hemodynamically stable. His acute elevation in LFTs is likely reactive in setting of pancreatitis.     He endorses one episode of emesis yesterday with minute bright red blood. He states he also has poor dentition which could explain the bright blood.    EGD:  -Mild gastritis and duodenitis with duodenal erosions, biopsied    1. DUODENAL BULB, BIOPSY:   Benign duodenal mucosa with reactive/features of non-specific duodenitis   Negative for acute inflammation, neoplasia   Villous architecture is maintained   2. STOMACH, BIOPSY:   Gastric body and antral mucosa with mild chronic gastritis and reactive   changes   No evidence of intestinal metaplasia, dysplasia or malignancy   No evidence of Helicobacter pylori organisms on H&E stain GI consult 4/28            HM PN 4/29        GI POC 4/29      Path 4/29     Please document your best medical opinion regarding the etiology of __hematemesis_____?        [ x  ] Due to poor dentition   [   ] Due to gastritis   [   ] Due to duodenitis    [   ] Due to duodenal erosions   [   ] Other etiology (please specify):___________________   [  ] Clinically Undetermined             Please document in your progress notes daily for the duration of treatment, until resolved, and include in your  discharge summary.

## 2022-05-10 ENCOUNTER — HOSPITAL ENCOUNTER (EMERGENCY)
Facility: HOSPITAL | Age: 67
Discharge: HOME | End: 2022-05-10
Attending: EMERGENCY MEDICINE
Payer: COMMERCIAL

## 2022-05-10 ENCOUNTER — HOSPITAL ENCOUNTER (OUTPATIENT)
Dept: RADIOLOGY | Facility: HOSPITAL | Age: 67
Discharge: HOME | End: 2022-05-10
Attending: ORTHOPAEDIC SURGERY
Payer: COMMERCIAL

## 2022-05-10 ENCOUNTER — TELEPHONE (OUTPATIENT)
Dept: MEDSURG UNIT | Facility: HOSPITAL | Age: 67
End: 2022-05-10
Payer: COMMERCIAL

## 2022-05-10 VITALS
TEMPERATURE: 99 F | HEIGHT: 74 IN | SYSTOLIC BLOOD PRESSURE: 146 MMHG | DIASTOLIC BLOOD PRESSURE: 75 MMHG | RESPIRATION RATE: 16 BRPM | OXYGEN SATURATION: 100 % | HEART RATE: 91 BPM | BODY MASS INDEX: 31.32 KG/M2 | WEIGHT: 244 LBS

## 2022-05-10 DIAGNOSIS — K29.00 ACUTE SUPERFICIAL GASTRITIS WITHOUT HEMORRHAGE: ICD-10-CM

## 2022-05-10 DIAGNOSIS — R11.10 VOMITING: ICD-10-CM

## 2022-05-10 DIAGNOSIS — M54.16 LUMBAR RADICULITIS: ICD-10-CM

## 2022-05-10 DIAGNOSIS — Z96.89 SPINAL CORD STIMULATOR STATUS: ICD-10-CM

## 2022-05-10 DIAGNOSIS — R10.13 EPIGASTRIC PAIN: ICD-10-CM

## 2022-05-10 DIAGNOSIS — M54.50 CHRONIC BILATERAL LOW BACK PAIN WITHOUT SCIATICA: Primary | ICD-10-CM

## 2022-05-10 DIAGNOSIS — Z98.1 HISTORY OF LUMBAR SPINAL FUSION: ICD-10-CM

## 2022-05-10 DIAGNOSIS — G89.29 CHRONIC BILATERAL LOW BACK PAIN WITHOUT SCIATICA: Primary | ICD-10-CM

## 2022-05-10 DIAGNOSIS — M51.36 DISC DEGENERATION, LUMBAR: ICD-10-CM

## 2022-05-10 LAB
ALBUMIN SERPL BCP-MCNC: 4.3 G/DL (ref 3.5–5.2)
ALP SERPL-CCNC: 124 U/L (ref 55–135)
ALT SERPL W/O P-5'-P-CCNC: 24 U/L (ref 10–44)
ANION GAP SERPL CALC-SCNC: 15 MMOL/L (ref 8–16)
AST SERPL-CCNC: 11 U/L (ref 10–40)
BACTERIA #/AREA URNS HPF: NORMAL /HPF
BASOPHILS # BLD AUTO: 0.06 K/UL (ref 0–0.2)
BASOPHILS NFR BLD: 0.5 % (ref 0–1.9)
BILIRUB SERPL-MCNC: 0.8 MG/DL (ref 0.1–1)
BILIRUB UR QL STRIP: NEGATIVE
BUN SERPL-MCNC: 18 MG/DL (ref 8–23)
CALCIUM SERPL-MCNC: 10 MG/DL (ref 8.7–10.5)
CHLORIDE SERPL-SCNC: 100 MMOL/L (ref 95–110)
CLARITY UR: CLEAR
CO2 SERPL-SCNC: 25 MMOL/L (ref 23–29)
COLOR UR: YELLOW
CREAT SERPL-MCNC: 1.1 MG/DL (ref 0.5–1.4)
DIFFERENTIAL METHOD: ABNORMAL
EOSINOPHIL # BLD AUTO: 0.3 K/UL (ref 0–0.5)
EOSINOPHIL NFR BLD: 2.7 % (ref 0–8)
ERYTHROCYTE [DISTWIDTH] IN BLOOD BY AUTOMATED COUNT: 13.2 % (ref 11.5–14.5)
EST. GFR  (AFRICAN AMERICAN): >60 ML/MIN/1.73 M^2
EST. GFR  (NON AFRICAN AMERICAN): >60 ML/MIN/1.73 M^2
GLUCOSE SERPL-MCNC: 208 MG/DL (ref 70–110)
GLUCOSE UR QL STRIP: ABNORMAL
HCT VFR BLD AUTO: 45.2 % (ref 40–54)
HGB BLD-MCNC: 14.6 G/DL (ref 14–18)
HGB UR QL STRIP: NEGATIVE
IMM GRANULOCYTES # BLD AUTO: 0.03 K/UL (ref 0–0.04)
IMM GRANULOCYTES NFR BLD AUTO: 0.3 % (ref 0–0.5)
KETONES UR QL STRIP: ABNORMAL
LEUKOCYTE ESTERASE UR QL STRIP: NEGATIVE
LIPASE SERPL-CCNC: 44 U/L (ref 4–60)
LYMPHOCYTES # BLD AUTO: 0.6 K/UL (ref 1–4.8)
LYMPHOCYTES NFR BLD: 5.2 % (ref 18–48)
MCH RBC QN AUTO: 30.3 PG (ref 27–31)
MCHC RBC AUTO-ENTMCNC: 32.3 G/DL (ref 32–36)
MCV RBC AUTO: 94 FL (ref 82–98)
MICROSCOPIC COMMENT: NORMAL
MONOCYTES # BLD AUTO: 0.5 K/UL (ref 0.3–1)
MONOCYTES NFR BLD: 3.9 % (ref 4–15)
NEUTROPHILS # BLD AUTO: 10.2 K/UL (ref 1.8–7.7)
NEUTROPHILS NFR BLD: 87.4 % (ref 38–73)
NITRITE UR QL STRIP: NEGATIVE
NRBC BLD-RTO: 0 /100 WBC
PH UR STRIP: 6 [PH] (ref 5–8)
PLATELET # BLD AUTO: 247 K/UL (ref 150–450)
PMV BLD AUTO: 10.2 FL (ref 9.2–12.9)
POTASSIUM SERPL-SCNC: 4.8 MMOL/L (ref 3.5–5.1)
PROT SERPL-MCNC: 8.3 G/DL (ref 6–8.4)
PROT UR QL STRIP: NEGATIVE
RBC # BLD AUTO: 4.82 M/UL (ref 4.6–6.2)
SODIUM SERPL-SCNC: 140 MMOL/L (ref 136–145)
SP GR UR STRIP: <=1.005 (ref 1–1.03)
URN SPEC COLLECT METH UR: ABNORMAL
UROBILINOGEN UR STRIP-ACNC: NEGATIVE EU/DL
WBC # BLD AUTO: 11.68 K/UL (ref 3.9–12.7)
YEAST URNS QL MICRO: NORMAL

## 2022-05-10 PROCEDURE — 83690 ASSAY OF LIPASE: CPT | Performed by: NURSE PRACTITIONER

## 2022-05-10 PROCEDURE — 99285 EMERGENCY DEPT VISIT HI MDM: CPT | Mod: 25

## 2022-05-10 PROCEDURE — 93010 ELECTROCARDIOGRAM REPORT: CPT | Mod: ,,, | Performed by: GENERAL PRACTICE

## 2022-05-10 PROCEDURE — 96361 HYDRATE IV INFUSION ADD-ON: CPT

## 2022-05-10 PROCEDURE — 93010 EKG 12-LEAD: ICD-10-PCS | Mod: ,,, | Performed by: GENERAL PRACTICE

## 2022-05-10 PROCEDURE — 25000003 PHARM REV CODE 250: Performed by: EMERGENCY MEDICINE

## 2022-05-10 PROCEDURE — 80053 COMPREHEN METABOLIC PANEL: CPT | Performed by: NURSE PRACTITIONER

## 2022-05-10 PROCEDURE — 81000 URINALYSIS NONAUTO W/SCOPE: CPT | Performed by: NURSE PRACTITIONER

## 2022-05-10 PROCEDURE — 96374 THER/PROPH/DIAG INJ IV PUSH: CPT

## 2022-05-10 PROCEDURE — 93005 ELECTROCARDIOGRAM TRACING: CPT

## 2022-05-10 PROCEDURE — 63600175 PHARM REV CODE 636 W HCPCS: Performed by: EMERGENCY MEDICINE

## 2022-05-10 PROCEDURE — 72131 CT LUMBAR SPINE W/O DYE: CPT | Mod: TC,PO

## 2022-05-10 PROCEDURE — 85025 COMPLETE CBC W/AUTO DIFF WBC: CPT | Performed by: NURSE PRACTITIONER

## 2022-05-10 PROCEDURE — 25000003 PHARM REV CODE 250: Performed by: NURSE PRACTITIONER

## 2022-05-10 PROCEDURE — 96375 TX/PRO/DX INJ NEW DRUG ADDON: CPT

## 2022-05-10 RX ORDER — PANTOPRAZOLE SODIUM 40 MG/1
40 TABLET, DELAYED RELEASE ORAL
Status: COMPLETED | OUTPATIENT
Start: 2022-05-10 | End: 2022-05-10

## 2022-05-10 RX ORDER — ONDANSETRON HYDROCHLORIDE 8 MG/1
8 TABLET, FILM COATED ORAL EVERY 12 HOURS PRN
Qty: 8 TABLET | Refills: 1 | Status: ON HOLD | OUTPATIENT
Start: 2022-05-10 | End: 2023-01-24 | Stop reason: HOSPADM

## 2022-05-10 RX ORDER — DROPERIDOL 2.5 MG/ML
2.5 INJECTION, SOLUTION INTRAMUSCULAR; INTRAVENOUS
Status: COMPLETED | OUTPATIENT
Start: 2022-05-10 | End: 2022-05-10

## 2022-05-10 RX ORDER — METOCLOPRAMIDE HYDROCHLORIDE 5 MG/ML
10 INJECTION INTRAMUSCULAR; INTRAVENOUS
Status: COMPLETED | OUTPATIENT
Start: 2022-05-10 | End: 2022-05-10

## 2022-05-10 RX ORDER — OXYCODONE HYDROCHLORIDE 10 MG/1
10 TABLET ORAL
Status: COMPLETED | OUTPATIENT
Start: 2022-05-10 | End: 2022-05-10

## 2022-05-10 RX ORDER — PANTOPRAZOLE SODIUM 40 MG/1
40 TABLET, DELAYED RELEASE ORAL 2 TIMES DAILY
Qty: 60 TABLET | Refills: 2 | Status: ON HOLD | OUTPATIENT
Start: 2022-05-10 | End: 2023-01-24 | Stop reason: HOSPADM

## 2022-05-10 RX ORDER — MAG HYDROX/ALUMINUM HYD/SIMETH 200-200-20
5 SUSPENSION, ORAL (FINAL DOSE FORM) ORAL
Status: COMPLETED | OUTPATIENT
Start: 2022-05-10 | End: 2022-05-10

## 2022-05-10 RX ORDER — KETOROLAC TROMETHAMINE 30 MG/ML
15 INJECTION, SOLUTION INTRAMUSCULAR; INTRAVENOUS
Status: COMPLETED | OUTPATIENT
Start: 2022-05-10 | End: 2022-05-10

## 2022-05-10 RX ADMIN — METOCLOPRAMIDE 10 MG: 5 INJECTION, SOLUTION INTRAMUSCULAR; INTRAVENOUS at 01:05

## 2022-05-10 RX ADMIN — ALUMINUM HYDROXIDE, MAGNESIUM HYDROXIDE, AND SIMETHICONE 5 ML: 200; 200; 20 SUSPENSION ORAL at 01:05

## 2022-05-10 RX ADMIN — DROPERIDOL 2.5 MG: 2.5 INJECTION, SOLUTION INTRAMUSCULAR; INTRAVENOUS at 04:05

## 2022-05-10 RX ADMIN — SODIUM CHLORIDE 1000 ML: 0.9 INJECTION, SOLUTION INTRAVENOUS at 01:05

## 2022-05-10 RX ADMIN — PANTOPRAZOLE SODIUM 40 MG: 40 TABLET, DELAYED RELEASE ORAL at 01:05

## 2022-05-10 RX ADMIN — OXYCODONE HYDROCHLORIDE 10 MG: 10 TABLET ORAL at 03:05

## 2022-05-10 RX ADMIN — KETOROLAC TROMETHAMINE 15 MG: 30 INJECTION, SOLUTION INTRAMUSCULAR at 01:05

## 2022-05-10 NOTE — ED NOTES
Kevan Colin presents to the ED with vomiting, back pain and abdominal pain.  Pt was admitted to the hospital and then discharged last week.  Pt stated that he keeps throwing up so he came back.  Pt states that his pain is 9/10.  SO at the bedside.  Pt has bruising  on his right arm from a PICC line placement during his hospital stay.  No other complaints at this time.

## 2022-05-10 NOTE — ED NOTES
Upon discharge, patient is AAOx4, no cardiac or respiratory complications. Follow up care and  Medications have been reviewed with patient and has been instructed to return to the ER if needed. Patient verbalized understanding and ambulated to the lobby without difficulty. ELENITA OATES.
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
## 2022-05-10 NOTE — FIRST PROVIDER EVALUATION
Emergency Department TeleTriage Encounter Note      CHIEF COMPLAINT    Chief Complaint   Patient presents with    Vomiting     X 2 days     Back Pain       VITAL SIGNS   Initial Vitals [05/10/22 1156]   BP Pulse Resp Temp SpO2   (!) 155/79 100 20 98 °F (36.7 °C) 95 %      MAP       --            ALLERGIES    Review of patient's allergies indicates:   Allergen Reactions    Bee pollens Anaphylaxis     Bee stings     Penicillins Nausea Only     Other reaction(s): Unknown    Codeine Rash     Other reaction(s): Unknown    Morphine Rash       PROVIDER TRIAGE NOTE  This is a teletriage evaluation of a 66 y.o. male presenting to the ED with c/o vomiting x 2 days with associated abdominal pain and back pain. Hx of pancreatitis. Limited physical exam via telehealth: The patient is awake, alert, holding emesis bag and is not in respiratory distress. Initial orders will be placed and care will be transferred to an alternate provider when patient is roomed for a full evaluation. Any additional orders and the final disposition will be determined by that provider.         ORDERS  Labs Reviewed   CBC W/ AUTO DIFFERENTIAL   COMPREHENSIVE METABOLIC PANEL   LIPASE   URINALYSIS, REFLEX TO URINE CULTURE       ED Orders (720h ago, onward)    Start Ordered     Status Ordering Provider    05/10/22 1230 05/10/22 1228  sodium chloride 0.9% bolus 1,000 mL  ED 1 Time         Ordered CARLA PATEL    05/10/22 1229 05/10/22 1228  Vital signs  Every 2 hours         Ordered CARLA PATEL PRichie    05/10/22 1229 05/10/22 1228  Diet NPO  Diet effective now         Ordered CARLA PATEL    05/10/22 1229 05/10/22 1228  Insert peripheral IV  Once         Ordered CARLA PATEL    05/10/22 1229 05/10/22 1228  CBC W/ AUTO DIFFERENTIAL  STAT         Ordered CARLA APTEL    05/10/22 1229 05/10/22 1228  Comp. Metabolic Panel  STAT         Ordered CARLA PATEL    05/10/22 1229 05/10/22 1228  Lipase  STAT         Ordered CARLA PATEL     05/10/22 1229 05/10/22 1228  Urinalysis, Reflex to Urine Culture Urine, Clean Catch  STAT         Ordered CARLA PATEL            Virtual Visit Note: The provider triage portion of this emergency department evaluation and documentation was performed via watAgame, a HIPAA-compliant telemedicine application, in concert with a tele-presenter in the room. A face to face patient evaluation with one of my colleagues will occur once the patient is placed in an emergency department room.      DISCLAIMER: This note was prepared with KXEN voice recognition transcription software. Garbled syntax, mangled pronouns, and other bizarre constructions may be attributed to that software system.

## 2022-05-12 ENCOUNTER — TELEPHONE (OUTPATIENT)
Dept: FAMILY MEDICINE | Facility: CLINIC | Age: 67
End: 2022-05-12

## 2022-05-12 NOTE — TELEPHONE ENCOUNTER
----- Message from Shayla Grady sent at 5/11/2022  3:32 PM CDT -----  REQUEST FOR SURGICAL CLEARANCE   30-May-2021

## 2022-05-24 DIAGNOSIS — E78.1 HYPERTRIGLYCERIDEMIA: ICD-10-CM

## 2022-05-24 RX ORDER — ATORVASTATIN CALCIUM 40 MG/1
40 TABLET, FILM COATED ORAL DAILY
Qty: 90 TABLET | Refills: 1 | Status: SHIPPED | OUTPATIENT
Start: 2022-05-24 | End: 2022-12-01

## 2022-06-06 DIAGNOSIS — Z79.4 TYPE 2 DIABETES MELLITUS WITH HYPERGLYCEMIA, WITH LONG-TERM CURRENT USE OF INSULIN: ICD-10-CM

## 2022-06-06 DIAGNOSIS — E11.65 TYPE 2 DIABETES MELLITUS WITH HYPERGLYCEMIA, WITH LONG-TERM CURRENT USE OF INSULIN: ICD-10-CM

## 2022-06-06 DIAGNOSIS — E11.42 DIABETIC POLYNEUROPATHY ASSOCIATED WITH TYPE 2 DIABETES MELLITUS: ICD-10-CM

## 2022-06-06 DIAGNOSIS — I10 BENIGN ESSENTIAL HTN: ICD-10-CM

## 2022-06-06 RX ORDER — GABAPENTIN 600 MG/1
TABLET ORAL
Qty: 270 TABLET | Refills: 1 | Status: SHIPPED | OUTPATIENT
Start: 2022-06-06 | End: 2023-01-17

## 2022-06-06 RX ORDER — LISINOPRIL 10 MG/1
TABLET ORAL
Qty: 90 TABLET | Refills: 1 | Status: SHIPPED | OUTPATIENT
Start: 2022-06-06 | End: 2022-12-19

## 2022-06-23 DIAGNOSIS — I10 BENIGN ESSENTIAL HTN: ICD-10-CM

## 2022-06-23 RX ORDER — CARVEDILOL 3.12 MG/1
3.12 TABLET ORAL 2 TIMES DAILY
Qty: 180 TABLET | Refills: 1 | Status: SHIPPED | OUTPATIENT
Start: 2022-06-23 | End: 2022-12-21

## 2022-06-23 NOTE — TELEPHONE ENCOUNTER
Requested Prescriptions     Pending Prescriptions Disp Refills    carvediloL (COREG) 3.125 MG tablet 180 tablet 1     Sig: Take 1 tablet (3.125 mg total) by mouth 2 (two) times daily.         [facility] called, patient requesting Coreg refill.         Last OV: 03/14/2022

## 2022-08-11 DIAGNOSIS — E78.1 HYPERTRIGLYCERIDEMIA: ICD-10-CM
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
## 2022-09-16 ENCOUNTER — HOSPITAL ENCOUNTER (INPATIENT)
Facility: HOSPITAL | Age: 67
LOS: 2 days | Discharge: HOME | DRG: 683 | End: 2022-09-18
Attending: EMERGENCY MEDICINE | Admitting: INTERNAL MEDICINE
Payer: COMMERCIAL

## 2022-09-16 DIAGNOSIS — R00.0 TACHYCARDIA: ICD-10-CM

## 2022-09-16 DIAGNOSIS — N17.9 AKI (ACUTE KIDNEY INJURY): ICD-10-CM

## 2022-09-16 DIAGNOSIS — E87.20 LACTIC ACIDOSIS: Primary | ICD-10-CM

## 2022-09-16 DIAGNOSIS — E86.0 DEHYDRATION: ICD-10-CM

## 2022-09-16 DIAGNOSIS — R07.9 CHEST PAIN: ICD-10-CM

## 2022-09-16 DIAGNOSIS — M79.605 LEG PAIN, LATERAL, LEFT: ICD-10-CM

## 2022-09-16 DIAGNOSIS — R53.1 GENERALIZED WEAKNESS: ICD-10-CM

## 2022-09-16 DIAGNOSIS — Z03.89 RULED OUT FOR MYOCARDIAL INFARCTION: ICD-10-CM

## 2022-09-16 PROBLEM — Z71.89 ACP (ADVANCE CARE PLANNING): Status: ACTIVE | Noted: 2022-09-16

## 2022-09-16 LAB
ALBUMIN SERPL BCP-MCNC: 4.5 G/DL (ref 3.5–5.2)
ALP SERPL-CCNC: 108 U/L (ref 55–135)
ALT SERPL W/O P-5'-P-CCNC: 11 U/L (ref 10–44)
AMPHET+METHAMPHET UR QL: NEGATIVE
ANION GAP SERPL CALC-SCNC: 17 MMOL/L (ref 8–16)
AST SERPL-CCNC: 12 U/L (ref 10–40)
B-OH-BUTYR BLD STRIP-SCNC: 0.2 MMOL/L (ref 0–0.5)
BACTERIA #/AREA URNS HPF: NORMAL /HPF
BARBITURATES UR QL SCN>200 NG/ML: NEGATIVE
BASOPHILS # BLD AUTO: 0.07 K/UL (ref 0–0.2)
BASOPHILS NFR BLD: 0.6 % (ref 0–1.9)
BENZODIAZ UR QL SCN>200 NG/ML: ABNORMAL
BILIRUB SERPL-MCNC: 1.2 MG/DL (ref 0.1–1)
BILIRUB UR QL STRIP: NEGATIVE
BNP SERPL-MCNC: 22 PG/ML (ref 0–99)
BUN SERPL-MCNC: 20 MG/DL (ref 8–23)
BZE UR QL SCN: NEGATIVE
CALCIUM SERPL-MCNC: 9.5 MG/DL (ref 8.7–10.5)
CANNABINOIDS UR QL SCN: ABNORMAL
CHLORIDE SERPL-SCNC: 98 MMOL/L (ref 95–110)
CK SERPL-CCNC: 56 U/L (ref 20–200)
CLARITY UR: CLEAR
CO2 SERPL-SCNC: 22 MMOL/L (ref 23–29)
COLOR UR: YELLOW
CREAT SERPL-MCNC: 1.5 MG/DL (ref 0.5–1.4)
CREAT UR-MCNC: 169.5 MG/DL (ref 23–375)
DIFFERENTIAL METHOD: ABNORMAL
EOSINOPHIL # BLD AUTO: 0.5 K/UL (ref 0–0.5)
EOSINOPHIL NFR BLD: 4.2 % (ref 0–8)
ERYTHROCYTE [DISTWIDTH] IN BLOOD BY AUTOMATED COUNT: 14.6 % (ref 11.5–14.5)
EST. GFR  (NO RACE VARIABLE): 50 ML/MIN/1.73 M^2
GLUCOSE SERPL-MCNC: 303 MG/DL (ref 70–110)
GLUCOSE UR QL STRIP: ABNORMAL
HCT VFR BLD AUTO: 40.1 % (ref 40–54)
HGB BLD-MCNC: 13.2 G/DL (ref 14–18)
HGB UR QL STRIP: ABNORMAL
HYALINE CASTS #/AREA URNS LPF: 1 /LPF
IMM GRANULOCYTES # BLD AUTO: 0.04 K/UL (ref 0–0.04)
IMM GRANULOCYTES NFR BLD AUTO: 0.3 % (ref 0–0.5)
INFLUENZA A, MOLECULAR: NEGATIVE
INFLUENZA B, MOLECULAR: NEGATIVE
KETONES UR QL STRIP: NEGATIVE
LACTATE SERPL-SCNC: 1.7 MMOL/L (ref 0.5–2.2)
LACTATE SERPL-SCNC: 3.6 MMOL/L (ref 0.5–2.2)
LACTATE SERPL-SCNC: 4.7 MMOL/L (ref 0.5–2.2)
LEUKOCYTE ESTERASE UR QL STRIP: NEGATIVE
LIPASE SERPL-CCNC: 53 U/L (ref 4–60)
LYMPHOCYTES # BLD AUTO: 1.8 K/UL (ref 1–4.8)
LYMPHOCYTES NFR BLD: 15.4 % (ref 18–48)
MCH RBC QN AUTO: 31.8 PG (ref 27–31)
MCHC RBC AUTO-ENTMCNC: 32.9 G/DL (ref 32–36)
MCV RBC AUTO: 97 FL (ref 82–98)
METHADONE UR QL SCN>300 NG/ML: NEGATIVE
MICROSCOPIC COMMENT: NORMAL
MONOCYTES # BLD AUTO: 0.7 K/UL (ref 0.3–1)
MONOCYTES NFR BLD: 5.7 % (ref 4–15)
NEUTROPHILS # BLD AUTO: 8.5 K/UL (ref 1.8–7.7)
NEUTROPHILS NFR BLD: 73.8 % (ref 38–73)
NITRITE UR QL STRIP: NEGATIVE
NRBC BLD-RTO: 0 /100 WBC
OPIATES UR QL SCN: ABNORMAL
PCP UR QL SCN>25 NG/ML: NEGATIVE
PH UR STRIP: 5 [PH] (ref 5–8)
PLATELET # BLD AUTO: 97 K/UL (ref 150–450)
PMV BLD AUTO: 9.8 FL (ref 9.2–12.9)
POCT GLUCOSE: 221 MG/DL (ref 70–110)
POCT GLUCOSE: 225 MG/DL (ref 70–110)
POTASSIUM SERPL-SCNC: 4.4 MMOL/L (ref 3.5–5.1)
PROT SERPL-MCNC: 8.3 G/DL (ref 6–8.4)
PROT UR QL STRIP: NEGATIVE
RBC # BLD AUTO: 4.15 M/UL (ref 4.6–6.2)
RBC #/AREA URNS HPF: 2 /HPF (ref 0–4)
SARS-COV-2 RDRP RESP QL NAA+PROBE: NEGATIVE
SODIUM SERPL-SCNC: 137 MMOL/L (ref 136–145)
SP GR UR STRIP: >=1.03 (ref 1–1.03)
SPECIMEN SOURCE: NORMAL
TOXICOLOGY INFORMATION: ABNORMAL
TROPONIN I SERPL DL<=0.01 NG/ML-MCNC: 0.01 NG/ML (ref 0–0.03)
TROPONIN I SERPL DL<=0.01 NG/ML-MCNC: 0.15 NG/ML (ref 0–0.03)
TSH SERPL DL<=0.005 MIU/L-ACNC: 3.73 UIU/ML (ref 0.4–4)
URN SPEC COLLECT METH UR: ABNORMAL
UROBILINOGEN UR STRIP-ACNC: NEGATIVE EU/DL
WBC # BLD AUTO: 11.5 K/UL (ref 3.9–12.7)
WBC #/AREA URNS HPF: 1 /HPF (ref 0–5)

## 2022-09-16 PROCEDURE — 96365 THER/PROPH/DIAG IV INF INIT: CPT

## 2022-09-16 PROCEDURE — 80053 COMPREHEN METABOLIC PANEL: CPT | Performed by: EMERGENCY MEDICINE

## 2022-09-16 PROCEDURE — 81000 URINALYSIS NONAUTO W/SCOPE: CPT | Mod: 59 | Performed by: INTERNAL MEDICINE

## 2022-09-16 PROCEDURE — 85025 COMPLETE CBC W/AUTO DIFF WBC: CPT | Performed by: EMERGENCY MEDICINE

## 2022-09-16 PROCEDURE — 83880 ASSAY OF NATRIURETIC PEPTIDE: CPT | Performed by: EMERGENCY MEDICINE

## 2022-09-16 PROCEDURE — 25000242 PHARM REV CODE 250 ALT 637 W/ HCPCS: Performed by: INTERNAL MEDICINE

## 2022-09-16 PROCEDURE — 25000003 PHARM REV CODE 250: Performed by: EMERGENCY MEDICINE

## 2022-09-16 PROCEDURE — 27000221 HC OXYGEN, UP TO 24 HOURS

## 2022-09-16 PROCEDURE — 82010 KETONE BODYS QUAN: CPT | Performed by: INTERNAL MEDICINE

## 2022-09-16 PROCEDURE — 99285 EMERGENCY DEPT VISIT HI MDM: CPT | Mod: 25

## 2022-09-16 PROCEDURE — 84443 ASSAY THYROID STIM HORMONE: CPT | Performed by: EMERGENCY MEDICINE

## 2022-09-16 PROCEDURE — 83690 ASSAY OF LIPASE: CPT | Performed by: EMERGENCY MEDICINE

## 2022-09-16 PROCEDURE — 83605 ASSAY OF LACTIC ACID: CPT | Mod: 91 | Performed by: INTERNAL MEDICINE

## 2022-09-16 PROCEDURE — U0002 COVID-19 LAB TEST NON-CDC: HCPCS | Performed by: EMERGENCY MEDICINE

## 2022-09-16 PROCEDURE — 36415 COLL VENOUS BLD VENIPUNCTURE: CPT | Performed by: EMERGENCY MEDICINE

## 2022-09-16 PROCEDURE — 96375 TX/PRO/DX INJ NEW DRUG ADDON: CPT

## 2022-09-16 PROCEDURE — 93005 ELECTROCARDIOGRAM TRACING: CPT

## 2022-09-16 PROCEDURE — 94640 AIRWAY INHALATION TREATMENT: CPT

## 2022-09-16 PROCEDURE — 84484 ASSAY OF TROPONIN QUANT: CPT | Mod: 91 | Performed by: INTERNAL MEDICINE

## 2022-09-16 PROCEDURE — 25000003 PHARM REV CODE 250: Performed by: INTERNAL MEDICINE

## 2022-09-16 PROCEDURE — 87040 BLOOD CULTURE FOR BACTERIA: CPT | Performed by: EMERGENCY MEDICINE

## 2022-09-16 PROCEDURE — 63600175 PHARM REV CODE 636 W HCPCS: Performed by: EMERGENCY MEDICINE

## 2022-09-16 PROCEDURE — 93010 ELECTROCARDIOGRAM REPORT: CPT | Mod: ,,, | Performed by: SPECIALIST

## 2022-09-16 PROCEDURE — 93010 EKG 12-LEAD: ICD-10-PCS | Mod: ,,, | Performed by: SPECIALIST

## 2022-09-16 PROCEDURE — 80307 DRUG TEST PRSMV CHEM ANLYZR: CPT | Performed by: EMERGENCY MEDICINE

## 2022-09-16 PROCEDURE — 83605 ASSAY OF LACTIC ACID: CPT | Performed by: EMERGENCY MEDICINE

## 2022-09-16 PROCEDURE — 87502 INFLUENZA DNA AMP PROBE: CPT | Performed by: EMERGENCY MEDICINE

## 2022-09-16 PROCEDURE — 63600175 PHARM REV CODE 636 W HCPCS: Performed by: NURSE PRACTITIONER

## 2022-09-16 PROCEDURE — 12000002 HC ACUTE/MED SURGE SEMI-PRIVATE ROOM

## 2022-09-16 PROCEDURE — 96361 HYDRATE IV INFUSION ADD-ON: CPT

## 2022-09-16 PROCEDURE — 94761 N-INVAS EAR/PLS OXIMETRY MLT: CPT

## 2022-09-16 PROCEDURE — 84484 ASSAY OF TROPONIN QUANT: CPT | Performed by: EMERGENCY MEDICINE

## 2022-09-16 PROCEDURE — 82550 ASSAY OF CK (CPK): CPT | Performed by: INTERNAL MEDICINE

## 2022-09-16 PROCEDURE — 36415 COLL VENOUS BLD VENIPUNCTURE: CPT | Performed by: INTERNAL MEDICINE

## 2022-09-16 RX ORDER — NALOXONE HCL 0.4 MG/ML
0.02 VIAL (ML) INJECTION
Status: DISCONTINUED | OUTPATIENT
Start: 2022-09-16 | End: 2022-09-18 | Stop reason: HOSPADM

## 2022-09-16 RX ORDER — ACETAMINOPHEN 325 MG/1
650 TABLET ORAL EVERY 8 HOURS PRN
Status: DISCONTINUED | OUTPATIENT
Start: 2022-09-16 | End: 2022-09-18 | Stop reason: HOSPADM

## 2022-09-16 RX ORDER — ONDANSETRON 2 MG/ML
4 INJECTION INTRAMUSCULAR; INTRAVENOUS
Status: COMPLETED | OUTPATIENT
Start: 2022-09-16 | End: 2022-09-16

## 2022-09-16 RX ORDER — SODIUM,POTASSIUM PHOSPHATES 280-250MG
2 POWDER IN PACKET (EA) ORAL
Status: DISCONTINUED | OUTPATIENT
Start: 2022-09-16 | End: 2022-09-18 | Stop reason: HOSPADM

## 2022-09-16 RX ORDER — IPRATROPIUM BROMIDE AND ALBUTEROL SULFATE 2.5; .5 MG/3ML; MG/3ML
3 SOLUTION RESPIRATORY (INHALATION)
Status: DISCONTINUED | OUTPATIENT
Start: 2022-09-16 | End: 2022-09-18 | Stop reason: HOSPADM

## 2022-09-16 RX ORDER — GLUCAGON 1 MG
1 KIT INJECTION
Status: DISCONTINUED | OUTPATIENT
Start: 2022-09-16 | End: 2022-09-18 | Stop reason: HOSPADM

## 2022-09-16 RX ORDER — IBUPROFEN 200 MG
24 TABLET ORAL
Status: DISCONTINUED | OUTPATIENT
Start: 2022-09-16 | End: 2022-09-18 | Stop reason: HOSPADM

## 2022-09-16 RX ORDER — ACETAMINOPHEN 325 MG/1
650 TABLET ORAL
Status: CANCELLED | OUTPATIENT
Start: 2022-09-16 | End: 2022-09-16

## 2022-09-16 RX ORDER — IBUPROFEN 200 MG
16 TABLET ORAL
Status: DISCONTINUED | OUTPATIENT
Start: 2022-09-16 | End: 2022-09-18 | Stop reason: HOSPADM

## 2022-09-16 RX ORDER — INSULIN ASPART 100 [IU]/ML
0-5 INJECTION, SOLUTION INTRAVENOUS; SUBCUTANEOUS
Status: DISCONTINUED | OUTPATIENT
Start: 2022-09-16 | End: 2022-09-18 | Stop reason: HOSPADM

## 2022-09-16 RX ORDER — LANOLIN ALCOHOL/MO/W.PET/CERES
800 CREAM (GRAM) TOPICAL
Status: DISCONTINUED | OUTPATIENT
Start: 2022-09-16 | End: 2022-09-18 | Stop reason: HOSPADM

## 2022-09-16 RX ORDER — TALC
6 POWDER (GRAM) TOPICAL NIGHTLY PRN
Status: DISCONTINUED | OUTPATIENT
Start: 2022-09-16 | End: 2022-09-18 | Stop reason: HOSPADM

## 2022-09-16 RX ORDER — CYCLOBENZAPRINE HCL 5 MG
5 TABLET ORAL 2 TIMES DAILY
Status: DISCONTINUED | OUTPATIENT
Start: 2022-09-16 | End: 2022-09-18 | Stop reason: HOSPADM

## 2022-09-16 RX ORDER — CEFEPIME HYDROCHLORIDE 1 G/50ML
2 INJECTION, SOLUTION INTRAVENOUS
Status: COMPLETED | OUTPATIENT
Start: 2022-09-16 | End: 2022-09-16

## 2022-09-16 RX ORDER — SODIUM CHLORIDE 9 MG/ML
INJECTION, SOLUTION INTRAVENOUS CONTINUOUS
Status: DISCONTINUED | OUTPATIENT
Start: 2022-09-16 | End: 2022-09-18 | Stop reason: HOSPADM

## 2022-09-16 RX ORDER — SODIUM CHLORIDE 0.9 % (FLUSH) 0.9 %
10 SYRINGE (ML) INJECTION EVERY 8 HOURS
Status: DISCONTINUED | OUTPATIENT
Start: 2022-09-16 | End: 2022-09-18 | Stop reason: HOSPADM

## 2022-09-16 RX ORDER — ATORVASTATIN CALCIUM 40 MG/1
40 TABLET, FILM COATED ORAL DAILY
Status: DISCONTINUED | OUTPATIENT
Start: 2022-09-17 | End: 2022-09-18 | Stop reason: HOSPADM

## 2022-09-16 RX ORDER — GABAPENTIN 400 MG/1
400 CAPSULE ORAL 3 TIMES DAILY
Status: DISCONTINUED | OUTPATIENT
Start: 2022-09-16 | End: 2022-09-18 | Stop reason: HOSPADM

## 2022-09-16 RX ORDER — DIAZEPAM 5 MG/1
5 TABLET ORAL EVERY 8 HOURS PRN
Status: DISCONTINUED | OUTPATIENT
Start: 2022-09-16 | End: 2022-09-18 | Stop reason: HOSPADM

## 2022-09-16 RX ORDER — PANTOPRAZOLE SODIUM 40 MG/1
40 TABLET, DELAYED RELEASE ORAL 2 TIMES DAILY
Status: DISCONTINUED | OUTPATIENT
Start: 2022-09-16 | End: 2022-09-18 | Stop reason: HOSPADM

## 2022-09-16 RX ORDER — OXYCODONE HYDROCHLORIDE 5 MG/1
10 TABLET ORAL ONCE
Status: CANCELLED | OUTPATIENT
Start: 2022-09-16

## 2022-09-16 RX ORDER — ONDANSETRON 2 MG/ML
4 INJECTION INTRAMUSCULAR; INTRAVENOUS EVERY 8 HOURS PRN
Status: DISCONTINUED | OUTPATIENT
Start: 2022-09-16 | End: 2022-09-18 | Stop reason: HOSPADM

## 2022-09-16 RX ORDER — POLYETHYLENE GLYCOL 3350 17 G/17G
17 POWDER, FOR SOLUTION ORAL DAILY PRN
Status: DISCONTINUED | OUTPATIENT
Start: 2022-09-16 | End: 2022-09-18 | Stop reason: HOSPADM

## 2022-09-16 RX ORDER — MORPHINE SULFATE 4 MG/ML
4 INJECTION, SOLUTION INTRAMUSCULAR; INTRAVENOUS ONCE
Status: COMPLETED | OUTPATIENT
Start: 2022-09-16 | End: 2022-09-16

## 2022-09-16 RX ADMIN — MORPHINE SULFATE 4 MG: 4 INJECTION INTRAVENOUS at 08:09

## 2022-09-16 RX ADMIN — APIXABAN 5 MG: 2.5 TABLET, FILM COATED ORAL at 08:09

## 2022-09-16 RX ADMIN — SODIUM CHLORIDE: 0.9 INJECTION, SOLUTION INTRAVENOUS at 06:09

## 2022-09-16 RX ADMIN — GABAPENTIN 400 MG: 400 CAPSULE ORAL at 08:09

## 2022-09-16 RX ADMIN — CYCLOBENZAPRINE HYDROCHLORIDE 5 MG: 5 TABLET, FILM COATED ORAL at 08:09

## 2022-09-16 RX ADMIN — VANCOMYCIN HYDROCHLORIDE 2000 MG: 1 INJECTION, POWDER, LYOPHILIZED, FOR SOLUTION INTRAVENOUS at 05:09

## 2022-09-16 RX ADMIN — CEFEPIME HYDROCHLORIDE 2 G: 2 INJECTION, SOLUTION INTRAVENOUS at 04:09

## 2022-09-16 RX ADMIN — ONDANSETRON 4 MG: 2 INJECTION INTRAMUSCULAR; INTRAVENOUS at 02:09

## 2022-09-16 RX ADMIN — SODIUM CHLORIDE 1000 ML: 0.9 INJECTION, SOLUTION INTRAVENOUS at 03:09

## 2022-09-16 RX ADMIN — IPRATROPIUM BROMIDE AND ALBUTEROL SULFATE 3 ML: 2.5; .5 SOLUTION RESPIRATORY (INHALATION) at 07:09

## 2022-09-16 RX ADMIN — PANTOPRAZOLE SODIUM 40 MG: 40 TABLET, DELAYED RELEASE ORAL at 08:09

## 2022-09-16 RX ADMIN — IPRATROPIUM BROMIDE AND ALBUTEROL SULFATE 3 ML: 2.5; .5 SOLUTION RESPIRATORY (INHALATION) at 05:09

## 2022-09-16 NOTE — ASSESSMENT & PLAN NOTE
Chronic complaint.  No new motor or sensory deficits noted.  He had history of significant diabetic neuropathy.  No new low back pain reported.  PT and OT evaluation.

## 2022-09-16 NOTE — HPI
Patient is a 67-year-old  male with past medical history significant for hypertension, hyperlipidemia, coronary artery disease status post acute myocardial infarction, chronic systolic congestive Heart failure (ejection fraction 35%, history of PE/DVT, chronic obstructive pulmonary disease on 3 liter/minute supplemental oxygen via nasal cannula, Parkinson's disease and opiate dependence for chronic low back pain is being admitted to Hospital Medicine under inpatient status from [facility] Emergency Room with complaints of generalized weakness especially bilateral lower extremity weakness for the past week or so.  Patient denies any new injury or trauma to lower back.  Patient denies any new numbness weakness, weakness, loss of bowel or bladder function in lower extremities.  No recent fevers or chills reported.  Patient denies any abdominal pain, urinary difficulties, rash, expectoration.  Patient does report nonspecific dry nonproductive cough.  This morning patient felt very weak and almost lost balance and had at near passing out feeling.  Patient reports compliance with medications.  No other focal subjective complaints other than chronic low back pain.  Part of the history obtained from patient's wife at bedside who confirms full code status.

## 2022-09-16 NOTE — ASSESSMENT & PLAN NOTE
Observe fall and seizure precautions.  PT and OT evaluation in the morning.  Continue antiparkinsonian medications as before.

## 2022-09-16 NOTE — ASSESSMENT & PLAN NOTE
Advance Care Planning     Date: 09/16/2022    Living Will  During this visit, I engaged the patient  in the advance care planning process.  The patient and I reviewed the role for advance directives and their purpose in directing future healthcare if the patient's unable to speak for him/herself.  At this point in time, the patient does have full decision-making capacity.  We discussed different extreme health states that he could experience, and reviewed what kind of medical care he would want in those situations.  The patient communicated that if he were comatose and had little chance of a meaningful recovery, he would want machines/life-sustaining treatments used. I spent a total of 16 minutes engaging the patient in this advance care planning discussion.
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
## 2022-09-16 NOTE — ASSESSMENT & PLAN NOTE
Patient with acute kidney injury likely due to IVVD/dehydration and pre-renal azotemia BRENT is currently worsening. Labs reviewed- Renal function/electrolytes with Estimated Creatinine Clearance: 61.9 mL/min (A) (based on SCr of 1.5 mg/dL (H)). according to latest data. Monitor urine output and serial BMP and adjust therapy as needed. Avoid nephrotoxins and renally dose meds for GFR listed above.   Hold ACE-inhibitor and Lasix therapy for now.  Continue gentle IV fluid hydration, monitor for sign and symptoms of CHF exacerbation.

## 2022-09-16 NOTE — ED TRIAGE NOTES
Pt presents with nausea, bilateral leg pain and weakness, pain is worse on the left side. Fall 2 wks ago from weakness of legs. Pt is oxygen dependent at home. Alert and oriented.

## 2022-09-16 NOTE — ASSESSMENT & PLAN NOTE
Noted.  Continue home pain medication regimen.  Patient is status post implantable nerve stimulator.

## 2022-09-16 NOTE — ASSESSMENT & PLAN NOTE
Patient's FSGs are uncontrolled due to hyperglycemia on current medication regimen.  Last A1c reviewed-   Lab Results   Component Value Date    HGBA1C 7.7 (H) 02/10/2022     Most recent fingerstick glucose reviewed- No results for input(s): POCTGLUCOSE in the last 24 hours.  Current correctional scale  Low  Maintain anti-hyperglycemic dose as follows-   Antihyperglycemics (From admission, onward)    Start     Stop Route Frequency Ordered    09/16/22 2100  insulin detemir U-100 pen 60 Units         -- SubQ Nightly 09/16/22 1631    09/16/22 1728  insulin aspart U-100 pen 0-5 Units         -- SubQ Before meals & nightly PRN 09/16/22 1631        Hold Oral hypoglycemics while patient is in the hospital.

## 2022-09-16 NOTE — ED NOTES
Pt on cardiac monitor, bp cuff and continuous pulse ox. Fall risk and allergy bracelet on pt. Call light with reach will continue to monitor.

## 2022-09-16 NOTE — ASSESSMENT & PLAN NOTE
Chronic problem. Will continue chronic medications and monitor for any changes, adjusting as needed.
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
## 2022-09-16 NOTE — ASSESSMENT & PLAN NOTE
Body mass index is 30.81 kg/m². Morbid obesity complicates all aspects of disease management from diagnostic modalities to treatment. Weight loss encouraged and health benefits explained to patient.

## 2022-09-16 NOTE — ASSESSMENT & PLAN NOTE
Currently stable.  Holding diuretic and ACE-inhibitor therapy.  Continue gentle IV fluid hydration.  Monitor signs symptoms of any worsening of CHF exacerbation.  Ejection fraction 35%.  Continue tele monitoring.

## 2022-09-17 LAB
ALBUMIN SERPL BCP-MCNC: 3.8 G/DL (ref 3.5–5.2)
ALP SERPL-CCNC: 104 U/L (ref 55–135)
ALT SERPL W/O P-5'-P-CCNC: 16 U/L (ref 10–44)
ANION GAP SERPL CALC-SCNC: 11 MMOL/L (ref 8–16)
AST SERPL-CCNC: 16 U/L (ref 10–40)
BILIRUB SERPL-MCNC: 0.7 MG/DL (ref 0.1–1)
BUN SERPL-MCNC: 17 MG/DL (ref 8–23)
CALCIUM SERPL-MCNC: 9.3 MG/DL (ref 8.7–10.5)
CHLORIDE SERPL-SCNC: 102 MMOL/L (ref 95–110)
CHOLEST SERPL-MCNC: 131 MG/DL (ref 120–199)
CHOLEST/HDLC SERPL: 4.1 {RATIO} (ref 2–5)
CK MB SERPL-MCNC: 1.4 NG/ML (ref 0.1–6.5)
CK MB SERPL-MCNC: 1.4 NG/ML (ref 0.1–6.5)
CK MB SERPL-MCNC: 1.9 NG/ML (ref 0.1–6.5)
CK MB SERPL-RTO: 3.3 % (ref 0–5)
CK MB SERPL-RTO: 3.5 % (ref 0–5)
CK MB SERPL-RTO: 3.7 % (ref 0–5)
CK SERPL-CCNC: 40 U/L (ref 20–200)
CK SERPL-CCNC: 42 U/L (ref 20–200)
CK SERPL-CCNC: 52 U/L (ref 20–200)
CO2 SERPL-SCNC: 28 MMOL/L (ref 23–29)
CREAT SERPL-MCNC: 1 MG/DL (ref 0.5–1.4)
EST. GFR  (NO RACE VARIABLE): >60 ML/MIN/1.73 M^2
GLUCOSE SERPL-MCNC: 225 MG/DL (ref 70–110)
HDLC SERPL-MCNC: 32 MG/DL (ref 40–75)
HDLC SERPL: 24.4 % (ref 20–50)
LACTATE SERPL-SCNC: 1.8 MMOL/L (ref 0.5–2.2)
LDLC SERPL CALC-MCNC: 57.8 MG/DL (ref 63–159)
MAGNESIUM SERPL-MCNC: 1.8 MG/DL (ref 1.6–2.6)
NONHDLC SERPL-MCNC: 99 MG/DL
PHOSPHATE SERPL-MCNC: 2.9 MG/DL (ref 2.7–4.5)
POCT GLUCOSE: 194 MG/DL (ref 70–110)
POCT GLUCOSE: 203 MG/DL (ref 70–110)
POCT GLUCOSE: 227 MG/DL (ref 70–110)
POCT GLUCOSE: 247 MG/DL (ref 70–110)
POTASSIUM SERPL-SCNC: 4.4 MMOL/L (ref 3.5–5.1)
PROT SERPL-MCNC: 6.9 G/DL (ref 6–8.4)
SODIUM SERPL-SCNC: 141 MMOL/L (ref 136–145)
TRIGL SERPL-MCNC: 206 MG/DL (ref 30–150)
TROPONIN I SERPL DL<=0.01 NG/ML-MCNC: 0.12 NG/ML (ref 0–0.03)
TROPONIN I SERPL DL<=0.01 NG/ML-MCNC: 0.28 NG/ML (ref 0–0.03)

## 2022-09-17 PROCEDURE — 36415 COLL VENOUS BLD VENIPUNCTURE: CPT | Performed by: INTERNAL MEDICINE

## 2022-09-17 PROCEDURE — 97535 SELF CARE MNGMENT TRAINING: CPT

## 2022-09-17 PROCEDURE — 97161 PT EVAL LOW COMPLEX 20 MIN: CPT

## 2022-09-17 PROCEDURE — 82553 CREATINE MB FRACTION: CPT | Performed by: INTERNAL MEDICINE

## 2022-09-17 PROCEDURE — 94640 AIRWAY INHALATION TREATMENT: CPT

## 2022-09-17 PROCEDURE — 83605 ASSAY OF LACTIC ACID: CPT | Mod: 91 | Performed by: INTERNAL MEDICINE

## 2022-09-17 PROCEDURE — 93010 ELECTROCARDIOGRAM REPORT: CPT | Mod: 76,,, | Performed by: SPECIALIST

## 2022-09-17 PROCEDURE — 25000003 PHARM REV CODE 250: Performed by: INTERNAL MEDICINE

## 2022-09-17 PROCEDURE — 80053 COMPREHEN METABOLIC PANEL: CPT | Performed by: INTERNAL MEDICINE

## 2022-09-17 PROCEDURE — 83735 ASSAY OF MAGNESIUM: CPT | Performed by: INTERNAL MEDICINE

## 2022-09-17 PROCEDURE — 93005 ELECTROCARDIOGRAM TRACING: CPT

## 2022-09-17 PROCEDURE — 80061 LIPID PANEL: CPT | Performed by: INTERNAL MEDICINE

## 2022-09-17 PROCEDURE — 99900035 HC TECH TIME PER 15 MIN (STAT)

## 2022-09-17 PROCEDURE — 84484 ASSAY OF TROPONIN QUANT: CPT | Mod: 91 | Performed by: INTERNAL MEDICINE

## 2022-09-17 PROCEDURE — 93010 EKG 12-LEAD: ICD-10-PCS | Mod: 76,,, | Performed by: SPECIALIST

## 2022-09-17 PROCEDURE — 27000221 HC OXYGEN, UP TO 24 HOURS

## 2022-09-17 PROCEDURE — 63600175 PHARM REV CODE 636 W HCPCS: Performed by: INTERNAL MEDICINE

## 2022-09-17 PROCEDURE — 84100 ASSAY OF PHOSPHORUS: CPT | Performed by: INTERNAL MEDICINE

## 2022-09-17 PROCEDURE — 82553 CREATINE MB FRACTION: CPT | Mod: 91 | Performed by: INTERNAL MEDICINE

## 2022-09-17 PROCEDURE — 25000242 PHARM REV CODE 250 ALT 637 W/ HCPCS: Performed by: INTERNAL MEDICINE

## 2022-09-17 PROCEDURE — 12000002 HC ACUTE/MED SURGE SEMI-PRIVATE ROOM

## 2022-09-17 PROCEDURE — 84484 ASSAY OF TROPONIN QUANT: CPT | Performed by: INTERNAL MEDICINE

## 2022-09-17 PROCEDURE — 97165 OT EVAL LOW COMPLEX 30 MIN: CPT

## 2022-09-17 PROCEDURE — 94761 N-INVAS EAR/PLS OXIMETRY MLT: CPT

## 2022-09-17 PROCEDURE — 93010 ELECTROCARDIOGRAM REPORT: CPT | Mod: ,,, | Performed by: SPECIALIST

## 2022-09-17 RX ORDER — MUPIROCIN 20 MG/G
OINTMENT TOPICAL 2 TIMES DAILY
Status: DISCONTINUED | OUTPATIENT
Start: 2022-09-17 | End: 2022-09-18 | Stop reason: HOSPADM

## 2022-09-17 RX ORDER — TRAMADOL HYDROCHLORIDE 50 MG/1
50 TABLET ORAL EVERY 6 HOURS PRN
Status: DISCONTINUED | OUTPATIENT
Start: 2022-09-17 | End: 2022-09-18 | Stop reason: HOSPADM

## 2022-09-17 RX ADMIN — PANTOPRAZOLE SODIUM 40 MG: 40 TABLET, DELAYED RELEASE ORAL at 09:09

## 2022-09-17 RX ADMIN — IPRATROPIUM BROMIDE AND ALBUTEROL SULFATE 3 ML: 2.5; .5 SOLUTION RESPIRATORY (INHALATION) at 08:09

## 2022-09-17 RX ADMIN — MUPIROCIN: 20 OINTMENT TOPICAL at 09:09

## 2022-09-17 RX ADMIN — ATORVASTATIN CALCIUM 40 MG: 40 TABLET, FILM COATED ORAL at 09:09

## 2022-09-17 RX ADMIN — IPRATROPIUM BROMIDE AND ALBUTEROL SULFATE 3 ML: 2.5; .5 SOLUTION RESPIRATORY (INHALATION) at 03:09

## 2022-09-17 RX ADMIN — Medication 6 MG: at 01:09

## 2022-09-17 RX ADMIN — GABAPENTIN 400 MG: 400 CAPSULE ORAL at 09:09

## 2022-09-17 RX ADMIN — INSULIN ASPART 2 UNITS: 100 INJECTION, SOLUTION INTRAVENOUS; SUBCUTANEOUS at 04:09

## 2022-09-17 RX ADMIN — INSULIN ASPART 1 UNITS: 100 INJECTION, SOLUTION INTRAVENOUS; SUBCUTANEOUS at 08:09

## 2022-09-17 RX ADMIN — GABAPENTIN 400 MG: 400 CAPSULE ORAL at 03:09

## 2022-09-17 RX ADMIN — PANTOPRAZOLE SODIUM 40 MG: 40 TABLET, DELAYED RELEASE ORAL at 08:09

## 2022-09-17 RX ADMIN — CYCLOBENZAPRINE HYDROCHLORIDE 5 MG: 5 TABLET, FILM COATED ORAL at 09:09

## 2022-09-17 RX ADMIN — APIXABAN 5 MG: 2.5 TABLET, FILM COATED ORAL at 08:09

## 2022-09-17 RX ADMIN — INSULIN DETEMIR 60 UNITS: 100 INJECTION, SOLUTION SUBCUTANEOUS at 08:09

## 2022-09-17 RX ADMIN — IPRATROPIUM BROMIDE AND ALBUTEROL SULFATE 3 ML: 2.5; .5 SOLUTION RESPIRATORY (INHALATION) at 07:09

## 2022-09-17 RX ADMIN — INSULIN ASPART 2 UNITS: 100 INJECTION, SOLUTION INTRAVENOUS; SUBCUTANEOUS at 12:09

## 2022-09-17 RX ADMIN — SODIUM CHLORIDE: 0.9 INJECTION, SOLUTION INTRAVENOUS at 04:09

## 2022-09-17 RX ADMIN — APIXABAN 5 MG: 2.5 TABLET, FILM COATED ORAL at 09:09

## 2022-09-17 RX ADMIN — IPRATROPIUM BROMIDE AND ALBUTEROL SULFATE 3 ML: 2.5; .5 SOLUTION RESPIRATORY (INHALATION) at 11:09

## 2022-09-17 RX ADMIN — ACETAMINOPHEN 650 MG: 325 TABLET ORAL at 01:09

## 2022-09-17 RX ADMIN — CYCLOBENZAPRINE HYDROCHLORIDE 5 MG: 5 TABLET, FILM COATED ORAL at 08:09

## 2022-09-17 RX ADMIN — TRAMADOL HYDROCHLORIDE 50 MG: 50 TABLET, COATED ORAL at 09:09

## 2022-09-17 RX ADMIN — TRAMADOL HYDROCHLORIDE 50 MG: 50 TABLET, COATED ORAL at 03:09

## 2022-09-17 RX ADMIN — GABAPENTIN 400 MG: 400 CAPSULE ORAL at 08:09

## 2022-09-17 NOTE — PLAN OF CARE
[facility]  Initial Discharge Assessment       Primary Care Provider: Nikita Magallanes NP    Admission Diagnosis: Lactic acidosis [E87.2]  Tachycardia [R00.0]  Generalized weakness [R53.1]  Chest pain [R07.9]  Leg pain, lateral, left [M79.605]    Admission Date: 9/16/2022  Expected Discharge Date:  to be determined     SW met with pt at bedside to complete discharge assessment, verified PCP, pharmacy and information on facesheet.  No HH or dialysis. See DME below.  Spouse will drive pt home.  Pt needs resources on food and financial assistance.    Discharge Barriers Identified: None    Payor: [employer] / Plan: [employer] MEDICARE HMO / Product Type: Medicare Advantage /     Extended Emergency Contact Information  Primary Emergency Contact: Cheryle Villafana  Address: [id number] [place] [address]  Home Phone: [phone]  Mobile Phone: [phone]  Relation: Spouse  Preferred language: English   needed? No  Secondary Emergency Contact: Lisette Monsivais  Address: UNKNOWN   [place]  Mobile Phone: [phone]  Relation: Sister  Preferred language: English   needed? No    Discharge Plan A: Home  Discharge Plan B: Home      [id number] [place] - [address] [place] [address]  Phone: [phone] Fax: [phone]    [facility] [id number] - [place] - [address]  [address]  [place] [address]  Phone: [phone] Fax: [phone]      Initial Assessment (most recent)       Adult Discharge Assessment - 09/17/22 1202          Discharge Assessment    Assessment Type Discharge Planning Assessment     Confirmed/corrected address, phone number and insurance Yes     Confirmed Demographics Correct on Facesheet     Source of Information patient     Communicated HOMAR with patient/caregiver No     Lives With spouse     Do you expect to  return to your current living situation? Yes     Do you have help at home or someone to help you manage your care at home? Yes     Who are your caregiver(s) and their phone number(s)? spouse     Prior to hospitilization cognitive status: Alert/Oriented     Current cognitive status: Alert/Oriented     Walking or Climbing Stairs Difficulty none     Dressing/Bathing Difficulty none     Home Accessibility not wheelchair accessible     Equipment Currently Used at Home oxygen;shower chair     Readmission within 30 days? No     Do you currently have service(s) that help you manage your care at home? No     Do you take prescription medications? No     Do you have prescription coverage? Yes     Coverage [employer]     Do you have any problems affording any of your prescribed medications? No     Is the patient taking medications as prescribed? yes     Who is going to help you get home at discharge? spouse     How do you get to doctors appointments? car, drives self     Are you on dialysis? No     Do you take coumadin? No     Discharge Plan A Home     Discharge Plan B Home     DME Needed Upon Discharge  none     Discharge Plan discussed with: Patient     Discharge Barriers Identified None        Physical Activity    On average, how many days per week do you engage in moderate to strenuous exercise (like a brisk walk)? 0 days        Financial Resource Strain    How hard is it for you to pay for the very basics like food, housing, medical care, and heating? Somewhat hard        Housing Stability    In the last 12 months, was there a time when you were not able to pay the mortgage or rent on time? No     In the last 12 months, was there a time when you did not have a steady place to sleep or slept in a shelter (including now)? No        Transportation Needs    In the past 12 months, has lack of transportation kept you from medical appointments or from getting medications? No     In the past 12 months, has lack of transportation  kept you from meetings, work, or from getting things needed for daily living? No        Food Insecurity    Within the past 12 months, you worried that your food would run out before you got the money to buy more. Sometimes true        Social Connections    In a typical week, how many times do you talk on the phone with family, friends, or neighbors? Three times a week     How often do you attend Jewish or Baptist services? Never     Do you belong to any clubs or organizations such as Jewish groups, unions, fraternal or athletic groups, or school groups? No     How often do you attend meetings of the clubs or organizations you belong to? Never     Are you , , , , never , or living with a partner?         Alcohol Use    Q1: How often do you have a drink containing alcohol? Never

## 2022-09-17 NOTE — HOSPITAL COURSE
Patient was admitted to medicine telemetry service.  Patient was initiated on IV fluid hydration with holding diuretic therapy and ACE-inhibitor.  Renal functions improved.  Lactic acid normalized.  Microbiology results closely followed which remained negative.  Patient required supportive care for painful peripheral diabetic neuropathy.  Patient worked with PT and OT while fall precautions observed.

## 2022-09-17 NOTE — PLAN OF CARE
Goals to be met by: 10/15/22     Patient will increase functional independence with ADLs by performing:    UE Dressing with Modified [place].  LE Dressing with Modified [place].  Grooming while standing at sink with Modified [place].  Toileting from toilet with Modified [place] for hygiene and clothing management.   Bathing from  shower chair/bench with Modified [place].  Toilet transfer to toilet with Modified [place].  Increased functional strength to WFL for ADL's/IADL's.

## 2022-09-17 NOTE — CARE UPDATE
09/17/22 0822   Patient Assessment/Suction   Level of Consciousness (AVPU) alert   Respiratory Effort Unlabored;Normal   Expansion/Accessory Muscles/Retractions no use of accessory muscles;no retractions   All Lung Fields Breath Sounds coarse   Rhythm/Pattern, Respiratory pattern regular;depth regular   PRE-TX-O2   O2 Device (Oxygen Therapy) nasal cannula  (3 lpm at home)   $ Is the patient on Low Flow Oxygen? Yes   Flow (L/min) 3   SpO2 97 %   Pulse Oximetry Type Intermittent   $ Pulse Oximetry - Multiple Charge Pulse Oximetry - Multiple   Pulse 82   Resp 18   Aerosol Therapy   $ Aerosol Therapy Charges Aerosol Treatment   Respiratory Treatment Status (SVN) given   Treatment Route (SVN) mask   Patient Position (SVN) Ashton's   Signs of Intolerance (SVN) none   Breath Sounds Post-Respiratory Treatment   Throughout All Fields Post-Treatment All Fields   Throughout All Fields Post-Treatment aeration increased   Post-treatment Heart Rate (beats/min) 85   Post-treatment Resp Rate (breaths/min) 18
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
## 2022-09-17 NOTE — PLAN OF CARE
Problem: Adult Inpatient Plan of Care  Goal: Plan of Care Review  Outcome: Ongoing, Progressing     Problem: Adult Inpatient Plan of Care  Goal: Patient-Specific Goal (Individualized)  Outcome: Ongoing, Progressing   Patient alert and oriented resting in bed. NAD. Denies SOB. C/o constant pain with abd and left leg. VSS. Urinal at bedside. Normal SR. Bed alarm active. Plan of care reviewed with patient. Verbalizes understanding.Call light in reach. Pt free from fall or injury. Will monitor.

## 2022-09-17 NOTE — ASSESSMENT & PLAN NOTE
Chronic complaint.  Painful diabetic peripheral neuropathy.  No new motor or sensory deficits noted.  He had history of significant diabetic neuropathy.  No new low back pain reported.  PT and OT evaluation.

## 2022-09-17 NOTE — CARE UPDATE
09/16/22 1953   Patient Assessment/Suction   Level of Consciousness (AVPU) alert   Respiratory Effort Unlabored   Expansion/Accessory Muscles/Retractions expansion symmetric   All Lung Fields Breath Sounds Anterior:;Lateral:   GHULAM Breath Sounds diminished   LLL Breath Sounds diminished   RUL Breath Sounds diminished   RML Breath Sounds diminished   RLL Breath Sounds diminished   Rhythm/Pattern, Respiratory unlabored;pattern regular   Cough Frequency no cough   PRE-TX-O2   O2 Device (Oxygen Therapy) nasal cannula   $ Is the patient on Low Flow Oxygen? Yes   Flow (L/min) 3   SpO2 99 %   Pulse Oximetry Type Intermittent   $ Pulse Oximetry - Multiple Charge Pulse Oximetry - Multiple   Pulse 83   Resp 16   Positioning HOB elevated 30 degrees   Aerosol Therapy   $ Aerosol Therapy Charges Aerosol Treatment   Respiratory Treatment Status (SVN) given   Treatment Route (SVN) mask   Patient Position (SVN) HOB elevated   Post Treatment Assessment (SVN) increased aeration   Signs of Intolerance (SVN) none   Breath Sounds Post-Respiratory Treatment   Throughout All Fields Post-Treatment All Fields   Throughout All Fields Post-Treatment aeration increased   Post-treatment Heart Rate (beats/min) 85   Post-treatment Resp Rate (breaths/min) 18

## 2022-09-17 NOTE — NURSING
Latest Reference Range & Units 09/16/22 20:49   Troponin I 0.000 - 0.026 ng/mL 0.155 (H)   (H): Data is abnormally high      Kusum Cee notified. Will continue to monitor. Pt asymptomatic.

## 2022-09-17 NOTE — SUBJECTIVE & OBJECTIVE
Interval History:  Afebrile.  Patient is complaining of bilateral lower extremity leg pains without erythema warmth and tenderness.  The symptoms are consistent with chronic peripheral neuropathy symptoms.  Patient is asking for more pain medications.  Denies any chest pain, palpitation, shortness of breath.  Serum lactate normalized.  Renal functions improving.  Holding diuretic therapy and ACE-inhibitor for now.  Patient's wife is at bedside.    Review of Systems   Constitutional:  Positive for fatigue.   Respiratory:  Positive for cough.    Musculoskeletal:  Positive for back pain.   Skin:         Chronic stasis dermatitis of bilateral lower extremities.   Neurological:  Positive for weakness.   All other systems reviewed and are negative.  Objective:     Vital Signs (Most Recent):  Temp: 97.9 °F (36.6 °C) (09/17/22 0814)  Pulse: 82 (09/17/22 0822)  Resp: 18 (09/17/22 0822)  BP: 119/87 (09/17/22 0814)  SpO2: 97 % (09/17/22 0822) Vital Signs (24h Range):  Temp:  [97.6 °F (36.4 °C)-98.4 °F (36.9 °C)] 97.9 °F (36.6 °C)  Pulse:  [] 82  Resp:  [16-20] 18  SpO2:  [92 %-100 %] 97 %  BP: (105-130)/(67-87) 119/87     Weight: 110.9 kg (244 lb 7.8 oz)  Body mass index is 31.39 kg/m².    Intake/Output Summary (Last 24 hours) at 9/17/2022 0907  Last data filed at 9/17/2022 0300  Gross per 24 hour   Intake 50 ml   Output 1250 ml   Net -1200 ml      Physical Exam  Vitals and nursing note reviewed.   Constitutional:       Appearance: Normal appearance. He is well-developed.   HENT:      Head: Normocephalic and atraumatic.      Nose: Nose normal. No septal deviation.   Eyes:      Conjunctiva/sclera: Conjunctivae normal.      Pupils: Pupils are equal, round, and reactive to light.   Neck:      Thyroid: No thyroid mass.      Vascular: No JVD.      Trachea: No tracheal tenderness or tracheal deviation.   Cardiovascular:      Rate and Rhythm: Normal rate and regular rhythm.      Heart sounds: S1 normal and S2 normal. No murmur  heard.    No friction rub. No gallop.   Pulmonary:      Effort: Pulmonary effort is normal.      Breath sounds: Normal breath sounds. No decreased breath sounds, wheezing, rhonchi or rales.   Abdominal:      General: Bowel sounds are normal. There is no distension.      Palpations: Abdomen is soft. There is no hepatomegaly, splenomegaly or mass.      Tenderness: There is no abdominal tenderness.   Musculoskeletal:      Comments: 1+ pitting edema bilateral lower extremities.  Chronic stasis dermatitis changes of bilateral lower extremities noted.  Homans sign negative bilaterally.   Skin:     General: Skin is warm.      Findings: No rash.   Neurological:      General: No focal deficit present.      Mental Status: He is alert and oriented to person, place, and time.      Cranial Nerves: No cranial nerve deficit.      Sensory: No sensory deficit.   Psychiatric:         Mood and Affect: Mood normal.         Behavior: Behavior normal.       Significant Labs: All pertinent labs within the past 24 hours have been reviewed.  CBC:   Recent Labs   Lab 09/16/22  1412   WBC 11.50   HGB 13.2*   HCT 40.1   PLT 97*     CMP:   Recent Labs   Lab 09/16/22  1412 09/17/22  0432    141   K 4.4 4.4   CL 98 102   CO2 22* 28   * 225*   BUN 20 17   CREATININE 1.5* 1.0   CALCIUM 9.5 9.3   PROT 8.3 6.9   ALBUMIN 4.5 3.8   BILITOT 1.2* 0.7   ALKPHOS 108 104   AST 12 16   ALT 11 16   ANIONGAP 17* 11     Cardiac Markers:   Recent Labs   Lab 09/16/22  1412   BNP 22     Coagulation: No results for input(s): PT, INR, APTT in the last 48 hours.  Lactic Acid:   Recent Labs   Lab 09/17/22  0027 09/17/22  0432 09/17/22  0802   LACTATE 1.8 1.8 1.8     Troponin:   Recent Labs   Lab 09/16/22  2049 09/17/22  0027 09/17/22  0802   TROPONINI 0.155* 0.282* 0.125*     TSH:   Recent Labs   Lab 09/16/22  1412   TSH 3.730     Urine Studies:   Recent Labs   Lab 09/16/22  1633   COLORU Yellow   APPEARANCEUA Clear   PHUR 5.0   SPECGRAV >=1.030*    PROTEINUA Negative   GLUCUA 2+*   KETONESU Negative   BILIRUBINUA Negative   OCCULTUA 2+*   NITRITE Negative   UROBILINOGEN Negative   LEUKOCYTESUR Negative   RBCUA 2   WBCUA 1   BACTERIA Occasional   HYALINECASTS 1     Microbiology Results (last 7 days)       Procedure Component Value Units Date/Time    Blood Culture #2 [[id number]] Collected: 09/16/22 1642    Order Status: Completed Specimen: Blood Updated: 09/17/22 0745     Blood Culture, Routine No Growth to date    Blood Culture #1 [[id number]] Collected: 09/16/22 1615    Order Status: Completed Specimen: Blood Updated: 09/17/22 0745     Blood Culture, Routine No Growth to date    Influenza A & B by Molecular [[id number]] Collected: 09/16/22 1433    Order Status: Completed Specimen: Nasopharyngeal Swab Updated: 09/16/22 1508     Influenza A, Molecular Negative     Influenza B, Molecular Negative     Flu A & B Source Nasal swab            Significant Imaging:   CXR:  TENS electrodes in position.  Blunting of the left lateral costophrenic angle most likely due to scarring unchanged from prior studies.  Otherwise negative chest x-ray     Bilateral lower extremities venous Doppler scan:   Small amount of thrombus is identified in the proximal left greater saphenous vein.  Deep vein thrombosis is not seen

## 2022-09-17 NOTE — PLAN OF CARE
Problem: Physical Therapy  Goal: Physical Therapy Goal  Description: Goals to be met by: 2022     Patient will increase functional independence with mobility by performin). Supine to sit with Modified [place]  2). Sit to supine with Modified [place]  3). Sit to stand transfer with Modified [place]  4). Gait  x > 50 feet with Modified [place] using Rolling Walker.     Outcome: Ongoing, Progressing

## 2022-09-17 NOTE — NURSING
Latest Reference Range & Units 09/17/22 00:27   Troponin I 0.000 - 0.026 ng/mL 0.282 (H)   (H): Data is abnormally high    NP notified via secure chat.

## 2022-09-17 NOTE — ASSESSMENT & PLAN NOTE
Improving.  Patient with acute kidney injury likely due to IVVD/dehydration and pre-renal azotemia BRENT is currently worsening. Labs reviewed- Renal function/electrolytes with Estimated Creatinine Clearance: 93.7 mL/min (based on SCr of 1 mg/dL). according to latest data. Monitor urine output and serial BMP and adjust therapy as needed. Avoid nephrotoxins and renally dose meds for GFR listed above.   Hold ACE-inhibitor and Lasix therapy for now.  Continue gentle IV fluid hydration, monitor for sign and symptoms of CHF exacerbation.

## 2022-09-17 NOTE — PROGRESS NOTES
[facility] Hospital Medicine  Progress Note    Patient Name: Kevan Colin Sr.  MRN: [id number]  Patient Class: IP- Inpatient   Admission Date: 9/16/2022  Length of Stay: 1 days  Attending Physician: Memo Henry MD  Primary Care Provider: Nikita Magallanes NP        Subjective:     Principal Problem:BRENT (acute kidney injury)        HPI:  Patient is a 67-year-old  male with past medical history significant for hypertension, hyperlipidemia, coronary artery disease status post acute myocardial infarction, chronic systolic congestive Heart failure (ejection fraction 35%, history of PE/DVT, chronic obstructive pulmonary disease on 3 liter/minute supplemental oxygen via nasal cannula, Parkinson's disease and opiate dependence for chronic low back pain is being admitted to Hospital Medicine under inpatient status from [facility] Emergency Room with complaints of generalized weakness especially bilateral lower extremity weakness for the past week or so.  Patient denies any new injury or trauma to lower back.  Patient denies any new numbness weakness, weakness, loss of bowel or bladder function in lower extremities.  No recent fevers or chills reported.  Patient denies any abdominal pain, urinary difficulties, rash, expectoration.  Patient does report nonspecific dry nonproductive cough.  This morning patient felt very weak and almost lost balance and had at near passing out feeling.  Patient reports compliance with medications.  No other focal subjective complaints other than chronic low back pain.  Part of the history obtained from patient's wife at bedside who confirms full code status.          Overview/Hospital Course:  No notes on file    Interval History:  Afebrile.  Patient is complaining of bilateral lower extremity leg pains without erythema warmth and tenderness.  The symptoms are consistent with chronic peripheral neuropathy symptoms.  Patient is asking  for more pain medications.  Denies any chest pain, palpitation, shortness of breath.  Serum lactate normalized.  Renal functions improving.  Holding diuretic therapy and ACE-inhibitor for now.  Patient's wife is at bedside.    Review of Systems   Constitutional:  Positive for fatigue.   Respiratory:  Positive for cough.    Musculoskeletal:  Positive for back pain.   Skin:         Chronic stasis dermatitis of bilateral lower extremities.   Neurological:  Positive for weakness.   All other systems reviewed and are negative.  Objective:     Vital Signs (Most Recent):  Temp: 97.9 °F (36.6 °C) (09/17/22 0814)  Pulse: 82 (09/17/22 0822)  Resp: 18 (09/17/22 0822)  BP: 119/87 (09/17/22 0814)  SpO2: 97 % (09/17/22 0822) Vital Signs (24h Range):  Temp:  [97.6 °F (36.4 °C)-98.4 °F (36.9 °C)] 97.9 °F (36.6 °C)  Pulse:  [] 82  Resp:  [16-20] 18  SpO2:  [92 %-100 %] 97 %  BP: (105-130)/(67-87) 119/87     Weight: 110.9 kg (244 lb 7.8 oz)  Body mass index is 31.39 kg/m².    Intake/Output Summary (Last 24 hours) at 9/17/2022 0907  Last data filed at 9/17/2022 0300  Gross per 24 hour   Intake 50 ml   Output 1250 ml   Net -1200 ml      Physical Exam  Vitals and nursing note reviewed.   Constitutional:       Appearance: Normal appearance. He is well-developed.   HENT:      Head: Normocephalic and atraumatic.      Nose: Nose normal. No septal deviation.   Eyes:      Conjunctiva/sclera: Conjunctivae normal.      Pupils: Pupils are equal, round, and reactive to light.   Neck:      Thyroid: No thyroid mass.      Vascular: No JVD.      Trachea: No tracheal tenderness or tracheal deviation.   Cardiovascular:      Rate and Rhythm: Normal rate and regular rhythm.      Heart sounds: S1 normal and S2 normal. No murmur heard.    No friction rub. No gallop.   Pulmonary:      Effort: Pulmonary effort is normal.      Breath sounds: Normal breath sounds. No decreased breath sounds, wheezing, rhonchi or rales.   Abdominal:      General: Bowel  sounds are normal. There is no distension.      Palpations: Abdomen is soft. There is no hepatomegaly, splenomegaly or mass.      Tenderness: There is no abdominal tenderness.   Musculoskeletal:      Comments: 1+ pitting edema bilateral lower extremities.  Chronic stasis dermatitis changes of bilateral lower extremities noted.  Homans sign negative bilaterally.   Skin:     General: Skin is warm.      Findings: No rash.   Neurological:      General: No focal deficit present.      Mental Status: He is alert and oriented to person, place, and time.      Cranial Nerves: No cranial nerve deficit.      Sensory: No sensory deficit.   Psychiatric:         Mood and Affect: Mood normal.         Behavior: Behavior normal.       Significant Labs: All pertinent labs within the past 24 hours have been reviewed.  CBC:   Recent Labs   Lab 09/16/22  1412   WBC 11.50   HGB 13.2*   HCT 40.1   PLT 97*     CMP:   Recent Labs   Lab 09/16/22  1412 09/17/22  0432    141   K 4.4 4.4   CL 98 102   CO2 22* 28   * 225*   BUN 20 17   CREATININE 1.5* 1.0   CALCIUM 9.5 9.3   PROT 8.3 6.9   ALBUMIN 4.5 3.8   BILITOT 1.2* 0.7   ALKPHOS 108 104   AST 12 16   ALT 11 16   ANIONGAP 17* 11     Cardiac Markers:   Recent Labs   Lab 09/16/22  1412   BNP 22     Coagulation: No results for input(s): PT, INR, APTT in the last 48 hours.  Lactic Acid:   Recent Labs   Lab 09/17/22  0027 09/17/22  0432 09/17/22  0802   LACTATE 1.8 1.8 1.8     Troponin:   Recent Labs   Lab 09/16/22  2049 09/17/22  0027 09/17/22  0802   TROPONINI 0.155* 0.282* 0.125*     TSH:   Recent Labs   Lab 09/16/22  1412   TSH 3.730     Urine Studies:   Recent Labs   Lab 09/16/22  1633   COLORU Yellow   APPEARANCEUA Clear   PHUR 5.0   SPECGRAV >=1.030*   PROTEINUA Negative   GLUCUA 2+*   KETONESU Negative   BILIRUBINUA Negative   OCCULTUA 2+*   NITRITE Negative   UROBILINOGEN Negative   LEUKOCYTESUR Negative   RBCUA 2   WBCUA 1   BACTERIA Occasional   HYALINECASTS 1      Microbiology Results (last 7 days)       Procedure Component Value Units Date/Time    Blood Culture #2 [[id number]] Collected: 09/16/22 1642    Order Status: Completed Specimen: Blood Updated: 09/17/22 0745     Blood Culture, Routine No Growth to date    Blood Culture #1 [[id number]] Collected: 09/16/22 1615    Order Status: Completed Specimen: Blood Updated: 09/17/22 0745     Blood Culture, Routine No Growth to date    Influenza A & B by Molecular [[id number]] Collected: 09/16/22 1433    Order Status: Completed Specimen: Nasopharyngeal Swab Updated: 09/16/22 1508     Influenza A, Molecular Negative     Influenza B, Molecular Negative     Flu A & B Source Nasal swab            Significant Imaging:   CXR:  TENS electrodes in position.  Blunting of the left lateral costophrenic angle most likely due to scarring unchanged from prior studies.  Otherwise negative chest x-ray     Bilateral lower extremities venous Doppler scan:   Small amount of thrombus is identified in the proximal left greater saphenous vein.  Deep vein thrombosis is not seen        Assessment/Plan:      * BRENT (acute kidney injury)  Improving.  Patient with acute kidney injury likely due to IVVD/dehydration and pre-renal azotemia BRENT is currently worsening. Labs reviewed- Renal function/electrolytes with Estimated Creatinine Clearance: 93.7 mL/min (based on SCr of 1 mg/dL). according to latest data. Monitor urine output and serial BMP and adjust therapy as needed. Avoid nephrotoxins and renally dose meds for GFR listed above.   Hold ACE-inhibitor and Lasix therapy for now.  Continue gentle IV fluid hydration, monitor for sign and symptoms of CHF exacerbation.      ACP (advance care planning)  Advance Care Planning     Date: 09/16/2022    Living Will  During this visit, I engaged the patient  in the advance care planning process.  The patient and I reviewed the role for advance directives and their purpose in directing future healthcare if the  patient's unable to speak for him/herself.  At this point in time, the patient does have full decision-making capacity.  We discussed different extreme health states that he could experience, and reviewed what kind of medical care he would want in those situations.  The patient communicated that if he were comatose and had little chance of a meaningful recovery, he would want machines/life-sustaining treatments used. I spent a total of 16 minutes engaging the patient in this advance care planning discussion.                Lactic acidosis  Likely secondary to dehydration versus early sepsis versus metformin use.  Hold metformin.  Continue IV fluid hydration.  Trend lactate level.  Check serum ketones.  Check CPK level.      Complaints of leg weakness  Chronic complaint.  Painful diabetic peripheral neuropathy.  No new motor or sensory deficits noted.  He had history of significant diabetic neuropathy.  No new low back pain reported.  PT and OT evaluation.      Chronic systolic heart failure  Currently stable.  Holding diuretic and ACE-inhibitor therapy.  Continue gentle IV fluid hydration.  Monitor signs symptoms of any worsening of CHF exacerbation.  Ejection fraction 35%.  Continue tele monitoring.        Chronic pain associated with significant psychosocial dysfunction  Noted.  Continue home pain medication regimen.  Patient is status post implantable nerve stimulator.      Benign essential HTN  Continue tele monitoring, monitor blood pressure closely, hold antihypertensive agents for now.      Anticoagulant long-term use  Continue use of Eliquis as before.      Depression  Chronic problem. Will continue chronic medications and monitor for any changes, adjusting as needed.            Coronary artery disease involving native coronary artery without angina pectoris  Patient with known CAD and monitor for S/Sx of angina/ACS. Continue to monitor on telemetry.        Parkinson's disease  Observe fall and seizure  precautions.  PT and OT evaluation in the morning.  Continue antiparkinsonian medications as before.      Diabetic neuropathy  Noted.  Continue gabapentin.    Obesity  Body mass index is 30.81 kg/m². Morbid obesity complicates all aspects of disease management from diagnostic modalities to treatment. Weight loss encouraged and health benefits explained to patient.         Type 2 diabetes mellitus, uncontrolled  Patient's FSGs are uncontrolled due to hyperglycemia on current medication regimen.  Last A1c reviewed-   Lab Results   Component Value Date    HGBA1C 7.7 (H) 02/10/2022     Most recent fingerstick glucose reviewed- No results for input(s): POCTGLUCOSE in the last 24 hours.  Current correctional scale  Low  Maintain anti-hyperglycemic dose as follows-   Antihyperglycemics (From admission, onward)    Start     Stop Route Frequency Ordered    09/16/22 2100  insulin detemir U-100 pen 60 Units         -- SubQ Nightly 09/16/22 1631    09/16/22 1728  insulin aspart U-100 pen 0-5 Units         -- SubQ Before meals & nightly PRN 09/16/22 1631        Hold Oral hypoglycemics while patient is in the hospital.    Discussed with patient's wife, answered all questions.  VTE Risk Mitigation (From admission, onward)         Ordered     apixaban tablet 5 mg  2 times daily         09/16/22 1631     IP VTE HIGH RISK PATIENT  Once         09/16/22 1631     Place sequential compression device  Until discontinued         09/16/22 1631     Reason for No Pharmacological VTE Prophylaxis  Once        Question:  Reasons:  Answer:  Already adequately anticoagulated on oral Anticoagulants    09/16/22 1631                Discharge Planning   HOMAR:  9/18/22    Code Status: Full Code   Is the patient medically ready for discharge?:     Reason for patient still in hospital (select all that apply): Patient trending condition and Consult recommendations                     Memo Henry MD  Department of Hospital Medicine   [facility]

## 2022-09-18 VITALS
HEIGHT: 74 IN | TEMPERATURE: 97 F | BODY MASS INDEX: 31.38 KG/M2 | SYSTOLIC BLOOD PRESSURE: 117 MMHG | WEIGHT: 244.5 LBS | RESPIRATION RATE: 18 BRPM | OXYGEN SATURATION: 99 % | HEART RATE: 70 BPM | DIASTOLIC BLOOD PRESSURE: 64 MMHG

## 2022-09-18 LAB
ALBUMIN SERPL BCP-MCNC: 3.6 G/DL (ref 3.5–5.2)
ALP SERPL-CCNC: 92 U/L (ref 55–135)
ALT SERPL W/O P-5'-P-CCNC: 15 U/L (ref 10–44)
ANION GAP SERPL CALC-SCNC: 10 MMOL/L (ref 8–16)
AST SERPL-CCNC: 13 U/L (ref 10–40)
BASOPHILS # BLD AUTO: 0.07 K/UL (ref 0–0.2)
BASOPHILS NFR BLD: 0.9 % (ref 0–1.9)
BILIRUB SERPL-MCNC: 0.5 MG/DL (ref 0.1–1)
BUN SERPL-MCNC: 12 MG/DL (ref 8–23)
CALCIUM SERPL-MCNC: 9 MG/DL (ref 8.7–10.5)
CHLORIDE SERPL-SCNC: 102 MMOL/L (ref 95–110)
CO2 SERPL-SCNC: 28 MMOL/L (ref 23–29)
CREAT SERPL-MCNC: 0.8 MG/DL (ref 0.5–1.4)
DIFFERENTIAL METHOD: ABNORMAL
EOSINOPHIL # BLD AUTO: 0.4 K/UL (ref 0–0.5)
EOSINOPHIL NFR BLD: 5.3 % (ref 0–8)
ERYTHROCYTE [DISTWIDTH] IN BLOOD BY AUTOMATED COUNT: 14.2 % (ref 11.5–14.5)
EST. GFR  (NO RACE VARIABLE): >60 ML/MIN/1.73 M^2
GLUCOSE SERPL-MCNC: 109 MG/DL (ref 70–110)
HCT VFR BLD AUTO: 32.5 % (ref 40–54)
HGB BLD-MCNC: 10.5 G/DL (ref 14–18)
IMM GRANULOCYTES # BLD AUTO: 0.01 K/UL (ref 0–0.04)
IMM GRANULOCYTES NFR BLD AUTO: 0.1 % (ref 0–0.5)
LYMPHOCYTES # BLD AUTO: 2.6 K/UL (ref 1–4.8)
LYMPHOCYTES NFR BLD: 33.9 % (ref 18–48)
MAGNESIUM SERPL-MCNC: 1.7 MG/DL (ref 1.6–2.6)
MCH RBC QN AUTO: 31.4 PG (ref 27–31)
MCHC RBC AUTO-ENTMCNC: 32.3 G/DL (ref 32–36)
MCV RBC AUTO: 97 FL (ref 82–98)
MONOCYTES # BLD AUTO: 0.7 K/UL (ref 0.3–1)
MONOCYTES NFR BLD: 8.8 % (ref 4–15)
NEUTROPHILS # BLD AUTO: 4 K/UL (ref 1.8–7.7)
NEUTROPHILS NFR BLD: 51 % (ref 38–73)
NRBC BLD-RTO: 0 /100 WBC
PHOSPHATE SERPL-MCNC: 3 MG/DL (ref 2.7–4.5)
PLATELET # BLD AUTO: 91 K/UL (ref 150–450)
PMV BLD AUTO: 9.8 FL (ref 9.2–12.9)
POCT GLUCOSE: 110 MG/DL (ref 70–110)
POCT GLUCOSE: 131 MG/DL (ref 70–110)
POTASSIUM SERPL-SCNC: 3.6 MMOL/L (ref 3.5–5.1)
PROT SERPL-MCNC: 6.7 G/DL (ref 6–8.4)
RBC # BLD AUTO: 3.34 M/UL (ref 4.6–6.2)
SODIUM SERPL-SCNC: 140 MMOL/L (ref 136–145)
WBC # BLD AUTO: 7.74 K/UL (ref 3.9–12.7)

## 2022-09-18 PROCEDURE — 36415 COLL VENOUS BLD VENIPUNCTURE: CPT | Performed by: INTERNAL MEDICINE

## 2022-09-18 PROCEDURE — 90694 VACC AIIV4 NO PRSRV 0.5ML IM: CPT | Performed by: INTERNAL MEDICINE

## 2022-09-18 PROCEDURE — 25000242 PHARM REV CODE 250 ALT 637 W/ HCPCS: Performed by: INTERNAL MEDICINE

## 2022-09-18 PROCEDURE — 84100 ASSAY OF PHOSPHORUS: CPT | Performed by: INTERNAL MEDICINE

## 2022-09-18 PROCEDURE — 85025 COMPLETE CBC W/AUTO DIFF WBC: CPT | Performed by: INTERNAL MEDICINE

## 2022-09-18 PROCEDURE — 63600175 PHARM REV CODE 636 W HCPCS: Performed by: INTERNAL MEDICINE

## 2022-09-18 PROCEDURE — A4216 STERILE WATER/SALINE, 10 ML: HCPCS | Performed by: INTERNAL MEDICINE

## 2022-09-18 PROCEDURE — 83735 ASSAY OF MAGNESIUM: CPT | Performed by: INTERNAL MEDICINE

## 2022-09-18 PROCEDURE — 80053 COMPREHEN METABOLIC PANEL: CPT | Performed by: INTERNAL MEDICINE

## 2022-09-18 PROCEDURE — G0009 ADMIN PNEUMOCOCCAL VACCINE: HCPCS | Performed by: INTERNAL MEDICINE

## 2022-09-18 PROCEDURE — 94640 AIRWAY INHALATION TREATMENT: CPT

## 2022-09-18 PROCEDURE — 90472 IMMUNIZATION ADMIN EACH ADD: CPT | Performed by: INTERNAL MEDICINE

## 2022-09-18 PROCEDURE — 90677 PCV20 VACCINE IM: CPT | Performed by: INTERNAL MEDICINE

## 2022-09-18 PROCEDURE — 27000221 HC OXYGEN, UP TO 24 HOURS

## 2022-09-18 PROCEDURE — 25000003 PHARM REV CODE 250: Performed by: INTERNAL MEDICINE

## 2022-09-18 PROCEDURE — G0008 ADMIN INFLUENZA VIRUS VAC: HCPCS | Performed by: INTERNAL MEDICINE

## 2022-09-18 PROCEDURE — 94761 N-INVAS EAR/PLS OXIMETRY MLT: CPT

## 2022-09-18 PROCEDURE — 90471 IMMUNIZATION ADMIN: CPT | Performed by: INTERNAL MEDICINE

## 2022-09-18 RX ADMIN — PNEUMOCOCCAL 20-VALENT CONJUGATE VACCINE 0.5 ML
2.2; 2.2; 2.2; 2.2; 2.2; 2.2; 2.2; 2.2; 2.2; 2.2; 2.2; 2.2; 2.2; 2.2; 2.2; 2.2; 4.4; 2.2; 2.2; 2.2 INJECTION, SUSPENSION INTRAMUSCULAR at 02:09

## 2022-09-18 RX ADMIN — Medication 800 MG: at 12:09

## 2022-09-18 RX ADMIN — IPRATROPIUM BROMIDE AND ALBUTEROL SULFATE 3 ML: 2.5; .5 SOLUTION RESPIRATORY (INHALATION) at 06:09

## 2022-09-18 RX ADMIN — CYCLOBENZAPRINE HYDROCHLORIDE 5 MG: 5 TABLET, FILM COATED ORAL at 08:09

## 2022-09-18 RX ADMIN — INFLUENZA A VIRUS A/VICTORIA/2570/2019 IVR-215 (H1N1) ANTIGEN (FORMALDEHYDE INACTIVATED), INFLUENZA A VIRUS A/DARWIN/6/2021 IVR-227 (H3N2) ANTIGEN (FORMALDEHYDE INACTIVATED), INFLUENZA B VIRUS B/AUSTRIA/1359417/2021 BVR-26 ANTIGEN (FORMALDEHYDE INACTIVATED), INFLUENZA B VIRUS B/PHUKET/3073/2013 BVR-1B ANTIGEN (FORMALDEHYDE INACTIVATED) 0.5 ML: 15; 15; 15; 15 INJECTION, SUSPENSION INTRAMUSCULAR at 02:09

## 2022-09-18 RX ADMIN — POTASSIUM BICARBONATE 50 MEQ: 977.5 TABLET, EFFERVESCENT ORAL at 08:09

## 2022-09-18 RX ADMIN — PANTOPRAZOLE SODIUM 40 MG: 40 TABLET, DELAYED RELEASE ORAL at 08:09

## 2022-09-18 RX ADMIN — APIXABAN 5 MG: 2.5 TABLET, FILM COATED ORAL at 08:09

## 2022-09-18 RX ADMIN — Medication 800 MG: at 08:09

## 2022-09-18 RX ADMIN — ATORVASTATIN CALCIUM 40 MG: 40 TABLET, FILM COATED ORAL at 08:09

## 2022-09-18 RX ADMIN — GABAPENTIN 400 MG: 400 CAPSULE ORAL at 08:09

## 2022-09-18 RX ADMIN — Medication 10 ML: at 08:09

## 2022-09-18 RX ADMIN — TRAMADOL HYDROCHLORIDE 50 MG: 50 TABLET, COATED ORAL at 02:09

## 2022-09-18 RX ADMIN — IPRATROPIUM BROMIDE AND ALBUTEROL SULFATE 3 ML: 2.5; .5 SOLUTION RESPIRATORY (INHALATION) at 11:09

## 2022-09-18 NOTE — DISCHARGE INSTRUCTIONS
Avoid any use of non steroidal anti-inflammatory medications such as ibuprofen, Motrin, Aleve etc..

## 2022-09-18 NOTE — PROGRESS NOTES
Discharged instructions reviewed with patient and wife at bedside. Patient and wife verbalized understanding. Ivs removed and charted. Patient left via wheelchair to private vehicle.

## 2022-09-18 NOTE — DISCHARGE SUMMARY
[facility] Hospital Medicine  Discharge Summary      Patient Name: Kevan Colin Sr.  MRN: [id number]  Patient Class: IP- Inpatient  Admission Date: 9/16/2022  Hospital Length of Stay: 2 days  Discharge Date and Time:  09/18/2022 9:33 AM  Attending Physician: Memo Henry MD   Discharging Provider: Memo Henry MD  Primary Care Provider: Nikita Magallanes NP      HPI:   Patient is a 67-year-old  male with past medical history significant for hypertension, hyperlipidemia, coronary artery disease status post acute myocardial infarction, chronic systolic congestive Heart failure (ejection fraction 35%, history of PE/DVT, chronic obstructive pulmonary disease on 3 liter/minute supplemental oxygen via nasal cannula, Parkinson's disease and opiate dependence for chronic low back pain is being admitted to Hospital Medicine under inpatient status from [facility] Emergency Room with complaints of generalized weakness especially bilateral lower extremity weakness for the past week or so.  Patient denies any new injury or trauma to lower back.  Patient denies any new numbness weakness, weakness, loss of bowel or bladder function in lower extremities.  No recent fevers or chills reported.  Patient denies any abdominal pain, urinary difficulties, rash, expectoration.  Patient does report nonspecific dry nonproductive cough.  This morning patient felt very weak and almost lost balance and had at near passing out feeling.  Patient reports compliance with medications.  No other focal subjective complaints other than chronic low back pain.  Part of the history obtained from patient's wife at bedside who confirms full code status.          * No surgery found *      Hospital Course:   Patient was admitted to medicine telemetry service.  Patient was initiated on IV fluid hydration with holding diuretic therapy and ACE-inhibitor.  Renal functions improved.  Lactic acid  normalized.  Microbiology results closely followed which remained negative.  Patient required supportive care for painful peripheral diabetic neuropathy.  Patient worked with PT and OT while fall precautions observed.  Home health and home physical therapy has been arranged.  Patient also counseled regarding avoidance of nonsteroidal anti-inflammatory medication use.  Patient will have a surveillance BMP checked next week.  Discharge plan of care reviewed with the patient and his wife who voiced understanding.       Goals of Care Treatment Preferences:  Code Status: Full Code      Consults:   Consults (From admission, onward)          Status Ordering Provider     Case Management/  Once        Provider:  (Not yet assigned)    MINDY Dhillon          CXR:  TENS electrodes in position.  Blunting of the left lateral costophrenic angle most likely due to scarring unchanged from prior studies.  Otherwise negative chest x-ray     Bilateral lower extremities venous Doppler scan:   Small amount of thrombus is identified in the proximal left greater saphenous vein.  Deep vein thrombosis is not seen    Final Active Diagnoses:    Diagnosis Date Noted POA    PRINCIPAL PROBLEM:  BRENT (acute kidney injury) [N17.9] 09/16/2022 Yes    Lactic acidosis [E87.2] 09/16/2022 Yes    ACP (advance care planning) [Z71.89] 09/16/2022 Not Applicable    Complaints of leg weakness [R29.898] 12/15/2021 Yes    Chronic systolic heart failure [I50.22] 01/07/2021 Yes    Chronic pain associated with significant psychosocial dysfunction [G89.4] 09/14/2016 Yes    Anticoagulant long-term use [Z79.01] 03/24/2016 Not Applicable    Benign essential HTN [I10] 03/24/2016 Yes    Coronary artery disease involving native coronary artery without angina pectoris [I25.10] 02/06/2016 Yes    Depression [F32.A] 02/06/2016 Yes    Parkinson's disease [G20] 02/04/2016 Yes     Chronic    Diabetic neuropathy [E11.40] 10/01/2013 Yes    Obesity [E66.9]  06/06/2013 Yes    Type 2 diabetes mellitus, uncontrolled [E11.65] 05/23/2012 Yes      Problems Resolved During this Admission:       Discharged Condition: good    Disposition: Home or Self Care    Follow Up:   Follow-up Information       Nikita Magallanes NP Follow up in 1 week(s).    Specialty: Family Medicine  Contact information:  [address] [facility]  [address]  [place] [address]  [phone]                           Patient Instructions:      BASIC METABOLIC PANEL   Standing Status: Future Standing Exp. Date: 11/17/23     Diet Cardiac     Diet diabetic     Notify your health care provider if you experience any of the following:  temperature >100.4     Notify your health care provider if you experience any of the following:  persistent nausea and vomiting or diarrhea     Notify your health care provider if you experience any of the following:  severe uncontrolled pain     Notify your health care provider if you experience any of the following:  difficulty breathing or increased cough     Notify your health care provider if you experience any of the following:  persistent dizziness, light-headedness, or visual disturbances     Notify your health care provider if you experience any of the following:  increased confusion or weakness     Activity as tolerated   Order Comments: Observe fall precautions.       Significant Diagnostic Studies: Labs:   CMP   Recent Labs   Lab 09/16/22  1412 09/17/22  0432 09/18/22  0433    141 140   K 4.4 4.4 3.6   CL 98 102 102   CO2 22* 28 28   * 225* 109   BUN 20 17 12   CREATININE 1.5* 1.0 0.8   CALCIUM 9.5 9.3 9.0   PROT 8.3 6.9 6.7   ALBUMIN 4.5 3.8 3.6   BILITOT 1.2* 0.7 0.5   ALKPHOS 108 104 92   AST 12 16 13   ALT 11 16 15   ANIONGAP 17* 11 10   , CBC   Recent Labs   Lab 09/16/22  1412 09/18/22  0433   WBC 11.50 7.74   HGB 13.2* 10.5*   HCT 40.1 32.5*   PLT 97* 91*    and INR   Lab Results   Component Value Date    INR 1.1 12/01/2021    INR 1.0 07/05/2019    INR 1.1  08/21/2017       Pending Diagnostic Studies:       Procedure Component Value Units Date/Time    EKG 12-lead [[id number]]     Order Status: Sent Lab Status: No result     EKG 12-lead [[id number]]     Order Status: Sent Lab Status: No result            Medications:  Reconciled Home Medications:      Medication List        CHANGE how you take these medications      HumaLOG KwikPen Insulin 100 unit/mL pen  Generic drug: insulin lispro  INJECT 16 UNITS INTO THE SKIN 3 (THREE) TIMES DAILY BEFORE MEALS.  What changed:   how much to take  how to take this  when to take this  additional instructions            CONTINUE taking these medications      albuterol 90 mcg/actuation inhaler  Commonly known as: PROVENTIL/VENTOLIN HFA  Inhale 2 puffs into the lungs every 4 (four) hours as needed for Shortness of Breath or Wheezing.     albuterol-ipratropium 2.5 mg-0.5 mg/3 mL nebulizer solution  Commonly known as: DUO-NEB  Take 3 mLs by nebulization every 6 (six) hours as needed for Wheezing. Rescue     apixaban 5 mg Tab  Commonly known as: ELIQUIS  Take 5 mg by mouth 2 (two) times daily. May resume 8/6/19 in the evening     atorvastatin 40 MG tablet  Commonly known as: LIPITOR  Take 1 tablet (40 mg total) by mouth once daily.     BASAGLAR KWIKPEN U-100 INSULIN glargine 100 units/mL SubQ pen  Generic drug: insulin  Inject 90 Units into the skin every evening.     carvediloL 3.125 MG tablet  Commonly known as: COREG  Take 1 tablet (3.125 mg total) by mouth 2 (two) times daily.     cyclobenzaprine 5 MG tablet  Commonly known as: FLEXERIL  TAKE ONE TABLET BY MOUTH TWICE DAILY     diazePAM 5 MG tablet  Commonly known as: VALIUM  Take 5 mg by mouth every 8 (eight) hours as needed.     doxepin 3 mg Tab  Commonly known as: SILENOR     EPINEPHrine 0.3 mg/0.3 mL Atin  Commonly known as: EPIPEN  EpiPen 2-Helio 0.3 mg/0.3 mL injection, auto-injector     furosemide 40 MG tablet  Commonly known as: LASIX  Take 40 mg by mouth once daily.     gabapentin  600 MG tablet  Commonly known as: NEURONTIN  TAKE ONE TABLET BY MOUTH THREE TIMES DAILY.     INVEGA SUSTENNA 156 mg/mL Syrg injection  Generic drug: paliperidone palmitate     lisinopriL 10 MG tablet  TAKE ONE TABLET BY MOUTH ONCE DAILY     metFORMIN 1000 MG tablet  Commonly known as: GLUCOPHAGE  Take 1 tablet (1,000 mg total) by mouth 2 (two) times daily with meals.     ondansetron 8 MG tablet  Commonly known as: ZOFRAN  Take 1 tablet (8 mg total) by mouth every 12 (twelve) hours as needed for Nausea.     pantoprazole 40 MG tablet  Commonly known as: PROTONIX  Take 1 tablet (40 mg total) by mouth 2 (two) times daily.     potassium chloride SA 20 MEQ tablet  Commonly known as: K-DUR,KLOR-CON  Take 20 mEq by mouth once daily.              Indwelling Lines/Drains at time of discharge:   Lines/Drains/Airways       None                   Time spent on the discharge of patient: 33 minutes         Memo Henry MD  Department of Hospital Medicine  [facility]

## 2022-09-18 NOTE — PLAN OF CARE
SW met with patient and patient's spouse at bedside regarding home health.  Spouse signed patient choice disclosure choosing no preference.  SW sent patient information to [facility] via [employer] system.  Spouse stated patient has rolling walker at home for home use.       09/18/22 1145   Post-Acute Status   Post-Acute Authorization Home Health;HME   HME Status Patient declined/refused   Home Health Status Referrals Sent   Patient choice form signed by patient/caregiver List with quality metrics by geographic area provided

## 2022-09-18 NOTE — PLAN OF CARE
Patient cleared for discharge from case management standpoint.       09/18/22 1148   Final Note   Assessment Type Final Discharge Note   Anticipated Discharge Disposition Home-Health   Hospital Resources/Appts/Education Provided Post-Acute resouces added to AVS;Provided education on problems/symptoms using teachback;Appointments scheduled and added to AVS;Provided patient/caregiver with written discharge plan information

## 2022-09-18 NOTE — CARE UPDATE
09/17/22 1915   Patient Assessment/Suction   Level of Consciousness (AVPU) alert   Respiratory Effort Unlabored;Normal   Expansion/Accessory Muscles/Retractions no retractions   All Lung Fields Breath Sounds Anterior:;Lateral:   GHULAM Breath Sounds coarse   LLL Breath Sounds diminished   RUL Breath Sounds diminished   RML Breath Sounds diminished   RLL Breath Sounds diminished   Rhythm/Pattern, Respiratory unlabored;pattern regular   Cough Frequency no cough   Cough Type no productive sputum   PRE-TX-O2   O2 Device (Oxygen Therapy) nasal cannula   Flow (L/min) 3   SpO2 95 %   Pulse Oximetry Type Intermittent   $ Pulse Oximetry - Multiple Charge Pulse Oximetry - Multiple   Pulse 80   Resp 16   Positioning HOB elevated 30 degrees   Aerosol Therapy   $ Aerosol Therapy Charges Aerosol Treatment   Daily Review of Necessity (SVN) completed   Respiratory Treatment Status (SVN) given   Treatment Route (SVN) mask   Patient Position (SVN) HOB elevated   Post Treatment Assessment (SVN) increased aeration   Signs of Intolerance (SVN) none   Breath Sounds Post-Respiratory Treatment   Throughout All Fields Post-Treatment All Fields   Throughout All Fields Post-Treatment aeration increased   Post-treatment Heart Rate (beats/min) 80   Post-treatment Resp Rate (breaths/min) 18

## 2022-09-18 NOTE — PLAN OF CARE
Patient accepted via [employer] system by [facility].       09/18/22 1147   Post-Acute Status   Post-Acute Authorization Home Health   Home Health Status Set-up Complete/Auth obtained

## 2022-09-18 NOTE — SUBJECTIVE & OBJECTIVE
Interval History:  Afebrile.  Patient is complaining of bilateral lower extremity leg pains without erythema warmth and tenderness.  The symptoms are consistent with chronic peripheral neuropathy symptoms.  Patient is asking for more pain medications.  Denies any chest pain, palpitation, shortness of breath.  Serum lactate normalized.  Renal functions improving.  Holding diuretic therapy and ACE-inhibitor for now.  Patient's wife is at bedside.    Review of Systems   Constitutional:  Positive for fatigue.   Respiratory:  Positive for cough.    Musculoskeletal:  Positive for back pain.   Skin:         Chronic stasis dermatitis of bilateral lower extremities.   Neurological:  Positive for weakness.   All other systems reviewed and are negative.  Objective:     Vital Signs (Most Recent):  Temp: 97 °F (36.1 °C) (09/18/22 0813)  Pulse: 70 (09/18/22 0813)  Resp: 16 (09/18/22 0813)  BP: (!) 140/82 (09/18/22 0813)  SpO2: 99 % (09/18/22 0813) Vital Signs (24h Range):  Temp:  [96.3 °F (35.7 °C)-98.9 °F (37.2 °C)] 97 °F (36.1 °C)  Pulse:  [70-96] 70  Resp:  [16-20] 16  SpO2:  [95 %-99 %] 99 %  BP: (109-140)/(63-82) 140/82     Weight: 110.9 kg (244 lb 7.8 oz)  Body mass index is 31.39 kg/m².    Intake/Output Summary (Last 24 hours) at 9/18/2022 0926  Last data filed at 9/17/2022 2353  Gross per 24 hour   Intake --   Output 1350 ml   Net -1350 ml        Physical Exam  Vitals and nursing note reviewed.   Constitutional:       Appearance: Normal appearance. He is well-developed.   HENT:      Head: Normocephalic and atraumatic.      Nose: Nose normal. No septal deviation.   Eyes:      Conjunctiva/sclera: Conjunctivae normal.      Pupils: Pupils are equal, round, and reactive to light.   Neck:      Thyroid: No thyroid mass.      Vascular: No JVD.      Trachea: No tracheal tenderness or tracheal deviation.   Cardiovascular:      Rate and Rhythm: Normal rate and regular rhythm.      Heart sounds: S1 normal and S2 normal. No murmur  heard.    No friction rub. No gallop.   Pulmonary:      Effort: Pulmonary effort is normal.      Breath sounds: Normal breath sounds. No decreased breath sounds, wheezing, rhonchi or rales.   Abdominal:      General: Bowel sounds are normal. There is no distension.      Palpations: Abdomen is soft. There is no hepatomegaly, splenomegaly or mass.      Tenderness: There is no abdominal tenderness.   Musculoskeletal:      Comments: 1+ pitting edema bilateral lower extremities.  Chronic stasis dermatitis changes of bilateral lower extremities noted.  Homans sign negative bilaterally.   Skin:     General: Skin is warm.      Findings: No rash.   Neurological:      General: No focal deficit present.      Mental Status: He is alert and oriented to person, place, and time.      Cranial Nerves: No cranial nerve deficit.      Sensory: No sensory deficit.   Psychiatric:         Mood and Affect: Mood normal.         Behavior: Behavior normal.       Significant Labs: All pertinent labs within the past 24 hours have been reviewed.  CBC:   Recent Labs   Lab 09/16/22 1412 09/18/22  0433   WBC 11.50 7.74   HGB 13.2* 10.5*   HCT 40.1 32.5*   PLT 97* 91*       CMP:   Recent Labs   Lab 09/16/22  1412 09/17/22  0432 09/18/22  0433    141 140   K 4.4 4.4 3.6   CL 98 102 102   CO2 22* 28 28   * 225* 109   BUN 20 17 12   CREATININE 1.5* 1.0 0.8   CALCIUM 9.5 9.3 9.0   PROT 8.3 6.9 6.7   ALBUMIN 4.5 3.8 3.6   BILITOT 1.2* 0.7 0.5   ALKPHOS 108 104 92   AST 12 16 13   ALT 11 16 15   ANIONGAP 17* 11 10       Cardiac Markers:   Recent Labs   Lab 09/16/22  1412   BNP 22       Coagulation: No results for input(s): PT, INR, APTT in the last 48 hours.  Lactic Acid:   Recent Labs   Lab 09/17/22  0027 09/17/22  0432 09/17/22  0802   LACTATE 1.8 1.8 1.8       Troponin:   Recent Labs   Lab 09/16/22  2049 09/17/22  0027 09/17/22  0802   TROPONINI 0.155* 0.282* 0.125*       TSH:   Recent Labs   Lab 09/16/22  1412   TSH 3.730       Urine  Studies:   Recent Labs   Lab 09/16/22  1633   COLORU Yellow   APPEARANCEUA Clear   PHUR 5.0   SPECGRAV >=1.030*   PROTEINUA Negative   GLUCUA 2+*   KETONESU Negative   BILIRUBINUA Negative   OCCULTUA 2+*   NITRITE Negative   UROBILINOGEN Negative   LEUKOCYTESUR Negative   RBCUA 2   WBCUA 1   BACTERIA Occasional   HYALINECASTS 1       Microbiology Results (last 7 days)       Procedure Component Value Units Date/Time    Blood Culture #1 [[id number]] Collected: 09/16/22 1615    Order Status: Completed Specimen: Blood Updated: 09/18/22 0613     Blood Culture, Routine No Growth to date      No Growth to date    Blood Culture #2 [[id number]] Collected: 09/16/22 1642    Order Status: Completed Specimen: Blood Updated: 09/18/22 0613     Blood Culture, Routine No Growth to date      No Growth to date    Influenza A & B by Molecular [[id number]] Collected: 09/16/22 1433    Order Status: Completed Specimen: Nasopharyngeal Swab Updated: 09/16/22 1508     Influenza A, Molecular Negative     Influenza B, Molecular Negative     Flu A & B Source Nasal swab            Significant Imaging:   CXR:  TENS electrodes in position.  Blunting of the left lateral costophrenic angle most likely due to scarring unchanged from prior studies.  Otherwise negative chest x-ray     Bilateral lower extremities venous Doppler scan:   Small amount of thrombus is identified in the proximal left greater saphenous vein.  Deep vein thrombosis is not seen

## 2022-09-19 ENCOUNTER — OFFICE VISIT (OUTPATIENT)
Dept: FAMILY MEDICINE | Facility: CLINIC | Age: 67
End: 2022-09-19
Payer: COMMERCIAL

## 2022-09-19 VITALS
TEMPERATURE: 98 F | OXYGEN SATURATION: 90 % | WEIGHT: 245.19 LBS | HEIGHT: 74 IN | BODY MASS INDEX: 31.47 KG/M2 | SYSTOLIC BLOOD PRESSURE: 124 MMHG | DIASTOLIC BLOOD PRESSURE: 76 MMHG | HEART RATE: 78 BPM

## 2022-09-19 DIAGNOSIS — R53.83 FATIGUE, UNSPECIFIED TYPE: ICD-10-CM

## 2022-09-19 DIAGNOSIS — E78.1 HYPERTRIGLYCERIDEMIA: ICD-10-CM

## 2022-09-19 DIAGNOSIS — M54.16 LUMBAR RADICULITIS: ICD-10-CM

## 2022-09-19 DIAGNOSIS — I25.10 CORONARY ARTERY DISEASE INVOLVING NATIVE CORONARY ARTERY OF NATIVE HEART WITHOUT ANGINA PECTORIS: ICD-10-CM

## 2022-09-19 DIAGNOSIS — Z79.4 TYPE 2 DIABETES MELLITUS WITH HYPERGLYCEMIA, WITH LONG-TERM CURRENT USE OF INSULIN: Primary | ICD-10-CM

## 2022-09-19 DIAGNOSIS — E11.65 TYPE 2 DIABETES MELLITUS WITH HYPERGLYCEMIA, WITH LONG-TERM CURRENT USE OF INSULIN: Primary | ICD-10-CM

## 2022-09-19 DIAGNOSIS — N17.9 AKI (ACUTE KIDNEY INJURY): ICD-10-CM

## 2022-09-19 DIAGNOSIS — E11.42 DIABETIC POLYNEUROPATHY ASSOCIATED WITH TYPE 2 DIABETES MELLITUS: ICD-10-CM

## 2022-09-19 DIAGNOSIS — I10 BENIGN ESSENTIAL HTN: ICD-10-CM

## 2022-09-19 DIAGNOSIS — I50.22 CHRONIC SYSTOLIC HEART FAILURE: ICD-10-CM

## 2022-09-19 PROCEDURE — 3074F SYST BP LT 130 MM HG: CPT | Mod: CPTII,S$GLB,, | Performed by: NURSE PRACTITIONER

## 2022-09-19 PROCEDURE — 3288F PR FALLS RISK ASSESSMENT DOCUMENTED: ICD-10-PCS | Mod: CPTII,S$GLB,, | Performed by: NURSE PRACTITIONER

## 2022-09-19 PROCEDURE — 3066F PR DOCUMENTATION OF TREATMENT FOR NEPHROPATHY: ICD-10-PCS | Mod: CPTII,S$GLB,, | Performed by: NURSE PRACTITIONER

## 2022-09-19 PROCEDURE — 1125F AMNT PAIN NOTED PAIN PRSNT: CPT | Mod: CPTII,S$GLB,, | Performed by: NURSE PRACTITIONER

## 2022-09-19 PROCEDURE — 3061F NEG MICROALBUMINURIA REV: CPT | Mod: CPTII,S$GLB,, | Performed by: NURSE PRACTITIONER

## 2022-09-19 PROCEDURE — 3078F PR MOST RECENT DIASTOLIC BLOOD PRESSURE < 80 MM HG: ICD-10-PCS | Mod: CPTII,S$GLB,, | Performed by: NURSE PRACTITIONER

## 2022-09-19 PROCEDURE — 1111F PR DISCHARGE MEDS RECONCILED W/ CURRENT OUTPATIENT MED LIST: ICD-10-PCS | Mod: CPTII,S$GLB,, | Performed by: NURSE PRACTITIONER

## 2022-09-19 PROCEDURE — 4010F ACE/ARB THERAPY RXD/TAKEN: CPT | Mod: CPTII,S$GLB,, | Performed by: NURSE PRACTITIONER

## 2022-09-19 PROCEDURE — 3051F HG A1C>EQUAL 7.0%<8.0%: CPT | Mod: CPTII,S$GLB,, | Performed by: NURSE PRACTITIONER

## 2022-09-19 PROCEDURE — 1160F PR REVIEW ALL MEDS BY PRESCRIBER/CLIN PHARMACIST DOCUMENTED: ICD-10-PCS | Mod: CPTII,S$GLB,, | Performed by: NURSE PRACTITIONER

## 2022-09-19 PROCEDURE — 1125F PR PAIN SEVERITY QUANTIFIED, PAIN PRESENT: ICD-10-PCS | Mod: CPTII,S$GLB,, | Performed by: NURSE PRACTITIONER

## 2022-09-19 PROCEDURE — 99214 OFFICE O/P EST MOD 30 MIN: CPT | Mod: S$GLB,,, | Performed by: NURSE PRACTITIONER

## 2022-09-19 PROCEDURE — 1101F PT FALLS ASSESS-DOCD LE1/YR: CPT | Mod: CPTII,S$GLB,, | Performed by: NURSE PRACTITIONER

## 2022-09-19 PROCEDURE — 99214 PR OFFICE/OUTPT VISIT, EST, LEVL IV, 30-39 MIN: ICD-10-PCS | Mod: S$GLB,,, | Performed by: NURSE PRACTITIONER

## 2022-09-19 PROCEDURE — G0180 MD CERTIFICATION HHA PATIENT: HCPCS | Mod: ,,, | Performed by: NURSE PRACTITIONER

## 2022-09-19 PROCEDURE — 3066F NEPHROPATHY DOC TX: CPT | Mod: CPTII,S$GLB,, | Performed by: NURSE PRACTITIONER

## 2022-09-19 PROCEDURE — 3074F PR MOST RECENT SYSTOLIC BLOOD PRESSURE < 130 MM HG: ICD-10-PCS | Mod: CPTII,S$GLB,, | Performed by: NURSE PRACTITIONER

## 2022-09-19 PROCEDURE — 3008F BODY MASS INDEX DOCD: CPT | Mod: CPTII,S$GLB,, | Performed by: NURSE PRACTITIONER

## 2022-09-19 PROCEDURE — 1159F PR MEDICATION LIST DOCUMENTED IN MEDICAL RECORD: ICD-10-PCS | Mod: CPTII,S$GLB,, | Performed by: NURSE PRACTITIONER

## 2022-09-19 PROCEDURE — 3288F FALL RISK ASSESSMENT DOCD: CPT | Mod: CPTII,S$GLB,, | Performed by: NURSE PRACTITIONER

## 2022-09-19 PROCEDURE — 4010F PR ACE/ARB THEARPY RXD/TAKEN: ICD-10-PCS | Mod: CPTII,S$GLB,, | Performed by: NURSE PRACTITIONER

## 2022-09-19 PROCEDURE — 1159F MED LIST DOCD IN RCRD: CPT | Mod: CPTII,S$GLB,, | Performed by: NURSE PRACTITIONER

## 2022-09-19 PROCEDURE — 3061F PR NEG MICROALBUMINURIA RESULT DOCUMENTED/REVIEW: ICD-10-PCS | Mod: CPTII,S$GLB,, | Performed by: NURSE PRACTITIONER

## 2022-09-19 PROCEDURE — 3078F DIAST BP <80 MM HG: CPT | Mod: CPTII,S$GLB,, | Performed by: NURSE PRACTITIONER

## 2022-09-19 PROCEDURE — 1101F PR PT FALLS ASSESS DOC 0-1 FALLS W/OUT INJ PAST YR: ICD-10-PCS | Mod: CPTII,S$GLB,, | Performed by: NURSE PRACTITIONER

## 2022-09-19 PROCEDURE — 1160F RVW MEDS BY RX/DR IN RCRD: CPT | Mod: CPTII,S$GLB,, | Performed by: NURSE PRACTITIONER

## 2022-09-19 PROCEDURE — 3008F PR BODY MASS INDEX (BMI) DOCUMENTED: ICD-10-PCS | Mod: CPTII,S$GLB,, | Performed by: NURSE PRACTITIONER

## 2022-09-19 PROCEDURE — 1111F DSCHRG MED/CURRENT MED MERGE: CPT | Mod: CPTII,S$GLB,, | Performed by: NURSE PRACTITIONER

## 2022-09-19 PROCEDURE — G0180 PR HOME HEALTH MD CERTIFICATION: ICD-10-PCS | Mod: ,,, | Performed by: NURSE PRACTITIONER

## 2022-09-19 PROCEDURE — 3051F PR MOST RECENT HEMOGLOBIN A1C LEVEL 7.0 - < 8.0%: ICD-10-PCS | Mod: CPTII,S$GLB,, | Performed by: NURSE PRACTITIONER

## 2022-09-19 RX ORDER — SUVOREXANT 20 MG/1
1 TABLET, FILM COATED ORAL NIGHTLY PRN
COMMUNITY
Start: 2022-08-26

## 2022-09-19 RX ORDER — IBUPROFEN 800 MG/1
800 TABLET ORAL EVERY 6 HOURS PRN
Status: ON HOLD | COMMUNITY
Start: 2022-07-26 | End: 2023-01-24 | Stop reason: HOSPADM
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
## 2022-09-20 ENCOUNTER — PATIENT OUTREACH (OUTPATIENT)
Dept: ADMINISTRATIVE | Facility: CLINIC | Age: 67
End: 2022-09-20
Payer: COMMERCIAL

## 2022-09-21 NOTE — PROGRESS NOTES
C3 nurse attempted to contact Kevan Colin Sr. for a TCC post hospital discharge follow up call. No answer. Left voicemail with callback information. The patient has a scheduled HOSFU appointment with Nikita Magallanes NP on 09/26/2022 @ 1300.

## 2022-09-22 LAB
BACTERIA BLD CULT: NORMAL
BACTERIA BLD CULT: NORMAL

## 2022-09-22 NOTE — PROGRESS NOTES
C3 nurse spoke with Kevan Colin Sr. for a TCC post hospital discharge follow up call. The patient has a scheduled HOSFU appointment with Nikita Magallanes NP on 09/26/2022 @ 1300.

## 2022-09-23 ENCOUNTER — TELEPHONE (OUTPATIENT)
Dept: FAMILY MEDICINE | Facility: CLINIC | Age: 67
End: 2022-09-23

## 2022-09-27 DIAGNOSIS — Z79.4 TYPE 2 DIABETES MELLITUS WITH HYPERGLYCEMIA, WITH LONG-TERM CURRENT USE OF INSULIN: Primary | ICD-10-CM

## 2022-09-27 DIAGNOSIS — E11.65 TYPE 2 DIABETES MELLITUS WITH HYPERGLYCEMIA, WITH LONG-TERM CURRENT USE OF INSULIN: Primary | ICD-10-CM

## 2022-09-27 LAB
ALBUMIN SERPL-MCNC: 3.9 G/DL (ref 3.6–5.1)
ALBUMIN/GLOB SERPL: 1.4 (CALC) (ref 1–2.5)
ALP SERPL-CCNC: 93 U/L (ref 35–144)
ALT SERPL-CCNC: 8 U/L (ref 9–46)
AST SERPL-CCNC: 10 U/L (ref 10–35)
BASOPHILS # BLD AUTO: 78 CELLS/UL (ref 0–200)
BASOPHILS NFR BLD AUTO: 1.1 %
BILIRUB SERPL-MCNC: 0.3 MG/DL (ref 0.2–1.2)
BUN SERPL-MCNC: 10 MG/DL (ref 7–25)
BUN/CREAT SERPL: ABNORMAL (CALC) (ref 6–22)
CALCIUM SERPL-MCNC: 8.8 MG/DL (ref 8.6–10.3)
CHLORIDE SERPL-SCNC: 103 MMOL/L (ref 98–110)
CO2 SERPL-SCNC: 27 MMOL/L (ref 20–32)
CREAT SERPL-MCNC: 1.06 MG/DL (ref 0.7–1.35)
EGFR: 76 ML/MIN/1.73M2
EOSINOPHIL # BLD AUTO: 511 CELLS/UL (ref 15–500)
EOSINOPHIL NFR BLD AUTO: 7.2 %
ERYTHROCYTE [DISTWIDTH] IN BLOOD BY AUTOMATED COUNT: 13 % (ref 11–15)
FOLATE SERPL-MCNC: 9.7 NG/ML
GLOBULIN SER CALC-MCNC: 2.8 G/DL (CALC) (ref 1.9–3.7)
GLUCOSE SERPL-MCNC: 222 MG/DL (ref 65–99)
HBA1C MFR BLD: 8.3 % OF TOTAL HGB
HCT VFR BLD AUTO: 34.9 % (ref 38.5–50)
HGB BLD-MCNC: 11 G/DL (ref 13.2–17.1)
LYMPHOCYTES # BLD AUTO: 2386 CELLS/UL (ref 850–3900)
LYMPHOCYTES NFR BLD AUTO: 33.6 %
MCH RBC QN AUTO: 30.6 PG (ref 27–33)
MCHC RBC AUTO-ENTMCNC: 31.5 G/DL (ref 32–36)
MCV RBC AUTO: 97.2 FL (ref 80–100)
MONOCYTES # BLD AUTO: 383 CELLS/UL (ref 200–950)
MONOCYTES NFR BLD AUTO: 5.4 %
NEUTROPHILS # BLD AUTO: 3742 CELLS/UL (ref 1500–7800)
NEUTROPHILS NFR BLD AUTO: 52.7 %
PLATELET # BLD AUTO: 223 THOUSAND/UL (ref 140–400)
PMV BLD REES-ECKER: 9.8 FL (ref 7.5–12.5)
POTASSIUM SERPL-SCNC: 4.1 MMOL/L (ref 3.5–5.3)
PROT SERPL-MCNC: 6.7 G/DL (ref 6.1–8.1)
RBC # BLD AUTO: 3.59 MILLION/UL (ref 4.2–5.8)
SODIUM SERPL-SCNC: 140 MMOL/L (ref 135–146)
VIT B12 SERPL-MCNC: 269 PG/ML (ref 200–1100)
WBC # BLD AUTO: 7.1 THOUSAND/UL (ref 3.8–10.8)

## 2022-09-28 ENCOUNTER — EXTERNAL HOME HEALTH (OUTPATIENT)
Dept: HOME HEALTH SERVICES | Facility: HOSPITAL | Age: 67
End: 2022-09-28
Payer: COMMERCIAL

## 2022-09-28 ENCOUNTER — TELEPHONE (OUTPATIENT)
Dept: FAMILY MEDICINE | Facility: CLINIC | Age: 67
End: 2022-09-28

## 2022-10-03 ENCOUNTER — DOCUMENT SCAN (OUTPATIENT)
Dept: HOME HEALTH SERVICES | Facility: HOSPITAL | Age: 67
End: 2022-10-03
Payer: MEDICAID

## 2022-10-05 ENCOUNTER — TELEPHONE (OUTPATIENT)
Dept: NEUROLOGY | Facility: CLINIC | Age: 67
End: 2022-10-05
Payer: COMMERCIAL

## 2022-10-05 NOTE — TELEPHONE ENCOUNTER
----- Message from Venita Walters, Patient Care Assistant sent at 10/4/2022  4:20 PM CDT -----  Regarding: appointment  Contact: pt's wife  Type:  Sooner Appointment Request    Caller is requesting a sooner appointment.  Caller declined first available appointment listed below.  Caller will not accept being placed on the waitlist and is requesting a message be sent to doctor.    Name of Caller:  pt's wife  When is the first available appointment?  2022  Symptoms:  new pt shaking and memory loss   Best Call Back Number:  [phone] (work)    Additional Information:  please call pt to advise. Thanks!

## 2022-10-07 NOTE — TELEPHONE ENCOUNTER
Pt's wife notified that [employer] is out of network.  She will call his insurance  to see who is in network.

## 2022-10-12 ENCOUNTER — DOCUMENT SCAN (OUTPATIENT)
Dept: HOME HEALTH SERVICES | Facility: HOSPITAL | Age: 67
End: 2022-10-12
Payer: COMMERCIAL

## 2022-10-18 DIAGNOSIS — Z79.4 TYPE 2 DIABETES MELLITUS WITH HYPERGLYCEMIA, WITH LONG-TERM CURRENT USE OF INSULIN: ICD-10-CM

## 2022-10-18 DIAGNOSIS — E11.65 TYPE 2 DIABETES MELLITUS WITH HYPERGLYCEMIA, WITH LONG-TERM CURRENT USE OF INSULIN: ICD-10-CM

## 2022-10-18 RX ORDER — METFORMIN HYDROCHLORIDE 1000 MG/1
1000 TABLET ORAL 2 TIMES DAILY WITH MEALS
Qty: 180 TABLET | Refills: 1 | Status: SHIPPED | OUTPATIENT
Start: 2022-10-18 | End: 2023-03-20 | Stop reason: SDUPTHER

## 2022-11-01 ENCOUNTER — HOSPITAL ENCOUNTER (EMERGENCY)
Facility: HOSPITAL | Age: 67
Discharge: HOME | End: 2022-11-01
Attending: EMERGENCY MEDICINE
Payer: COMMERCIAL

## 2022-11-01 VITALS
DIASTOLIC BLOOD PRESSURE: 79 MMHG | HEART RATE: 83 BPM | RESPIRATION RATE: 18 BRPM | OXYGEN SATURATION: 100 % | TEMPERATURE: 99 F | SYSTOLIC BLOOD PRESSURE: 140 MMHG

## 2022-11-01 DIAGNOSIS — I87.2 VENOUS INSUFFICIENCY OF BOTH LOWER EXTREMITIES: Primary | ICD-10-CM

## 2022-11-01 DIAGNOSIS — R07.9 CHEST PAIN: ICD-10-CM

## 2022-11-01 DIAGNOSIS — M79.89 LEG SWELLING: ICD-10-CM

## 2022-11-01 LAB
ALBUMIN SERPL BCP-MCNC: 4 G/DL (ref 3.5–5.2)
ALP SERPL-CCNC: 93 U/L (ref 55–135)
ALT SERPL W/O P-5'-P-CCNC: 16 U/L (ref 10–44)
ANION GAP SERPL CALC-SCNC: 11 MMOL/L (ref 8–16)
AST SERPL-CCNC: 12 U/L (ref 10–40)
BASOPHILS # BLD AUTO: 0.1 K/UL (ref 0–0.2)
BASOPHILS NFR BLD: 1 % (ref 0–1.9)
BILIRUB SERPL-MCNC: 0.4 MG/DL (ref 0.1–1)
BNP SERPL-MCNC: 20 PG/ML (ref 0–99)
BUN SERPL-MCNC: 31 MG/DL (ref 8–23)
CALCIUM SERPL-MCNC: 9.9 MG/DL (ref 8.7–10.5)
CHLORIDE SERPL-SCNC: 102 MMOL/L (ref 95–110)
CO2 SERPL-SCNC: 24 MMOL/L (ref 23–29)
CREAT SERPL-MCNC: 1.5 MG/DL (ref 0.5–1.4)
DIFFERENTIAL METHOD: ABNORMAL
EOSINOPHIL # BLD AUTO: 0.5 K/UL (ref 0–0.5)
EOSINOPHIL NFR BLD: 5 % (ref 0–8)
ERYTHROCYTE [DISTWIDTH] IN BLOOD BY AUTOMATED COUNT: 13.4 % (ref 11.5–14.5)
EST. GFR  (NO RACE VARIABLE): 50 ML/MIN/1.73 M^2
GLUCOSE SERPL-MCNC: 206 MG/DL (ref 70–110)
HCT VFR BLD AUTO: 40.4 % (ref 40–54)
HGB BLD-MCNC: 12.7 G/DL (ref 14–18)
IMM GRANULOCYTES # BLD AUTO: 0.03 K/UL (ref 0–0.04)
IMM GRANULOCYTES NFR BLD AUTO: 0.3 % (ref 0–0.5)
INR PPP: 1 (ref 0.8–1.2)
LYMPHOCYTES # BLD AUTO: 2.2 K/UL (ref 1–4.8)
LYMPHOCYTES NFR BLD: 22.6 % (ref 18–48)
MCH RBC QN AUTO: 31.5 PG (ref 27–31)
MCHC RBC AUTO-ENTMCNC: 31.4 G/DL (ref 32–36)
MCV RBC AUTO: 100 FL (ref 82–98)
MONOCYTES # BLD AUTO: 0.8 K/UL (ref 0.3–1)
MONOCYTES NFR BLD: 8.3 % (ref 4–15)
NEUTROPHILS # BLD AUTO: 6 K/UL (ref 1.8–7.7)
NEUTROPHILS NFR BLD: 62.8 % (ref 38–73)
NRBC BLD-RTO: 0 /100 WBC
PLATELET # BLD AUTO: 175 K/UL (ref 150–450)
PMV BLD AUTO: 9.7 FL (ref 9.2–12.9)
POTASSIUM SERPL-SCNC: 5.1 MMOL/L (ref 3.5–5.1)
PROT SERPL-MCNC: 7.8 G/DL (ref 6–8.4)
PROTHROMBIN TIME: 10.3 SEC (ref 9–12.5)
RBC # BLD AUTO: 4.03 M/UL (ref 4.6–6.2)
SODIUM SERPL-SCNC: 137 MMOL/L (ref 136–145)
TROPONIN I SERPL DL<=0.01 NG/ML-MCNC: <0.006 NG/ML (ref 0–0.03)
TROPONIN I SERPL DL<=0.01 NG/ML-MCNC: <0.006 NG/ML (ref 0–0.03)
WBC # BLD AUTO: 9.61 K/UL (ref 3.9–12.7)

## 2022-11-01 PROCEDURE — 93010 ELECTROCARDIOGRAM REPORT: CPT | Mod: ,,, | Performed by: INTERNAL MEDICINE

## 2022-11-01 PROCEDURE — 85025 COMPLETE CBC W/AUTO DIFF WBC: CPT | Performed by: EMERGENCY MEDICINE

## 2022-11-01 PROCEDURE — 25000003 PHARM REV CODE 250: Performed by: EMERGENCY MEDICINE

## 2022-11-01 PROCEDURE — 80053 COMPREHEN METABOLIC PANEL: CPT | Performed by: EMERGENCY MEDICINE

## 2022-11-01 PROCEDURE — 36415 COLL VENOUS BLD VENIPUNCTURE: CPT | Performed by: EMERGENCY MEDICINE

## 2022-11-01 PROCEDURE — 84484 ASSAY OF TROPONIN QUANT: CPT | Performed by: EMERGENCY MEDICINE

## 2022-11-01 PROCEDURE — 83880 ASSAY OF NATRIURETIC PEPTIDE: CPT | Performed by: EMERGENCY MEDICINE

## 2022-11-01 PROCEDURE — 93005 ELECTROCARDIOGRAM TRACING: CPT

## 2022-11-01 PROCEDURE — 87389 HIV-1 AG W/HIV-1&-2 AB AG IA: CPT | Performed by: EMERGENCY MEDICINE

## 2022-11-01 PROCEDURE — 85610 PROTHROMBIN TIME: CPT | Performed by: EMERGENCY MEDICINE

## 2022-11-01 PROCEDURE — 94761 N-INVAS EAR/PLS OXIMETRY MLT: CPT

## 2022-11-01 PROCEDURE — 25500020 PHARM REV CODE 255

## 2022-11-01 PROCEDURE — 86803 HEPATITIS C AB TEST: CPT | Performed by: EMERGENCY MEDICINE

## 2022-11-01 PROCEDURE — 93010 EKG 12-LEAD: ICD-10-PCS | Mod: ,,, | Performed by: INTERNAL MEDICINE

## 2022-11-01 PROCEDURE — 99285 EMERGENCY DEPT VISIT HI MDM: CPT | Mod: 25

## 2022-11-01 RX ORDER — ACETAMINOPHEN 500 MG
1000 TABLET ORAL
Status: COMPLETED | OUTPATIENT
Start: 2022-11-01 | End: 2022-11-01

## 2022-11-01 RX ADMIN — ACETAMINOPHEN 1000 MG: 500 TABLET ORAL at 06:11

## 2022-11-01 RX ADMIN — IOHEXOL 75 ML: 350 INJECTION, SOLUTION INTRAVENOUS at 07:11

## 2022-11-01 RX ADMIN — APIXABAN 5 MG: 2.5 TABLET, FILM COATED ORAL at 03:11

## 2022-11-01 NOTE — FIRST PROVIDER EVALUATION
Emergency Department TeleTriage Encounter Note      CHIEF COMPLAINT    Chief Complaint   Patient presents with    Leg Swelling     Patient states that he has lower leg swelling in his left lower leg has had DVT in the past , ran out of his blood thinners        VITAL SIGNS   Initial Vitals [11/01/22 1322]   BP Pulse Resp Temp SpO2   109/63 97 20 97.8 °F (36.6 °C) 98 %      MAP       --            ALLERGIES    Review of patient's allergies indicates:   Allergen Reactions    Bee pollens Anaphylaxis     Bee stings     Penicillins Nausea Only     Other reaction(s): Unknown    Codeine Rash     Other reaction(s): Unknown    Morphine Rash       PROVIDER TRIAGE NOTE  Patient presents with complaint of left leg swelling.  He denies trauma.  He reports history of DVTs in the legs.  He states he has been out of his blood thinners recently.      Phy:   Constitutional: well nourished, well developed, appearing stated age, NAD   HEENT: NCAT, symmetrical lids, No obvious facial deformity.  Normal phonation. Normal Conjunctiva   Neck: NAROM   Respiratory: Normal effort.  No obvious use of accessory muscles   Musculoskeletal: Moved upper extremities well   Neuro: Alert, answers questions appropriately    Psych: appropriate mood and affect      Initial orders will be placed and care will be transferred to an alternate provider when patient is roomed for a full evaluation. Any additional orders and the final disposition will be determined by that provider.        ORDERS  Labs Reviewed   HIV 1 / 2 ANTIBODY   HEPATITIS C ANTIBODY       ED Orders (720h ago, onward)      Start Ordered     Status Ordering Provider    11/01/22 1332 11/01/22 1331   Lower Extremity Veins Left  1 time imaging         Ordered KHADAR PICHARDO    11/01/22 1323 11/01/22 1322  HIV 1/2 Ag/Ab (4th Gen)  STAT         Pending Collection JOSSE FLORES    11/01/22 1323 11/01/22 1322  Hepatitis C Antibody  STAT         Pending Collection SANDRA  JOSSE NOVA              Virtual Visit Note: The provider triage portion of this emergency department evaluation and documentation was performed via [employer], a HIPAA-compliant telemedicine application, in concert with a tele-presenter in the room. A face to face patient evaluation with one of my colleagues will occur once the patient is placed in an emergency department room.      DISCLAIMER: This note was prepared with gate5 voice recognition transcription software. Garbled syntax, mangled pronouns, and other bizarre constructions may be attributed to that software system.

## 2022-11-01 NOTE — ED NOTES
Pt in with c/o bilateral leg and foot swelling. Pt states he has been out of Eliquis for about a week and a half. States he has attempted to notify PCP and pharmacy that he needed a refill  but no one is calling back and updating him. Bilateral lower leg swelling 2 plus non pitting edema, redness noted bilaterally.
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
## 2022-11-01 NOTE — ED NOTES
Spoke with Becca at [facility] who states pt had a one time prescription after being discharged from the hospital in Sept. Becca states at no point was there another prescription sent from pt's PCP.

## 2022-11-02 LAB
HCV AB SERPL QL IA: NORMAL
HIV 1+2 AB+HIV1 P24 AG SERPL QL IA: NORMAL

## 2022-12-08 DIAGNOSIS — I26.99 PULMONARY EMBOLISM, UNSPECIFIED CHRONICITY, UNSPECIFIED PULMONARY EMBOLISM TYPE, UNSPECIFIED WHETHER ACUTE COR PULMONALE PRESENT: Primary | ICD-10-CM

## 2022-12-09 ENCOUNTER — TELEPHONE (OUTPATIENT)
Dept: FAMILY MEDICINE | Facility: CLINIC | Age: 67
End: 2022-12-09

## 2022-12-16 ENCOUNTER — TELEPHONE (OUTPATIENT)
Dept: FAMILY MEDICINE | Facility: CLINIC | Age: 67
End: 2022-12-16

## 2022-12-16 NOTE — TELEPHONE ENCOUNTER
----- Message from Susan Lepe sent at 12/16/2022  8:36 AM CST -----  Regarding: Stomach bug  Pt called stating he has been vomiting and having diarrhea x 4 days.  States he cant keep anything down.  We have no appointments available today.  Please call his spouse (Cheryle) to advise.

## 2022-12-19 PROBLEM — N17.9 AKI (ACUTE KIDNEY INJURY): Status: RESOLVED | Noted: 2022-09-16 | Resolved: 2022-12-19

## 2022-12-20 ENCOUNTER — OFFICE VISIT (OUTPATIENT)
Dept: FAMILY MEDICINE | Facility: CLINIC | Age: 67
End: 2022-12-20
Payer: COMMERCIAL

## 2022-12-20 VITALS
OXYGEN SATURATION: 99 % | TEMPERATURE: 98 F | BODY MASS INDEX: 32.04 KG/M2 | HEIGHT: 74 IN | DIASTOLIC BLOOD PRESSURE: 71 MMHG | WEIGHT: 249.63 LBS | HEART RATE: 77 BPM | SYSTOLIC BLOOD PRESSURE: 127 MMHG

## 2022-12-20 DIAGNOSIS — I10 BENIGN ESSENTIAL HTN: ICD-10-CM

## 2022-12-20 DIAGNOSIS — E11.65 TYPE 2 DIABETES MELLITUS WITH HYPERGLYCEMIA, WITH LONG-TERM CURRENT USE OF INSULIN: Primary | ICD-10-CM

## 2022-12-20 DIAGNOSIS — E11.42 DIABETIC POLYNEUROPATHY ASSOCIATED WITH TYPE 2 DIABETES MELLITUS: ICD-10-CM

## 2022-12-20 DIAGNOSIS — Z12.5 SCREENING FOR PROSTATE CANCER: ICD-10-CM

## 2022-12-20 DIAGNOSIS — E78.1 HYPERTRIGLYCERIDEMIA: ICD-10-CM

## 2022-12-20 DIAGNOSIS — I50.22 CHRONIC SYSTOLIC HEART FAILURE: ICD-10-CM

## 2022-12-20 DIAGNOSIS — Z79.4 TYPE 2 DIABETES MELLITUS WITH HYPERGLYCEMIA, WITH LONG-TERM CURRENT USE OF INSULIN: Primary | ICD-10-CM

## 2022-12-20 DIAGNOSIS — I25.10 CORONARY ARTERY DISEASE INVOLVING NATIVE CORONARY ARTERY OF NATIVE HEART WITHOUT ANGINA PECTORIS: ICD-10-CM

## 2022-12-20 LAB — HBA1C MFR BLD: 7.9 %

## 2022-12-20 PROCEDURE — 3078F PR MOST RECENT DIASTOLIC BLOOD PRESSURE < 80 MM HG: ICD-10-PCS | Mod: CPTII,S$GLB,, | Performed by: NURSE PRACTITIONER

## 2022-12-20 PROCEDURE — 3078F DIAST BP <80 MM HG: CPT | Mod: CPTII,S$GLB,, | Performed by: NURSE PRACTITIONER

## 2022-12-20 PROCEDURE — 3074F PR MOST RECENT SYSTOLIC BLOOD PRESSURE < 130 MM HG: ICD-10-PCS | Mod: CPTII,S$GLB,, | Performed by: NURSE PRACTITIONER

## 2022-12-20 PROCEDURE — 1159F PR MEDICATION LIST DOCUMENTED IN MEDICAL RECORD: ICD-10-PCS | Mod: CPTII,S$GLB,, | Performed by: NURSE PRACTITIONER

## 2022-12-20 PROCEDURE — 3008F PR BODY MASS INDEX (BMI) DOCUMENTED: ICD-10-PCS | Mod: CPTII,S$GLB,, | Performed by: NURSE PRACTITIONER

## 2022-12-20 PROCEDURE — 3066F NEPHROPATHY DOC TX: CPT | Mod: CPTII,S$GLB,, | Performed by: NURSE PRACTITIONER

## 2022-12-20 PROCEDURE — 3288F FALL RISK ASSESSMENT DOCD: CPT | Mod: CPTII,S$GLB,, | Performed by: NURSE PRACTITIONER

## 2022-12-20 PROCEDURE — 3061F PR NEG MICROALBUMINURIA RESULT DOCUMENTED/REVIEW: ICD-10-PCS | Mod: CPTII,S$GLB,, | Performed by: NURSE PRACTITIONER

## 2022-12-20 PROCEDURE — 3066F PR DOCUMENTATION OF TREATMENT FOR NEPHROPATHY: ICD-10-PCS | Mod: CPTII,S$GLB,, | Performed by: NURSE PRACTITIONER

## 2022-12-20 PROCEDURE — 3051F HG A1C>EQUAL 7.0%<8.0%: CPT | Mod: CPTII,S$GLB,, | Performed by: NURSE PRACTITIONER

## 2022-12-20 PROCEDURE — 1101F PR PT FALLS ASSESS DOC 0-1 FALLS W/OUT INJ PAST YR: ICD-10-PCS | Mod: CPTII,S$GLB,, | Performed by: NURSE PRACTITIONER

## 2022-12-20 PROCEDURE — 99214 OFFICE O/P EST MOD 30 MIN: CPT | Mod: S$GLB,,, | Performed by: NURSE PRACTITIONER

## 2022-12-20 PROCEDURE — 1160F RVW MEDS BY RX/DR IN RCRD: CPT | Mod: CPTII,S$GLB,, | Performed by: NURSE PRACTITIONER

## 2022-12-20 PROCEDURE — 4010F PR ACE/ARB THEARPY RXD/TAKEN: ICD-10-PCS | Mod: CPTII,S$GLB,, | Performed by: NURSE PRACTITIONER

## 2022-12-20 PROCEDURE — 3074F SYST BP LT 130 MM HG: CPT | Mod: CPTII,S$GLB,, | Performed by: NURSE PRACTITIONER

## 2022-12-20 PROCEDURE — 3008F BODY MASS INDEX DOCD: CPT | Mod: CPTII,S$GLB,, | Performed by: NURSE PRACTITIONER

## 2022-12-20 PROCEDURE — 83036 HEMOGLOBIN GLYCOSYLATED A1C: CPT | Mod: QW,S$GLB,, | Performed by: NURSE PRACTITIONER

## 2022-12-20 PROCEDURE — 3051F PR MOST RECENT HEMOGLOBIN A1C LEVEL 7.0 - < 8.0%: ICD-10-PCS | Mod: CPTII,S$GLB,, | Performed by: NURSE PRACTITIONER

## 2022-12-20 PROCEDURE — 1160F PR REVIEW ALL MEDS BY PRESCRIBER/CLIN PHARMACIST DOCUMENTED: ICD-10-PCS | Mod: CPTII,S$GLB,, | Performed by: NURSE PRACTITIONER

## 2022-12-20 PROCEDURE — 1101F PT FALLS ASSESS-DOCD LE1/YR: CPT | Mod: CPTII,S$GLB,, | Performed by: NURSE PRACTITIONER

## 2022-12-20 PROCEDURE — 4010F ACE/ARB THERAPY RXD/TAKEN: CPT | Mod: CPTII,S$GLB,, | Performed by: NURSE PRACTITIONER

## 2022-12-20 PROCEDURE — 1159F MED LIST DOCD IN RCRD: CPT | Mod: CPTII,S$GLB,, | Performed by: NURSE PRACTITIONER

## 2022-12-20 PROCEDURE — 3061F NEG MICROALBUMINURIA REV: CPT | Mod: CPTII,S$GLB,, | Performed by: NURSE PRACTITIONER

## 2022-12-20 PROCEDURE — 83036 POCT HEMOGLOBIN A1C: ICD-10-PCS | Mod: QW,S$GLB,, | Performed by: NURSE PRACTITIONER

## 2022-12-20 PROCEDURE — 99214 PR OFFICE/OUTPT VISIT, EST, LEVL IV, 30-39 MIN: ICD-10-PCS | Mod: S$GLB,,, | Performed by: NURSE PRACTITIONER

## 2022-12-20 PROCEDURE — 3288F PR FALLS RISK ASSESSMENT DOCUMENTED: ICD-10-PCS | Mod: CPTII,S$GLB,, | Performed by: NURSE PRACTITIONER
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
## 2023-01-21 ENCOUNTER — HOSPITAL ENCOUNTER (OUTPATIENT)
Facility: HOSPITAL | Age: 68
Discharge: HOME | End: 2023-01-24
Attending: EMERGENCY MEDICINE | Admitting: INTERNAL MEDICINE
Payer: MEDICAID

## 2023-01-21 DIAGNOSIS — R10.9 ABDOMINAL PAIN: ICD-10-CM

## 2023-01-21 DIAGNOSIS — R11.2 INTRACTABLE VOMITING WITH NAUSEA: Primary | ICD-10-CM

## 2023-01-21 PROBLEM — F12.90 MARIJUANA USE: Status: ACTIVE | Noted: 2023-01-21

## 2023-01-21 LAB
ALBUMIN SERPL BCP-MCNC: 4.1 G/DL (ref 3.5–5.2)
ALP SERPL-CCNC: 83 U/L (ref 55–135)
ALT SERPL W/O P-5'-P-CCNC: 17 U/L (ref 10–44)
AMPHET+METHAMPHET UR QL: NEGATIVE
ANION GAP SERPL CALC-SCNC: 8 MMOL/L (ref 8–16)
AST SERPL-CCNC: 18 U/L (ref 10–40)
BARBITURATES UR QL SCN>200 NG/ML: NEGATIVE
BASOPHILS # BLD AUTO: 0.09 K/UL (ref 0–0.2)
BASOPHILS NFR BLD: 1.3 % (ref 0–1.9)
BENZODIAZ UR QL SCN>200 NG/ML: NEGATIVE
BILIRUB SERPL-MCNC: 0.7 MG/DL (ref 0.1–1)
BILIRUB UR QL STRIP: NEGATIVE
BNP SERPL-MCNC: 121 PG/ML (ref 0–99)
BUN SERPL-MCNC: 15 MG/DL (ref 8–23)
BZE UR QL SCN: NEGATIVE
CALCIUM SERPL-MCNC: 8.9 MG/DL (ref 8.7–10.5)
CANNABINOIDS UR QL SCN: ABNORMAL
CHLORIDE SERPL-SCNC: 103 MMOL/L (ref 95–110)
CK SERPL-CCNC: 54 U/L (ref 20–200)
CLARITY UR: CLEAR
CO2 SERPL-SCNC: 26 MMOL/L (ref 23–29)
COLOR UR: YELLOW
CREAT SERPL-MCNC: 1.1 MG/DL (ref 0.5–1.4)
CREAT UR-MCNC: 109 MG/DL (ref 23–375)
DIFFERENTIAL METHOD: ABNORMAL
EOSINOPHIL # BLD AUTO: 0.6 K/UL (ref 0–0.5)
EOSINOPHIL NFR BLD: 8.2 % (ref 0–8)
ERYTHROCYTE [DISTWIDTH] IN BLOOD BY AUTOMATED COUNT: 13.8 % (ref 11.5–14.5)
EST. GFR  (NO RACE VARIABLE): >60 ML/MIN/1.73 M^2
GLUCOSE SERPL-MCNC: 123 MG/DL (ref 70–110)
GLUCOSE SERPL-MCNC: 133 MG/DL (ref 70–110)
GLUCOSE SERPL-MCNC: 202 MG/DL (ref 70–110)
GLUCOSE UR QL STRIP: ABNORMAL
HCT VFR BLD AUTO: 40.6 % (ref 40–54)
HGB BLD-MCNC: 12.9 G/DL (ref 14–18)
HGB UR QL STRIP: NEGATIVE
IMM GRANULOCYTES # BLD AUTO: 0.02 K/UL (ref 0–0.04)
IMM GRANULOCYTES NFR BLD AUTO: 0.3 % (ref 0–0.5)
KETONES UR QL STRIP: NEGATIVE
LEUKOCYTE ESTERASE UR QL STRIP: NEGATIVE
LIPASE SERPL-CCNC: 61 U/L (ref 4–60)
LYMPHOCYTES # BLD AUTO: 2.3 K/UL (ref 1–4.8)
LYMPHOCYTES NFR BLD: 33.6 % (ref 18–48)
MAGNESIUM SERPL-MCNC: 1.6 MG/DL (ref 1.6–2.6)
MCH RBC QN AUTO: 30.1 PG (ref 27–31)
MCHC RBC AUTO-ENTMCNC: 31.8 G/DL (ref 32–36)
MCV RBC AUTO: 95 FL (ref 82–98)
MONOCYTES # BLD AUTO: 0.5 K/UL (ref 0.3–1)
MONOCYTES NFR BLD: 7.1 % (ref 4–15)
NEUTROPHILS # BLD AUTO: 3.4 K/UL (ref 1.8–7.7)
NEUTROPHILS NFR BLD: 49.5 % (ref 38–73)
NITRITE UR QL STRIP: NEGATIVE
NRBC BLD-RTO: 0 /100 WBC
OPIATES UR QL SCN: NEGATIVE
PCP UR QL SCN>25 NG/ML: NEGATIVE
PH UR STRIP: 7 [PH] (ref 5–8)
PLATELET # BLD AUTO: 208 K/UL (ref 150–450)
PMV BLD AUTO: 9.5 FL (ref 9.2–12.9)
POTASSIUM SERPL-SCNC: 4.4 MMOL/L (ref 3.5–5.1)
PROT SERPL-MCNC: 7.3 G/DL (ref 6–8.4)
PROT UR QL STRIP: ABNORMAL
RBC # BLD AUTO: 4.28 M/UL (ref 4.6–6.2)
SARS-COV-2 RDRP RESP QL NAA+PROBE: NEGATIVE
SODIUM SERPL-SCNC: 137 MMOL/L (ref 136–145)
SP GR UR STRIP: >1.03 (ref 1–1.03)
TOXICOLOGY INFORMATION: ABNORMAL
TROPONIN I SERPL HS-MCNC: 3.9 PG/ML (ref 0–14.9)
TROPONIN I SERPL HS-MCNC: 4 PG/ML (ref 0–14.9)
TSH SERPL DL<=0.005 MIU/L-ACNC: 2.82 UIU/ML (ref 0.34–5.6)
URN SPEC COLLECT METH UR: ABNORMAL
UROBILINOGEN UR STRIP-ACNC: NEGATIVE EU/DL
WBC # BLD AUTO: 6.87 K/UL (ref 3.9–12.7)

## 2023-01-21 PROCEDURE — U0002 COVID-19 LAB TEST NON-CDC: HCPCS | Performed by: NURSE PRACTITIONER

## 2023-01-21 PROCEDURE — 83690 ASSAY OF LIPASE: CPT | Performed by: EMERGENCY MEDICINE

## 2023-01-21 PROCEDURE — C9113 INJ PANTOPRAZOLE SODIUM, VIA: HCPCS | Performed by: EMERGENCY MEDICINE

## 2023-01-21 PROCEDURE — G0378 HOSPITAL OBSERVATION PER HR: HCPCS

## 2023-01-21 PROCEDURE — 36415 COLL VENOUS BLD VENIPUNCTURE: CPT | Performed by: NURSE PRACTITIONER

## 2023-01-21 PROCEDURE — 83735 ASSAY OF MAGNESIUM: CPT | Performed by: EMERGENCY MEDICINE

## 2023-01-21 PROCEDURE — 84443 ASSAY THYROID STIM HORMONE: CPT | Performed by: EMERGENCY MEDICINE

## 2023-01-21 PROCEDURE — 93010 EKG 12-LEAD: ICD-10-PCS | Mod: ,,, | Performed by: SPECIALIST

## 2023-01-21 PROCEDURE — 25000003 PHARM REV CODE 250: Performed by: NURSE PRACTITIONER

## 2023-01-21 PROCEDURE — 63600175 PHARM REV CODE 636 W HCPCS: Performed by: EMERGENCY MEDICINE

## 2023-01-21 PROCEDURE — 96375 TX/PRO/DX INJ NEW DRUG ADDON: CPT

## 2023-01-21 PROCEDURE — 25000003 PHARM REV CODE 250: Performed by: INTERNAL MEDICINE

## 2023-01-21 PROCEDURE — 99285 EMERGENCY DEPT VISIT HI MDM: CPT | Mod: 25

## 2023-01-21 PROCEDURE — 96365 THER/PROPH/DIAG IV INF INIT: CPT

## 2023-01-21 PROCEDURE — 83880 ASSAY OF NATRIURETIC PEPTIDE: CPT | Performed by: EMERGENCY MEDICINE

## 2023-01-21 PROCEDURE — 93005 ELECTROCARDIOGRAM TRACING: CPT | Performed by: SPECIALIST

## 2023-01-21 PROCEDURE — 93010 ELECTROCARDIOGRAM REPORT: CPT | Mod: ,,, | Performed by: SPECIALIST

## 2023-01-21 PROCEDURE — 96361 HYDRATE IV INFUSION ADD-ON: CPT

## 2023-01-21 PROCEDURE — 96376 TX/PRO/DX INJ SAME DRUG ADON: CPT

## 2023-01-21 PROCEDURE — 84484 ASSAY OF TROPONIN QUANT: CPT | Performed by: EMERGENCY MEDICINE

## 2023-01-21 PROCEDURE — 80307 DRUG TEST PRSMV CHEM ANLYZR: CPT | Performed by: EMERGENCY MEDICINE

## 2023-01-21 PROCEDURE — 84484 ASSAY OF TROPONIN QUANT: CPT | Mod: 91 | Performed by: NURSE PRACTITIONER

## 2023-01-21 PROCEDURE — 25000003 PHARM REV CODE 250: Performed by: EMERGENCY MEDICINE

## 2023-01-21 PROCEDURE — 85025 COMPLETE CBC W/AUTO DIFF WBC: CPT | Performed by: EMERGENCY MEDICINE

## 2023-01-21 PROCEDURE — 80053 COMPREHEN METABOLIC PANEL: CPT | Performed by: EMERGENCY MEDICINE

## 2023-01-21 PROCEDURE — 81003 URINALYSIS AUTO W/O SCOPE: CPT | Mod: 59 | Performed by: EMERGENCY MEDICINE

## 2023-01-21 PROCEDURE — 82962 GLUCOSE BLOOD TEST: CPT

## 2023-01-21 PROCEDURE — 83036 HEMOGLOBIN GLYCOSYLATED A1C: CPT | Performed by: NURSE PRACTITIONER

## 2023-01-21 PROCEDURE — 25500020 PHARM REV CODE 255: Performed by: EMERGENCY MEDICINE

## 2023-01-21 PROCEDURE — 82550 ASSAY OF CK (CPK): CPT | Performed by: EMERGENCY MEDICINE

## 2023-01-21 RX ORDER — TALC
6 POWDER (GRAM) TOPICAL NIGHTLY PRN
Status: DISCONTINUED | OUTPATIENT
Start: 2023-01-21 | End: 2023-01-24 | Stop reason: HOSPADM

## 2023-01-21 RX ORDER — ONDANSETRON 4 MG/1
1 TABLET, FILM COATED ORAL 2 TIMES DAILY
COMMUNITY
Start: 2022-12-29 | End: 2023-07-20 | Stop reason: SDUPTHER

## 2023-01-21 RX ORDER — ALBUTEROL SULFATE 90 UG/1
2 AEROSOL, METERED RESPIRATORY (INHALATION) EVERY 4 HOURS PRN
Status: DISCONTINUED | OUTPATIENT
Start: 2023-01-21 | End: 2023-01-21

## 2023-01-21 RX ORDER — SODIUM CHLORIDE 0.9 % (FLUSH) 0.9 %
10 SYRINGE (ML) INJECTION
Status: DISCONTINUED | OUTPATIENT
Start: 2023-01-21 | End: 2023-01-24 | Stop reason: HOSPADM

## 2023-01-21 RX ORDER — LAMOTRIGINE 25 MG/1
50 TABLET ORAL 2 TIMES DAILY
COMMUNITY
Start: 2022-12-28 | End: 2023-11-17

## 2023-01-21 RX ORDER — ACETAMINOPHEN 325 MG/1
650 TABLET ORAL EVERY 6 HOURS PRN
Status: DISCONTINUED | OUTPATIENT
Start: 2023-01-21 | End: 2023-01-24 | Stop reason: HOSPADM

## 2023-01-21 RX ORDER — ONDANSETRON 2 MG/ML
4 INJECTION INTRAMUSCULAR; INTRAVENOUS
Status: COMPLETED | OUTPATIENT
Start: 2023-01-21 | End: 2023-01-21

## 2023-01-21 RX ORDER — MULTIVITAMIN
1 TABLET ORAL DAILY
COMMUNITY

## 2023-01-21 RX ORDER — TRIAMCINOLONE ACETONIDE 1 MG/G
1 PASTE DENTAL
COMMUNITY
Start: 2022-12-21

## 2023-01-21 RX ORDER — CARVEDILOL 3.12 MG/1
3.12 TABLET ORAL 2 TIMES DAILY
Status: DISCONTINUED | OUTPATIENT
Start: 2023-01-21 | End: 2023-01-24 | Stop reason: HOSPADM

## 2023-01-21 RX ORDER — ALBUTEROL SULFATE 0.83 MG/ML
2.5 SOLUTION RESPIRATORY (INHALATION) EVERY 4 HOURS PRN
Status: DISCONTINUED | OUTPATIENT
Start: 2023-01-21 | End: 2023-01-24 | Stop reason: HOSPADM

## 2023-01-21 RX ORDER — QUETIAPINE FUMARATE 300 MG/1
300 TABLET, FILM COATED ORAL NIGHTLY
COMMUNITY
Start: 2022-12-28 | End: 2023-04-24

## 2023-01-21 RX ORDER — IBUPROFEN 200 MG
24 TABLET ORAL
Status: DISCONTINUED | OUTPATIENT
Start: 2023-01-21 | End: 2023-01-24 | Stop reason: HOSPADM

## 2023-01-21 RX ORDER — PANTOPRAZOLE SODIUM 40 MG/10ML
40 INJECTION, POWDER, LYOPHILIZED, FOR SOLUTION INTRAVENOUS DAILY
Status: COMPLETED | OUTPATIENT
Start: 2023-01-22 | End: 2023-01-24

## 2023-01-21 RX ORDER — GABAPENTIN 300 MG/1
600 CAPSULE ORAL 3 TIMES DAILY
Status: DISCONTINUED | OUTPATIENT
Start: 2023-01-21 | End: 2023-01-24 | Stop reason: HOSPADM

## 2023-01-21 RX ORDER — ACETAMINOPHEN 325 MG/1
650 TABLET ORAL EVERY 8 HOURS PRN
Status: DISCONTINUED | OUTPATIENT
Start: 2023-01-21 | End: 2023-01-21

## 2023-01-21 RX ORDER — PRAZOSIN HYDROCHLORIDE 5 MG/1
5 CAPSULE ORAL NIGHTLY
COMMUNITY
Start: 2022-12-28 | End: 2023-04-24

## 2023-01-21 RX ORDER — NAPROXEN SODIUM 220 MG
220 TABLET ORAL 2 TIMES DAILY WITH MEALS
COMMUNITY

## 2023-01-21 RX ORDER — FUROSEMIDE 20 MG/1
20 TABLET ORAL DAILY
Status: DISCONTINUED | OUTPATIENT
Start: 2023-01-22 | End: 2023-01-24 | Stop reason: HOSPADM

## 2023-01-21 RX ORDER — MELATONIN 10 MG
1 CAPSULE ORAL NIGHTLY PRN
COMMUNITY

## 2023-01-21 RX ORDER — VORTIOXETINE 10 MG/1
1 TABLET, FILM COATED ORAL DAILY
COMMUNITY
Start: 2022-12-28 | End: 2023-04-24

## 2023-01-21 RX ORDER — ONDANSETRON 2 MG/ML
4 INJECTION INTRAMUSCULAR; INTRAVENOUS EVERY 8 HOURS PRN
Status: DISCONTINUED | OUTPATIENT
Start: 2023-01-21 | End: 2023-01-24 | Stop reason: HOSPADM

## 2023-01-21 RX ORDER — ATORVASTATIN CALCIUM 40 MG/1
40 TABLET, FILM COATED ORAL DAILY
Status: DISCONTINUED | OUTPATIENT
Start: 2023-01-22 | End: 2023-01-24 | Stop reason: HOSPADM

## 2023-01-21 RX ORDER — CLONAZEPAM 0.5 MG/1
0.5 TABLET ORAL NIGHTLY PRN
Status: DISCONTINUED | OUTPATIENT
Start: 2023-01-21 | End: 2023-01-24 | Stop reason: HOSPADM

## 2023-01-21 RX ORDER — INSULIN ASPART 100 [IU]/ML
0-5 INJECTION, SOLUTION INTRAVENOUS; SUBCUTANEOUS
Status: DISCONTINUED | OUTPATIENT
Start: 2023-01-21 | End: 2023-01-24 | Stop reason: HOSPADM

## 2023-01-21 RX ORDER — QUETIAPINE FUMARATE 100 MG/1
300 TABLET, FILM COATED ORAL NIGHTLY
Status: DISCONTINUED | OUTPATIENT
Start: 2023-01-21 | End: 2023-01-24 | Stop reason: HOSPADM

## 2023-01-21 RX ORDER — PRAZOSIN HYDROCHLORIDE 1 MG/1
2 CAPSULE ORAL NIGHTLY
Status: DISCONTINUED | OUTPATIENT
Start: 2023-01-21 | End: 2023-01-24 | Stop reason: HOSPADM

## 2023-01-21 RX ORDER — PANTOPRAZOLE SODIUM 40 MG/10ML
40 INJECTION, POWDER, LYOPHILIZED, FOR SOLUTION INTRAVENOUS DAILY
Status: DISCONTINUED | OUTPATIENT
Start: 2023-01-21 | End: 2023-01-21

## 2023-01-21 RX ORDER — LAMOTRIGINE 25 MG/1
50 TABLET ORAL 2 TIMES DAILY
Status: DISCONTINUED | OUTPATIENT
Start: 2023-01-21 | End: 2023-01-24 | Stop reason: HOSPADM

## 2023-01-21 RX ORDER — GLUCAGON 1 MG
1 KIT INJECTION
Status: DISCONTINUED | OUTPATIENT
Start: 2023-01-21 | End: 2023-01-24 | Stop reason: HOSPADM

## 2023-01-21 RX ORDER — PRAZOSIN HYDROCHLORIDE 2 MG/1
2 CAPSULE ORAL NIGHTLY
COMMUNITY
Start: 2022-12-28 | End: 2023-04-24

## 2023-01-21 RX ORDER — ONDANSETRON 2 MG/ML
8 INJECTION INTRAMUSCULAR; INTRAVENOUS
Status: COMPLETED | OUTPATIENT
Start: 2023-01-21 | End: 2023-01-21

## 2023-01-21 RX ORDER — SODIUM CHLORIDE 9 MG/ML
INJECTION, SOLUTION INTRAVENOUS CONTINUOUS
Status: ACTIVE | OUTPATIENT
Start: 2023-01-21 | End: 2023-01-22

## 2023-01-21 RX ORDER — IBUPROFEN 200 MG
16 TABLET ORAL
Status: DISCONTINUED | OUTPATIENT
Start: 2023-01-21 | End: 2023-01-24 | Stop reason: HOSPADM

## 2023-01-21 RX ORDER — CLONAZEPAM 0.5 MG/1
0.5 TABLET ORAL NIGHTLY PRN
COMMUNITY
Start: 2022-12-28 | End: 2024-02-29

## 2023-01-21 RX ORDER — FUROSEMIDE 20 MG/1
20 TABLET ORAL DAILY
COMMUNITY
End: 2024-02-29 | Stop reason: SDUPTHER

## 2023-01-21 RX ORDER — POTASSIUM CHLORIDE 20 MEQ/1
20 TABLET, EXTENDED RELEASE ORAL DAILY
Status: DISCONTINUED | OUTPATIENT
Start: 2023-01-22 | End: 2023-01-24 | Stop reason: HOSPADM

## 2023-01-21 RX ADMIN — ONDANSETRON 8 MG: 2 INJECTION INTRAMUSCULAR; INTRAVENOUS at 01:01

## 2023-01-21 RX ADMIN — QUETIAPINE 300 MG: 100 TABLET ORAL at 08:01

## 2023-01-21 RX ADMIN — PROMETHAZINE HYDROCHLORIDE 6.25 MG: 25 INJECTION INTRAMUSCULAR; INTRAVENOUS at 06:01

## 2023-01-21 RX ADMIN — APIXABAN 5 MG: 5 TABLET, FILM COATED ORAL at 08:01

## 2023-01-21 RX ADMIN — ONDANSETRON 4 MG: 2 INJECTION INTRAMUSCULAR; INTRAVENOUS at 04:01

## 2023-01-21 RX ADMIN — PRAZOSIN HYDROCHLORIDE 2 MG: 1 CAPSULE ORAL at 08:01

## 2023-01-21 RX ADMIN — IOHEXOL 100 ML: 350 INJECTION, SOLUTION INTRAVENOUS at 12:01

## 2023-01-21 RX ADMIN — GABAPENTIN 600 MG: 300 CAPSULE ORAL at 08:01

## 2023-01-21 RX ADMIN — ACETAMINOPHEN 650 MG: 325 TABLET ORAL at 11:01

## 2023-01-21 RX ADMIN — CLONAZEPAM 0.5 MG: 0.5 TABLET ORAL at 09:01

## 2023-01-21 RX ADMIN — SODIUM CHLORIDE 250 ML: 0.9 INJECTION, SOLUTION INTRAVENOUS at 03:01

## 2023-01-21 RX ADMIN — LAMOTRIGINE 50 MG: 25 TABLET ORAL at 09:01

## 2023-01-21 RX ADMIN — SODIUM CHLORIDE: 0.9 INJECTION, SOLUTION INTRAVENOUS at 07:01

## 2023-01-21 RX ADMIN — PANTOPRAZOLE SODIUM 40 MG: 40 INJECTION, POWDER, LYOPHILIZED, FOR SOLUTION INTRAVENOUS at 03:01

## 2023-01-21 RX ADMIN — CARVEDILOL 3.12 MG: 3.12 TABLET, FILM COATED ORAL at 08:01

## 2023-01-21 NOTE — H&P
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  01/21/2023
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
## 2023-01-22 LAB
ALBUMIN SERPL BCP-MCNC: 3.7 G/DL (ref 3.5–5.2)
ALP SERPL-CCNC: 66 U/L (ref 55–135)
ALT SERPL W/O P-5'-P-CCNC: 13 U/L (ref 10–44)
ANION GAP SERPL CALC-SCNC: 6 MMOL/L (ref 8–16)
AST SERPL-CCNC: 12 U/L (ref 10–40)
BASOPHILS # BLD AUTO: 0.07 K/UL (ref 0–0.2)
BASOPHILS NFR BLD: 1 % (ref 0–1.9)
BILIRUB SERPL-MCNC: 0.6 MG/DL (ref 0.1–1)
BUN SERPL-MCNC: 15 MG/DL (ref 8–23)
CALCIUM SERPL-MCNC: 8.7 MG/DL (ref 8.7–10.5)
CHLORIDE SERPL-SCNC: 107 MMOL/L (ref 95–110)
CO2 SERPL-SCNC: 25 MMOL/L (ref 23–29)
CREAT SERPL-MCNC: 0.9 MG/DL (ref 0.5–1.4)
DIFFERENTIAL METHOD: ABNORMAL
EOSINOPHIL # BLD AUTO: 0.5 K/UL (ref 0–0.5)
EOSINOPHIL NFR BLD: 7 % (ref 0–8)
ERYTHROCYTE [DISTWIDTH] IN BLOOD BY AUTOMATED COUNT: 13.8 % (ref 11.5–14.5)
EST. GFR  (NO RACE VARIABLE): >60 ML/MIN/1.73 M^2
GLUCOSE SERPL-MCNC: 119 MG/DL (ref 70–110)
GLUCOSE SERPL-MCNC: 146 MG/DL (ref 70–110)
GLUCOSE SERPL-MCNC: 150 MG/DL (ref 70–110)
GLUCOSE SERPL-MCNC: 150 MG/DL (ref 70–110)
GLUCOSE SERPL-MCNC: 175 MG/DL (ref 70–110)
HCT VFR BLD AUTO: 38.2 % (ref 40–54)
HGB BLD-MCNC: 12.1 G/DL (ref 14–18)
IMM GRANULOCYTES # BLD AUTO: 0.02 K/UL (ref 0–0.04)
IMM GRANULOCYTES NFR BLD AUTO: 0.3 % (ref 0–0.5)
LYMPHOCYTES # BLD AUTO: 3 K/UL (ref 1–4.8)
LYMPHOCYTES NFR BLD: 43.5 % (ref 18–48)
MCH RBC QN AUTO: 30 PG (ref 27–31)
MCHC RBC AUTO-ENTMCNC: 31.7 G/DL (ref 32–36)
MCV RBC AUTO: 95 FL (ref 82–98)
MONOCYTES # BLD AUTO: 0.6 K/UL (ref 0.3–1)
MONOCYTES NFR BLD: 8 % (ref 4–15)
NEUTROPHILS # BLD AUTO: 2.8 K/UL (ref 1.8–7.7)
NEUTROPHILS NFR BLD: 40.2 % (ref 38–73)
NRBC BLD-RTO: 0 /100 WBC
PLATELET # BLD AUTO: 177 K/UL (ref 150–450)
PMV BLD AUTO: 9.5 FL (ref 9.2–12.9)
POTASSIUM SERPL-SCNC: 3.7 MMOL/L (ref 3.5–5.1)
PROT SERPL-MCNC: 6.7 G/DL (ref 6–8.4)
RBC # BLD AUTO: 4.04 M/UL (ref 4.6–6.2)
SODIUM SERPL-SCNC: 138 MMOL/L (ref 136–145)
TROPONIN I SERPL HS-MCNC: 4.8 PG/ML (ref 0–14.9)
WBC # BLD AUTO: 6.87 K/UL (ref 3.9–12.7)

## 2023-01-22 PROCEDURE — 85025 COMPLETE CBC W/AUTO DIFF WBC: CPT | Performed by: NURSE PRACTITIONER

## 2023-01-22 PROCEDURE — C9113 INJ PANTOPRAZOLE SODIUM, VIA: HCPCS | Performed by: NURSE PRACTITIONER

## 2023-01-22 PROCEDURE — 25000003 PHARM REV CODE 250: Performed by: INTERNAL MEDICINE

## 2023-01-22 PROCEDURE — 63600175 PHARM REV CODE 636 W HCPCS: Performed by: INTERNAL MEDICINE

## 2023-01-22 PROCEDURE — 96376 TX/PRO/DX INJ SAME DRUG ADON: CPT

## 2023-01-22 PROCEDURE — 36415 COLL VENOUS BLD VENIPUNCTURE: CPT | Performed by: NURSE PRACTITIONER

## 2023-01-22 PROCEDURE — 96375 TX/PRO/DX INJ NEW DRUG ADDON: CPT | Mod: 59

## 2023-01-22 PROCEDURE — 63600175 PHARM REV CODE 636 W HCPCS: Performed by: NURSE PRACTITIONER

## 2023-01-22 PROCEDURE — 80053 COMPREHEN METABOLIC PANEL: CPT | Performed by: NURSE PRACTITIONER

## 2023-01-22 PROCEDURE — 25000003 PHARM REV CODE 250: Performed by: NURSE PRACTITIONER

## 2023-01-22 PROCEDURE — G0378 HOSPITAL OBSERVATION PER HR: HCPCS

## 2023-01-22 RX ORDER — KETOROLAC TROMETHAMINE 30 MG/ML
15 INJECTION, SOLUTION INTRAMUSCULAR; INTRAVENOUS ONCE
Status: DISCONTINUED | OUTPATIENT
Start: 2023-01-22 | End: 2023-01-22

## 2023-01-22 RX ORDER — KETOROLAC TROMETHAMINE 30 MG/ML
15 INJECTION, SOLUTION INTRAMUSCULAR; INTRAVENOUS ONCE
Status: COMPLETED | OUTPATIENT
Start: 2023-01-22 | End: 2023-01-22

## 2023-01-22 RX ADMIN — POTASSIUM CHLORIDE 20 MEQ: 1500 TABLET, EXTENDED RELEASE ORAL at 08:01

## 2023-01-22 RX ADMIN — APIXABAN 5 MG: 5 TABLET, FILM COATED ORAL at 08:01

## 2023-01-22 RX ADMIN — CLONAZEPAM 0.5 MG: 0.5 TABLET ORAL at 08:01

## 2023-01-22 RX ADMIN — ONDANSETRON 4 MG: 2 INJECTION INTRAMUSCULAR; INTRAVENOUS at 08:01

## 2023-01-22 RX ADMIN — GABAPENTIN 600 MG: 300 CAPSULE ORAL at 08:01

## 2023-01-22 RX ADMIN — KETOROLAC TROMETHAMINE 15 MG: 30 INJECTION, SOLUTION INTRAMUSCULAR; INTRAVENOUS at 03:01

## 2023-01-22 RX ADMIN — ATORVASTATIN CALCIUM 40 MG: 40 TABLET, FILM COATED ORAL at 08:01

## 2023-01-22 RX ADMIN — PRAZOSIN HYDROCHLORIDE 2 MG: 1 CAPSULE ORAL at 08:01

## 2023-01-22 RX ADMIN — FUROSEMIDE 20 MG: 20 TABLET ORAL at 08:01

## 2023-01-22 RX ADMIN — ACETAMINOPHEN 650 MG: 325 TABLET ORAL at 04:01

## 2023-01-22 RX ADMIN — SODIUM CHLORIDE: 0.9 INJECTION, SOLUTION INTRAVENOUS at 05:01

## 2023-01-22 RX ADMIN — CARVEDILOL 3.12 MG: 3.12 TABLET, FILM COATED ORAL at 08:01

## 2023-01-22 RX ADMIN — QUETIAPINE 300 MG: 100 TABLET ORAL at 08:01

## 2023-01-22 RX ADMIN — PANTOPRAZOLE SODIUM 40 MG: 40 INJECTION, POWDER, LYOPHILIZED, FOR SOLUTION INTRAVENOUS at 08:01

## 2023-01-22 RX ADMIN — ONDANSETRON 4 MG: 2 INJECTION INTRAMUSCULAR; INTRAVENOUS at 04:01

## 2023-01-22 RX ADMIN — GABAPENTIN 600 MG: 300 CAPSULE ORAL at 01:01

## 2023-01-22 RX ADMIN — LAMOTRIGINE 50 MG: 25 TABLET ORAL at 08:01

## 2023-01-22 NOTE — PROGRESS NOTES
Automatic Inhaler to Nebulizer Interchange    albuterol (Ventolin, ProAir, or Proventil)  mcg given multiple times per day changed to albuterol 2.5 mg Q4H PRN Wheezing, Shortness of Breath  per [facility] Automatic Therapeutic Substitutions Protocol.    Please contact pharmacy at extension [phone] with any questions.     Thank you,   Nehemiah Chand

## 2023-01-22 NOTE — PLAN OF CARE
Problem: Adult Inpatient Plan of Care  Goal: Plan of Care Review  Outcome: Ongoing, Progressing  Goal: Patient-Specific Goal (Individualized)  Outcome: Ongoing, Progressing  Goal: Absence of Hospital-Acquired Illness or Injury  Outcome: Ongoing, Progressing  Goal: Optimal Comfort and Wellbeing  Outcome: Ongoing, Progressing  Goal: Readiness for Transition of Care  Outcome: Ongoing, Progressing     Problem: Diabetes Comorbidity  Goal: Blood Glucose Level Within Targeted Range  Outcome: Ongoing, Progressing     Problem: Pain Acute  Goal: Acceptable Pain Control and Functional Ability  Outcome: Ongoing, Progressing     Problem: Nausea and Vomiting  Goal: Fluid and Electrolyte Balance  Outcome: Ongoing, Progressing

## 2023-01-22 NOTE — PLAN OF CARE
CM met with pt at bedside.  Explained the MOON.  Pt verbalized understanding the HUFFMAN.  HUFFMAN signed and scanned to .       01/22/23 1052   HUFFMAN Message   Medicare Outpatient and Observation Notification regarding financial responsibility Explained to patient/caregiver;Signed/date by patient/caregiver   Date HUFFMAN was signed 01/22/23   Time HUFFMAN was signed [id number]

## 2023-01-22 NOTE — PROGRESS NOTES
Subjective:  Patient seen examined in the morning.  Patient states he still feels nauseous and did have episode of vomiting this morning although he says vomiting has less frequent now.    General appearance:  Chronically ill-appearing male in no apparent distress.  Skin: No Rash.   Neuro: Motor and sensory exams grossly intact. Good tone. Power in all 4 extremities 5/5.   HENT: Atraumatic head. Moist mucous membranes of oral cavity.  Eyes: Normal extraocular movements.   Neck: Supple. No evidence of lymphadenopathy. No thyroidomegaly.  Lungs: Clear to auscultation bilaterally. No wheezing present.   Heart: Regular rate and rhythm. S1 and S2 present with no murmurs/gallop/rub. No pedal edema. No JVD present.   Abdomen: Soft, non-distended, tenderness to palpate epigastric region. No rebound tenderness/guarding. No masses or organomegaly. Bowel sounds are normal. Bladder is not palpable.   Extremities: No cyanosis, clubbing, or edema.  Psych/mental status: Alert and oriented. Cooperative. Responds appropriately to questions.          Intractable nausea with vomiting  -possible gastric paresis versus marijuana induced hyper emesis  -encourage marijuana cessation  - antiemetics  -IV PPI  -IV hydration       2. Type 2 diabetes  -low dose NovoLog insulin sliding scale  -hypoglycemic protocol  -cardiac diabetic diet once eating liquid diet advance as tolerated for now  -sliding scale protocol     3. History of pulmonary emboli and DVTs  -continue Eliquis  -no unilateral lower extremity swelling     4. Essential hypertension  -continue home medications to manage     5. Marijuana use  -may be the culprit of his intractable nausea vomiting  -cessation encouraged and discussed

## 2023-01-22 NOTE — PLAN OF CARE
[facility]  Initial Discharge Assessment       Primary Care Provider: Nikita Magallanes NP    Admission Diagnosis: Intractable vomiting with nausea [R11.2]    Admission Date: 1/21/2023  Expected Discharge Date:     CM met with pt at bedside.  Verified information on face sheet.  No dialysis, no coumadin, no nebulizer.  Pt spouse will transport home upon discharge.  No needs identified at this time.        Discharge Barriers Identified: None    Payor: [facility] MCARE / Plan: [facility] DUAL COMPLETE HMO SNP / Product Type: Medicare Advantage /     Extended Emergency Contact Information  Primary Emergency Contact: Cheryle Villafana  Address: [address]           [place] [address] [facility]  Home Phone: [phone]  Mobile Phone: [phone]  Relation: Spouse  Preferred language: English   needed? No  Secondary Emergency Contact: Lisette Monsivais  Address: UNKNOWN   [place]  Mobile Phone: [phone]  Relation: Sister  Preferred language: English   needed? No    Discharge Plan A: Home with family  Discharge Plan B: Home with family      [id number] [place] - [address]  [address] [place] [address]  Phone: [phone] Fax: [phone]    [facility] [place] - [place] - [address]  [address]  [place] [address]  Phone: [phone] Fax: [phone]      Initial Assessment (most recent)       Adult Discharge Assessment - 01/22/23 1051          Discharge Assessment    Assessment Type Discharge Planning Assessment     Confirmed/corrected address, phone number and insurance Yes     Confirmed Demographics Correct on Facesheet     Source of Information patient     Does patient/caregiver understand observation status Yes     Communicated HOMAR with patient/caregiver Date not available/Unable to determine     Reason For Admission Intractable nausea and  vomiting     People in Home spouse     Facility Arrived From: home     Do you expect to return to your current living situation? Yes     Do you have help at home or someone to help you manage your care at home? Yes     Who are your caregiver(s) and their phone number(s)? Cheryle Villafana (spouse) [phone]     Prior to hospitilization cognitive status: Unable to Assess     Current cognitive status: Alert/Oriented     Walking or Climbing Stairs ambulation difficulty, requires equipment     Mobility Management 4 pronge cane     Dressing/Bathing --   no issue    Equipment Currently Used at Home cane, quad;nebulizer;oxygen     Patient currently being followed by outpatient case management? No     Do you currently have service(s) that help you manage your care at home? No     Do you take prescription medications? Yes     Do you have prescription coverage? Yes     Coverage Payor:  [facility] DUAL COMPLETE HMO SNP     Do you have any problems affording any of your prescribed medications? No     Is the patient taking medications as prescribed? yes     Who is going to help you get home at discharge? spouse     How do you get to doctors appointments? family or friend will provide     Are you on dialysis? No     Do you take coumadin? No     Discharge Plan A Home with family     Discharge Plan B Home with family     DME Needed Upon Discharge  none     Discharge Plan discussed with: Patient     Discharge Barriers Identified None

## 2023-01-23 LAB
ALBUMIN SERPL BCP-MCNC: 3.9 G/DL (ref 3.5–5.2)
ALP SERPL-CCNC: 69 U/L (ref 55–135)
ALT SERPL W/O P-5'-P-CCNC: 14 U/L (ref 10–44)
ANION GAP SERPL CALC-SCNC: 8 MMOL/L (ref 8–16)
AST SERPL-CCNC: 13 U/L (ref 10–40)
BASOPHILS # BLD AUTO: 0.08 K/UL (ref 0–0.2)
BASOPHILS NFR BLD: 1.3 % (ref 0–1.9)
BILIRUB SERPL-MCNC: 0.7 MG/DL (ref 0.1–1)
BUN SERPL-MCNC: 12 MG/DL (ref 8–23)
CALCIUM SERPL-MCNC: 8.6 MG/DL (ref 8.7–10.5)
CHLORIDE SERPL-SCNC: 108 MMOL/L (ref 95–110)
CO2 SERPL-SCNC: 25 MMOL/L (ref 23–29)
CREAT SERPL-MCNC: 1 MG/DL (ref 0.5–1.4)
DIFFERENTIAL METHOD: ABNORMAL
EOSINOPHIL # BLD AUTO: 0.5 K/UL (ref 0–0.5)
EOSINOPHIL NFR BLD: 7.2 % (ref 0–8)
ERYTHROCYTE [DISTWIDTH] IN BLOOD BY AUTOMATED COUNT: 13.9 % (ref 11.5–14.5)
EST. GFR  (NO RACE VARIABLE): >60 ML/MIN/1.73 M^2
GLUCOSE SERPL-MCNC: 158 MG/DL (ref 70–110)
GLUCOSE SERPL-MCNC: 165 MG/DL (ref 70–110)
GLUCOSE SERPL-MCNC: 166 MG/DL (ref 70–110)
GLUCOSE SERPL-MCNC: 176 MG/DL (ref 70–110)
GLUCOSE SERPL-MCNC: 215 MG/DL (ref 70–110)
GLUCOSE SERPL-MCNC: 227 MG/DL (ref 70–110)
HCT VFR BLD AUTO: 39.6 % (ref 40–54)
HGB BLD-MCNC: 12.6 G/DL (ref 14–18)
IMM GRANULOCYTES # BLD AUTO: 0.01 K/UL (ref 0–0.04)
IMM GRANULOCYTES NFR BLD AUTO: 0.2 % (ref 0–0.5)
LYMPHOCYTES # BLD AUTO: 2.6 K/UL (ref 1–4.8)
LYMPHOCYTES NFR BLD: 41.2 % (ref 18–48)
MCH RBC QN AUTO: 30.2 PG (ref 27–31)
MCHC RBC AUTO-ENTMCNC: 31.8 G/DL (ref 32–36)
MCV RBC AUTO: 95 FL (ref 82–98)
MONOCYTES # BLD AUTO: 0.6 K/UL (ref 0.3–1)
MONOCYTES NFR BLD: 9 % (ref 4–15)
NEUTROPHILS # BLD AUTO: 2.6 K/UL (ref 1.8–7.7)
NEUTROPHILS NFR BLD: 41.1 % (ref 38–73)
NRBC BLD-RTO: 0 /100 WBC
PLATELET # BLD AUTO: 179 K/UL (ref 150–450)
PMV BLD AUTO: 9.8 FL (ref 9.2–12.9)
POTASSIUM SERPL-SCNC: 3.8 MMOL/L (ref 3.5–5.1)
PROT SERPL-MCNC: 7.2 G/DL (ref 6–8.4)
RBC # BLD AUTO: 4.17 M/UL (ref 4.6–6.2)
SODIUM SERPL-SCNC: 141 MMOL/L (ref 136–145)
WBC # BLD AUTO: 6.36 K/UL (ref 3.9–12.7)

## 2023-01-23 PROCEDURE — 25000003 PHARM REV CODE 250: Performed by: INTERNAL MEDICINE

## 2023-01-23 PROCEDURE — 36415 COLL VENOUS BLD VENIPUNCTURE: CPT | Performed by: NURSE PRACTITIONER

## 2023-01-23 PROCEDURE — 96376 TX/PRO/DX INJ SAME DRUG ADON: CPT | Mod: 59

## 2023-01-23 PROCEDURE — 63600175 PHARM REV CODE 636 W HCPCS: Performed by: INTERNAL MEDICINE

## 2023-01-23 PROCEDURE — 99900031 HC PATIENT EDUCATION (STAT)

## 2023-01-23 PROCEDURE — C9113 INJ PANTOPRAZOLE SODIUM, VIA: HCPCS | Performed by: NURSE PRACTITIONER

## 2023-01-23 PROCEDURE — 99900035 HC TECH TIME PER 15 MIN (STAT)

## 2023-01-23 PROCEDURE — 85025 COMPLETE CBC W/AUTO DIFF WBC: CPT | Performed by: NURSE PRACTITIONER

## 2023-01-23 PROCEDURE — 80053 COMPREHEN METABOLIC PANEL: CPT | Performed by: NURSE PRACTITIONER

## 2023-01-23 PROCEDURE — 63600175 PHARM REV CODE 636 W HCPCS: Performed by: NURSE PRACTITIONER

## 2023-01-23 PROCEDURE — G0378 HOSPITAL OBSERVATION PER HR: HCPCS

## 2023-01-23 PROCEDURE — 96372 THER/PROPH/DIAG INJ SC/IM: CPT | Performed by: NURSE PRACTITIONER

## 2023-01-23 PROCEDURE — 94799 UNLISTED PULMONARY SVC/PX: CPT

## 2023-01-23 PROCEDURE — 25000003 PHARM REV CODE 250: Performed by: NURSE PRACTITIONER

## 2023-01-23 PROCEDURE — 94761 N-INVAS EAR/PLS OXIMETRY MLT: CPT

## 2023-01-23 RX ADMIN — PRAZOSIN HYDROCHLORIDE 2 MG: 1 CAPSULE ORAL at 08:01

## 2023-01-23 RX ADMIN — GABAPENTIN 600 MG: 300 CAPSULE ORAL at 08:01

## 2023-01-23 RX ADMIN — CLONAZEPAM 0.5 MG: 0.5 TABLET ORAL at 08:01

## 2023-01-23 RX ADMIN — APIXABAN 5 MG: 5 TABLET, FILM COATED ORAL at 08:01

## 2023-01-23 RX ADMIN — CARVEDILOL 3.12 MG: 3.12 TABLET, FILM COATED ORAL at 08:01

## 2023-01-23 RX ADMIN — GABAPENTIN 600 MG: 300 CAPSULE ORAL at 03:01

## 2023-01-23 RX ADMIN — INSULIN ASPART 2 UNITS: 100 INJECTION, SOLUTION INTRAVENOUS; SUBCUTANEOUS at 11:01

## 2023-01-23 RX ADMIN — PANTOPRAZOLE SODIUM 40 MG: 40 INJECTION, POWDER, LYOPHILIZED, FOR SOLUTION INTRAVENOUS at 08:01

## 2023-01-23 RX ADMIN — QUETIAPINE 300 MG: 100 TABLET ORAL at 08:01

## 2023-01-23 RX ADMIN — ONDANSETRON 4 MG: 2 INJECTION INTRAMUSCULAR; INTRAVENOUS at 10:01

## 2023-01-23 RX ADMIN — PROMETHAZINE HYDROCHLORIDE 12.5 MG: 25 INJECTION INTRAMUSCULAR; INTRAVENOUS at 02:01

## 2023-01-23 RX ADMIN — LAMOTRIGINE 50 MG: 25 TABLET ORAL at 08:01

## 2023-01-23 RX ADMIN — POTASSIUM CHLORIDE 20 MEQ: 1500 TABLET, EXTENDED RELEASE ORAL at 08:01

## 2023-01-23 RX ADMIN — FUROSEMIDE 20 MG: 20 TABLET ORAL at 08:01

## 2023-01-23 RX ADMIN — ATORVASTATIN CALCIUM 40 MG: 40 TABLET, FILM COATED ORAL at 08:01

## 2023-01-23 NOTE — CARE UPDATE
01/23/23 0941   Patient Assessment/Suction   Level of Consciousness (AVPU) alert   Respiratory Effort Unlabored   Expansion/Accessory Muscles/Retractions no use of accessory muscles   All Lung Fields Breath Sounds clear;diminished   Rhythm/Pattern, Respiratory unlabored   Cough Frequency no cough   PRE-TX-O2   Device (Oxygen Therapy) room air   SpO2 98 %   Pulse Oximetry Type Intermittent   $ Pulse Oximetry - Multiple Charge Pulse Oximetry - Multiple   Pulse 73   Resp 17   Aerosol Therapy   $ Aerosol Therapy Charges PRN treatment not required   Education   $ Education Other (see comment);15 min  (sats)   Respiratory Evaluation   $ Care Plan Tech Time 15 min   $ Eval/Re-eval Charges Re-evaluation

## 2023-01-23 NOTE — PROGRESS NOTES
Subjective:  Patient seen examined in the morning.  Patient states he still feels nauseous after eating this morning.  However he says he has not vomited yet.  But feels the urge to do so.    General appearance:  Chronically ill-appearing male in no apparent distress.  Skin: No Rash.   Neuro: Motor and sensory exams grossly intact. Good tone. Power in all 4 extremities 5/5.   HENT: Atraumatic head. Moist mucous membranes of oral cavity.  Eyes: Normal extraocular movements.   Neck: Supple. No evidence of lymphadenopathy. No thyroidomegaly.  Lungs: Clear to auscultation bilaterally. No wheezing present.   Abdomen: Soft, non-distended  Extremities: No cyanosis, clubbing, or edema.  Psych/mental status: Alert and oriented. Cooperative. Responds appropriately to questions.          Intractable nausea with vomiting  -possible gastric paresis versus marijuana induced hyper emesis  -encouraged marijuana cessation.  - antiemetics  -IV PPI  -IV hydration  -patient still feeling very uneasy/nauseous today.  Did not have episode of vomiting.  Was planning on discharging patient however wife is very concerned that he will vomit and she will have to bring him back.  I agreed to let him stay overnight to continue to monitor.  If no issues then can discharge tomorrow.  Patient agrees to stop taking marijuana on discharge for at least a week in order to see if this does help his symptoms.       2. Type 2 diabetes  -low dose NovoLog insulin sliding scale  -hypoglycemic protocol  -cardiac diabetic diet once eating liquid diet advance as tolerated for now  -sliding scale protocol     3. History of pulmonary emboli and DVTs  -continue Eliquis  -no unilateral lower extremity swelling     4. Essential hypertension  -continue home medications to manage     5. Marijuana use  -may be the culprit of his intractable nausea vomiting  -cessation encouraged and discussed. See above

## 2023-01-24 VITALS
RESPIRATION RATE: 17 BRPM | HEART RATE: 67 BPM | WEIGHT: 250.69 LBS | OXYGEN SATURATION: 97 % | HEIGHT: 74 IN | SYSTOLIC BLOOD PRESSURE: 139 MMHG | TEMPERATURE: 98 F | BODY MASS INDEX: 32.17 KG/M2 | DIASTOLIC BLOOD PRESSURE: 104 MMHG

## 2023-01-24 LAB
ALBUMIN SERPL BCP-MCNC: 3.8 G/DL (ref 3.5–5.2)
ALP SERPL-CCNC: 69 U/L (ref 55–135)
ALT SERPL W/O P-5'-P-CCNC: 13 U/L (ref 10–44)
ANION GAP SERPL CALC-SCNC: 10 MMOL/L (ref 8–16)
AST SERPL-CCNC: 12 U/L (ref 10–40)
BASOPHILS # BLD AUTO: 0.07 K/UL (ref 0–0.2)
BASOPHILS NFR BLD: 0.9 % (ref 0–1.9)
BILIRUB SERPL-MCNC: 0.5 MG/DL (ref 0.1–1)
BUN SERPL-MCNC: 10 MG/DL (ref 8–23)
CALCIUM SERPL-MCNC: 8.9 MG/DL (ref 8.7–10.5)
CHLORIDE SERPL-SCNC: 105 MMOL/L (ref 95–110)
CO2 SERPL-SCNC: 27 MMOL/L (ref 23–29)
CREAT SERPL-MCNC: 1 MG/DL (ref 0.5–1.4)
DIFFERENTIAL METHOD: ABNORMAL
EOSINOPHIL # BLD AUTO: 0.5 K/UL (ref 0–0.5)
EOSINOPHIL NFR BLD: 6.7 % (ref 0–8)
ERYTHROCYTE [DISTWIDTH] IN BLOOD BY AUTOMATED COUNT: 14.1 % (ref 11.5–14.5)
EST. GFR  (NO RACE VARIABLE): >60 ML/MIN/1.73 M^2
ESTIMATED AVG GLUCOSE: 194 MG/DL (ref 68–131)
GLUCOSE SERPL-MCNC: 175 MG/DL (ref 70–110)
GLUCOSE SERPL-MCNC: 221 MG/DL (ref 70–110)
HBA1C MFR BLD: 8.4 % (ref 4.5–6.2)
HCT VFR BLD AUTO: 39.2 % (ref 40–54)
HGB BLD-MCNC: 12.4 G/DL (ref 14–18)
IMM GRANULOCYTES # BLD AUTO: 0.03 K/UL (ref 0–0.04)
IMM GRANULOCYTES NFR BLD AUTO: 0.4 % (ref 0–0.5)
LYMPHOCYTES # BLD AUTO: 2.9 K/UL (ref 1–4.8)
LYMPHOCYTES NFR BLD: 37.3 % (ref 18–48)
MCH RBC QN AUTO: 30.2 PG (ref 27–31)
MCHC RBC AUTO-ENTMCNC: 31.6 G/DL (ref 32–36)
MCV RBC AUTO: 96 FL (ref 82–98)
MONOCYTES # BLD AUTO: 0.7 K/UL (ref 0.3–1)
MONOCYTES NFR BLD: 8.8 % (ref 4–15)
NEUTROPHILS # BLD AUTO: 3.6 K/UL (ref 1.8–7.7)
NEUTROPHILS NFR BLD: 45.9 % (ref 38–73)
NRBC BLD-RTO: 0 /100 WBC
PLATELET # BLD AUTO: 182 K/UL (ref 150–450)
PMV BLD AUTO: 10 FL (ref 9.2–12.9)
POTASSIUM SERPL-SCNC: 3.9 MMOL/L (ref 3.5–5.1)
PROT SERPL-MCNC: 7 G/DL (ref 6–8.4)
RBC # BLD AUTO: 4.1 M/UL (ref 4.6–6.2)
SODIUM SERPL-SCNC: 142 MMOL/L (ref 136–145)
WBC # BLD AUTO: 7.73 K/UL (ref 3.9–12.7)

## 2023-01-24 PROCEDURE — 96376 TX/PRO/DX INJ SAME DRUG ADON: CPT | Mod: 59

## 2023-01-24 PROCEDURE — 25000003 PHARM REV CODE 250: Performed by: NURSE PRACTITIONER

## 2023-01-24 PROCEDURE — 85025 COMPLETE CBC W/AUTO DIFF WBC: CPT | Performed by: NURSE PRACTITIONER

## 2023-01-24 PROCEDURE — 80053 COMPREHEN METABOLIC PANEL: CPT | Performed by: NURSE PRACTITIONER

## 2023-01-24 PROCEDURE — 36415 COLL VENOUS BLD VENIPUNCTURE: CPT | Performed by: NURSE PRACTITIONER

## 2023-01-24 PROCEDURE — C9113 INJ PANTOPRAZOLE SODIUM, VIA: HCPCS | Performed by: NURSE PRACTITIONER

## 2023-01-24 PROCEDURE — 94761 N-INVAS EAR/PLS OXIMETRY MLT: CPT

## 2023-01-24 PROCEDURE — G0378 HOSPITAL OBSERVATION PER HR: HCPCS

## 2023-01-24 PROCEDURE — 94799 UNLISTED PULMONARY SVC/PX: CPT

## 2023-01-24 PROCEDURE — 63600175 PHARM REV CODE 636 W HCPCS: Performed by: NURSE PRACTITIONER

## 2023-01-24 PROCEDURE — 99900035 HC TECH TIME PER 15 MIN (STAT)

## 2023-01-24 PROCEDURE — 99900031 HC PATIENT EDUCATION (STAT)

## 2023-01-24 RX ORDER — METOCLOPRAMIDE 5 MG/1
5 TABLET ORAL
Qty: 21 TABLET | Refills: 0 | Status: SHIPPED | OUTPATIENT
Start: 2023-01-24

## 2023-01-24 RX ADMIN — CARVEDILOL 3.12 MG: 3.12 TABLET, FILM COATED ORAL at 09:01

## 2023-01-24 RX ADMIN — FUROSEMIDE 20 MG: 20 TABLET ORAL at 09:01

## 2023-01-24 RX ADMIN — ATORVASTATIN CALCIUM 40 MG: 40 TABLET, FILM COATED ORAL at 09:01

## 2023-01-24 RX ADMIN — APIXABAN 5 MG: 5 TABLET, FILM COATED ORAL at 09:01

## 2023-01-24 RX ADMIN — LAMOTRIGINE 50 MG: 25 TABLET ORAL at 09:01

## 2023-01-24 RX ADMIN — POTASSIUM CHLORIDE 20 MEQ: 1500 TABLET, EXTENDED RELEASE ORAL at 09:01

## 2023-01-24 RX ADMIN — GABAPENTIN 600 MG: 300 CAPSULE ORAL at 09:01

## 2023-01-24 RX ADMIN — PANTOPRAZOLE SODIUM 40 MG: 40 INJECTION, POWDER, LYOPHILIZED, FOR SOLUTION INTRAVENOUS at 09:01

## 2023-01-24 NOTE — CARE UPDATE
01/24/23 0903   Patient Assessment/Suction   Level of Consciousness (AVPU) alert   Respiratory Effort Unlabored   Expansion/Accessory Muscles/Retractions no use of accessory muscles   All Lung Fields Breath Sounds clear;diminished   Rhythm/Pattern, Respiratory unlabored   Cough Frequency no cough   PRE-TX-O2   Device (Oxygen Therapy) room air   SpO2 97 %   Pulse Oximetry Type Intermittent   $ Pulse Oximetry - Multiple Charge Pulse Oximetry - Multiple   Pulse 70   Resp 18   Aerosol Therapy   $ Aerosol Therapy Charges PRN treatment not required   Education   $ Education Other (see comment);15 min  (sats)   Respiratory Evaluation   $ Care Plan Tech Time 15 min   $ Eval/Re-eval Charges Re-evaluation

## 2023-01-24 NOTE — PLAN OF CARE
Hospital follow up appointment attempted, but I was unable to reach office staff.  Patient to make own follow up appointment via phone or MyChart.     Discharge orders and chart reviewed with no further post-acute discharge needs identified at this time.  At this time, patient is cleared for discharge from Case Management standpoint.        01/24/23 1109   Final Note   Assessment Type Final Discharge Note   Anticipated Discharge Disposition Home   Post-Acute Status   Post-Acute Authorization Other   Other Status No Post-Acute Service Needs   Discharge Delays None known at this time

## 2023-01-25 NOTE — DISCHARGE SUMMARY
[facility], later [facility]  Discharge Summary      Patient Name: Kevan Colin Sr.  MRN: [id number]  LIBBY: [id number]  Patient Class: OP- Observation  Admission Date: 1/21/2023  Hospital Length of Stay: 0 days  Discharge Date and Time:  01/24/2023 9:52 PM  Attending Physician: No att. providers found   Discharging Provider: Albert Polanco MD  Primary Care Provider: Nikita Magallnaes NP    Primary Care Team: Networked reference to record PCT     HPI:   No notes on file    * No surgery found *      Hospital Course:    64 y.o. male with PMHx of DM, HTN, PE, DVT, COPD, depression, appendicitis, and gastritis was admitted with  intractable nausea with vomiting and epigastric pain for the past 3 weeks.  He had history of peptic ulcer disease and gastritis.  He is on a PPI.  He also endorse smoking marijuana throughout the day recently     A CT of the abdomen and pelvis was within normal limits a CT of the chest was negative his lipase was within normal limits.  Appear cannibal induced  hyperemesis syndrome Vs gastroparesis and symptomatic treatment given and later self limited and better.  Later DC with reglan PRN and follow up with PCP as O.       Goals of Care Treatment Preferences:  Code Status: Full Code      Consults:     No new Assessment & Plan notes have been filed under this hospital service since the last note was generated.  Service: Hospital Medicine    Final Active Diagnoses:    Diagnosis Date Noted POA    PRINCIPAL PROBLEM:  Intractable nausea and vomiting [R11.2] 01/21/2023 Yes    Marijuana use [F12.90] 01/21/2023 Yes      Problems Resolved During this Admission:       Discharged Condition: good    Disposition: Home or Self Care    Follow Up:   Follow-up Information     Nikita Magallanes NP Follow up in 1 week(s).    Specialty: Family Medicine  Contact information:  [address] [facility]  [address]  [facility] [address]  [phone]                       Patient Instructions:      Diet  Adult Regular     Activity as tolerated       Significant Diagnostic Studies: Labs:   CMP   Recent Labs   Lab 01/23/23  0254 01/24/23  0348    142   K 3.8 3.9    105   CO2 25 27   * 175*   BUN 12 10   CREATININE 1.0 1.0   CALCIUM 8.6* 8.9   PROT 7.2 7.0   ALBUMIN 3.9 3.8   BILITOT 0.7 0.5   ALKPHOS 69 69   AST 13 12   ALT 14 13   ANIONGAP 8 10    and CBC   Recent Labs   Lab 01/23/23  0254 01/24/23  0348   WBC 6.36 7.73   HGB 12.6* 12.4*   HCT 39.6* 39.2*    182       Pending Diagnostic Studies:     None         Medications:  Reconciled Home Medications:      Medication List      START taking these medications    metoclopramide HCl 5 MG tablet  Commonly known as: REGLAN  Take 1 tablet (5 mg total) by mouth 3 (three) times daily before meals.        CHANGE how you take these medications    cyclobenzaprine 5 MG tablet  Commonly known as: FLEXERIL  TAKE ONE TABLET BY MOUTH TWICE DAILY  What changed: when to take this     ELIQUIS 5 mg Tab  Generic drug: apixaban  TAKE ONE TABLET BY MOUTH TWICE DAILY  What changed: how much to take     furosemide 20 MG tablet  Commonly known as: LASIX  Take 20 mg by mouth once daily.  What changed: Another medication with the same name was removed. Continue taking this medication, and follow the directions you see here.     HumaLOG KwikPen Insulin 100 unit/mL pen  Generic drug: insulin lispro  INJECT 16 UNITS INTO THE SKIN 3 (THREE) TIMES DAILY BEFORE MEALS.  What changed:   · when to take this  · additional instructions     ondansetron 4 MG tablet  Commonly known as: ZOFRAN  Take 1 tablet by mouth 2 (two) times a day.  What changed: Another medication with the same name was removed. Continue taking this medication, and follow the directions you see here.        CONTINUE taking these medications    albuterol 90 mcg/actuation inhaler  Commonly known as: PROVENTIL/VENTOLIN HFA  Inhale 2 puffs into the lungs every 4 (four) hours as needed for Shortness of Breath or  Wheezing.     albuterol-ipratropium 2.5 mg-0.5 mg/3 mL nebulizer solution  Commonly known as: DUO-NEB  Take 3 mLs by nebulization every 6 (six) hours as needed for Wheezing. Rescue     atorvastatin 40 MG tablet  Commonly known as: LIPITOR  TAKE 1 TABLET (40 MG TOTAL) BY MOUTH ONCE DAILY.     BELSOMRA 20 mg Tab  Generic drug: suvorexant  Take 1 tablet by mouth nightly as needed.     carvediloL 3.125 MG tablet  Commonly known as: COREG  Take 1 tablet (3.125 mg total) by mouth 2 (two) times daily.     clonazePAM 0.5 MG tablet  Commonly known as: KlonoPIN  Take 0.5 mg by mouth nightly as needed.     empagliflozin 25 mg tablet  Commonly known as: JARDIANCE  Take 1 tablet (25 mg total) by mouth once daily.     EPINEPHrine 0.3 mg/0.3 mL Atin  Commonly known as: EPIPEN  EpiPen 2-Helio 0.3 mg/0.3 mL injection, auto-injector     gabapentin 600 MG tablet  Commonly known as: NEURONTIN  TAKE ONE TABLET BY MOUTH THREE TIMES DAILY.     INVEGA SUSTENNA 156 mg/mL Syrg injection  Generic drug: paliperidone palmitate     lamoTRIgine 25 MG tablet  Commonly known as: LAMICTAL  Take 50 mg by mouth 2 (two) times daily.     melatonin 10 mg Cap  Take 1 capsule by mouth nightly as needed.     metFORMIN 1000 MG tablet  Commonly known as: GLUCOPHAGE  Take 1 tablet (1,000 mg total) by mouth 2 (two) times daily with meals.     naproxen sodium 220 MG tablet  Commonly known as: ANAPROX  Take 220 mg by mouth 2 (two) times daily with meals.     ONE DAILY MULTIVITAMIN per tablet  Generic drug: multivitamin  Take 1 tablet by mouth once daily.     potassium chloride SA 20 MEQ tablet  Commonly known as: K-DUR,KLOR-CON  Take 20 mEq by mouth once daily.     * prazosin 2 MG Cap  Commonly known as: MINIPRESS  Take 2 mg by mouth every evening.     * prazosin 5 MG capsule  Commonly known as: MINIPRESS  Take 5 mg by mouth every evening.     QUEtiapine 300 MG Tab  Commonly known as: SEROQUEL  Take 300 mg by mouth every evening.     triamcinolone acetonide 0.1%  0.1 % paste  Commonly known as: KENALOG  Place 1 application onto teeth as needed.     TRINTELLIX 10 mg Tab  Generic drug: vortioxetine  Take 1 tablet by mouth once daily.         * This list has 2 medication(s) that are the same as other medications prescribed for you. Read the directions carefully, and ask your doctor or other care provider to review them with you.            STOP taking these medications    diazePAM 5 MG tablet  Commonly known as: VALIUM     doxepin 3 mg Tab  Commonly known as: SILENOR     ibuprofen 800 MG tablet  Commonly known as: ADVIL,MOTRIN     lisinopriL 10 MG tablet     pantoprazole 40 MG tablet  Commonly known as: PROTONIX        ASK your doctor about these medications    BASAGLAR KWIKPEN U-100 INSULIN glargine 100 units/mL SubQ pen  Generic drug: insulin  Inject 90 Units into the skin every evening.            Indwelling Lines/Drains at time of discharge:   Lines/Drains/Airways     None               General: Patient resting comfortably in no acute distress. Appears as stated age. Calm  Eyes: EOM intact. No conjunctivae injection. No scleral icterus.  ENT: Hearing grossly intact. No discharge from ears. No nasal discharge.   CVS: RRR. No LE edema BL.  Lungs: CTA BL, no wheezing or crackles. Good breath sounds. No accessory muscle use. No acute respiratory distress  Neuro: non focal , Follows commands. Responds appropriately  Time spent on the discharge of patient: 22 minutes         Albert Polanco MD  Department of Hospital Medicine  [facility]

## 2023-01-25 NOTE — HOSPITAL COURSE
64 y.o. male with PMHx of DM, HTN, PE, DVT, COPD, depression, appendicitis, and gastritis was admitted with  intractable nausea with vomiting and epigastric pain for the past 3 weeks.  He had history of peptic ulcer disease and gastritis.  He is on a PPI.  He also endorse smoking marijuana throughout the day recently     A CT of the abdomen and pelvis was within normal limits a CT of the chest was negative his lipase was within normal limits.  Appear cannibal induced  hyperemesis syndrome Vs gastroparesis and symptomatic treatment given and later self limited and better.  Later DC with reglan PRN and follow up with PCP as O.

## 2023-03-16 ENCOUNTER — OFFICE VISIT (OUTPATIENT)
Dept: UROLOGY | Facility: CLINIC | Age: 68
End: 2023-03-16
Payer: MEDICARE

## 2023-03-16 VITALS
DIASTOLIC BLOOD PRESSURE: 71 MMHG | WEIGHT: 250.69 LBS | HEART RATE: 80 BPM | BODY MASS INDEX: 32.17 KG/M2 | HEIGHT: 74 IN | SYSTOLIC BLOOD PRESSURE: 112 MMHG

## 2023-03-16 DIAGNOSIS — E13.649 UNCONTROLLED DIABETES MELLITUS OF OTHER TYPE WITH HYPOGLYCEMIA, UNSPECIFIED HYPOGLYCEMIA COMA STATUS: ICD-10-CM

## 2023-03-16 DIAGNOSIS — N40.1 BENIGN PROSTATIC HYPERPLASIA WITH LOWER URINARY TRACT SYMPTOMS, SYMPTOM DETAILS UNSPECIFIED: ICD-10-CM

## 2023-03-16 DIAGNOSIS — N40.0 BENIGN PROSTATIC HYPERPLASIA, UNSPECIFIED WHETHER LOWER URINARY TRACT SYMPTOMS PRESENT: Primary | ICD-10-CM

## 2023-03-16 DIAGNOSIS — E11.42 DIABETIC POLYNEUROPATHY ASSOCIATED WITH TYPE 2 DIABETES MELLITUS: ICD-10-CM

## 2023-03-16 LAB — POC RESIDUAL URINE VOLUME: 91 ML (ref 0–100)

## 2023-03-16 PROCEDURE — 3008F BODY MASS INDEX DOCD: CPT | Mod: CPTII,S$GLB,, | Performed by: NURSE PRACTITIONER

## 2023-03-16 PROCEDURE — 1159F PR MEDICATION LIST DOCUMENTED IN MEDICAL RECORD: ICD-10-PCS | Mod: CPTII,S$GLB,, | Performed by: NURSE PRACTITIONER

## 2023-03-16 PROCEDURE — 3078F DIAST BP <80 MM HG: CPT | Mod: CPTII,S$GLB,, | Performed by: NURSE PRACTITIONER

## 2023-03-16 PROCEDURE — 3074F PR MOST RECENT SYSTOLIC BLOOD PRESSURE < 130 MM HG: ICD-10-PCS | Mod: CPTII,S$GLB,, | Performed by: NURSE PRACTITIONER

## 2023-03-16 PROCEDURE — 51798 US URINE CAPACITY MEASURE: CPT | Mod: S$GLB,,, | Performed by: NURSE PRACTITIONER

## 2023-03-16 PROCEDURE — 99999 PR PBB SHADOW E&M-EST. PATIENT-LVL III: CPT | Mod: PBBFAC,,, | Performed by: NURSE PRACTITIONER

## 2023-03-16 PROCEDURE — 1159F MED LIST DOCD IN RCRD: CPT | Mod: CPTII,S$GLB,, | Performed by: NURSE PRACTITIONER

## 2023-03-16 PROCEDURE — 3052F HG A1C>EQUAL 8.0%<EQUAL 9.0%: CPT | Mod: CPTII,S$GLB,, | Performed by: NURSE PRACTITIONER

## 2023-03-16 PROCEDURE — 3052F PR MOST RECENT HEMOGLOBIN A1C LEVEL 8.0 - < 9.0%: ICD-10-PCS | Mod: CPTII,S$GLB,, | Performed by: NURSE PRACTITIONER

## 2023-03-16 PROCEDURE — 99204 OFFICE O/P NEW MOD 45 MIN: CPT | Mod: S$GLB,,, | Performed by: NURSE PRACTITIONER

## 2023-03-16 PROCEDURE — 99204 PR OFFICE/OUTPT VISIT, NEW, LEVL IV, 45-59 MIN: ICD-10-PCS | Mod: S$GLB,,, | Performed by: NURSE PRACTITIONER

## 2023-03-16 PROCEDURE — 1160F RVW MEDS BY RX/DR IN RCRD: CPT | Mod: CPTII,S$GLB,, | Performed by: NURSE PRACTITIONER

## 2023-03-16 PROCEDURE — 3078F PR MOST RECENT DIASTOLIC BLOOD PRESSURE < 80 MM HG: ICD-10-PCS | Mod: CPTII,S$GLB,, | Performed by: NURSE PRACTITIONER

## 2023-03-16 PROCEDURE — 3074F SYST BP LT 130 MM HG: CPT | Mod: CPTII,S$GLB,, | Performed by: NURSE PRACTITIONER

## 2023-03-16 PROCEDURE — 99999 PR PBB SHADOW E&M-EST. PATIENT-LVL III: ICD-10-PCS | Mod: PBBFAC,,, | Performed by: NURSE PRACTITIONER

## 2023-03-16 PROCEDURE — 1160F PR REVIEW ALL MEDS BY PRESCRIBER/CLIN PHARMACIST DOCUMENTED: ICD-10-PCS | Mod: CPTII,S$GLB,, | Performed by: NURSE PRACTITIONER

## 2023-03-16 PROCEDURE — 51798 POCT BLADDER SCAN: ICD-10-PCS | Mod: S$GLB,,, | Performed by: NURSE PRACTITIONER

## 2023-03-16 PROCEDURE — 3008F PR BODY MASS INDEX (BMI) DOCUMENTED: ICD-10-PCS | Mod: CPTII,S$GLB,, | Performed by: NURSE PRACTITIONER

## 2023-03-16 RX ORDER — CIPROFLOXACIN 500 MG/1
500 TABLET ORAL 2 TIMES DAILY
Qty: 28 TABLET | Refills: 0 | Status: SHIPPED | OUTPATIENT
Start: 2023-03-16 | End: 2023-03-30
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
## 2023-03-17 ENCOUNTER — TELEPHONE (OUTPATIENT)
Dept: UROLOGY | Facility: CLINIC | Age: 68
End: 2023-03-17
Payer: MEDICARE

## 2023-03-17 NOTE — TELEPHONE ENCOUNTER
Endo referral: spoke with wife Cheryle. Attempted to schedule appointment. System says insurance out of network. We talked on her contacting insurance for in-network providers to call directly. If referral needed to contact PCP/Suellen Garcia [facility]. She voices understanding and thanks for the call.

## 2023-03-20 ENCOUNTER — OFFICE VISIT (OUTPATIENT)
Dept: FAMILY MEDICINE | Facility: CLINIC | Age: 68
End: 2023-03-20
Payer: MEDICARE

## 2023-03-20 VITALS
SYSTOLIC BLOOD PRESSURE: 116 MMHG | WEIGHT: 247.31 LBS | OXYGEN SATURATION: 97 % | HEART RATE: 85 BPM | DIASTOLIC BLOOD PRESSURE: 82 MMHG | TEMPERATURE: 99 F | HEIGHT: 74 IN | BODY MASS INDEX: 31.74 KG/M2

## 2023-03-20 DIAGNOSIS — E78.1 HYPERTRIGLYCERIDEMIA: ICD-10-CM

## 2023-03-20 DIAGNOSIS — I25.10 CORONARY ARTERY DISEASE INVOLVING NATIVE CORONARY ARTERY OF NATIVE HEART WITHOUT ANGINA PECTORIS: ICD-10-CM

## 2023-03-20 DIAGNOSIS — R11.2 NAUSEA AND VOMITING, UNSPECIFIED VOMITING TYPE: ICD-10-CM

## 2023-03-20 DIAGNOSIS — G89.29 CHRONIC RADICULAR PAIN OF LOWER BACK: ICD-10-CM

## 2023-03-20 DIAGNOSIS — F12.90 MARIJUANA USE: ICD-10-CM

## 2023-03-20 DIAGNOSIS — E11.65 TYPE 2 DIABETES MELLITUS WITH HYPERGLYCEMIA, WITH LONG-TERM CURRENT USE OF INSULIN: ICD-10-CM

## 2023-03-20 DIAGNOSIS — I10 BENIGN ESSENTIAL HTN: Primary | ICD-10-CM

## 2023-03-20 DIAGNOSIS — Z12.5 SCREENING FOR PROSTATE CANCER: ICD-10-CM

## 2023-03-20 DIAGNOSIS — K86.1 IDIOPATHIC CHRONIC PANCREATITIS: ICD-10-CM

## 2023-03-20 DIAGNOSIS — M54.16 CHRONIC RADICULAR PAIN OF LOWER BACK: ICD-10-CM

## 2023-03-20 DIAGNOSIS — I50.22 CHRONIC SYSTOLIC HEART FAILURE: ICD-10-CM

## 2023-03-20 DIAGNOSIS — E11.42 DIABETIC POLYNEUROPATHY ASSOCIATED WITH TYPE 2 DIABETES MELLITUS: ICD-10-CM

## 2023-03-20 DIAGNOSIS — Z79.4 TYPE 2 DIABETES MELLITUS WITH HYPERGLYCEMIA, WITH LONG-TERM CURRENT USE OF INSULIN: ICD-10-CM

## 2023-03-20 DIAGNOSIS — Z79.01 ANTICOAGULANT LONG-TERM USE: ICD-10-CM

## 2023-03-20 PROCEDURE — 3074F PR MOST RECENT SYSTOLIC BLOOD PRESSURE < 130 MM HG: ICD-10-PCS | Mod: CPTII,S$GLB,, | Performed by: NURSE PRACTITIONER

## 2023-03-20 PROCEDURE — 3008F PR BODY MASS INDEX (BMI) DOCUMENTED: ICD-10-PCS | Mod: CPTII,S$GLB,, | Performed by: NURSE PRACTITIONER

## 2023-03-20 PROCEDURE — 1160F PR REVIEW ALL MEDS BY PRESCRIBER/CLIN PHARMACIST DOCUMENTED: ICD-10-PCS | Mod: CPTII,S$GLB,, | Performed by: NURSE PRACTITIONER

## 2023-03-20 PROCEDURE — 99215 OFFICE O/P EST HI 40 MIN: CPT | Mod: S$GLB,,, | Performed by: NURSE PRACTITIONER

## 2023-03-20 PROCEDURE — 3052F PR MOST RECENT HEMOGLOBIN A1C LEVEL 8.0 - < 9.0%: ICD-10-PCS | Mod: CPTII,S$GLB,, | Performed by: NURSE PRACTITIONER

## 2023-03-20 PROCEDURE — 3008F BODY MASS INDEX DOCD: CPT | Mod: CPTII,S$GLB,, | Performed by: NURSE PRACTITIONER

## 2023-03-20 PROCEDURE — 1159F PR MEDICATION LIST DOCUMENTED IN MEDICAL RECORD: ICD-10-PCS | Mod: CPTII,S$GLB,, | Performed by: NURSE PRACTITIONER

## 2023-03-20 PROCEDURE — 1125F PR PAIN SEVERITY QUANTIFIED, PAIN PRESENT: ICD-10-PCS | Mod: CPTII,S$GLB,, | Performed by: NURSE PRACTITIONER

## 2023-03-20 PROCEDURE — 3288F PR FALLS RISK ASSESSMENT DOCUMENTED: ICD-10-PCS | Mod: CPTII,S$GLB,, | Performed by: NURSE PRACTITIONER

## 2023-03-20 PROCEDURE — 99215 PR OFFICE/OUTPT VISIT, EST, LEVL V, 40-54 MIN: ICD-10-PCS | Mod: S$GLB,,, | Performed by: NURSE PRACTITIONER

## 2023-03-20 PROCEDURE — 3079F DIAST BP 80-89 MM HG: CPT | Mod: CPTII,S$GLB,, | Performed by: NURSE PRACTITIONER

## 2023-03-20 PROCEDURE — 1101F PR PT FALLS ASSESS DOC 0-1 FALLS W/OUT INJ PAST YR: ICD-10-PCS | Mod: CPTII,S$GLB,, | Performed by: NURSE PRACTITIONER

## 2023-03-20 PROCEDURE — 3079F PR MOST RECENT DIASTOLIC BLOOD PRESSURE 80-89 MM HG: ICD-10-PCS | Mod: CPTII,S$GLB,, | Performed by: NURSE PRACTITIONER

## 2023-03-20 PROCEDURE — 1159F MED LIST DOCD IN RCRD: CPT | Mod: CPTII,S$GLB,, | Performed by: NURSE PRACTITIONER

## 2023-03-20 PROCEDURE — 3052F HG A1C>EQUAL 8.0%<EQUAL 9.0%: CPT | Mod: CPTII,S$GLB,, | Performed by: NURSE PRACTITIONER

## 2023-03-20 PROCEDURE — 1160F RVW MEDS BY RX/DR IN RCRD: CPT | Mod: CPTII,S$GLB,, | Performed by: NURSE PRACTITIONER

## 2023-03-20 PROCEDURE — 1125F AMNT PAIN NOTED PAIN PRSNT: CPT | Mod: CPTII,S$GLB,, | Performed by: NURSE PRACTITIONER

## 2023-03-20 PROCEDURE — 1101F PT FALLS ASSESS-DOCD LE1/YR: CPT | Mod: CPTII,S$GLB,, | Performed by: NURSE PRACTITIONER

## 2023-03-20 PROCEDURE — 3288F FALL RISK ASSESSMENT DOCD: CPT | Mod: CPTII,S$GLB,, | Performed by: NURSE PRACTITIONER

## 2023-03-20 PROCEDURE — 3074F SYST BP LT 130 MM HG: CPT | Mod: CPTII,S$GLB,, | Performed by: NURSE PRACTITIONER

## 2023-03-20 RX ORDER — INSULIN PUMP SYRINGE, 3 ML
EACH MISCELLANEOUS
Qty: 1 EACH | Refills: 0 | Status: SHIPPED | OUTPATIENT
Start: 2023-03-20 | End: 2024-02-29 | Stop reason: SDUPTHER

## 2023-03-20 RX ORDER — CYCLOBENZAPRINE HCL 5 MG
5 TABLET ORAL 2 TIMES DAILY
Qty: 60 TABLET | Refills: 1 | Status: SHIPPED | OUTPATIENT
Start: 2023-03-20 | End: 2023-05-25

## 2023-03-20 RX ORDER — METFORMIN HYDROCHLORIDE 1000 MG/1
1000 TABLET ORAL 2 TIMES DAILY WITH MEALS
Qty: 180 TABLET | Refills: 1 | Status: SHIPPED | OUTPATIENT
Start: 2023-03-20 | End: 2023-10-30
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
## 2023-03-21 ENCOUNTER — TELEPHONE (OUTPATIENT)
Dept: FAMILY MEDICINE | Facility: CLINIC | Age: 68
End: 2023-03-21

## 2023-03-21 NOTE — TELEPHONE ENCOUNTER
Attempted to call pt about referral to [facility] and the patient can call [phone] to schedule.

## 2023-04-24 ENCOUNTER — TELEPHONE (OUTPATIENT)
Dept: GASTROENTEROLOGY | Facility: CLINIC | Age: 68
End: 2023-04-24
Payer: MEDICARE

## 2023-04-24 ENCOUNTER — OFFICE VISIT (OUTPATIENT)
Dept: GASTROENTEROLOGY | Facility: CLINIC | Age: 68
End: 2023-04-24
Payer: MEDICARE

## 2023-04-24 VITALS — BODY MASS INDEX: 30.08 KG/M2 | WEIGHT: 234.38 LBS | HEIGHT: 74 IN

## 2023-04-24 DIAGNOSIS — R19.7 DIARRHEA, UNSPECIFIED TYPE: Primary | ICD-10-CM

## 2023-04-24 PROCEDURE — 3052F PR MOST RECENT HEMOGLOBIN A1C LEVEL 8.0 - < 9.0%: ICD-10-PCS | Mod: CPTII,S$GLB,, | Performed by: INTERNAL MEDICINE

## 2023-04-24 PROCEDURE — 3008F PR BODY MASS INDEX (BMI) DOCUMENTED: ICD-10-PCS | Mod: CPTII,S$GLB,, | Performed by: INTERNAL MEDICINE

## 2023-04-24 PROCEDURE — 3052F HG A1C>EQUAL 8.0%<EQUAL 9.0%: CPT | Mod: CPTII,S$GLB,, | Performed by: INTERNAL MEDICINE

## 2023-04-24 PROCEDURE — 1125F AMNT PAIN NOTED PAIN PRSNT: CPT | Mod: CPTII,S$GLB,, | Performed by: INTERNAL MEDICINE

## 2023-04-24 PROCEDURE — 1125F PR PAIN SEVERITY QUANTIFIED, PAIN PRESENT: ICD-10-PCS | Mod: CPTII,S$GLB,, | Performed by: INTERNAL MEDICINE

## 2023-04-24 PROCEDURE — 1101F PR PT FALLS ASSESS DOC 0-1 FALLS W/OUT INJ PAST YR: ICD-10-PCS | Mod: CPTII,S$GLB,, | Performed by: INTERNAL MEDICINE

## 2023-04-24 PROCEDURE — 3288F FALL RISK ASSESSMENT DOCD: CPT | Mod: CPTII,S$GLB,, | Performed by: INTERNAL MEDICINE

## 2023-04-24 PROCEDURE — 99214 PR OFFICE/OUTPT VISIT, EST, LEVL IV, 30-39 MIN: ICD-10-PCS | Mod: S$GLB,,, | Performed by: INTERNAL MEDICINE

## 2023-04-24 PROCEDURE — 3008F BODY MASS INDEX DOCD: CPT | Mod: CPTII,S$GLB,, | Performed by: INTERNAL MEDICINE

## 2023-04-24 PROCEDURE — 3288F PR FALLS RISK ASSESSMENT DOCUMENTED: ICD-10-PCS | Mod: CPTII,S$GLB,, | Performed by: INTERNAL MEDICINE

## 2023-04-24 PROCEDURE — 99999 PR PBB SHADOW E&M-EST. PATIENT-LVL III: CPT | Mod: PBBFAC,,, | Performed by: INTERNAL MEDICINE

## 2023-04-24 PROCEDURE — 99999 PR PBB SHADOW E&M-EST. PATIENT-LVL III: ICD-10-PCS | Mod: PBBFAC,,, | Performed by: INTERNAL MEDICINE

## 2023-04-24 PROCEDURE — 1101F PT FALLS ASSESS-DOCD LE1/YR: CPT | Mod: CPTII,S$GLB,, | Performed by: INTERNAL MEDICINE

## 2023-04-24 PROCEDURE — 99214 OFFICE O/P EST MOD 30 MIN: CPT | Mod: S$GLB,,, | Performed by: INTERNAL MEDICINE

## 2023-04-24 RX ORDER — PANTOPRAZOLE SODIUM 40 MG/1
40 TABLET, DELAYED RELEASE ORAL DAILY
Qty: 30 TABLET | Refills: 11 | Status: SHIPPED | OUTPATIENT
Start: 2023-04-24 | End: 2024-04-23

## 2023-04-24 NOTE — TELEPHONE ENCOUNTER
Left voicemail to see if patient can come a little later for today's appointment as the doctor will most stanislav be running behind. I asked him to come at 130 pm / 2 pm instead of 1 pm.

## 2023-04-25 ENCOUNTER — LAB VISIT (OUTPATIENT)
Dept: LAB | Facility: HOSPITAL | Age: 68
End: 2023-04-25
Attending: NURSE PRACTITIONER
Payer: MEDICARE

## 2023-04-25 ENCOUNTER — OFFICE VISIT (OUTPATIENT)
Dept: UROLOGY | Facility: CLINIC | Age: 68
End: 2023-04-25
Payer: MEDICARE

## 2023-04-25 ENCOUNTER — TELEPHONE (OUTPATIENT)
Dept: UROLOGY | Facility: CLINIC | Age: 68
End: 2023-04-25

## 2023-04-25 VITALS
HEART RATE: 88 BPM | BODY MASS INDEX: 30.08 KG/M2 | DIASTOLIC BLOOD PRESSURE: 64 MMHG | SYSTOLIC BLOOD PRESSURE: 102 MMHG | HEIGHT: 74 IN | WEIGHT: 234.38 LBS

## 2023-04-25 DIAGNOSIS — N40.0 BENIGN PROSTATIC HYPERPLASIA, UNSPECIFIED WHETHER LOWER URINARY TRACT SYMPTOMS PRESENT: Primary | ICD-10-CM

## 2023-04-25 DIAGNOSIS — E13.649 UNCONTROLLED DIABETES MELLITUS OF OTHER TYPE WITH HYPOGLYCEMIA, UNSPECIFIED HYPOGLYCEMIA COMA STATUS: ICD-10-CM

## 2023-04-25 DIAGNOSIS — N40.0 BENIGN PROSTATIC HYPERPLASIA, UNSPECIFIED WHETHER LOWER URINARY TRACT SYMPTOMS PRESENT: ICD-10-CM

## 2023-04-25 LAB
BILIRUBIN, UA POC OHS: NEGATIVE
BLOOD, UA POC OHS: NEGATIVE
CLARITY, UA POC OHS: CLEAR
COLOR, UA POC OHS: YELLOW
GLUCOSE, UA POC OHS: >=1000
KETONES, UA POC OHS: NEGATIVE
LEUKOCYTES, UA POC OHS: NEGATIVE
NITRITE, UA POC OHS: NEGATIVE
PH, UA POC OHS: 5
POC RESIDUAL URINE VOLUME: 0 ML (ref 0–100)
PROTEIN, UA POC OHS: NEGATIVE
SPECIFIC GRAVITY, UA POC OHS: 1.01
UROBILINOGEN, UA POC OHS: 0.2

## 2023-04-25 PROCEDURE — 3074F PR MOST RECENT SYSTOLIC BLOOD PRESSURE < 130 MM HG: ICD-10-PCS | Mod: CPTII,S$GLB,, | Performed by: NURSE PRACTITIONER

## 2023-04-25 PROCEDURE — 3008F PR BODY MASS INDEX (BMI) DOCUMENTED: ICD-10-PCS | Mod: CPTII,S$GLB,, | Performed by: NURSE PRACTITIONER

## 2023-04-25 PROCEDURE — 81003 URINALYSIS AUTO W/O SCOPE: CPT | Mod: QW,S$GLB,, | Performed by: NURSE PRACTITIONER

## 2023-04-25 PROCEDURE — 3052F HG A1C>EQUAL 8.0%<EQUAL 9.0%: CPT | Mod: CPTII,S$GLB,, | Performed by: NURSE PRACTITIONER

## 2023-04-25 PROCEDURE — 51798 POCT BLADDER SCAN: ICD-10-PCS | Mod: S$GLB,,, | Performed by: NURSE PRACTITIONER

## 2023-04-25 PROCEDURE — 84153 ASSAY OF PSA TOTAL: CPT | Performed by: NURSE PRACTITIONER

## 2023-04-25 PROCEDURE — 3288F PR FALLS RISK ASSESSMENT DOCUMENTED: ICD-10-PCS | Mod: CPTII,S$GLB,, | Performed by: NURSE PRACTITIONER

## 2023-04-25 PROCEDURE — 99214 PR OFFICE/OUTPT VISIT, EST, LEVL IV, 30-39 MIN: ICD-10-PCS | Mod: S$GLB,,, | Performed by: NURSE PRACTITIONER

## 2023-04-25 PROCEDURE — 1159F PR MEDICATION LIST DOCUMENTED IN MEDICAL RECORD: ICD-10-PCS | Mod: CPTII,S$GLB,, | Performed by: NURSE PRACTITIONER

## 2023-04-25 PROCEDURE — 1159F MED LIST DOCD IN RCRD: CPT | Mod: CPTII,S$GLB,, | Performed by: NURSE PRACTITIONER

## 2023-04-25 PROCEDURE — 1101F PT FALLS ASSESS-DOCD LE1/YR: CPT | Mod: CPTII,S$GLB,, | Performed by: NURSE PRACTITIONER

## 2023-04-25 PROCEDURE — 81003 POCT URINALYSIS(INSTRUMENT): ICD-10-PCS | Mod: QW,S$GLB,, | Performed by: NURSE PRACTITIONER

## 2023-04-25 PROCEDURE — 99999 PR PBB SHADOW E&M-EST. PATIENT-LVL V: CPT | Mod: PBBFAC,,, | Performed by: NURSE PRACTITIONER

## 2023-04-25 PROCEDURE — 99214 OFFICE O/P EST MOD 30 MIN: CPT | Mod: S$GLB,,, | Performed by: NURSE PRACTITIONER

## 2023-04-25 PROCEDURE — 3288F FALL RISK ASSESSMENT DOCD: CPT | Mod: CPTII,S$GLB,, | Performed by: NURSE PRACTITIONER

## 2023-04-25 PROCEDURE — 36415 COLL VENOUS BLD VENIPUNCTURE: CPT | Performed by: NURSE PRACTITIONER

## 2023-04-25 PROCEDURE — 1160F PR REVIEW ALL MEDS BY PRESCRIBER/CLIN PHARMACIST DOCUMENTED: ICD-10-PCS | Mod: CPTII,S$GLB,, | Performed by: NURSE PRACTITIONER

## 2023-04-25 PROCEDURE — 3078F DIAST BP <80 MM HG: CPT | Mod: CPTII,S$GLB,, | Performed by: NURSE PRACTITIONER

## 2023-04-25 PROCEDURE — 1101F PR PT FALLS ASSESS DOC 0-1 FALLS W/OUT INJ PAST YR: ICD-10-PCS | Mod: CPTII,S$GLB,, | Performed by: NURSE PRACTITIONER

## 2023-04-25 PROCEDURE — 3008F BODY MASS INDEX DOCD: CPT | Mod: CPTII,S$GLB,, | Performed by: NURSE PRACTITIONER

## 2023-04-25 PROCEDURE — 51798 US URINE CAPACITY MEASURE: CPT | Mod: S$GLB,,, | Performed by: NURSE PRACTITIONER

## 2023-04-25 PROCEDURE — 3052F PR MOST RECENT HEMOGLOBIN A1C LEVEL 8.0 - < 9.0%: ICD-10-PCS | Mod: CPTII,S$GLB,, | Performed by: NURSE PRACTITIONER

## 2023-04-25 PROCEDURE — 3074F SYST BP LT 130 MM HG: CPT | Mod: CPTII,S$GLB,, | Performed by: NURSE PRACTITIONER

## 2023-04-25 PROCEDURE — 99999 PR PBB SHADOW E&M-EST. PATIENT-LVL V: ICD-10-PCS | Mod: PBBFAC,,, | Performed by: NURSE PRACTITIONER

## 2023-04-25 PROCEDURE — 3078F PR MOST RECENT DIASTOLIC BLOOD PRESSURE < 80 MM HG: ICD-10-PCS | Mod: CPTII,S$GLB,, | Performed by: NURSE PRACTITIONER

## 2023-04-25 PROCEDURE — 1160F RVW MEDS BY RX/DR IN RCRD: CPT | Mod: CPTII,S$GLB,, | Performed by: NURSE PRACTITIONER

## 2023-04-25 RX ORDER — TAMSULOSIN HYDROCHLORIDE 0.4 MG/1
0.4 CAPSULE ORAL DAILY
Qty: 90 CAPSULE | Refills: 4 | Status: SHIPPED | OUTPATIENT
Start: 2023-04-25 | End: 2023-07-25 | Stop reason: SDUPTHER
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
## 2023-04-25 NOTE — TELEPHONE ENCOUNTER
----- Message from Alicia Schaeffer sent at 4/25/2023 11:19 AM CDT -----  Regarding: directions  Contact: don polanco want to speak with a nurse regarding directions on rx, please call back at [phone] (home)       [id number] - [place] - [address]  [address] [place] [address]  Phone: [phone] Fax: [phone]       Case number [id number]

## 2023-04-26 LAB
PROSTATE SPECIFIC ANTIGEN, TOTAL: 0.71 NG/ML (ref 0–4)
PSA FREE MFR SERPL: 39.44 %
PSA FREE SERPL-MCNC: 0.28 NG/ML (ref 0–1.5)
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
## 2023-04-27 ENCOUNTER — HOSPITAL ENCOUNTER (EMERGENCY)
Facility: HOSPITAL | Age: 68
Discharge: HOME | End: 2023-04-27
Attending: EMERGENCY MEDICINE
Payer: MEDICARE
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
## 2023-04-27 DIAGNOSIS — S80.12XA CONTUSION OF LEFT LEG, INITIAL ENCOUNTER: ICD-10-CM
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
## 2023-04-27 DIAGNOSIS — S70.02XA CONTUSION OF LEFT HIP, INITIAL ENCOUNTER: ICD-10-CM

## 2023-04-27 DIAGNOSIS — S16.1XXA STRAIN OF NECK MUSCLE, INITIAL ENCOUNTER: ICD-10-CM

## 2023-04-27 PROCEDURE — 99284 EMERGENCY DEPT VISIT MOD MDM: CPT | Mod: 25

## 2023-04-28 ENCOUNTER — TELEPHONE (OUTPATIENT)
Dept: FAMILY MEDICINE | Facility: CLINIC | Age: 68
End: 2023-04-28
Payer: MEDICARE

## 2023-04-28 ENCOUNTER — TELEPHONE (OUTPATIENT)
Dept: GASTROENTEROLOGY | Facility: CLINIC | Age: 68
End: 2023-04-28
Payer: MEDICARE

## 2023-04-28 ENCOUNTER — ANESTHESIA EVENT (OUTPATIENT)
Dept: ENDOSCOPY | Facility: HOSPITAL | Age: 68
End: 2023-04-28
Payer: MEDICARE

## 2023-04-28 VITALS
WEIGHT: 231 LBS | TEMPERATURE: 98 F | OXYGEN SATURATION: 95 % | BODY MASS INDEX: 29.66 KG/M2 | DIASTOLIC BLOOD PRESSURE: 61 MMHG | SYSTOLIC BLOOD PRESSURE: 102 MMHG | RESPIRATION RATE: 18 BRPM | HEART RATE: 78 BPM

## 2023-04-28 NOTE — SIGNIFICANT EVENT
Reevaluated patient, increased work of breathing on bipap with significant supraclavicular retractions noted, I notified Dr. Iglesias in ED and requested he evaluate patient.  IV decadron ordered at this time.    Patient significantly more stridorous at this time despite racemic epi inhalation.  Orders placed for STAT CT neck, benadryl, epi, pepcid, and repeat racemic epi inhalation.  Transfer to ICU following CT for close airway monitoring and intubation if required.   0 = swallows foods/liquids without difficulty

## 2023-04-28 NOTE — TELEPHONE ENCOUNTER
I received a secure chat form Arielle Arroyo stating that his we are out of network for him in regards to his upcoming procedure, and that patient was notified, but he did not state if he wanted to cancel nor do I see documentation that he wants to cancel.     I am unable to reach him by phone, I left him a voicemail to call me back.

## 2023-04-28 NOTE — TELEPHONE ENCOUNTER
----- Message from Beena Tapia sent at 4/28/2023  2:44 PM CDT -----  Contact: Pts leandro Lobato [phone]  Type: Needs Medical Advice  Who Called:  Pts leandro Lobato     Best Call Back Number: [phone]  Additional Information: Requesting a NP appt for neck and back pain. Would like to change pcp's to [facility]. No np appts avail to schedule.

## 2023-04-28 NOTE — TELEPHONE ENCOUNTER
Called patient in regards to needing an appointment for back pain. No answer , left voicemail to return call.
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
## 2023-05-01 ENCOUNTER — HOSPITAL ENCOUNTER (OUTPATIENT)
Facility: HOSPITAL | Age: 68
Discharge: HOME | End: 2023-05-01
Attending: INTERNAL MEDICINE | Admitting: INTERNAL MEDICINE
Payer: MEDICARE

## 2023-05-01 ENCOUNTER — ANESTHESIA (OUTPATIENT)
Dept: ENDOSCOPY | Facility: HOSPITAL | Age: 68
End: 2023-05-01
Payer: MEDICARE

## 2023-05-01 VITALS
HEART RATE: 80 BPM | BODY MASS INDEX: 29.65 KG/M2 | OXYGEN SATURATION: 95 % | SYSTOLIC BLOOD PRESSURE: 128 MMHG | HEIGHT: 74 IN | DIASTOLIC BLOOD PRESSURE: 81 MMHG | WEIGHT: 231 LBS | RESPIRATION RATE: 18 BRPM | TEMPERATURE: 98 F

## 2023-05-01 DIAGNOSIS — R11.2 NAUSEA AND VOMITING: Primary | ICD-10-CM

## 2023-05-01 PROCEDURE — 37000008 HC ANESTHESIA 1ST 15 MINUTES: Performed by: INTERNAL MEDICINE

## 2023-05-01 PROCEDURE — 25000003 PHARM REV CODE 250: Performed by: NURSE ANESTHETIST, CERTIFIED REGISTERED

## 2023-05-01 PROCEDURE — 25000003 PHARM REV CODE 250: Performed by: INTERNAL MEDICINE

## 2023-05-01 PROCEDURE — 43235 PR EGD, FLEX, DIAGNOSTIC: ICD-10-PCS | Mod: ,,, | Performed by: INTERNAL MEDICINE

## 2023-05-01 PROCEDURE — D9220A PRA ANESTHESIA: Mod: CRNA,,, | Performed by: NURSE ANESTHETIST, CERTIFIED REGISTERED

## 2023-05-01 PROCEDURE — D9220A PRA ANESTHESIA: Mod: ANES,,, | Performed by: ANESTHESIOLOGY

## 2023-05-01 PROCEDURE — D9220A PRA ANESTHESIA: ICD-10-PCS | Mod: ANES,,, | Performed by: ANESTHESIOLOGY

## 2023-05-01 PROCEDURE — 43235 EGD DIAGNOSTIC BRUSH WASH: CPT | Mod: ,,, | Performed by: INTERNAL MEDICINE

## 2023-05-01 PROCEDURE — 63600175 PHARM REV CODE 636 W HCPCS: Performed by: NURSE ANESTHETIST, CERTIFIED REGISTERED

## 2023-05-01 PROCEDURE — D9220A PRA ANESTHESIA: ICD-10-PCS | Mod: CRNA,,, | Performed by: NURSE ANESTHETIST, CERTIFIED REGISTERED

## 2023-05-01 PROCEDURE — 43235 EGD DIAGNOSTIC BRUSH WASH: CPT | Performed by: INTERNAL MEDICINE

## 2023-05-01 RX ORDER — PROPOFOL 10 MG/ML
VIAL (ML) INTRAVENOUS
Status: DISCONTINUED | OUTPATIENT
Start: 2023-05-01 | End: 2023-05-01

## 2023-05-01 RX ORDER — SODIUM CHLORIDE 9 MG/ML
INJECTION, SOLUTION INTRAVENOUS CONTINUOUS
Status: DISCONTINUED | OUTPATIENT
Start: 2023-05-01 | End: 2023-05-01 | Stop reason: HOSPADM

## 2023-05-01 RX ORDER — LIDOCAINE HYDROCHLORIDE 20 MG/ML
INJECTION INTRAVENOUS
Status: DISCONTINUED | OUTPATIENT
Start: 2023-05-01 | End: 2023-05-01

## 2023-05-01 RX ADMIN — LIDOCAINE HYDROCHLORIDE 100 MG: 20 INJECTION, SOLUTION INTRAVENOUS at 09:05

## 2023-05-01 RX ADMIN — PROPOFOL 50 MG: 10 INJECTION, EMULSION INTRAVENOUS at 09:05

## 2023-05-01 RX ADMIN — SODIUM CHLORIDE: 9 INJECTION, SOLUTION INTRAVENOUS at 09:05

## 2023-05-01 NOTE — ANESTHESIA POSTPROCEDURE EVALUATION
Anesthesia Post Evaluation    Patient: Kevan Colin     Procedure(s) Performed: Procedure(s) (LRB):  EGD (ESOPHAGOGASTRODUODENOSCOPY) (N/A)    Final Anesthesia Type: general      Patient location during evaluation: PACU  Patient participation: Yes- Able to Participate  Level of consciousness: awake and alert and oriented  Post-procedure vital signs: reviewed and stable  Pain management: adequate  Airway patency: patent    PONV status at discharge: No PONV  Anesthetic complications: no      Cardiovascular status: blood pressure returned to baseline and stable  Respiratory status: unassisted and spontaneous ventilation  Hydration status: euvolemic  Follow-up not needed.          Vitals Value Taken Time   /81 05/01/23 0945   Temp 36.4 °C (97.5 °F) 05/01/23 0926   Pulse 80 05/01/23 0945   Resp 18 05/01/23 0945   SpO2 95 % 05/01/23 0945         Event Time   Out of Recovery 10:15:23         Pain/Neville Score: Neville Score: 7 (5/1/2023  9:26 AM)

## 2023-05-01 NOTE — PROVATION PATIENT INSTRUCTIONS
Discharge Summary/Instructions after an Endoscopic Procedure  Patient Name: Kevan Colin  Patient MRN: [id number]  Patient YOB: 1955  Monday, May 1, 2023  Alfie Liriano MD  Dear patient,  As a result of recent federal legislation (The Federal Cures Act), you may   receive lab or pathology results from your procedure in your [facility]   account before your physician is able to contact you. Your physician or   their representative will relay the results to you with their   recommendations at their soonest availability.  Thank you,  RESTRICTIONS:  During your procedure today, you received medications for sedation.  These   medications may affect your judgment, balance and coordination.  Therefore,   for 24 hours, you have the following restrictions:   - DO NOT drive a car, operate machinery, make legal/financial decisions,   sign important papers or drink alcohol.    ACTIVITY:  Today: no heavy lifting, straining or running due to procedural   sedation/anesthesia.  The following day: return to full activity including work.  DIET:  Eat and drink normally unless instructed otherwise.     TREATMENT FOR COMMON SIDE EFFECTS:  - Mild abdominal pain, nausea, belching, bloating or excessive gas:  rest,   eat lightly and use a heating pad.  - Sore Throat: treat with throat lozenges and/or gargle with warm salt   water.  - Because air was used during the procedure, expelling large amounts of air   from your rectum or belching is normal.  - If a bowel prep was taken, you may not have a bowel movement for 1-3 days.    This is normal.  SYMPTOMS TO WATCH FOR AND REPORT TO YOUR PHYSICIAN:  1. Abdominal pain or bloating, other than gas cramps.  2. Chest pain.  3. Back pain.  4. Signs of infection such as: chills or fever occurring within 24 hours   after the procedure.  5. Rectal bleeding, which would show as bright red, maroon, or black stools.   (A tablespoon of blood from the rectum is not serious, especially if    hemorrhoids are present.)  6. Vomiting.  7. Weakness or dizziness.  GO DIRECTLY TO THE NEAREST EMERGENCY ROOM IF YOU HAVE ANY OF THE FOLLOWING:      Difficulty breathing              Chills and/or fever over 101 F   Persistent vomiting and/or vomiting blood   Severe abdominal pain   Severe chest pain   Black, tarry stools   Bleeding- more than one tablespoon   Any other symptom or condition that you feel may need urgent attention  Your doctor recommends these additional instructions:  If any biopsies were taken, your doctors clinic will contact you in 1 to 2   weeks with any results.  - Discharge patient to home.   - Advance diet as tolerated.   - Continue present medications, including anti-emetics and PPI  For questions, problems or results please call your physician - Alfie Liriano MD at Work:  [phone].  [facility], EMERGENCY ROOM PHONE NUMBER: [phone]  IF A COMPLICATION OR EMERGENCY SITUATION ARISES AND YOU ARE UNABLE TO REACH   YOUR PHYSICIAN - GO DIRECTLY TO THE EMERGENCY ROOM.  Alfie Liriano MD  5/1/2023 9:24:06 AM  This report has been verified and signed electronically.  Dear patient,  As a result of recent federal legislation (The Federal Cures Act), you may   receive lab or pathology results from your procedure in your [facility]   account before your physician is able to contact you. Your physician or   their representative will relay the results to you with their   recommendations at their soonest availability.  Thank you,  PROVATION
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
## 2023-05-01 NOTE — TRANSFER OF CARE
"Anesthesia Transfer of Care Note    Patient: Kevan Colin Sr.    Procedure(s) Performed: Procedure(s) (LRB):  EGD (ESOPHAGOGASTRODUODENOSCOPY) (N/A)    Patient location: PACU    Anesthesia Type: general    Transport from OR: Transported from OR on room air with adequate spontaneous ventilation    Post pain: adequate analgesia    Post assessment: no apparent anesthetic complications and tolerated procedure well    Post vital signs: stable    Level of consciousness: sedated    Nausea/Vomiting: no nausea/vomiting    Complications: none    Transfer of care protocol was followed      Last vitals:   Visit Vitals  BP (!) 163/91 (BP Location: Left arm, Patient Position: Lying)   Pulse 95   Temp 36.4 °C (97.5 °F) (Skin)   Resp 20   Ht 6' 2" (1.88 m)   Wt 104.8 kg (231 lb)   SpO2 (!) 94%   BMI 29.66 kg/m²     "

## 2023-05-01 NOTE — PLAN OF CARE
Vss, lakeisha po fluids, denies pain, ambulates easily. IV removed, catheter intact. Discharge instructions provided and states understanding. States ready to go home.  Discharged from facility with family per wheelchair.

## 2023-05-01 NOTE — ANESTHESIA PREPROCEDURE EVALUATION
05/01/2023  Kevan Colin Sr. is a 67 y.o., male.      Pre-op Assessment    I have reviewed the Patient Summary Reports.     I have reviewed the Nursing Notes. I have reviewed the NPO Status.   I have reviewed the Medications.     Review of Systems  Anesthesia Hx:  No problems with previous Anesthesia    Social:  Former Smoker    Cardiovascular:   Hypertension, well controlled Past MI CAD asymptomatic  Angina PVD QUIÑONES Pulmonary HTN   Pulmonary:   COPD, moderate Asthma Shortness of breath Sleep Apnea DVT   Renal/:  Renal/ Normal     Musculoskeletal:   Arthritis   Spine Disorders: lumbar    Neurological:   CVA Neuromuscular Disease,    Endocrine:   Diabetes, well controlled, type 2 Pancreatitis   Psych:   Psychiatric History (PTSD)          Physical Exam  General: Well nourished, Cooperative, Alert and Oriented    Airway:  Mallampati: II   Mouth Opening: Normal  TM Distance: Normal  Neck ROM: Normal ROM        Anesthesia Plan  Type of Anesthesia, risks & benefits discussed:    Anesthesia Type: Gen ETT, Gen Supraglottic Airway, Gen Natural Airway, MAC  Intra-op Monitoring Plan: Standard ASA Monitors  Post Op Pain Control Plan: multimodal analgesia  Induction:  IV  Airway Plan: Direct, Video and Fiberoptic, Post-Induction  Informed Consent: Informed consent signed with the Patient and all parties understand the risks and agree with anesthesia plan.  All questions answered.   ASA Score: 3    Ready For Surgery From Anesthesia Perspective.     .

## 2023-05-03 ENCOUNTER — HOSPITAL ENCOUNTER (EMERGENCY)
Facility: HOSPITAL | Age: 68
Discharge: HOME | End: 2023-05-03
Attending: EMERGENCY MEDICINE
Payer: MEDICARE

## 2023-05-03 VITALS
HEART RATE: 84 BPM | OXYGEN SATURATION: 97 % | SYSTOLIC BLOOD PRESSURE: 117 MMHG | TEMPERATURE: 98 F | DIASTOLIC BLOOD PRESSURE: 67 MMHG | RESPIRATION RATE: 20 BRPM | WEIGHT: 231 LBS | BODY MASS INDEX: 29.66 KG/M2

## 2023-05-03 DIAGNOSIS — E86.0 DEHYDRATION: Primary | ICD-10-CM

## 2023-05-03 DIAGNOSIS — N17.9 AKI (ACUTE KIDNEY INJURY): ICD-10-CM

## 2023-05-03 DIAGNOSIS — R42 DIZZINESS: ICD-10-CM

## 2023-05-03 LAB
ALBUMIN SERPL BCP-MCNC: 4.2 G/DL (ref 3.5–5.2)
ALP SERPL-CCNC: 75 U/L (ref 55–135)
ALT SERPL W/O P-5'-P-CCNC: 16 U/L (ref 10–44)
ANION GAP SERPL CALC-SCNC: 15 MMOL/L (ref 8–16)
AST SERPL-CCNC: 12 U/L (ref 10–40)
B-OH-BUTYR BLD STRIP-SCNC: 0.3 MMOL/L (ref 0–0.5)
BACTERIA #/AREA URNS HPF: NORMAL /HPF
BASOPHILS # BLD AUTO: 0.07 K/UL (ref 0–0.2)
BASOPHILS NFR BLD: 0.9 % (ref 0–1.9)
BILIRUB SERPL-MCNC: 0.4 MG/DL (ref 0.1–1)
BILIRUB UR QL STRIP: ABNORMAL
BUN SERPL-MCNC: 35 MG/DL (ref 8–23)
CALCIUM SERPL-MCNC: 9.5 MG/DL (ref 8.7–10.5)
CHLORIDE SERPL-SCNC: 102 MMOL/L (ref 95–110)
CLARITY UR: CLEAR
CO2 SERPL-SCNC: 20 MMOL/L (ref 23–29)
COLOR UR: YELLOW
CREAT SERPL-MCNC: 1.6 MG/DL (ref 0.5–1.4)
DIFFERENTIAL METHOD: ABNORMAL
EOSINOPHIL # BLD AUTO: 0.6 K/UL (ref 0–0.5)
EOSINOPHIL NFR BLD: 7.3 % (ref 0–8)
ERYTHROCYTE [DISTWIDTH] IN BLOOD BY AUTOMATED COUNT: 13.7 % (ref 11.5–14.5)
EST. GFR  (NO RACE VARIABLE): 47 ML/MIN/1.73 M^2
GLUCOSE SERPL-MCNC: 321 MG/DL (ref 70–110)
GLUCOSE UR QL STRIP: ABNORMAL
HCT VFR BLD AUTO: 38.4 % (ref 40–54)
HGB BLD-MCNC: 12.3 G/DL (ref 14–18)
HGB UR QL STRIP: ABNORMAL
IMM GRANULOCYTES # BLD AUTO: 0.01 K/UL (ref 0–0.04)
IMM GRANULOCYTES NFR BLD AUTO: 0.1 % (ref 0–0.5)
KETONES UR QL STRIP: NEGATIVE
LEUKOCYTE ESTERASE UR QL STRIP: NEGATIVE
LYMPHOCYTES # BLD AUTO: 2.6 K/UL (ref 1–4.8)
LYMPHOCYTES NFR BLD: 34.2 % (ref 18–48)
MCH RBC QN AUTO: 30.9 PG (ref 27–31)
MCHC RBC AUTO-ENTMCNC: 32 G/DL (ref 32–36)
MCV RBC AUTO: 97 FL (ref 82–98)
MICROSCOPIC COMMENT: NORMAL
MONOCYTES # BLD AUTO: 0.7 K/UL (ref 0.3–1)
MONOCYTES NFR BLD: 8.6 % (ref 4–15)
NEUTROPHILS # BLD AUTO: 3.7 K/UL (ref 1.8–7.7)
NEUTROPHILS NFR BLD: 48.9 % (ref 38–73)
NITRITE UR QL STRIP: NEGATIVE
NRBC BLD-RTO: 0 /100 WBC
PH UR STRIP: 6 [PH] (ref 5–8)
PLATELET # BLD AUTO: 196 K/UL (ref 150–450)
PMV BLD AUTO: 9.9 FL (ref 9.2–12.9)
POCT GLUCOSE: 273 MG/DL (ref 70–110)
POTASSIUM SERPL-SCNC: 4.4 MMOL/L (ref 3.5–5.1)
PROT SERPL-MCNC: 7.3 G/DL (ref 6–8.4)
PROT UR QL STRIP: NEGATIVE
RBC # BLD AUTO: 3.98 M/UL (ref 4.6–6.2)
RBC #/AREA URNS HPF: 1 /HPF (ref 0–4)
SODIUM SERPL-SCNC: 137 MMOL/L (ref 136–145)
SP GR UR STRIP: 1.01 (ref 1–1.03)
SQUAMOUS #/AREA URNS HPF: 1 /HPF
URN SPEC COLLECT METH UR: ABNORMAL
UROBILINOGEN UR STRIP-ACNC: NEGATIVE EU/DL
WBC # BLD AUTO: 7.54 K/UL (ref 3.9–12.7)
WBC #/AREA URNS HPF: 0 /HPF (ref 0–5)
YEAST URNS QL MICRO: NORMAL

## 2023-05-03 PROCEDURE — 96360 HYDRATION IV INFUSION INIT: CPT

## 2023-05-03 PROCEDURE — 63600175 PHARM REV CODE 636 W HCPCS: Performed by: EMERGENCY MEDICINE

## 2023-05-03 PROCEDURE — 82010 KETONE BODYS QUAN: CPT | Performed by: EMERGENCY MEDICINE

## 2023-05-03 PROCEDURE — 36415 COLL VENOUS BLD VENIPUNCTURE: CPT | Performed by: EMERGENCY MEDICINE

## 2023-05-03 PROCEDURE — 94761 N-INVAS EAR/PLS OXIMETRY MLT: CPT

## 2023-05-03 PROCEDURE — 93010 ELECTROCARDIOGRAM REPORT: CPT | Mod: ,,, | Performed by: INTERNAL MEDICINE

## 2023-05-03 PROCEDURE — 93005 ELECTROCARDIOGRAM TRACING: CPT

## 2023-05-03 PROCEDURE — 81000 URINALYSIS NONAUTO W/SCOPE: CPT | Performed by: EMERGENCY MEDICINE

## 2023-05-03 PROCEDURE — 27000221 HC OXYGEN, UP TO 24 HOURS

## 2023-05-03 PROCEDURE — 96361 HYDRATE IV INFUSION ADD-ON: CPT

## 2023-05-03 PROCEDURE — 93010 EKG 12-LEAD: ICD-10-PCS | Mod: ,,, | Performed by: INTERNAL MEDICINE

## 2023-05-03 PROCEDURE — 85025 COMPLETE CBC W/AUTO DIFF WBC: CPT | Performed by: EMERGENCY MEDICINE

## 2023-05-03 PROCEDURE — 25000003 PHARM REV CODE 250: Performed by: EMERGENCY MEDICINE

## 2023-05-03 PROCEDURE — 82962 GLUCOSE BLOOD TEST: CPT | Mod: 91

## 2023-05-03 PROCEDURE — 99284 EMERGENCY DEPT VISIT MOD MDM: CPT | Mod: 25

## 2023-05-03 PROCEDURE — 80053 COMPREHEN METABOLIC PANEL: CPT | Performed by: EMERGENCY MEDICINE

## 2023-05-03 RX ADMIN — SODIUM CHLORIDE 1000 ML: 0.9 INJECTION, SOLUTION INTRAVENOUS at 05:05

## 2023-05-03 RX ADMIN — SODIUM CHLORIDE, POTASSIUM CHLORIDE, SODIUM LACTATE AND CALCIUM CHLORIDE 500 ML: 600; 310; 30; 20 INJECTION, SOLUTION INTRAVENOUS at 07:05
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
## 2023-05-03 NOTE — ED NOTES
Patient unable to void to urinal. States he has not urinated since 0900. Bladder scan completed with 491 mL of urine noted. Patient offered in and out catheter per protocol for urine sample obtainment, which patient declined at this time. Education provided with verbal understanding noted.

## 2023-05-04 LAB — POCT GLUCOSE: 301 MG/DL (ref 70–110)

## 2023-05-04 NOTE — ED NOTES
Pt c/o dizziness. Appears drowsy but easy to arouse. Denies any other complaints. States he had a syncopal episode earlier in the day and hasn't urinated much today.

## 2023-05-04 NOTE — RESPIRATORY THERAPY
05/03/23 2006   Patient Assessment/Suction   Level of Consciousness (AVPU) alert   PRE-TX-O2   Device (Oxygen Therapy) nasal cannula   $ Is the patient on Low Flow Oxygen? Yes   Flow (L/min) 2   Oxygen Concentration (%) 28   SpO2 97 %   Pulse Oximetry Type Intermittent   $ Pulse Oximetry - Multiple Charge Pulse Oximetry - Multiple   Pulse 81   Resp 20   /72   Ready to Wean/Extubation Screen   FIO2<=50 (chart decimal) 0.28

## 2023-05-05 ENCOUNTER — TELEPHONE (OUTPATIENT)
Dept: FAMILY MEDICINE | Facility: CLINIC | Age: 68
End: 2023-05-05
Payer: MEDICARE

## 2023-05-05 NOTE — TELEPHONE ENCOUNTER
----- Message from Kimberli Olivas sent at 5/5/2023 11:49 AM CDT -----  Type: Needs Medical Advice  Who Called: Pt  Best Call Back Number: [phone] or [phone]  Additional Information: Pt would like to est care with , he is not happy with the PCP he has now, pl call bk to advise Thanks

## 2023-05-08 ENCOUNTER — TELEPHONE (OUTPATIENT)
Dept: FAMILY MEDICINE | Facility: CLINIC | Age: 68
End: 2023-05-08

## 2023-05-11 ENCOUNTER — OFFICE VISIT (OUTPATIENT)
Dept: FAMILY MEDICINE | Facility: CLINIC | Age: 68
End: 2023-05-11
Payer: MEDICARE

## 2023-05-11 ENCOUNTER — HOSPITAL ENCOUNTER (OUTPATIENT)
Dept: RADIOLOGY | Facility: CLINIC | Age: 68
Discharge: HOME | End: 2023-05-11
Attending: NURSE PRACTITIONER
Payer: MEDICARE

## 2023-05-11 VITALS
BODY MASS INDEX: 30.84 KG/M2 | TEMPERATURE: 98 F | WEIGHT: 240.31 LBS | HEART RATE: 80 BPM | SYSTOLIC BLOOD PRESSURE: 118 MMHG | OXYGEN SATURATION: 97 % | HEIGHT: 74 IN | DIASTOLIC BLOOD PRESSURE: 70 MMHG

## 2023-05-11 DIAGNOSIS — E11.65 TYPE 2 DIABETES MELLITUS WITH HYPERGLYCEMIA, WITH LONG-TERM CURRENT USE OF INSULIN: ICD-10-CM

## 2023-05-11 DIAGNOSIS — Z79.4 TYPE 2 DIABETES MELLITUS WITH HYPERGLYCEMIA, WITH LONG-TERM CURRENT USE OF INSULIN: ICD-10-CM

## 2023-05-11 DIAGNOSIS — M54.42 LEFT-SIDED LOW BACK PAIN WITH LEFT-SIDED SCIATICA, UNSPECIFIED CHRONICITY: ICD-10-CM

## 2023-05-11 DIAGNOSIS — I10 BENIGN ESSENTIAL HTN: ICD-10-CM

## 2023-05-11 DIAGNOSIS — E78.1 HYPERTRIGLYCERIDEMIA: ICD-10-CM

## 2023-05-11 DIAGNOSIS — M54.42 LEFT-SIDED LOW BACK PAIN WITH LEFT-SIDED SCIATICA, UNSPECIFIED CHRONICITY: Primary | ICD-10-CM

## 2023-05-11 DIAGNOSIS — Z79.01 ANTICOAGULANT LONG-TERM USE: ICD-10-CM

## 2023-05-11 PROCEDURE — 99999 PR PBB SHADOW E&M-EST. PATIENT-LVL IV: CPT | Mod: PBBFAC,,, | Performed by: NURSE PRACTITIONER

## 2023-05-11 PROCEDURE — 3052F HG A1C>EQUAL 8.0%<EQUAL 9.0%: CPT | Mod: CPTII,S$GLB,, | Performed by: NURSE PRACTITIONER

## 2023-05-11 PROCEDURE — 99213 OFFICE O/P EST LOW 20 MIN: CPT | Mod: S$GLB,,, | Performed by: NURSE PRACTITIONER

## 2023-05-11 PROCEDURE — 3074F PR MOST RECENT SYSTOLIC BLOOD PRESSURE < 130 MM HG: ICD-10-PCS | Mod: CPTII,S$GLB,, | Performed by: NURSE PRACTITIONER

## 2023-05-11 PROCEDURE — 3008F PR BODY MASS INDEX (BMI) DOCUMENTED: ICD-10-PCS | Mod: CPTII,S$GLB,, | Performed by: NURSE PRACTITIONER

## 2023-05-11 PROCEDURE — 1125F AMNT PAIN NOTED PAIN PRSNT: CPT | Mod: CPTII,S$GLB,, | Performed by: NURSE PRACTITIONER

## 2023-05-11 PROCEDURE — 3078F DIAST BP <80 MM HG: CPT | Mod: CPTII,S$GLB,, | Performed by: NURSE PRACTITIONER

## 2023-05-11 PROCEDURE — 72100 X-RAY EXAM L-S SPINE 2/3 VWS: CPT | Mod: TC,FY,PO

## 2023-05-11 PROCEDURE — 72100 X-RAY EXAM L-S SPINE 2/3 VWS: CPT | Mod: 26,,, | Performed by: RADIOLOGY

## 2023-05-11 PROCEDURE — 3078F PR MOST RECENT DIASTOLIC BLOOD PRESSURE < 80 MM HG: ICD-10-PCS | Mod: CPTII,S$GLB,, | Performed by: NURSE PRACTITIONER

## 2023-05-11 PROCEDURE — 1159F PR MEDICATION LIST DOCUMENTED IN MEDICAL RECORD: ICD-10-PCS | Mod: CPTII,S$GLB,, | Performed by: NURSE PRACTITIONER

## 2023-05-11 PROCEDURE — 1160F PR REVIEW ALL MEDS BY PRESCRIBER/CLIN PHARMACIST DOCUMENTED: ICD-10-PCS | Mod: CPTII,S$GLB,, | Performed by: NURSE PRACTITIONER

## 2023-05-11 PROCEDURE — 1160F RVW MEDS BY RX/DR IN RCRD: CPT | Mod: CPTII,S$GLB,, | Performed by: NURSE PRACTITIONER

## 2023-05-11 PROCEDURE — 3288F FALL RISK ASSESSMENT DOCD: CPT | Mod: CPTII,S$GLB,, | Performed by: NURSE PRACTITIONER

## 2023-05-11 PROCEDURE — 3074F SYST BP LT 130 MM HG: CPT | Mod: CPTII,S$GLB,, | Performed by: NURSE PRACTITIONER

## 2023-05-11 PROCEDURE — 1125F PR PAIN SEVERITY QUANTIFIED, PAIN PRESENT: ICD-10-PCS | Mod: CPTII,S$GLB,, | Performed by: NURSE PRACTITIONER

## 2023-05-11 PROCEDURE — 1159F MED LIST DOCD IN RCRD: CPT | Mod: CPTII,S$GLB,, | Performed by: NURSE PRACTITIONER

## 2023-05-11 PROCEDURE — 99999 PR PBB SHADOW E&M-EST. PATIENT-LVL IV: ICD-10-PCS | Mod: PBBFAC,,, | Performed by: NURSE PRACTITIONER

## 2023-05-11 PROCEDURE — 3008F BODY MASS INDEX DOCD: CPT | Mod: CPTII,S$GLB,, | Performed by: NURSE PRACTITIONER

## 2023-05-11 PROCEDURE — 1101F PR PT FALLS ASSESS DOC 0-1 FALLS W/OUT INJ PAST YR: ICD-10-PCS | Mod: CPTII,S$GLB,, | Performed by: NURSE PRACTITIONER

## 2023-05-11 PROCEDURE — 1101F PT FALLS ASSESS-DOCD LE1/YR: CPT | Mod: CPTII,S$GLB,, | Performed by: NURSE PRACTITIONER

## 2023-05-11 PROCEDURE — 99213 PR OFFICE/OUTPT VISIT, EST, LEVL III, 20-29 MIN: ICD-10-PCS | Mod: S$GLB,,, | Performed by: NURSE PRACTITIONER

## 2023-05-11 PROCEDURE — 3288F PR FALLS RISK ASSESSMENT DOCUMENTED: ICD-10-PCS | Mod: CPTII,S$GLB,, | Performed by: NURSE PRACTITIONER

## 2023-05-11 PROCEDURE — 3052F PR MOST RECENT HEMOGLOBIN A1C LEVEL 8.0 - < 9.0%: ICD-10-PCS | Mod: CPTII,S$GLB,, | Performed by: NURSE PRACTITIONER

## 2023-05-11 PROCEDURE — 72100 XR LUMBAR SPINE AP AND LATERAL: ICD-10-PCS | Mod: 26,,, | Performed by: RADIOLOGY
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
## 2023-05-12 ENCOUNTER — LAB VISIT (OUTPATIENT)
Dept: LAB | Facility: HOSPITAL | Age: 68
End: 2023-05-12
Attending: NURSE PRACTITIONER
Payer: MEDICARE

## 2023-05-12 DIAGNOSIS — Z79.4 TYPE 2 DIABETES MELLITUS WITH HYPERGLYCEMIA, WITH LONG-TERM CURRENT USE OF INSULIN: ICD-10-CM

## 2023-05-12 DIAGNOSIS — I10 BENIGN ESSENTIAL HTN: ICD-10-CM

## 2023-05-12 DIAGNOSIS — E78.1 HYPERTRIGLYCERIDEMIA: ICD-10-CM

## 2023-05-12 DIAGNOSIS — E11.65 TYPE 2 DIABETES MELLITUS WITH HYPERGLYCEMIA, WITH LONG-TERM CURRENT USE OF INSULIN: ICD-10-CM

## 2023-05-12 LAB
ALBUMIN SERPL BCP-MCNC: 3.7 G/DL (ref 3.5–5.2)
ALP SERPL-CCNC: 75 U/L (ref 55–135)
ALT SERPL W/O P-5'-P-CCNC: 13 U/L (ref 10–44)
ANION GAP SERPL CALC-SCNC: 8 MMOL/L (ref 8–16)
AST SERPL-CCNC: 15 U/L (ref 10–40)
BASOPHILS # BLD AUTO: 0.07 K/UL (ref 0–0.2)
BASOPHILS NFR BLD: 1 % (ref 0–1.9)
BILIRUB SERPL-MCNC: 0.2 MG/DL (ref 0.1–1)
BUN SERPL-MCNC: 16 MG/DL (ref 8–23)
CALCIUM SERPL-MCNC: 9 MG/DL (ref 8.7–10.5)
CHLORIDE SERPL-SCNC: 110 MMOL/L (ref 95–110)
CHOLEST SERPL-MCNC: 129 MG/DL (ref 120–199)
CHOLEST/HDLC SERPL: 3.5 {RATIO} (ref 2–5)
CO2 SERPL-SCNC: 22 MMOL/L (ref 23–29)
CREAT SERPL-MCNC: 1.4 MG/DL (ref 0.5–1.4)
DIFFERENTIAL METHOD: ABNORMAL
EOSINOPHIL # BLD AUTO: 0.5 K/UL (ref 0–0.5)
EOSINOPHIL NFR BLD: 7.3 % (ref 0–8)
ERYTHROCYTE [DISTWIDTH] IN BLOOD BY AUTOMATED COUNT: 14.8 % (ref 11.5–14.5)
EST. GFR  (NO RACE VARIABLE): 54.7 ML/MIN/1.73 M^2
ESTIMATED AVG GLUCOSE: 186 MG/DL (ref 68–131)
GLUCOSE SERPL-MCNC: 212 MG/DL (ref 70–110)
HBA1C MFR BLD: 8.1 % (ref 4–5.6)
HCT VFR BLD AUTO: 38.8 % (ref 40–54)
HDLC SERPL-MCNC: 37 MG/DL (ref 40–75)
HDLC SERPL: 28.7 % (ref 20–50)
HGB BLD-MCNC: 11.9 G/DL (ref 14–18)
IMM GRANULOCYTES # BLD AUTO: 0.02 K/UL (ref 0–0.04)
IMM GRANULOCYTES NFR BLD AUTO: 0.3 % (ref 0–0.5)
LDLC SERPL CALC-MCNC: 65.8 MG/DL (ref 63–159)
LYMPHOCYTES # BLD AUTO: 2.1 K/UL (ref 1–4.8)
LYMPHOCYTES NFR BLD: 30.5 % (ref 18–48)
MCH RBC QN AUTO: 30.4 PG (ref 27–31)
MCHC RBC AUTO-ENTMCNC: 30.7 G/DL (ref 32–36)
MCV RBC AUTO: 99 FL (ref 82–98)
MONOCYTES # BLD AUTO: 0.6 K/UL (ref 0.3–1)
MONOCYTES NFR BLD: 8.4 % (ref 4–15)
NEUTROPHILS # BLD AUTO: 3.6 K/UL (ref 1.8–7.7)
NEUTROPHILS NFR BLD: 52.5 % (ref 38–73)
NONHDLC SERPL-MCNC: 92 MG/DL
NRBC BLD-RTO: 0 /100 WBC
PLATELET # BLD AUTO: 207 K/UL (ref 150–450)
PMV BLD AUTO: 10.6 FL (ref 9.2–12.9)
POTASSIUM SERPL-SCNC: 4 MMOL/L (ref 3.5–5.1)
PROT SERPL-MCNC: 6.9 G/DL (ref 6–8.4)
RBC # BLD AUTO: 3.91 M/UL (ref 4.6–6.2)
SODIUM SERPL-SCNC: 140 MMOL/L (ref 136–145)
TRIGL SERPL-MCNC: 131 MG/DL (ref 30–150)
TSH SERPL DL<=0.005 MIU/L-ACNC: 3.11 UIU/ML (ref 0.4–4)
WBC # BLD AUTO: 6.89 K/UL (ref 3.9–12.7)

## 2023-05-12 PROCEDURE — 85025 COMPLETE CBC W/AUTO DIFF WBC: CPT | Performed by: NURSE PRACTITIONER

## 2023-05-12 PROCEDURE — 80053 COMPREHEN METABOLIC PANEL: CPT | Performed by: NURSE PRACTITIONER

## 2023-05-12 PROCEDURE — 36415 COLL VENOUS BLD VENIPUNCTURE: CPT | Mod: PO | Performed by: NURSE PRACTITIONER

## 2023-05-12 PROCEDURE — 84443 ASSAY THYROID STIM HORMONE: CPT | Performed by: NURSE PRACTITIONER

## 2023-05-12 PROCEDURE — 83036 HEMOGLOBIN GLYCOSYLATED A1C: CPT | Performed by: NURSE PRACTITIONER

## 2023-05-12 PROCEDURE — 80061 LIPID PANEL: CPT | Performed by: NURSE PRACTITIONER

## 2023-05-14 DIAGNOSIS — M54.42 LEFT-SIDED LOW BACK PAIN WITH LEFT-SIDED SCIATICA, UNSPECIFIED CHRONICITY: Primary | ICD-10-CM

## 2023-05-17 ENCOUNTER — TELEPHONE (OUTPATIENT)
Dept: FAMILY MEDICINE | Facility: CLINIC | Age: 68
End: 2023-05-17
Payer: MEDICARE

## 2023-05-25 DIAGNOSIS — M54.16 CHRONIC RADICULAR PAIN OF LOWER BACK: ICD-10-CM

## 2023-05-25 DIAGNOSIS — I26.99 PULMONARY EMBOLISM, UNSPECIFIED CHRONICITY, UNSPECIFIED PULMONARY EMBOLISM TYPE, UNSPECIFIED WHETHER ACUTE COR PULMONALE PRESENT: ICD-10-CM

## 2023-05-25 DIAGNOSIS — G89.29 CHRONIC RADICULAR PAIN OF LOWER BACK: ICD-10-CM

## 2023-05-25 RX ORDER — CYCLOBENZAPRINE HCL 5 MG
TABLET ORAL
Qty: 180 TABLET | Refills: 1 | Status: SHIPPED | OUTPATIENT
Start: 2023-05-25 | End: 2023-07-20 | Stop reason: SDUPTHER

## 2023-05-25 RX ORDER — APIXABAN 5 MG/1
TABLET, FILM COATED ORAL
Qty: 180 TABLET | Refills: 1 | Status: SHIPPED | OUTPATIENT
Start: 2023-05-25 | End: 2023-12-11

## 2023-07-02 DIAGNOSIS — E11.65 TYPE 2 DIABETES MELLITUS WITH HYPERGLYCEMIA, WITH LONG-TERM CURRENT USE OF INSULIN: ICD-10-CM

## 2023-07-02 DIAGNOSIS — Z79.4 TYPE 2 DIABETES MELLITUS WITH HYPERGLYCEMIA, WITH LONG-TERM CURRENT USE OF INSULIN: ICD-10-CM

## 2023-07-03 RX ORDER — EMPAGLIFLOZIN 25 MG/1
TABLET, FILM COATED ORAL
Qty: 90 TABLET | Refills: 1 | Status: SHIPPED | OUTPATIENT
Start: 2023-07-03 | End: 2023-09-28

## 2023-07-20 ENCOUNTER — OFFICE VISIT (OUTPATIENT)
Dept: FAMILY MEDICINE | Facility: CLINIC | Age: 68
End: 2023-07-20
Payer: MEDICARE

## 2023-07-20 VITALS
HEART RATE: 110 BPM | BODY MASS INDEX: 30.29 KG/M2 | WEIGHT: 236 LBS | OXYGEN SATURATION: 98 % | HEIGHT: 74 IN | SYSTOLIC BLOOD PRESSURE: 128 MMHG | DIASTOLIC BLOOD PRESSURE: 84 MMHG | TEMPERATURE: 98 F

## 2023-07-20 DIAGNOSIS — M54.16 CHRONIC RADICULAR PAIN OF LOWER BACK: ICD-10-CM

## 2023-07-20 DIAGNOSIS — R11.2 NAUSEA AND VOMITING, UNSPECIFIED VOMITING TYPE: ICD-10-CM

## 2023-07-20 DIAGNOSIS — E78.1 HYPERTRIGLYCERIDEMIA: ICD-10-CM

## 2023-07-20 DIAGNOSIS — I10 BENIGN ESSENTIAL HTN: ICD-10-CM

## 2023-07-20 DIAGNOSIS — K86.1 IDIOPATHIC CHRONIC PANCREATITIS: ICD-10-CM

## 2023-07-20 DIAGNOSIS — E11.65 TYPE 2 DIABETES MELLITUS WITH HYPERGLYCEMIA, WITH LONG-TERM CURRENT USE OF INSULIN: Primary | ICD-10-CM

## 2023-07-20 DIAGNOSIS — Z79.4 TYPE 2 DIABETES MELLITUS WITH HYPERGLYCEMIA, WITH LONG-TERM CURRENT USE OF INSULIN: Primary | ICD-10-CM

## 2023-07-20 DIAGNOSIS — G89.29 CHRONIC RADICULAR PAIN OF LOWER BACK: ICD-10-CM

## 2023-07-20 DIAGNOSIS — Z79.01 ANTICOAGULANT LONG-TERM USE: ICD-10-CM

## 2023-07-20 DIAGNOSIS — E11.42 DIABETIC POLYNEUROPATHY ASSOCIATED WITH TYPE 2 DIABETES MELLITUS: ICD-10-CM

## 2023-07-20 PROCEDURE — 3079F DIAST BP 80-89 MM HG: CPT | Mod: CPTII,S$GLB,, | Performed by: NURSE PRACTITIONER

## 2023-07-20 PROCEDURE — 3066F PR DOCUMENTATION OF TREATMENT FOR NEPHROPATHY: ICD-10-PCS | Mod: CPTII,S$GLB,, | Performed by: NURSE PRACTITIONER

## 2023-07-20 PROCEDURE — 3061F PR NEG MICROALBUMINURIA RESULT DOCUMENTED/REVIEW: ICD-10-PCS | Mod: CPTII,S$GLB,, | Performed by: NURSE PRACTITIONER

## 2023-07-20 PROCEDURE — 1126F AMNT PAIN NOTED NONE PRSNT: CPT | Mod: CPTII,S$GLB,, | Performed by: NURSE PRACTITIONER

## 2023-07-20 PROCEDURE — 3066F NEPHROPATHY DOC TX: CPT | Mod: CPTII,S$GLB,, | Performed by: NURSE PRACTITIONER

## 2023-07-20 PROCEDURE — 99215 OFFICE O/P EST HI 40 MIN: CPT | Mod: S$GLB,,, | Performed by: NURSE PRACTITIONER

## 2023-07-20 PROCEDURE — 3061F NEG MICROALBUMINURIA REV: CPT | Mod: CPTII,S$GLB,, | Performed by: NURSE PRACTITIONER

## 2023-07-20 PROCEDURE — 3052F PR MOST RECENT HEMOGLOBIN A1C LEVEL 8.0 - < 9.0%: ICD-10-PCS | Mod: CPTII,S$GLB,, | Performed by: NURSE PRACTITIONER

## 2023-07-20 PROCEDURE — 3008F PR BODY MASS INDEX (BMI) DOCUMENTED: ICD-10-PCS | Mod: CPTII,S$GLB,, | Performed by: NURSE PRACTITIONER

## 2023-07-20 PROCEDURE — 3074F SYST BP LT 130 MM HG: CPT | Mod: CPTII,S$GLB,, | Performed by: NURSE PRACTITIONER

## 2023-07-20 PROCEDURE — 1160F PR REVIEW ALL MEDS BY PRESCRIBER/CLIN PHARMACIST DOCUMENTED: ICD-10-PCS | Mod: CPTII,S$GLB,, | Performed by: NURSE PRACTITIONER

## 2023-07-20 PROCEDURE — 99215 OFFICE O/P EST HI 40 MIN: CPT | Performed by: NURSE PRACTITIONER

## 2023-07-20 PROCEDURE — 1159F MED LIST DOCD IN RCRD: CPT | Mod: CPTII,S$GLB,, | Performed by: NURSE PRACTITIONER

## 2023-07-20 PROCEDURE — 99215 PR OFFICE/OUTPT VISIT, EST, LEVL V, 40-54 MIN: ICD-10-PCS | Mod: S$GLB,,, | Performed by: NURSE PRACTITIONER

## 2023-07-20 PROCEDURE — 3074F PR MOST RECENT SYSTOLIC BLOOD PRESSURE < 130 MM HG: ICD-10-PCS | Mod: CPTII,S$GLB,, | Performed by: NURSE PRACTITIONER

## 2023-07-20 PROCEDURE — 3079F PR MOST RECENT DIASTOLIC BLOOD PRESSURE 80-89 MM HG: ICD-10-PCS | Mod: CPTII,S$GLB,, | Performed by: NURSE PRACTITIONER

## 2023-07-20 PROCEDURE — 3052F HG A1C>EQUAL 8.0%<EQUAL 9.0%: CPT | Mod: CPTII,S$GLB,, | Performed by: NURSE PRACTITIONER

## 2023-07-20 PROCEDURE — 3008F BODY MASS INDEX DOCD: CPT | Mod: CPTII,S$GLB,, | Performed by: NURSE PRACTITIONER

## 2023-07-20 PROCEDURE — 1159F PR MEDICATION LIST DOCUMENTED IN MEDICAL RECORD: ICD-10-PCS | Mod: CPTII,S$GLB,, | Performed by: NURSE PRACTITIONER

## 2023-07-20 PROCEDURE — 1160F RVW MEDS BY RX/DR IN RCRD: CPT | Mod: CPTII,S$GLB,, | Performed by: NURSE PRACTITIONER

## 2023-07-20 PROCEDURE — 1126F PR PAIN SEVERITY QUANTIFIED, NO PAIN PRESENT: ICD-10-PCS | Mod: CPTII,S$GLB,, | Performed by: NURSE PRACTITIONER

## 2023-07-20 RX ORDER — CYCLOBENZAPRINE HCL 5 MG
5 TABLET ORAL 2 TIMES DAILY
Qty: 180 TABLET | Refills: 1 | Status: SHIPPED | OUTPATIENT
Start: 2023-07-20 | End: 2023-12-21

## 2023-07-20 RX ORDER — GABAPENTIN 600 MG/1
600 TABLET ORAL 3 TIMES DAILY
Qty: 270 TABLET | Refills: 1 | Status: SHIPPED | OUTPATIENT
Start: 2023-07-20 | End: 2023-12-21

## 2023-07-20 RX ORDER — ONDANSETRON 4 MG/1
4 TABLET, FILM COATED ORAL EVERY 12 HOURS PRN
Qty: 20 TABLET | Refills: 0 | Status: SHIPPED | OUTPATIENT
Start: 2023-07-20 | End: 2023-08-31

## 2023-07-20 NOTE — PROGRESS NOTES
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 Negative  Negative Final    Comment: Recommend a 24 hour urine protein or a urine   protein/creatinine ratio if globulin induced proteinuria is  clinically suspected.      Glucose, UA 05/03/2023 4+ (A)  Negative Final    Ketones, UA 05/03/2023 Negative  Negative Final    Bilirubin (UA) 05/03/2023 1+ (A)  Negative Final    Comment: Positive urine bilirubin is not confirmed. Correlate with   serum bilirubin and clinical presentation.      Occult Blood UA 05/03/2023 Trace (A)  Negative Final    Nitrite, UA 05/03/2023 Negative  Negative Final    Urobilinogen, UA 05/03/2023 Negative  <2.0 EU/dL Final    Leukocytes, UA 05/03/2023 Negative  Negative Final    Beta-Hydroxybutyrate 05/03/2023 0.3  0.0 - 0.5 mmol/L Final    POCT Glucose 05/03/2023 273 (H)  70 - 110 mg/dL Final    RBC, UA 05/03/2023 1  0 - 4 /hpf Final    WBC, UA 05/03/2023 0  0 - 5 /hpf Final    Bacteria 05/03/2023 None  None-Occ /hpf Final    Yeast, UA 05/03/2023 None  None Final    Squam Epithel, UA 05/03/2023 1  /hpf Final    Microscopic Comment 05/03/2023 SEE COMMENT   Final    Comment: Other formed elements not mentioned in the report are not   present in the microscopic examination.       POCT Glucose 05/03/2023 301 (H)  70 - 110 mg/dL Final   Lab Visit on 04/25/2023   Component Date Value Ref Range Status    PSA Total  04/25/2023 0.71  0.00 - 4.00 ng/mL Final    Comment: The testing method is a chemiluminescent microparticle immunoassay   manufactured by [employer] and performed on the [employer]   or   [employer] system. Values obtained with different assay manufacturers   for   methods may be different and cannot be used interchangeably.  PSA Expected levels:  Hormonal Therapy: <0.05 ng/ml  Prostatectomy: <0.01 ng/ml  Radiation Therapy: <1.00 ng/ml      PSA, Free 04/25/2023 0.28  0.00 - 1.50 ng/mL Final    Comment: The testing method is a chemiluminescent microparticle immunoassay   manufactured by [employer] and performed on the [employer]   or   [employer] system. Values obtained with different assay manufacturers   for   methods may be different and cannot be used interchangeably.      PSA, Free % 04/25/2023 39.44  Not established % Final   Office Visit on 04/25/2023   Component Date Value Ref Range Status    Color, POC UA 04/25/2023 Yellow  Yellow, Straw, Colorless Final    Clarity, POC UA 04/25/2023 Clear  Clear Final    Glucose, POC UA 04/25/2023 >=1000 (A)  Negative Final    Bilirubin, POC UA 04/25/2023 Negative  Negative Final    Ketones, POC UA 04/25/2023 Negative  Negative Final    Spec Grav POC UA 04/25/2023 1.010  1.005 - 1.030 Final    Blood, POC UA 04/25/2023 Negative  Negative Final    pH, POC UA 04/25/2023 5.0  5.0 - 8.0 Final    Protein, POC UA 04/25/2023 Negative  Negative Final    Urobilinogen, POC UA 04/25/2023 0.2  <=1.0 Final    Nitrite, POC UA 04/25/2023 Negative  Negative Final    WBC, POC UA 04/25/2023 Negative  Negative Final    POC Residual Urine Volume 04/25/2023 0  0 - 100 mL Final     Assessment:       1. Type 2 diabetes mellitus with hyperglycemia, with long-term current use of insulin    2. Benign essential HTN    3. Hypertriglyceridemia    4. Diabetic polyneuropathy associated with type 2 diabetes mellitus    5. Anticoagulant long-term use    6. Idiopathic chronic pancreatitis     7. Nausea and vomiting, unspecified vomiting type    8. Chronic radicular pain of lower back        Plan:       Type 2 diabetes mellitus with hyperglycemia, with long-term current use of insulin  Patient to establish with endocrinology in 2 weeks. Patient encouraged to follow-up.  Continue Basalgar 90 units nightly, Novolog on sliding scale, Metformin 1000 mg bid, and Jardiance 25 mg daily. Log glucose and bring to Endo appointment. He may benefit from DPP-4, but will defer to Endo. Continue portion control and weight loss. Cut down on the starches, carbohydrates, sugars and high-calorie items like syrups, sweets, cookies, and candies.    Benign essential HTN  Reduce the amount of salt in your diet; Lose weight; Avoid drinking too much alcohol; Exercise at least 30 minutes per day most days of the week.  Continue current medications and home BP monitoring.    Hypertriglyceridemia  Stable, continue Lipitor 40 mg. Limit red meat, butter, fried foods, cheese, and other foods that have a lot of saturated fat. Consume more: lean meats, fish, fruits, vegetables, whole grains, beans, lentils, and nuts.     Diabetic polyneuropathy associated with type 2 diabetes mellitus  Stable with Gabapentin, continue current treatment.   -     gabapentin (NEURONTIN) 600 MG tablet; Take 1 tablet (600 mg total) by mouth 3 (three) times daily.  Dispense: 270 tablet; Refill: 1    Anticoagulant long-term use  On Eliquis due to history DVT and PE.  CBC stable. No bleeding or bruising concerns.     Idiopathic chronic pancreatitis  Referred to GI for further management.   -     Ambulatory referral/consult to Gastroenterology; Future; Expected date: 07/27/2023    Nausea and vomiting, unspecified vomiting type  Stable EGD.  May be related to chronic pancreatitis.  Referred to GI. Zofran PRN.   -     Ambulatory referral/consult to Gastroenterology; Future; Expected date: 07/27/2023  -     ondansetron (ZOFRAN) 4 MG tablet; Take 1 tablet (4 mg  total) by mouth every 12 (twelve) hours as needed for Nausea.  Dispense: 20 tablet; Refill: 0    Chronic radicular pain of lower back  Continue Flexeril.   -     cyclobenzaprine (FLEXERIL) 5 MG tablet; Take 1 tablet (5 mg total) by mouth 2 (two) times daily.  Dispense: 180 tablet; Refill: 1    This note was created using [employer] voice recognition software that occasionally misinterprets phrases or words.     I spent a total of 45 minutes on the day of the visit.This includes face to face time and non-face to face time preparing to see the patient (eg, review of tests), obtaining and/or reviewing separately obtained history, documenting clinical information in the electronic or other health record, independently interpreting results and communicating results to the patient/family/caregiver, or care coordinator.    Follow up in about 6 months (around 1/20/2024).      7/20/2023 EDE Williamson, FNP

## 2023-07-25 ENCOUNTER — OFFICE VISIT (OUTPATIENT)
Dept: UROLOGY | Facility: CLINIC | Age: 68
End: 2023-07-25
Payer: MEDICARE

## 2023-07-25 DIAGNOSIS — E13.649 UNCONTROLLED DIABETES MELLITUS OF OTHER TYPE WITH HYPOGLYCEMIA, UNSPECIFIED HYPOGLYCEMIA COMA STATUS: ICD-10-CM

## 2023-07-25 DIAGNOSIS — N52.9 ERECTILE DYSFUNCTION, UNSPECIFIED ERECTILE DYSFUNCTION TYPE: ICD-10-CM

## 2023-07-25 DIAGNOSIS — N40.0 BENIGN PROSTATIC HYPERPLASIA, UNSPECIFIED WHETHER LOWER URINARY TRACT SYMPTOMS PRESENT: Primary | ICD-10-CM

## 2023-07-25 PROCEDURE — 3052F HG A1C>EQUAL 8.0%<EQUAL 9.0%: CPT | Mod: CPTII,S$GLB,, | Performed by: NURSE PRACTITIONER

## 2023-07-25 PROCEDURE — 3052F PR MOST RECENT HEMOGLOBIN A1C LEVEL 8.0 - < 9.0%: ICD-10-PCS | Mod: CPTII,S$GLB,, | Performed by: NURSE PRACTITIONER

## 2023-07-25 PROCEDURE — 3066F PR DOCUMENTATION OF TREATMENT FOR NEPHROPATHY: ICD-10-PCS | Mod: CPTII,S$GLB,, | Performed by: NURSE PRACTITIONER

## 2023-07-25 PROCEDURE — 1159F MED LIST DOCD IN RCRD: CPT | Mod: CPTII,S$GLB,, | Performed by: NURSE PRACTITIONER

## 2023-07-25 PROCEDURE — 3061F PR NEG MICROALBUMINURIA RESULT DOCUMENTED/REVIEW: ICD-10-PCS | Mod: CPTII,S$GLB,, | Performed by: NURSE PRACTITIONER

## 2023-07-25 PROCEDURE — 99999 PR PBB SHADOW E&M-EST. PATIENT-LVL IV: ICD-10-PCS | Mod: PBBFAC,,, | Performed by: NURSE PRACTITIONER

## 2023-07-25 PROCEDURE — 99213 PR OFFICE/OUTPT VISIT, EST, LEVL III, 20-29 MIN: ICD-10-PCS | Mod: S$GLB,,, | Performed by: NURSE PRACTITIONER

## 2023-07-25 PROCEDURE — 1160F PR REVIEW ALL MEDS BY PRESCRIBER/CLIN PHARMACIST DOCUMENTED: ICD-10-PCS | Mod: CPTII,S$GLB,, | Performed by: NURSE PRACTITIONER

## 2023-07-25 PROCEDURE — 99999 PR PBB SHADOW E&M-EST. PATIENT-LVL IV: CPT | Mod: PBBFAC,,, | Performed by: NURSE PRACTITIONER

## 2023-07-25 PROCEDURE — 3066F NEPHROPATHY DOC TX: CPT | Mod: CPTII,S$GLB,, | Performed by: NURSE PRACTITIONER

## 2023-07-25 PROCEDURE — 3061F NEG MICROALBUMINURIA REV: CPT | Mod: CPTII,S$GLB,, | Performed by: NURSE PRACTITIONER

## 2023-07-25 PROCEDURE — 1159F PR MEDICATION LIST DOCUMENTED IN MEDICAL RECORD: ICD-10-PCS | Mod: CPTII,S$GLB,, | Performed by: NURSE PRACTITIONER

## 2023-07-25 PROCEDURE — 1160F RVW MEDS BY RX/DR IN RCRD: CPT | Mod: CPTII,S$GLB,, | Performed by: NURSE PRACTITIONER

## 2023-07-25 PROCEDURE — 99213 OFFICE O/P EST LOW 20 MIN: CPT | Mod: S$GLB,,, | Performed by: NURSE PRACTITIONER

## 2023-07-25 RX ORDER — TAMSULOSIN HYDROCHLORIDE 0.4 MG/1
0.4 CAPSULE ORAL DAILY
Qty: 90 CAPSULE | Refills: 2 | Status: SHIPPED | OUTPATIENT
Start: 2023-07-25 | End: 2023-09-28 | Stop reason: SDUPTHER
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
## 2023-08-07 ENCOUNTER — TELEPHONE (OUTPATIENT)
Dept: FAMILY MEDICINE | Facility: CLINIC | Age: 68
End: 2023-08-07
Payer: MEDICARE

## 2023-08-07 NOTE — TELEPHONE ENCOUNTER
Tried calling pt about setting up Endocrinology appt p/Referral  No answer, Voicemail full, No pt portal

## 2023-08-09 ENCOUNTER — TELEPHONE (OUTPATIENT)
Dept: FAMILY MEDICINE | Facility: CLINIC | Age: 68
End: 2023-08-09
Payer: MEDICARE

## 2023-08-30 DIAGNOSIS — R11.2 NAUSEA AND VOMITING, UNSPECIFIED VOMITING TYPE: ICD-10-CM

## 2023-08-31 RX ORDER — ONDANSETRON 4 MG/1
TABLET, FILM COATED ORAL
Qty: 20 TABLET | Refills: 0 | Status: SHIPPED | OUTPATIENT
Start: 2023-08-31 | End: 2023-10-24 | Stop reason: SDUPTHER

## 2023-09-28 ENCOUNTER — TELEPHONE (OUTPATIENT)
Dept: UROLOGY | Facility: CLINIC | Age: 68
End: 2023-09-28

## 2023-09-28 ENCOUNTER — OFFICE VISIT (OUTPATIENT)
Dept: UROLOGY | Facility: CLINIC | Age: 68
End: 2023-09-28
Payer: MEDICARE

## 2023-09-28 DIAGNOSIS — R30.0 DYSURIA: ICD-10-CM

## 2023-09-28 DIAGNOSIS — E13.649 UNCONTROLLED DIABETES MELLITUS OF OTHER TYPE WITH HYPOGLYCEMIA, UNSPECIFIED HYPOGLYCEMIA COMA STATUS: ICD-10-CM

## 2023-09-28 DIAGNOSIS — N52.9 ERECTILE DYSFUNCTION, UNSPECIFIED ERECTILE DYSFUNCTION TYPE: ICD-10-CM

## 2023-09-28 DIAGNOSIS — R39.9 UTI SYMPTOMS: Primary | ICD-10-CM

## 2023-09-28 LAB
BACTERIA #/AREA URNS AUTO: NORMAL /HPF
BILIRUB UR QL STRIP: NEGATIVE
CLARITY UR REFRACT.AUTO: CLEAR
COLOR UR AUTO: YELLOW
GLUCOSE UR QL STRIP: ABNORMAL
HGB UR QL STRIP: NEGATIVE
KETONES UR QL STRIP: NEGATIVE
LEUKOCYTE ESTERASE UR QL STRIP: NEGATIVE
MICROSCOPIC COMMENT: NORMAL
NITRITE UR QL STRIP: NEGATIVE
PH UR STRIP: 5 [PH] (ref 5–8)
POC RESIDUAL URINE VOLUME: 114 ML (ref 0–100)
PROT UR QL STRIP: NEGATIVE
SP GR UR STRIP: >1.03 (ref 1–1.03)
SQUAMOUS #/AREA URNS AUTO: 0 /HPF
URN SPEC COLLECT METH UR: ABNORMAL
WBC #/AREA URNS AUTO: 2 /HPF (ref 0–5)
YEAST UR QL AUTO: NORMAL

## 2023-09-28 PROCEDURE — 1101F PR PT FALLS ASSESS DOC 0-1 FALLS W/OUT INJ PAST YR: ICD-10-PCS | Mod: CPTII,S$GLB,, | Performed by: NURSE PRACTITIONER

## 2023-09-28 PROCEDURE — 3288F FALL RISK ASSESSMENT DOCD: CPT | Mod: CPTII,S$GLB,, | Performed by: NURSE PRACTITIONER

## 2023-09-28 PROCEDURE — 1160F RVW MEDS BY RX/DR IN RCRD: CPT | Mod: CPTII,S$GLB,, | Performed by: NURSE PRACTITIONER

## 2023-09-28 PROCEDURE — 3288F PR FALLS RISK ASSESSMENT DOCUMENTED: ICD-10-PCS | Mod: CPTII,S$GLB,, | Performed by: NURSE PRACTITIONER

## 2023-09-28 PROCEDURE — 3066F PR DOCUMENTATION OF TREATMENT FOR NEPHROPATHY: ICD-10-PCS | Mod: CPTII,S$GLB,, | Performed by: NURSE PRACTITIONER

## 2023-09-28 PROCEDURE — 87086 URINE CULTURE/COLONY COUNT: CPT | Performed by: NURSE PRACTITIONER

## 2023-09-28 PROCEDURE — 87147 CULTURE TYPE IMMUNOLOGIC: CPT | Performed by: NURSE PRACTITIONER

## 2023-09-28 PROCEDURE — 99214 PR OFFICE/OUTPT VISIT, EST, LEVL IV, 30-39 MIN: ICD-10-PCS | Mod: S$GLB,,, | Performed by: NURSE PRACTITIONER

## 2023-09-28 PROCEDURE — 87088 URINE BACTERIA CULTURE: CPT | Performed by: NURSE PRACTITIONER

## 2023-09-28 PROCEDURE — 1159F PR MEDICATION LIST DOCUMENTED IN MEDICAL RECORD: ICD-10-PCS | Mod: CPTII,S$GLB,, | Performed by: NURSE PRACTITIONER

## 2023-09-28 PROCEDURE — 3066F NEPHROPATHY DOC TX: CPT | Mod: CPTII,S$GLB,, | Performed by: NURSE PRACTITIONER

## 2023-09-28 PROCEDURE — 81001 URINALYSIS AUTO W/SCOPE: CPT | Performed by: NURSE PRACTITIONER

## 2023-09-28 PROCEDURE — 1160F PR REVIEW ALL MEDS BY PRESCRIBER/CLIN PHARMACIST DOCUMENTED: ICD-10-PCS | Mod: CPTII,S$GLB,, | Performed by: NURSE PRACTITIONER

## 2023-09-28 PROCEDURE — 3061F PR NEG MICROALBUMINURIA RESULT DOCUMENTED/REVIEW: ICD-10-PCS | Mod: CPTII,S$GLB,, | Performed by: NURSE PRACTITIONER

## 2023-09-28 PROCEDURE — 99999 PR PBB SHADOW E&M-EST. PATIENT-LVL II: ICD-10-PCS | Mod: PBBFAC,,, | Performed by: NURSE PRACTITIONER

## 2023-09-28 PROCEDURE — 51798 US URINE CAPACITY MEASURE: CPT | Mod: S$GLB,,, | Performed by: NURSE PRACTITIONER

## 2023-09-28 PROCEDURE — 51798 POCT BLADDER SCAN: ICD-10-PCS | Mod: S$GLB,,, | Performed by: NURSE PRACTITIONER

## 2023-09-28 PROCEDURE — 99214 OFFICE O/P EST MOD 30 MIN: CPT | Mod: S$GLB,,, | Performed by: NURSE PRACTITIONER

## 2023-09-28 PROCEDURE — 1101F PT FALLS ASSESS-DOCD LE1/YR: CPT | Mod: CPTII,S$GLB,, | Performed by: NURSE PRACTITIONER

## 2023-09-28 PROCEDURE — 1159F MED LIST DOCD IN RCRD: CPT | Mod: CPTII,S$GLB,, | Performed by: NURSE PRACTITIONER

## 2023-09-28 PROCEDURE — 99999 PR PBB SHADOW E&M-EST. PATIENT-LVL II: CPT | Mod: PBBFAC,,, | Performed by: NURSE PRACTITIONER

## 2023-09-28 PROCEDURE — 3061F NEG MICROALBUMINURIA REV: CPT | Mod: CPTII,S$GLB,, | Performed by: NURSE PRACTITIONER

## 2023-09-28 PROCEDURE — 3052F PR MOST RECENT HEMOGLOBIN A1C LEVEL 8.0 - < 9.0%: ICD-10-PCS | Mod: CPTII,S$GLB,, | Performed by: NURSE PRACTITIONER

## 2023-09-28 PROCEDURE — 3052F HG A1C>EQUAL 8.0%<EQUAL 9.0%: CPT | Mod: CPTII,S$GLB,, | Performed by: NURSE PRACTITIONER

## 2023-09-28 RX ORDER — DOXYCYCLINE 100 MG/1
100 CAPSULE ORAL 2 TIMES DAILY
Qty: 42 CAPSULE | Refills: 0 | Status: SHIPPED | OUTPATIENT
Start: 2023-09-28 | End: 2023-10-19

## 2023-09-28 RX ORDER — TAMSULOSIN HYDROCHLORIDE 0.4 MG/1
0.4 CAPSULE ORAL DAILY
Qty: 90 CAPSULE | Refills: 2 | Status: SHIPPED | OUTPATIENT
Start: 2023-09-28 | End: 2023-12-27
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
## 2023-09-28 NOTE — TELEPHONE ENCOUNTER
----- Message from Omaira Hawthorne sent at 9/28/2023 12:13 PM CDT -----  Contact: Patient  Type: Needs Medical Advice    Who Called:  Patient  What is this regarding?:  Pt was there this morning and he went to get lab work to check his bladder problems but there are no orders for his A1C or anything.  Best Call Back Number:  [phone]  Additional Information:  Please call the patient back at the phone number listed above to advise. Thank you!

## 2023-09-29 ENCOUNTER — TELEPHONE (OUTPATIENT)
Dept: UROLOGY | Facility: CLINIC | Age: 68
End: 2023-09-29
Payer: MEDICARE

## 2023-09-29 LAB — BACTERIA UR CULT: ABNORMAL

## 2023-10-02 RX ORDER — CEFDINIR 300 MG/1
300 CAPSULE ORAL 2 TIMES DAILY
Qty: 20 CAPSULE | Refills: 0 | Status: SHIPPED | OUTPATIENT
Start: 2023-10-02 | End: 2023-10-12

## 2023-10-06 DIAGNOSIS — I10 BENIGN ESSENTIAL HTN: Primary | ICD-10-CM

## 2023-10-06 DIAGNOSIS — I50.22 CHRONIC SYSTOLIC HEART FAILURE: ICD-10-CM

## 2023-10-06 RX ORDER — FUROSEMIDE 20 MG/1
20 TABLET ORAL DAILY
Qty: 30 TABLET | Refills: 5 | Status: SHIPPED | OUTPATIENT
Start: 2023-10-06

## 2023-10-06 RX ORDER — FUROSEMIDE 20 MG/1
20 TABLET ORAL 2 TIMES DAILY
Qty: 60 TABLET | Refills: 11 | OUTPATIENT
Start: 2023-10-06 | End: 2024-10-05

## 2023-10-24 DIAGNOSIS — R11.2 NAUSEA AND VOMITING, UNSPECIFIED VOMITING TYPE: ICD-10-CM

## 2023-10-24 RX ORDER — ONDANSETRON 4 MG/1
4 TABLET, FILM COATED ORAL EVERY 12 HOURS PRN
Qty: 20 TABLET | Refills: 0 | Status: SHIPPED | OUTPATIENT
Start: 2023-10-24 | End: 2023-11-15

## 2023-10-24 RX ORDER — ONDANSETRON HYDROCHLORIDE 8 MG/1
TABLET, FILM COATED ORAL
Qty: 8 TABLET | OUTPATIENT
Start: 2023-10-24

## 2023-10-30 DIAGNOSIS — Z79.4 TYPE 2 DIABETES MELLITUS WITH HYPERGLYCEMIA, WITH LONG-TERM CURRENT USE OF INSULIN: ICD-10-CM

## 2023-10-30 DIAGNOSIS — E11.65 TYPE 2 DIABETES MELLITUS WITH HYPERGLYCEMIA, WITH LONG-TERM CURRENT USE OF INSULIN: ICD-10-CM

## 2023-10-30 RX ORDER — METFORMIN HYDROCHLORIDE 1000 MG/1
1000 TABLET ORAL 2 TIMES DAILY WITH MEALS
Qty: 60 TABLET | Refills: 5 | Status: SHIPPED | OUTPATIENT
Start: 2023-10-30

## 2023-11-07 LAB
LEFT EYE DM RETINOPATHY: NEGATIVE
RIGHT EYE DM RETINOPATHY: NEGATIVE

## 2023-11-15 ENCOUNTER — HOSPITAL ENCOUNTER (EMERGENCY)
Facility: HOSPITAL | Age: 68
Discharge: HOME | End: 2023-11-15
Attending: EMERGENCY MEDICINE
Payer: MEDICARE

## 2023-11-15 VITALS
BODY MASS INDEX: 30.92 KG/M2 | DIASTOLIC BLOOD PRESSURE: 73 MMHG | HEART RATE: 79 BPM | TEMPERATURE: 98 F | RESPIRATION RATE: 18 BRPM | SYSTOLIC BLOOD PRESSURE: 162 MMHG | OXYGEN SATURATION: 99 % | WEIGHT: 240.94 LBS | HEIGHT: 74 IN

## 2023-11-15 DIAGNOSIS — A08.4 VIRAL GASTROENTERITIS: ICD-10-CM

## 2023-11-15 DIAGNOSIS — K76.9 HEPATIC LESION: Primary | ICD-10-CM

## 2023-11-15 LAB
ALBUMIN SERPL BCP-MCNC: 4.1 G/DL (ref 3.5–5.2)
ALP SERPL-CCNC: 95 U/L (ref 55–135)
ALT SERPL W/O P-5'-P-CCNC: 16 U/L (ref 10–44)
ANION GAP SERPL CALC-SCNC: 15 MMOL/L (ref 8–16)
AST SERPL-CCNC: 14 U/L (ref 10–40)
BACTERIA #/AREA URNS HPF: NORMAL /HPF
BASOPHILS # BLD AUTO: 0.08 K/UL (ref 0–0.2)
BASOPHILS NFR BLD: 1 % (ref 0–1.9)
BILIRUB SERPL-MCNC: 0.6 MG/DL (ref 0.1–1)
BILIRUB UR QL STRIP: NEGATIVE
BUN SERPL-MCNC: 18 MG/DL (ref 8–23)
CALCIUM SERPL-MCNC: 9.3 MG/DL (ref 8.7–10.5)
CHLORIDE SERPL-SCNC: 103 MMOL/L (ref 95–110)
CLARITY UR: CLEAR
CO2 SERPL-SCNC: 22 MMOL/L (ref 23–29)
COLOR UR: YELLOW
CREAT SERPL-MCNC: 1.2 MG/DL (ref 0.5–1.4)
DIFFERENTIAL METHOD: ABNORMAL
EOSINOPHIL # BLD AUTO: 0.4 K/UL (ref 0–0.5)
EOSINOPHIL NFR BLD: 5.6 % (ref 0–8)
ERYTHROCYTE [DISTWIDTH] IN BLOOD BY AUTOMATED COUNT: 13.5 % (ref 11.5–14.5)
EST. GFR  (NO RACE VARIABLE): >60 ML/MIN/1.73 M^2
GLUCOSE SERPL-MCNC: 145 MG/DL (ref 70–110)
GLUCOSE UR QL STRIP: ABNORMAL
HCT VFR BLD AUTO: 40.8 % (ref 40–54)
HGB BLD-MCNC: 13.4 G/DL (ref 14–18)
HGB UR QL STRIP: NEGATIVE
IMM GRANULOCYTES # BLD AUTO: 0.03 K/UL (ref 0–0.04)
IMM GRANULOCYTES NFR BLD AUTO: 0.4 % (ref 0–0.5)
INFLUENZA A, MOLECULAR: NEGATIVE
INFLUENZA B, MOLECULAR: NEGATIVE
KETONES UR QL STRIP: ABNORMAL
LACTATE SERPL-SCNC: 1 MMOL/L (ref 0.5–2.2)
LACTATE SERPL-SCNC: 1 MMOL/L (ref 0.5–2.2)
LEUKOCYTE ESTERASE UR QL STRIP: NEGATIVE
LIPASE SERPL-CCNC: 18 U/L (ref 4–60)
LYMPHOCYTES # BLD AUTO: 1.8 K/UL (ref 1–4.8)
LYMPHOCYTES NFR BLD: 23.3 % (ref 18–48)
MCH RBC QN AUTO: 31.2 PG (ref 27–31)
MCHC RBC AUTO-ENTMCNC: 32.8 G/DL (ref 32–36)
MCV RBC AUTO: 95 FL (ref 82–98)
MICROSCOPIC COMMENT: NORMAL
MONOCYTES # BLD AUTO: 0.6 K/UL (ref 0.3–1)
MONOCYTES NFR BLD: 7.3 % (ref 4–15)
NEUTROPHILS # BLD AUTO: 4.8 K/UL (ref 1.8–7.7)
NEUTROPHILS NFR BLD: 62.4 % (ref 38–73)
NITRITE UR QL STRIP: NEGATIVE
NRBC BLD-RTO: 0 /100 WBC
PH UR STRIP: 6 [PH] (ref 5–8)
PLATELET # BLD AUTO: 182 K/UL (ref 150–450)
PMV BLD AUTO: 9.4 FL (ref 9.2–12.9)
POTASSIUM SERPL-SCNC: 4.1 MMOL/L (ref 3.5–5.1)
PROT SERPL-MCNC: 7.6 G/DL (ref 6–8.4)
PROT UR QL STRIP: NEGATIVE
RBC # BLD AUTO: 4.29 M/UL (ref 4.6–6.2)
RBC #/AREA URNS HPF: 1 /HPF (ref 0–4)
SARS-COV-2 RDRP RESP QL NAA+PROBE: NEGATIVE
SODIUM SERPL-SCNC: 140 MMOL/L (ref 136–145)
SP GR UR STRIP: >1.03 (ref 1–1.03)
SPECIMEN SOURCE: NORMAL
URN SPEC COLLECT METH UR: ABNORMAL
UROBILINOGEN UR STRIP-ACNC: NEGATIVE EU/DL
WBC # BLD AUTO: 7.71 K/UL (ref 3.9–12.7)
WBC #/AREA URNS HPF: 1 /HPF (ref 0–5)
YEAST URNS QL MICRO: NORMAL

## 2023-11-15 PROCEDURE — 99285 EMERGENCY DEPT VISIT HI MDM: CPT | Mod: 25

## 2023-11-15 PROCEDURE — 36415 COLL VENOUS BLD VENIPUNCTURE: CPT | Performed by: NURSE PRACTITIONER

## 2023-11-15 PROCEDURE — 83605 ASSAY OF LACTIC ACID: CPT | Performed by: NURSE PRACTITIONER

## 2023-11-15 PROCEDURE — 83690 ASSAY OF LIPASE: CPT | Performed by: NURSE PRACTITIONER

## 2023-11-15 PROCEDURE — 85025 COMPLETE CBC W/AUTO DIFF WBC: CPT | Performed by: NURSE PRACTITIONER

## 2023-11-15 PROCEDURE — 25000003 PHARM REV CODE 250: Performed by: NURSE PRACTITIONER

## 2023-11-15 PROCEDURE — 25500020 PHARM REV CODE 255

## 2023-11-15 PROCEDURE — 87502 INFLUENZA DNA AMP PROBE: CPT | Performed by: NURSE PRACTITIONER

## 2023-11-15 PROCEDURE — 63600175 PHARM REV CODE 636 W HCPCS: Performed by: NURSE PRACTITIONER

## 2023-11-15 PROCEDURE — 96361 HYDRATE IV INFUSION ADD-ON: CPT

## 2023-11-15 PROCEDURE — U0002 COVID-19 LAB TEST NON-CDC: HCPCS | Performed by: NURSE PRACTITIONER

## 2023-11-15 PROCEDURE — 81000 URINALYSIS NONAUTO W/SCOPE: CPT | Performed by: NURSE PRACTITIONER

## 2023-11-15 PROCEDURE — 96374 THER/PROPH/DIAG INJ IV PUSH: CPT | Mod: 59

## 2023-11-15 PROCEDURE — 80053 COMPREHEN METABOLIC PANEL: CPT | Performed by: NURSE PRACTITIONER

## 2023-11-15 RX ORDER — ONDANSETRON 4 MG/1
4 TABLET, ORALLY DISINTEGRATING ORAL EVERY 6 HOURS PRN
Qty: 1 TABLET | Refills: 0 | Status: SHIPPED | OUTPATIENT
Start: 2023-11-15 | End: 2024-02-29 | Stop reason: SDUPTHER

## 2023-11-15 RX ORDER — ONDANSETRON 2 MG/ML
4 INJECTION INTRAMUSCULAR; INTRAVENOUS
Status: COMPLETED | OUTPATIENT
Start: 2023-11-15 | End: 2023-11-15

## 2023-11-15 RX ORDER — SODIUM CHLORIDE 9 MG/ML
1000 INJECTION, SOLUTION INTRAVENOUS
Status: COMPLETED | OUTPATIENT
Start: 2023-11-15 | End: 2023-11-15

## 2023-11-15 RX ADMIN — IOHEXOL 100 ML: 350 INJECTION, SOLUTION INTRAVENOUS at 12:11

## 2023-11-15 RX ADMIN — SODIUM CHLORIDE 1000 ML: 9 INJECTION, SOLUTION INTRAVENOUS at 10:11

## 2023-11-15 RX ADMIN — ONDANSETRON 4 MG: 2 INJECTION INTRAMUSCULAR; INTRAVENOUS at 10:11
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
## 2023-11-16 ENCOUNTER — PATIENT OUTREACH (OUTPATIENT)
Dept: EMERGENCY MEDICINE | Facility: HOSPITAL | Age: 68
End: 2023-11-16

## 2023-11-16 NOTE — PROGRESS NOTES
Pt has an appointment for tomorrow scheduled with oJnathan Benton III, MD for 8:30 a.m. Pt will be reminded today, and will ensure pt does not need any other appointments scheduled.    Harriet Talbot  ED Navigator  [phone]

## 2023-11-16 NOTE — PROGRESS NOTES
Pt was contacted to be reminded of his appointment on tomorrow with Jonathan Benton III, MD for 8:30 a.m. Pt did not answer the call, and does not have an e-mail listed in epic. Therefore, unable to reach and could not ask if he wanted to schedule gastroenterology appt.    Harriet Talbot ED Navigator  [phone]
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
## 2023-11-16 NOTE — PROGRESS NOTES
Pt was reminded about his appointment on tomorrow with Jonathan Benton III, MD for 8:30 a.m. Pt was also scheduled and reminded about appointment on tomorrow with Ama Velázquez NP at 11:00 a.m.    Harriet Talbot   Navigator  [phone]

## 2023-11-17 ENCOUNTER — OFFICE VISIT (OUTPATIENT)
Dept: GASTROENTEROLOGY | Facility: CLINIC | Age: 68
End: 2023-11-17
Payer: MEDICARE

## 2023-11-17 ENCOUNTER — TELEPHONE (OUTPATIENT)
Dept: FAMILY MEDICINE | Facility: CLINIC | Age: 68
End: 2023-11-17

## 2023-11-17 ENCOUNTER — OFFICE VISIT (OUTPATIENT)
Dept: FAMILY MEDICINE | Facility: CLINIC | Age: 68
End: 2023-11-17
Payer: MEDICARE

## 2023-11-17 VITALS — BODY MASS INDEX: 30.54 KG/M2 | WEIGHT: 238 LBS | HEIGHT: 74 IN

## 2023-11-17 VITALS
TEMPERATURE: 98 F | HEIGHT: 74 IN | HEART RATE: 77 BPM | SYSTOLIC BLOOD PRESSURE: 136 MMHG | BODY MASS INDEX: 30.63 KG/M2 | DIASTOLIC BLOOD PRESSURE: 80 MMHG | OXYGEN SATURATION: 99 % | WEIGHT: 238.69 LBS

## 2023-11-17 DIAGNOSIS — K76.9 HEPATIC LESION: ICD-10-CM

## 2023-11-17 DIAGNOSIS — Z93.0 STATUS POST TRACHEOSTOMY: ICD-10-CM

## 2023-11-17 DIAGNOSIS — Z51.81 ENCOUNTER FOR MONITORING LONG-TERM PROTON PUMP INHIBITOR THERAPY: ICD-10-CM

## 2023-11-17 DIAGNOSIS — Z96.653 HISTORY OF BILATERAL KNEE REPLACEMENT: ICD-10-CM

## 2023-11-17 DIAGNOSIS — G47.33 OSA (OBSTRUCTIVE SLEEP APNEA): ICD-10-CM

## 2023-11-17 DIAGNOSIS — F43.10 PTSD (POST-TRAUMATIC STRESS DISORDER): ICD-10-CM

## 2023-11-17 DIAGNOSIS — K83.8 INTRAHEPATIC BILE DUCT DILATION: ICD-10-CM

## 2023-11-17 DIAGNOSIS — Z86.73 STATUS POST CVA: ICD-10-CM

## 2023-11-17 DIAGNOSIS — F17.210 SMOKING GREATER THAN 30 PACK YEARS: ICD-10-CM

## 2023-11-17 DIAGNOSIS — I26.99 PULMONARY EMBOLISM, UNSPECIFIED CHRONICITY, UNSPECIFIED PULMONARY EMBOLISM TYPE, UNSPECIFIED WHETHER ACUTE COR PULMONALE PRESENT: ICD-10-CM

## 2023-11-17 DIAGNOSIS — E11.42 TYPE 2 DIABETES MELLITUS WITH DIABETIC POLYNEUROPATHY, WITH LONG-TERM CURRENT USE OF INSULIN: ICD-10-CM

## 2023-11-17 DIAGNOSIS — Z79.4 TYPE 2 DIABETES MELLITUS WITH DIABETIC POLYNEUROPATHY, WITH LONG-TERM CURRENT USE OF INSULIN: ICD-10-CM

## 2023-11-17 DIAGNOSIS — R41.89 COGNITIVE IMPAIRMENT: ICD-10-CM

## 2023-11-17 DIAGNOSIS — R19.4 CHANGE IN BOWEL HABITS: ICD-10-CM

## 2023-11-17 DIAGNOSIS — I65.29 STENOSIS OF CAROTID ARTERY, UNSPECIFIED LATERALITY: ICD-10-CM

## 2023-11-17 DIAGNOSIS — Z87.19 HISTORY OF PANCREATITIS: ICD-10-CM

## 2023-11-17 DIAGNOSIS — F31.9 BIPOLAR 1 DISORDER: ICD-10-CM

## 2023-11-17 DIAGNOSIS — R19.5 LOOSE STOOLS: ICD-10-CM

## 2023-11-17 DIAGNOSIS — R11.2 NAUSEA AND VOMITING, UNSPECIFIED VOMITING TYPE: Primary | ICD-10-CM

## 2023-11-17 DIAGNOSIS — E78.5 HYPERLIPIDEMIA, UNSPECIFIED HYPERLIPIDEMIA TYPE: ICD-10-CM

## 2023-11-17 DIAGNOSIS — I25.2 OLD MYOCARDIAL INFARCT: ICD-10-CM

## 2023-11-17 DIAGNOSIS — Z95.828 GREENFIELD FILTER IN PLACE: ICD-10-CM

## 2023-11-17 DIAGNOSIS — S68.119A AMPUTATION OF INDEX FINGER: ICD-10-CM

## 2023-11-17 DIAGNOSIS — K21.9 GASTROESOPHAGEAL REFLUX DISEASE, UNSPECIFIED WHETHER ESOPHAGITIS PRESENT: ICD-10-CM

## 2023-11-17 DIAGNOSIS — Z79.01 ANTICOAGULANT LONG-TERM USE: Primary | ICD-10-CM

## 2023-11-17 DIAGNOSIS — R25.1 TREMOR: ICD-10-CM

## 2023-11-17 DIAGNOSIS — R10.9 LEFT SIDED ABDOMINAL PAIN: ICD-10-CM

## 2023-11-17 DIAGNOSIS — N40.0 BENIGN PROSTATIC HYPERPLASIA, UNSPECIFIED WHETHER LOWER URINARY TRACT SYMPTOMS PRESENT: ICD-10-CM

## 2023-11-17 DIAGNOSIS — I50.22 CHRONIC SYSTOLIC HEART FAILURE: ICD-10-CM

## 2023-11-17 DIAGNOSIS — Z79.01 ANTICOAGULATED: ICD-10-CM

## 2023-11-17 DIAGNOSIS — Z79.899 ENCOUNTER FOR MONITORING LONG-TERM PROTON PUMP INHIBITOR THERAPY: ICD-10-CM

## 2023-11-17 PROCEDURE — 1101F PR PT FALLS ASSESS DOC 0-1 FALLS W/OUT INJ PAST YR: ICD-10-PCS | Mod: CPTII,S$GLB,,

## 2023-11-17 PROCEDURE — 1160F RVW MEDS BY RX/DR IN RCRD: CPT | Mod: CPTII,S$GLB,,

## 2023-11-17 PROCEDURE — 3061F NEG MICROALBUMINURIA REV: CPT | Mod: CPTII,S$GLB,, | Performed by: FAMILY MEDICINE

## 2023-11-17 PROCEDURE — 3061F PR NEG MICROALBUMINURIA RESULT DOCUMENTED/REVIEW: ICD-10-PCS | Mod: CPTII,S$GLB,,

## 2023-11-17 PROCEDURE — 3066F NEPHROPATHY DOC TX: CPT | Mod: CPTII,S$GLB,,

## 2023-11-17 PROCEDURE — 1101F PR PT FALLS ASSESS DOC 0-1 FALLS W/OUT INJ PAST YR: ICD-10-PCS | Mod: CPTII,S$GLB,, | Performed by: FAMILY MEDICINE

## 2023-11-17 PROCEDURE — 99999 PR PBB SHADOW E&M-EST. PATIENT-LVL V: ICD-10-PCS | Mod: PBBFAC,,,

## 2023-11-17 PROCEDURE — 3079F PR MOST RECENT DIASTOLIC BLOOD PRESSURE 80-89 MM HG: ICD-10-PCS | Mod: CPTII,S$GLB,, | Performed by: FAMILY MEDICINE

## 2023-11-17 PROCEDURE — 3066F NEPHROPATHY DOC TX: CPT | Mod: CPTII,S$GLB,, | Performed by: FAMILY MEDICINE

## 2023-11-17 PROCEDURE — 99214 OFFICE O/P EST MOD 30 MIN: CPT | Mod: S$GLB,,,

## 2023-11-17 PROCEDURE — 3288F FALL RISK ASSESSMENT DOCD: CPT | Mod: CPTII,S$GLB,, | Performed by: FAMILY MEDICINE

## 2023-11-17 PROCEDURE — 3066F PR DOCUMENTATION OF TREATMENT FOR NEPHROPATHY: ICD-10-PCS | Mod: CPTII,S$GLB,, | Performed by: FAMILY MEDICINE

## 2023-11-17 PROCEDURE — 1101F PT FALLS ASSESS-DOCD LE1/YR: CPT | Mod: CPTII,S$GLB,,

## 2023-11-17 PROCEDURE — 3008F BODY MASS INDEX DOCD: CPT | Mod: CPTII,S$GLB,, | Performed by: FAMILY MEDICINE

## 2023-11-17 PROCEDURE — 1159F PR MEDICATION LIST DOCUMENTED IN MEDICAL RECORD: ICD-10-PCS | Mod: CPTII,S$GLB,,

## 2023-11-17 PROCEDURE — 99397 PR PREVENTIVE VISIT,EST,65 & OVER: ICD-10-PCS | Mod: S$GLB,,, | Performed by: FAMILY MEDICINE

## 2023-11-17 PROCEDURE — 3066F PR DOCUMENTATION OF TREATMENT FOR NEPHROPATHY: ICD-10-PCS | Mod: CPTII,S$GLB,,

## 2023-11-17 PROCEDURE — 1160F PR REVIEW ALL MEDS BY PRESCRIBER/CLIN PHARMACIST DOCUMENTED: ICD-10-PCS | Mod: CPTII,S$GLB,, | Performed by: FAMILY MEDICINE

## 2023-11-17 PROCEDURE — 1160F PR REVIEW ALL MEDS BY PRESCRIBER/CLIN PHARMACIST DOCUMENTED: ICD-10-PCS | Mod: CPTII,S$GLB,,

## 2023-11-17 PROCEDURE — 3052F HG A1C>EQUAL 8.0%<EQUAL 9.0%: CPT | Mod: CPTII,S$GLB,, | Performed by: FAMILY MEDICINE

## 2023-11-17 PROCEDURE — 3075F PR MOST RECENT SYSTOLIC BLOOD PRESS GE 130-139MM HG: ICD-10-PCS | Mod: CPTII,S$GLB,, | Performed by: FAMILY MEDICINE

## 2023-11-17 PROCEDURE — 1159F MED LIST DOCD IN RCRD: CPT | Mod: CPTII,S$GLB,,

## 2023-11-17 PROCEDURE — 1125F AMNT PAIN NOTED PAIN PRSNT: CPT | Mod: CPTII,S$GLB,, | Performed by: FAMILY MEDICINE

## 2023-11-17 PROCEDURE — 1126F AMNT PAIN NOTED NONE PRSNT: CPT | Mod: CPTII,S$GLB,,

## 2023-11-17 PROCEDURE — 1159F PR MEDICATION LIST DOCUMENTED IN MEDICAL RECORD: ICD-10-PCS | Mod: CPTII,S$GLB,, | Performed by: FAMILY MEDICINE

## 2023-11-17 PROCEDURE — 3061F PR NEG MICROALBUMINURIA RESULT DOCUMENTED/REVIEW: ICD-10-PCS | Mod: CPTII,S$GLB,, | Performed by: FAMILY MEDICINE

## 2023-11-17 PROCEDURE — 3008F BODY MASS INDEX DOCD: CPT | Mod: CPTII,S$GLB,,

## 2023-11-17 PROCEDURE — 3052F PR MOST RECENT HEMOGLOBIN A1C LEVEL 8.0 - < 9.0%: ICD-10-PCS | Mod: CPTII,S$GLB,, | Performed by: FAMILY MEDICINE

## 2023-11-17 PROCEDURE — 1125F PR PAIN SEVERITY QUANTIFIED, PAIN PRESENT: ICD-10-PCS | Mod: CPTII,S$GLB,, | Performed by: FAMILY MEDICINE

## 2023-11-17 PROCEDURE — 3079F DIAST BP 80-89 MM HG: CPT | Mod: CPTII,S$GLB,, | Performed by: FAMILY MEDICINE

## 2023-11-17 PROCEDURE — 99397 PER PM REEVAL EST PAT 65+ YR: CPT | Mod: S$GLB,,, | Performed by: FAMILY MEDICINE

## 2023-11-17 PROCEDURE — 1126F PR PAIN SEVERITY QUANTIFIED, NO PAIN PRESENT: ICD-10-PCS | Mod: CPTII,S$GLB,,

## 2023-11-17 PROCEDURE — 3052F PR MOST RECENT HEMOGLOBIN A1C LEVEL 8.0 - < 9.0%: ICD-10-PCS | Mod: CPTII,S$GLB,,

## 2023-11-17 PROCEDURE — 3288F PR FALLS RISK ASSESSMENT DOCUMENTED: ICD-10-PCS | Mod: CPTII,S$GLB,, | Performed by: FAMILY MEDICINE

## 2023-11-17 PROCEDURE — 3288F PR FALLS RISK ASSESSMENT DOCUMENTED: ICD-10-PCS | Mod: CPTII,S$GLB,,

## 2023-11-17 PROCEDURE — 3008F PR BODY MASS INDEX (BMI) DOCUMENTED: ICD-10-PCS | Mod: CPTII,S$GLB,, | Performed by: FAMILY MEDICINE

## 2023-11-17 PROCEDURE — 3008F PR BODY MASS INDEX (BMI) DOCUMENTED: ICD-10-PCS | Mod: CPTII,S$GLB,,

## 2023-11-17 PROCEDURE — 99214 PR OFFICE/OUTPT VISIT, EST, LEVL IV, 30-39 MIN: ICD-10-PCS | Mod: S$GLB,,,

## 2023-11-17 PROCEDURE — G0008 ADMIN INFLUENZA VIRUS VAC: HCPCS | Mod: S$GLB,,, | Performed by: FAMILY MEDICINE

## 2023-11-17 PROCEDURE — 3061F NEG MICROALBUMINURIA REV: CPT | Mod: CPTII,S$GLB,,

## 2023-11-17 PROCEDURE — 99999 PR PBB SHADOW E&M-EST. PATIENT-LVL V: CPT | Mod: PBBFAC,,,

## 2023-11-17 PROCEDURE — 1101F PT FALLS ASSESS-DOCD LE1/YR: CPT | Mod: CPTII,S$GLB,, | Performed by: FAMILY MEDICINE

## 2023-11-17 PROCEDURE — 3075F SYST BP GE 130 - 139MM HG: CPT | Mod: CPTII,S$GLB,, | Performed by: FAMILY MEDICINE

## 2023-11-17 PROCEDURE — 90694 FLU VACCINE - QUADRIVALENT - ADJUVANTED: ICD-10-PCS | Mod: S$GLB,,, | Performed by: FAMILY MEDICINE

## 2023-11-17 PROCEDURE — 90694 VACC AIIV4 NO PRSRV 0.5ML IM: CPT | Mod: S$GLB,,, | Performed by: FAMILY MEDICINE

## 2023-11-17 PROCEDURE — 1159F MED LIST DOCD IN RCRD: CPT | Mod: CPTII,S$GLB,, | Performed by: FAMILY MEDICINE

## 2023-11-17 PROCEDURE — G0008 FLU VACCINE - QUADRIVALENT - ADJUVANTED: ICD-10-PCS | Mod: S$GLB,,, | Performed by: FAMILY MEDICINE

## 2023-11-17 PROCEDURE — 3288F FALL RISK ASSESSMENT DOCD: CPT | Mod: CPTII,S$GLB,,

## 2023-11-17 PROCEDURE — 3052F HG A1C>EQUAL 8.0%<EQUAL 9.0%: CPT | Mod: CPTII,S$GLB,,

## 2023-11-17 PROCEDURE — 1160F RVW MEDS BY RX/DR IN RCRD: CPT | Mod: CPTII,S$GLB,, | Performed by: FAMILY MEDICINE

## 2023-11-17 RX ORDER — QUETIAPINE FUMARATE 300 MG/1
300 TABLET, FILM COATED ORAL NIGHTLY
COMMUNITY
End: 2023-11-17

## 2023-11-17 RX ORDER — CLONAZEPAM 1 MG/1
1 TABLET ORAL NIGHTLY PRN
COMMUNITY

## 2023-11-17 RX ORDER — VORTIOXETINE 20 MG/1
1 TABLET, FILM COATED ORAL
COMMUNITY
Start: 2023-10-30

## 2023-11-19 PROBLEM — Z79.4 TYPE 2 DIABETES MELLITUS WITH DIABETIC POLYNEUROPATHY, WITH LONG-TERM CURRENT USE OF INSULIN: Status: ACTIVE | Noted: 2023-11-19

## 2023-11-19 PROBLEM — R25.1 TREMOR: Status: ACTIVE | Noted: 2023-11-19

## 2023-11-19 PROBLEM — Z79.01 ANTICOAGULATED: Status: ACTIVE | Noted: 2023-11-19

## 2023-11-19 PROBLEM — F17.210 SMOKING GREATER THAN 30 PACK YEARS: Status: ACTIVE | Noted: 2023-11-19

## 2023-11-19 PROBLEM — Z86.73 STATUS POST CVA: Status: ACTIVE | Noted: 2023-11-19

## 2023-11-19 PROBLEM — F43.10 PTSD (POST-TRAUMATIC STRESS DISORDER): Status: ACTIVE | Noted: 2023-11-19

## 2023-11-19 PROBLEM — I65.29 STENOSIS OF CAROTID ARTERY: Status: ACTIVE | Noted: 2023-11-19

## 2023-11-19 PROBLEM — E78.5 HYPERLIPIDEMIA: Status: ACTIVE | Noted: 2023-11-19

## 2023-11-19 PROBLEM — N40.0 BENIGN PROSTATIC HYPERPLASIA: Status: ACTIVE | Noted: 2023-11-19

## 2023-11-19 PROBLEM — Z95.828 GREENFIELD FILTER IN PLACE: Status: ACTIVE | Noted: 2023-11-19

## 2023-11-19 PROBLEM — R41.89 COGNITIVE IMPAIRMENT: Status: ACTIVE | Noted: 2023-11-19

## 2023-11-19 PROBLEM — E11.42 TYPE 2 DIABETES MELLITUS WITH DIABETIC POLYNEUROPATHY, WITH LONG-TERM CURRENT USE OF INSULIN: Status: ACTIVE | Noted: 2023-11-19

## 2023-11-19 PROBLEM — Z96.653 HISTORY OF BILATERAL KNEE REPLACEMENT: Status: ACTIVE | Noted: 2023-11-19

## 2023-11-19 PROBLEM — S68.119A: Status: ACTIVE | Noted: 2023-11-19

## 2023-11-19 PROBLEM — I25.2 OLD MYOCARDIAL INFARCT: Status: ACTIVE | Noted: 2023-11-19

## 2023-11-19 PROBLEM — Z93.0 STATUS POST TRACHEOSTOMY: Status: ACTIVE | Noted: 2023-11-19

## 2023-11-19 PROBLEM — F31.9 BIPOLAR 1 DISORDER: Status: ACTIVE | Noted: 2023-11-19

## 2023-11-19 PROBLEM — D44.6 CAROTID BODY TUMOR: Status: RESOLVED | Noted: 2019-09-25 | Resolved: 2023-11-19

## 2023-11-19 NOTE — PROGRESS NOTES
Subjective:       Patient ID: Kevan Colin Sr. is a 68 y.o. male.    Chief Complaint: Establish Care    New patient visit.  Had been seeing a nurse practitioner previously    Social history 2 pack per day smoker in the past.  Over 30 pack years.  Quit for 10 years now.  No alcohol intake.  Retired.    Family history Alzheimer's and Parkinson's.    Immunizations needs flu shot.    Past medical history.  Tremor.  Had seen Dr. English previously.  Unsure whether this is Parkinson's are benign familial tremor.  Sounds more like benign tremor.  Has cognitive impairment.  Bilateral knee replacements.  Prior cholecystectomy.  Has had a tracheostomy in the past.  PTSD bipolar disorder hyperlipidemia chronic systolic heart failure.  Prior myocardial infarction.  Carotid stenosis.  Pulmonary emboli chronic anticoagulation.  [place] filter is in place is been present for many years and apparently is blocked.  Diabetes mellitus type 2 on insulin with polyneuropathy.  Uses 85 units of long-acting in insulin and 15 units of short-acting insulin with meals.  Uncontrolled as of last A1c.  Obstructive sleep apnea without CPAP.  Has BPH also.  Has had a prior amputation of the left index finger.  Recent hospitalization which showed a liver lesion seeing Gastroenterology today.      Review of Systems   Constitutional: Negative.    HENT: Negative.     Eyes: Negative.    Respiratory: Negative.     Cardiovascular: Negative.    Gastrointestinal:  Positive for vomiting.   Endocrine: Negative.    Genitourinary: Negative.    Musculoskeletal: Negative.    Skin: Negative.    Neurological: Negative.    Hematological: Negative.    Psychiatric/Behavioral: Negative.         Objective:      Physical Exam  Vitals reviewed.   Constitutional:       Appearance: Normal appearance. He is well-developed and normal weight.   HENT:      Head: Normocephalic and atraumatic.      Right Ear: Tympanic membrane normal.      Left Ear: Tympanic membrane  normal.      Nose: Nose normal.      Mouth/Throat:      Mouth: Mucous membranes are moist.      Pharynx: No oropharyngeal exudate.   Eyes:      Conjunctiva/sclera: Conjunctivae normal.      Pupils: Pupils are equal, round, and reactive to light.   Neck:      Vascular: No carotid bruit.   Cardiovascular:      Rate and Rhythm: Normal rate and regular rhythm.      Pulses: Normal pulses.      Heart sounds: Normal heart sounds. No murmur heard.     No gallop.   Pulmonary:      Effort: Pulmonary effort is normal.      Breath sounds: Normal breath sounds.   Abdominal:      General: Bowel sounds are normal.      Palpations: Abdomen is soft. There is no mass.      Tenderness: There is no abdominal tenderness.   Musculoskeletal:         General: Normal range of motion.      Cervical back: Normal range of motion.      Comments: Partial amputation of left index finger and tip of the middle finger.   Skin:     General: Skin is warm and dry.   Neurological:      General: No focal deficit present.      Mental Status: He is alert and oriented to person, place, and time.      Comments: Markedly decreased sensation in the feet.  Light touch present only 1 of 5 spots tested each foot.   Psychiatric:         Mood and Affect: Mood normal.         Behavior: Behavior normal.         Assessment:       1. Anticoagulant long-term use    2. Chronic systolic heart failure    3. Tremor    4. Cognitive impairment    5. History of bilateral knee replacement    6. [place] filter in place    7. Status post tracheostomy    8. Stenosis of carotid artery, unspecified laterality    9. Old myocardial infarct    10. Status post CVA    11. Pulmonary embolism, unspecified chronicity, unspecified pulmonary embolism type, unspecified whether acute cor pulmonale present    12. Anticoagulated    13. Type 2 diabetes mellitus with diabetic polyneuropathy, with long-term current use of insulin    14. JOSE ALBERTO (obstructive sleep apnea)    15. Benign prostatic  hyperplasia, unspecified whether lower urinary tract symptoms present    16. PTSD (post-traumatic stress disorder)    17. Bipolar 1 disorder    18. Hyperlipidemia, unspecified hyperlipidemia type    19. Amputation of index finger    20. Smoking greater than 30 pack years        Plan:       Anticoagulant long-term use    Chronic systolic heart failure    Tremor    Cognitive impairment    History of bilateral knee replacement    Ran filter in place  -     Ambulatory referral/consult to Vascular Surgery; Future; Expected date: 11/24/2023    Status post tracheostomy    Stenosis of carotid artery, unspecified laterality    Old myocardial infarct    Status post CVA    Pulmonary embolism, unspecified chronicity, unspecified pulmonary embolism type, unspecified whether acute cor pulmonale present    Anticoagulated    Type 2 diabetes mellitus with diabetic polyneuropathy, with long-term current use of insulin  -     Cancel: Hemoglobin A1C; Future; Expected date: 11/17/2023  -     Hemoglobin A1C; Future; Expected date: 11/17/2023    JOSE ALBERTO (obstructive sleep apnea)    Benign prostatic hyperplasia, unspecified whether lower urinary tract symptoms present    PTSD (post-traumatic stress disorder)    Bipolar 1 disorder    Hyperlipidemia, unspecified hyperlipidemia type  -     Cancel: Lipid Panel; Future; Expected date: 11/17/2023  -     Lipid Panel; Future; Expected date: 11/17/2023    Amputation of index finger    Smoking greater than 30 pack years  -     CT Chest Lung Screening Low Dose; Future; Expected date: 01/01/2024    Other orders  -     Influenza (FLUAD) - Quadrivalent (Adjuvanted) *Preferred* (65+) (PF)      Flu shot today.  CT chest low-dose screening in January.  Check A1c and lipids.  Get a copy of his eye exam from Dr. Alanis in [place].  [facility].  To gastroenterology as scheduled.  Colonoscopy report needed was done by Dr. Mcclain.  Refer him to Dr. Turcios regarding the [place] filter.   Continue regular follow-up with his cardiologist Dr. Quinones.

## 2023-11-20 ENCOUNTER — PATIENT OUTREACH (OUTPATIENT)
Dept: ADMINISTRATIVE | Facility: HOSPITAL | Age: 68
End: 2023-11-20
Payer: MEDICARE

## 2023-11-20 ENCOUNTER — TELEPHONE (OUTPATIENT)
Dept: HEPATOLOGY | Facility: CLINIC | Age: 68
End: 2023-11-20
Payer: MEDICARE

## 2023-11-20 ENCOUNTER — TELEPHONE (OUTPATIENT)
Dept: FAMILY MEDICINE | Facility: CLINIC | Age: 68
End: 2023-11-20
Payer: MEDICARE

## 2023-11-20 NOTE — PROGRESS NOTES
Population Health Chart Review & Patient Outreach Details    Outreach Performed: NO    Additional Pop Health Notes:           Updates Requested / Reviewed:      Updated Care Coordination Note and Care Team Updated         Health Maintenance Topics Overdue:    Health Maintenance Due   Topic Date Due    RSV Vaccine (Age 60+) (1 - 1-dose 60+ series) Never done    Eye Exam  10/30/2020    Hemoglobin A1c  08/12/2023    COVID-19 Vaccine (2 - 2023-24 season) 09/01/2023         Health Maintenance Topic(s) Outreach Outcomes & Actions Taken:    Eye Exam - Outreach Outcomes & Actions Taken  : External Records Requested & Care Team Updated if Applicable    Colorectal Cancer Screening - Outreach Outcomes & Actions Taken  : External Records Requested & Care Team Updated if Applicable

## 2023-11-20 NOTE — TELEPHONE ENCOUNTER
Returned patients call but unable to contact him M for him to call back and get RsRichie Garza    ----- Message from Maria Luisa Guzmán sent at 11/20/2023 11:19 AM CST -----  Contact: WifeDanita Lobato  Type: Needs Medical Advice         Who Called: pt marlon Lobato  Best Call Back Number: [phone]  Additional Information: Requesting a call back regarding  pt needs to reschedule appt due to wife has cataract surgery that same day.   Please Advise- Thank you

## 2023-11-20 NOTE — LETTER
AUTHORIZATION FOR RELEASE OF   CONFIDENTIAL INFORMATION    Dear Dr Mcclain,    We are seeing Kvean Colin Sr., date of birth 1955, in the clinic at [facility]. Jonathan Benton III, MD is the patient's PCP. Kevan Colin Sr. has an outstanding lab/procedure at the time we reviewed his chart. In order to help keep his health information updated, he has authorized us to request the following medical record(s):        (  )  MAMMOGRAM                                      ( X )  COLONOSCOPY      (  )  PAP SMEAR                                          (  )  OUTSIDE LAB RESULTS     (  )  DEXA SCAN                                          (  )  EYE EXAM            (  )  FOOT EXAM                                          (  )  ENTIRE RECORD     (  )  OUTSIDE IMMUNIZATIONS                 (  )  _______________         Please fax records to [facility], Clinton H. III, MD at [phone]      Thanks so much and have a great day!    Madison Cadet LPN [facility]  [address] [place] [address]   [phone]   [phone]           Patient Name: Kevan Colin Sr.  : 1955  Patient Phone #: [phone]

## 2023-11-20 NOTE — LETTER
AUTHORIZATION FOR RELEASE OF   CONFIDENTIAL INFORMATION    Dear Dr Alanis,    We are seeing Kevan Colin Sr., date of birth 1955, in the clinic at [facility]. Jonathan Benton III, MD is the patient's PCP. Kevan Colin Sr. has an outstanding lab/procedure at the time we reviewed his chart. In order to help keep his health information updated, he has authorized us to request the following medical record(s):        (  )  MAMMOGRAM                                      (  )  COLONOSCOPY      (  )  PAP SMEAR                                          (  )  OUTSIDE LAB RESULTS     (  )  DEXA SCAN                                          ( X )  EYE EXAM            (  )  FOOT EXAM                                          (  )  ENTIRE RECORD     (  )  OUTSIDE IMMUNIZATIONS                 (  )  _______________         Please fax records to [facility], Clinton H. III, MD at [phone]    Thanks so much and have a great day!    Madison Cadet LPN [address] [employer] [facility]  [place] [address]   [phone]   [phone]           Patient Name: Kevan Colin Sr.  : 1955  Patient Phone #: [phone]

## 2023-11-22 LAB
LEFT EYE DM RETINOPATHY: POSITIVE
RIGHT EYE DM RETINOPATHY: POSITIVE

## 2023-11-24 ENCOUNTER — PATIENT OUTREACH (OUTPATIENT)
Dept: ADMINISTRATIVE | Facility: HOSPITAL | Age: 68
End: 2023-11-24
Payer: MEDICARE

## 2023-11-24 NOTE — PROGRESS NOTES
Population Health Chart Review & Patient Outreach Details    Outreach Performed: NO    Additional Pop Health Notes:           Updates Requested / Reviewed:      Updated Care Coordination Note, Care Everywhere, External Sources: [facility], and Care Team Updated         Health Maintenance Topics Overdue:    Health Maintenance Due   Topic Date Due    RSV Vaccine (Age 60+ and Pregnant patients) (1 - 1-dose 60+ series) Never done    Eye Exam  10/30/2020    Hemoglobin A1c  08/12/2023    COVID-19 Vaccine (2 - 2023-24 season) 09/01/2023         Health Maintenance Topic(s) Outreach Outcomes & Actions Taken:    Eye Exam - Outreach Outcomes & Actions Taken  : Diabetic Eye External Records Uploaded, Care Team & History Updated if Applicable

## 2023-11-28 ENCOUNTER — HOSPITAL ENCOUNTER (EMERGENCY)
Facility: HOSPITAL | Age: 68
Discharge: HOME | End: 2023-11-28
Attending: EMERGENCY MEDICINE
Payer: MEDICARE

## 2023-11-28 VITALS
RESPIRATION RATE: 20 BRPM | SYSTOLIC BLOOD PRESSURE: 105 MMHG | OXYGEN SATURATION: 99 % | HEART RATE: 76 BPM | BODY MASS INDEX: 29.53 KG/M2 | TEMPERATURE: 99 F | DIASTOLIC BLOOD PRESSURE: 70 MMHG | WEIGHT: 230 LBS

## 2023-11-28 DIAGNOSIS — R10.9 LEFT FLANK PAIN: Primary | ICD-10-CM

## 2023-11-28 DIAGNOSIS — M54.50 ACUTE LEFT-SIDED LOW BACK PAIN WITHOUT SCIATICA: ICD-10-CM

## 2023-11-28 LAB
BACTERIA #/AREA URNS HPF: NORMAL /HPF
BILIRUB UR QL STRIP: NEGATIVE
CLARITY UR: CLEAR
COLOR UR: COLORLESS
GLUCOSE UR QL STRIP: ABNORMAL
HGB UR QL STRIP: NEGATIVE
KETONES UR QL STRIP: NEGATIVE
LEUKOCYTE ESTERASE UR QL STRIP: ABNORMAL
MICROSCOPIC COMMENT: NORMAL
NITRITE UR QL STRIP: NEGATIVE
PH UR STRIP: 5 [PH] (ref 5–8)
PROT UR QL STRIP: NEGATIVE
RBC #/AREA URNS HPF: 1 /HPF (ref 0–4)
SP GR UR STRIP: 1.02 (ref 1–1.03)
URN SPEC COLLECT METH UR: ABNORMAL
UROBILINOGEN UR STRIP-ACNC: NEGATIVE EU/DL
WBC #/AREA URNS HPF: 1 /HPF (ref 0–5)
YEAST URNS QL MICRO: NORMAL

## 2023-11-28 PROCEDURE — 96374 THER/PROPH/DIAG INJ IV PUSH: CPT

## 2023-11-28 PROCEDURE — 99285 EMERGENCY DEPT VISIT HI MDM: CPT | Mod: 25

## 2023-11-28 PROCEDURE — 81000 URINALYSIS NONAUTO W/SCOPE: CPT | Performed by: EMERGENCY MEDICINE

## 2023-11-28 PROCEDURE — 63600175 PHARM REV CODE 636 W HCPCS: Performed by: EMERGENCY MEDICINE

## 2023-11-28 RX ORDER — MORPHINE SULFATE 4 MG/ML
8 INJECTION, SOLUTION INTRAMUSCULAR; INTRAVENOUS
Status: COMPLETED | OUTPATIENT
Start: 2023-11-28 | End: 2023-11-28

## 2023-11-28 RX ADMIN — MORPHINE SULFATE 8 MG: 4 INJECTION, SOLUTION INTRAMUSCULAR; INTRAVENOUS at 01:11
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
## 2023-11-29 ENCOUNTER — TELEPHONE (OUTPATIENT)
Dept: HEPATOLOGY | Facility: CLINIC | Age: 68
End: 2023-11-29
Payer: MEDICARE

## 2023-11-29 ENCOUNTER — HOSPITAL ENCOUNTER (EMERGENCY)
Facility: HOSPITAL | Age: 68
Discharge: HOME | End: 2023-11-29
Attending: EMERGENCY MEDICINE
Payer: MEDICARE

## 2023-11-29 ENCOUNTER — PATIENT OUTREACH (OUTPATIENT)
Dept: EMERGENCY MEDICINE | Facility: HOSPITAL | Age: 68
End: 2023-11-29

## 2023-11-29 VITALS
TEMPERATURE: 98 F | RESPIRATION RATE: 16 BRPM | HEART RATE: 73 BPM | BODY MASS INDEX: 29.52 KG/M2 | OXYGEN SATURATION: 97 % | HEIGHT: 74 IN | DIASTOLIC BLOOD PRESSURE: 85 MMHG | SYSTOLIC BLOOD PRESSURE: 129 MMHG | WEIGHT: 230 LBS

## 2023-11-29 DIAGNOSIS — E78.1 HYPERTRIGLYCERIDEMIA: ICD-10-CM

## 2023-11-29 DIAGNOSIS — M54.32 LEFT SIDED SCIATICA: Primary | ICD-10-CM

## 2023-11-29 DIAGNOSIS — I10 BENIGN ESSENTIAL HTN: ICD-10-CM

## 2023-11-29 PROCEDURE — 99284 EMERGENCY DEPT VISIT MOD MDM: CPT

## 2023-11-29 PROCEDURE — 96372 THER/PROPH/DIAG INJ SC/IM: CPT | Performed by: EMERGENCY MEDICINE

## 2023-11-29 PROCEDURE — 63600175 PHARM REV CODE 636 W HCPCS: Performed by: EMERGENCY MEDICINE

## 2023-11-29 RX ORDER — ATORVASTATIN CALCIUM 40 MG/1
TABLET, FILM COATED ORAL
Qty: 90 TABLET | Refills: 1 | Status: SHIPPED | OUTPATIENT
Start: 2023-11-29

## 2023-11-29 RX ORDER — HYDROCODONE BITARTRATE AND ACETAMINOPHEN 5; 325 MG/1; MG/1
1 TABLET ORAL EVERY 6 HOURS PRN
Qty: 12 TABLET | Refills: 0 | Status: SHIPPED | OUTPATIENT
Start: 2023-11-29 | End: 2024-02-29

## 2023-11-29 RX ORDER — CARVEDILOL 3.12 MG/1
TABLET ORAL
Qty: 56 TABLET | Refills: 0 | Status: SHIPPED | OUTPATIENT
Start: 2023-11-29 | End: 2023-12-21

## 2023-11-29 RX ORDER — ONDANSETRON 2 MG/ML
4 INJECTION INTRAMUSCULAR; INTRAVENOUS
Status: COMPLETED | OUTPATIENT
Start: 2023-11-29 | End: 2023-11-29

## 2023-11-29 RX ORDER — MORPHINE SULFATE 2 MG/ML
6 INJECTION, SOLUTION INTRAMUSCULAR; INTRAVENOUS
Status: COMPLETED | OUTPATIENT
Start: 2023-11-29 | End: 2023-11-29

## 2023-11-29 RX ORDER — HYDROCODONE BITARTRATE AND ACETAMINOPHEN 5; 325 MG/1; MG/1
1 TABLET ORAL EVERY 6 HOURS PRN
Qty: 12 TABLET | Refills: 0 | Status: SHIPPED | OUTPATIENT
Start: 2023-11-29 | End: 2023-11-29 | Stop reason: SDUPTHER

## 2023-11-29 RX ADMIN — MORPHINE SULFATE 6 MG: 2 INJECTION, SOLUTION INTRAMUSCULAR; INTRAVENOUS at 01:11

## 2023-11-29 RX ADMIN — ONDANSETRON 4 MG: 2 INJECTION INTRAMUSCULAR; INTRAVENOUS at 01:11

## 2023-11-29 NOTE — TELEPHONE ENCOUNTER
Called the patient to confirmed the appt.  But patient stated that he wants to reschedule appt due to transportation issue.  Rescheduled to the next available appt 11/30/2023 with DR. Maldonado.  Patient confirmed and agreed with the appt.  Reminder letter mailed.

## 2023-11-29 NOTE — DISCHARGE INSTRUCTIONS
Return to the ER for worsening pain, fever, weakness/numbness, incontinence, or for any other concerns.  Follow-up with your PCP and with [facility] for re-evaluation.
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
## 2023-11-30 ENCOUNTER — TELEPHONE (OUTPATIENT)
Dept: FAMILY MEDICINE | Facility: CLINIC | Age: 68
End: 2023-11-30
Payer: MEDICARE

## 2023-11-30 DIAGNOSIS — I10 BENIGN ESSENTIAL HTN: ICD-10-CM

## 2023-11-30 DIAGNOSIS — E78.1 HYPERTRIGLYCERIDEMIA: ICD-10-CM

## 2023-11-30 NOTE — PROGRESS NOTES
Pt is needing to reschedule hepatology appointment that was supposed to be for today. Pt was rescheduled for 12/28/2023 at 9:30 a.m. with Jonah Maldonado MD. Pt would also like to scheduled with PCP from ER visits. The following was sent to Dr. Benton's staff: Good morning, pt was in the ER twice recently and is needing to schedule a follow-up appointment. This patient is a part of the [facility] quality work for the system and is a patient with two or more chronic conditions, which requires a Post ED 7 day appt. If someone can give him a call to schedule, or schedule him and let me know when it is completed, and I can call and inform him, it would be appreciated. Thanks in advance :) pt will be contacted as necessary.    Harriet Talbot  ED Navigator  [phone]

## 2023-12-01 RX ORDER — CARVEDILOL 3.12 MG/1
TABLET ORAL
Qty: 56 TABLET | Refills: 0 | OUTPATIENT
Start: 2023-12-01

## 2023-12-01 RX ORDER — ATORVASTATIN CALCIUM 40 MG/1
TABLET, FILM COATED ORAL
Qty: 28 TABLET | OUTPATIENT
Start: 2023-12-01

## 2023-12-01 NOTE — TELEPHONE ENCOUNTER
----- Message from Harriet Talbot, Patient Care Assistant sent at 11/30/2023  9:04 AM CST -----  Regarding: ER F/U appt.  Good morning, pt was in the ER twice recently and is needing to schedule a follow-up appointment. 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 If someone can give him a call to schedule, or schedule him and let me know when it is completed, and I can call and inform him, it would be appreciated. Thanks in advance :)

## 2023-12-04 NOTE — PROGRESS NOTES
Pt was contacted by PCP clinic for appointment, but did not answer or call back. Encounter will be closed.    Harriet Talbot  ED Navigator  [phone]

## 2023-12-11 DIAGNOSIS — I26.99 PULMONARY EMBOLISM, UNSPECIFIED CHRONICITY, UNSPECIFIED PULMONARY EMBOLISM TYPE, UNSPECIFIED WHETHER ACUTE COR PULMONALE PRESENT: ICD-10-CM

## 2023-12-11 RX ORDER — APIXABAN 5 MG/1
5 TABLET, FILM COATED ORAL 2 TIMES DAILY
Qty: 168 TABLET | Refills: 0 | Status: SHIPPED | OUTPATIENT
Start: 2023-12-11 | End: 2024-03-08

## 2023-12-19 ENCOUNTER — PATIENT OUTREACH (OUTPATIENT)
Dept: EMERGENCY MEDICINE | Facility: HOSPITAL | Age: 68
End: 2023-12-19

## 2023-12-19 DIAGNOSIS — R11.2 NAUSEA AND VOMITING, UNSPECIFIED VOMITING TYPE: ICD-10-CM

## 2023-12-19 RX ORDER — ONDANSETRON 4 MG/1
4 TABLET, FILM COATED ORAL EVERY 12 HOURS PRN
Qty: 20 TABLET | Refills: 0 | Status: SHIPPED | OUTPATIENT
Start: 2023-12-19 | End: 2024-01-09

## 2023-12-19 NOTE — PROGRESS NOTES
Pt reached out to cancel 12/28 appointment with hepatology, as looking for something close to area. The following was sent to Dr. Scheuermann's staff:Good morning, pt was was scheduled with [facility] hepatology, but cancelled because they are looking for something to close to their area. Sending this message to see if this provider is, or know of anyone in [place] who is? If so, can the pt be called by your staff to schedule? I hope to hear from someone soon. Thanks in advance! :)    Harriet Talbot  ED Navigator  [phone]

## 2023-12-20 ENCOUNTER — HOSPITAL ENCOUNTER (OUTPATIENT)
Dept: RADIOLOGY | Facility: HOSPITAL | Age: 68
Discharge: HOME | End: 2023-12-20
Attending: NEUROLOGICAL SURGERY
Payer: MEDICARE

## 2023-12-20 DIAGNOSIS — M54.14 RADICULOPATHY, THORACIC REGION: ICD-10-CM

## 2023-12-20 DIAGNOSIS — M54.6 PAIN IN THORACIC SPINE: ICD-10-CM

## 2023-12-20 DIAGNOSIS — M54.16 RADICULOPATHY, LUMBAR REGION: ICD-10-CM

## 2023-12-20 PROCEDURE — 72128 CT CHEST SPINE W/O DYE: CPT | Mod: TC,PO

## 2023-12-20 PROCEDURE — 72131 CT LUMBAR SPINE W/O DYE: CPT | Mod: TC,PO

## 2023-12-21 ENCOUNTER — PATIENT OUTREACH (OUTPATIENT)
Dept: ADMINISTRATIVE | Facility: HOSPITAL | Age: 68
End: 2023-12-21
Payer: MEDICARE

## 2023-12-21 DIAGNOSIS — E11.42 DIABETIC POLYNEUROPATHY ASSOCIATED WITH TYPE 2 DIABETES MELLITUS: ICD-10-CM

## 2023-12-21 DIAGNOSIS — I10 BENIGN ESSENTIAL HTN: ICD-10-CM

## 2023-12-21 DIAGNOSIS — M54.16 CHRONIC RADICULAR PAIN OF LOWER BACK: ICD-10-CM

## 2023-12-21 DIAGNOSIS — G89.29 CHRONIC RADICULAR PAIN OF LOWER BACK: ICD-10-CM

## 2023-12-21 RX ORDER — GABAPENTIN 600 MG/1
600 TABLET ORAL 3 TIMES DAILY
Qty: 90 TABLET | Refills: 0 | Status: SHIPPED | OUTPATIENT
Start: 2023-12-21 | End: 2024-01-25

## 2023-12-21 RX ORDER — CYCLOBENZAPRINE HCL 5 MG
5 TABLET ORAL 2 TIMES DAILY
Qty: 60 TABLET | Refills: 0 | Status: SHIPPED | OUTPATIENT
Start: 2023-12-21 | End: 2024-01-25

## 2023-12-21 RX ORDER — CARVEDILOL 3.12 MG/1
TABLET ORAL
Qty: 56 TABLET | Refills: 0 | Status: SHIPPED | OUTPATIENT
Start: 2023-12-21 | End: 2024-02-02

## 2023-12-21 RX ORDER — EMPAGLIFLOZIN 25 MG/1
25 TABLET, FILM COATED ORAL
Qty: 28 TABLET | Refills: 0 | Status: SHIPPED | OUTPATIENT
Start: 2023-12-21 | End: 2024-02-02

## 2023-12-21 NOTE — PROGRESS NOTES
Population Health Chart Review & Patient Outreach Details    Outreach Performed: NO    Additional Pop Health Notes:           Updates Requested / Reviewed:      Updated Care Coordination Note, , Care Team Updated, and Immunizations Reconciliation Completed or Queried: [place]         Health Maintenance Topics Overdue:    Health Maintenance Due   Topic Date Due    RSV Vaccine (Age 60+ and Pregnant patients) (1 - 1-dose 60+ series) Never done    Hemoglobin A1c  08/12/2023    COVID-19 Vaccine (2 - 2023-24 season) 09/01/2023         Health Maintenance Topic(s) Outreach Outcomes & Actions Taken:    Eye Exam - Outreach Outcomes & Actions Taken  : Diabetic Eye External Records Uploaded, Care Team & History Updated if Applicable

## 2023-12-22 ENCOUNTER — TELEPHONE (OUTPATIENT)
Dept: HEPATOLOGY | Facility: CLINIC | Age: 68
End: 2023-12-22
Payer: MEDICARE

## 2023-12-22 NOTE — TELEPHONE ENCOUNTER
----- Message from Harriet Talbot Patient Care Assistant sent at 12/22/2023  7:55 AM CST -----  Regarding: RE: Appt.  Thank you.  They were just trying to find someone closer if possible.    ----- Message -----  From: Sharon Tan RN  Sent: 12/21/2023   4:22 PM CST  To: Harriet Talbot Patient Care Assistant  Subject: RE: Appt.                                        Patient has to see MD for liver lesions at [facility].  He was scheduled and cancelled visit on 12/28/23.   ----- Message -----  From: Harriet Talbot Patient Care Assistant  Sent: 12/21/2023   2:26 PM CST  To: Scheuermann Jennifer B Staff  Subject: FW: Appt.                                        Hi, good afternoon.  I had sent the message below earlier in the week. Can I get a status on it, please? Thanks in advance.    ----- Message -----  From: Harriet Talbot Patient Care Assistant  Sent: 12/19/2023  11:22 AM CST  To: Scheu[facility] Jennifer B Staff  Subject: Appt.                                            Good morning, pt was was scheduled with [facility] hepatology, but cancelled because they are looking for something to close to their area. Sending this message to see if this provider is, or know of anyone in [place] who is? If so, can the pt be called by your staff to schedule? I hope to hear from someone soon. Thanks in advance!: )

## 2024-01-08 ENCOUNTER — TELEPHONE (OUTPATIENT)
Dept: GASTROENTEROLOGY | Facility: CLINIC | Age: 69
End: 2024-01-08
Payer: MEDICARE

## 2024-01-08 NOTE — TELEPHONE ENCOUNTER
----- Message from Harriet Alicea sent at 1/8/2024  9:55 AM CST -----  Regarding: Needs to reschedule  Type: Needs Medical Advice  Who Called:  Cheryle- Wife    Best Call Back Number: [phone]  Additional Information: Needs to rescheudle colonoscopy please call wife to saad

## 2024-01-09 DIAGNOSIS — R11.2 NAUSEA AND VOMITING, UNSPECIFIED VOMITING TYPE: ICD-10-CM

## 2024-01-09 RX ORDER — ONDANSETRON 4 MG/1
4 TABLET, FILM COATED ORAL EVERY 12 HOURS PRN
Qty: 20 TABLET | Refills: 0 | Status: SHIPPED | OUTPATIENT
Start: 2024-01-09 | End: 2024-02-20

## 2024-01-25 DIAGNOSIS — G89.29 CHRONIC RADICULAR PAIN OF LOWER BACK: ICD-10-CM

## 2024-01-25 DIAGNOSIS — E11.42 DIABETIC POLYNEUROPATHY ASSOCIATED WITH TYPE 2 DIABETES MELLITUS: ICD-10-CM

## 2024-01-25 DIAGNOSIS — M54.16 CHRONIC RADICULAR PAIN OF LOWER BACK: ICD-10-CM

## 2024-01-25 RX ORDER — CYCLOBENZAPRINE HCL 5 MG
5 TABLET ORAL 2 TIMES DAILY
Qty: 60 TABLET | Refills: 0 | Status: SHIPPED | OUTPATIENT
Start: 2024-01-25 | End: 2024-02-29 | Stop reason: SDUPTHER

## 2024-01-25 RX ORDER — GABAPENTIN 600 MG/1
600 TABLET ORAL 3 TIMES DAILY
Qty: 90 TABLET | Refills: 0 | Status: SHIPPED | OUTPATIENT
Start: 2024-01-25 | End: 2024-02-29 | Stop reason: SDUPTHER

## 2024-02-02 DIAGNOSIS — I10 BENIGN ESSENTIAL HTN: ICD-10-CM

## 2024-02-02 RX ORDER — EMPAGLIFLOZIN 25 MG/1
25 TABLET, FILM COATED ORAL
Qty: 28 TABLET | Refills: 0 | Status: SHIPPED | OUTPATIENT
Start: 2024-02-02 | End: 2024-03-08

## 2024-02-02 RX ORDER — CARVEDILOL 3.12 MG/1
TABLET ORAL
Qty: 56 TABLET | Refills: 0 | Status: SHIPPED | OUTPATIENT
Start: 2024-02-02 | End: 2024-03-08

## 2024-02-07 ENCOUNTER — TELEPHONE (OUTPATIENT)
Dept: GASTROENTEROLOGY | Facility: CLINIC | Age: 69
End: 2024-02-07
Payer: MEDICARE

## 2024-02-07 NOTE — TELEPHONE ENCOUNTER
----- Message from Beena Tapia sent at 2/7/2024  9:21 AM CST -----  Contact: Cheryle [phone]  Type: Needs Medical Advice  Who Called:  Pts wife Cheryle Slaughter Call Back Number: [phone]      Additional Information: Calling to [facility] coloscopy on 03/20. Pts wife will call back at a later date to reschedule.

## 2024-02-20 ENCOUNTER — TELEPHONE (OUTPATIENT)
Dept: FAMILY MEDICINE | Facility: CLINIC | Age: 69
End: 2024-02-20
Payer: MEDICARE

## 2024-02-20 DIAGNOSIS — R11.2 NAUSEA AND VOMITING, UNSPECIFIED VOMITING TYPE: ICD-10-CM

## 2024-02-20 RX ORDER — ONDANSETRON 4 MG/1
TABLET, FILM COATED ORAL
Qty: 20 TABLET | Refills: 0 | Status: SHIPPED | OUTPATIENT
Start: 2024-02-20 | End: 2024-03-08

## 2024-02-21 ENCOUNTER — TELEPHONE (OUTPATIENT)
Dept: UROLOGY | Facility: CLINIC | Age: 69
End: 2024-02-21
Payer: MEDICARE

## 2024-02-21 NOTE — TELEPHONE ENCOUNTER
SPOUSE STATES pt has pain when urinating. Pt refused first available 2/28. Referred pt to [place] [facility] urgent care. Pt states he will go to urgent care.

## 2024-02-21 NOTE — TELEPHONE ENCOUNTER
----- Message from Joana Nicholas sent at 2/21/2024  2:11 PM CST -----  Contact: wife  Type:  Sooner Appointment Request    Caller is requesting a sooner appointment.  Caller declined first available appointment listed below.  Caller will not accept being placed on the waitlist and is requesting a message be sent to doctor.    Name of Caller:  Cheryle/wife  When is the first available appointment?  tuesday  Symptoms:  Hurting when urinating   Would the patient rather a call back or a response via [facility]? Call back   Best Call Back Number:  [phone]  Additional Information:  States pt would like to get seen today or tomorrow is in pain.Please call back

## 2024-02-21 NOTE — TELEPHONE ENCOUNTER
----- Message from Maria Luisa Guzmán sent at 2/20/2024 11:19 AM CST -----  Contact: pt  Type: Needs Medical Advice         Who Called: pt  Best Call Back Number:[phone]  Additional Information: Requesting a call back regarding pt pharm said they sent refill requests for rx below and have heard back from office.     ondansetron (ZOFRAN-ODT) 4 MG [facility] [place] - [place] - [address]  [address] [address]  Phone: [phone] Fax: [phone]      Please Advise- Thank you

## 2024-02-29 ENCOUNTER — OFFICE VISIT (OUTPATIENT)
Dept: FAMILY MEDICINE | Facility: CLINIC | Age: 69
End: 2024-02-29
Payer: MEDICARE

## 2024-02-29 VITALS
RESPIRATION RATE: 18 BRPM | OXYGEN SATURATION: 95 % | BODY MASS INDEX: 30.47 KG/M2 | HEART RATE: 67 BPM | TEMPERATURE: 98 F | WEIGHT: 237.44 LBS | SYSTOLIC BLOOD PRESSURE: 122 MMHG | HEIGHT: 74 IN | DIASTOLIC BLOOD PRESSURE: 72 MMHG

## 2024-02-29 DIAGNOSIS — G89.29 CHRONIC RADICULAR PAIN OF LOWER BACK: ICD-10-CM

## 2024-02-29 DIAGNOSIS — D17.23 LIPOMA OF RIGHT LOWER EXTREMITY: ICD-10-CM

## 2024-02-29 DIAGNOSIS — R25.1 TREMOR: ICD-10-CM

## 2024-02-29 DIAGNOSIS — Z79.4 TYPE 2 DIABETES MELLITUS WITH HYPERGLYCEMIA, WITH LONG-TERM CURRENT USE OF INSULIN: ICD-10-CM

## 2024-02-29 DIAGNOSIS — E78.5 HYPERLIPIDEMIA, UNSPECIFIED HYPERLIPIDEMIA TYPE: ICD-10-CM

## 2024-02-29 DIAGNOSIS — E11.42 TYPE 2 DIABETES MELLITUS WITH DIABETIC POLYNEUROPATHY, WITH LONG-TERM CURRENT USE OF INSULIN: Primary | ICD-10-CM

## 2024-02-29 DIAGNOSIS — M54.16 LUMBAR RADICULOPATHY: ICD-10-CM

## 2024-02-29 DIAGNOSIS — Z79.01 ANTICOAGULATED: ICD-10-CM

## 2024-02-29 DIAGNOSIS — E11.42 DIABETIC POLYNEUROPATHY ASSOCIATED WITH TYPE 2 DIABETES MELLITUS: ICD-10-CM

## 2024-02-29 DIAGNOSIS — M54.16 CHRONIC RADICULAR PAIN OF LOWER BACK: ICD-10-CM

## 2024-02-29 DIAGNOSIS — F32.A DEPRESSION, UNSPECIFIED DEPRESSION TYPE: ICD-10-CM

## 2024-02-29 DIAGNOSIS — E11.65 TYPE 2 DIABETES MELLITUS WITH HYPERGLYCEMIA, WITH LONG-TERM CURRENT USE OF INSULIN: ICD-10-CM

## 2024-02-29 DIAGNOSIS — M25.511 ACUTE PAIN OF RIGHT SHOULDER: ICD-10-CM

## 2024-02-29 DIAGNOSIS — I26.99 PULMONARY EMBOLISM, UNSPECIFIED CHRONICITY, UNSPECIFIED PULMONARY EMBOLISM TYPE, UNSPECIFIED WHETHER ACUTE COR PULMONALE PRESENT: ICD-10-CM

## 2024-02-29 DIAGNOSIS — Z79.4 TYPE 2 DIABETES MELLITUS WITH DIABETIC POLYNEUROPATHY, WITH LONG-TERM CURRENT USE OF INSULIN: Primary | ICD-10-CM

## 2024-02-29 DIAGNOSIS — G47.33 OSA (OBSTRUCTIVE SLEEP APNEA): ICD-10-CM

## 2024-02-29 DIAGNOSIS — Z95.828 GREENFIELD FILTER IN PLACE: ICD-10-CM

## 2024-02-29 DIAGNOSIS — F31.9 BIPOLAR 1 DISORDER: ICD-10-CM

## 2024-02-29 DIAGNOSIS — I65.23 CAROTID STENOSIS, BILATERAL: ICD-10-CM

## 2024-02-29 DIAGNOSIS — F20.9 SCHIZOPHRENIA, UNSPECIFIED TYPE: ICD-10-CM

## 2024-02-29 DIAGNOSIS — I10 BENIGN ESSENTIAL HTN: ICD-10-CM

## 2024-02-29 DIAGNOSIS — K86.1 IDIOPATHIC CHRONIC PANCREATITIS: ICD-10-CM

## 2024-02-29 DIAGNOSIS — Z79.01 ANTICOAGULANT LONG-TERM USE: ICD-10-CM

## 2024-02-29 DIAGNOSIS — M50.30 DDD (DEGENERATIVE DISC DISEASE), CERVICAL: ICD-10-CM

## 2024-02-29 DIAGNOSIS — I25.10 CORONARY ARTERY DISEASE INVOLVING NATIVE CORONARY ARTERY OF NATIVE HEART WITHOUT ANGINA PECTORIS: ICD-10-CM

## 2024-02-29 DIAGNOSIS — G20.A1 PARKINSON'S DISEASE, UNSPECIFIED WHETHER DYSKINESIA PRESENT, UNSPECIFIED WHETHER MANIFESTATIONS FLUCTUATE: ICD-10-CM

## 2024-02-29 DIAGNOSIS — R11.0 NAUSEA: ICD-10-CM

## 2024-02-29 PROBLEM — E26.1 SECONDARY HYPERALDOSTERONISM: Status: ACTIVE | Noted: 2024-02-29

## 2024-02-29 PROCEDURE — 3074F SYST BP LT 130 MM HG: CPT | Mod: HCNC,CPTII,S$GLB,

## 2024-02-29 PROCEDURE — 3008F BODY MASS INDEX DOCD: CPT | Mod: HCNC,CPTII,S$GLB,

## 2024-02-29 PROCEDURE — 1160F RVW MEDS BY RX/DR IN RCRD: CPT | Mod: HCNC,CPTII,S$GLB,

## 2024-02-29 PROCEDURE — 1159F MED LIST DOCD IN RCRD: CPT | Mod: HCNC,CPTII,S$GLB,

## 2024-02-29 PROCEDURE — 1125F AMNT PAIN NOTED PAIN PRSNT: CPT | Mod: HCNC,CPTII,S$GLB,

## 2024-02-29 PROCEDURE — 3078F DIAST BP <80 MM HG: CPT | Mod: HCNC,CPTII,S$GLB,

## 2024-02-29 PROCEDURE — 1101F PT FALLS ASSESS-DOCD LE1/YR: CPT | Mod: HCNC,CPTII,S$GLB,

## 2024-02-29 PROCEDURE — 3288F FALL RISK ASSESSMENT DOCD: CPT | Mod: HCNC,CPTII,S$GLB,

## 2024-02-29 PROCEDURE — 99214 OFFICE O/P EST MOD 30 MIN: CPT | Mod: HCNC,S$GLB,,

## 2024-02-29 PROCEDURE — 99999 PR PBB SHADOW E&M-EST. PATIENT-LVL V: CPT | Mod: PBBFAC,HCNC,,

## 2024-02-29 RX ORDER — INSULIN PUMP SYRINGE, 3 ML
EACH MISCELLANEOUS
Qty: 1 EACH | Refills: 0 | Status: SHIPPED | OUTPATIENT
Start: 2024-02-29 | End: 2025-02-28

## 2024-02-29 RX ORDER — CYCLOBENZAPRINE HCL 5 MG
5 TABLET ORAL 2 TIMES DAILY
Qty: 180 TABLET | Refills: 1 | Status: SHIPPED | OUTPATIENT
Start: 2024-02-29 | End: 2024-04-30

## 2024-02-29 RX ORDER — GABAPENTIN 600 MG/1
600 TABLET ORAL 3 TIMES DAILY
Qty: 270 TABLET | Refills: 1 | Status: SHIPPED | OUTPATIENT
Start: 2024-02-29

## 2024-02-29 RX ORDER — ONDANSETRON 4 MG/1
4 TABLET, ORALLY DISINTEGRATING ORAL EVERY 6 HOURS PRN
Qty: 20 TABLET | Refills: 0 | Status: SHIPPED | OUTPATIENT
Start: 2024-02-29 | End: 2024-04-17 | Stop reason: SDUPTHER
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
## 2024-03-04 PROBLEM — D17.23 LIPOMA OF RIGHT LOWER EXTREMITY: Status: ACTIVE | Noted: 2024-03-04

## 2024-03-06 ENCOUNTER — OFFICE VISIT (OUTPATIENT)
Dept: ENDOCRINOLOGY | Facility: CLINIC | Age: 69
End: 2024-03-06
Payer: MEDICARE

## 2024-03-06 VITALS
WEIGHT: 244.69 LBS | SYSTOLIC BLOOD PRESSURE: 110 MMHG | BODY MASS INDEX: 31.4 KG/M2 | TEMPERATURE: 98 F | OXYGEN SATURATION: 96 % | HEIGHT: 74 IN | HEART RATE: 106 BPM | DIASTOLIC BLOOD PRESSURE: 80 MMHG

## 2024-03-06 DIAGNOSIS — I25.10 CORONARY ARTERY DISEASE INVOLVING NATIVE CORONARY ARTERY OF NATIVE HEART WITHOUT ANGINA PECTORIS: ICD-10-CM

## 2024-03-06 DIAGNOSIS — E78.5 HYPERLIPIDEMIA ASSOCIATED WITH TYPE 2 DIABETES MELLITUS: ICD-10-CM

## 2024-03-06 DIAGNOSIS — E66.9 OBESITY (BMI 30-39.9): ICD-10-CM

## 2024-03-06 DIAGNOSIS — E13.649 UNCONTROLLED DIABETES MELLITUS OF OTHER TYPE WITH HYPOGLYCEMIA, UNSPECIFIED HYPOGLYCEMIA COMA STATUS: Primary | ICD-10-CM

## 2024-03-06 DIAGNOSIS — E11.59 HYPERTENSION ASSOCIATED WITH TYPE 2 DIABETES MELLITUS: ICD-10-CM

## 2024-03-06 DIAGNOSIS — E11.69 HYPERLIPIDEMIA ASSOCIATED WITH TYPE 2 DIABETES MELLITUS: ICD-10-CM

## 2024-03-06 DIAGNOSIS — I15.2 HYPERTENSION ASSOCIATED WITH TYPE 2 DIABETES MELLITUS: ICD-10-CM

## 2024-03-06 DIAGNOSIS — N39.0 FREQUENT UTI: ICD-10-CM

## 2024-03-06 PROCEDURE — 1159F MED LIST DOCD IN RCRD: CPT | Mod: HCNC,CPTII,S$GLB, | Performed by: PHYSICIAN ASSISTANT

## 2024-03-06 PROCEDURE — 3008F BODY MASS INDEX DOCD: CPT | Mod: HCNC,CPTII,S$GLB, | Performed by: PHYSICIAN ASSISTANT

## 2024-03-06 PROCEDURE — 1125F AMNT PAIN NOTED PAIN PRSNT: CPT | Mod: HCNC,CPTII,S$GLB, | Performed by: PHYSICIAN ASSISTANT

## 2024-03-06 PROCEDURE — 3079F DIAST BP 80-89 MM HG: CPT | Mod: HCNC,CPTII,S$GLB, | Performed by: PHYSICIAN ASSISTANT

## 2024-03-06 PROCEDURE — G2211 COMPLEX E/M VISIT ADD ON: HCPCS | Mod: HCNC,S$GLB,, | Performed by: PHYSICIAN ASSISTANT

## 2024-03-06 PROCEDURE — 1101F PT FALLS ASSESS-DOCD LE1/YR: CPT | Mod: HCNC,CPTII,S$GLB, | Performed by: PHYSICIAN ASSISTANT

## 2024-03-06 PROCEDURE — 99999 PR PBB SHADOW E&M-EST. PATIENT-LVL IV: CPT | Mod: PBBFAC,HCNC,, | Performed by: PHYSICIAN ASSISTANT

## 2024-03-06 PROCEDURE — 1160F RVW MEDS BY RX/DR IN RCRD: CPT | Mod: HCNC,CPTII,S$GLB, | Performed by: PHYSICIAN ASSISTANT

## 2024-03-06 PROCEDURE — 3074F SYST BP LT 130 MM HG: CPT | Mod: HCNC,CPTII,S$GLB, | Performed by: PHYSICIAN ASSISTANT

## 2024-03-06 PROCEDURE — 99204 OFFICE O/P NEW MOD 45 MIN: CPT | Mod: HCNC,S$GLB,, | Performed by: PHYSICIAN ASSISTANT

## 2024-03-06 PROCEDURE — 3288F FALL RISK ASSESSMENT DOCD: CPT | Mod: HCNC,CPTII,S$GLB, | Performed by: PHYSICIAN ASSISTANT

## 2024-03-06 RX ORDER — SEMAGLUTIDE 0.68 MG/ML
INJECTION, SOLUTION SUBCUTANEOUS
Qty: 3 ML | Refills: 11 | Status: ON HOLD | OUTPATIENT
Start: 2024-03-06 | End: 2024-05-19 | Stop reason: HOSPADM

## 2024-03-06 RX ORDER — BLOOD-GLUCOSE SENSOR
EACH MISCELLANEOUS
Qty: 3 EACH | Refills: 11 | Status: SHIPPED | OUTPATIENT
Start: 2024-03-06

## 2024-03-06 RX ORDER — BLOOD-GLUCOSE,RECEIVER,CONT
EACH MISCELLANEOUS
Qty: 1 EACH | Refills: 0 | Status: SHIPPED | OUTPATIENT
Start: 2024-03-06 | End: 2024-04-30

## 2024-03-06 NOTE — PROGRESS NOTES
"CC: This 68 y.o. male presents for management of T2DM  and chronic conditions pending review including HTN, HLP    HPI: was diagnosed with T2DM 30 years ago. New to endocrine. Arrives with his wife. Seen by Dr. Yu in 2012.  Never admitted for T2DM.   Family hx of DM: 2 sisters  Fhx of thyroid disease: none  Denies missing doses of DM medication.   hypoglycemia at home  monitoring BG at home:  Fasting: <170    Diet:   BF-protein shake  LH-sandwich, potato salad  DN-sandwich, fries  Snacks on ice cream. Drinks sweet tea.    Exercise: No formal exercise. Walks at his apartment complex.    CURRENT DM MEDS:  Metformin 1000 mg bid  Jardiance 25 mg qd  Humalog 15 u tid ac  Basaglar 90 units qd    Standards of Care:  Eye exam: 11/23 Dr. Nicole q 6 mths  Podiatry exam: PCP  DE: never    PMHx, PSHx: reviewed in epic. Parkinson's Disease.  Social Hx: no ETOH use. Former smoker.    Wt Readings from Last 10 Encounters:   03/06/24 111 kg (244 lb 11.4 oz)   02/29/24 107.7 kg (237 lb 7 oz)   11/29/23 104.3 kg (230 lb)   11/28/23 104.3 kg (230 lb)   11/17/23 108 kg (238 lb)   11/17/23 108.3 kg (238 lb 11.2 oz)   11/15/23 109.3 kg (240 lb 15.4 oz)   07/20/23 107 kg (236 lb)   05/11/23 109 kg (240 lb 4.8 oz)   05/03/23 104.8 kg (231 lb)      ROS:   Gen: Appetite good, + wt gain 7 lbs, denies fatigue and weakness.  Skin: Skin is intact and heals well, no rashes, no hair changes  Eyes: Denies visual disturbances  Resp: no SOB or QUIÑONES, no cough  Cardiac: No palpitations, chest pain, no edema   GI: No nausea or vomiting, diarrhea, constipation, or abdominal pain.  /GYN: No nocturia, burning or pain.   MS/Neuro: Denies numbness/ tingling in BLE; Gait steady, speech clear  Psych: Denies drug/ETOH abuse, no hx of depression.  Other systems: negative.    /80 (BP Location: Right arm, Patient Position: Sitting, BP Method: Small (Manual))   Pulse 106   Temp 98.3 °F (36.8 °C) (Oral)   Ht 6' 2" (1.88 m)   Wt 111 kg (244 lb 11.4 oz)  "  SpO2 96%   BMI 31.42 kg/m²      PE:  GENERAL: Well developed, well nourished.  PSYCH: AAOx3, appropriate mood and affect, pleasant expression, conversant, appears relaxed, well groomed.   EYES: Conjunctiva, corneas clear  NECK: Supple, trachea midline,no thyromegaly or nodules  CHEST: Resp even and unlabored,   VASCULAR: DP pulses +2/4 bilaterally, no edema.  NEURO: Gait steady  SKIN: Skin warm and dry no acanthosis nigracans.  3/24  Foot Exam: no sores or macerations noted.     Protective Sensation (w/ 10 gram monofilament):  Right: Intact  Left: Intact    Visual Inspection:  Normal -  Bilateral, Nails Intact - without Evidence of Foot Deformity- Bilateral, Callus -  Bilateral, Dry Skin -  Bilateral, and Onychomycosis -  Bilateral    Pedal Pulses:   Right: Present  Left: Present    Posterior Tibialis Pulses:   Right:Present  Left: Present     Vibratory Sensation  Right:Positive  Left:Positive     Personally reviewed labs below:    No visits with results within 1 Month(s) from this visit.   Latest known visit with results is:   Patient Outreach on 12/21/2023   Component Date Value Ref Range Status    Left Eye DM Retinopathy 11/22/2023 Positive   Final    Right Eye DM Retinopathy 11/22/2023 Positive   Final         ASSESSMENT and PLAN:    1. Uncontrolled diabetes mellitus of other type with hypoglycemia, unspecified hypoglycemia coma status  Ambulatory referral/consult to Endocrinology    semaglutide (OZEMPIC) 0.25 mg or 0.5 mg (2 mg/3 mL) pen injector    Microalbumin/Creatinine Ratio, Urine    Ambulatory referral/consult to Diabetes Education    DEXCOM G7 SENSOR [place]    DEXCOM G7  Misc    Hemoglobin A1C    T4, Free    TSH    Lipid Panel    Comprehensive Metabolic Panel    Hemoglobin A1C    Hemoglobin A1C    T4, Free    TSH    Lipid Panel    Comprehensive Metabolic Panel    Hemoglobin A1C    CANCELED: Hemoglobin A1C    CANCELED: Comprehensive Metabolic Panel    CANCELED: Lipid Panel    CANCELED: TSH     CANCELED: T4, Free    CANCELED: Hemoglobin A1C      2. Hypertension associated with type 2 diabetes mellitus        3. Hyperlipidemia associated with type 2 diabetes mellitus        4. Coronary artery disease involving native coronary artery of native heart without angina pectoris        5. Obesity (BMI 30-39.9)           T2DM with hyperglycemia-  A1c today.   Medication Changes  Start Ozempic 0.25 mg once weekly x 4 weeks.    At week 5 increase to 0.5 mg weekly.      Decrease Basaglar to 70 units nightly.      Continue Humalog 15 units with meals.      Continue Jardiance and Metformin.      Start dexcom G7.  Discussed DM, progression of disease, long term complications, tx options.   Discussed A1c and BG goals.   Reviewed  hypoglycemia, s/s and appropriate tx.   Instructed to monitor BG and bring meter/ log to every clinic visit.   - takes ASA, ACEi, statin    HTN - controlled, continue meds as previously prescribed and monitor.     HLP - stable LDL , on statin therapy, LFTs WNL  CAD-stable-f/u w/ cardiology.  Parkinson's Disease-stable-f/u w/ neurology  Obesity-Body mass index is 31.42 kg/m². Increase exercise to 30 min qd.  Frequent UTI-last UTI in 11/23. Seeing urology. May decrease infections when glucose is controlled. Will dc jaridance if he continues to have them.    Follow-Up   Referral to DE  Cmp, lp, TFTs, A1c, mac  F/u in 3 mths w/ labs prior -a1c

## 2024-03-06 NOTE — PATIENT INSTRUCTIONS
Medication Changes  Start Ozempic 0.25 mg once weekly x 4 weeks.    At week 5 increase to 0.5 mg weekly.      Decrease Basaglar to 70 units nightly.      Continue Humalog 15 units with meals.      Continue Jardiance and Metformin.

## 2024-03-07 ENCOUNTER — HOSPITAL ENCOUNTER (OUTPATIENT)
Dept: RADIOLOGY | Facility: HOSPITAL | Age: 69
Discharge: HOME | End: 2024-03-07
Payer: MEDICARE

## 2024-03-07 DIAGNOSIS — M25.511 ACUTE PAIN OF RIGHT SHOULDER: ICD-10-CM

## 2024-03-07 PROCEDURE — 73030 X-RAY EXAM OF SHOULDER: CPT | Mod: TC,PO,RT

## 2024-03-07 NOTE — PROGRESS NOTES
X-ray of the shoulder shows some degenerative changes/arthritis in your right shoulder but shows no acute injury.  If pain persist recommend seeing an orthopedic doctor for treatment.

## 2024-03-08 DIAGNOSIS — I26.99 PULMONARY EMBOLISM, UNSPECIFIED CHRONICITY, UNSPECIFIED PULMONARY EMBOLISM TYPE, UNSPECIFIED WHETHER ACUTE COR PULMONALE PRESENT: ICD-10-CM

## 2024-03-08 DIAGNOSIS — R11.2 NAUSEA AND VOMITING, UNSPECIFIED VOMITING TYPE: ICD-10-CM

## 2024-03-08 DIAGNOSIS — I10 BENIGN ESSENTIAL HTN: ICD-10-CM

## 2024-03-08 LAB
ALBUMIN SERPL-MCNC: 4.1 G/DL (ref 3.6–5.1)
ALBUMIN/GLOB SERPL: 1.4 (CALC) (ref 1–2.5)
ALP SERPL-CCNC: 95 U/L (ref 35–144)
ALT SERPL-CCNC: 13 U/L (ref 9–46)
AST SERPL-CCNC: 12 U/L (ref 10–35)
BILIRUB SERPL-MCNC: 0.4 MG/DL (ref 0.2–1.2)
BUN SERPL-MCNC: 16 MG/DL (ref 7–25)
BUN/CREAT SERPL: ABNORMAL (CALC) (ref 6–22)
CALCIUM SERPL-MCNC: 8.8 MG/DL (ref 8.6–10.3)
CHLORIDE SERPL-SCNC: 98 MMOL/L (ref 98–110)
CHOLEST SERPL-MCNC: 180 MG/DL
CHOLEST/HDLC SERPL: 4.1 (CALC)
CO2 SERPL-SCNC: 29 MMOL/L (ref 20–32)
CREAT SERPL-MCNC: 1.03 MG/DL (ref 0.7–1.35)
EGFR: 79 ML/MIN/1.73M2
EXTRA TUBE RECEIVED: NORMAL
GLOBULIN SER CALC-MCNC: 2.9 G/DL (CALC) (ref 1.9–3.7)
GLUCOSE SERPL-MCNC: 223 MG/DL (ref 65–99)
HBA1C MFR BLD: 8.9 % OF TOTAL HGB
HDLC SERPL-MCNC: 44 MG/DL
LDLC SERPL CALC-MCNC: 98 MG/DL (CALC)
NONHDLC SERPL-MCNC: 136 MG/DL (CALC)
POTASSIUM SERPL-SCNC: 4.5 MMOL/L (ref 3.5–5.3)
PROT SERPL-MCNC: 7 G/DL (ref 6.1–8.1)
SODIUM SERPL-SCNC: 139 MMOL/L (ref 135–146)
SPECIMEN SOURCE: NORMAL
T4 FREE SERPL-MCNC: 0.8 NG/DL (ref 0.8–1.8)
TRIGL SERPL-MCNC: 263 MG/DL
TSH SERPL-ACNC: 4.15 MIU/L (ref 0.4–4.5)

## 2024-03-08 RX ORDER — APIXABAN 5 MG/1
5 TABLET, FILM COATED ORAL 2 TIMES DAILY
Qty: 180 TABLET | Refills: 0 | Status: SHIPPED | OUTPATIENT
Start: 2024-03-08 | End: 2024-06-07

## 2024-03-08 RX ORDER — ONDANSETRON 4 MG/1
TABLET, FILM COATED ORAL
Qty: 16 TABLET | Refills: 0 | Status: SHIPPED | OUTPATIENT
Start: 2024-03-08 | End: 2024-05-15

## 2024-03-08 RX ORDER — EMPAGLIFLOZIN 25 MG/1
25 TABLET, FILM COATED ORAL
Qty: 90 TABLET | Refills: 0 | Status: SHIPPED | OUTPATIENT
Start: 2024-03-08 | End: 2024-06-07

## 2024-03-08 RX ORDER — CARVEDILOL 3.12 MG/1
TABLET ORAL
Qty: 180 TABLET | Refills: 0 | Status: SHIPPED | OUTPATIENT
Start: 2024-03-08 | End: 2024-06-07

## 2024-03-12 ENCOUNTER — TELEPHONE (OUTPATIENT)
Dept: ENDOCRINOLOGY | Facility: CLINIC | Age: 69
End: 2024-03-12
Payer: MEDICARE

## 2024-03-12 NOTE — TELEPHONE ENCOUNTER
Received notice from [facility] Review that Dexcom G7 Sensor has been approved from 01/01/2024 to 12/31/2024.  Authorization# [id number].  Also approval for Dexcom G7  has been approved from 01/01/2024 to 12/31/2024, authorization# [id number]

## 2024-03-20 ENCOUNTER — CLINICAL SUPPORT (OUTPATIENT)
Dept: DIABETES | Facility: CLINIC | Age: 69
End: 2024-03-20
Payer: MEDICARE

## 2024-03-20 DIAGNOSIS — E13.649 UNCONTROLLED DIABETES MELLITUS OF OTHER TYPE WITH HYPOGLYCEMIA, UNSPECIFIED HYPOGLYCEMIA COMA STATUS: ICD-10-CM

## 2024-03-20 PROCEDURE — G0108 DIAB MANAGE TRN  PER INDIV: HCPCS | Mod: HCNC,S$GLB,, | Performed by: NUTRITIONIST

## 2024-03-20 PROCEDURE — 99999 PR PBB SHADOW E&M-EST. PATIENT-LVL III: CPT | Mod: PBBFAC,HCNC,, | Performed by: NUTRITIONIST

## 2024-03-20 NOTE — PROGRESS NOTES
"Diabetes Care Specialist Progress Note  Author: Belinda Hein RD  Date: 3/20/2024    Referral 3/6/24    Program Intake  Reason for Diabetes Program Visit:: Initial Diabetes Assessment  Current diabetes risk level:: high (T2DM Outcomes Risk=3.5)  In the last 12 months, have you:: none  Permission to speak with others about care:: yes (wife, Cheryle)  Continuous Glucose Monitoring  Patient has CGM: No    Lab Results   Component Value Date    HGBA1C 8.9 (H) 03/06/2024       Clinical  Patient Health Rating  Compared to other people your age, how would you rate your health?: Good    Problem Review  Reviewed Problem List with Patient: yes  Active comorbidities affecting diabetes self-care.: yes  Comorbidities: Stroke, Cardiovascular Disease, Hypertension, Other (comment) (pancreatitis)  Reviewed health maintenance: yes    Clinical Assessment  Current Diabetes Treatment: Insulin, Oral Medication, Injectable (Metformin 1000 mg BID; Jardiance 25 mg daily; Basaglar 75 units HS; Humalog 15 units TIDWM if BG"high"  (>250); Ozempic 0.25 mg for 4 weeks--has not started Ozempic)  Have you ever experienced hypoglycemia (low blood sugar)?: no  Have you ever experienced hyperglycemia (high blood sugar)?: yes  In the last month, how often have you experienced high blood sugar?: more than once a day  Are you able to tell when your blood sugar is high?: No (comment)  Have you ever been hospitalized because your blood sugar was high?: no    Medication Information  How do you obtain your medications?: Patient drives  How many days a week do you miss your medications?: 3 or more  Do you sometimes have difficulty refilling your medications?: No  Medication adherence impacting ability to self-manage diabetes?: Yes    Labs  Do you have regular lab work to monitor your medications?: Yes  Type of Regular Lab Work: A1c, Cholesterol, CBC, Other  Where do you get your labs drawn?: [facility]  Lab Compliance Barriers: No    Nutritional Status  Diet: " Regular  Meal Plan 24 Hour Recall: Breakfast, Lunch, Dinner, Snack  Meal Plan 24 Hour Recall - Breakfast:  (PRO drink --Equate)  Meal Plan 24 Hour Recall - Lunch:  (bologna & cheese sandwich; Diet coke)  Meal Plan 24 Hour Recall - Dinner:  (chicken, gravy, mashed potatoes, mixed veggies; 12 oz milk)  Meal Plan 24 Hour Recall - Snack:  (none)  Change in appetite?: No  Dentation:: Wears Dentures (does not interfere with oral intake)  Recent Changes in Weight: Weight Loss  Was weight loss intentional or unintentional?: Intentional (70# loss over past year)  Current nutritional status an area of need that is impacting patient's ability to self-manage diabetes?: No    Additional Social History    Support  Does anyone support you with your diabetes care?: yes  Who supports you?: self, spouse  Who takes you to your medical appointments?: self, spouse  Does the current support meet the patient's needs?: Yes  Is Support an area impacting ability to self-manage diabetes?: No    Access to Mass Media & Technology  Does the patient have access to any of the following devices or technologies?: Smart phone  Media or technology needs impacting ability to self-manage diabetes?: No    Cognitive/Behavioral Health  Alert and Oriented: Yes  Difficulty Thinking: No  Requires Prompting: No  Requires assistance for routine expression?: No  Cognitive or behavioral barriers impacting ability to self-manage diabetes?: No    Culture/Pentecostal  Culture or Jainism beliefs that may impact ability to access healthcare: No    Communication  Language preference: English  Hearing Problems: No  Vision Problems: No  Communication needs impacting ability to self-manage diabetes?: No    Health Literacy  Preferred Learning Method: Face to Face  How often do you need to have someone help you read instructions, pamphlets, or written material from your doctor or pharmacy?: Never  Health literacy needs impacting ability to self-manage diabetes?:  No      Diabetes Self-Management Skills Assessment    Diabetes Disease Process/Treatment Options  Patient/caregiver able to state what happens when someone has diabetes.: yes  Patient/caregiver knows what type of diabetes they have.: yes  Diabetes Type : Type II  Patient/caregiver able to identify at least three signs and symptoms of diabetes.: yes  Identified signs and symptoms:: blurred vision, fatigue, frequent infections, frequent urination, increased thirst  Patient able to identify at least three risk factors for diabetes.: yes  Identified risk factors:: age over 40, being overweight, family history, reduced activity  Diabetes Disease Process/Treatment Options: Skills Assessment Completed: Yes  Assessment indicates:: Adequate understanding  Area of need?: No    Nutrition/Healthy Eating  Challenges to healthy eating:: portion control  Method of carbohydrate measurement:: no method  Patient can identify foods that impact blood sugar.: yes  Patient-identified foods:: fruit/fruit juice, milk, soda, starchy vegetables (corn, peas, beans), starches (bread, pasta, rice, cereal), sweets, yogurt  Nutrition/Healthy Eating Skills Assessment Completed:: Yes  Assessment indicates:: Instruction Needed  Area of need?: Yes    Physical Activity/Exercise  Patient's daily activity level:: sedentary  Patient formally exercises outside of work.: no (pt will walk outside to garbage dumpster and mailbox; sometimes will walk around complex where he lives)  Patient can identify forms of physical activity.: yes  Stated forms of physical activity:: any movement performed by muscles that uses energy  Patient can identify reasons why exercise/physical activity is important in diabetes management.: yes  Identified reasons:: strengthens heart, muscles, and bones, tones muscles, lowers blood glucose, blood pressure, and cholesterol, lowers risk of heart disease and stroke, relieves stress  Physical Activity/Exercise Skills Assessment  "Completed: : Yes  Assessment indicates:: Adequate understanding  Area of need?: No    Medications  Patient is able to describe current diabetes management routine.: yes  Diabetes management routine:: injectable medications, insulin, oral medications (Metformin 1000 mg BID; Jardiance 25 mg daily; Basaglar 75 units HS; Humalog 15 units TIDWM if BG"high" (>250); Ozempic 0.25 mg for 4 weeks--has not started Ozempic)  Patient is able to identify current diabetes medications, dosages, and appropriate timing of medications.: no  Patient understands the purpose of the medications taken for diabetes.: no  Patient reports problems or concerns with current medication regimen.: no  Medication Skills Assessment Completed:: Yes  Assessment indicates:: Instruction Needed  Area of need?: Yes    Home Blood Glucose Monitoring  Patient states that blood sugar is checked at home daily.: yes  Monitoring Method:: home glucometer  How often do you check your blood sugar?: 4 times a day  When do you check your blood sugar?: Before breakfast, Before lunch, Before dinner, Before bedtime  When you check what is your typical blood sugar range? :  (220-230s)  Blood glucose logs:: no  Blood glucose logs reviewed today?: no  Home Blood Glucose Monitoring Skills Assessment Completed: : Yes  Assessment indicates:: Instruction Needed  Area of need?: Yes    Acute Complications  Acute Complications Skills Assessment Completed: : No  Deffered due to:: Time  Area of need?: No    Chronic Complications  Patient can identify major chronic complications of diabetes.: yes  Stated chronic complications:: heart disease/heart attack, kidney disease, neuropathy/nerve damage, retinopathy, stroke  Patient can identify ways to prevent or delay diabetes complications.: yes  Stated ways to prevent complications:: healthy eating and regular activity, controlling blood sugar  Patient is aware that having diabetes increases risk of heart disease?: Yes  Patient is aware " that heart disease is the leading cause of death and disability in people with diabetes?: Yes  Patient able to state risk factors for heart disease?: Yes  Patient stated risk factors for heart disease:: High blood pressure, High cholesterol, Diet, Limited activity, Medication non-adherance, Having diabetes  Patient is taking statin?: Yes  Chronic Complications Skills Assessment Completed: : Yes  Assessment indicates:: Instruction Needed  Area of need?: Yes    Psychosocial/Coping  Patient can identify ways of coping with chronic disease.: yes  Patient-stated ways of coping with chronic disease:: support from loved ones  Psychosocial/Coping Skills Assessment Completed: : Yes  Assessment indicates:: Adequate understanding  Area of need?: No      Assessment Summary and Plan    Based on today's diabetes care assessment, the following areas of need were identified:          3/20/2024    12:03 AM   Social   Support No   Access to Mass Media/Tech No   Cognitive/Behavioral Health No   Culture/Baptism No   Communication No   Health Literacy No            3/20/2024    12:03 AM   Clinical   Medication Adherence Yes   Lab Compliance No   Nutritional Status No            3/20/2024    12:03 AM   Diabetes Self-Management Skills   Diabetes Disease Process/Treatment Options No   Nutrition/Healthy Eating Yes--see Care Plan   Physical Activity/Exercise No   Medication Yes--see Care Plan   Home Blood Glucose Monitoring Yes--see Care Plan   Acute Complications No   Chronic Complications No   Psychosocial/Coping No          Today's interventions were provided through individual discussion, instruction, and written materials were provided.      Patient verbalized understanding of instruction and written materials.  Pt was able to return back demonstration of instructions today. Patient understood key points, needs reinforcement and further instruction.     Diabetes Self-Management Care Plan:    Today's Diabetes Self-Management Care Plan  "was developed with Kevan's input. Kevan has agreed to work toward the following goal(s) to improve his/her overall diabetes control.      Care Plan: Diabetes Management   Updates made since 2/19/2024 12:00 AM        Problem: Medications         Goal: Patient Agrees to take Diabetes Medication(s) as prescribed.    Start Date: 3/20/2024   Expected End Date: 6/20/2024   Priority: High   Barriers: No Barriers Identified   Note:    Pt has not been taking Humalog unless his pre-meal BG was "high"  which means (to pt) >250.  Instructed pt that he needs to take Humalog with every meal 5-15 mins BEFORE eating.  He has not been doing this.  Double checked Carol's note and there is no indication that pt should not take or that he should only take if BG>250.  Typed up instructions for pt as follows directly from appt with Carol:      Medication Changes  Start Ozempic 0.25 mg once weekly x 4 weeks.  At week 5 increase to 0.5 mg weekly.     Decrease Basaglar to 70 units nightly.     Continue Humalog 15 units with meals.     Take 5-15 minutes BEFORE eating      Continue Jardiance and Metformin.    Pt and wife both voiced understanding.  They have to  Ozempic and will start to use.  Reviewed proper way to use/administer.  Pt also did not know that basaglar had been reduced to 70 units (not 75) HS       Task: Reviewed with patient all current diabetes medications and provided basic review of the purpose, dosage, frequency, side effects, and storage of both oral and injectable diabetes medications. Completed 3/20/2024        Task: Instructed patient on how to self-administer insulin Completed 3/20/2024        Task: Discussed guidelines for preventing, detecting and treating hypoglycemia and hyperglycemia and reviewed the importance of meal and medication timing with diabetes mediations for prevention of hypoglycemia and maximum drug benefit. Completed 3/20/2024        Problem: Blood Glucose Self-Monitoring         Goal: Patient " "agrees to check and record blood sugars 3-4 times per day.    Start Date: 3/20/2024   Expected End Date: 6/20/2024   Priority: Level 2   Barriers: No Barriers Identified   Note:    Reviewed target BG levels and BG log.  Asked pt to record BG and amount of meal time insulin daily.  Bring BG log to all MD and Educator appts.  Pt is trying to get Dexcom G7; will contact Educator for training when they receive.       Task: Provided patient with a meter today and sent Rx request to provider to send to patients pharmacy.         Task: Reviewed the importance of self-monitoring blood glucose and keeping logs.         Task: Instructed on how to self-monitor blood glucose using a home glucometer, how to properly dispose of used strips and lancets after use, and how to appropriately store meter and supplies.         Task: Provided patient with blood glucose logs, reviewed appropriate timing and frequency to SMBG, education on parameters on when to notify provider and advised patient to bring logs to all appts with PCP/Endocrinologist/Diabetes Care Specialist.         Task: Discussed ways to minimize pain when monitoring blood glucose.         Problem: Healthy Eating         Goal: Patient will try to be more consistent with amount of carbs at each meal--use "fistful"    Start Date: 3/20/2024   Expected End Date: 6/20/2024   Priority: Medium   Note:    Pt has been trying to be more careful with diet and is working with wife, Cheryle, to eat smaller portions  However, pt tends not to eat much during the day and then wife provides more at evening meal.  Encouraged them both to have a consistent amount of carbs per meal--a fistfu.  REC:  *have 1 packet oatmeal with PRO drink for b'fast  *watch amount of carbs at dinner  *fins 8 oz cup at home for milk with meals and limit to this amount  *eat any non-starchy veggies pt enjoys (green beans, cooked carrots) especially since there are not many he enjoys  *Ok to have corn or peas but " watch other carbs at same meal since these are starchy veggies  *continue with water, Diet Coke       Task: Reviewed the sources and role of Carbohydrate, Protein, and Fat and how each nutrient impacts blood sugar. Completed 3/20/2024        Task: Provided visual examples using dry measuring cups, food models, and other familiar objects such as computer mouse, deck or cards, tennis ball etc. to help with visualization of portions. Completed 3/20/2024        Task: Explained how to count carbohydrates using the food label and the use of dry measuring cups for accurate carb counting. Completed 3/20/2024        Task: Discussed strategies for choosing healthier menu options when dining out. Completed 3/20/2024        Task: Recommended replacing beverages containing high sugar content with noncaloric/sugar free options and/or water. Completed 3/20/2024        Task: Review the importance of balancing carbohydrates with each meal using portion control techniques to count servings of carbohydrate and label reading to identify serving size and amount of total carbs per serving. Completed 3/20/2024        Task: Provided Sample plate method and reviewed the use of the plate to estimate amounts of carbohydrate per meal. Completed 3/20/2024          Follow Up Plan     Follow up in about 2 weeks (around 4/3/2024) for f/u on 4/4/24 to review BG, meds and diet.  If pt has received Dexcom G7, will train/start .    Today's care plan and follow up schedule was discussed with patient.  Kevan verbalized understanding of the care plan, goals, and agrees to follow up plan.        The patient was encouraged to communicate with his/her health care provider/physician and care team regarding his/her condition(s) and treatment.  I provided the patient with my contact information today and encouraged to contact me via phone or [facility]'s Patient Portal as needed.     Length of Visit   Total Time: 60 Minutes

## 2024-03-28 ENCOUNTER — PATIENT OUTREACH (OUTPATIENT)
Dept: DIABETES | Facility: CLINIC | Age: 69
End: 2024-03-28
Payer: MEDICARE

## 2024-03-28 DIAGNOSIS — Z79.4 TYPE 2 DIABETES MELLITUS WITH HYPERGLYCEMIA, WITH LONG-TERM CURRENT USE OF INSULIN: ICD-10-CM

## 2024-03-28 DIAGNOSIS — E11.65 TYPE 2 DIABETES MELLITUS WITH HYPERGLYCEMIA, WITH LONG-TERM CURRENT USE OF INSULIN: ICD-10-CM

## 2024-03-28 RX ORDER — INSULIN LISPRO 100 [IU]/ML
INJECTION, SOLUTION INTRAVENOUS; SUBCUTANEOUS
Qty: 45 ML | Refills: 3 | Status: SHIPPED | OUTPATIENT
Start: 2024-03-28 | End: 2024-06-12 | Stop reason: SDUPTHER

## 2024-03-28 RX ORDER — INSULIN GLARGINE 100 [IU]/ML
INJECTION, SOLUTION SUBCUTANEOUS
Qty: 75 ML | Refills: 3 | Status: SHIPPED | OUTPATIENT
Start: 2024-03-28 | End: 2024-03-28 | Stop reason: SDUPTHER

## 2024-03-28 RX ORDER — INSULIN LISPRO 100 [IU]/ML
INJECTION, SOLUTION INTRAVENOUS; SUBCUTANEOUS
Qty: 45 ML | Refills: 3 | Status: SHIPPED | OUTPATIENT
Start: 2024-03-28 | End: 2024-03-28 | Stop reason: SDUPTHER

## 2024-03-28 RX ORDER — INSULIN GLARGINE 100 [IU]/ML
INJECTION, SOLUTION SUBCUTANEOUS
Qty: 75 ML | Refills: 3 | Status: SHIPPED | OUTPATIENT
Start: 2024-03-28 | End: 2024-06-12

## 2024-03-28 NOTE — PATIENT INSTRUCTIONS
Wife called with concerns that she gave her   insulin and very concerned about getting new Rx s asap.  Spoke directly to JOYA Jhaveri who immediately put order in for both Humalog and Basaglar and sent Rx to [facility] [address] (per Cheryle's request).  Also assured pt that  insulin might be less effective; watch BG levels; throw away  insulin.

## 2024-03-28 NOTE — TELEPHONE ENCOUNTER
----- Message from Brando Elizondo sent at 3/28/2024  1:20 PM CDT -----  Type: Needs Medical Advice  Who Called:  pt   Symptoms (please be specific):  gave pt outdated rx for insulin   Pharmacy name and phone #:    [id number] - [address] & [address] [address]  Phone: [phone] Fax: [phone]  Best Call Back Number: [phone]  Additional Information: pts spouse stated she would like to be advised due to concerns to not realizing pts insulin being outdated and concerns with what it could do to pt and getting pt new refills asap please call back to advise asap thanks!   insulin (BASAGLAR KWIKPEN U-100 INSULIN) glargine 100 units/mL (3mL) SubQ pen  HUMALOG KWIKPEN INSULIN 100 unit/mL pen

## 2024-03-28 NOTE — TELEPHONE ENCOUNTER
Spoke with Ms. Lobato to let her know Ms. Barrios sent scripts today for his insulin.  Ms. Cheryle stated it needs to be called into [facility] [address] and [facility] is already closed and will be closed tomorrow.

## 2024-04-09 DIAGNOSIS — M54.16 RADICULOPATHY, LUMBAR REGION: Primary | ICD-10-CM

## 2024-04-12 DIAGNOSIS — I50.22 CHRONIC SYSTOLIC HEART FAILURE: ICD-10-CM

## 2024-04-12 DIAGNOSIS — I10 BENIGN ESSENTIAL HTN: ICD-10-CM

## 2024-04-15 RX ORDER — FUROSEMIDE 20 MG/1
20 TABLET ORAL DAILY
Qty: 90 TABLET | Refills: 1 | OUTPATIENT
Start: 2024-04-15

## 2024-04-16 DIAGNOSIS — I50.22 CHRONIC SYSTOLIC HEART FAILURE: ICD-10-CM

## 2024-04-16 DIAGNOSIS — I10 BENIGN ESSENTIAL HTN: ICD-10-CM

## 2024-04-16 RX ORDER — FUROSEMIDE 20 MG/1
20 TABLET ORAL DAILY
Qty: 30 TABLET | Refills: 2 | Status: SHIPPED | OUTPATIENT
Start: 2024-04-16

## 2024-04-17 ENCOUNTER — HOSPITAL ENCOUNTER (OUTPATIENT)
Facility: HOSPITAL | Age: 69
LOS: 1 days | Discharge: HOME | End: 2024-04-18
Attending: EMERGENCY MEDICINE | Admitting: INTERNAL MEDICINE
Payer: MEDICARE

## 2024-04-17 DIAGNOSIS — R07.9 CHEST PAIN: Primary | ICD-10-CM

## 2024-04-17 LAB
ALBUMIN SERPL BCP-MCNC: 4 G/DL (ref 3.5–5.2)
ALP SERPL-CCNC: 118 U/L (ref 55–135)
ALT SERPL W/O P-5'-P-CCNC: 16 U/L (ref 10–44)
ANION GAP SERPL CALC-SCNC: 17 MMOL/L (ref 8–16)
APTT PPP: 30.6 SEC (ref 21–32)
AST SERPL-CCNC: 17 U/L (ref 10–40)
BASOPHILS # BLD AUTO: 0.06 K/UL (ref 0–0.2)
BASOPHILS NFR BLD: 0.6 % (ref 0–1.9)
BILIRUB SERPL-MCNC: 0.4 MG/DL (ref 0.1–1)
BNP SERPL-MCNC: 81 PG/ML (ref 0–99)
BUN SERPL-MCNC: 11 MG/DL (ref 8–23)
CALCIUM SERPL-MCNC: 9.5 MG/DL (ref 8.7–10.5)
CHLORIDE SERPL-SCNC: 103 MMOL/L (ref 95–110)
CO2 SERPL-SCNC: 20 MMOL/L (ref 23–29)
CREAT SERPL-MCNC: 1 MG/DL (ref 0.5–1.4)
DIFFERENTIAL METHOD BLD: ABNORMAL
EOSINOPHIL # BLD AUTO: 0.3 K/UL (ref 0–0.5)
EOSINOPHIL NFR BLD: 2.5 % (ref 0–8)
ERYTHROCYTE [DISTWIDTH] IN BLOOD BY AUTOMATED COUNT: 14 % (ref 11.5–14.5)
EST. GFR  (NO RACE VARIABLE): >60 ML/MIN/1.73 M^2
GLUCOSE SERPL-MCNC: 80 MG/DL (ref 70–110)
GLUCOSE SERPL-MCNC: 86 MG/DL (ref 70–110)
HCT VFR BLD AUTO: 39.6 % (ref 40–54)
HGB BLD-MCNC: 12.4 G/DL (ref 14–18)
IMM GRANULOCYTES # BLD AUTO: 0.03 K/UL (ref 0–0.04)
IMM GRANULOCYTES NFR BLD AUTO: 0.3 % (ref 0–0.5)
LIPASE SERPL-CCNC: 32 U/L (ref 4–60)
LYMPHOCYTES # BLD AUTO: 1.6 K/UL (ref 1–4.8)
LYMPHOCYTES NFR BLD: 15.8 % (ref 18–48)
MCH RBC QN AUTO: 30.7 PG (ref 27–31)
MCHC RBC AUTO-ENTMCNC: 31.3 G/DL (ref 32–36)
MCV RBC AUTO: 98 FL (ref 82–98)
MONOCYTES # BLD AUTO: 0.8 K/UL (ref 0.3–1)
MONOCYTES NFR BLD: 8 % (ref 4–15)
NEUTROPHILS # BLD AUTO: 7.5 K/UL (ref 1.8–7.7)
NEUTROPHILS NFR BLD: 72.8 % (ref 38–73)
NRBC BLD-RTO: 0 /100 WBC
PLATELET # BLD AUTO: 200 K/UL (ref 150–450)
PMV BLD AUTO: 9.4 FL (ref 9.2–12.9)
POTASSIUM SERPL-SCNC: 4.5 MMOL/L (ref 3.5–5.1)
PROT SERPL-MCNC: 8.1 G/DL (ref 6–8.4)
RBC # BLD AUTO: 4.04 M/UL (ref 4.6–6.2)
SODIUM SERPL-SCNC: 140 MMOL/L (ref 136–145)
TROPONIN I SERPL DL<=0.01 NG/ML-MCNC: <0.006 NG/ML (ref 0–0.03)
TROPONIN I SERPL DL<=0.01 NG/ML-MCNC: <0.006 NG/ML (ref 0–0.03)
WBC # BLD AUTO: 10.35 K/UL (ref 3.9–12.7)

## 2024-04-17 PROCEDURE — 93010 ELECTROCARDIOGRAM REPORT: CPT | Mod: ,,, | Performed by: INTERNAL MEDICINE

## 2024-04-17 PROCEDURE — 36415 COLL VENOUS BLD VENIPUNCTURE: CPT | Mod: XB | Performed by: PHYSICIAN ASSISTANT

## 2024-04-17 PROCEDURE — 85025 COMPLETE CBC W/AUTO DIFF WBC: CPT | Performed by: PHYSICIAN ASSISTANT

## 2024-04-17 PROCEDURE — G0378 HOSPITAL OBSERVATION PER HR: HCPCS

## 2024-04-17 PROCEDURE — 99285 EMERGENCY DEPT VISIT HI MDM: CPT | Mod: 25

## 2024-04-17 PROCEDURE — 36415 COLL VENOUS BLD VENIPUNCTURE: CPT | Performed by: PHYSICIAN ASSISTANT

## 2024-04-17 PROCEDURE — 85730 THROMBOPLASTIN TIME PARTIAL: CPT | Performed by: PHYSICIAN ASSISTANT

## 2024-04-17 PROCEDURE — 96374 THER/PROPH/DIAG INJ IV PUSH: CPT

## 2024-04-17 PROCEDURE — 94760 N-INVAS EAR/PLS OXIMETRY 1: CPT | Mod: XB

## 2024-04-17 PROCEDURE — 83690 ASSAY OF LIPASE: CPT | Performed by: PHYSICIAN ASSISTANT

## 2024-04-17 PROCEDURE — 25000003 PHARM REV CODE 250: Performed by: PHYSICIAN ASSISTANT

## 2024-04-17 PROCEDURE — 94761 N-INVAS EAR/PLS OXIMETRY MLT: CPT

## 2024-04-17 PROCEDURE — 63600175 PHARM REV CODE 636 W HCPCS: Performed by: INTERNAL MEDICINE

## 2024-04-17 PROCEDURE — 93005 ELECTROCARDIOGRAM TRACING: CPT

## 2024-04-17 PROCEDURE — 99900035 HC TECH TIME PER 15 MIN (STAT)

## 2024-04-17 PROCEDURE — 96376 TX/PRO/DX INJ SAME DRUG ADON: CPT

## 2024-04-17 PROCEDURE — 25000003 PHARM REV CODE 250: Performed by: STUDENT IN AN ORGANIZED HEALTH CARE EDUCATION/TRAINING PROGRAM

## 2024-04-17 PROCEDURE — 84484 ASSAY OF TROPONIN QUANT: CPT | Mod: 91 | Performed by: PHYSICIAN ASSISTANT

## 2024-04-17 PROCEDURE — 83880 ASSAY OF NATRIURETIC PEPTIDE: CPT | Performed by: PHYSICIAN ASSISTANT

## 2024-04-17 PROCEDURE — 96375 TX/PRO/DX INJ NEW DRUG ADDON: CPT

## 2024-04-17 PROCEDURE — 84484 ASSAY OF TROPONIN QUANT: CPT | Performed by: PHYSICIAN ASSISTANT

## 2024-04-17 PROCEDURE — 80053 COMPREHEN METABOLIC PANEL: CPT | Performed by: PHYSICIAN ASSISTANT

## 2024-04-17 PROCEDURE — 63600175 PHARM REV CODE 636 W HCPCS: Performed by: STUDENT IN AN ORGANIZED HEALTH CARE EDUCATION/TRAINING PROGRAM

## 2024-04-17 PROCEDURE — 96372 THER/PROPH/DIAG INJ SC/IM: CPT | Performed by: INTERNAL MEDICINE

## 2024-04-17 PROCEDURE — 63600175 PHARM REV CODE 636 W HCPCS: Mod: JZ,JG | Performed by: PHYSICIAN ASSISTANT

## 2024-04-17 PROCEDURE — 25000242 PHARM REV CODE 250 ALT 637 W/ HCPCS: Performed by: PHYSICIAN ASSISTANT

## 2024-04-17 RX ORDER — IBUPROFEN 200 MG
24 TABLET ORAL
Status: DISCONTINUED | OUTPATIENT
Start: 2024-04-17 | End: 2024-04-18 | Stop reason: HOSPADM

## 2024-04-17 RX ORDER — ASPIRIN 325 MG
325 TABLET ORAL DAILY
Status: DISCONTINUED | OUTPATIENT
Start: 2024-04-17 | End: 2024-04-18 | Stop reason: HOSPADM

## 2024-04-17 RX ORDER — TAMSULOSIN HYDROCHLORIDE 0.4 MG/1
0.4 CAPSULE ORAL NIGHTLY
Status: DISCONTINUED | OUTPATIENT
Start: 2024-04-17 | End: 2024-04-18 | Stop reason: HOSPADM

## 2024-04-17 RX ORDER — PANTOPRAZOLE SODIUM 40 MG/1
40 TABLET, DELAYED RELEASE ORAL
Qty: 90 TABLET | Refills: 1 | Status: SHIPPED | OUTPATIENT
Start: 2024-04-17 | End: 2024-04-30

## 2024-04-17 RX ORDER — INSULIN ASPART 100 [IU]/ML
0-15 INJECTION, SOLUTION INTRAVENOUS; SUBCUTANEOUS
Status: DISCONTINUED | OUTPATIENT
Start: 2024-04-17 | End: 2024-04-18 | Stop reason: HOSPADM

## 2024-04-17 RX ORDER — CYCLOBENZAPRINE HCL 5 MG
5 TABLET ORAL 2 TIMES DAILY
Status: DISCONTINUED | OUTPATIENT
Start: 2024-04-17 | End: 2024-04-18 | Stop reason: HOSPADM

## 2024-04-17 RX ORDER — ENOXAPARIN SODIUM 150 MG/ML
1 INJECTION SUBCUTANEOUS
Status: DISCONTINUED | OUTPATIENT
Start: 2024-04-17 | End: 2024-04-18 | Stop reason: HOSPADM

## 2024-04-17 RX ORDER — LAMOTRIGINE 25 MG/1
50 TABLET ORAL DAILY
Status: DISCONTINUED | OUTPATIENT
Start: 2024-04-18 | End: 2024-04-18 | Stop reason: HOSPADM

## 2024-04-17 RX ORDER — CLONAZEPAM 1 MG/1
1 TABLET ORAL NIGHTLY PRN
Status: DISCONTINUED | OUTPATIENT
Start: 2024-04-17 | End: 2024-04-18 | Stop reason: HOSPADM

## 2024-04-17 RX ORDER — SODIUM,POTASSIUM PHOSPHATES 280-250MG
2 POWDER IN PACKET (EA) ORAL
Status: DISCONTINUED | OUTPATIENT
Start: 2024-04-17 | End: 2024-04-18 | Stop reason: HOSPADM

## 2024-04-17 RX ORDER — LANOLIN ALCOHOL/MO/W.PET/CERES
800 CREAM (GRAM) TOPICAL
Status: DISCONTINUED | OUTPATIENT
Start: 2024-04-17 | End: 2024-04-18 | Stop reason: HOSPADM

## 2024-04-17 RX ORDER — HYDROCODONE BITARTRATE AND ACETAMINOPHEN 5; 325 MG/1; MG/1
1 TABLET ORAL EVERY 6 HOURS PRN
Status: DISCONTINUED | OUTPATIENT
Start: 2024-04-17 | End: 2024-04-18 | Stop reason: HOSPADM

## 2024-04-17 RX ORDER — FUROSEMIDE 20 MG/1
20 TABLET ORAL DAILY
Status: DISCONTINUED | OUTPATIENT
Start: 2024-04-18 | End: 2024-04-18 | Stop reason: HOSPADM

## 2024-04-17 RX ORDER — QUETIAPINE FUMARATE 300 MG/1
300 TABLET, FILM COATED ORAL NIGHTLY
COMMUNITY
Start: 2024-04-11

## 2024-04-17 RX ORDER — PANTOPRAZOLE SODIUM 40 MG/1
40 TABLET, DELAYED RELEASE ORAL
Status: DISCONTINUED | OUTPATIENT
Start: 2024-04-18 | End: 2024-04-18 | Stop reason: HOSPADM

## 2024-04-17 RX ORDER — QUETIAPINE FUMARATE 100 MG/1
300 TABLET, FILM COATED ORAL NIGHTLY
Status: DISCONTINUED | OUTPATIENT
Start: 2024-04-17 | End: 2024-04-18 | Stop reason: HOSPADM

## 2024-04-17 RX ORDER — TALC
6 POWDER (GRAM) TOPICAL NIGHTLY PRN
Status: DISCONTINUED | OUTPATIENT
Start: 2024-04-17 | End: 2024-04-18 | Stop reason: HOSPADM

## 2024-04-17 RX ORDER — LAMOTRIGINE 25 MG/1
50 TABLET ORAL DAILY
COMMUNITY
Start: 2024-04-11

## 2024-04-17 RX ORDER — NITROGLYCERIN 0.4 MG/1
0.4 TABLET SUBLINGUAL EVERY 5 MIN PRN
Status: DISCONTINUED | OUTPATIENT
Start: 2024-04-17 | End: 2024-04-18 | Stop reason: HOSPADM

## 2024-04-17 RX ORDER — GABAPENTIN 300 MG/1
600 CAPSULE ORAL 3 TIMES DAILY
Status: DISCONTINUED | OUTPATIENT
Start: 2024-04-17 | End: 2024-04-18 | Stop reason: HOSPADM

## 2024-04-17 RX ORDER — OXYCODONE AND ACETAMINOPHEN 5; 325 MG/1; MG/1
1 TABLET ORAL EVERY 4 HOURS PRN
COMMUNITY
Start: 2024-03-23

## 2024-04-17 RX ORDER — CARVEDILOL 3.12 MG/1
3.12 TABLET ORAL 2 TIMES DAILY
Status: DISCONTINUED | OUTPATIENT
Start: 2024-04-17 | End: 2024-04-18 | Stop reason: HOSPADM

## 2024-04-17 RX ORDER — MORPHINE SULFATE 4 MG/ML
4 INJECTION, SOLUTION INTRAMUSCULAR; INTRAVENOUS
Status: COMPLETED | OUTPATIENT
Start: 2024-04-17 | End: 2024-04-17

## 2024-04-17 RX ORDER — ALBUTEROL SULFATE 0.83 MG/ML
2.5 SOLUTION RESPIRATORY (INHALATION) EVERY 4 HOURS PRN
Status: DISCONTINUED | OUTPATIENT
Start: 2024-04-17 | End: 2024-04-18 | Stop reason: HOSPADM

## 2024-04-17 RX ORDER — SODIUM CHLORIDE 0.9 % (FLUSH) 0.9 %
10 SYRINGE (ML) INJECTION
Status: DISCONTINUED | OUTPATIENT
Start: 2024-04-17 | End: 2024-04-18 | Stop reason: HOSPADM

## 2024-04-17 RX ORDER — ACETAMINOPHEN 500 MG
1000 TABLET ORAL
Status: COMPLETED | OUTPATIENT
Start: 2024-04-17 | End: 2024-04-17

## 2024-04-17 RX ORDER — ATORVASTATIN CALCIUM 40 MG/1
40 TABLET, FILM COATED ORAL NIGHTLY
Status: DISCONTINUED | OUTPATIENT
Start: 2024-04-17 | End: 2024-04-18 | Stop reason: HOSPADM

## 2024-04-17 RX ORDER — ONDANSETRON HYDROCHLORIDE 2 MG/ML
4 INJECTION, SOLUTION INTRAVENOUS
Status: COMPLETED | OUTPATIENT
Start: 2024-04-17 | End: 2024-04-17

## 2024-04-17 RX ORDER — GLUCAGON 1 MG
1 KIT INJECTION
Status: DISCONTINUED | OUTPATIENT
Start: 2024-04-17 | End: 2024-04-18 | Stop reason: HOSPADM

## 2024-04-17 RX ORDER — IBUPROFEN 200 MG
16 TABLET ORAL
Status: DISCONTINUED | OUTPATIENT
Start: 2024-04-17 | End: 2024-04-18 | Stop reason: HOSPADM

## 2024-04-17 RX ORDER — HYDROMORPHONE HYDROCHLORIDE 1 MG/ML
0.5 INJECTION, SOLUTION INTRAMUSCULAR; INTRAVENOUS; SUBCUTANEOUS EVERY 6 HOURS PRN
Status: DISCONTINUED | OUTPATIENT
Start: 2024-04-17 | End: 2024-04-18 | Stop reason: HOSPADM

## 2024-04-17 RX ORDER — ONDANSETRON HYDROCHLORIDE 2 MG/ML
4 INJECTION, SOLUTION INTRAVENOUS EVERY 8 HOURS PRN
Status: DISCONTINUED | OUTPATIENT
Start: 2024-04-17 | End: 2024-04-18 | Stop reason: HOSPADM

## 2024-04-17 RX ADMIN — Medication 6 MG: at 08:04

## 2024-04-17 RX ADMIN — CARVEDILOL 3.12 MG: 3.12 TABLET, FILM COATED ORAL at 08:04

## 2024-04-17 RX ADMIN — MORPHINE SULFATE 4 MG: 4 INJECTION, SOLUTION INTRAMUSCULAR; INTRAVENOUS at 01:04

## 2024-04-17 RX ADMIN — CYCLOBENZAPRINE HYDROCHLORIDE 5 MG: 5 TABLET, FILM COATED ORAL at 08:04

## 2024-04-17 RX ADMIN — HYDROMORPHONE HYDROCHLORIDE 0.5 MG: 0.5 INJECTION, SOLUTION INTRAMUSCULAR; INTRAVENOUS; SUBCUTANEOUS at 08:04

## 2024-04-17 RX ADMIN — APIXABAN 5 MG: 5 TABLET, FILM COATED ORAL at 08:04

## 2024-04-17 RX ADMIN — ASPIRIN 325 MG ORAL TABLET 325 MG: 325 PILL ORAL at 11:04

## 2024-04-17 RX ADMIN — ENOXAPARIN SODIUM 105 MG: 120 INJECTION SUBCUTANEOUS at 11:04

## 2024-04-17 RX ADMIN — TAMSULOSIN HYDROCHLORIDE 0.4 MG: 0.4 CAPSULE ORAL at 08:04

## 2024-04-17 RX ADMIN — ATORVASTATIN CALCIUM 40 MG: 40 TABLET, FILM COATED ORAL at 08:04

## 2024-04-17 RX ADMIN — QUETIAPINE 300 MG: 100 TABLET ORAL at 08:04

## 2024-04-17 RX ADMIN — NITROGLYCERIN 0.4 MG: 0.4 TABLET, ORALLY DISINTEGRATING SUBLINGUAL at 02:04

## 2024-04-17 RX ADMIN — GABAPENTIN 600 MG: 300 CAPSULE ORAL at 08:04

## 2024-04-17 RX ADMIN — ONDANSETRON 4 MG: 2 INJECTION INTRAMUSCULAR; INTRAVENOUS at 01:04

## 2024-04-17 RX ADMIN — ONDANSETRON 4 MG: 2 INJECTION INTRAMUSCULAR; INTRAVENOUS at 08:04

## 2024-04-17 RX ADMIN — ACETAMINOPHEN 1000 MG: 500 TABLET, COATED ORAL at 03:04

## 2024-04-17 NOTE — HPI
68 year old pt getting admitted with chest pain  Pt was suffering from chest pain for past 2 days  Chest pain mostly on L side with radiation to Neck and R shoulder  He went to [facility] and got transferred here  Pt had LHC in Jan 2024 under Dr Wheeler in [facility] and there was no evidence of obstructive CAD  Spinal cord stimulator was removed on March 2024  Pt suffers from chronic pain
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
## 2024-04-17 NOTE — ASSESSMENT & PLAN NOTE
Chronic, controlled. Latest blood pressure and vitals reviewed-     Temp:  [97 °F (36.1 °C)]   Pulse:  [80-96]   Resp:  [14-20]   BP: (119-157)/()   SpO2:  [94 %-99 %] .   Home meds for hypertension were reviewed and noted below.   Hypertension Medications               carvediloL (COREG) 3.125 MG tablet TAKE 1 TABLET BY MOUTH TWICE A DAY    furosemide (LASIX) 20 MG tablet TAKE 1 TABLET (20 MG TOTAL) BY MOUTH ONCE DAILY.            While in the hospital, will manage blood pressure as follows; Continue home antihypertensive regimen    Will utilize p.r.n. blood pressure medication only if patient's blood pressure greater than 140/90 and he develops symptoms such as worsening chest pain or shortness of breath.

## 2024-04-17 NOTE — PHARMACY MED REC
"Admission Medication History     The home medication history was taken by Natanael Briones.    You may go to "Admission" then "Reconcile Home Medications" tabs to review and/or act upon these items.     The home medication list has been updated by the Pharmacy department.   Please read ALL comments highlighted in yellow.   Please address this information as you see fit.    Feel free to contact us if you have any questions or require assistance.      The medications listed below were removed from the home medication list. Please reorder if appropriate:  Patient reports no longer taking the following medication(s):  Duo-Neb  Metoclopramide 5 mg  Naproxen 220 mg  Triamcinolone Paste    Medications listed below were obtained from: Patient/family and Analytic software- [employer]  No current facility-administered medications on file prior to encounter.     Current Outpatient Medications on File Prior to Encounter   Medication Sig Dispense Refill    albuterol (PROVENTIL/VENTOLIN HFA) 90 mcg/actuation inhaler Inhale 2 puffs into the lungs every 4 (four) hours as needed for Shortness of Breath or Wheezing. 18 g 3    atorvastatin (LIPITOR) 40 MG tablet TAKE 1 TABLET BY MOUTH EVERY NIGHT AT BEDTIME (Patient taking differently: Take 40 mg by mouth every evening.) 90 tablet 1    BELSOMRA 20 mg Tab Take 1 tablet by mouth nightly as needed (Insomnia).      carvediloL (COREG) 3.125 MG tablet TAKE 1 TABLET BY MOUTH TWICE A DAY (Patient taking differently: Take 3.125 mg by mouth 2 (two) times daily.) 180 tablet 0    clonazePAM (KLONOPIN) 1 MG tablet Take 1 mg by mouth nightly as needed for Anxiety.      cyclobenzaprine (FLEXERIL) 5 MG tablet Take 1 tablet (5 mg total) by mouth 2 (two) times daily. 180 tablet 1    ELIQUIS 5 mg Tab TAKE 1 TABLET BY MOUTH TWICE A  tablet 0    furosemide (LASIX) 20 MG tablet TAKE 1 TABLET (20 MG TOTAL) BY MOUTH ONCE DAILY. 30 tablet 2    gabapentin (NEURONTIN) 600 MG tablet Take 1 tablet (600 mg " total) by mouth 3 (three) times daily. 270 tablet 1    insulin (BASAGLAR KWIKPEN U-100 INSULIN) glargine 100 units/mL SubQ pen Inject 75 u qd. (Patient taking differently: Inject 70 Units into the skin every evening. Inject 75 u qd.) 75 mL 3    insulin lispro (HUMALOG KWIKPEN INSULIN) 100 unit/mL pen Inject 15 units with meals. (Patient taking differently: Inject 15 Units into the skin 3 (three) times daily with meals. Inject 15 units with meals.) 45 mL 3    JARDIANCE 25 mg tablet TAKE 1 TABLET BY MOUTH DAILY (Patient taking differently: Take 25 mg by mouth once daily.) 90 tablet 0    lamoTRIgine (LAMICTAL) 25 MG tablet Take 50 mg by mouth once daily.      melatonin 10 mg Cap Take 1 capsule by mouth nightly as needed (Insomnia).      metFORMIN (GLUCOPHAGE) 1000 MG tablet Take 1 tablet (1,000 mg total) by mouth 2 (two) times daily with meals. 60 tablet 5    multivitamin (THERAGRAN) per tablet Take 1 tablet by mouth once daily.      ondansetron (ZOFRAN) 4 MG tablet TAKE 1 TABLET BY MOUTH EVERY TWELVE HOURS AS NEEDED FOR NAUSEA (Patient taking differently: Take 4 mg by mouth every 12 (twelve) hours as needed for Nausea.) 16 tablet 0    oxyCODONE-acetaminophen (PERCOCET) 5-325 mg per tablet Take 1 tablet by mouth every 4 (four) hours as needed for Pain. for pain.      pantoprazole (PROTONIX) 40 MG tablet Take 1 tablet (40 mg total) by mouth once daily. 30 tablet 11    potassium chloride SA (K-DUR,KLOR-CON) 20 MEQ tablet Take 20 mEq by mouth once daily.       QUEtiapine (SEROQUEL) 300 MG Tab Take 300 mg by mouth every evening.      semaglutide (OZEMPIC) 0.25 mg or 0.5 mg (2 mg/3 mL) pen injector Inject 0.25 mg into the skin every 7 days for 30 days, THEN 0.5 mg every 7 days. 3 mL 11    tamsulosin (FLOMAX) 0.4 mg Cap Take 1 capsule (0.4 mg total) by mouth once daily. Take in evening. (Patient taking differently: Take 0.4 mg by mouth every evening. Take in evening.) 90 capsule 2    TRINTELLIX 20 mg Tab Take 1 tablet by  mouth Daily.      blood sugar diagnostic Strp To check BG 4 times daily, to use with insurance preferred meter 200 each 5    blood-glucose meter kit To check BG 4 times daily, to use with insurance preferred meter 1 each 0    DEXCOM G7  Misc Use to monitor glucose. 1 each 0    DEXCOM G7 SENSOR Malou Change every 10 days. 3 each 11    EPINEPHrine (EPIPEN) 0.3 mg/0.3 mL AtIn EpiPen 2-Helio 0.3 mg/0.3 mL injection, auto-injector 1 Device 1    [DISCONTINUED] albuterol-ipratropium (DUO-NEB) 2.5 mg-0.5 mg/3 mL nebulizer solution Take 3 mLs by nebulization every 6 (six) hours as needed for Wheezing. Rescue 1 Box 0    [DISCONTINUED] metoclopramide HCl (REGLAN) 5 MG tablet Take 1 tablet (5 mg total) by mouth 3 (three) times daily before meals. 21 tablet 0    [DISCONTINUED] naproxen sodium (ANAPROX) 220 MG tablet Take 220 mg by mouth 2 (two) times daily with meals.      [DISCONTINUED] ondansetron (ZOFRAN-ODT) 4 MG TbDL Take 1 tablet (4 mg total) by mouth every 6 (six) hours as needed (nausea and vomiting). 20 tablet 0    [DISCONTINUED] triamcinolone acetonide 0.1% (KENALOG) 0.1 % paste Place 1 application onto teeth as needed.           Natanael Briones  EXT 1924                .

## 2024-04-17 NOTE — ED NOTES
Room assignment changed. Report called to AARTI Noe @ [facility] [phone] for admission to [facility]

## 2024-04-17 NOTE — FIRST PROVIDER EVALUATION
Emergency Department TeleTriage Encounter Note      CHIEF COMPLAINT    Chief Complaint   Patient presents with    Chest Pain     Chest Pain x 2 days        VITAL SIGNS   Initial Vitals [04/17/24 1014]   BP Pulse Resp Temp SpO2   (!) 124/95 85 20 97 °F (36.1 °C) 98 %      MAP       --            ALLERGIES    Review of patient's allergies indicates:   Allergen Reactions    Bee pollens Anaphylaxis     Bee stings     Penicillins Nausea Only     Other reaction(s): Unknown    Codeine Rash     Other reaction(s): Unknown    Morphine Rash       PROVIDER TRIAGE NOTE  Patient presents with complaint of chest pain for 2 days.  Reports intermittent shortness of breath      Phy:   Constitutional: well nourished, well developed, appearing stated age, NAD        Initial orders will be placed and care will be transferred to an alternate provider when patient is roomed for a full evaluation. Any additional orders and the final disposition will be determined by that provider.        ORDERS  Labs Reviewed - No data to display    ED Orders (720h ago, onward)      Start Ordered     Status Ordering Provider    04/17/24 1025 04/17/24 1024  EKG 12-lead  Once         Ordered FELIZ WILLAMS              Virtual Visit Note: The provider triage portion of this emergency department evaluation and documentation was performed via [employer], a HIPAA-compliant telemedicine application, in concert with a tele-presenter in the room. A face to face patient evaluation with one of my colleagues will occur once the patient is placed in an emergency department room.      DISCLAIMER: This note was prepared with [employer] voice recognition transcription software. Garbled syntax, mangled pronouns, and other bizarre constructions may be attributed to that software system.

## 2024-04-17 NOTE — ASSESSMENT & PLAN NOTE
Pt had LHC done in Jan 2024 and it was normal  11 months ago he had EGD which was also normal   Pt has chronic pancreatitis   He suffers from chronic pain and was on spinal cord stimulator which was just removed days ago  He had PE in 2002 so check D dimer  Also his legs are swollen and USS veins LE s ordered

## 2024-04-17 NOTE — ED NOTES
Report given to AARTI Solomon @ [phone] for transfer to [facility] room 2511

## 2024-04-18 VITALS
HEART RATE: 77 BPM | DIASTOLIC BLOOD PRESSURE: 80 MMHG | RESPIRATION RATE: 17 BRPM | BODY MASS INDEX: 47.14 KG/M2 | OXYGEN SATURATION: 97 % | TEMPERATURE: 98 F | HEIGHT: 60 IN | WEIGHT: 240.13 LBS | SYSTOLIC BLOOD PRESSURE: 115 MMHG

## 2024-04-18 LAB
ALBUMIN SERPL BCP-MCNC: 4.1 G/DL (ref 3.5–5.2)
ALP SERPL-CCNC: 109 U/L (ref 55–135)
ALT SERPL W/O P-5'-P-CCNC: 13 U/L (ref 10–44)
ANION GAP SERPL CALC-SCNC: 6 MMOL/L (ref 8–16)
AST SERPL-CCNC: 15 U/L (ref 10–40)
BASOPHILS # BLD AUTO: 0.08 K/UL (ref 0–0.2)
BASOPHILS NFR BLD: 1.3 % (ref 0–1.9)
BILIRUB SERPL-MCNC: 0.4 MG/DL (ref 0.1–1)
BUN SERPL-MCNC: 12 MG/DL (ref 8–23)
CALCIUM SERPL-MCNC: 9 MG/DL (ref 8.7–10.5)
CHLORIDE SERPL-SCNC: 101 MMOL/L (ref 95–110)
CO2 SERPL-SCNC: 33 MMOL/L (ref 23–29)
CREAT SERPL-MCNC: 0.9 MG/DL (ref 0.5–1.4)
D DIMER PPP IA.FEU-MCNC: 0.29 MG/L FEU (ref 0–0.49)
DIFFERENTIAL METHOD BLD: ABNORMAL
EOSINOPHIL # BLD AUTO: 0.4 K/UL (ref 0–0.5)
EOSINOPHIL NFR BLD: 6.1 % (ref 0–8)
ERYTHROCYTE [DISTWIDTH] IN BLOOD BY AUTOMATED COUNT: 14.3 % (ref 11.5–14.5)
EST. GFR  (NO RACE VARIABLE): >60 ML/MIN/1.73 M^2
GLUCOSE SERPL-MCNC: 48 MG/DL (ref 70–110)
GLUCOSE SERPL-MCNC: 97 MG/DL (ref 70–110)
HCT VFR BLD AUTO: 40.1 % (ref 40–54)
HGB BLD-MCNC: 12.4 G/DL (ref 14–18)
IMM GRANULOCYTES # BLD AUTO: 0.02 K/UL (ref 0–0.04)
IMM GRANULOCYTES NFR BLD AUTO: 0.3 % (ref 0–0.5)
LYMPHOCYTES # BLD AUTO: 2.4 K/UL (ref 1–4.8)
LYMPHOCYTES NFR BLD: 38.1 % (ref 18–48)
MCH RBC QN AUTO: 30.8 PG (ref 27–31)
MCHC RBC AUTO-ENTMCNC: 30.9 G/DL (ref 32–36)
MCV RBC AUTO: 100 FL (ref 82–98)
MONOCYTES # BLD AUTO: 0.6 K/UL (ref 0.3–1)
MONOCYTES NFR BLD: 9 % (ref 4–15)
NEUTROPHILS # BLD AUTO: 2.8 K/UL (ref 1.8–7.7)
NEUTROPHILS NFR BLD: 45.2 % (ref 38–73)
NRBC BLD-RTO: 0 /100 WBC
PLATELET # BLD AUTO: 215 K/UL (ref 150–450)
PMV BLD AUTO: 9.2 FL (ref 9.2–12.9)
POTASSIUM SERPL-SCNC: 4 MMOL/L (ref 3.5–5.1)
PROT SERPL-MCNC: 7.4 G/DL (ref 6–8.4)
RBC # BLD AUTO: 4.02 M/UL (ref 4.6–6.2)
SODIUM SERPL-SCNC: 140 MMOL/L (ref 136–145)
WBC # BLD AUTO: 6.19 K/UL (ref 3.9–12.7)

## 2024-04-18 PROCEDURE — 85379 FIBRIN DEGRADATION QUANT: CPT | Performed by: INTERNAL MEDICINE

## 2024-04-18 PROCEDURE — 36415 COLL VENOUS BLD VENIPUNCTURE: CPT | Performed by: INTERNAL MEDICINE

## 2024-04-18 PROCEDURE — 85025 COMPLETE CBC W/AUTO DIFF WBC: CPT | Performed by: STUDENT IN AN ORGANIZED HEALTH CARE EDUCATION/TRAINING PROGRAM

## 2024-04-18 PROCEDURE — 80053 COMPREHEN METABOLIC PANEL: CPT | Performed by: STUDENT IN AN ORGANIZED HEALTH CARE EDUCATION/TRAINING PROGRAM

## 2024-04-18 PROCEDURE — G0378 HOSPITAL OBSERVATION PER HR: HCPCS

## 2024-04-18 PROCEDURE — 25000003 PHARM REV CODE 250: Performed by: INTERNAL MEDICINE

## 2024-04-18 PROCEDURE — 25000003 PHARM REV CODE 250: Performed by: STUDENT IN AN ORGANIZED HEALTH CARE EDUCATION/TRAINING PROGRAM

## 2024-04-18 PROCEDURE — 96375 TX/PRO/DX INJ NEW DRUG ADDON: CPT

## 2024-04-18 PROCEDURE — 25000003 PHARM REV CODE 250: Performed by: PHYSICIAN ASSISTANT

## 2024-04-18 RX ADMIN — GABAPENTIN 600 MG: 300 CAPSULE ORAL at 09:04

## 2024-04-18 RX ADMIN — HYDROCODONE BITARTRATE AND ACETAMINOPHEN 1 TABLET: 5; 325 TABLET ORAL at 03:04

## 2024-04-18 RX ADMIN — DEXTROSE MONOHYDRATE 25 G: 25 INJECTION, SOLUTION INTRAVENOUS at 06:04

## 2024-04-18 RX ADMIN — ASPIRIN 325 MG ORAL TABLET 325 MG: 325 PILL ORAL at 09:04

## 2024-04-18 RX ADMIN — PANTOPRAZOLE SODIUM 40 MG: 40 TABLET, DELAYED RELEASE ORAL at 06:04

## 2024-04-18 RX ADMIN — LAMOTRIGINE 50 MG: 25 TABLET ORAL at 09:04

## 2024-04-18 RX ADMIN — CYCLOBENZAPRINE HYDROCHLORIDE 5 MG: 5 TABLET, FILM COATED ORAL at 09:04

## 2024-04-18 RX ADMIN — FUROSEMIDE 20 MG: 20 TABLET ORAL at 09:04

## 2024-04-18 RX ADMIN — CARVEDILOL 3.12 MG: 3.12 TABLET, FILM COATED ORAL at 09:04

## 2024-04-18 NOTE — PLAN OF CARE
Problem: Adult Inpatient Plan of Care  Goal: Plan of Care Review  Outcome: Met  Goal: Patient-Specific Goal (Individualized)  Outcome: Met  Goal: Absence of Hospital-Acquired Illness or Injury  Outcome: Met  Goal: Optimal Comfort and Wellbeing  Outcome: Met  Goal: Readiness for Transition of Care  Outcome: Met     Problem: Diabetes Comorbidity  Goal: Blood Glucose Level Within Targeted Range  Outcome: Met     Problem: Bariatric Environmental Safety  Goal: Safety Maintained with Care  Outcome: Met     Problem: Fall Injury Risk  Goal: Absence of Fall and Fall-Related Injury  Outcome: Met     Problem: Chest Pain  Goal: Resolution of Chest Pain Symptoms  Outcome: Met

## 2024-04-18 NOTE — PLAN OF CARE
Discharge orders and chart reviewed. No other discharge needs noted at this time. Pt is clear for discharge from case management. Pt is discharging to home.    Follow up apts with PCP and cardiology scheduled and added to AVS     04/18/24 1044   Final Note   Assessment Type Final Discharge Note   Anticipated Discharge Disposition Home   What phone number can be called within the next 1-3 days to see how you are doing after discharge? [phone]   Hospital Resources/Appts/Education Provided Appointments scheduled and added to AVS   Post-Acute Status   Discharge Delays (!) Personal Transportation

## 2024-04-18 NOTE — PLAN OF CARE
04/18/24 1030   HUFFMAN Message   Medicare Outpatient and Observation Notification regarding financial responsibility Explained to patient/caregiver;Signed/date by patient/caregiver   Date HUFFMAN was signed 04/18/24   Time HUFFMAN was signed 1030

## 2024-04-18 NOTE — TELEPHONE ENCOUNTER
No care due was identified.  [facility] Embedded Care Due Messages. Reference number: [id number].   4/17/2024 7:15:04 PM CDT

## 2024-04-18 NOTE — CARE UPDATE
04/17/24 2024   Patient Assessment/Suction   Level of Consciousness (AVPU) alert   Respiratory Effort Normal;Unlabored   Expansion/Accessory Muscles/Retractions no use of accessory muscles;no retractions;expansion symmetric   All Lung Fields Breath Sounds diminished;clear   Rhythm/Pattern, Respiratory unlabored;pattern regular;no shortness of breath reported;shallow   Cough Frequency infrequent   Cough Type nonproductive   PRE-TX-O2   Device (Oxygen Therapy) room air   SpO2 98 %   Pulse Oximetry Type Intermittent   $ Pulse Oximetry - Single Charge Pulse Oximetry - Single   Pulse 80   Resp 18   Aerosol Therapy   $ Aerosol Therapy Charges PRN treatment not required

## 2024-04-18 NOTE — DISCHARGE SUMMARY
[facility]  Discharge Summary      Patient Name: Kevan Colin Sr.  MRN: [id number]  LIBBY: [id number]  Patient Class: OP- Observation  Admission Date: 4/17/2024  Hospital Length of Stay: 1 days  Discharge Date and Time: 4/18/2024 11:44 AM  Attending Physician: No att. providers found   Discharging Provider: Jovon Swenson MD  Primary Care Provider: Jonathan Benton III, MD    Primary Care Team: Networked reference to record PCT     HPI:   68 year old pt getting admitted with chest pain  Pt was suffering from chest pain for past 2 days  Chest pain mostly on L side with radiation to Neck and R shoulder  He went to [facility] and got transferred here  Pt had LHC in Jan 2024 under Dr Wheeler in [facility] and there was no evidence of obstructive CAD  Spinal cord stimulator was removed on March 2024  Pt suffers from chronic pain     * No surgery found *      Hospital Course:   Patient with history chronic pain, T2DM, HTN presents with chest discomfort. Pain is reproducible on exam. Pain is on and off for several days. Per records patient had angiogram this year that showed no evidence of CAD. Troponins negative. US of LE showed DVT, patient already on eliquis with hx of dvt and IVC filter. Symptoms improved during admission. Pt advised to follow up with pain management, PCP, and cardio on discharge.       Physical Exam    Constitutional:       Appearance: He is well-developed.   Cardiovascular:      Rate and Rhythm: Normal rate.      Goals of Care Treatment Preferences:  Code Status: Full Code      Consults:     No new Assessment & Plan notes have been filed under this hospital service since the last note was generated.  Service: Hospital Medicine    Final Active Diagnoses:    Diagnosis Date Noted POA    Type 2 diabetes mellitus with diabetic polyneuropathy, with long-term current use of insulin [E11.42, Z79.4] 11/19/2023 Not Applicable    Chronic systolic heart failure [I50.22]  01/07/2021 Yes    Chronic, continuous use of opioids [F11.90] 06/08/2020 Yes    Battery end of life of spinal cord stimulator [Z45.42] 09/14/2016 Yes    Chronic pain associated with significant psychosocial dysfunction [G89.4] 09/14/2016 Yes    Benign essential HTN [I10] 03/24/2016 Yes    Coronary artery disease involving native coronary artery without angina pectoris [I25.10] 02/06/2016 Yes    Chronic pancreatitis- Hx pancreatic stent [K86.1] 01/26/2016 Yes      Problems Resolved During this Admission:    Diagnosis Date Noted Date Resolved POA    PRINCIPAL PROBLEM:  Chest pain [R07.9] 03/03/2015 04/18/2024 Yes       Discharged Condition: good    Disposition: Home or Self Care    Follow Up:   Follow-up Information       Jonathan Benton III, MD. Go on 4/30/2024.    Specialty: Family Medicine  Why: hospital follow up appointment scheduled at 10:40 AM - you will see the NP for this visit  Contact information:  [address]  [address]  [place] [address]  [phone]               Giancarlo Wheeler MD. Go on 4/19/2024.    Specialties: Cardiology, Interventional Cardiology  Why: hospital follow up appointment scheduled tomorrow at 1 PM - you will see Dr. Wheeler' NPSaray  Contact information:  [address] [facility]  [place] [address]  [phone]                           Patient Instructions:      Diet Cardiac     Notify your health care provider if you experience any of the following:  temperature >100.4     Notify your health care provider if you experience any of the following:  persistent nausea and vomiting or diarrhea     Notify your health care provider if you experience any of the following:  severe uncontrolled pain     Notify your health care provider if you experience any of the following:  redness, tenderness, or signs of infection (pain, swelling, redness, odor or green/yellow discharge around incision site)     Activity as tolerated       Significant Diagnostic Studies: Labs: CMP   Recent Labs   Lab  04/17/24  1053 04/18/24  0525    140   K 4.5 4.0    101   CO2 20* 33*   GLU 80 48*   BUN 11 12   CREATININE 1.0 0.9   CALCIUM 9.5 9.0   PROT 8.1 7.4   ALBUMIN 4.0 4.1   BILITOT 0.4 0.4   ALKPHOS 118 109   AST 17 15   ALT 16 13   ANIONGAP 17* 6*    and CBC   Recent Labs   Lab 04/17/24  1144 04/18/24  0525   WBC 10.35 6.19   HGB 12.4* 12.4*   HCT 39.6* 40.1    215       Pending Diagnostic Studies:       None           Medications:  Reconciled Home Medications:      Medication List        CHANGE how you take these medications      BASAGLAR KWIKPEN U-100 INSULIN glargine 100 units/mL SubQ pen  Generic drug: insulin  Inject 75 u qd.  What changed:   how much to take  how to take this  when to take this     insulin lispro 100 unit/mL pen  Commonly known as: HumaLOG KwikPen Insulin  Inject 15 units with meals.  What changed:   how much to take  how to take this  when to take this     ondansetron 4 MG tablet  Commonly known as: ZOFRAN  TAKE 1 TABLET BY MOUTH EVERY TWELVE HOURS AS NEEDED FOR NAUSEA  What changed: See the new instructions.     pantoprazole 40 MG tablet  Commonly known as: PROTONIX  TAKE 1 TABLET BY MOUTH DAILY  What changed: when to take this     tamsulosin 0.4 mg Cap  Commonly known as: FLOMAX  Take 1 capsule (0.4 mg total) by mouth once daily. Take in evening.  What changed: when to take this            CONTINUE taking these medications      albuterol 90 mcg/actuation inhaler  Commonly known as: PROVENTIL/VENTOLIN HFA  Inhale 2 puffs into the lungs every 4 (four) hours as needed for Shortness of Breath or Wheezing.     atorvastatin 40 MG tablet  Commonly known as: LIPITOR  TAKE 1 TABLET BY MOUTH EVERY NIGHT AT BEDTIME     BELSOMRA 20 mg Tab  Generic drug: suvorexant  Take 1 tablet by mouth nightly as needed (Insomnia).     blood sugar diagnostic Strp  To check BG 4 times daily, to use with insurance preferred meter     blood-glucose meter kit  To check BG 4 times daily, to use with  insurance preferred meter     carvediloL 3.125 MG tablet  Commonly known as: COREG  TAKE 1 TABLET BY MOUTH TWICE A DAY     clonazePAM 1 MG tablet  Commonly known as: KlonoPIN  Take 1 mg by mouth nightly as needed for Anxiety.     cyclobenzaprine 5 MG tablet  Commonly known as: FLEXERIL  Take 1 tablet (5 mg total) by mouth 2 (two) times daily.     DEXCOM G7  Misc  Generic drug: blood-glucose meter,continuous  Use to monitor glucose.     DEXCOM G7 SENSOR Malou  Generic drug: blood-glucose sensor  Change every 10 days.     ELIQUIS 5 mg Tab  Generic drug: apixaban  TAKE 1 TABLET BY MOUTH TWICE A DAY     EPINEPHrine 0.3 mg/0.3 mL Atin  Commonly known as: EPIPEN  EpiPen 2-Helio 0.3 mg/0.3 mL injection, auto-injector     furosemide 20 MG tablet  Commonly known as: LASIX  TAKE 1 TABLET (20 MG TOTAL) BY MOUTH ONCE DAILY.     gabapentin 600 MG tablet  Commonly known as: NEURONTIN  Take 1 tablet (600 mg total) by mouth 3 (three) times daily.     JARDIANCE 25 mg tablet  Generic drug: empagliflozin  TAKE 1 TABLET BY MOUTH DAILY     lamoTRIgine 25 MG tablet  Commonly known as: LAMICTAL  Take 50 mg by mouth once daily.     melatonin 10 mg Cap  Take 1 capsule by mouth nightly as needed (Insomnia).     metFORMIN 1000 MG tablet  Commonly known as: GLUCOPHAGE  Take 1 tablet (1,000 mg total) by mouth 2 (two) times daily with meals.     multivitamin per tablet  Commonly known as: THERAGRAN  Take 1 tablet by mouth once daily.     oxyCODONE-acetaminophen 5-325 mg per tablet  Commonly known as: PERCOCET  Take 1 tablet by mouth every 4 (four) hours as needed for Pain. for pain.     OZEMPIC 0.25 mg or 0.5 mg (2 mg/3 mL) pen injector  Generic drug: semaglutide  Inject 0.25 mg into the skin every 7 days for 30 days, THEN 0.5 mg every 7 days.  Start taking on: March 6, 2024     potassium chloride SA 20 MEQ tablet  Commonly known as: K-DUR,KLOR-CON  Take 20 mEq by mouth once daily.     QUEtiapine 300 MG Tab  Commonly known as:  SEROQUEL  Take 300 mg by mouth every evening.     TRINTELLIX 20 mg Tab  Generic drug: vortioxetine  Take 1 tablet by mouth Daily.              Indwelling Lines/Drains at time of discharge:   Lines/Drains/Airways       None                   Time spent on the discharge of patient: 33 minutes         Jovon Swenson MD  Department of Hospital Medicine  [facility]

## 2024-04-18 NOTE — NURSING
1102 discharge instructions given and explained to the patient and his spouse. They deny questions or concerns at this time. Awaiting transportation.

## 2024-04-18 NOTE — HOSPITAL COURSE
Patient with history chronic pain, T2DM, HTN presents with chest discomfort. Pain is reproducible on exam. Pain is on and off for several days. Per records patient had angiogram this year that showed no evidence of CAD. Troponins negative. US of LE showed DVT, patient already on eliquis with hx of dvt and IVC filter. Symptoms improved during admission. Pt advised to follow up with pain management, PCP, and cardio on discharge.       Physical Exam    Constitutional:       Appearance: He is well-developed.   Cardiovascular:      Rate and Rhythm: Normal rate.

## 2024-04-19 ENCOUNTER — PATIENT OUTREACH (OUTPATIENT)
Dept: ADMINISTRATIVE | Facility: CLINIC | Age: 69
End: 2024-04-19
Payer: MEDICARE

## 2024-04-19 NOTE — PROGRESS NOTES
C3 nurse spoke with Kevan Colin Sr. Wife Cheryle for a TCC post hospital discharge follow up call. The patient has a scheduled HOSFU appointment with Ketty Whiting on 4/30/24 @ 1040.

## 2024-04-29 LAB
OHS QRS DURATION: 92 MS
OHS QTC CALCULATION: 432 MS

## 2024-04-30 ENCOUNTER — OFFICE VISIT (OUTPATIENT)
Dept: FAMILY MEDICINE | Facility: CLINIC | Age: 69
End: 2024-04-30
Payer: MEDICARE

## 2024-04-30 ENCOUNTER — HOSPITAL ENCOUNTER (OUTPATIENT)
Dept: RADIOLOGY | Facility: HOSPITAL | Age: 69
Discharge: HOME | End: 2024-04-30
Attending: NURSE PRACTITIONER
Payer: MEDICARE

## 2024-04-30 VITALS
OXYGEN SATURATION: 96 % | DIASTOLIC BLOOD PRESSURE: 74 MMHG | BODY MASS INDEX: 179.46 KG/M2 | HEART RATE: 88 BPM | WEIGHT: 229.75 LBS | SYSTOLIC BLOOD PRESSURE: 122 MMHG | TEMPERATURE: 99 F

## 2024-04-30 DIAGNOSIS — R10.31 RIGHT LOWER QUADRANT PAIN: ICD-10-CM

## 2024-04-30 DIAGNOSIS — R11.10 VOMITING, UNSPECIFIED VOMITING TYPE, UNSPECIFIED WHETHER NAUSEA PRESENT: Primary | ICD-10-CM

## 2024-04-30 DIAGNOSIS — E26.1 SECONDARY HYPERALDOSTERONISM: ICD-10-CM

## 2024-04-30 DIAGNOSIS — Z93.0 STATUS POST TRACHEOSTOMY: ICD-10-CM

## 2024-04-30 DIAGNOSIS — R11.2 NAUSEA AND VOMITING, UNSPECIFIED VOMITING TYPE: ICD-10-CM

## 2024-04-30 DIAGNOSIS — E66.01 CLASS 3 OBESITY: ICD-10-CM

## 2024-04-30 DIAGNOSIS — R10.31 RLQ ABDOMINAL PAIN: ICD-10-CM

## 2024-04-30 PROBLEM — E66.813 CLASS 3 OBESITY: Status: ACTIVE | Noted: 2024-04-30

## 2024-04-30 PROCEDURE — 3052F HG A1C>EQUAL 8.0%<EQUAL 9.0%: CPT | Mod: HCNC,CPTII,S$GLB, | Performed by: NURSE PRACTITIONER

## 2024-04-30 PROCEDURE — 99999 PR PBB SHADOW E&M-EST. PATIENT-LVL III: CPT | Mod: PBBFAC,HCNC,, | Performed by: NURSE PRACTITIONER

## 2024-04-30 PROCEDURE — 3078F DIAST BP <80 MM HG: CPT | Mod: HCNC,CPTII,S$GLB, | Performed by: NURSE PRACTITIONER

## 2024-04-30 PROCEDURE — 3008F BODY MASS INDEX DOCD: CPT | Mod: HCNC,CPTII,S$GLB, | Performed by: NURSE PRACTITIONER

## 2024-04-30 PROCEDURE — 1111F DSCHRG MED/CURRENT MED MERGE: CPT | Mod: HCNC,CPTII,S$GLB, | Performed by: NURSE PRACTITIONER

## 2024-04-30 PROCEDURE — 1159F MED LIST DOCD IN RCRD: CPT | Mod: HCNC,CPTII,S$GLB, | Performed by: NURSE PRACTITIONER

## 2024-04-30 PROCEDURE — 1125F AMNT PAIN NOTED PAIN PRSNT: CPT | Mod: HCNC,CPTII,S$GLB, | Performed by: NURSE PRACTITIONER

## 2024-04-30 PROCEDURE — 74176 CT ABD & PELVIS W/O CONTRAST: CPT | Mod: 26,,, | Performed by: RADIOLOGY

## 2024-04-30 PROCEDURE — 3074F SYST BP LT 130 MM HG: CPT | Mod: HCNC,CPTII,S$GLB, | Performed by: NURSE PRACTITIONER

## 2024-04-30 PROCEDURE — 1101F PT FALLS ASSESS-DOCD LE1/YR: CPT | Mod: HCNC,CPTII,S$GLB, | Performed by: NURSE PRACTITIONER

## 2024-04-30 PROCEDURE — 74176 CT ABD & PELVIS W/O CONTRAST: CPT | Mod: TC

## 2024-04-30 PROCEDURE — 3288F FALL RISK ASSESSMENT DOCD: CPT | Mod: HCNC,CPTII,S$GLB, | Performed by: NURSE PRACTITIONER

## 2024-04-30 PROCEDURE — 99213 OFFICE O/P EST LOW 20 MIN: CPT | Mod: HCNC,S$GLB,, | Performed by: NURSE PRACTITIONER

## 2024-04-30 RX ORDER — PANTOPRAZOLE SODIUM 40 MG/1
40 TABLET, DELAYED RELEASE ORAL 2 TIMES DAILY
Qty: 60 TABLET | Refills: 5 | Status: SHIPPED | OUTPATIENT
Start: 2024-04-30 | End: 2025-04-30

## 2024-04-30 RX ORDER — ONDANSETRON 4 MG/1
4 TABLET, ORALLY DISINTEGRATING ORAL EVERY 6 HOURS PRN
Qty: 20 TABLET | Refills: 5 | Status: SHIPPED | OUTPATIENT
Start: 2024-04-30
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
## 2024-05-08 ENCOUNTER — TELEPHONE (OUTPATIENT)
Dept: FAMILY MEDICINE | Facility: CLINIC | Age: 69
End: 2024-05-08
Payer: MEDICARE

## 2024-05-08 LAB
LEFT EYE DM RETINOPATHY: POSITIVE
RIGHT EYE DM RETINOPATHY: POSITIVE

## 2024-05-08 NOTE — TELEPHONE ENCOUNTER
Spoke with dr diehl re pt having back surg Friday. He needs apt for clear for dr glenda murphy and to resc surg due to multiple health problems and dr diehl saw him once in November. Audrey with [facility] will inform surgeons office and have patient call to make pre op with dr diehl.

## 2024-05-08 NOTE — TELEPHONE ENCOUNTER
----- Message from Amadou Son sent at 5/8/2024  3:12 PM CDT -----  Type: Needs Medical Advice  Who Called:  pt wife, Cheryle   Symptoms (please be specific):  said she need to speak to the nurse--said she need the dr to see her  on 5/27 not a NP --please call and advise  Best Call Back Number: [phone]    Additional Information: thank you

## 2024-05-09 DIAGNOSIS — Z79.4 TYPE 2 DIABETES MELLITUS WITH HYPERGLYCEMIA, WITH LONG-TERM CURRENT USE OF INSULIN: ICD-10-CM

## 2024-05-09 DIAGNOSIS — E11.65 TYPE 2 DIABETES MELLITUS WITH HYPERGLYCEMIA, WITH LONG-TERM CURRENT USE OF INSULIN: ICD-10-CM

## 2024-05-10 RX ORDER — METFORMIN HYDROCHLORIDE 1000 MG/1
1000 TABLET ORAL 2 TIMES DAILY WITH MEALS
Qty: 56 TABLET | Refills: 0 | Status: SHIPPED | OUTPATIENT
Start: 2024-05-10 | End: 2024-06-06

## 2024-05-14 RX ORDER — TAMSULOSIN HYDROCHLORIDE 0.4 MG/1
0.4 CAPSULE ORAL NIGHTLY
Qty: 90 CAPSULE | Refills: 3 | Status: SHIPPED | OUTPATIENT
Start: 2024-05-14

## 2024-05-15 ENCOUNTER — HOSPITAL ENCOUNTER (INPATIENT)
Facility: HOSPITAL | Age: 69
LOS: 3 days | Discharge: HOME | DRG: 392 | End: 2024-05-19
Attending: EMERGENCY MEDICINE | Admitting: STUDENT IN AN ORGANIZED HEALTH CARE EDUCATION/TRAINING PROGRAM
Payer: MEDICARE

## 2024-05-15 DIAGNOSIS — R10.9 ABDOMINAL PAIN, UNSPECIFIED ABDOMINAL LOCATION: Primary | ICD-10-CM

## 2024-05-15 DIAGNOSIS — R07.9 CHEST PAIN: ICD-10-CM

## 2024-05-15 DIAGNOSIS — R16.0 LIVER MASS, RIGHT LOBE: ICD-10-CM

## 2024-05-15 DIAGNOSIS — R11.2 INTRACTABLE NAUSEA AND VOMITING: ICD-10-CM

## 2024-05-15 DIAGNOSIS — R11.10 VOMITING: ICD-10-CM

## 2024-05-15 DIAGNOSIS — Z79.4 TYPE 2 DIABETES MELLITUS WITH DIABETIC POLYNEUROPATHY, WITH LONG-TERM CURRENT USE OF INSULIN: ICD-10-CM

## 2024-05-15 DIAGNOSIS — E11.42 TYPE 2 DIABETES MELLITUS WITH DIABETIC POLYNEUROPATHY, WITH LONG-TERM CURRENT USE OF INSULIN: ICD-10-CM

## 2024-05-15 LAB
ALBUMIN SERPL BCP-MCNC: 4.6 G/DL (ref 3.5–5.2)
ALP SERPL-CCNC: 97 U/L (ref 55–135)
ALT SERPL W/O P-5'-P-CCNC: 9 U/L (ref 10–44)
ANION GAP SERPL CALC-SCNC: 12 MMOL/L (ref 8–16)
AST SERPL-CCNC: 11 U/L (ref 10–40)
BACTERIA #/AREA URNS HPF: NORMAL /HPF
BASOPHILS # BLD AUTO: 0.06 K/UL (ref 0–0.2)
BASOPHILS NFR BLD: 0.7 % (ref 0–1.9)
BILIRUB SERPL-MCNC: 0.5 MG/DL (ref 0.1–1)
BILIRUB UR QL STRIP: NEGATIVE
BUN SERPL-MCNC: 10 MG/DL (ref 8–23)
CALCIUM SERPL-MCNC: 9.8 MG/DL (ref 8.7–10.5)
CHLORIDE SERPL-SCNC: 101 MMOL/L (ref 95–110)
CLARITY UR: CLEAR
CO2 SERPL-SCNC: 28 MMOL/L (ref 23–29)
COLOR UR: YELLOW
CREAT SERPL-MCNC: 0.9 MG/DL (ref 0.5–1.4)
DIFFERENTIAL METHOD BLD: ABNORMAL
EOSINOPHIL # BLD AUTO: 0.1 K/UL (ref 0–0.5)
EOSINOPHIL NFR BLD: 1.5 % (ref 0–8)
ERYTHROCYTE [DISTWIDTH] IN BLOOD BY AUTOMATED COUNT: 13.7 % (ref 11.5–14.5)
EST. GFR  (NO RACE VARIABLE): >60 ML/MIN/1.73 M^2
GLUCOSE SERPL-MCNC: 139 MG/DL (ref 70–110)
GLUCOSE SERPL-MCNC: 192 MG/DL (ref 70–110)
GLUCOSE UR QL STRIP: ABNORMAL
HCT VFR BLD AUTO: 46.1 % (ref 40–54)
HGB BLD-MCNC: 14.5 G/DL (ref 14–18)
HGB UR QL STRIP: NEGATIVE
IMM GRANULOCYTES # BLD AUTO: 0.03 K/UL (ref 0–0.04)
IMM GRANULOCYTES NFR BLD AUTO: 0.4 % (ref 0–0.5)
KETONES UR QL STRIP: ABNORMAL
LEUKOCYTE ESTERASE UR QL STRIP: NEGATIVE
LIPASE SERPL-CCNC: 13 U/L (ref 4–60)
LYMPHOCYTES # BLD AUTO: 1.2 K/UL (ref 1–4.8)
LYMPHOCYTES NFR BLD: 14.8 % (ref 18–48)
MCH RBC QN AUTO: 30.3 PG (ref 27–31)
MCHC RBC AUTO-ENTMCNC: 31.5 G/DL (ref 32–36)
MCV RBC AUTO: 96 FL (ref 82–98)
MICROSCOPIC COMMENT: NORMAL
MONOCYTES # BLD AUTO: 0.3 K/UL (ref 0.3–1)
MONOCYTES NFR BLD: 4.2 % (ref 4–15)
NEUTROPHILS # BLD AUTO: 6.3 K/UL (ref 1.8–7.7)
NEUTROPHILS NFR BLD: 78.4 % (ref 38–73)
NITRITE UR QL STRIP: NEGATIVE
NRBC BLD-RTO: 0 /100 WBC
PH UR STRIP: 8 [PH] (ref 5–8)
PLATELET # BLD AUTO: 225 K/UL (ref 150–450)
PMV BLD AUTO: 9.8 FL (ref 9.2–12.9)
POTASSIUM SERPL-SCNC: 4 MMOL/L (ref 3.5–5.1)
PROT SERPL-MCNC: 7.9 G/DL (ref 6–8.4)
PROT UR QL STRIP: NEGATIVE
RBC # BLD AUTO: 4.78 M/UL (ref 4.6–6.2)
RBC #/AREA URNS HPF: 1 /HPF (ref 0–4)
SODIUM SERPL-SCNC: 141 MMOL/L (ref 136–145)
SP GR UR STRIP: >1.03 (ref 1–1.03)
URN SPEC COLLECT METH UR: ABNORMAL
UROBILINOGEN UR STRIP-ACNC: NEGATIVE EU/DL
WBC # BLD AUTO: 8.04 K/UL (ref 3.9–12.7)
YEAST URNS QL MICRO: NORMAL

## 2024-05-15 PROCEDURE — 25500020 PHARM REV CODE 255: Performed by: EMERGENCY MEDICINE

## 2024-05-15 PROCEDURE — 99285 EMERGENCY DEPT VISIT HI MDM: CPT | Mod: 25

## 2024-05-15 PROCEDURE — 63600175 PHARM REV CODE 636 W HCPCS: Performed by: EMERGENCY MEDICINE

## 2024-05-15 PROCEDURE — 83690 ASSAY OF LIPASE: CPT | Performed by: STUDENT IN AN ORGANIZED HEALTH CARE EDUCATION/TRAINING PROGRAM

## 2024-05-15 PROCEDURE — 25000003 PHARM REV CODE 250: Performed by: STUDENT IN AN ORGANIZED HEALTH CARE EDUCATION/TRAINING PROGRAM

## 2024-05-15 PROCEDURE — 82962 GLUCOSE BLOOD TEST: CPT

## 2024-05-15 PROCEDURE — 81001 URINALYSIS AUTO W/SCOPE: CPT | Performed by: STUDENT IN AN ORGANIZED HEALTH CARE EDUCATION/TRAINING PROGRAM

## 2024-05-15 PROCEDURE — 63600175 PHARM REV CODE 636 W HCPCS: Performed by: STUDENT IN AN ORGANIZED HEALTH CARE EDUCATION/TRAINING PROGRAM

## 2024-05-15 PROCEDURE — 80053 COMPREHEN METABOLIC PANEL: CPT | Performed by: STUDENT IN AN ORGANIZED HEALTH CARE EDUCATION/TRAINING PROGRAM

## 2024-05-15 PROCEDURE — 99900035 HC TECH TIME PER 15 MIN (STAT)

## 2024-05-15 PROCEDURE — 85025 COMPLETE CBC W/AUTO DIFF WBC: CPT | Performed by: STUDENT IN AN ORGANIZED HEALTH CARE EDUCATION/TRAINING PROGRAM

## 2024-05-15 PROCEDURE — 25000003 PHARM REV CODE 250: Performed by: EMERGENCY MEDICINE

## 2024-05-15 PROCEDURE — 96365 THER/PROPH/DIAG IV INF INIT: CPT

## 2024-05-15 PROCEDURE — G0378 HOSPITAL OBSERVATION PER HR: HCPCS

## 2024-05-15 PROCEDURE — 94799 UNLISTED PULMONARY SVC/PX: CPT

## 2024-05-15 PROCEDURE — 93005 ELECTROCARDIOGRAM TRACING: CPT | Performed by: GENERAL PRACTICE

## 2024-05-15 PROCEDURE — 96372 THER/PROPH/DIAG INJ SC/IM: CPT | Performed by: STUDENT IN AN ORGANIZED HEALTH CARE EDUCATION/TRAINING PROGRAM

## 2024-05-15 PROCEDURE — 51701 INSERT BLADDER CATHETER: CPT

## 2024-05-15 PROCEDURE — 93010 ELECTROCARDIOGRAM REPORT: CPT | Mod: ,,, | Performed by: GENERAL PRACTICE

## 2024-05-15 PROCEDURE — 94761 N-INVAS EAR/PLS OXIMETRY MLT: CPT

## 2024-05-15 PROCEDURE — 96375 TX/PRO/DX INJ NEW DRUG ADDON: CPT

## 2024-05-15 PROCEDURE — 63600175 PHARM REV CODE 636 W HCPCS: Performed by: HOSPITALIST

## 2024-05-15 PROCEDURE — 96376 TX/PRO/DX INJ SAME DRUG ADON: CPT

## 2024-05-15 RX ORDER — SODIUM CHLORIDE, SODIUM LACTATE, POTASSIUM CHLORIDE, CALCIUM CHLORIDE 600; 310; 30; 20 MG/100ML; MG/100ML; MG/100ML; MG/100ML
INJECTION, SOLUTION INTRAVENOUS CONTINUOUS
Status: ACTIVE | OUTPATIENT
Start: 2024-05-15 | End: 2024-05-16

## 2024-05-15 RX ORDER — NALOXONE HCL 0.4 MG/ML
0.02 VIAL (ML) INJECTION
Status: DISCONTINUED | OUTPATIENT
Start: 2024-05-15 | End: 2024-05-19 | Stop reason: HOSPADM

## 2024-05-15 RX ORDER — AMOXICILLIN 250 MG
1 CAPSULE ORAL DAILY PRN
Status: DISCONTINUED | OUTPATIENT
Start: 2024-05-15 | End: 2024-05-19 | Stop reason: HOSPADM

## 2024-05-15 RX ORDER — QUETIAPINE FUMARATE 100 MG/1
300 TABLET, FILM COATED ORAL NIGHTLY
Status: DISCONTINUED | OUTPATIENT
Start: 2024-05-15 | End: 2024-05-19 | Stop reason: HOSPADM

## 2024-05-15 RX ORDER — GLUCAGON 1 MG
1 KIT INJECTION
Status: DISCONTINUED | OUTPATIENT
Start: 2024-05-15 | End: 2024-05-16

## 2024-05-15 RX ORDER — CYCLOBENZAPRINE HCL 5 MG
5 TABLET ORAL 2 TIMES DAILY
COMMUNITY
Start: 2024-04-30

## 2024-05-15 RX ORDER — GLUCAGON 1 MG
1 KIT INJECTION
Status: DISCONTINUED | OUTPATIENT
Start: 2024-05-15 | End: 2024-05-19 | Stop reason: HOSPADM

## 2024-05-15 RX ORDER — PROCHLORPERAZINE EDISYLATE 5 MG/ML
10 INJECTION INTRAMUSCULAR; INTRAVENOUS EVERY 6 HOURS PRN
Status: DISCONTINUED | OUTPATIENT
Start: 2024-05-15 | End: 2024-05-19 | Stop reason: HOSPADM

## 2024-05-15 RX ORDER — ALUMINUM HYDROXIDE, MAGNESIUM HYDROXIDE, AND SIMETHICONE 1200; 120; 1200 MG/30ML; MG/30ML; MG/30ML
30 SUSPENSION ORAL 4 TIMES DAILY PRN
Status: DISCONTINUED | OUTPATIENT
Start: 2024-05-15 | End: 2024-05-19 | Stop reason: HOSPADM

## 2024-05-15 RX ORDER — ACETAMINOPHEN 325 MG/1
650 TABLET ORAL EVERY 8 HOURS PRN
Status: DISCONTINUED | OUTPATIENT
Start: 2024-05-15 | End: 2024-05-19 | Stop reason: HOSPADM

## 2024-05-15 RX ORDER — CLONAZEPAM 1 MG/1
1 TABLET ORAL NIGHTLY PRN
Status: DISCONTINUED | OUTPATIENT
Start: 2024-05-15 | End: 2024-05-19 | Stop reason: HOSPADM

## 2024-05-15 RX ORDER — TALC
9 POWDER (GRAM) TOPICAL NIGHTLY
Status: DISCONTINUED | OUTPATIENT
Start: 2024-05-15 | End: 2024-05-19 | Stop reason: HOSPADM

## 2024-05-15 RX ORDER — ENOXAPARIN SODIUM 100 MG/ML
40 INJECTION SUBCUTANEOUS EVERY 24 HOURS
Status: DISCONTINUED | OUTPATIENT
Start: 2024-05-16 | End: 2024-05-16

## 2024-05-15 RX ORDER — ONDANSETRON HYDROCHLORIDE 2 MG/ML
4 INJECTION, SOLUTION INTRAVENOUS
Status: COMPLETED | OUTPATIENT
Start: 2024-05-15 | End: 2024-05-15

## 2024-05-15 RX ORDER — TALC
6 POWDER (GRAM) TOPICAL NIGHTLY PRN
Status: DISCONTINUED | OUTPATIENT
Start: 2024-05-15 | End: 2024-05-19 | Stop reason: HOSPADM

## 2024-05-15 RX ORDER — IBUPROFEN 200 MG
24 TABLET ORAL
Status: DISCONTINUED | OUTPATIENT
Start: 2024-05-15 | End: 2024-05-19 | Stop reason: HOSPADM

## 2024-05-15 RX ORDER — ALBUTEROL SULFATE 0.83 MG/ML
2.5 SOLUTION RESPIRATORY (INHALATION) EVERY 4 HOURS PRN
Status: DISCONTINUED | OUTPATIENT
Start: 2024-05-15 | End: 2024-05-19 | Stop reason: HOSPADM

## 2024-05-15 RX ORDER — HYDROMORPHONE HYDROCHLORIDE 1 MG/ML
0.5 INJECTION, SOLUTION INTRAMUSCULAR; INTRAVENOUS; SUBCUTANEOUS EVERY 4 HOURS PRN
Status: DISCONTINUED | OUTPATIENT
Start: 2024-05-15 | End: 2024-05-16

## 2024-05-15 RX ORDER — ALBUTEROL SULFATE 90 UG/1
2 AEROSOL, METERED RESPIRATORY (INHALATION) EVERY 4 HOURS PRN
Status: DISCONTINUED | OUTPATIENT
Start: 2024-05-15 | End: 2024-05-15

## 2024-05-15 RX ORDER — INSULIN ASPART 100 [IU]/ML
0-5 INJECTION, SOLUTION INTRAVENOUS; SUBCUTANEOUS EVERY 6 HOURS PRN
Status: DISCONTINUED | OUTPATIENT
Start: 2024-05-15 | End: 2024-05-16

## 2024-05-15 RX ORDER — OXYCODONE AND ACETAMINOPHEN 5; 325 MG/1; MG/1
1 TABLET ORAL
Status: COMPLETED | OUTPATIENT
Start: 2024-05-15 | End: 2024-05-15

## 2024-05-15 RX ORDER — INSULIN GLARGINE 100 [IU]/ML
10 INJECTION, SOLUTION SUBCUTANEOUS NIGHTLY
Status: DISCONTINUED | OUTPATIENT
Start: 2024-05-15 | End: 2024-05-16

## 2024-05-15 RX ORDER — PROMETHAZINE HYDROCHLORIDE 25 MG/1
25 SUPPOSITORY RECTAL EVERY 6 HOURS PRN
Status: DISCONTINUED | OUTPATIENT
Start: 2024-05-15 | End: 2024-05-19 | Stop reason: HOSPADM

## 2024-05-15 RX ORDER — IBUPROFEN 200 MG
16 TABLET ORAL
Status: DISCONTINUED | OUTPATIENT
Start: 2024-05-15 | End: 2024-05-19 | Stop reason: HOSPADM

## 2024-05-15 RX ORDER — LAMOTRIGINE 25 MG/1
50 TABLET ORAL DAILY
Status: DISCONTINUED | OUTPATIENT
Start: 2024-05-15 | End: 2024-05-19 | Stop reason: HOSPADM

## 2024-05-15 RX ORDER — GABAPENTIN 300 MG/1
600 CAPSULE ORAL 3 TIMES DAILY
Status: DISCONTINUED | OUTPATIENT
Start: 2024-05-15 | End: 2024-05-19 | Stop reason: HOSPADM

## 2024-05-15 RX ORDER — SODIUM CHLORIDE 0.9 % (FLUSH) 0.9 %
2 SYRINGE (ML) INJECTION EVERY 8 HOURS PRN
Status: DISCONTINUED | OUTPATIENT
Start: 2024-05-15 | End: 2024-05-19 | Stop reason: HOSPADM

## 2024-05-15 RX ORDER — HYDROMORPHONE HYDROCHLORIDE 1 MG/ML
0.5 INJECTION, SOLUTION INTRAMUSCULAR; INTRAVENOUS; SUBCUTANEOUS EVERY 6 HOURS PRN
Status: DISCONTINUED | OUTPATIENT
Start: 2024-05-15 | End: 2024-05-15

## 2024-05-15 RX ORDER — ONDANSETRON HYDROCHLORIDE 2 MG/ML
4 INJECTION, SOLUTION INTRAVENOUS EVERY 6 HOURS PRN
Status: DISCONTINUED | OUTPATIENT
Start: 2024-05-15 | End: 2024-05-17

## 2024-05-15 RX ORDER — ACETAMINOPHEN 325 MG/1
650 TABLET ORAL EVERY 4 HOURS PRN
Status: DISCONTINUED | OUTPATIENT
Start: 2024-05-15 | End: 2024-05-19 | Stop reason: HOSPADM

## 2024-05-15 RX ORDER — ESZOPICLONE 2 MG/1
2 TABLET, FILM COATED ORAL NIGHTLY PRN
COMMUNITY
Start: 2024-05-09

## 2024-05-15 RX ORDER — OXYCODONE AND ACETAMINOPHEN 5; 325 MG/1; MG/1
1 TABLET ORAL EVERY 6 HOURS PRN
Status: DISCONTINUED | OUTPATIENT
Start: 2024-05-15 | End: 2024-05-19 | Stop reason: HOSPADM

## 2024-05-15 RX ADMIN — LAMOTRIGINE 50 MG: 25 TABLET ORAL at 02:05

## 2024-05-15 RX ADMIN — Medication 6 MG: at 08:05

## 2024-05-15 RX ADMIN — ONDANSETRON 4 MG: 2 INJECTION INTRAMUSCULAR; INTRAVENOUS at 03:05

## 2024-05-15 RX ADMIN — ONDANSETRON 4 MG: 2 INJECTION INTRAMUSCULAR; INTRAVENOUS at 11:05

## 2024-05-15 RX ADMIN — INSULIN GLARGINE 10 UNITS: 100 INJECTION, SOLUTION SUBCUTANEOUS at 09:05

## 2024-05-15 RX ADMIN — SODIUM CHLORIDE, POTASSIUM CHLORIDE, SODIUM LACTATE AND CALCIUM CHLORIDE: 600; 310; 30; 20 INJECTION, SOLUTION INTRAVENOUS at 10:05

## 2024-05-15 RX ADMIN — PROMETHAZINE HYDROCHLORIDE 12.5 MG: 25 INJECTION INTRAMUSCULAR; INTRAVENOUS at 05:05

## 2024-05-15 RX ADMIN — ONDANSETRON 4 MG: 2 INJECTION INTRAMUSCULAR; INTRAVENOUS at 06:05

## 2024-05-15 RX ADMIN — GABAPENTIN 600 MG: 300 CAPSULE ORAL at 03:05

## 2024-05-15 RX ADMIN — OXYCODONE HYDROCHLORIDE AND ACETAMINOPHEN 1 TABLET: 5; 325 TABLET ORAL at 03:05

## 2024-05-15 RX ADMIN — QUETIAPINE 300 MG: 100 TABLET ORAL at 08:05

## 2024-05-15 RX ADMIN — HYDROMORPHONE HYDROCHLORIDE 0.5 MG: 0.5 INJECTION, SOLUTION INTRAMUSCULAR; INTRAVENOUS; SUBCUTANEOUS at 06:05

## 2024-05-15 RX ADMIN — GABAPENTIN 600 MG: 300 CAPSULE ORAL at 08:05

## 2024-05-15 RX ADMIN — CLONAZEPAM 1 MG: 1 TABLET ORAL at 08:05

## 2024-05-15 RX ADMIN — HYDROMORPHONE HYDROCHLORIDE 0.5 MG: 0.5 INJECTION, SOLUTION INTRAMUSCULAR; INTRAVENOUS; SUBCUTANEOUS at 11:05

## 2024-05-15 RX ADMIN — PROCHLORPERAZINE EDISYLATE 10 MG: 5 INJECTION INTRAMUSCULAR; INTRAVENOUS at 10:05

## 2024-05-15 RX ADMIN — IOHEXOL 100 ML: 350 INJECTION, SOLUTION INTRAVENOUS at 07:05

## 2024-05-15 NOTE — ASSESSMENT & PLAN NOTE
Due to Hx of VTE  Held Eliquis due to possible Biopsy tomorrow  If no Biopsy, then will continue Eliquis

## 2024-05-15 NOTE — HPI
Patient is a 67yo with a PMH of VTE, COPD, T2DM, HLD & HTN who presented to the ED for abdominal pain and n/v. Per Patient's wife he has abdominal pain and n/v, however in the past few days he was having more n/v. For this reason he was brought into the ED. In the ED due to his abdominal pain Patient got a CT abdomen done, which demonstrated Hepatic Mass. Per wife they had been aware of this mass, though no follow up was done outpatient. Patient was admitted for further management of his n/v & abdominal pain, but also for biopsy of his mass

## 2024-05-15 NOTE — PROGRESS NOTES
Automatic Inhaler to Nebulizer Interchange    albuterol (Ventolin, ProAir, or Proventil)  mcg given multiple times per day changed to albuterol 2.5 mg every 4 hours prn  per [facility] Automatic Therapeutic Substitutions Protocol.    Please contact pharmacy at extension [phone] with any questions.     Thank you,   Sher Price

## 2024-05-15 NOTE — ASSESSMENT & PLAN NOTE
Patient was started on Zofran IV for his n/v  Will consider Phenergan if Zofran not sufficient   Patient made NPO due to him vomiting meals at home
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
## 2024-05-16 PROBLEM — R07.9 CHEST PAIN: Status: ACTIVE | Noted: 2024-05-16

## 2024-05-16 LAB
ALBUMIN SERPL BCP-MCNC: 4.3 G/DL (ref 3.5–5.2)
ALP SERPL-CCNC: 83 U/L (ref 55–135)
ALT SERPL W/O P-5'-P-CCNC: 8 U/L (ref 10–44)
ANION GAP SERPL CALC-SCNC: 11 MMOL/L (ref 8–16)
AST SERPL-CCNC: 12 U/L (ref 10–40)
BASOPHILS # BLD AUTO: 0.08 K/UL (ref 0–0.2)
BASOPHILS NFR BLD: 1 % (ref 0–1.9)
BILIRUB SERPL-MCNC: 0.5 MG/DL (ref 0.1–1)
BUN SERPL-MCNC: 13 MG/DL (ref 8–23)
CALCIUM SERPL-MCNC: 9.4 MG/DL (ref 8.7–10.5)
CHLORIDE SERPL-SCNC: 104 MMOL/L (ref 95–110)
CO2 SERPL-SCNC: 27 MMOL/L (ref 23–29)
CREAT SERPL-MCNC: 0.8 MG/DL (ref 0.5–1.4)
DIFFERENTIAL METHOD BLD: ABNORMAL
EOSINOPHIL # BLD AUTO: 0.5 K/UL (ref 0–0.5)
EOSINOPHIL NFR BLD: 5.5 % (ref 0–8)
ERYTHROCYTE [DISTWIDTH] IN BLOOD BY AUTOMATED COUNT: 14.2 % (ref 11.5–14.5)
EST. GFR  (NO RACE VARIABLE): >60 ML/MIN/1.73 M^2
GLUCOSE SERPL-MCNC: 112 MG/DL (ref 70–110)
GLUCOSE SERPL-MCNC: 116 MG/DL (ref 70–110)
GLUCOSE SERPL-MCNC: 121 MG/DL (ref 70–110)
GLUCOSE SERPL-MCNC: 142 MG/DL (ref 70–110)
GLUCOSE SERPL-MCNC: 161 MG/DL (ref 70–110)
HCT VFR BLD AUTO: 42.9 % (ref 40–54)
HGB BLD-MCNC: 13.5 G/DL (ref 14–18)
IMM GRANULOCYTES # BLD AUTO: 0.02 K/UL (ref 0–0.04)
IMM GRANULOCYTES NFR BLD AUTO: 0.2 % (ref 0–0.5)
LYMPHOCYTES # BLD AUTO: 2.8 K/UL (ref 1–4.8)
LYMPHOCYTES NFR BLD: 33.6 % (ref 18–48)
MAGNESIUM SERPL-MCNC: 1.9 MG/DL (ref 1.6–2.6)
MCH RBC QN AUTO: 30.5 PG (ref 27–31)
MCHC RBC AUTO-ENTMCNC: 31.5 G/DL (ref 32–36)
MCV RBC AUTO: 97 FL (ref 82–98)
MONOCYTES # BLD AUTO: 0.9 K/UL (ref 0.3–1)
MONOCYTES NFR BLD: 10.6 % (ref 4–15)
NEUTROPHILS # BLD AUTO: 4 K/UL (ref 1.8–7.7)
NEUTROPHILS NFR BLD: 49.1 % (ref 38–73)
NRBC BLD-RTO: 0 /100 WBC
PHOSPHATE SERPL-MCNC: 3.3 MG/DL (ref 2.7–4.5)
PLATELET # BLD AUTO: 208 K/UL (ref 150–450)
PMV BLD AUTO: 9.9 FL (ref 9.2–12.9)
POTASSIUM SERPL-SCNC: 3.6 MMOL/L (ref 3.5–5.1)
PROT SERPL-MCNC: 7.5 G/DL (ref 6–8.4)
RBC # BLD AUTO: 4.42 M/UL (ref 4.6–6.2)
SODIUM SERPL-SCNC: 142 MMOL/L (ref 136–145)
TROPONIN I SERPL HS-MCNC: 5.9 PG/ML (ref 0–14.9)
TROPONIN I SERPL HS-MCNC: 6.7 PG/ML (ref 0–14.9)
TSH SERPL DL<=0.005 MIU/L-ACNC: 5.57 UIU/ML (ref 0.34–5.6)
WBC # BLD AUTO: 8.22 K/UL (ref 3.9–12.7)

## 2024-05-16 PROCEDURE — 80053 COMPREHEN METABOLIC PANEL: CPT | Performed by: STUDENT IN AN ORGANIZED HEALTH CARE EDUCATION/TRAINING PROGRAM

## 2024-05-16 PROCEDURE — C9113 INJ PANTOPRAZOLE SODIUM, VIA: HCPCS | Performed by: NURSE PRACTITIONER

## 2024-05-16 PROCEDURE — 85025 COMPLETE CBC W/AUTO DIFF WBC: CPT | Performed by: STUDENT IN AN ORGANIZED HEALTH CARE EDUCATION/TRAINING PROGRAM

## 2024-05-16 PROCEDURE — 63600175 PHARM REV CODE 636 W HCPCS: Performed by: NURSE PRACTITIONER

## 2024-05-16 PROCEDURE — 93005 ELECTROCARDIOGRAM TRACING: CPT | Performed by: GENERAL PRACTICE

## 2024-05-16 PROCEDURE — 25000003 PHARM REV CODE 250: Performed by: STUDENT IN AN ORGANIZED HEALTH CARE EDUCATION/TRAINING PROGRAM

## 2024-05-16 PROCEDURE — 94761 N-INVAS EAR/PLS OXIMETRY MLT: CPT

## 2024-05-16 PROCEDURE — 36415 COLL VENOUS BLD VENIPUNCTURE: CPT | Performed by: NURSE PRACTITIONER

## 2024-05-16 PROCEDURE — 83735 ASSAY OF MAGNESIUM: CPT | Performed by: STUDENT IN AN ORGANIZED HEALTH CARE EDUCATION/TRAINING PROGRAM

## 2024-05-16 PROCEDURE — 93010 ELECTROCARDIOGRAM REPORT: CPT | Mod: ,,, | Performed by: GENERAL PRACTICE

## 2024-05-16 PROCEDURE — 84484 ASSAY OF TROPONIN QUANT: CPT | Performed by: NURSE PRACTITIONER

## 2024-05-16 PROCEDURE — 36415 COLL VENOUS BLD VENIPUNCTURE: CPT | Performed by: STUDENT IN AN ORGANIZED HEALTH CARE EDUCATION/TRAINING PROGRAM

## 2024-05-16 PROCEDURE — 63600175 PHARM REV CODE 636 W HCPCS: Performed by: STUDENT IN AN ORGANIZED HEALTH CARE EDUCATION/TRAINING PROGRAM

## 2024-05-16 PROCEDURE — 99900031 HC PATIENT EDUCATION (STAT)

## 2024-05-16 PROCEDURE — 96376 TX/PRO/DX INJ SAME DRUG ADON: CPT

## 2024-05-16 PROCEDURE — 84100 ASSAY OF PHOSPHORUS: CPT | Performed by: STUDENT IN AN ORGANIZED HEALTH CARE EDUCATION/TRAINING PROGRAM

## 2024-05-16 PROCEDURE — 21400001 HC TELEMETRY ROOM

## 2024-05-16 PROCEDURE — 84443 ASSAY THYROID STIM HORMONE: CPT | Performed by: NURSE PRACTITIONER

## 2024-05-16 PROCEDURE — 25000003 PHARM REV CODE 250: Performed by: NURSE PRACTITIONER

## 2024-05-16 PROCEDURE — 99900035 HC TECH TIME PER 15 MIN (STAT)

## 2024-05-16 RX ORDER — GLUCAGON 1 MG
1 KIT INJECTION
Status: DISCONTINUED | OUTPATIENT
Start: 2024-05-16 | End: 2024-05-19 | Stop reason: HOSPADM

## 2024-05-16 RX ORDER — LIDOCAINE HYDROCHLORIDE 20 MG/ML
15 SOLUTION OROPHARYNGEAL ONCE
Status: COMPLETED | OUTPATIENT
Start: 2024-05-16 | End: 2024-05-16

## 2024-05-16 RX ORDER — IBUPROFEN 200 MG
24 TABLET ORAL
Status: DISCONTINUED | OUTPATIENT
Start: 2024-05-16 | End: 2024-05-19 | Stop reason: HOSPADM

## 2024-05-16 RX ORDER — HYDROMORPHONE HYDROCHLORIDE 1 MG/ML
1 INJECTION, SOLUTION INTRAMUSCULAR; INTRAVENOUS; SUBCUTANEOUS ONCE
Status: COMPLETED | OUTPATIENT
Start: 2024-05-16 | End: 2024-05-16

## 2024-05-16 RX ORDER — IBUPROFEN 200 MG
16 TABLET ORAL
Status: DISCONTINUED | OUTPATIENT
Start: 2024-05-16 | End: 2024-05-19 | Stop reason: HOSPADM

## 2024-05-16 RX ORDER — ALUMINUM HYDROXIDE, MAGNESIUM HYDROXIDE, AND SIMETHICONE 1200; 120; 1200 MG/30ML; MG/30ML; MG/30ML
30 SUSPENSION ORAL ONCE
Status: COMPLETED | OUTPATIENT
Start: 2024-05-16 | End: 2024-05-16

## 2024-05-16 RX ORDER — PANTOPRAZOLE SODIUM 40 MG/1
40 TABLET, DELAYED RELEASE ORAL DAILY
Status: DISCONTINUED | OUTPATIENT
Start: 2024-05-16 | End: 2024-05-19 | Stop reason: HOSPADM

## 2024-05-16 RX ORDER — HYDROMORPHONE HYDROCHLORIDE 1 MG/ML
1 INJECTION, SOLUTION INTRAMUSCULAR; INTRAVENOUS; SUBCUTANEOUS EVERY 4 HOURS PRN
Status: DISCONTINUED | OUTPATIENT
Start: 2024-05-16 | End: 2024-05-19 | Stop reason: HOSPADM

## 2024-05-16 RX ORDER — INSULIN ASPART 100 [IU]/ML
0-10 INJECTION, SOLUTION INTRAVENOUS; SUBCUTANEOUS
Status: DISCONTINUED | OUTPATIENT
Start: 2024-05-16 | End: 2024-05-19 | Stop reason: HOSPADM

## 2024-05-16 RX ORDER — PANTOPRAZOLE SODIUM 40 MG/10ML
40 INJECTION, POWDER, LYOPHILIZED, FOR SOLUTION INTRAVENOUS ONCE
Status: COMPLETED | OUTPATIENT
Start: 2024-05-16 | End: 2024-05-16

## 2024-05-16 RX ADMIN — HYDROMORPHONE HYDROCHLORIDE 0.5 MG: 0.5 INJECTION, SOLUTION INTRAMUSCULAR; INTRAVENOUS; SUBCUTANEOUS at 09:05

## 2024-05-16 RX ADMIN — ALUMINUM HYDROXIDE, MAGNESIUM HYDROXIDE, AND SIMETHICONE 30 ML: 200; 200; 20 SUSPENSION ORAL at 12:05

## 2024-05-16 RX ADMIN — APIXABAN 5 MG: 5 TABLET, FILM COATED ORAL at 09:05

## 2024-05-16 RX ADMIN — GABAPENTIN 600 MG: 300 CAPSULE ORAL at 10:05

## 2024-05-16 RX ADMIN — OXYCODONE HYDROCHLORIDE AND ACETAMINOPHEN 1 TABLET: 5; 325 TABLET ORAL at 05:05

## 2024-05-16 RX ADMIN — ONDANSETRON 4 MG: 2 INJECTION INTRAMUSCULAR; INTRAVENOUS at 09:05

## 2024-05-16 RX ADMIN — HYDROMORPHONE HYDROCHLORIDE 0.5 MG: 0.5 INJECTION, SOLUTION INTRAMUSCULAR; INTRAVENOUS; SUBCUTANEOUS at 03:05

## 2024-05-16 RX ADMIN — PANTOPRAZOLE SODIUM 40 MG: 40 TABLET, DELAYED RELEASE ORAL at 12:05

## 2024-05-16 RX ADMIN — ONDANSETRON 4 MG: 2 INJECTION INTRAMUSCULAR; INTRAVENOUS at 07:05

## 2024-05-16 RX ADMIN — APIXABAN 5 MG: 5 TABLET, FILM COATED ORAL at 12:05

## 2024-05-16 RX ADMIN — PANTOPRAZOLE SODIUM 40 MG: 40 INJECTION, POWDER, LYOPHILIZED, FOR SOLUTION INTRAVENOUS at 12:05

## 2024-05-16 RX ADMIN — GABAPENTIN 600 MG: 300 CAPSULE ORAL at 02:05

## 2024-05-16 RX ADMIN — HYDROMORPHONE HYDROCHLORIDE 1 MG: 1 INJECTION, SOLUTION INTRAMUSCULAR; INTRAVENOUS; SUBCUTANEOUS at 07:05

## 2024-05-16 RX ADMIN — ONDANSETRON 4 MG: 2 INJECTION INTRAMUSCULAR; INTRAVENOUS at 03:05

## 2024-05-16 RX ADMIN — QUETIAPINE 300 MG: 100 TABLET ORAL at 09:05

## 2024-05-16 RX ADMIN — LIDOCAINE HYDROCHLORIDE 15 ML: 20 SOLUTION ORAL at 02:05

## 2024-05-16 RX ADMIN — HYDROMORPHONE HYDROCHLORIDE 1 MG: 1 INJECTION, SOLUTION INTRAMUSCULAR; INTRAVENOUS; SUBCUTANEOUS at 10:05

## 2024-05-16 RX ADMIN — LAMOTRIGINE 50 MG: 25 TABLET ORAL at 10:05

## 2024-05-16 RX ADMIN — GABAPENTIN 600 MG: 300 CAPSULE ORAL at 09:05

## 2024-05-16 RX ADMIN — Medication 9 MG: at 09:05

## 2024-05-16 RX ADMIN — CLONAZEPAM 1 MG: 1 TABLET ORAL at 07:05

## 2024-05-16 NOTE — PROGRESS NOTES
[facility] Medicine  Progress Note    Patient Name: Kevan Colin Sr.  MRN: [id number]  Patient Class: IP- Inpatient   Admission Date: 5/15/2024  Length of Stay: 0 days  Attending Physician: Yovanny Sosa MD  Primary Care Provider: Jonathan Benton III, MD        Subjective:     Principal Problem:Intractable nausea and vomiting        HPI:  Patient is a 67yo with a PMH of VTE, COPD, T2DM, HLD & HTN who presented to the ED for abdominal pain and n/v. Per Patient's wife he has abdominal pain and n/v, however in the past few days he was having more n/v. For this reason he was brought into the ED. In the ED due to his abdominal pain Patient got a CT abdomen done, which demonstrated Hepatic Mass. Per wife they had been aware of this mass, though no follow up was done outpatient. Patient was admitted for further management of his n/v & abdominal pain, but also for biopsy of his mass    Overview/Hospital Course:  Kevan Colin Sr. Was monitored closely during his hospitalization.  CT abdomen and pelvis revealed known hepatic mass which is slightly larger than most recent imaging.  Chronic Eliquis held and IR consulted for biopsy of hepatic mass; however, the radiologist determined that the lesion was not amenable to biopsy.  Chronic eliquis was resumed.  He was given IV antiemetics for nausea, IV and oral narcotics for pain control.  He did have c/o chest pain during his admission and was treated with IV dilaudid, GI cocktail and protonix.  EKG was obtained and was without ischemic changes.  Troponins were trended.    Interval History: remains nauseated with active vomiting this morning.  C/o neck pain, headache and now chest pain.  EKG without acute changes.  Initial troponin negative.  Will trend.    Review of Systems   Constitutional:  Positive for appetite change and unexpected weight change. Negative for fatigue.   HENT:  Negative for sinus pressure and sinus pain.    Eyes:   Negative for photophobia and visual disturbance.   Respiratory:  Negative for cough and shortness of breath.    Cardiovascular:  Positive for chest pain. Negative for palpitations.   Gastrointestinal:  Positive for abdominal pain, nausea and vomiting.   Endocrine: Negative for polyphagia and polyuria.   Genitourinary:  Negative for decreased urine volume, dysuria and hematuria.   Musculoskeletal:  Positive for back pain and neck pain. Negative for neck stiffness.   Skin:  Negative for rash and wound.   Neurological:  Negative for speech difficulty and numbness.   Psychiatric/Behavioral:  Negative for agitation and confusion. The patient is nervous/anxious.      Objective:     Vital Signs (Most Recent):  Temp: 98.1 °F (36.7 °C) (05/16/24 0710)  Pulse: 62 (05/16/24 0753)  Resp: 20 (05/16/24 1057)  BP: 137/73 (05/16/24 0710)  SpO2: 100 % (05/16/24 0753) Vital Signs (24h Range):  Temp:  [97.9 °F (36.6 °C)-98.4 °F (36.9 °C)] 98.1 °F (36.7 °C)  Pulse:  [54-85] 62  Resp:  [12-20] 20  SpO2:  [93 %-100 %] 100 %  BP: (129-182)/() 137/73     Weight: 102 kg (224 lb 13.9 oz)  Body mass index is 28.87 kg/m².    Intake/Output Summary (Last 24 hours) at 5/16/2024 1142  Last data filed at 5/16/2024 0942  Gross per 24 hour   Intake 50 ml   Output 450 ml   Net -400 ml         Physical Exam  Constitutional:       General: He is not in acute distress.     Appearance: He is ill-appearing.   HENT:      Head: Normocephalic and atraumatic.      Right Ear: External ear normal.      Left Ear: External ear normal.      Nose: No congestion or rhinorrhea.      Mouth/Throat:      Pharynx: No oropharyngeal exudate or posterior oropharyngeal erythema.   Cardiovascular:      Rate and Rhythm: Normal rate and regular rhythm.   Pulmonary:      Effort: Pulmonary effort is normal.      Breath sounds: Normal breath sounds.   Abdominal:      General: Bowel sounds are normal. There is no distension.      Palpations: Abdomen is soft.      Tenderness:  There is abdominal tenderness (generalized, but primarily epigastric and right side).   Musculoskeletal:      Right lower leg: No edema.      Left lower leg: No edema.   Skin:     General: Skin is warm and dry.   Neurological:      Mental Status: He is alert and oriented to person, place, and time. Mental status is at baseline.      Sensory: No sensory deficit.      Motor: Weakness present.   Psychiatric:         Mood and Affect: Mood is anxious.         Behavior: Behavior is cooperative.             Significant Labs: All pertinent labs within the past 24 hours have been reviewed.  Bilirubin:   Recent Labs   Lab 04/17/24  1053 04/18/24  0525 05/15/24  0236 05/16/24  0340   BILITOT 0.4 0.4 0.5 0.5     CBC:   Recent Labs   Lab 05/15/24  0236 05/16/24  0340   WBC 8.04 8.22   HGB 14.5 13.5*   HCT 46.1 42.9    208     CMP:   Recent Labs   Lab 05/15/24  0236 05/16/24  0340    142   K 4.0 3.6    104   CO2 28 27   * 116*   BUN 10 13   CREATININE 0.9 0.8   CALCIUM 9.8 9.4   PROT 7.9 7.5   ALBUMIN 4.6 4.3   BILITOT 0.5 0.5   ALKPHOS 97 83   AST 11 12   ALT 9* 8*   ANIONGAP 12 11     Lipase:   Recent Labs   Lab 05/15/24  0236   LIPASE 13     Magnesium:   Recent Labs   Lab 05/16/24  0340   MG 1.9       Troponin:   Recent Labs   Lab 05/16/24  1043   TROPONINIHS 6.7     TSH:   Recent Labs   Lab 03/06/24  0907   TSH 4.15     Urine Studies:   Recent Labs   Lab 05/15/24  0413   COLORU Yellow   APPEARANCEUA Clear   PHUR 8.0   SPECGRAV >1.030*   PROTEINUA Negative   GLUCUA 4+*   KETONESU 2+*   BILIRUBINUA Negative   OCCULTUA Negative   NITRITE Negative   UROBILINOGEN Negative   LEUKOCYTESUR Negative   RBCUA 1   BACTERIA None       Significant Imaging: I have reviewed all pertinent imaging results/findings within the past 24 hours.    Assessment/Plan:      * Intractable nausea and vomiting  Possibly 2/2 Ozempic  Per review of the record, he started Ozempic on or about 3/6/24 after it was prescribed by  "endocrine.  He was then admitted to inpatient on 4/17 for chest pain at which time he was noted to have epigastric tenderness.  Seen by PCP on 4/30 for increased episodes of N/V/D.  Presents to ED yesterday with intractable N/V and abdominal pain after last injection of Ozempic 1 day prior.  Would advise to discontinue use of Ozempic.  Will give supportive care with IVFH if needed, IV antiemetics and pain control.     Chest pain  Suspect GI origin  EKG prn Chest Pain-no ischemic changes thus far  Maintain telemetry  Trend troponin-initial trop negative  CBC, CMP, Mg, Phos daily  Fasting Lipid panel in am  TSH level  CXR-reviewed with no acute findings  Continue anticoagulation  LHC done in Jan 2024 - Dr Wheeler at [facility] -no evidence of obstructive CAD       Liver mass  Patient has Liver Mass-known but he has not had good follow up  IR Consulted for Biopsy -not amenable to biopsy.  Needs MRI with liver protocol; however, given his persistent N/V I do not think he can tolerate MRI currently.  Will wait for his symptoms to jamilah.  Reasonable to schedule for outpatient MRI at discharge if unable to obtain during this hospital stay.      Type 2 diabetes mellitus with diabetic polyneuropathy, with long-term current use of insulin  Patient's FSGs are controlled on current medication regimen.  Last A1c reviewed-   Lab Results   Component Value Date    HGBA1C 8.9 (H) 03/06/2024     Most recent fingerstick glucose reviewed- No results for input(s): "POCTGLUCOSE" in the last 24 hours.  Current correctional scale  Medium  Maintain anti-hyperglycemic dose as follows-   Antihyperglycemics (From admission, onward)      Start     Stop Route Frequency Ordered    05/16/24 1247  insulin aspart U-100 pen 0-10 Units         -- SubQ Before meals & nightly PRN 05/16/24 1147        Hold long acting insulin for now and just cover with SSI until he is able to tolerate a diet.  Hold Oral hypoglycemics while patient is in the " hospital.        Presence of vena cava filter  aware    Chronic pain associated with significant psychosocial dysfunction  Continue Home Gabapentin       Anticoagulant long-term use  Due to Hx of VTE  Continue Eliquis      VTE Risk Mitigation (From admission, onward)           Ordered     apixaban tablet 5 mg  2 times daily         05/16/24 1022     IP VTE HIGH RISK PATIENT  Once         05/15/24 1101     Place sequential compression device  Until discontinued         05/15/24 1101                    Discharge Planning   HOMAR: 5/19/2024     Code Status: Full Code   Is the patient medically ready for discharge?:     Reason for patient still in hospital (select all that apply): Patient trending condition and Treatment                     MAYNOR Brennan  Department of Hospital Medicine   [facility]

## 2024-05-16 NOTE — CARE UPDATE
05/16/24 0753   Patient Assessment/Suction   Level of Consciousness (AVPU) alert   Respiratory Effort Normal;Unlabored   Expansion/Accessory Muscles/Retractions no retractions;no use of accessory muscles   All Lung Fields Breath Sounds clear;diminished   Rhythm/Pattern, Respiratory no shortness of breath reported;pattern regular;unlabored   Cough Frequency no cough   PRE-TX-O2   Device (Oxygen Therapy) room air   SpO2 100 %   Pulse Oximetry Type Intermittent   $ Pulse Oximetry - Multiple Charge Pulse Oximetry - Multiple   Pulse 62   Resp 18   Aerosol Therapy   $ Aerosol Therapy Charges PRN treatment not required   Respiratory Treatment Status (SVN) PRN treatment not required   Education   $ Education 15 min

## 2024-05-16 NOTE — ASSESSMENT & PLAN NOTE
"Patient's FSGs are controlled on current medication regimen.  Last A1c reviewed-   Lab Results   Component Value Date    HGBA1C 8.9 (H) 03/06/2024     Most recent fingerstick glucose reviewed- No results for input(s): "POCTGLUCOSE" in the last 24 hours.  Current correctional scale  Medium  Maintain anti-hyperglycemic dose as follows-   Antihyperglycemics (From admission, onward)      Start     Stop Route Frequency Ordered    05/16/24 1247  insulin aspart U-100 pen 0-10 Units         -- SubQ Before meals & nightly PRN 05/16/24 1147        Hold long acting insulin for now and just cover with SSI until he is able to tolerate a diet.  Hold Oral hypoglycemics while patient is in the hospital.      "

## 2024-05-16 NOTE — ASSESSMENT & PLAN NOTE
Suspect GI origin  EKG prn Chest Pain-no ischemic changes thus far  Maintain telemetry  Trend troponin-initial trop negative  CBC, CMP, Mg, Phos daily  Fasting Lipid panel in am  TSH level  CXR-reviewed with no acute findings  Continue anticoagulation  LHC done in Jan 2024 - Dr Wheeler at [facility] -no evidence of obstructive CAD

## 2024-05-16 NOTE — PLAN OF CARE
Problem: Infection  Goal: Absence of Infection Signs and Symptoms  Outcome: Progressing     Problem: Adult Inpatient Plan of Care  Goal: Plan of Care Review  Outcome: Progressing  Goal: Patient-Specific Goal (Individualized)  Outcome: Progressing  Goal: Absence of Hospital-Acquired Illness or Injury  Outcome: Progressing  Goal: Optimal Comfort and Wellbeing  Outcome: Progressing  Goal: Readiness for Transition of Care  Outcome: Progressing     Problem: Bariatric Environmental Safety  Goal: Safety Maintained with Care  Outcome: Progressing     Problem: Diabetes Comorbidity  Goal: Blood Glucose Level Within Targeted Range  Outcome: Progressing     Problem: Skin Injury Risk Increased  Goal: Skin Health and Integrity  Outcome: Progressing

## 2024-05-16 NOTE — NURSING
Nurses Note -- 4 Eyes      5/16/2024   5:22 AM      Skin assessed during: Admit      [x] No Altered Skin Integrity Present    []Prevention Measures Documented      [] Yes- Altered Skin Integrity Present or Discovered   [] LDA Added if Not in Epic (Describe Wound)   [] New Altered Skin Integrity was Present on Admit and Documented in LDA   [] Wound Image Taken    Wound Care Consulted? No    Attending Nurse:  Betty Berman RN/Staff Member:  carlene mejia

## 2024-05-16 NOTE — PLAN OF CARE
Problem: Infection  Goal: Absence of Infection Signs and Symptoms  Outcome: Progressing     Problem: Adult Inpatient Plan of Care  Goal: Plan of Care Review  Outcome: Progressing     Problem: Adult Inpatient Plan of Care  Goal: Plan of Care Review  Outcome: Progressing  Goal: Patient-Specific Goal (Individualized)  Outcome: Progressing  Goal: Absence of Hospital-Acquired Illness or Injury  Outcome: Progressing  Goal: Optimal Comfort and Wellbeing  Outcome: Progressing  Goal: Readiness for Transition of Care  Outcome: Progressing     Problem: Bariatric Environmental Safety  Goal: Safety Maintained with Care  Outcome: Progressing     Problem: Diabetes Comorbidity  Goal: Blood Glucose Level Within Targeted Range  Outcome: Progressing     Problem: Skin Injury Risk Increased  Goal: Skin Health and Integrity  Outcome: Progressing     Problem: Fall Injury Risk  Goal: Absence of Fall and Fall-Related Injury  Outcome: Progressing

## 2024-05-16 NOTE — ASSESSMENT & PLAN NOTE
Possibly 2/2 Ozempic  Per review of the record, he started Ozempic on or about 3/6/24 after it was prescribed by endocrine.  He was then admitted to inpatient on 4/17 for chest pain at which time he was noted to have epigastric tenderness.  Seen by PCP on 4/30 for increased episodes of N/V/D.  Presents to ED yesterday with intractable N/V and abdominal pain after last injection of Ozempic 1 day prior.  Would advise to discontinue use of Ozempic.  Will give supportive care with IVFH if needed, IV antiemetics and pain control.

## 2024-05-16 NOTE — ASSESSMENT & PLAN NOTE
Patient has Liver Mass-known but he has not had good follow up  IR Consulted for Biopsy -not amenable to biopsy.  Needs MRI with liver protocol; however, given his persistent N/V I do not think he can tolerate MRI currently.  Will wait for his symptoms to jamilah.  Reasonable to schedule for outpatient MRI at discharge if unable to obtain during this hospital stay.

## 2024-05-16 NOTE — HOSPITAL COURSE
Kevan Kvngraman Colin Sr. Was monitored closely during his hospitalization.  CT abdomen and pelvis revealed known hepatic mass which is slightly larger than most recent imaging.  Chronic Eliquis held and IR consulted for biopsy of hepatic mass; however, the radiologist determined that the lesion was not amenable to biopsy.  Chronic eliquis was resumed.  He was given IV antiemetics for nausea, IV and oral narcotics for pain control.  He did have c/o chest pain during his admission and was treated with IV dilaudid, GI cocktail and protonix.  EKG was obtained and was without ischemic changes.  Troponins were negative. Patient treated for nausea and vomiting with antiemetics during hospital stay and improved. MRI of liver ordered for 5/18. Symptoms resolved and he was suitable for discharge. Correlation between Ozempic dosing and presentation of symptoms was reviewed. Strong possibility that exacerbation of symptoms is secondary to Ozempic and it was recommended that he discontinue use and f/u with his endocrinologist/PCP.  Home health was arranged.  He will follow up with his PCP.

## 2024-05-16 NOTE — SUBJECTIVE & OBJECTIVE
Interval History: remains nauseated with active vomiting this morning.  C/o neck pain, headache and now chest pain.  EKG without acute changes.  Initial troponin negative.  Will trend.    Review of Systems   Constitutional:  Positive for appetite change and unexpected weight change. Negative for fatigue.   HENT:  Negative for sinus pressure and sinus pain.    Eyes:  Negative for photophobia and visual disturbance.   Respiratory:  Negative for cough and shortness of breath.    Cardiovascular:  Positive for chest pain. Negative for palpitations.   Gastrointestinal:  Positive for abdominal pain, nausea and vomiting.   Endocrine: Negative for polyphagia and polyuria.   Genitourinary:  Negative for decreased urine volume, dysuria and hematuria.   Musculoskeletal:  Positive for back pain and neck pain. Negative for neck stiffness.   Skin:  Negative for rash and wound.   Neurological:  Negative for speech difficulty and numbness.   Psychiatric/Behavioral:  Negative for agitation and confusion. The patient is nervous/anxious.      Objective:     Vital Signs (Most Recent):  Temp: 98.1 °F (36.7 °C) (05/16/24 0710)  Pulse: 62 (05/16/24 0753)  Resp: 20 (05/16/24 1057)  BP: 137/73 (05/16/24 0710)  SpO2: 100 % (05/16/24 0753) Vital Signs (24h Range):  Temp:  [97.9 °F (36.6 °C)-98.4 °F (36.9 °C)] 98.1 °F (36.7 °C)  Pulse:  [54-85] 62  Resp:  [12-20] 20  SpO2:  [93 %-100 %] 100 %  BP: (129-182)/() 137/73     Weight: 102 kg (224 lb 13.9 oz)  Body mass index is 28.87 kg/m².    Intake/Output Summary (Last 24 hours) at 5/16/2024 1142  Last data filed at 5/16/2024 0942  Gross per 24 hour   Intake 50 ml   Output 450 ml   Net -400 ml         Physical Exam  Constitutional:       General: He is not in acute distress.     Appearance: He is ill-appearing.   HENT:      Head: Normocephalic and atraumatic.      Right Ear: External ear normal.      Left Ear: External ear normal.      Nose: No congestion or rhinorrhea.      Mouth/Throat:       Pharynx: No oropharyngeal exudate or posterior oropharyngeal erythema.   Cardiovascular:      Rate and Rhythm: Normal rate and regular rhythm.   Pulmonary:      Effort: Pulmonary effort is normal.      Breath sounds: Normal breath sounds.   Abdominal:      General: Bowel sounds are normal. There is no distension.      Palpations: Abdomen is soft.      Tenderness: There is abdominal tenderness (generalized, but primarily epigastric and right side).   Musculoskeletal:      Right lower leg: No edema.      Left lower leg: No edema.   Skin:     General: Skin is warm and dry.   Neurological:      Mental Status: He is alert and oriented to person, place, and time. Mental status is at baseline.      Sensory: No sensory deficit.      Motor: Weakness present.   Psychiatric:         Mood and Affect: Mood is anxious.         Behavior: Behavior is cooperative.             Significant Labs: All pertinent labs within the past 24 hours have been reviewed.  Bilirubin:   Recent Labs   Lab 04/17/24  1053 04/18/24  0525 05/15/24  0236 05/16/24  0340   BILITOT 0.4 0.4 0.5 0.5     CBC:   Recent Labs   Lab 05/15/24  0236 05/16/24  0340   WBC 8.04 8.22   HGB 14.5 13.5*   HCT 46.1 42.9    208     CMP:   Recent Labs   Lab 05/15/24  0236 05/16/24  0340    142   K 4.0 3.6    104   CO2 28 27   * 116*   BUN 10 13   CREATININE 0.9 0.8   CALCIUM 9.8 9.4   PROT 7.9 7.5   ALBUMIN 4.6 4.3   BILITOT 0.5 0.5   ALKPHOS 97 83   AST 11 12   ALT 9* 8*   ANIONGAP 12 11     Lipase:   Recent Labs   Lab 05/15/24  0236   LIPASE 13     Magnesium:   Recent Labs   Lab 05/16/24  0340   MG 1.9       Troponin:   Recent Labs   Lab 05/16/24  1043   TROPONINIHS 6.7     TSH:   Recent Labs   Lab 03/06/24  0907   TSH 4.15     Urine Studies:   Recent Labs   Lab 05/15/24  0413   COLORU Yellow   APPEARANCEUA Clear   PHUR 8.0   SPECGRAV >1.030*   PROTEINUA Negative   GLUCUA 4+*   KETONESU 2+*   BILIRUBINUA Negative   OCCULTUA Negative   NITRITE  Negative   UROBILINOGEN Negative   LEUKOCYTESUR Negative   RBCUA 1   BACTERIA None       Significant Imaging: I have reviewed all pertinent imaging results/findings within the past 24 hours.

## 2024-05-16 NOTE — PLAN OF CARE
[facility]  Initial Discharge Assessment     Assessment completed at bedside with Pt, and all information on FaceSheet confirmed, including demographics, PCP, pharmacy and insurance. Pt has not addressed advance directives. He currently lives in [place] with his spouse.  His PCP is Jonathan Benton MD, and last appointment was two weeks ago. His preferred pharmacy is [facility] on [address]. He denies any DME/HH/HD/Blood Thinners. For transportation to appointments, he drives himself but his wife will transport back home after discharge. He denies any recent hospitalizations. Plan for discharge is to return home. Case Management to continue to follow for discharge planning needs.       Primary Care Provider: Jonathan Benton III, MD    Admission Diagnosis: Abdominal pain, unspecified abdominal location [R10.9]    Admission Date: 5/15/2024  Expected Discharge Date: 5/19/2024    Transition of Care Barriers: None    Payor: HU[employer] MANAGED MEDICARE / Plan: [employer] HMO PPO SPECIAL NEEDS / Product Type: Medicare Advantage /     Extended Emergency Contact Information  Primary Emergency Contact: BroderickCheryle  Address: [address]           [address]  Home Phone: [phone]  Mobile Phone: [phone]  Relation: Spouse  Preferred language: English   needed? No  Secondary Emergency Contact: Lisette Monsivais  Address: UNKNOWN   [place]  Mobile Phone: [phone]  Relation: Sister  Preferred language: English   needed? No    Discharge Plan A: Home with family  Discharge Plan B: Home with family      [facility] - [place], [place] - [address]  [address]  [place] [address]  Phone: [phone] Fax: [phone]      Initial Assessment (most recent)       Adult Discharge Assessment - 05/16/24 1425          Discharge Assessment    Assessment Type Discharge Planning  Assessment     Confirmed/corrected address, phone number and insurance Yes     Confirmed Demographics Correct on Facesheet     Source of Information patient     When was your last doctors appointment? --   two weeks ago    Reason For Admission Abdominal pain     People in Home spouse     Do you expect to return to your current living situation? Yes     Do you have help at home or someone to help you manage your care at home? Yes     Who are your caregiver(s) and their phone number(s)? Cheryle Villafana (Spouse)  [phone] (Mobile)     Prior to hospitilization cognitive status: Alert/Oriented     Current cognitive status: Alert/Oriented     Walking or Climbing Stairs Difficulty no     Dressing/Bathing Difficulty no     Home Accessibility wheelchair accessible     Home Layout Able to live on 1st floor     Equipment Currently Used at Home none     Readmission within 30 days? Yes     Patient currently being followed by outpatient case management? No     Do you currently have service(s) that help you manage your care at home? Yes     Name and Contact number of agency Cheryle Villafana (Spouse)  [phone] (Mobile)     Do you take prescription medications? Yes     Do you have prescription coverage? Yes     Coverage HU[employer] MANAGED MEDICARE - [facility] SPECIAL NEEDS     Do you have any problems affording any of your prescribed medications? No     Is the patient taking medications as prescribed? yes     Who is going to help you get home at discharge? Cheryle Villafana (Spouse)  [phone] (Mobile)     How do you get to doctors appointments? family or friend will provide     Are you on dialysis? No     Do you take coumadin? No     Discharge Plan A Home with family     Discharge Plan B Home with family     DME Needed Upon Discharge  none     Discharge Plan discussed with: Patient     Transition of Care Barriers None

## 2024-05-17 LAB
ALBUMIN SERPL BCP-MCNC: 3.8 G/DL (ref 3.5–5.2)
ALP SERPL-CCNC: 132 U/L (ref 55–135)
ALT SERPL W/O P-5'-P-CCNC: 71 U/L (ref 10–44)
ANION GAP SERPL CALC-SCNC: 6 MMOL/L (ref 8–16)
AST SERPL-CCNC: 95 U/L (ref 10–40)
BASOPHILS # BLD AUTO: 0.07 K/UL (ref 0–0.2)
BASOPHILS NFR BLD: 1.3 % (ref 0–1.9)
BILIRUB SERPL-MCNC: 0.5 MG/DL (ref 0.1–1)
BUN SERPL-MCNC: 13 MG/DL (ref 8–23)
CALCIUM SERPL-MCNC: 8.6 MG/DL (ref 8.7–10.5)
CHLORIDE SERPL-SCNC: 103 MMOL/L (ref 95–110)
CHOLEST SERPL-MCNC: 115 MG/DL (ref 120–199)
CHOLEST/HDLC SERPL: 3.7 {RATIO} (ref 2–5)
CO2 SERPL-SCNC: 31 MMOL/L (ref 23–29)
CREAT SERPL-MCNC: 0.9 MG/DL (ref 0.5–1.4)
DIFFERENTIAL METHOD BLD: ABNORMAL
EOSINOPHIL # BLD AUTO: 0.4 K/UL (ref 0–0.5)
EOSINOPHIL NFR BLD: 7 % (ref 0–8)
ERYTHROCYTE [DISTWIDTH] IN BLOOD BY AUTOMATED COUNT: 14 % (ref 11.5–14.5)
EST. GFR  (NO RACE VARIABLE): >60 ML/MIN/1.73 M^2
GLUCOSE SERPL-MCNC: 124 MG/DL (ref 70–110)
GLUCOSE SERPL-MCNC: 139 MG/DL (ref 70–110)
GLUCOSE SERPL-MCNC: 141 MG/DL (ref 70–110)
GLUCOSE SERPL-MCNC: 157 MG/DL (ref 70–110)
HCT VFR BLD AUTO: 39.1 % (ref 40–54)
HDLC SERPL-MCNC: 31 MG/DL (ref 40–75)
HDLC SERPL: 27 % (ref 20–50)
HGB BLD-MCNC: 12.4 G/DL (ref 14–18)
IMM GRANULOCYTES # BLD AUTO: 0.02 K/UL (ref 0–0.04)
IMM GRANULOCYTES NFR BLD AUTO: 0.4 % (ref 0–0.5)
LDLC SERPL CALC-MCNC: 55.8 MG/DL (ref 63–159)
LIPASE SERPL-CCNC: 344 U/L (ref 4–60)
LYMPHOCYTES # BLD AUTO: 1.5 K/UL (ref 1–4.8)
LYMPHOCYTES NFR BLD: 27.4 % (ref 18–48)
MAGNESIUM SERPL-MCNC: 1.9 MG/DL (ref 1.6–2.6)
MCH RBC QN AUTO: 30.5 PG (ref 27–31)
MCHC RBC AUTO-ENTMCNC: 31.7 G/DL (ref 32–36)
MCV RBC AUTO: 96 FL (ref 82–98)
MONOCYTES # BLD AUTO: 0.5 K/UL (ref 0.3–1)
MONOCYTES NFR BLD: 8.3 % (ref 4–15)
NEUTROPHILS # BLD AUTO: 3 K/UL (ref 1.8–7.7)
NEUTROPHILS NFR BLD: 55.6 % (ref 38–73)
NONHDLC SERPL-MCNC: 84 MG/DL
NRBC BLD-RTO: 0 /100 WBC
PLATELET # BLD AUTO: 173 K/UL (ref 150–450)
PMV BLD AUTO: 10 FL (ref 9.2–12.9)
POTASSIUM SERPL-SCNC: 3.8 MMOL/L (ref 3.5–5.1)
PROT SERPL-MCNC: 6.6 G/DL (ref 6–8.4)
RBC # BLD AUTO: 4.06 M/UL (ref 4.6–6.2)
SODIUM SERPL-SCNC: 140 MMOL/L (ref 136–145)
TRIGL SERPL-MCNC: 141 MG/DL (ref 30–150)
WBC # BLD AUTO: 5.43 K/UL (ref 3.9–12.7)

## 2024-05-17 PROCEDURE — 85025 COMPLETE CBC W/AUTO DIFF WBC: CPT | Performed by: STUDENT IN AN ORGANIZED HEALTH CARE EDUCATION/TRAINING PROGRAM

## 2024-05-17 PROCEDURE — 80061 LIPID PANEL: CPT | Performed by: NURSE PRACTITIONER

## 2024-05-17 PROCEDURE — 36415 COLL VENOUS BLD VENIPUNCTURE: CPT | Performed by: STUDENT IN AN ORGANIZED HEALTH CARE EDUCATION/TRAINING PROGRAM

## 2024-05-17 PROCEDURE — 63600175 PHARM REV CODE 636 W HCPCS: Performed by: NURSE PRACTITIONER

## 2024-05-17 PROCEDURE — 99900035 HC TECH TIME PER 15 MIN (STAT)

## 2024-05-17 PROCEDURE — 94760 N-INVAS EAR/PLS OXIMETRY 1: CPT

## 2024-05-17 PROCEDURE — 80053 COMPREHEN METABOLIC PANEL: CPT | Performed by: STUDENT IN AN ORGANIZED HEALTH CARE EDUCATION/TRAINING PROGRAM

## 2024-05-17 PROCEDURE — 83690 ASSAY OF LIPASE: CPT | Performed by: NURSE PRACTITIONER

## 2024-05-17 PROCEDURE — 21400001 HC TELEMETRY ROOM

## 2024-05-17 PROCEDURE — 82962 GLUCOSE BLOOD TEST: CPT

## 2024-05-17 PROCEDURE — 63600175 PHARM REV CODE 636 W HCPCS: Performed by: STUDENT IN AN ORGANIZED HEALTH CARE EDUCATION/TRAINING PROGRAM

## 2024-05-17 PROCEDURE — 83735 ASSAY OF MAGNESIUM: CPT | Performed by: STUDENT IN AN ORGANIZED HEALTH CARE EDUCATION/TRAINING PROGRAM

## 2024-05-17 PROCEDURE — 25000003 PHARM REV CODE 250: Performed by: STUDENT IN AN ORGANIZED HEALTH CARE EDUCATION/TRAINING PROGRAM

## 2024-05-17 PROCEDURE — 25000003 PHARM REV CODE 250: Performed by: NURSE PRACTITIONER

## 2024-05-17 PROCEDURE — 99900031 HC PATIENT EDUCATION (STAT)

## 2024-05-17 RX ORDER — ONDANSETRON HYDROCHLORIDE 2 MG/ML
4 INJECTION, SOLUTION INTRAVENOUS EVERY 4 HOURS PRN
Status: DISCONTINUED | OUTPATIENT
Start: 2024-05-17 | End: 2024-05-19 | Stop reason: HOSPADM

## 2024-05-17 RX ORDER — METOCLOPRAMIDE HYDROCHLORIDE 5 MG/ML
5 INJECTION INTRAMUSCULAR; INTRAVENOUS
Status: COMPLETED | OUTPATIENT
Start: 2024-05-17 | End: 2024-05-18

## 2024-05-17 RX ADMIN — PANTOPRAZOLE SODIUM 40 MG: 40 TABLET, DELAYED RELEASE ORAL at 05:05

## 2024-05-17 RX ADMIN — ONDANSETRON 4 MG: 2 INJECTION INTRAMUSCULAR; INTRAVENOUS at 09:05

## 2024-05-17 RX ADMIN — OXYCODONE HYDROCHLORIDE AND ACETAMINOPHEN 1 TABLET: 5; 325 TABLET ORAL at 04:05

## 2024-05-17 RX ADMIN — Medication 9 MG: at 10:05

## 2024-05-17 RX ADMIN — GABAPENTIN 600 MG: 300 CAPSULE ORAL at 10:05

## 2024-05-17 RX ADMIN — METOCLOPRAMIDE 5 MG: 5 INJECTION, SOLUTION INTRAMUSCULAR; INTRAVENOUS at 08:05

## 2024-05-17 RX ADMIN — APIXABAN 5 MG: 5 TABLET, FILM COATED ORAL at 10:05

## 2024-05-17 RX ADMIN — METOCLOPRAMIDE 5 MG: 5 INJECTION, SOLUTION INTRAMUSCULAR; INTRAVENOUS at 04:05

## 2024-05-17 RX ADMIN — QUETIAPINE 300 MG: 100 TABLET ORAL at 10:05

## 2024-05-17 RX ADMIN — ONDANSETRON 4 MG: 2 INJECTION INTRAMUSCULAR; INTRAVENOUS at 10:05

## 2024-05-17 RX ADMIN — HYDROMORPHONE HYDROCHLORIDE 1 MG: 1 INJECTION, SOLUTION INTRAMUSCULAR; INTRAVENOUS; SUBCUTANEOUS at 08:05

## 2024-05-17 RX ADMIN — ONDANSETRON 4 MG: 2 INJECTION INTRAMUSCULAR; INTRAVENOUS at 02:05

## 2024-05-17 RX ADMIN — GABAPENTIN 600 MG: 300 CAPSULE ORAL at 09:05

## 2024-05-17 RX ADMIN — APIXABAN 5 MG: 5 TABLET, FILM COATED ORAL at 09:05

## 2024-05-17 RX ADMIN — LAMOTRIGINE 50 MG: 25 TABLET ORAL at 09:05

## 2024-05-17 RX ADMIN — GABAPENTIN 600 MG: 300 CAPSULE ORAL at 02:05

## 2024-05-17 RX ADMIN — OXYCODONE HYDROCHLORIDE AND ACETAMINOPHEN 1 TABLET: 5; 325 TABLET ORAL at 05:05

## 2024-05-17 RX ADMIN — HYDROMORPHONE HYDROCHLORIDE 1 MG: 1 INJECTION, SOLUTION INTRAMUSCULAR; INTRAVENOUS; SUBCUTANEOUS at 09:05

## 2024-05-17 RX ADMIN — HYDROMORPHONE HYDROCHLORIDE 1 MG: 1 INJECTION, SOLUTION INTRAMUSCULAR; INTRAVENOUS; SUBCUTANEOUS at 12:05

## 2024-05-17 NOTE — ASSESSMENT & PLAN NOTE
Possibly 2/2 Ozempic  Per review of the record, he started Ozempic on or about 3/6/24 after it was prescribed by endocrine.  He was then admitted to inpatient on 4/17 for chest pain at which time he was noted to have epigastric tenderness.  Seen by PCP on 4/30 for increased episodes of N/V/D.  Presents to ED yesterday with intractable N/V and abdominal pain after last injection of Ozempic 1 day prior.  Would advise to discontinue use of Ozempic.  Will give supportive care with IVFH if needed, IV antiemetics and pain control.   Trial of Reglan ACHS x 4 doses ordered. Monitor response

## 2024-05-17 NOTE — SUBJECTIVE & OBJECTIVE
Interval History: Patient continues with nausea/vomiting suspected 2/2 ozempic. Reports some abdominal pain. Normal bowel movement two days ago. Denies any chest pain, shortness of breath. Will do trial of Reglan ACHS x 4 dose - discussed with patient and he is agreeable.     Review of Systems   Constitutional:  Negative for fever.   HENT:  Negative for sinus pain.    Respiratory:  Negative for shortness of breath.    Cardiovascular:  Negative for chest pain.   Gastrointestinal:  Positive for abdominal pain, nausea and vomiting. Negative for constipation and diarrhea.   Genitourinary:  Negative for dysuria.   Neurological:  Negative for dizziness and light-headedness.     Objective:     Vital Signs (Most Recent):  Temp: 97.6 °F (36.4 °C) (05/17/24 0733)  Pulse: 66 (05/17/24 0733)  Resp: 16 (05/17/24 0910)  BP: (!) 113/54 (05/17/24 0733)  SpO2: (!) 90 % (05/17/24 0733) Vital Signs (24h Range):  Temp:  [97.6 °F (36.4 °C)-98.4 °F (36.9 °C)] 97.6 °F (36.4 °C)  Pulse:  [66-87] 66  Resp:  [15-18] 16  SpO2:  [90 %-97 %] 90 %  BP: ()/(54-94) 113/54     Weight: 102 kg (224 lb 13.9 oz)  Body mass index is 28.87 kg/m².    Intake/Output Summary (Last 24 hours) at 5/17/2024 1059  Last data filed at 5/16/2024 2313  Gross per 24 hour   Intake 720 ml   Output 1050 ml   Net -330 ml         Physical Exam  Constitutional:       General: He is not in acute distress.     Appearance: He is ill-appearing.   HENT:      Head: Normocephalic and atraumatic.      Right Ear: External ear normal.      Left Ear: External ear normal.      Nose: No congestion or rhinorrhea.      Mouth/Throat:      Pharynx: No oropharyngeal exudate or posterior oropharyngeal erythema.   Cardiovascular:      Rate and Rhythm: Normal rate and regular rhythm.   Pulmonary:      Effort: Pulmonary effort is normal.      Breath sounds: Normal breath sounds.   Abdominal:      General: Bowel sounds are normal. There is no distension.      Palpations: Abdomen is soft.       Tenderness: There is abdominal tenderness (generalized, but primarily epigastric and right side).   Musculoskeletal:      Right lower leg: No edema.      Left lower leg: No edema.   Skin:     General: Skin is warm and dry.   Neurological:      Mental Status: He is alert and oriented to person, place, and time. Mental status is at baseline.      Sensory: No sensory deficit.      Motor: Weakness present.   Psychiatric:         Mood and Affect: Mood is anxious.         Behavior: Behavior is cooperative.             Significant Labs: All pertinent labs within the past 24 hours have been reviewed.    Significant Imaging: I have reviewed all pertinent imaging results/findings within the past 24 hours.

## 2024-05-17 NOTE — PLAN OF CARE
Problem: Infection  Goal: Absence of Infection Signs and Symptoms  Outcome: Progressing     Problem: Adult Inpatient Plan of Care  Goal: Plan of Care Review  Outcome: Progressing  Goal: Patient-Specific Goal (Individualized)  Outcome: Progressing  Goal: Absence of Hospital-Acquired Illness or Injury  Outcome: Progressing  Goal: Optimal Comfort and Wellbeing  Outcome: Progressing  Goal: Readiness for Transition of Care  Outcome: Progressing     Problem: Bariatric Environmental Safety  Goal: Safety Maintained with Care  Outcome: Progressing     Problem: Diabetes Comorbidity  Goal: Blood Glucose Level Within Targeted Range  Outcome: Progressing     Problem: Skin Injury Risk Increased  Goal: Skin Health and Integrity  Outcome: Progressing     Problem: Fall Injury Risk  Goal: Absence of Fall and Fall-Related Injury  Outcome: Progressing

## 2024-05-17 NOTE — ASSESSMENT & PLAN NOTE
Suspect GI origin  EKG prn Chest Pain-no ischemic changes thus far  Maintain telemetry  Trend troponin-initial trop negative  CBC, CMP, Mg, Phos daily  Fasting Lipid panel in am  TSH level  CXR-reviewed with no acute findings  Continue anticoagulation  LHC done in Jan 2024 - Dr Wheeler at [facility] -no evidence of obstructive CAD

## 2024-05-17 NOTE — PROGRESS NOTES
Critical access hospital Medicine  Progress Note    Patient Name: Kevan Colin Sr.  MRN: [id number]  Patient Class: IP- Inpatient   Admission Date: 5/15/2024  Length of Stay: 1 days  Attending Physician: Yovanny Sosa MD  Primary Care Provider: Jonathan Benton III, MD        Subjective:     Principal Problem:Intractable nausea and vomiting        HPI:  Patient is a 67yo with a PMH of VTE, COPD, T2DM, HLD & HTN who presented to the ED for abdominal pain and n/v. Per Patient's wife he has abdominal pain and n/v, however in the past few days he was having more n/v. For this reason he was brought into the ED. In the ED due to his abdominal pain Patient got a CT abdomen done, which demonstrated Hepatic Mass. Per wife they had been aware of this mass, though no follow up was done outpatient. Patient was admitted for further management of his n/v & abdominal pain, but also for biopsy of his mass    Overview/Hospital Course:  Kevan Colin Sr. Was monitored closely during his hospitalization.  CT abdomen and pelvis revealed known hepatic mass which is slightly larger than most recent imaging.  Chronic Eliquis held and IR consulted for biopsy of hepatic mass; however, the radiologist determined that the lesion was not amenable to biopsy.  Chronic eliquis was resumed.  He was given IV antiemetics for nausea, IV and oral narcotics for pain control.  He did have c/o chest pain during his admission and was treated with IV dilaudid, GI cocktail and protonix.  EKG was obtained and was without ischemic changes.  Troponins were negative.     Interval History: Patient continues with nausea/vomiting suspected 2/2 ozempic. Reports some abdominal pain. Normal bowel movement two days ago. Denies any chest pain, shortness of breath. Will do trial of Reglan ACHS x 4 dose - discussed with patient and he is agreeable.     Review of Systems   Constitutional:  Negative for fever.   HENT:  Negative for sinus pain.     Respiratory:  Negative for shortness of breath.    Cardiovascular:  Negative for chest pain.   Gastrointestinal:  Positive for abdominal pain, nausea and vomiting. Negative for constipation and diarrhea.   Genitourinary:  Negative for dysuria.   Neurological:  Negative for dizziness and light-headedness.     Objective:     Vital Signs (Most Recent):  Temp: 97.6 °F (36.4 °C) (05/17/24 0733)  Pulse: 66 (05/17/24 0733)  Resp: 16 (05/17/24 0910)  BP: (!) 113/54 (05/17/24 0733)  SpO2: (!) 90 % (05/17/24 0733) Vital Signs (24h Range):  Temp:  [97.6 °F (36.4 °C)-98.4 °F (36.9 °C)] 97.6 °F (36.4 °C)  Pulse:  [66-87] 66  Resp:  [15-18] 16  SpO2:  [90 %-97 %] 90 %  BP: ()/(54-94) 113/54     Weight: 102 kg (224 lb 13.9 oz)  Body mass index is 28.87 kg/m².    Intake/Output Summary (Last 24 hours) at 5/17/2024 1059  Last data filed at 5/16/2024 2313  Gross per 24 hour   Intake 720 ml   Output 1050 ml   Net -330 ml         Physical Exam  Constitutional:       General: He is not in acute distress.     Appearance: He is ill-appearing.   HENT:      Head: Normocephalic and atraumatic.      Right Ear: External ear normal.      Left Ear: External ear normal.      Nose: No congestion or rhinorrhea.      Mouth/Throat:      Pharynx: No oropharyngeal exudate or posterior oropharyngeal erythema.   Cardiovascular:      Rate and Rhythm: Normal rate and regular rhythm.   Pulmonary:      Effort: Pulmonary effort is normal.      Breath sounds: Normal breath sounds.   Abdominal:      General: Bowel sounds are normal. There is no distension.      Palpations: Abdomen is soft.      Tenderness: There is abdominal tenderness (generalized, but primarily epigastric and right side).   Musculoskeletal:      Right lower leg: No edema.      Left lower leg: No edema.   Skin:     General: Skin is warm and dry.   Neurological:      Mental Status: He is alert and oriented to person, place, and time. Mental status is at baseline.      Sensory: No sensory  deficit.      Motor: Weakness present.   Psychiatric:         Mood and Affect: Mood is anxious.         Behavior: Behavior is cooperative.             Significant Labs: All pertinent labs within the past 24 hours have been reviewed.    Significant Imaging: I have reviewed all pertinent imaging results/findings within the past 24 hours.    Assessment/Plan:      * Intractable nausea and vomiting  Possibly 2/2 Ozempic  Per review of the record, he started Ozempic on or about 3/6/24 after it was prescribed by endocrine.  He was then admitted to inpatient on 4/17 for chest pain at which time he was noted to have epigastric tenderness.  Seen by PCP on 4/30 for increased episodes of N/V/D.  Presents to ED yesterday with intractable N/V and abdominal pain after last injection of Ozempic 1 day prior.  Would advise to discontinue use of Ozempic.  Will give supportive care with IVFH if needed, IV antiemetics and pain control.   Trial of Reglan ACHS x 4 doses ordered. Monitor response     Chest pain  Suspect GI origin  EKG prn Chest Pain-no ischemic changes thus far  Maintain telemetry  Trend troponin-initial trop negative  CBC, CMP, Mg, Phos daily  Fasting Lipid panel in am  TSH level  CXR-reviewed with no acute findings  Continue anticoagulation  LHC done in Jan 2024 - Dr Wheeler at [facility] -no evidence of obstructive CAD       Liver mass  Patient has Liver Mass-known but he has not had good follow up  IR Consulted for Biopsy -not amenable to biopsy.  Needs MRI with liver protocol; however, given his persistent N/V I do not think he can tolerate MRI currently.  Will wait for his symptoms to jamilah.  Reasonable to schedule for outpatient MRI at discharge if unable to obtain during this hospital stay.      Type 2 diabetes mellitus with diabetic polyneuropathy, with long-term current use of insulin  Patient's FSGs are controlled on current medication regimen.  Last A1c reviewed-   Lab Results   Component Value Date     "HGBA1C 8.9 (H) 03/06/2024     Most recent fingerstick glucose reviewed- No results for input(s): "POCTGLUCOSE" in the last 24 hours.  Current correctional scale  Medium  Maintain anti-hyperglycemic dose as follows-   Antihyperglycemics (From admission, onward)      Start     Stop Route Frequency Ordered    05/16/24 1247  insulin aspart U-100 pen 0-10 Units         -- SubQ Before meals & nightly PRN 05/16/24 1147        Hold long acting insulin for now and just cover with SSI until he is able to tolerate a diet.  Hold Oral hypoglycemics while patient is in the hospital.        Presence of vena cava filter  aware    Chronic pain associated with significant psychosocial dysfunction  Continue Home Gabapentin       Anticoagulant long-term use  Due to Hx of VTE  Continue Eliquis      VTE Risk Mitigation (From admission, onward)           Ordered     apixaban tablet 5 mg  2 times daily         05/16/24 1022     IP VTE HIGH RISK PATIENT  Once         05/15/24 1101     Place sequential compression device  Until discontinued         05/15/24 1101                    Discharge Planning   HOMAR: 5/19/2024     Code Status: Full Code   Is the patient medically ready for discharge?:     Reason for patient still in hospital (select all that apply): Treatment  Discharge Plan A: Home with family                  Marlin Rojas NP  Department of Hospital Medicine   [facility]    "

## 2024-05-18 PROBLEM — E87.6 HYPOKALEMIA: Status: ACTIVE | Noted: 2024-05-18

## 2024-05-18 LAB
ALBUMIN SERPL BCP-MCNC: 3.6 G/DL (ref 3.5–5.2)
ALP SERPL-CCNC: 127 U/L (ref 55–135)
ALT SERPL W/O P-5'-P-CCNC: 86 U/L (ref 10–44)
ANION GAP SERPL CALC-SCNC: 6 MMOL/L (ref 8–16)
AST SERPL-CCNC: 74 U/L (ref 10–40)
BASOPHILS # BLD AUTO: 0.05 K/UL (ref 0–0.2)
BASOPHILS NFR BLD: 0.9 % (ref 0–1.9)
BILIRUB SERPL-MCNC: 0.5 MG/DL (ref 0.1–1)
BUN SERPL-MCNC: 9 MG/DL (ref 8–23)
CALCIUM SERPL-MCNC: 8.6 MG/DL (ref 8.7–10.5)
CHLORIDE SERPL-SCNC: 105 MMOL/L (ref 95–110)
CO2 SERPL-SCNC: 30 MMOL/L (ref 23–29)
CREAT SERPL-MCNC: 0.8 MG/DL (ref 0.5–1.4)
DIFFERENTIAL METHOD BLD: ABNORMAL
EOSINOPHIL # BLD AUTO: 0.5 K/UL (ref 0–0.5)
EOSINOPHIL NFR BLD: 9.4 % (ref 0–8)
ERYTHROCYTE [DISTWIDTH] IN BLOOD BY AUTOMATED COUNT: 13.9 % (ref 11.5–14.5)
EST. GFR  (NO RACE VARIABLE): >60 ML/MIN/1.73 M^2
GLUCOSE SERPL-MCNC: 117 MG/DL (ref 70–110)
GLUCOSE SERPL-MCNC: 135 MG/DL (ref 70–110)
GLUCOSE SERPL-MCNC: 157 MG/DL (ref 70–110)
GLUCOSE SERPL-MCNC: 168 MG/DL (ref 70–110)
GLUCOSE SERPL-MCNC: 217 MG/DL (ref 70–110)
HCT VFR BLD AUTO: 38.6 % (ref 40–54)
HGB BLD-MCNC: 12.1 G/DL (ref 14–18)
IMM GRANULOCYTES # BLD AUTO: 0.01 K/UL (ref 0–0.04)
IMM GRANULOCYTES NFR BLD AUTO: 0.2 % (ref 0–0.5)
LYMPHOCYTES # BLD AUTO: 1.7 K/UL (ref 1–4.8)
LYMPHOCYTES NFR BLD: 29.2 % (ref 18–48)
MAGNESIUM SERPL-MCNC: 1.9 MG/DL (ref 1.6–2.6)
MCH RBC QN AUTO: 30.3 PG (ref 27–31)
MCHC RBC AUTO-ENTMCNC: 31.3 G/DL (ref 32–36)
MCV RBC AUTO: 97 FL (ref 82–98)
MONOCYTES # BLD AUTO: 0.4 K/UL (ref 0.3–1)
MONOCYTES NFR BLD: 7.7 % (ref 4–15)
NEUTROPHILS # BLD AUTO: 3 K/UL (ref 1.8–7.7)
NEUTROPHILS NFR BLD: 52.6 % (ref 38–73)
NRBC BLD-RTO: 0 /100 WBC
PLATELET # BLD AUTO: 168 K/UL (ref 150–450)
PMV BLD AUTO: 9.7 FL (ref 9.2–12.9)
POTASSIUM SERPL-SCNC: 3.1 MMOL/L (ref 3.5–5.1)
PROT SERPL-MCNC: 6.2 G/DL (ref 6–8.4)
RBC # BLD AUTO: 4 M/UL (ref 4.6–6.2)
SODIUM SERPL-SCNC: 141 MMOL/L (ref 136–145)
WBC # BLD AUTO: 5.72 K/UL (ref 3.9–12.7)

## 2024-05-18 PROCEDURE — 94761 N-INVAS EAR/PLS OXIMETRY MLT: CPT

## 2024-05-18 PROCEDURE — 36415 COLL VENOUS BLD VENIPUNCTURE: CPT | Performed by: STUDENT IN AN ORGANIZED HEALTH CARE EDUCATION/TRAINING PROGRAM

## 2024-05-18 PROCEDURE — 99900031 HC PATIENT EDUCATION (STAT)

## 2024-05-18 PROCEDURE — 99900035 HC TECH TIME PER 15 MIN (STAT)

## 2024-05-18 PROCEDURE — 25000003 PHARM REV CODE 250: Performed by: NURSE PRACTITIONER

## 2024-05-18 PROCEDURE — 25000003 PHARM REV CODE 250: Performed by: STUDENT IN AN ORGANIZED HEALTH CARE EDUCATION/TRAINING PROGRAM

## 2024-05-18 PROCEDURE — A9585 GADOBUTROL INJECTION: HCPCS | Performed by: HOSPITALIST

## 2024-05-18 PROCEDURE — 63600175 PHARM REV CODE 636 W HCPCS: Performed by: NURSE PRACTITIONER

## 2024-05-18 PROCEDURE — 83735 ASSAY OF MAGNESIUM: CPT | Performed by: STUDENT IN AN ORGANIZED HEALTH CARE EDUCATION/TRAINING PROGRAM

## 2024-05-18 PROCEDURE — 85025 COMPLETE CBC W/AUTO DIFF WBC: CPT | Performed by: STUDENT IN AN ORGANIZED HEALTH CARE EDUCATION/TRAINING PROGRAM

## 2024-05-18 PROCEDURE — 80053 COMPREHEN METABOLIC PANEL: CPT | Performed by: STUDENT IN AN ORGANIZED HEALTH CARE EDUCATION/TRAINING PROGRAM

## 2024-05-18 PROCEDURE — 25500020 PHARM REV CODE 255: Performed by: HOSPITALIST

## 2024-05-18 PROCEDURE — 21400001 HC TELEMETRY ROOM

## 2024-05-18 RX ORDER — POTASSIUM CHLORIDE 20 MEQ/1
20 TABLET, EXTENDED RELEASE ORAL EVERY 4 HOURS
Status: COMPLETED | OUTPATIENT
Start: 2024-05-18 | End: 2024-05-18

## 2024-05-18 RX ORDER — GADOBUTROL 604.72 MG/ML
10 INJECTION INTRAVENOUS
Status: COMPLETED | OUTPATIENT
Start: 2024-05-18 | End: 2024-05-18

## 2024-05-18 RX ADMIN — GABAPENTIN 600 MG: 300 CAPSULE ORAL at 09:05

## 2024-05-18 RX ADMIN — OXYCODONE HYDROCHLORIDE AND ACETAMINOPHEN 1 TABLET: 5; 325 TABLET ORAL at 11:05

## 2024-05-18 RX ADMIN — QUETIAPINE 300 MG: 100 TABLET ORAL at 09:05

## 2024-05-18 RX ADMIN — APIXABAN 5 MG: 5 TABLET, FILM COATED ORAL at 09:05

## 2024-05-18 RX ADMIN — GABAPENTIN 600 MG: 300 CAPSULE ORAL at 08:05

## 2024-05-18 RX ADMIN — METOCLOPRAMIDE 5 MG: 5 INJECTION, SOLUTION INTRAMUSCULAR; INTRAVENOUS at 11:05

## 2024-05-18 RX ADMIN — PANTOPRAZOLE SODIUM 40 MG: 40 TABLET, DELAYED RELEASE ORAL at 06:05

## 2024-05-18 RX ADMIN — POTASSIUM CHLORIDE 20 MEQ: 1500 TABLET, EXTENDED RELEASE ORAL at 08:05

## 2024-05-18 RX ADMIN — Medication 9 MG: at 09:05

## 2024-05-18 RX ADMIN — APIXABAN 5 MG: 5 TABLET, FILM COATED ORAL at 08:05

## 2024-05-18 RX ADMIN — GADOBUTROL 10 ML: 604.72 INJECTION INTRAVENOUS at 03:05

## 2024-05-18 RX ADMIN — METOCLOPRAMIDE 5 MG: 5 INJECTION, SOLUTION INTRAMUSCULAR; INTRAVENOUS at 06:05

## 2024-05-18 RX ADMIN — INSULIN ASPART 4 UNITS: 100 INJECTION, SOLUTION INTRAVENOUS; SUBCUTANEOUS at 11:05

## 2024-05-18 RX ADMIN — GABAPENTIN 600 MG: 300 CAPSULE ORAL at 03:05

## 2024-05-18 RX ADMIN — OXYCODONE HYDROCHLORIDE AND ACETAMINOPHEN 1 TABLET: 5; 325 TABLET ORAL at 05:05

## 2024-05-18 RX ADMIN — POTASSIUM CHLORIDE 20 MEQ: 1500 TABLET, EXTENDED RELEASE ORAL at 11:05

## 2024-05-18 RX ADMIN — LAMOTRIGINE 50 MG: 25 TABLET ORAL at 08:05

## 2024-05-18 RX ADMIN — HYDROMORPHONE HYDROCHLORIDE 1 MG: 1 INJECTION, SOLUTION INTRAMUSCULAR; INTRAVENOUS; SUBCUTANEOUS at 07:05

## 2024-05-18 NOTE — PROGRESS NOTES
On license of [facility] Medicine  Progress Note    Patient Name: Kevan Colin Sr.  MRN: [id number]  Patient Class: IP- Inpatient   Admission Date: 5/15/2024  Length of Stay: 2 days  Attending Physician: Citlali Mott MD  Primary Care Provider: Jonathan Benton III, MD        Subjective:     Principal Problem:Intractable nausea and vomiting        HPI:  Patient is a 67yo with a PMH of VTE, COPD, T2DM, HLD & HTN who presented to the ED for abdominal pain and n/v. Per Patient's wife he has abdominal pain and n/v, however in the past few days he was having more n/v. For this reason he was brought into the ED. In the ED due to his abdominal pain Patient got a CT abdomen done, which demonstrated Hepatic Mass. Per wife they had been aware of this mass, though no follow up was done outpatient. Patient was admitted for further management of his n/v & abdominal pain, but also for biopsy of his mass    Overview/Hospital Course:  Kevan Colin Sr. Was monitored closely during his hospitalization.  CT abdomen and pelvis revealed known hepatic mass which is slightly larger than most recent imaging.  Chronic Eliquis held and IR consulted for biopsy of hepatic mass; however, the radiologist determined that the lesion was not amenable to biopsy.  Chronic eliquis was resumed.  He was given IV antiemetics for nausea, IV and oral narcotics for pain control.  He did have c/o chest pain during his admission and was treated with IV dilaudid, GI cocktail and protonix.  EKG was obtained and was without ischemic changes.  Troponins were negative. Patient treated for nausea and vomiting with antiemetics during hospital stay and improved. MRI of liver ordered for 5/18.    Interval History: Patient tolerating liquids. Does endorse some nausea and vomiting after dinner last night. Overall looks well. See hospital course.    Review of Systems   Constitutional:  Negative for fever.   HENT:  Negative for sinus pain.     Respiratory:  Negative for shortness of breath.    Cardiovascular:  Negative for chest pain.   Gastrointestinal:  Positive for abdominal pain, nausea and vomiting. Negative for constipation and diarrhea.   Genitourinary:  Negative for dysuria.   Neurological:  Negative for dizziness and light-headedness.     Objective:     Vital Signs (Most Recent):  Temp: 98 °F (36.7 °C) (05/18/24 1100)  Pulse: 74 (05/18/24 1100)  Resp: 15 (05/18/24 1145)  BP: 138/76 (05/18/24 1100)  SpO2: (!) 94 % (05/18/24 1100) Vital Signs (24h Range):  Temp:  [98 °F (36.7 °C)-99 °F (37.2 °C)] 98 °F (36.7 °C)  Pulse:  [73-81] 74  Resp:  [15-18] 15  SpO2:  [94 %-95 %] 94 %  BP: (123-157)/(68-82) 138/76     Weight: 102 kg (224 lb 13.9 oz)  Body mass index is 28.87 kg/m².    Intake/Output Summary (Last 24 hours) at 5/18/2024 1204  Last data filed at 5/18/2024 1153  Gross per 24 hour   Intake 1090 ml   Output 2400 ml   Net -1310 ml         Physical Exam  Constitutional:       General: He is not in acute distress.     Appearance: Normal appearance.   HENT:      Head: Normocephalic and atraumatic.      Right Ear: External ear normal.      Left Ear: External ear normal.      Nose: No congestion or rhinorrhea.      Mouth/Throat:      Pharynx: No oropharyngeal exudate or posterior oropharyngeal erythema.   Cardiovascular:      Rate and Rhythm: Normal rate and regular rhythm.   Pulmonary:      Effort: Pulmonary effort is normal.      Breath sounds: Normal breath sounds.   Abdominal:      General: Bowel sounds are increased. There is no distension.      Palpations: Abdomen is soft.      Tenderness: There is abdominal tenderness in the right upper quadrant.   Musculoskeletal:      Right lower leg: No edema.      Left lower leg: No edema.   Skin:     General: Skin is warm and dry.   Neurological:      Mental Status: He is alert and oriented to person, place, and time. Mental status is at baseline.      Sensory: No sensory deficit.      Motor: Weakness  present.   Psychiatric:         Mood and Affect: Mood is anxious.         Behavior: Behavior is cooperative.             Significant Labs: All pertinent labs within the past 24 hours have been reviewed.    Significant Imaging: I have reviewed all pertinent imaging results/findings within the past 24 hours.    Assessment/Plan:      * Intractable nausea and vomiting  Possibly 2/2 Ozempic  Per review of the record, he started Ozempic on or about 3/6/24 after it was prescribed by endocrine.  He was then admitted to inpatient on 4/17 for chest pain at which time he was noted to have epigastric tenderness.  Seen by PCP on 4/30 for increased episodes of N/V/D.  Presents to ED yesterday with intractable N/V and abdominal pain after last injection of Ozempic 1 day prior.  Would advise to discontinue use of Ozempic.  Will give supportive care with IVFH if needed, IV antiemetics and pain control.   Trial of Reglan ACHS x 4 doses ordered. Monitor response     Hypokalemia  Patient has hypokalemia which is Acute and currently uncontrolled. Most recent potassium levels reviewed-   Lab Results   Component Value Date    K 3.1 (L) 05/18/2024   . Will continue potassium replacement per protocol and recheck repeat levels after replacement completed.     Chest pain  Suspect GI origin  EKG prn Chest Pain-no ischemic changes thus far  Maintain telemetry  Trend troponin-initial trop negative  CBC, CMP, Mg, Phos daily  Fasting Lipid panel in am  TSH level  CXR-reviewed with no acute findings  Continue anticoagulation  LHC done in Jan 2024 - Dr Wheeler at [facility] -no evidence of obstructive CAD       Liver mass  Patient has Liver Mass-known but he has not had good follow up  IR Consulted for Biopsy -not amenable to biopsy. Inpatient MRI liver protocol ordered for 5/18.  Reasonable to schedule for outpatient MRI at discharge if unable to obtain during this hospital stay.      Type 2 diabetes mellitus with diabetic polyneuropathy,  "with long-term current use of insulin  Patient's FSGs are controlled on current medication regimen.  Last A1c reviewed-   Lab Results   Component Value Date    HGBA1C 8.9 (H) 03/06/2024     Most recent fingerstick glucose reviewed- No results for input(s): "POCTGLUCOSE" in the last 24 hours.  Current correctional scale  Medium  Maintain anti-hyperglycemic dose as follows-   Antihyperglycemics (From admission, onward)      Start     Stop Route Frequency Ordered    05/16/24 1247  insulin aspart U-100 pen 0-10 Units         -- SubQ Before meals & nightly PRN 05/16/24 1147        Hold long acting insulin for now and just cover with SSI until he is able to tolerate a diet.  Hold Oral hypoglycemics while patient is in the hospital.        Presence of vena cava filter  aware    Chronic pain associated with significant psychosocial dysfunction  Continue Home Gabapentin       Anticoagulant long-term use  Due to Hx of VTE  Continue Eliquis      VTE Risk Mitigation (From admission, onward)           Ordered     apixaban tablet 5 mg  2 times daily         05/16/24 1022     IP VTE HIGH RISK PATIENT  Once         05/15/24 1101     Place sequential compression device  Until discontinued         05/15/24 1101                    Discharge Planning   HOMAR: 5/19/2024     Code Status: Full Code   Is the patient medically ready for discharge?:     Reason for patient still in hospital (select all that apply): Patient trending condition  Discharge Plan A: Home with family                  Win Stevens NP  Department of Hospital Medicine   [facility]    "

## 2024-05-18 NOTE — ASSESSMENT & PLAN NOTE
Patient has hypokalemia which is Acute and currently uncontrolled. Most recent potassium levels reviewed-   Lab Results   Component Value Date    K 3.1 (L) 05/18/2024   . Will continue potassium replacement per protocol and recheck repeat levels after replacement completed.

## 2024-05-18 NOTE — PLAN OF CARE
Problem: Infection  Goal: Absence of Infection Signs and Symptoms  Outcome: Progressing     Problem: Adult Inpatient Plan of Care  Goal: Plan of Care Review  Outcome: Progressing  Goal: Patient-Specific Goal (Individualized)  Outcome: Progressing  Goal: Absence of Hospital-Acquired Illness or Injury  Outcome: Progressing  Goal: Optimal Comfort and Wellbeing  Outcome: Progressing  Goal: Readiness for Transition of Care  Outcome: Progressing     Problem: Bariatric Environmental Safety  Goal: Safety Maintained with Care  Outcome: Progressing     Problem: Diabetes Comorbidity  Goal: Blood Glucose Level Within Targeted Range  Outcome: Progressing     Problem: Skin Injury Risk Increased  Goal: Skin Health and Integrity  Outcome: Progressing     Problem: Fall Injury Risk  Goal: Absence of Fall and Fall-Related Injury  Outcome: Progressing     Problem: Pain Acute  Goal: Optimal Pain Control and Function  Outcome: Progressing     Problem: Nausea and Vomiting  Goal: Nausea and Vomiting Relief  Outcome: Progressing

## 2024-05-18 NOTE — ASSESSMENT & PLAN NOTE
Patient has Liver Mass-known but he has not had good follow up  IR Consulted for Biopsy -not amenable to biopsy. Inpatient MRI liver protocol ordered for 5/18.  Reasonable to schedule for outpatient MRI at discharge if unable to obtain during this hospital stay.

## 2024-05-18 NOTE — RESPIRATORY THERAPY
05/18/24 0808   Patient Assessment/Suction   Level of Consciousness (AVPU) alert   Respiratory Effort Unlabored   All Lung Fields Breath Sounds clear;diminished   PRE-TX-O2   Device (Oxygen Therapy) room air   SpO2 (!) 94 %   Pulse Oximetry Type Intermittent   $ Pulse Oximetry - Multiple Charge Pulse Oximetry - Multiple   Pulse 75   Resp 18   Aerosol Therapy   $ Aerosol Therapy Charges PRN treatment not required

## 2024-05-18 NOTE — SUBJECTIVE & OBJECTIVE
Interval History: Patient tolerating liquids. Does endorse some nausea and vomiting after dinner last night. Overall looks well. See hospital course.    Review of Systems   Constitutional:  Negative for fever.   HENT:  Negative for sinus pain.    Respiratory:  Negative for shortness of breath.    Cardiovascular:  Negative for chest pain.   Gastrointestinal:  Positive for abdominal pain, nausea and vomiting. Negative for constipation and diarrhea.   Genitourinary:  Negative for dysuria.   Neurological:  Negative for dizziness and light-headedness.     Objective:     Vital Signs (Most Recent):  Temp: 98 °F (36.7 °C) (05/18/24 1100)  Pulse: 74 (05/18/24 1100)  Resp: 15 (05/18/24 1145)  BP: 138/76 (05/18/24 1100)  SpO2: (!) 94 % (05/18/24 1100) Vital Signs (24h Range):  Temp:  [98 °F (36.7 °C)-99 °F (37.2 °C)] 98 °F (36.7 °C)  Pulse:  [73-81] 74  Resp:  [15-18] 15  SpO2:  [94 %-95 %] 94 %  BP: (123-157)/(68-82) 138/76     Weight: 102 kg (224 lb 13.9 oz)  Body mass index is 28.87 kg/m².    Intake/Output Summary (Last 24 hours) at 5/18/2024 1204  Last data filed at 5/18/2024 1153  Gross per 24 hour   Intake 1090 ml   Output 2400 ml   Net -1310 ml         Physical Exam  Constitutional:       General: He is not in acute distress.     Appearance: Normal appearance.   HENT:      Head: Normocephalic and atraumatic.      Right Ear: External ear normal.      Left Ear: External ear normal.      Nose: No congestion or rhinorrhea.      Mouth/Throat:      Pharynx: No oropharyngeal exudate or posterior oropharyngeal erythema.   Cardiovascular:      Rate and Rhythm: Normal rate and regular rhythm.   Pulmonary:      Effort: Pulmonary effort is normal.      Breath sounds: Normal breath sounds.   Abdominal:      General: Bowel sounds are increased. There is no distension.      Palpations: Abdomen is soft.      Tenderness: There is abdominal tenderness in the right upper quadrant.   Musculoskeletal:      Right lower leg: No edema.       Left lower leg: No edema.   Skin:     General: Skin is warm and dry.   Neurological:      Mental Status: He is alert and oriented to person, place, and time. Mental status is at baseline.      Sensory: No sensory deficit.      Motor: Weakness present.   Psychiatric:         Mood and Affect: Mood is anxious.         Behavior: Behavior is cooperative.             Significant Labs: All pertinent labs within the past 24 hours have been reviewed.    Significant Imaging: I have reviewed all pertinent imaging results/findings within the past 24 hours.

## 2024-05-19 VITALS
OXYGEN SATURATION: 97 % | TEMPERATURE: 98 F | SYSTOLIC BLOOD PRESSURE: 136 MMHG | RESPIRATION RATE: 17 BRPM | HEIGHT: 74 IN | DIASTOLIC BLOOD PRESSURE: 77 MMHG | HEART RATE: 98 BPM | BODY MASS INDEX: 28.86 KG/M2 | WEIGHT: 224.88 LBS

## 2024-05-19 LAB
ALBUMIN SERPL BCP-MCNC: 3.4 G/DL (ref 3.5–5.2)
ALP SERPL-CCNC: 128 U/L (ref 55–135)
ALT SERPL W/O P-5'-P-CCNC: 83 U/L (ref 10–44)
ANION GAP SERPL CALC-SCNC: 4 MMOL/L (ref 8–16)
AST SERPL-CCNC: 61 U/L (ref 10–40)
BASOPHILS # BLD AUTO: 0.05 K/UL (ref 0–0.2)
BASOPHILS NFR BLD: 0.9 % (ref 0–1.9)
BILIRUB SERPL-MCNC: 0.5 MG/DL (ref 0.1–1)
BUN SERPL-MCNC: 7 MG/DL (ref 8–23)
CALCIUM SERPL-MCNC: 8.4 MG/DL (ref 8.7–10.5)
CHLORIDE SERPL-SCNC: 106 MMOL/L (ref 95–110)
CO2 SERPL-SCNC: 29 MMOL/L (ref 23–29)
CREAT SERPL-MCNC: 0.8 MG/DL (ref 0.5–1.4)
DIFFERENTIAL METHOD BLD: ABNORMAL
EOSINOPHIL # BLD AUTO: 0.7 K/UL (ref 0–0.5)
EOSINOPHIL NFR BLD: 11.7 % (ref 0–8)
ERYTHROCYTE [DISTWIDTH] IN BLOOD BY AUTOMATED COUNT: 14.1 % (ref 11.5–14.5)
EST. GFR  (NO RACE VARIABLE): >60 ML/MIN/1.73 M^2
GLUCOSE SERPL-MCNC: 135 MG/DL (ref 70–110)
GLUCOSE SERPL-MCNC: 144 MG/DL (ref 70–110)
GLUCOSE SERPL-MCNC: 153 MG/DL (ref 70–110)
HCT VFR BLD AUTO: 37.2 % (ref 40–54)
HGB BLD-MCNC: 11.7 G/DL (ref 14–18)
IMM GRANULOCYTES # BLD AUTO: 0.02 K/UL (ref 0–0.04)
IMM GRANULOCYTES NFR BLD AUTO: 0.3 % (ref 0–0.5)
LYMPHOCYTES # BLD AUTO: 2.1 K/UL (ref 1–4.8)
LYMPHOCYTES NFR BLD: 35.5 % (ref 18–48)
MAGNESIUM SERPL-MCNC: 1.7 MG/DL (ref 1.6–2.6)
MCH RBC QN AUTO: 30.2 PG (ref 27–31)
MCHC RBC AUTO-ENTMCNC: 31.5 G/DL (ref 32–36)
MCV RBC AUTO: 96 FL (ref 82–98)
MONOCYTES # BLD AUTO: 0.5 K/UL (ref 0.3–1)
MONOCYTES NFR BLD: 8.5 % (ref 4–15)
NEUTROPHILS # BLD AUTO: 2.5 K/UL (ref 1.8–7.7)
NEUTROPHILS NFR BLD: 43.1 % (ref 38–73)
NRBC BLD-RTO: 0 /100 WBC
PLATELET # BLD AUTO: 169 K/UL (ref 150–450)
PMV BLD AUTO: 9.8 FL (ref 9.2–12.9)
POTASSIUM SERPL-SCNC: 3.4 MMOL/L (ref 3.5–5.1)
PROT SERPL-MCNC: 6.1 G/DL (ref 6–8.4)
RBC # BLD AUTO: 3.87 M/UL (ref 4.6–6.2)
SODIUM SERPL-SCNC: 139 MMOL/L (ref 136–145)
WBC # BLD AUTO: 5.88 K/UL (ref 3.9–12.7)

## 2024-05-19 PROCEDURE — 25000003 PHARM REV CODE 250: Performed by: STUDENT IN AN ORGANIZED HEALTH CARE EDUCATION/TRAINING PROGRAM

## 2024-05-19 PROCEDURE — 80053 COMPREHEN METABOLIC PANEL: CPT | Performed by: STUDENT IN AN ORGANIZED HEALTH CARE EDUCATION/TRAINING PROGRAM

## 2024-05-19 PROCEDURE — 85025 COMPLETE CBC W/AUTO DIFF WBC: CPT | Performed by: STUDENT IN AN ORGANIZED HEALTH CARE EDUCATION/TRAINING PROGRAM

## 2024-05-19 PROCEDURE — 36415 COLL VENOUS BLD VENIPUNCTURE: CPT | Performed by: STUDENT IN AN ORGANIZED HEALTH CARE EDUCATION/TRAINING PROGRAM

## 2024-05-19 PROCEDURE — 25000003 PHARM REV CODE 250: Performed by: NURSE PRACTITIONER

## 2024-05-19 PROCEDURE — 83735 ASSAY OF MAGNESIUM: CPT | Performed by: STUDENT IN AN ORGANIZED HEALTH CARE EDUCATION/TRAINING PROGRAM

## 2024-05-19 RX ORDER — POTASSIUM CHLORIDE 20 MEQ/1
40 TABLET, EXTENDED RELEASE ORAL ONCE
Status: COMPLETED | OUTPATIENT
Start: 2024-05-19 | End: 2024-05-19

## 2024-05-19 RX ADMIN — APIXABAN 5 MG: 5 TABLET, FILM COATED ORAL at 08:05

## 2024-05-19 RX ADMIN — LAMOTRIGINE 50 MG: 25 TABLET ORAL at 08:05

## 2024-05-19 RX ADMIN — GABAPENTIN 600 MG: 300 CAPSULE ORAL at 08:05

## 2024-05-19 RX ADMIN — PANTOPRAZOLE SODIUM 40 MG: 40 TABLET, DELAYED RELEASE ORAL at 05:05

## 2024-05-19 RX ADMIN — OXYCODONE HYDROCHLORIDE AND ACETAMINOPHEN 1 TABLET: 5; 325 TABLET ORAL at 08:05

## 2024-05-19 RX ADMIN — POTASSIUM CHLORIDE 40 MEQ: 1500 TABLET, EXTENDED RELEASE ORAL at 08:05

## 2024-05-19 NOTE — PLAN OF CARE
.    Chart and discharge orders reviewed.  Patient cleared for discharge from case management standpoint.     05/19/24 1258   Final Note   Assessment Type Final Discharge Note   Anticipated Discharge Disposition Home   Post-Acute Status   Discharge Delays None known at this time

## 2024-05-19 NOTE — PLAN OF CARE
Problem: Infection  Goal: Absence of Infection Signs and Symptoms  Outcome: Met     Problem: Adult Inpatient Plan of Care  Goal: Plan of Care Review  Outcome: Met  Goal: Patient-Specific Goal (Individualized)  Outcome: Met  Goal: Absence of Hospital-Acquired Illness or Injury  Outcome: Met  Goal: Optimal Comfort and Wellbeing  Outcome: Met  Goal: Readiness for Transition of Care  Outcome: Met     Problem: Bariatric Environmental Safety  Goal: Safety Maintained with Care  Outcome: Met     Problem: Diabetes Comorbidity  Goal: Blood Glucose Level Within Targeted Range  Outcome: Met     Problem: Skin Injury Risk Increased  Goal: Skin Health and Integrity  Outcome: Met     Problem: Fall Injury Risk  Goal: Absence of Fall and Fall-Related Injury  Outcome: Met     Problem: Pain Acute  Goal: Optimal Pain Control and Function  Outcome: Met     Problem: Nausea and Vomiting  Goal: Nausea and Vomiting Relief  Outcome: Met

## 2024-05-19 NOTE — CARE UPDATE
05/18/24 2010   Patient Assessment/Suction   Level of Consciousness (AVPU) alert   Respiratory Effort Normal   Expansion/Accessory Muscles/Retractions no use of accessory muscles   PRE-TX-O2   Device (Oxygen Therapy) room air   SpO2 96 %   Pulse Oximetry Type Intermittent   $ Pulse Oximetry - Multiple Charge Pulse Oximetry - Multiple   Pulse 80   Resp 19   Aerosol Therapy   $ Aerosol Therapy Charges PRN treatment not required   Education   $ Education 15 min   Respiratory Evaluation   $ Care Plan Tech Time 15 min

## 2024-05-19 NOTE — NURSING
1320 discharge instructions given and explained to the patient. He denies any questions or concerns at this time.

## 2024-05-20 LAB
OHS QRS DURATION: 100 MS
OHS QRS DURATION: 100 MS
OHS QTC CALCULATION: 444 MS
OHS QTC CALCULATION: 445 MS

## 2024-05-22 NOTE — DISCHARGE SUMMARY
[facility] Medicine  Discharge Summary      Patient Name: Kevan Colin Sr.  MRN: [id number]  LIBBY: [id number]  Patient Class: IP- Inpatient  Admission Date: 5/15/2024  Hospital Length of Stay: 3 days  Discharge Date and Time: 5/19/2024  1:38 PM  Attending Physician: Jenifer att. providers found   Discharging Provider: MAYNOR Brennan  Primary Care Provider: Jonathan Benton III, MD    Primary Care Team: Networked reference to record PCT     HPI:   Patient is a 67yo with a PMH of VTE, COPD, T2DM, HLD & HTN who presented to the ED for abdominal pain and n/v. Per Patient's wife he has abdominal pain and n/v, however in the past few days he was having more n/v. For this reason he was brought into the ED. In the ED due to his abdominal pain Patient got a CT abdomen done, which demonstrated Hepatic Mass. Per wife they had been aware of this mass, though no follow up was done outpatient. Patient was admitted for further management of his n/v & abdominal pain, but also for biopsy of his mass    * No surgery found *      Hospital Course:   Kevan Colin Sr. Was monitored closely during his hospitalization.  CT abdomen and pelvis revealed known hepatic mass which is slightly larger than most recent imaging.  Chronic Eliquis held and IR consulted for biopsy of hepatic mass; however, the radiologist determined that the lesion was not amenable to biopsy.  Chronic eliquis was resumed.  He was given IV antiemetics for nausea, IV and oral narcotics for pain control.  He did have c/o chest pain during his admission and was treated with IV dilaudid, GI cocktail and protonix.  EKG was obtained and was without ischemic changes.  Troponins were negative. Patient treated for nausea and vomiting with antiemetics during hospital stay and improved. MRI of liver ordered for 5/18. Symptoms resolved and he was suitable for discharge.  Correlation between Ozempic dosing and presentation of symptoms was  reviewed.  Strong possibility that exacerbation of symptoms is secondary to Ozempic and it was recommended that he discontinue use and f/u with his endocrinologist/PCP.  Home health was arranged.  He will follow up with his PCP.     Goals of Care Treatment Preferences:  Code Status: Full Code      Consults:     No new Assessment & Plan notes have been filed under this hospital service since the last note was generated.  Service: Hospital Medicine    Final Active Diagnoses:    Diagnosis Date Noted POA    PRINCIPAL PROBLEM:  Intractable nausea and vomiting [R11.2] 01/21/2023 Yes    Hypokalemia [E87.6] 05/18/2024 No    Chest pain [R07.9] 05/16/2024 Yes    Liver mass [R16.0] 05/15/2024 Yes    Type 2 diabetes mellitus with diabetic polyneuropathy, with long-term current use of insulin [E11.42, Z79.4] 11/19/2023 Not Applicable    Presence of vena cava filter [Z95.828] 11/19/2023 Not Applicable    Chronic pain associated with significant psychosocial dysfunction [G89.4] 09/14/2016 Yes    Anticoagulant long-term use [Z79.01] 03/24/2016 Not Applicable      Problems Resolved During this Admission:       Discharged Condition: stable    Disposition: Home or Self Care    Follow Up:   Follow-up Information       Jonathan Benton III, MD Follow up in 1 week(s).    Specialty: Family Medicine  Contact information:  [address] [place] [address]  [phone]                           Patient Instructions:      Diet Adult Regular     Notify your health care provider if you experience any of the following:  temperature >100.4     Notify your health care provider if you experience any of the following:  persistent nausea and vomiting or diarrhea     Notify your health care provider if you experience any of the following:  severe uncontrolled pain     Notify your health care provider if you experience any of the following:  difficulty breathing or increased cough     Notify your health care provider if you experience any  of the following:  severe persistent headache     Notify your health care provider if you experience any of the following:  persistent dizziness, light-headedness, or visual disturbances     Notify your health care provider if you experience any of the following:  increased confusion or weakness     Activity as tolerated       Significant Diagnostic Studies: Labs: All labs within the past 24 hours have been reviewed    Pending Diagnostic Studies:       None           Medications:  Reconciled Home Medications:      Medication List        CHANGE how you take these medications      BASAGLAR KWIKPEN U-100 INSULIN 100 unit/mL (3 mL) Inpn pen  Generic drug: insulin glargine U-100 (Lantus)  Inject 75 u qd.  What changed:   how much to take  how to take this  when to take this     insulin lispro 100 unit/mL pen  Commonly known as: HumaLOG KwikPen Insulin  Inject 15 units with meals.  What changed:   how much to take  how to take this  when to take this            CONTINUE taking these medications      albuterol 90 mcg/actuation inhaler  Commonly known as: PROVENTIL/VENTOLIN HFA  Inhale 2 puffs into the lungs every 4 (four) hours as needed for Shortness of Breath or Wheezing.     atorvastatin 40 MG tablet  Commonly known as: LIPITOR  TAKE 1 TABLET BY MOUTH EVERY NIGHT AT BEDTIME     BELSOMRA 20 mg Tab  Generic drug: suvorexant  Take 1 tablet by mouth nightly as needed (Insomnia).     blood sugar diagnostic Strp  To check BG 4 times daily, to use with insurance preferred meter     blood-glucose meter kit  To check BG 4 times daily, to use with insurance preferred meter     carvediloL 3.125 MG tablet  Commonly known as: COREG  TAKE 1 TABLET BY MOUTH TWICE A DAY     clonazePAM 1 MG tablet  Commonly known as: KlonoPIN  Take 1 mg by mouth nightly as needed for Anxiety.     cyclobenzaprine 5 MG tablet  Commonly known as: FLEXERIL  Take 5 mg by mouth 2 (two) times daily.     ELIQUIS 5 mg Tab  Generic drug: apixaban  TAKE 1 TABLET  BY MOUTH TWICE A DAY     EPINEPHrine 0.3 mg/0.3 mL Atin  Commonly known as: EPIPEN  EpiPen 2-Helio 0.3 mg/0.3 mL injection, auto-injector     eszopiclone 2 MG Tab  Commonly known as: LUNESTA  Take 2 mg by mouth nightly as needed.     furosemide 20 MG tablet  Commonly known as: LASIX  TAKE 1 TABLET (20 MG TOTAL) BY MOUTH ONCE DAILY.     gabapentin 600 MG tablet  Commonly known as: NEURONTIN  Take 1 tablet (600 mg total) by mouth 3 (three) times daily.     JARDIANCE 25 mg tablet  Generic drug: empagliflozin  TAKE 1 TABLET BY MOUTH DAILY     lamoTRIgine 25 MG tablet  Commonly known as: LAMICTAL  Take 50 mg by mouth once daily.     metFORMIN 1000 MG tablet  Commonly known as: GLUCOPHAGE  TAKE 1 TABLET BY MOUTH TWICE A DAY WITH MEALS     multivitamin per tablet  Commonly known as: THERAGRAN  Take 1 tablet by mouth once daily.     ondansetron 4 MG Tbdl  Commonly known as: ZOFRAN-ODT  Take 1 tablet (4 mg total) by mouth every 6 (six) hours as needed (nausea).     oxyCODONE-acetaminophen 5-325 mg per tablet  Commonly known as: PERCOCET  Take 1 tablet by mouth every 4 (four) hours as needed for Pain. for pain.     pantoprazole 40 MG tablet  Commonly known as: PROTONIX  Take 1 tablet (40 mg total) by mouth 2 (two) times daily.     potassium chloride SA 20 MEQ tablet  Commonly known as: K-DUR,KLOR-CON  Take 20 mEq by mouth once daily.     QUEtiapine 300 MG Tab  Commonly known as: SEROQUEL  Take 300 mg by mouth every evening.     tamsulosin 0.4 mg Cap  Commonly known as: FLOMAX  Take 1 capsule (0.4 mg total) by mouth every evening. Take in evening.     TRINTELLIX 20 mg Tab  Generic drug: vortioxetine  Take 1 tablet by mouth Daily.            STOP taking these medications      OZEMPIC 0.25 mg or 0.5 mg (2 mg/3 mL) pen injector  Generic drug: semaglutide            ASK your doctor about these medications      DEXCOM G7 SENSOR Malou  Generic drug: blood-glucose sensor  Change every 10 days.              Indwelling Lines/Drains at  time of discharge:   Lines/Drains/Airways       None                   Time spent on the discharge of patient: 33 minutes         MAYNOR Brennan  Department of Hospital Medicine  [facility]

## 2024-05-22 NOTE — NURSING
Patient on bipap. Admitted to 512. Follows all simple commands. Audible strider noted. Sinus tach on monitor.    No cyanosis, clubbing or edema

## 2024-05-27 ENCOUNTER — OFFICE VISIT (OUTPATIENT)
Dept: FAMILY MEDICINE | Facility: CLINIC | Age: 69
End: 2024-05-27
Payer: MEDICARE

## 2024-05-27 VITALS
HEIGHT: 74 IN | SYSTOLIC BLOOD PRESSURE: 136 MMHG | HEART RATE: 80 BPM | TEMPERATURE: 96 F | WEIGHT: 221 LBS | DIASTOLIC BLOOD PRESSURE: 72 MMHG | BODY MASS INDEX: 28.36 KG/M2 | OXYGEN SATURATION: 96 %

## 2024-05-27 DIAGNOSIS — Z12.5 SCREENING FOR PROSTATE CANCER: ICD-10-CM

## 2024-05-27 DIAGNOSIS — R79.89 ELEVATED LFTS: Primary | ICD-10-CM

## 2024-05-27 DIAGNOSIS — D64.9 ANEMIA, UNSPECIFIED TYPE: ICD-10-CM

## 2024-05-27 DIAGNOSIS — R10.30 LOWER ABDOMINAL PAIN: ICD-10-CM

## 2024-05-27 DIAGNOSIS — R11.10 VOMITING, UNSPECIFIED VOMITING TYPE, UNSPECIFIED WHETHER NAUSEA PRESENT: ICD-10-CM

## 2024-05-27 DIAGNOSIS — G89.29 OTHER CHRONIC PAIN: ICD-10-CM

## 2024-05-27 PROCEDURE — 1101F PT FALLS ASSESS-DOCD LE1/YR: CPT | Mod: HCNC,CPTII,S$GLB, | Performed by: FAMILY MEDICINE

## 2024-05-27 PROCEDURE — 3078F DIAST BP <80 MM HG: CPT | Mod: HCNC,CPTII,S$GLB, | Performed by: FAMILY MEDICINE

## 2024-05-27 PROCEDURE — 3008F BODY MASS INDEX DOCD: CPT | Mod: HCNC,CPTII,S$GLB, | Performed by: FAMILY MEDICINE

## 2024-05-27 PROCEDURE — 1159F MED LIST DOCD IN RCRD: CPT | Mod: HCNC,CPTII,S$GLB, | Performed by: FAMILY MEDICINE

## 2024-05-27 PROCEDURE — 3288F FALL RISK ASSESSMENT DOCD: CPT | Mod: HCNC,CPTII,S$GLB, | Performed by: FAMILY MEDICINE

## 2024-05-27 PROCEDURE — 3051F HG A1C>EQUAL 7.0%<8.0%: CPT | Mod: HCNC,CPTII,S$GLB, | Performed by: FAMILY MEDICINE

## 2024-05-27 PROCEDURE — 1111F DSCHRG MED/CURRENT MED MERGE: CPT | Mod: HCNC,CPTII,S$GLB, | Performed by: FAMILY MEDICINE

## 2024-05-27 PROCEDURE — 99999 PR PBB SHADOW E&M-EST. PATIENT-LVL IV: CPT | Mod: PBBFAC,HCNC,, | Performed by: FAMILY MEDICINE

## 2024-05-27 PROCEDURE — 99214 OFFICE O/P EST MOD 30 MIN: CPT | Mod: HCNC,S$GLB,, | Performed by: FAMILY MEDICINE

## 2024-05-27 PROCEDURE — 1126F AMNT PAIN NOTED NONE PRSNT: CPT | Mod: HCNC,CPTII,S$GLB, | Performed by: FAMILY MEDICINE

## 2024-05-27 PROCEDURE — 1160F RVW MEDS BY RX/DR IN RCRD: CPT | Mod: HCNC,CPTII,S$GLB, | Performed by: FAMILY MEDICINE

## 2024-05-27 PROCEDURE — 3075F SYST BP GE 130 - 139MM HG: CPT | Mod: HCNC,CPTII,S$GLB, | Performed by: FAMILY MEDICINE

## 2024-05-27 RX ORDER — METRONIDAZOLE 250 MG/1
250 TABLET ORAL 3 TIMES DAILY
COMMUNITY
End: 2024-05-27 | Stop reason: SDUPTHER

## 2024-05-27 RX ORDER — CIPROFLOXACIN 500 MG/1
500 TABLET ORAL 2 TIMES DAILY
Qty: 20 TABLET | Refills: 0 | Status: SHIPPED | OUTPATIENT
Start: 2024-05-27

## 2024-05-27 RX ORDER — METRONIDAZOLE 250 MG/1
250 TABLET ORAL 3 TIMES DAILY
Qty: 30 TABLET | Refills: 0 | Status: SHIPPED | OUTPATIENT
Start: 2024-05-27

## 2024-05-27 RX ORDER — CIPROFLOXACIN 500 MG/1
500 TABLET ORAL 2 TIMES DAILY
COMMUNITY
End: 2024-05-27 | Stop reason: SDUPTHER

## 2024-05-27 NOTE — PROGRESS NOTES
SCRIBE #1 NOTE: I, Kristine Stauffer, am scribing for, and in the presence of, Jonathan Benton III, MD. I have scribed the entire note.     Subjective:       Patient ID: Kevan Colin Sr. is a 68 y.o. male.    Chief Complaint: Follow-up    Kevan Colin Sr. is a 68 y.o. male who presents for a 3 month follow-up. Accompanied with his wife. Endorses multiple episodes of nausea, vomiting, and lower abdominal pain, per wife this has been ongoing for years, with recent episodes since off of Ozempic 2 weeks ago. Has been on Ozempic for about 6 months. Bowel movements with Diarrhea onset couple days ago. No constipation. No alcohol of significance. No fever or chills. Takes Zofran twice a day. Evaluated by BE Whiting in April 2024 for abdominal pain, nausea, and vomiting. Recent hospitalization at [facility] on 5/15. Discharged on 5/19/24. CT Abdomen Pelvis done during hospitalization and showed right lobe liver mass of 18mm, compared to 12mm mass back in November 2023.  Blood glucose at home around 129-130. Chronic back pain, taking 2 percocets per day as prescribed by Dr Torres; spinal stimulator removed. BMP is elevated. Saw BE Velázquez back in November 2023. Was instructed to do coloscopy, but has not done. Last upper scope 1 year ago by Dr Morales. Still seeing Endocrinology Dr. Centeno. Anemia, hemoglobin 11.7. Elevated LFTs.   Has not seen me in quite some time.  Was in the hospital May the 15th to 19th.  Lower abdominal pain vomiting Eliquis held.  Denies knowledge of the spot in the liver.  Says he is never seen Gastroenterology  before.  But in fact has seen probably 3 different gastroenterologist with same issues.  Has had ERCP MRCP.  The most recent studies show that the lesion in the liver is probably unchanged going back to 2011.  On further questioning acknowledges that they did not follow-up regarding the liver problem due to his ongoing back pain issues.  Spinal stimulator was removed by Dr. Ramachandran so  that back scans could be done.  Reviewed all of the scans in the computer system regarding the liver.  Chronic pain on Percocet 10 t.I.d..  Back pain.        Review of Systems   All other systems reviewed and are negative.  Positive for abdominal pain, nausea, vomiting, diarrhea. Negative for constipation. Negative for fever or chills.      Objective:      Physical examination: Vital signs noted. No acute distress. No carotid bruit. Regular heart rate and rhythm. Lungs clear to auscultation bilaterally. Abdomen bowel sounds are positive soft. There is LLQ tenderness. No rebound. No guarding. Extremities without edema. 2+ pedal pulses.      Assessment:       1. Elevated LFTs    2. Anemia, unspecified type    3. Lower abdominal pain    4. Vomiting, unspecified vomiting type, unspecified whether nausea present    5. Other chronic pain    6. Screening for prostate cancer        Plan:       Elevated LFTs  -     Hemoglobin A1C; Future; Expected date: 05/27/2024  -     Comprehensive Metabolic Panel; Future; Expected date: 05/27/2024  -     Hepatitis C Antibody; Future; Expected date: 05/27/2024  -     Hepatitis B Surface Antigen; Future; Expected date: 05/27/2024  -     Hepatitis A antibody, IgG; Future; Expected date: 05/27/2024  -     Cancel: HEPATITIS A ANTIBODY, IGM; Future; Expected date: 05/27/2024    Anemia, unspecified type  -     CBC Auto Differential; Future; Expected date: 05/27/2024    Lower abdominal pain  -     Ambulatory referral/consult to Gastroenterology; Future; Expected date: 06/03/2024  -     CLOSTRIDIUM DIFFICILE; Future; Expected date: 05/27/2024  -     X-Ray Abdomen Flat And Erect; Future; Expected date: 05/27/2024    Vomiting, unspecified vomiting type, unspecified whether nausea present    Other chronic pain    Screening for prostate cancer  -     PSA, Screening; Future; Expected date: 05/27/2024        Stay off of the Ozempic.  In case it was contributing to his vomiting.  Follow-up with   Johny.  Who has seen him several times in the past and done procedures on him.  PSA A1c CBC CMP hepatitis a BN see panels.  At [facility].  Cipro 500 b.I.d. for 10 days Flagyl 250 t.I.d. for 10 days cover the possibility of diverticulitis since he has some left lower quadrant tenderness.  Check stool for Clostridium difficile since he does have diarrhea.  Also check flat and erect of the abdomen.  I, Dr Jonathan Benton, personally performed the services described in this documentation. All medical record entries made by the scribe were at my direction and in my presence. I have reviewed the chart and agree that the record reflects my personal performance and is accurate and complete.

## 2024-05-28 ENCOUNTER — HOSPITAL ENCOUNTER (OUTPATIENT)
Dept: RADIOLOGY | Facility: HOSPITAL | Age: 69
Discharge: HOME | End: 2024-05-28
Attending: FAMILY MEDICINE
Payer: MEDICARE

## 2024-05-28 DIAGNOSIS — R10.30 LOWER ABDOMINAL PAIN: ICD-10-CM

## 2024-05-28 PROCEDURE — 74019 RADEX ABDOMEN 2 VIEWS: CPT | Mod: TC

## 2024-05-28 PROCEDURE — 74019 RADEX ABDOMEN 2 VIEWS: CPT | Mod: 26,,, | Performed by: RADIOLOGY

## 2024-05-28 NOTE — PLAN OF CARE
[facility]  Initial Discharge Assessment       Primary Care Provider: Jonathan Benton III, MD    Admission Diagnosis: Chest pain [R07.9]  Chest pain [R07.9]    Admission Date: 4/17/2024  Expected Discharge Date: 4/18/2024    Transition of Care Barriers: None    Payor: HU[employer] MANAGED MEDICARE / Plan: HU[employer] SNP HMO PPO SPECIAL NEEDS / Product Type: Medicare Advantage /     Extended Emergency Contact Information  Primary Emergency Contact: Cheryle Villafana  Address: [address]           [place] [address] [facility]  Home Phone: [phone]  Mobile Phone: [phone]  Relation: Spouse  Preferred language: English   needed? No  Secondary Emergency Contact: Lisette Monsivais  Address: UNKNOWN   [place]  Mobile Phone: [phone]  Relation: Sister  Preferred language: English   needed? No    Discharge Plan A: Home  Discharge Plan B: Home with family      [id number] - [place], [place] - [address]  [address]  [facility] [address]  Phone: [phone] Fax: [phone]    SW met with patient at bedside to complete discharge planning assessment.  Patient alert and oriented xs 4.  Patient verified all demographic information on facesheet is correct.  Patient verified PCP is Dr. Benton.  Patient verified primary health insurance is [employer] and secondary is [place] Medicaid.  Patient with NO home health or DME.  Patient with NO POA or Living Will.  Patient not on dialysis or medication coumadin.  Patient with no 30 day admission.  Patient with no financial issues at this time.  Patient family will provide transportation upon discharge from facility.  Patient independent with ADLs, live with spouse, drives self.        Initial Assessment (most recent)       Adult Discharge Assessment - 04/18/24 1030          Discharge Assessment    Assessment Type Discharge Planning Assessment     Confirmed/corrected  address, phone number and insurance Yes     Confirmed Demographics Correct on Facesheet     Source of Information patient;family     Does patient/caregiver understand observation status Yes     Communicated HOMAR with patient/caregiver Yes     People in Home spouse     Facility Arrived From: home     Do you expect to return to your current living situation? Yes     Do you have help at home or someone to help you manage your care at home? Yes     Who are your caregiver(s) and their phone number(s)? spouse     Prior to hospitilization cognitive status: Alert/Oriented     Current cognitive status: Alert/Oriented     Walking or Climbing Stairs Difficulty no     Dressing/Bathing Difficulty no     Equipment Currently Used at Home none     Readmission within 30 days? No     Patient currently being followed by outpatient case management? No     Do you currently have service(s) that help you manage your care at home? No     Do you take prescription medications? Yes     Do you have prescription coverage? Yes     Do you have any problems affording any of your prescribed medications? No     Is the patient taking medications as prescribed? yes     Who is going to help you get home at discharge? spouse     How do you get to doctors appointments? car, drives self     Are you on dialysis? No     Do you take coumadin? No     Discharge Plan A Home     Discharge Plan B Home with family     DME Needed Upon Discharge  none     Discharge Plan discussed with: Patient     Transition of Care Barriers None                                 118

## 2024-06-06 DIAGNOSIS — E78.1 HYPERTRIGLYCERIDEMIA: ICD-10-CM

## 2024-06-06 DIAGNOSIS — Z79.4 TYPE 2 DIABETES MELLITUS WITH HYPERGLYCEMIA, WITH LONG-TERM CURRENT USE OF INSULIN: ICD-10-CM

## 2024-06-06 DIAGNOSIS — I26.99 PULMONARY EMBOLISM, UNSPECIFIED CHRONICITY, UNSPECIFIED PULMONARY EMBOLISM TYPE, UNSPECIFIED WHETHER ACUTE COR PULMONALE PRESENT: ICD-10-CM

## 2024-06-06 DIAGNOSIS — E11.65 TYPE 2 DIABETES MELLITUS WITH HYPERGLYCEMIA, WITH LONG-TERM CURRENT USE OF INSULIN: ICD-10-CM

## 2024-06-06 DIAGNOSIS — I10 BENIGN ESSENTIAL HTN: ICD-10-CM

## 2024-06-06 RX ORDER — METFORMIN HYDROCHLORIDE 1000 MG/1
1000 TABLET ORAL 2 TIMES DAILY WITH MEALS
Qty: 180 TABLET | Refills: 1 | Status: SHIPPED | OUTPATIENT
Start: 2024-06-06

## 2024-06-06 RX ORDER — ATORVASTATIN CALCIUM 40 MG/1
40 TABLET, FILM COATED ORAL NIGHTLY
Qty: 90 TABLET | Refills: 3 | Status: SHIPPED | OUTPATIENT
Start: 2024-06-06

## 2024-06-06 NOTE — TELEPHONE ENCOUNTER
No care due was identified.  Health [facility] Embedded Care Due Messages. Reference number: [id number].   6/06/2024 2:38:03 PM CDT

## 2024-06-06 NOTE — TELEPHONE ENCOUNTER
Refill Decision Note   Kevan Colin  is requesting a refill authorization.  Brief Assessment and Rationale for Refill:  Approve     Medication Therapy Plan:         Pharmacist review requested: Yes   Extended chart review required: Yes   Comments:     Note composed:4:24 PM 06/06/2024

## 2024-06-06 NOTE — TELEPHONE ENCOUNTER
Refill Routing Note   Medication(s) are not appropriate for processing by [facility] for the following reason(s):        Drug-disease interaction: No prior override by participating responsible provider      [facility] action(s):  Defer  Approve        Pharmacist review requested: Yes     Appointments  past 12m or future 3m with PCP    Date Provider   Last Visit   5/27/2024 Jonathan Benton III, MD   Next Visit   Visit date not found Jonathan Benton III, MD   ED visits in past 90 days: 0        Note composed:2:48 PM 06/06/2024

## 2024-06-07 RX ORDER — CARVEDILOL 3.12 MG/1
TABLET ORAL
Qty: 56 TABLET | Refills: 0 | Status: SHIPPED | OUTPATIENT
Start: 2024-06-07

## 2024-06-07 RX ORDER — EMPAGLIFLOZIN 25 MG/1
25 TABLET, FILM COATED ORAL
Qty: 28 TABLET | Refills: 0 | Status: SHIPPED | OUTPATIENT
Start: 2024-06-07

## 2024-06-07 RX ORDER — APIXABAN 5 MG/1
5 TABLET, FILM COATED ORAL 2 TIMES DAILY
Qty: 56 TABLET | Refills: 0 | Status: SHIPPED | OUTPATIENT
Start: 2024-06-07

## 2024-06-10 ENCOUNTER — TELEPHONE (OUTPATIENT)
Dept: FAMILY MEDICINE | Facility: CLINIC | Age: 69
End: 2024-06-10
Payer: MEDICARE

## 2024-06-10 NOTE — TELEPHONE ENCOUNTER
----- Message from Jonathan Benton III, MD sent at 5/29/2024  2:54 PM CDT -----  NORMAL followup as needed.

## 2024-06-12 ENCOUNTER — OFFICE VISIT (OUTPATIENT)
Dept: ENDOCRINOLOGY | Facility: CLINIC | Age: 69
End: 2024-06-12
Payer: MEDICARE

## 2024-06-12 VITALS
TEMPERATURE: 98 F | HEIGHT: 74 IN | OXYGEN SATURATION: 97 % | SYSTOLIC BLOOD PRESSURE: 136 MMHG | HEART RATE: 92 BPM | DIASTOLIC BLOOD PRESSURE: 82 MMHG | WEIGHT: 224 LBS | BODY MASS INDEX: 28.75 KG/M2

## 2024-06-12 DIAGNOSIS — N39.0 FREQUENT UTI: ICD-10-CM

## 2024-06-12 DIAGNOSIS — E66.9 OBESITY (BMI 30-39.9): ICD-10-CM

## 2024-06-12 DIAGNOSIS — E11.69 HYPERLIPIDEMIA ASSOCIATED WITH TYPE 2 DIABETES MELLITUS: ICD-10-CM

## 2024-06-12 DIAGNOSIS — I15.2 HYPERTENSION ASSOCIATED WITH TYPE 2 DIABETES MELLITUS: ICD-10-CM

## 2024-06-12 DIAGNOSIS — E11.65 TYPE 2 DIABETES MELLITUS WITH HYPERGLYCEMIA, WITH LONG-TERM CURRENT USE OF INSULIN: Primary | ICD-10-CM

## 2024-06-12 DIAGNOSIS — Z79.4 TYPE 2 DIABETES MELLITUS WITH HYPERGLYCEMIA, WITH LONG-TERM CURRENT USE OF INSULIN: Primary | ICD-10-CM

## 2024-06-12 DIAGNOSIS — I25.10 CORONARY ARTERY DISEASE INVOLVING NATIVE CORONARY ARTERY OF NATIVE HEART WITHOUT ANGINA PECTORIS: ICD-10-CM

## 2024-06-12 DIAGNOSIS — E11.59 HYPERTENSION ASSOCIATED WITH TYPE 2 DIABETES MELLITUS: ICD-10-CM

## 2024-06-12 DIAGNOSIS — E78.5 HYPERLIPIDEMIA ASSOCIATED WITH TYPE 2 DIABETES MELLITUS: ICD-10-CM

## 2024-06-12 DIAGNOSIS — G20.A2 PARKINSON'S DISEASE WITHOUT DYSKINESIA, WITH FLUCTUATING MANIFESTATIONS: Chronic | ICD-10-CM

## 2024-06-12 PROCEDURE — 3008F BODY MASS INDEX DOCD: CPT | Mod: HCNC,CPTII,S$GLB, | Performed by: PHYSICIAN ASSISTANT

## 2024-06-12 PROCEDURE — 99999 PR PBB SHADOW E&M-EST. PATIENT-LVL IV: CPT | Mod: PBBFAC,HCNC,, | Performed by: PHYSICIAN ASSISTANT

## 2024-06-12 PROCEDURE — 1111F DSCHRG MED/CURRENT MED MERGE: CPT | Mod: HCNC,CPTII,S$GLB, | Performed by: PHYSICIAN ASSISTANT

## 2024-06-12 PROCEDURE — 3075F SYST BP GE 130 - 139MM HG: CPT | Mod: HCNC,CPTII,S$GLB, | Performed by: PHYSICIAN ASSISTANT

## 2024-06-12 PROCEDURE — 3079F DIAST BP 80-89 MM HG: CPT | Mod: HCNC,CPTII,S$GLB, | Performed by: PHYSICIAN ASSISTANT

## 2024-06-12 PROCEDURE — 1160F RVW MEDS BY RX/DR IN RCRD: CPT | Mod: HCNC,CPTII,S$GLB, | Performed by: PHYSICIAN ASSISTANT

## 2024-06-12 PROCEDURE — 1101F PT FALLS ASSESS-DOCD LE1/YR: CPT | Mod: HCNC,CPTII,S$GLB, | Performed by: PHYSICIAN ASSISTANT

## 2024-06-12 PROCEDURE — 1159F MED LIST DOCD IN RCRD: CPT | Mod: HCNC,CPTII,S$GLB, | Performed by: PHYSICIAN ASSISTANT

## 2024-06-12 PROCEDURE — 1125F AMNT PAIN NOTED PAIN PRSNT: CPT | Mod: HCNC,CPTII,S$GLB, | Performed by: PHYSICIAN ASSISTANT

## 2024-06-12 PROCEDURE — 3288F FALL RISK ASSESSMENT DOCD: CPT | Mod: HCNC,CPTII,S$GLB, | Performed by: PHYSICIAN ASSISTANT

## 2024-06-12 PROCEDURE — 99214 OFFICE O/P EST MOD 30 MIN: CPT | Mod: HCNC,S$GLB,, | Performed by: PHYSICIAN ASSISTANT

## 2024-06-12 PROCEDURE — G2211 COMPLEX E/M VISIT ADD ON: HCPCS | Mod: HCNC,S$GLB,, | Performed by: PHYSICIAN ASSISTANT

## 2024-06-12 PROCEDURE — 3051F HG A1C>EQUAL 7.0%<8.0%: CPT | Mod: HCNC,CPTII,S$GLB, | Performed by: PHYSICIAN ASSISTANT

## 2024-06-12 RX ORDER — INSULIN LISPRO 100 [IU]/ML
INJECTION, SOLUTION INTRAVENOUS; SUBCUTANEOUS
Qty: 45 ML | Refills: 3 | Status: SHIPPED | OUTPATIENT
Start: 2024-06-12

## 2024-06-12 NOTE — PROGRESS NOTES
CC: This 69 y.o. male presents for management of T2DM  and chronic conditions pending review including HTN, HLP    HPI: was diagnosed with T2DM 30 years ago. Arrives with his wife. Seen by Dr. Yu in 2012.  Never admitted for T2DM.   Family hx of DM: 2 sisters  Fhx of thyroid disease: none  Denies missing doses of DM medication.   hypoglycemia at home  monitoring BG at home:  Fasting: <170    Pt is vomiting every time he eats. Ozempic was stopped 3 wks ago. Seeing GI.    Diet:   BF-biscuit & gravy  LH-skipped  DN-Hamhawks, beans  Snacks on ice cream. Drinks sweet tea.    Exercise: No formal exercise. Walks at his apartment complex.    CURRENT DM MEDS:  Metformin 1000 mg bid  Jardiance 25 mg qd  Humalog 15 u tid ac  Lantus 70 units qd    Standards of Care:  Eye exam: 11/23 Dr. Nicole q 6 mths  Podiatry exam: PCP  DE: never    PMHx, PSHx: reviewed in epic. Parkinson's Disease.  Social Hx: no ETOH use. Former smoker.    Wt Readings from Last 10 Encounters:   06/12/24 101.6 kg (223 lb 15.8 oz)   05/27/24 100.2 kg (221 lb)   05/15/24 102 kg (224 lb 13.9 oz)   04/30/24 104.2 kg (229 lb 11.5 oz)   04/17/24 108.9 kg (240 lb 1.6 oz)   03/06/24 111 kg (244 lb 11.4 oz)   02/29/24 107.7 kg (237 lb 7 oz)   11/29/23 104.3 kg (230 lb)   11/28/23 104.3 kg (230 lb)   11/17/23 108 kg (238 lb)      ROS:   Gen: Appetite good, + wt gain 20 lbs, denies fatigue and weakness.  Skin: Skin is intact and heals well, no rashes, no hair changes  Eyes: Denies visual disturbances  Resp: no SOB or QUIÑONES, no cough  Cardiac: No palpitations, chest pain, no edema   GI: No nausea or vomiting, diarrhea, constipation, or abdominal pain.  /GYN: No nocturia, burning or pain.   MS/Neuro: Denies numbness/ tingling in BLE; Gait steady, speech clear  Psych: Denies drug/ETOH abuse, no hx of depression.  Other systems: negative.    /82 (BP Location: Right arm, Patient Position: Sitting, BP Method: Small (Manual))   Pulse 92   Temp 98.3 °F (36.8 °C)  "(Oral)   Ht 6' 2.4" (1.89 m)   Wt 101.6 kg (223 lb 15.8 oz)   SpO2 97%   BMI 28.45 kg/m²      PE:  GENERAL: Well developed, well nourished.  PSYCH: AAOx3, appropriate mood and affect, pleasant expression, conversant, appears relaxed, well groomed.   EYES: Conjunctiva, corneas clear  NECK: Supple, trachea midline,no thyromegaly or nodules  CHEST: Resp even and unlabored,   VASCULAR: DP pulses +2/4 bilaterally, no edema.  NEURO: Gait steady  SKIN: Skin warm and dry no acanthosis nigracans.  3/24  Foot Exam: no sores or macerations noted.     Protective Sensation (w/ 10 gram monofilament):  Right: Intact  Left: Intact    Visual Inspection:  Normal -  Bilateral, Nails Intact - without Evidence of Foot Deformity- Bilateral, Callus -  Bilateral, Dry Skin -  Bilateral, and Onychomycosis -  Bilateral    Pedal Pulses:   Right: Present  Left: Present    Posterior Tibialis Pulses:   Right:Present  Left: Present     Vibratory Sensation  Right:Positive  Left:Positive     Personally reviewed labs below:    Lab Visit on 05/28/2024   Component Date Value Ref Range Status    PSA, Screen 05/28/2024 2.42  0.00 - 4.00 ng/mL Final    Comment: The testing method is a chemiluminescent immunoassay manufactured by   [employer] and performed on the [employer] Immunoassay Systems. Values   obtained with different assay manufacturers for methods may be   different and   cannot be used interchangeably      Hemoglobin A1C 05/28/2024 7.3 (H)  4.5 - 6.2 % Final    Comment: According to ADA guidelines, hemoglobin A1C <7.0% represents  optimal control in non-pregnant diabetic patients.  Different  metrics may apply to specific populations.   Standards of Medical Care in Diabetes - 2016.    For the purpose of screening for the presence of diabetes:  <5.7%     Consistent with the absence of diabetes  5.7-6.4%  Consistent with increasing risk for diabetes   (prediabetes)  >or=6.5%  Consistent with diabetes    Currently no consensus exists " for use of hemoglobin A1C  for diagnosis of diabetes for children.      Estimated Avg Glucose 05/28/2024 163 (H)  68 - 131 mg/dL Final    WBC 05/28/2024 8.53  3.90 - 12.70 K/uL Final    RBC 05/28/2024 4.52 (L)  4.60 - 6.20 M/uL Final    Hemoglobin 05/28/2024 13.9 (L)  14.0 - 18.0 g/dL Final    Hematocrit 05/28/2024 43.3  40.0 - 54.0 % Final    MCV 05/28/2024 96  82 - 98 fL Final    MCH 05/28/2024 30.8  27.0 - 31.0 pg Final    MCHC 05/28/2024 32.1  32.0 - 36.0 g/dL Final    RDW 05/28/2024 13.8  11.5 - 14.5 % Final    Platelets 05/28/2024 272  150 - 450 K/uL Final    MPV 05/28/2024 9.3  9.2 - 12.9 fL Final    Immature Granulocytes 05/28/2024 0.2  0.0 - 0.5 % Final    Gran # (ANC) 05/28/2024 5.0  1.8 - 7.7 K/uL Final    Immature Grans (Abs) 05/28/2024 0.02  0.00 - 0.04 K/uL Final    Comment: Mild elevation in immature granulocytes is non specific and   can be seen in a variety of conditions including stress response,   acute inflammation, trauma and pregnancy. Correlation with other   laboratory and clinical findings is essential.      Lymph # 05/28/2024 2.2  1.0 - 4.8 K/uL Final    Mono # 05/28/2024 0.6  0.3 - 1.0 K/uL Final    Eos # 05/28/2024 0.5  0.0 - 0.5 K/uL Final    Baso # 05/28/2024 0.12  0.00 - 0.20 K/uL Final    nRBC 05/28/2024 0  0 /100 WBC Final    Gran % 05/28/2024 58.6  38.0 - 73.0 % Final    Lymph % 05/28/2024 26.1  18.0 - 48.0 % Final    Mono % 05/28/2024 7.5  4.0 - 15.0 % Final    Eosinophil % 05/28/2024 6.2  0.0 - 8.0 % Final    Basophil % 05/28/2024 1.4  0.0 - 1.9 % Final    Differential Method 05/28/2024 Automated   Final    Sodium 05/28/2024 139  136 - 145 mmol/L Final    Potassium 05/28/2024 4.2  3.5 - 5.1 mmol/L Final    Chloride 05/28/2024 100  95 - 110 mmol/L Final    CO2 05/28/2024 28  23 - 29 mmol/L Final    Glucose 05/28/2024 142 (H)  70 - 110 mg/dL Final    BUN 05/28/2024 14  8 - 23 mg/dL Final    Creatinine 05/28/2024 1.0  0.5 - 1.4 mg/dL Final    Calcium 05/28/2024 9.3  8.7 - 10.5 mg/dL  Final    Total Protein 05/28/2024 8.0  6.0 - 8.4 g/dL Final    Albumin 05/28/2024 4.5  3.5 - 5.2 g/dL Final    Total Bilirubin 05/28/2024 0.6  0.1 - 1.0 mg/dL Final    Comment: For infants and newborns, interpretation of results should be based  on gestational age, weight and in agreement with clinical  observations.    Premature Infant recommended reference ranges:  Up to 24 hours.............<8.0 mg/dL  Up to 48 hours............<12.0 mg/dL  3-5 days..................<15.0 mg/dL  6-29 days.................<15.0 mg/dL      Alkaline Phosphatase 05/28/2024 118  55 - 135 U/L Final    AST 05/28/2024 13  10 - 40 U/L Final    ALT 05/28/2024 13  10 - 44 U/L Final    eGFR 05/28/2024 >60.0  >60 mL/min/1.73 m^2 Final    Anion Gap 05/28/2024 11  8 - 16 mmol/L Final    Hepatitis C Ab 05/28/2024 Non Reactive  Non Reactive Final    Comment: HCV antibody alone does not differentiate  between previously resolved infection and  active infection. Equivocal and Reactive  HCV antibody results should be followed up  with an HCV RNA test to support the  diagnosis of active HCV infection.  Performed at:  [facility]  [id number]  : Sheldon Huertas MD, Phone:  [phone]      Hepatitis B Surface Ag 05/28/2024 Negative  Negative Final    Comment: Performed at:  [facility]  [id number]  : Sheldon Huertas MD, Phone:  [phone]     No results displayed because visit has over 200 results.            ASSESSMENT and PLAN:    1. Type 2 diabetes mellitus with hyperglycemia, with long-term current use of insulin  Hemoglobin A1C    insulin lispro (HUMALOG KWIKPEN INSULIN) 100 unit/mL pen      2. Hypertension associated with type 2 diabetes mellitus        3. Hyperlipidemia associated with type 2 diabetes mellitus        4. Coronary artery disease involving native coronary artery of native heart without angina pectoris        5.  Parkinson's disease without dyskinesia, with fluctuating manifestations        6. Obesity (BMI 30-39.9)             T2DM with hyperglycemia-  A1c is at goal.  Goal <7.5%  Pt recently stopped ozempic.  Pt has lost wt.  Continue current regimen.  Start dexcom.      Continue Basaglar to 70 units nightly.      Continue Humalog 15 units with meals.      Continue Jardiance and Metformin.      Discussed DM, progression of disease, long term complications, tx options.   Discussed A1c and BG goals.   Reviewed  hypoglycemia, s/s and appropriate tx.   Instructed to monitor BG and bring meter/ log to every clinic visit.   - takes ASA, ACEi, statin    HTN - controlled, continue meds as previously prescribed and monitor.     HLP - stable LDL , on statin therapy, LFTs WNL  CAD-stable-f/u w/ cardiology.  Parkinson's Disease-stable-f/u w/ neurology  Overweight-Body mass index is 28.45 kg/m². Increase exercise to 30 min qd.  Frequent UTI-last UTI in 11/23. Seeing urology. May decrease infections when glucose is controlled. Will dc jaridance if he continues to have them.    Follow-Up   F/u in 3 mths w/ labs prior -a1c

## 2024-06-18 DIAGNOSIS — K83.8 DILATION OF BILIARY TRACT: ICD-10-CM

## 2024-06-18 DIAGNOSIS — R11.2 NAUSEA WITH VOMITING: Primary | ICD-10-CM

## 2024-06-21 ENCOUNTER — HOSPITAL ENCOUNTER (EMERGENCY)
Facility: HOSPITAL | Age: 69
Discharge: HOME | End: 2024-06-21
Attending: STUDENT IN AN ORGANIZED HEALTH CARE EDUCATION/TRAINING PROGRAM
Payer: MEDICARE

## 2024-06-21 VITALS
HEART RATE: 87 BPM | RESPIRATION RATE: 18 BRPM | TEMPERATURE: 98 F | SYSTOLIC BLOOD PRESSURE: 118 MMHG | OXYGEN SATURATION: 95 % | DIASTOLIC BLOOD PRESSURE: 79 MMHG

## 2024-06-21 DIAGNOSIS — M25.539 WRIST PAIN: ICD-10-CM

## 2024-06-21 DIAGNOSIS — M25.531 RIGHT WRIST PAIN: Primary | ICD-10-CM

## 2024-06-21 PROCEDURE — 99283 EMERGENCY DEPT VISIT LOW MDM: CPT | Mod: 25

## 2024-06-21 PROCEDURE — 25000003 PHARM REV CODE 250: Performed by: PHYSICIAN ASSISTANT

## 2024-06-21 PROCEDURE — 29125 APPL SHORT ARM SPLINT STATIC: CPT | Mod: RT

## 2024-06-21 RX ORDER — HYDROCODONE BITARTRATE AND ACETAMINOPHEN 5; 325 MG/1; MG/1
1 TABLET ORAL
Status: COMPLETED | OUTPATIENT
Start: 2024-06-21 | End: 2024-06-21

## 2024-06-21 RX ADMIN — HYDROCODONE BITARTRATE AND ACETAMINOPHEN 1 TABLET: 5; 325 TABLET ORAL at 08:06
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
## 2024-06-21 NOTE — ED NOTES
Kevan Colin Sr. presents to the ED with c/o right wrist pain that started after moving a sofa. Patient has been provided an ice pack.   Mucous membranes are pink and moist. Skin is warm, dry and intact. .  ИВАН KWONG  Verified patient's name and date of birth.

## 2024-06-22 NOTE — DISCHARGE INSTRUCTIONS
Ice, elevate, rest.  Use the wrist splint for comfort and support.  Reduce heavy lifting or strenuous activity.  Follow up with orthopedist.    You may have a fracture to the hardware site.  Please follow-up with the orthopedist tomorrow.  Return to the ER with concerning or worsening symptoms.  You may take Tylenol for pain as needed.

## 2024-06-24 ENCOUNTER — PATIENT OUTREACH (OUTPATIENT)
Dept: EMERGENCY MEDICINE | Facility: HOSPITAL | Age: 69
End: 2024-06-24

## 2024-06-25 ENCOUNTER — ANESTHESIA (OUTPATIENT)
Dept: SURGERY | Facility: HOSPITAL | Age: 69
End: 2024-06-25
Payer: MEDICARE

## 2024-06-25 ENCOUNTER — ANESTHESIA EVENT (OUTPATIENT)
Dept: SURGERY | Facility: HOSPITAL | Age: 69
End: 2024-06-25
Payer: MEDICARE

## 2024-06-25 ENCOUNTER — HOSPITAL ENCOUNTER (OUTPATIENT)
Facility: HOSPITAL | Age: 69
Discharge: HOME | End: 2024-06-25
Attending: INTERNAL MEDICINE | Admitting: INTERNAL MEDICINE
Payer: MEDICARE

## 2024-06-25 VITALS
SYSTOLIC BLOOD PRESSURE: 134 MMHG | TEMPERATURE: 97 F | HEART RATE: 66 BPM | RESPIRATION RATE: 16 BRPM | DIASTOLIC BLOOD PRESSURE: 81 MMHG | OXYGEN SATURATION: 93 %

## 2024-06-25 DIAGNOSIS — K86.1 CHRONIC BILIARY PANCREATITIS: ICD-10-CM

## 2024-06-25 DIAGNOSIS — R10.9 ABDOMINAL PAIN: ICD-10-CM

## 2024-06-25 LAB — GLUCOSE SERPL-MCNC: 196 MG/DL (ref 70–110)

## 2024-06-25 PROCEDURE — 27202125 HC BALLOON, EXTRACTION (ANY): Performed by: INTERNAL MEDICINE

## 2024-06-25 PROCEDURE — 25500020 PHARM REV CODE 255: Performed by: INTERNAL MEDICINE

## 2024-06-25 PROCEDURE — 37000009 HC ANESTHESIA EA ADD 15 MINS: Performed by: INTERNAL MEDICINE

## 2024-06-25 PROCEDURE — 37000008 HC ANESTHESIA 1ST 15 MINUTES: Performed by: INTERNAL MEDICINE

## 2024-06-25 PROCEDURE — C1769 GUIDE WIRE: HCPCS | Performed by: INTERNAL MEDICINE

## 2024-06-25 PROCEDURE — 43262 ENDO CHOLANGIOPANCREATOGRAPH: CPT | Performed by: INTERNAL MEDICINE

## 2024-06-25 PROCEDURE — 63600175 PHARM REV CODE 636 W HCPCS: Performed by: STUDENT IN AN ORGANIZED HEALTH CARE EDUCATION/TRAINING PROGRAM

## 2024-06-25 PROCEDURE — 25000003 PHARM REV CODE 250: Performed by: ANESTHESIOLOGY

## 2024-06-25 PROCEDURE — 82962 GLUCOSE BLOOD TEST: CPT | Performed by: INTERNAL MEDICINE

## 2024-06-25 PROCEDURE — 63600175 PHARM REV CODE 636 W HCPCS: Performed by: ANESTHESIOLOGY

## 2024-06-25 PROCEDURE — 25000003 PHARM REV CODE 250: Performed by: INTERNAL MEDICINE

## 2024-06-25 PROCEDURE — 25000003 PHARM REV CODE 250: Performed by: STUDENT IN AN ORGANIZED HEALTH CARE EDUCATION/TRAINING PROGRAM

## 2024-06-25 RX ORDER — OXYCODONE HYDROCHLORIDE 5 MG/1
5 TABLET ORAL
Status: DISCONTINUED | OUTPATIENT
Start: 2024-06-25 | End: 2024-06-25 | Stop reason: HOSPADM

## 2024-06-25 RX ORDER — LIDOCAINE HYDROCHLORIDE 20 MG/ML
JELLY TOPICAL
Status: DISCONTINUED | OUTPATIENT
Start: 2024-06-25 | End: 2024-06-25

## 2024-06-25 RX ORDER — SUCCINYLCHOLINE CHLORIDE 20 MG/ML
INJECTION INTRAMUSCULAR; INTRAVENOUS
Status: DISCONTINUED | OUTPATIENT
Start: 2024-06-25 | End: 2024-06-25

## 2024-06-25 RX ORDER — LIDOCAINE HYDROCHLORIDE 20 MG/ML
INJECTION, SOLUTION EPIDURAL; INFILTRATION; INTRACAUDAL; PERINEURAL
Status: DISCONTINUED | OUTPATIENT
Start: 2024-06-25 | End: 2024-06-25

## 2024-06-25 RX ORDER — FENTANYL CITRATE 50 UG/ML
INJECTION, SOLUTION INTRAMUSCULAR; INTRAVENOUS
Status: DISCONTINUED | OUTPATIENT
Start: 2024-06-25 | End: 2024-06-25

## 2024-06-25 RX ORDER — ONDANSETRON HYDROCHLORIDE 2 MG/ML
INJECTION, SOLUTION INTRAVENOUS
Status: DISCONTINUED | OUTPATIENT
Start: 2024-06-25 | End: 2024-06-25

## 2024-06-25 RX ORDER — MEPERIDINE HYDROCHLORIDE 50 MG/ML
12.5 INJECTION INTRAMUSCULAR; INTRAVENOUS; SUBCUTANEOUS EVERY 10 MIN PRN
Status: DISCONTINUED | OUTPATIENT
Start: 2024-06-25 | End: 2024-06-25 | Stop reason: HOSPADM

## 2024-06-25 RX ORDER — INDOMETHACIN 50 MG/1
SUPPOSITORY RECTAL
Status: COMPLETED | OUTPATIENT
Start: 2024-06-25 | End: 2024-06-25

## 2024-06-25 RX ORDER — ACETAMINOPHEN 10 MG/ML
INJECTION, SOLUTION INTRAVENOUS
Status: DISCONTINUED | OUTPATIENT
Start: 2024-06-25 | End: 2024-06-25

## 2024-06-25 RX ORDER — ONDANSETRON HYDROCHLORIDE 2 MG/ML
4 INJECTION, SOLUTION INTRAVENOUS DAILY PRN
Status: DISCONTINUED | OUTPATIENT
Start: 2024-06-25 | End: 2024-06-25 | Stop reason: HOSPADM

## 2024-06-25 RX ORDER — ROCURONIUM BROMIDE 10 MG/ML
INJECTION, SOLUTION INTRAVENOUS
Status: DISCONTINUED | OUTPATIENT
Start: 2024-06-25 | End: 2024-06-25

## 2024-06-25 RX ORDER — FAMOTIDINE 10 MG/ML
INJECTION INTRAVENOUS
Status: DISCONTINUED | OUTPATIENT
Start: 2024-06-25 | End: 2024-06-25

## 2024-06-25 RX ORDER — SODIUM CHLORIDE, SODIUM LACTATE, POTASSIUM CHLORIDE, CALCIUM CHLORIDE 600; 310; 30; 20 MG/100ML; MG/100ML; MG/100ML; MG/100ML
INJECTION, SOLUTION INTRAVENOUS CONTINUOUS PRN
Status: DISCONTINUED | OUTPATIENT
Start: 2024-06-25 | End: 2024-06-25

## 2024-06-25 RX ORDER — PROPOFOL 10 MG/ML
VIAL (ML) INTRAVENOUS
Status: DISCONTINUED | OUTPATIENT
Start: 2024-06-25 | End: 2024-06-25

## 2024-06-25 RX ORDER — DIPHENHYDRAMINE HYDROCHLORIDE 50 MG/ML
12.5 INJECTION INTRAMUSCULAR; INTRAVENOUS
Status: DISCONTINUED | OUTPATIENT
Start: 2024-06-25 | End: 2024-06-25 | Stop reason: HOSPADM

## 2024-06-25 RX ORDER — FENTANYL CITRATE 50 UG/ML
25 INJECTION, SOLUTION INTRAMUSCULAR; INTRAVENOUS EVERY 5 MIN PRN
Status: DISCONTINUED | OUTPATIENT
Start: 2024-06-25 | End: 2024-06-25 | Stop reason: HOSPADM

## 2024-06-25 RX ADMIN — ACETAMINOPHEN 1000 MG: 10 INJECTION, SOLUTION INTRAVENOUS at 01:06

## 2024-06-25 RX ADMIN — LIDOCAINE HYDROCHLORIDE 60 MG: 20 INJECTION, SOLUTION INTRAVENOUS at 01:06

## 2024-06-25 RX ADMIN — LIDOCAINE HYDROCHLORIDE 4 ML: 20 JELLY TOPICAL at 01:06

## 2024-06-25 RX ADMIN — PROMETHAZINE HYDROCHLORIDE 12.5 MG: 25 INJECTION INTRAMUSCULAR; INTRAVENOUS at 02:06

## 2024-06-25 RX ADMIN — SODIUM CHLORIDE, SODIUM LACTATE, POTASSIUM CHLORIDE, AND CALCIUM CHLORIDE: .6; .31; .03; .02 INJECTION, SOLUTION INTRAVENOUS at 01:06

## 2024-06-25 RX ADMIN — ONDANSETRON 4 MG: 2 INJECTION INTRAMUSCULAR; INTRAVENOUS at 01:06

## 2024-06-25 RX ADMIN — PROPOFOL 140 MG: 10 INJECTION, EMULSION INTRAVENOUS at 01:06

## 2024-06-25 RX ADMIN — ONDANSETRON 4 MG: 2 INJECTION INTRAMUSCULAR; INTRAVENOUS at 02:06

## 2024-06-25 RX ADMIN — FENTANYL CITRATE 100 MCG: 50 INJECTION, SOLUTION INTRAMUSCULAR; INTRAVENOUS at 01:06

## 2024-06-25 RX ADMIN — Medication 200 MG: at 01:06

## 2024-06-25 RX ADMIN — ROCURONIUM BROMIDE 5 MG: 10 INJECTION, SOLUTION INTRAVENOUS at 01:06

## 2024-06-25 RX ADMIN — FAMOTIDINE 20 MG: 10 INJECTION, SOLUTION INTRAVENOUS at 01:06

## 2024-06-25 NOTE — ANESTHESIA PROCEDURE NOTES
Intubation    Date/Time: 6/25/2024 1:30 PM    Performed by: Britany Owens CRNA  Authorized by: Soy Rizo MD    Intubation:     Induction:  Intravenous    Intubated:  Postinduction    Mask Ventilation:  Easy mask    Attempts:  1    Attempted By:  CRNA    Method of Intubation:  Video laryngoscopy    Blade:  Liz 4    Laryngeal View Grade: Grade I - full view of cords      Difficult Airway Encountered?: No      Complications:  None    Airway Device:  Oral endotracheal tube    Airway Device Size:  7.5    Style/Cuff Inflation:  Cuffed    Secured at:  The lips    Placement Verified By:  Capnometry    Complicating Factors:  None    Findings Post-Intubation:  BS equal bilateral and atraumatic/condition of teeth unchanged
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
## 2024-06-25 NOTE — TRANSFER OF CARE
Anesthesia Transfer of Care Note    Patient: Kevan Colin Sr.    Procedure(s) Performed: Procedure(s) (LRB):  ERCP (ENDOSCOPIC RETROGRADE CHOLANGIOPANCREATOGRAPHY) (N/A)    Patient location: PACU    Anesthesia Type: general    Transport from OR: Transported from OR on room air with adequate spontaneous ventilation    Post pain: adequate analgesia    Post assessment: no apparent anesthetic complications    Post vital signs: stable    Level of consciousness: awake    Nausea/Vomiting: no nausea/vomiting    Complications: none    Transfer of care protocol was followed    Last vitals: Visit Vitals  BP (!) 134/90   Pulse 88   Temp 36.3 °C (97.4 °F)   Resp 16   SpO2 97%

## 2024-06-25 NOTE — [employer]ATION PATIENT INSTRUCTIONS
Discharge Summary/Instructions after an Endoscopic Procedure  Patient Name: Kevan Colin  Patient MRN: [id number]  Patient YOB: 1955 Tuesday, June 25, 2024  Julio César Mcclain III, MD  RESTRICTIONS:  During your procedure today, you received medications for sedation.  These   medications may affect your judgment, balance and coordination.  Therefore,   for 24 hours, you have the following restrictions:   - DO NOT drive a car, operate machinery, make legal/financial decisions,   sign important papers or drink alcohol.    ACTIVITY:  Today: no heavy lifting, straining or running due to procedural   sedation/anesthesia.  The following day: return to full activity including work.  DIET:  Eat and drink normally unless instructed otherwise.     TREATMENT FOR COMMON SIDE EFFECTS:  - Mild abdominal pain, nausea, belching, bloating or excessive gas:  rest,   eat lightly and use a heating pad.  - Sore Throat: treat with throat lozenges and/or gargle with warm salt   water.  - Because air was used during the procedure, expelling large amounts of air   from your rectum or belching is normal.  - If a bowel prep was taken, you may not have a bowel movement for 1-3 days.    This is normal.  SYMPTOMS TO WATCH FOR AND REPORT TO YOUR PHYSICIAN:  1. Abdominal pain or bloating, other than gas cramps.  2. Chest pain.  3. Back pain.  4. Signs of infection such as: chills or fever occurring within 24 hours   after the procedure.  5. Rectal bleeding, which would show as bright red, maroon, or black stools.   (A tablespoon of blood from the rectum is not serious, especially if   hemorrhoids are present.)  6. Vomiting.  7. Weakness or dizziness.  GO DIRECTLY TO THE NEAREST EMERGENCY ROOM IF YOU HAVE ANY OF THE FOLLOWING:      Difficulty breathing              Chills and/or fever over 101 F   Persistent vomiting and/or vomiting blood   Severe abdominal pain   Severe chest pain   Black, tarry stools   Bleeding- more than one  tablespoon   Any other symptom or condition that you feel may need urgent attention  Your doctor recommends these additional instructions:  If any biopsies were taken, your doctors clinic will contact you in 1 to 2   weeks with any results.  - Advance diet as tolerated.   - Continue present medications.   - Patient has a contact number available for emergencies.  The signs and   symptoms of potential delayed complications were discussed with the   patient.  Return to normal activities tomorrow.  Written discharge   instructions were provided to the patient.   - Discharge patient to home (with escort).  For questions, problems or results please call your physician - Julio César Mcclain III, MD at Work:  [phone].  [facility], EMERGENCY ROOM PHONE NUMBER: [phone]  IF A COMPLICATION OR EMERGENCY SITUATION ARISES AND YOU ARE UNABLE TO REACH   YOUR PHYSICIAN - GO DIRECTLY TO THE EMERGENCY ROOM.  Julio César Mcclain III, MD  6/25/2024 1:55:27 PM  This report has been verified and signed electronically.  Dear patient,  As a result of recent federal legislation (The Federal Cures Act), you may   receive lab or pathology results from your procedure in your [facility]   account before your physician is able to contact you. Your physician or   their representative will relay the results to you with their   recommendations at their soonest availability.  Thank you,  [employer]

## 2024-06-25 NOTE — ANESTHESIA POSTPROCEDURE EVALUATION
Anesthesia Post Evaluation    Patient: Kevan Colin Sr.    Procedure(s) Performed: Procedure(s) (LRB):  ERCP (ENDOSCOPIC RETROGRADE CHOLANGIOPANCREATOGRAPHY) (N/A)    Final Anesthesia Type: general      Patient location during evaluation: PACU  Patient participation: Yes- Able to Participate  Level of consciousness: awake and alert  Post-procedure vital signs: reviewed and stable  Pain management: adequate  Airway patency: patent  JOSE ALBERTO mitigation strategies: Extubation while patient is awake, Multimodal analgesia and Extubation and recovery carried out in lateral, semiupright, or other nonsupine position  PONV status at discharge: No PONV  Anesthetic complications: no      Cardiovascular status: stable  Respiratory status: unassisted and spontaneous ventilation  Hydration status: euvolemic  Follow-up not needed.              Vitals Value Taken Time   /69 06/25/24 1445   Temp  06/25/24 1449   Pulse 78 06/25/24 1447   Resp 27 06/25/24 1447   SpO2 95 % 06/25/24 1447   Vitals shown include unfiled device data.      No case tracking events are documented in the log.      Pain/Neville Score: Neville Score: 10 (6/25/2024  2:30 PM)

## 2024-06-25 NOTE — ANESTHESIA PREPROCEDURE EVALUATION
06/25/2024  Kevan Colin Sr. is a 69 y.o., male.      Pre-op Assessment    I have reviewed the Patient Summary Reports.     I have reviewed the Nursing Notes. I have reviewed the NPO Status.   I have reviewed the Medications.     Review of Systems  Anesthesia Hx:             Denies Family Hx of Anesthesia complications.    Denies Personal Hx of Anesthesia complications.                    Social:  Former Smoker, No Alcohol Use       Hematology/Oncology:                   Hematology Comments: Eliquis for history of pulmonary embolism.  Last Eliquis 4 days ago.                    Cardiovascular:     Hypertension  Past MI CAD      Angina    PVD (bilateral carotid stenosis and history of left CEA)       Cardiovascular Symptoms: Angina   Shortness of Breath Dyspnea On Extertion   Coronary Artery Disease:          Hx of Myocardial Infarction                  Hypertension         Pulmonary:   COPD, mild Asthma mild Shortness of breath  Sleep Apnea Patient has no breathing problems ever since he lost 80 lb  Asthma:   Chronic Obstructive Pulmonary Disease (COPD):           Obstructive Sleep Apnea (JOSE ALBERTO).      Education provided regarding risk of obstructive sleep apnea            Hepatic/GI:     GERD, well controlled   Postprandial nausea and vomiting.  Mild nausea this morning but no emesis.   Gerd          Musculoskeletal:  Arthritis        Arthritis     Spine Disorders: (spinal cord stimulator removed.  Patient no longer takes opioids.)  Chronic Pain           Neurological:   CVA, no residual symptoms            Arthritis              CVA - Cerebrovasular Accident               Neuromuscular Disease   Endocrine:  Diabetes (glucose 123 this morning)    Diabetes                      Psych:  Psychiatric History                  Physical Exam  General: Well nourished, Cooperative, Alert and  Oriented    Airway:  Mallampati: I   Mouth Opening: Normal  TM Distance: > 6 cm  Tongue: Normal  Neck ROM: Normal ROM    Dental:  Edentulous    Chest/Lungs:  Clear to auscultation, Normal Respiratory Rate    Heart:  Rate: Normal  Rhythm: Regular Rhythm  Sounds: Normal        Anesthesia Plan  Type of Anesthesia, risks & benefits discussed:    Anesthesia Type: Gen ETT  Intra-op Monitoring Plan: Standard ASA Monitors  Post Op Pain Control Plan:   (medical reason for not using multimodal pain management)  Induction:  IV  Airway Plan: Video, Post-Induction  Informed Consent: Informed consent signed with the Patient and all parties understand the risks and agree with anesthesia plan.  All questions answered.   ASA Score: 3  Anesthesia Plan Notes: Multimodal analgesia:  IV acetaminophen   Antiemetics:  Zofran, Pepcid, Decadron 8 mg, Benadryl 6.25 mg    Ready For Surgery From Anesthesia Perspective.     .

## 2024-07-08 DIAGNOSIS — I50.22 CHRONIC SYSTOLIC HEART FAILURE: ICD-10-CM

## 2024-07-08 DIAGNOSIS — I10 BENIGN ESSENTIAL HTN: ICD-10-CM

## 2024-07-08 RX ORDER — FUROSEMIDE 20 MG/1
20 TABLET ORAL
Qty: 28 TABLET | Refills: 0 | Status: SHIPPED | OUTPATIENT
Start: 2024-07-08

## 2024-07-10 RX ORDER — CARVEDILOL 3.12 MG/1
TABLET ORAL
Qty: 180 TABLET | Refills: 3 | Status: SHIPPED | OUTPATIENT
Start: 2024-07-10

## 2024-07-10 NOTE — TELEPHONE ENCOUNTER
Refill Routing Note   Medication(s) are not appropriate for processing by [facility] for the following reason(s):        Drug-disease interaction: carvediloL and COPD exacerbation; COPD type B   ED/Hospital Visit since last OV with provider    [facility] action(s):  Defer        Medication Therapy Plan: ED visits for wrist pain and abdominal pain.    Pharmacist review requested: Yes     Appointments  past 12m or future 3m with PCP    Date Provider   Last Visit   5/27/2024 Jonathan Benton III, MD   Next Visit   7/8/2024 Jonathan Benton III, MD   ED visits in past 90 days: 1        Note composed:11:52 AM 07/10/2024

## 2024-07-10 NOTE — TELEPHONE ENCOUNTER
No care due was identified.  [facility] Embedded Care Due Messages. Reference number: [id number].   7/10/2024 11:39:34 AM CDT

## 2024-07-10 NOTE — TELEPHONE ENCOUNTER
Refill Decision Note   Kevan Colin  is requesting a refill authorization.  Brief Assessment and Rationale for Refill:  Approve     Medication Therapy Plan:  PCP previously prescribed rx.   ED visit 6/21/24 for wrist pain after moving couch. No changes to therapy or follow up recommended.       Pharmacist review requested: Yes   Extended chart review required: Yes   Comments:     Note composed:12:03 PM 07/10/2024

## 2024-08-05 DIAGNOSIS — I26.99 PULMONARY EMBOLISM, UNSPECIFIED CHRONICITY, UNSPECIFIED PULMONARY EMBOLISM TYPE, UNSPECIFIED WHETHER ACUTE COR PULMONALE PRESENT: ICD-10-CM

## 2024-08-05 RX ORDER — EMPAGLIFLOZIN 25 MG/1
25 TABLET, FILM COATED ORAL
Qty: 28 TABLET | Refills: 0 | Status: SHIPPED | OUTPATIENT
Start: 2024-08-05

## 2024-08-05 RX ORDER — APIXABAN 5 MG/1
5 TABLET, FILM COATED ORAL 2 TIMES DAILY
Qty: 56 TABLET | Refills: 0 | Status: SHIPPED | OUTPATIENT
Start: 2024-08-05

## 2024-08-13 ENCOUNTER — TELEPHONE (OUTPATIENT)
Dept: FAMILY MEDICINE | Facility: CLINIC | Age: 69
End: 2024-08-13
Payer: MEDICARE

## 2024-08-13 NOTE — TELEPHONE ENCOUNTER
----- Message from Melly Hsu sent at 8/13/2024  3:28 PM CDT -----  Contact: wife  Type:  Needs Medical Advice, SOONER APPT REQUEST    Who Called: wife Cheryle  Symptoms (please be specific): needs a sooner appt with , pt has a gash on his leg and was told he needs to make an appt with his pcp because it has some discoloration on it. Please call pt.   Would the patient rather a call back or a response via [facility]? call  Best Call Back Number:   Additional Information: please advise and thank you.

## 2024-08-15 ENCOUNTER — OFFICE VISIT (OUTPATIENT)
Dept: FAMILY MEDICINE | Facility: CLINIC | Age: 69
End: 2024-08-15
Payer: MEDICARE

## 2024-08-15 ENCOUNTER — LAB VISIT (OUTPATIENT)
Dept: LAB | Facility: HOSPITAL | Age: 69
End: 2024-08-15
Attending: FAMILY MEDICINE
Payer: MEDICARE

## 2024-08-15 VITALS
BODY MASS INDEX: 30.66 KG/M2 | OXYGEN SATURATION: 95 % | WEIGHT: 238.88 LBS | DIASTOLIC BLOOD PRESSURE: 75 MMHG | HEART RATE: 63 BPM | TEMPERATURE: 98 F | HEIGHT: 74 IN | RESPIRATION RATE: 18 BRPM | SYSTOLIC BLOOD PRESSURE: 143 MMHG

## 2024-08-15 DIAGNOSIS — I50.22 CHRONIC SYSTOLIC HEART FAILURE: ICD-10-CM

## 2024-08-15 DIAGNOSIS — Z86.718 HISTORY OF DVT (DEEP VEIN THROMBOSIS): ICD-10-CM

## 2024-08-15 DIAGNOSIS — R11.10 VOMITING, UNSPECIFIED VOMITING TYPE, UNSPECIFIED WHETHER NAUSEA PRESENT: ICD-10-CM

## 2024-08-15 DIAGNOSIS — R11.2 NAUSEA AND VOMITING, UNSPECIFIED VOMITING TYPE: ICD-10-CM

## 2024-08-15 DIAGNOSIS — I26.99 PULMONARY EMBOLISM, UNSPECIFIED CHRONICITY, UNSPECIFIED PULMONARY EMBOLISM TYPE, UNSPECIFIED WHETHER ACUTE COR PULMONALE PRESENT: ICD-10-CM

## 2024-08-15 DIAGNOSIS — Z98.890 HISTORY OF LUMBAR SURGERY: ICD-10-CM

## 2024-08-15 DIAGNOSIS — I10 BENIGN ESSENTIAL HTN: ICD-10-CM

## 2024-08-15 DIAGNOSIS — Z79.01 ANTICOAGULATED: ICD-10-CM

## 2024-08-15 DIAGNOSIS — E78.5 HYPERLIPIDEMIA, UNSPECIFIED HYPERLIPIDEMIA TYPE: ICD-10-CM

## 2024-08-15 DIAGNOSIS — E11.42 DIABETIC POLYNEUROPATHY ASSOCIATED WITH TYPE 2 DIABETES MELLITUS: ICD-10-CM

## 2024-08-15 DIAGNOSIS — E11.42 TYPE 2 DIABETES MELLITUS WITH DIABETIC POLYNEUROPATHY, WITH LONG-TERM CURRENT USE OF INSULIN: ICD-10-CM

## 2024-08-15 DIAGNOSIS — E78.1 HYPERTRIGLYCERIDEMIA: ICD-10-CM

## 2024-08-15 DIAGNOSIS — Z79.4 TYPE 2 DIABETES MELLITUS WITH DIABETIC POLYNEUROPATHY, WITH LONG-TERM CURRENT USE OF INSULIN: ICD-10-CM

## 2024-08-15 DIAGNOSIS — F20.9 SCHIZOPHRENIA, UNSPECIFIED TYPE: ICD-10-CM

## 2024-08-15 LAB
ANION GAP SERPL CALC-SCNC: 10 MMOL/L (ref 8–16)
ANION GAP SERPL CALC-SCNC: 10 MMOL/L (ref 8–16)
BASOPHILS # BLD AUTO: 0.03 K/UL (ref 0–0.2)
BASOPHILS NFR BLD: 0.4 % (ref 0–1.9)
BNP SERPL-MCNC: 183 PG/ML (ref 0–99)
BNP SERPL-MCNC: 183 PG/ML (ref 0–99)
BUN SERPL-MCNC: 13 MG/DL (ref 8–23)
BUN SERPL-MCNC: 13 MG/DL (ref 8–23)
CALCIUM SERPL-MCNC: 9 MG/DL (ref 8.7–10.5)
CALCIUM SERPL-MCNC: 9 MG/DL (ref 8.7–10.5)
CHLORIDE SERPL-SCNC: 103 MMOL/L (ref 95–110)
CHLORIDE SERPL-SCNC: 103 MMOL/L (ref 95–110)
CO2 SERPL-SCNC: 24 MMOL/L (ref 23–29)
CO2 SERPL-SCNC: 24 MMOL/L (ref 23–29)
CREAT SERPL-MCNC: 0.9 MG/DL (ref 0.5–1.4)
CREAT SERPL-MCNC: 0.9 MG/DL (ref 0.5–1.4)
DIFFERENTIAL METHOD BLD: ABNORMAL
EOSINOPHIL # BLD AUTO: 0.5 K/UL (ref 0–0.5)
EOSINOPHIL NFR BLD: 6.6 % (ref 0–8)
ERYTHROCYTE [DISTWIDTH] IN BLOOD BY AUTOMATED COUNT: 15.1 % (ref 11.5–14.5)
EST. GFR  (NO RACE VARIABLE): >60 ML/MIN/1.73 M^2
EST. GFR  (NO RACE VARIABLE): >60 ML/MIN/1.73 M^2
GLUCOSE SERPL-MCNC: 142 MG/DL (ref 70–110)
GLUCOSE SERPL-MCNC: 142 MG/DL (ref 70–110)
HCT VFR BLD AUTO: 35.8 % (ref 40–54)
HGB BLD-MCNC: 11.1 G/DL (ref 14–18)
IMM GRANULOCYTES # BLD AUTO: 0.02 K/UL (ref 0–0.04)
IMM GRANULOCYTES NFR BLD AUTO: 0.3 % (ref 0–0.5)
LYMPHOCYTES # BLD AUTO: 1.6 K/UL (ref 1–4.8)
LYMPHOCYTES NFR BLD: 23.5 % (ref 18–48)
MCH RBC QN AUTO: 30.4 PG (ref 27–31)
MCHC RBC AUTO-ENTMCNC: 31 G/DL (ref 32–36)
MCV RBC AUTO: 98 FL (ref 82–98)
MONOCYTES # BLD AUTO: 0.7 K/UL (ref 0.3–1)
MONOCYTES NFR BLD: 9.7 % (ref 4–15)
NEUTROPHILS # BLD AUTO: 4.1 K/UL (ref 1.8–7.7)
NEUTROPHILS NFR BLD: 59.5 % (ref 38–73)
NRBC BLD-RTO: 0 /100 WBC
PLATELET # BLD AUTO: 248 K/UL (ref 150–450)
PMV BLD AUTO: 9.3 FL (ref 9.2–12.9)
POTASSIUM SERPL-SCNC: 4.2 MMOL/L (ref 3.5–5.1)
POTASSIUM SERPL-SCNC: 4.2 MMOL/L (ref 3.5–5.1)
RBC # BLD AUTO: 3.65 M/UL (ref 4.6–6.2)
SODIUM SERPL-SCNC: 137 MMOL/L (ref 136–145)
SODIUM SERPL-SCNC: 137 MMOL/L (ref 136–145)
WBC # BLD AUTO: 6.94 K/UL (ref 3.9–12.7)

## 2024-08-15 PROCEDURE — 99999 PR PBB SHADOW E&M-EST. PATIENT-LVL III: CPT | Mod: PBBFAC,HCNC,, | Performed by: FAMILY MEDICINE

## 2024-08-15 PROCEDURE — 3078F DIAST BP <80 MM HG: CPT | Mod: HCNC,CPTII,S$GLB, | Performed by: FAMILY MEDICINE

## 2024-08-15 PROCEDURE — 1160F RVW MEDS BY RX/DR IN RCRD: CPT | Mod: HCNC,CPTII,S$GLB, | Performed by: FAMILY MEDICINE

## 2024-08-15 PROCEDURE — 36415 COLL VENOUS BLD VENIPUNCTURE: CPT | Performed by: FAMILY MEDICINE

## 2024-08-15 PROCEDURE — 3051F HG A1C>EQUAL 7.0%<8.0%: CPT | Mod: HCNC,CPTII,S$GLB, | Performed by: FAMILY MEDICINE

## 2024-08-15 PROCEDURE — 1159F MED LIST DOCD IN RCRD: CPT | Mod: HCNC,CPTII,S$GLB, | Performed by: FAMILY MEDICINE

## 2024-08-15 PROCEDURE — 85025 COMPLETE CBC W/AUTO DIFF WBC: CPT | Performed by: FAMILY MEDICINE

## 2024-08-15 PROCEDURE — 1125F AMNT PAIN NOTED PAIN PRSNT: CPT | Mod: HCNC,CPTII,S$GLB, | Performed by: FAMILY MEDICINE

## 2024-08-15 PROCEDURE — 99214 OFFICE O/P EST MOD 30 MIN: CPT | Mod: HCNC,S$GLB,, | Performed by: FAMILY MEDICINE

## 2024-08-15 PROCEDURE — 3288F FALL RISK ASSESSMENT DOCD: CPT | Mod: HCNC,CPTII,S$GLB, | Performed by: FAMILY MEDICINE

## 2024-08-15 PROCEDURE — 1101F PT FALLS ASSESS-DOCD LE1/YR: CPT | Mod: HCNC,CPTII,S$GLB, | Performed by: FAMILY MEDICINE

## 2024-08-15 PROCEDURE — 3077F SYST BP >= 140 MM HG: CPT | Mod: HCNC,CPTII,S$GLB, | Performed by: FAMILY MEDICINE

## 2024-08-15 PROCEDURE — 83880 ASSAY OF NATRIURETIC PEPTIDE: CPT | Performed by: FAMILY MEDICINE

## 2024-08-15 PROCEDURE — 3008F BODY MASS INDEX DOCD: CPT | Mod: HCNC,CPTII,S$GLB, | Performed by: FAMILY MEDICINE

## 2024-08-15 PROCEDURE — 80048 BASIC METABOLIC PNL TOTAL CA: CPT | Performed by: FAMILY MEDICINE

## 2024-08-15 RX ORDER — ATORVASTATIN CALCIUM 40 MG/1
40 TABLET, FILM COATED ORAL NIGHTLY
Qty: 90 TABLET | Refills: 1 | Status: ON HOLD | OUTPATIENT
Start: 2024-08-15

## 2024-08-15 RX ORDER — PANTOPRAZOLE SODIUM 40 MG/1
40 TABLET, DELAYED RELEASE ORAL 2 TIMES DAILY
Qty: 60 TABLET | Refills: 5 | Status: ON HOLD | OUTPATIENT
Start: 2024-08-15 | End: 2025-08-15

## 2024-08-15 RX ORDER — METFORMIN HYDROCHLORIDE 1000 MG/1
1000 TABLET ORAL 2 TIMES DAILY WITH MEALS
Qty: 180 TABLET | Refills: 1 | Status: ON HOLD | OUTPATIENT
Start: 2024-08-15

## 2024-08-15 RX ORDER — OXYCODONE AND ACETAMINOPHEN 10; 325 MG/1; MG/1
1 TABLET ORAL
COMMUNITY
Start: 2024-07-26 | End: 2024-08-24 | Stop reason: DRUGHIGH

## 2024-08-15 RX ORDER — FUROSEMIDE 20 MG/1
20 TABLET ORAL DAILY
Qty: 90 TABLET | Refills: 0 | Status: ON HOLD | OUTPATIENT
Start: 2024-08-15

## 2024-08-15 RX ORDER — CLINDAMYCIN HYDROCHLORIDE 300 MG/1
300 CAPSULE ORAL 4 TIMES DAILY
Qty: 40 CAPSULE | Refills: 0 | Status: ON HOLD | OUTPATIENT
Start: 2024-08-15

## 2024-08-15 RX ORDER — QUETIAPINE FUMARATE 300 MG/1
300 TABLET, FILM COATED ORAL NIGHTLY
Qty: 90 TABLET | Refills: 1 | Status: ON HOLD | OUTPATIENT
Start: 2024-08-15

## 2024-08-15 RX ORDER — VORTIOXETINE 20 MG/1
1 TABLET, FILM COATED ORAL DAILY
Qty: 90 TABLET | Refills: 1 | Status: ON HOLD | OUTPATIENT
Start: 2024-08-15

## 2024-08-15 RX ORDER — LAMOTRIGINE 25 MG/1
50 TABLET ORAL DAILY
Qty: 180 TABLET | Refills: 1 | Status: ON HOLD | OUTPATIENT
Start: 2024-08-15

## 2024-08-15 RX ORDER — ONDANSETRON 4 MG/1
4 TABLET, ORALLY DISINTEGRATING ORAL EVERY 6 HOURS PRN
Qty: 20 TABLET | Refills: 5 | Status: ON HOLD | OUTPATIENT
Start: 2024-08-15

## 2024-08-15 RX ORDER — GABAPENTIN 600 MG/1
600 TABLET ORAL 3 TIMES DAILY
Qty: 270 TABLET | Refills: 1 | Status: ON HOLD | OUTPATIENT
Start: 2024-08-15

## 2024-08-15 RX ORDER — CARVEDILOL 3.12 MG/1
3.12 TABLET ORAL 2 TIMES DAILY
Qty: 180 TABLET | Refills: 3 | Status: ON HOLD | OUTPATIENT
Start: 2024-08-15

## 2024-08-15 RX ORDER — TAMSULOSIN HYDROCHLORIDE 0.4 MG/1
0.4 CAPSULE ORAL NIGHTLY
Qty: 90 CAPSULE | Refills: 1 | Status: ON HOLD | OUTPATIENT
Start: 2024-08-15

## 2024-08-15 NOTE — PROGRESS NOTES
SCRIBE #1 NOTE: I, Alexandr Blair, am scribing for, and in the presence of,  Jonathan Benton III, MD. I have scribed the entire note.     Subjective:       Patient ID: Kevan Colin Sr. is a 69 y.o. male.    Chief Complaint: Wound Check (3 days ago wound to lower left leg with discoloration to site) and Leg Pain (Pain in right knee and left leg)    Mr. Colin is here for a wound check and leg pain after his last appointment on May 27th. Accompanied by his wife. Uses a walker to ambulate. States that 2 days ago he has a bleeding laceration to his lower left shin that has discoloration at the site, and he has no idea of how it got there. States he is having right knee pain. He had a recent back surgery on July 25th with Dr. Torres at [facility]. He went to [facility] for rehabilitation, and he discontinued going 5 days ago.  He has had blood clots every 2 to 3 years for 14 years. Anticoagulated. On Eliquis. States he is having issues sleeping as well, and he does not snore. Schizophrenia. He has not been on his psych medications for 3 months. History of DVT. BMI of 30.3, lost 60 pounds overall. Cardiovascular good. No chest pain. No palpitations. Blood pressure is 143/75. Hyperlipidemia. On Lipitor. Other chronic pain. 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 On Novalog and Lantus, and he takes the shot in the morning. A1C is 7.3. CSF. States he is not taking Lasix. Follows up with cardiologist Dr. Wheeler. Tetanus vaccine is okay.     Review of Systems   Constitutional:  Negative for chills and fever.   HENT:  Negative for congestion and sore throat.    Eyes:  Negative for pain and visual disturbance.   Respiratory:  Negative for chest tightness and shortness of breath.    Cardiovascular:  Positive for leg swelling. Negative for chest pain.   Gastrointestinal:  Negative for nausea.   Endocrine: Negative for polydipsia and polyuria.   Genitourinary:  Negative for dysuria and  flank pain.   Musculoskeletal:  Negative for back pain, neck pain and neck stiffness.   Skin:  Positive for color change and wound. Negative for rash.   Allergic/Immunologic: Negative for immunocompromised state.   Neurological:  Negative for dizziness and weakness.   Hematological:  Does not bruise/bleed easily.   Psychiatric/Behavioral:  Positive for sleep disturbance. Negative for agitation and behavioral problems.    All other systems reviewed and are negative.      Objective:      Physical examination: Vital signs noted. No acute distress. No carotid bruit. Regular heart rate and rhythm. Lungs clear to auscultation bilaterally. 2+ pedal on the right. 3+ pedal edema on the left. 2+ pedal pulses. Discoloration of lower left leg greater than right. 1 x 1.5cm wound that is oval-shaped with erythema and slightly increased warmth.      Assessment:       1. BMI 30.0-30.9,adult    2. Hypertriglyceridemia    3. Benign essential HTN    4. Pulmonary embolism, unspecified chronicity, unspecified pulmonary embolism type, unspecified whether acute cor pulmonale present    5. Diabetic polyneuropathy associated with type 2 diabetes mellitus    6. Type 2 diabetes mellitus with diabetic polyneuropathy, with long-term current use of insulin    7. Vomiting, unspecified vomiting type, unspecified whether nausea present    8. Nausea and vomiting, unspecified vomiting type    9. History of lumbar surgery    10. Hyperlipidemia, unspecified hyperlipidemia type    11. Chronic systolic heart failure    12. History of DVT (deep vein thrombosis)    13. Anticoagulated    14. Schizophrenia, unspecified type        Plan:       BMI 30.0-30.9,adult    Hypertriglyceridemia  -     atorvastatin (LIPITOR) 40 MG tablet; Take 1 tablet (40 mg total) by mouth every evening.  Dispense: 90 tablet; Refill: 1    Benign essential HTN  -     carvediloL (COREG) 3.125 MG tablet; Take 1 tablet (3.125 mg total) by mouth 2 (two) times daily.  Dispense: 180  tablet; Refill: 3  -     furosemide (LASIX) 20 MG tablet; Take 1 tablet (20 mg total) by mouth once daily.  Dispense: 90 tablet; Refill: 0    Pulmonary embolism, unspecified chronicity, unspecified pulmonary embolism type, unspecified whether acute cor pulmonale present  -     apixaban (ELIQUIS) 5 mg Tab; Take 1 tablet (5 mg total) by mouth 2 (two) times daily.  Dispense: 56 tablet; Refill: 0  -     Basic Metabolic Panel; Future; Expected date: 08/15/2024  -     CBC Auto Differential; Future; Expected date: 08/15/2024  -     BNP; Future; Expected date: 08/15/2024  -     BNP; Future; Expected date: 08/29/2024  -     Basic Metabolic Panel; Future; Expected date: 08/29/2024    Diabetic polyneuropathy associated with type 2 diabetes mellitus  -     gabapentin (NEURONTIN) 600 MG tablet; Take 1 tablet (600 mg total) by mouth 3 (three) times daily.  Dispense: 270 tablet; Refill: 1    Type 2 diabetes mellitus with diabetic polyneuropathy, with long-term current use of insulin  -     metFORMIN (GLUCOPHAGE) 1000 MG tablet; Take 1 tablet (1,000 mg total) by mouth 2 (two) times daily with meals.  Dispense: 180 tablet; Refill: 1    Vomiting, unspecified vomiting type, unspecified whether nausea present  -     ondansetron (ZOFRAN-ODT) 4 MG TbDL; Take 1 tablet (4 mg total) by mouth every 6 (six) hours as needed (nausea).  Dispense: 20 tablet; Refill: 5  -     pantoprazole (PROTONIX) 40 MG tablet; Take 1 tablet (40 mg total) by mouth 2 (two) times daily.  Dispense: 60 tablet; Refill: 5    Nausea and vomiting, unspecified vomiting type  -     ondansetron (ZOFRAN-ODT) 4 MG TbDL; Take 1 tablet (4 mg total) by mouth every 6 (six) hours as needed (nausea).  Dispense: 20 tablet; Refill: 5  -     pantoprazole (PROTONIX) 40 MG tablet; Take 1 tablet (40 mg total) by mouth 2 (two) times daily.  Dispense: 60 tablet; Refill: 5    History of lumbar surgery    Hyperlipidemia, unspecified hyperlipidemia type    Chronic systolic heart failure  -      Basic Metabolic Panel; Future; Expected date: 08/15/2024  -     CBC Auto Differential; Future; Expected date: 08/15/2024  -     BNP; Future; Expected date: 08/15/2024  -     BNP; Future; Expected date: 08/29/2024  -     Basic Metabolic Panel; Future; Expected date: 08/29/2024  -     furosemide (LASIX) 20 MG tablet; Take 1 tablet (20 mg total) by mouth once daily.  Dispense: 90 tablet; Refill: 0    History of DVT (deep vein thrombosis)    Anticoagulated    Schizophrenia, unspecified type    Other orders  -     empagliflozin (JARDIANCE) 25 mg tablet; Take 1 tablet (25 mg total) by mouth once daily.  Dispense: 28 tablet; Refill: 0  -     lamoTRIgine (LAMICTAL) 25 MG tablet; Take 2 tablets (50 mg total) by mouth once daily.  Dispense: 180 tablet; Refill: 1  -     QUEtiapine (SEROQUEL) 300 MG Tab; Take 1 tablet (300 mg total) by mouth every evening.  Dispense: 90 tablet; Refill: 1  -     tamsulosin (FLOMAX) 0.4 mg Cap; Take 1 capsule (0.4 mg total) by mouth every evening. Take in evening.  Dispense: 90 capsule; Refill: 1  -     TRINTELLIX 20 mg Tab; Take 1 tablet (20 mg total) by mouth Daily.  Dispense: 90 tablet; Refill: 1  -     clindamycin (CLEOCIN) 300 MG capsule; Take 1 capsule (300 mg total) by mouth 4 (four) times daily.  Dispense: 40 capsule; Refill: 0    Cleocin 300 mg q.i.d. for 10 days.  BMP CBC BNP ordered.  Resume his Lasix at 20 mg daily.  Check daily weights.  Follow-up in 2 weeks with a BNP and a BMP.  Elevate legs.  All care gaps addressed or discussed.     I, Jonathan Benton III, MD, personally performed the services described in this documentation. All medical record entries made by the scribe were at my direction and in my presence. I have reviewed the chart and agree that the record reflects my personal performance and is accurate and complete.

## 2024-08-19 ENCOUNTER — TELEPHONE (OUTPATIENT)
Dept: FAMILY MEDICINE | Facility: CLINIC | Age: 69
End: 2024-08-19
Payer: MEDICARE

## 2024-08-19 DIAGNOSIS — M54.16 RADICULOPATHY, LUMBAR REGION: Primary | ICD-10-CM

## 2024-08-19 NOTE — TELEPHONE ENCOUNTER
----- Message from Venita Walters, Patient Care Assistant sent at 8/19/2024 12:40 PM CDT -----  Regarding: orders  Contact: Dimple Giron  Type: Needs Medical Advice    Who Called:  Dimple Giron    Best Call Back Number:       Additional Information: Dimple Giron states she would like a callback regarding orders for a walker with a seat and a different home health agent. Please call to advise. Thanks!

## 2024-08-20 ENCOUNTER — HOSPITAL ENCOUNTER (OUTPATIENT)
Dept: RADIOLOGY | Facility: HOSPITAL | Age: 69
Discharge: HOME | End: 2024-08-20
Attending: NEUROLOGICAL SURGERY
Payer: MEDICARE

## 2024-08-20 DIAGNOSIS — M54.16 RADICULOPATHY, LUMBAR REGION: ICD-10-CM

## 2024-08-20 PROCEDURE — 72100 X-RAY EXAM L-S SPINE 2/3 VWS: CPT | Mod: 26,,, | Performed by: RADIOLOGY

## 2024-08-20 PROCEDURE — 72100 X-RAY EXAM L-S SPINE 2/3 VWS: CPT | Mod: TC,PO

## 2024-08-21 ENCOUNTER — TELEPHONE (OUTPATIENT)
Dept: FAMILY MEDICINE | Facility: CLINIC | Age: 69
End: 2024-08-21
Payer: MEDICARE

## 2024-08-21 NOTE — TELEPHONE ENCOUNTER
Soke with pt and wife re edema legs , he is on lasix 20 mg 1 qd , so dr diehl said up to 40 mg so take 2 of the 20mg to equal 40 mg once daily in am and keep log of weight and keep fu next week and do the blood test prior apt next week. His weight was 238 at apt 8/15/24 last week, today home scale was 238.8.

## 2024-08-24 ENCOUNTER — HOSPITAL ENCOUNTER (INPATIENT)
Facility: HOSPITAL | Age: 69
LOS: 4 days | Discharge: HOME HEALTH | DRG: 871 | End: 2024-08-28
Attending: EMERGENCY MEDICINE | Admitting: INTERNAL MEDICINE
Payer: MEDICARE

## 2024-08-24 DIAGNOSIS — I50.9 CHF (CONGESTIVE HEART FAILURE): ICD-10-CM

## 2024-08-24 DIAGNOSIS — R10.9 ABDOMINAL PAIN, UNSPECIFIED ABDOMINAL LOCATION: ICD-10-CM

## 2024-08-24 DIAGNOSIS — U07.1 PNEUMONIA DUE TO COVID-19 VIRUS: ICD-10-CM

## 2024-08-24 DIAGNOSIS — I50.22 CHRONIC SYSTOLIC HEART FAILURE: ICD-10-CM

## 2024-08-24 DIAGNOSIS — R06.02 SHORTNESS OF BREATH: ICD-10-CM

## 2024-08-24 DIAGNOSIS — R09.02 HYPOXIA: ICD-10-CM

## 2024-08-24 DIAGNOSIS — J18.9 PNEUMONIA OF RIGHT LOWER LOBE DUE TO INFECTIOUS ORGANISM: Primary | ICD-10-CM

## 2024-08-24 DIAGNOSIS — K56.600 PARTIAL INTESTINAL OBSTRUCTION, UNSPECIFIED CAUSE: ICD-10-CM

## 2024-08-24 DIAGNOSIS — E16.2 HYPOGLYCEMIA: ICD-10-CM

## 2024-08-24 DIAGNOSIS — J12.82 PNEUMONIA DUE TO COVID-19 VIRUS: ICD-10-CM

## 2024-08-24 DIAGNOSIS — I10 BENIGN ESSENTIAL HTN: ICD-10-CM

## 2024-08-24 DIAGNOSIS — U07.1 COVID-19: ICD-10-CM

## 2024-08-24 PROBLEM — D64.9 ANEMIA: Status: ACTIVE | Noted: 2024-08-24

## 2024-08-24 PROBLEM — E11.649 TYPE 2 DIABETES MELLITUS WITH HYPOGLYCEMIA: Status: ACTIVE | Noted: 2023-11-19

## 2024-08-24 PROBLEM — A41.9 SEPSIS: Status: ACTIVE | Noted: 2024-08-24

## 2024-08-24 LAB
25(OH)D3+25(OH)D2 SERPL-MCNC: 20 NG/ML (ref 30–96)
ALBUMIN SERPL BCP-MCNC: 3.5 G/DL (ref 3.5–5.2)
ALP SERPL-CCNC: 80 U/L (ref 55–135)
ALT SERPL W/O P-5'-P-CCNC: 7 U/L (ref 10–44)
ANION GAP SERPL CALC-SCNC: 8 MMOL/L (ref 8–16)
APTT PPP: 32.5 SEC (ref 21–32)
AST SERPL-CCNC: 12 U/L (ref 10–40)
BACTERIA #/AREA URNS HPF: NORMAL /HPF
BASOPHILS # BLD AUTO: 0.05 K/UL (ref 0–0.2)
BASOPHILS NFR BLD: 0.4 % (ref 0–1.9)
BILIRUB SERPL-MCNC: 0.3 MG/DL (ref 0.1–1)
BILIRUB UR QL STRIP: NEGATIVE
BNP SERPL-MCNC: 206 PG/ML (ref 0–99)
BUN SERPL-MCNC: 14 MG/DL (ref 8–23)
CALCIUM SERPL-MCNC: 8.2 MG/DL (ref 8.7–10.5)
CHLORIDE SERPL-SCNC: 99 MMOL/L (ref 95–110)
CK SERPL-CCNC: 40 U/L (ref 20–200)
CLARITY UR: CLEAR
CO2 SERPL-SCNC: 28 MMOL/L (ref 23–29)
COLOR UR: YELLOW
CREAT SERPL-MCNC: 1 MG/DL (ref 0.5–1.4)
DIFFERENTIAL METHOD BLD: ABNORMAL
EOSINOPHIL # BLD AUTO: 0.1 K/UL (ref 0–0.5)
EOSINOPHIL NFR BLD: 1.2 % (ref 0–8)
ERYTHROCYTE [DISTWIDTH] IN BLOOD BY AUTOMATED COUNT: 15.6 % (ref 11.5–14.5)
ERYTHROCYTE [SEDIMENTATION RATE] IN BLOOD BY WESTERGREN METHOD: 44 MM/HR (ref 0–10)
FERRITIN SERPL-MCNC: 58.4 NG/ML (ref 20–300)
GFR SERPLBLD CREATININE-BSD FMLA CKD-EPI: >60 ML/MIN/1.73 M^2
GLUCOSE SERPL-MCNC: 102 MG/DL (ref 70–110)
GLUCOSE SERPL-MCNC: 130 MG/DL (ref 70–110)
GLUCOSE SERPL-MCNC: 33 MG/DL (ref 70–110)
GLUCOSE SERPL-MCNC: 61 MG/DL (ref 70–110)
GLUCOSE SERPL-MCNC: 87 MG/DL (ref 70–110)
GLUCOSE SERPL-MCNC: 88 MG/DL (ref 70–110)
GLUCOSE SERPL-MCNC: 89 MG/DL (ref 70–110)
GLUCOSE SERPL-MCNC: 93 MG/DL (ref 70–110)
GLUCOSE SERPL-MCNC: 94 MG/DL (ref 70–110)
GLUCOSE SERPL-MCNC: 94 MG/DL (ref 70–110)
GLUCOSE SERPL-MCNC: 96 MG/DL (ref 70–110)
GLUCOSE SERPL-MCNC: 97 MG/DL (ref 70–110)
GLUCOSE SERPL-MCNC: 98 MG/DL (ref 70–110)
GLUCOSE UR QL STRIP: ABNORMAL
HCT VFR BLD AUTO: 31.3 % (ref 40–54)
HGB BLD-MCNC: 9.7 G/DL (ref 14–18)
HGB UR QL STRIP: NEGATIVE
IMM GRANULOCYTES # BLD AUTO: 0.05 K/UL (ref 0–0.04)
IMM GRANULOCYTES NFR BLD AUTO: 0.4 % (ref 0–0.5)
INFLUENZA A, MOLECULAR: NEGATIVE
INFLUENZA B, MOLECULAR: NEGATIVE
INR PPP: 1 (ref 0.8–1.2)
KETONES UR QL STRIP: NEGATIVE
LACTATE SERPL-SCNC: 1 MMOL/L (ref 0.5–1.9)
LDH SERPL L TO P-CCNC: 0.95 MMOL/L (ref 0.5–2.2)
LDH SERPL L TO P-CCNC: 141 U/L (ref 110–260)
LEUKOCYTE ESTERASE UR QL STRIP: NEGATIVE
LIPASE SERPL-CCNC: 12 U/L (ref 4–60)
LYMPHOCYTES # BLD AUTO: 0.9 K/UL (ref 1–4.8)
LYMPHOCYTES NFR BLD: 7.8 % (ref 18–48)
MCH RBC QN AUTO: 29.6 PG (ref 27–31)
MCHC RBC AUTO-ENTMCNC: 31 G/DL (ref 32–36)
MCV RBC AUTO: 95 FL (ref 82–98)
MICROSCOPIC COMMENT: NORMAL
MONOCYTES # BLD AUTO: 0.5 K/UL (ref 0.3–1)
MONOCYTES NFR BLD: 4.5 % (ref 4–15)
NEUTROPHILS # BLD AUTO: 10 K/UL (ref 1.8–7.7)
NEUTROPHILS NFR BLD: 85.7 % (ref 38–73)
NITRITE UR QL STRIP: NEGATIVE
NRBC BLD-RTO: 0 /100 WBC
PH UR STRIP: 7 [PH] (ref 5–8)
PLATELET # BLD AUTO: 285 K/UL (ref 150–450)
PMV BLD AUTO: 9 FL (ref 9.2–12.9)
POTASSIUM SERPL-SCNC: 4.2 MMOL/L (ref 3.5–5.1)
PROT SERPL-MCNC: 6.9 G/DL (ref 6–8.4)
PROT UR QL STRIP: NEGATIVE
PROTHROMBIN TIME: 11.2 SEC (ref 9–12.5)
RBC # BLD AUTO: 3.28 M/UL (ref 4.6–6.2)
SAMPLE: NORMAL
SARS-COV-2 RDRP RESP QL NAA+PROBE: POSITIVE
SODIUM SERPL-SCNC: 135 MMOL/L (ref 136–145)
SP GR UR STRIP: 1.01 (ref 1–1.03)
SPECIMEN SOURCE: NORMAL
TROPONIN I SERPL HS-MCNC: 4.9 PG/ML (ref 0–14.9)
URN SPEC COLLECT METH UR: ABNORMAL
UROBILINOGEN UR STRIP-ACNC: NEGATIVE EU/DL
WBC # BLD AUTO: 11.63 K/UL (ref 3.9–12.7)
YEAST URNS QL MICRO: NORMAL

## 2024-08-24 PROCEDURE — 96366 THER/PROPH/DIAG IV INF ADDON: CPT

## 2024-08-24 PROCEDURE — 85730 THROMBOPLASTIN TIME PARTIAL: CPT | Performed by: INTERNAL MEDICINE

## 2024-08-24 PROCEDURE — 87040 BLOOD CULTURE FOR BACTERIA: CPT | Performed by: EMERGENCY MEDICINE

## 2024-08-24 PROCEDURE — 82306 VITAMIN D 25 HYDROXY: CPT | Performed by: INTERNAL MEDICINE

## 2024-08-24 PROCEDURE — 96375 TX/PRO/DX INJ NEW DRUG ADDON: CPT

## 2024-08-24 PROCEDURE — 80175 DRUG SCREEN QUAN LAMOTRIGINE: CPT | Performed by: INTERNAL MEDICINE

## 2024-08-24 PROCEDURE — 80053 COMPREHEN METABOLIC PANEL: CPT | Performed by: EMERGENCY MEDICINE

## 2024-08-24 PROCEDURE — 25000003 PHARM REV CODE 250: Performed by: FAMILY MEDICINE

## 2024-08-24 PROCEDURE — 83690 ASSAY OF LIPASE: CPT | Performed by: EMERGENCY MEDICINE

## 2024-08-24 PROCEDURE — 96361 HYDRATE IV INFUSION ADD-ON: CPT

## 2024-08-24 PROCEDURE — 96367 TX/PROPH/DG ADDL SEQ IV INF: CPT

## 2024-08-24 PROCEDURE — 36415 COLL VENOUS BLD VENIPUNCTURE: CPT | Performed by: EMERGENCY MEDICINE

## 2024-08-24 PROCEDURE — 87449 NOS EACH ORGANISM AG IA: CPT | Performed by: INTERNAL MEDICINE

## 2024-08-24 PROCEDURE — 99285 EMERGENCY DEPT VISIT HI MDM: CPT | Mod: 25

## 2024-08-24 PROCEDURE — 25000242 PHARM REV CODE 250 ALT 637 W/ HCPCS: Performed by: EMERGENCY MEDICINE

## 2024-08-24 PROCEDURE — 99900035 HC TECH TIME PER 15 MIN (STAT)

## 2024-08-24 PROCEDURE — 36415 COLL VENOUS BLD VENIPUNCTURE: CPT | Performed by: INTERNAL MEDICINE

## 2024-08-24 PROCEDURE — 84484 ASSAY OF TROPONIN QUANT: CPT | Performed by: EMERGENCY MEDICINE

## 2024-08-24 PROCEDURE — 96374 THER/PROPH/DIAG INJ IV PUSH: CPT | Mod: 59

## 2024-08-24 PROCEDURE — 25000003 PHARM REV CODE 250: Performed by: PHYSICAL THERAPY ASSISTANT

## 2024-08-24 PROCEDURE — 20000000 HC ICU ROOM

## 2024-08-24 PROCEDURE — 83615 LACTATE (LD) (LDH) ENZYME: CPT | Performed by: PHYSICAL THERAPY ASSISTANT

## 2024-08-24 PROCEDURE — 94799 UNLISTED PULMONARY SVC/PX: CPT

## 2024-08-24 PROCEDURE — 85610 PROTHROMBIN TIME: CPT | Performed by: INTERNAL MEDICINE

## 2024-08-24 PROCEDURE — 82728 ASSAY OF FERRITIN: CPT | Performed by: INTERNAL MEDICINE

## 2024-08-24 PROCEDURE — 25000003 PHARM REV CODE 250: Performed by: INTERNAL MEDICINE

## 2024-08-24 PROCEDURE — 27000207 HC ISOLATION

## 2024-08-24 PROCEDURE — 81001 URINALYSIS AUTO W/SCOPE: CPT | Performed by: EMERGENCY MEDICINE

## 2024-08-24 PROCEDURE — 25000242 PHARM REV CODE 250 ALT 637 W/ HCPCS: Performed by: PHYSICAL THERAPY ASSISTANT

## 2024-08-24 PROCEDURE — 25000003 PHARM REV CODE 250: Performed by: EMERGENCY MEDICINE

## 2024-08-24 PROCEDURE — 63600175 PHARM REV CODE 636 W HCPCS: Performed by: EMERGENCY MEDICINE

## 2024-08-24 PROCEDURE — 99900031 HC PATIENT EDUCATION (STAT)

## 2024-08-24 PROCEDURE — U0002 COVID-19 LAB TEST NON-CDC: HCPCS | Performed by: EMERGENCY MEDICINE

## 2024-08-24 PROCEDURE — 96365 THER/PROPH/DIAG IV INF INIT: CPT

## 2024-08-24 PROCEDURE — 93005 ELECTROCARDIOGRAM TRACING: CPT | Performed by: INTERNAL MEDICINE

## 2024-08-24 PROCEDURE — XW033E5 INTRODUCTION OF REMDESIVIR ANTI-INFECTIVE INTO PERIPHERAL VEIN, PERCUTANEOUS APPROACH, NEW TECHNOLOGY GROUP 5: ICD-10-PCS | Performed by: INTERNAL MEDICINE

## 2024-08-24 PROCEDURE — 87502 INFLUENZA DNA AMP PROBE: CPT | Performed by: EMERGENCY MEDICINE

## 2024-08-24 PROCEDURE — 94640 AIRWAY INHALATION TREATMENT: CPT

## 2024-08-24 PROCEDURE — 94640 AIRWAY INHALATION TREATMENT: CPT | Mod: XB

## 2024-08-24 PROCEDURE — 63600175 PHARM REV CODE 636 W HCPCS: Mod: JZ,TB | Performed by: PHYSICAL THERAPY ASSISTANT

## 2024-08-24 PROCEDURE — 93010 ELECTROCARDIOGRAM REPORT: CPT | Mod: ,,, | Performed by: INTERNAL MEDICINE

## 2024-08-24 PROCEDURE — 83605 ASSAY OF LACTIC ACID: CPT | Performed by: PHYSICAL THERAPY ASSISTANT

## 2024-08-24 PROCEDURE — 25500020 PHARM REV CODE 255: Performed by: EMERGENCY MEDICINE

## 2024-08-24 PROCEDURE — 94761 N-INVAS EAR/PLS OXIMETRY MLT: CPT

## 2024-08-24 PROCEDURE — 82962 GLUCOSE BLOOD TEST: CPT

## 2024-08-24 PROCEDURE — 83880 ASSAY OF NATRIURETIC PEPTIDE: CPT | Performed by: EMERGENCY MEDICINE

## 2024-08-24 PROCEDURE — 25000003 PHARM REV CODE 250

## 2024-08-24 PROCEDURE — 85651 RBC SED RATE NONAUTOMATED: CPT | Performed by: INTERNAL MEDICINE

## 2024-08-24 PROCEDURE — 85025 COMPLETE CBC W/AUTO DIFF WBC: CPT | Performed by: EMERGENCY MEDICINE

## 2024-08-24 PROCEDURE — 82550 ASSAY OF CK (CPK): CPT | Performed by: INTERNAL MEDICINE

## 2024-08-24 PROCEDURE — 27000221 HC OXYGEN, UP TO 24 HOURS

## 2024-08-24 RX ORDER — IBUPROFEN 200 MG
24 TABLET ORAL
Status: DISCONTINUED | OUTPATIENT
Start: 2024-08-24 | End: 2024-08-28 | Stop reason: HOSPADM

## 2024-08-24 RX ORDER — PSYLLIUM HUSK 0.4 G
1 CAPSULE ORAL DAILY
Status: DISCONTINUED | OUTPATIENT
Start: 2024-08-24 | End: 2024-08-24

## 2024-08-24 RX ORDER — DEXTROSE MONOHYDRATE 100 MG/ML
INJECTION, SOLUTION INTRAVENOUS
Status: COMPLETED | OUTPATIENT
Start: 2024-08-24 | End: 2024-08-24

## 2024-08-24 RX ORDER — SODIUM CHLORIDE 0.9 % (FLUSH) 0.9 %
10 SYRINGE (ML) INJECTION EVERY 12 HOURS PRN
Status: CANCELLED | OUTPATIENT
Start: 2024-08-24

## 2024-08-24 RX ORDER — INSULIN ASPART 100 [IU]/ML
0-5 INJECTION, SOLUTION INTRAVENOUS; SUBCUTANEOUS
Status: CANCELLED | OUTPATIENT
Start: 2024-08-24

## 2024-08-24 RX ORDER — SODIUM CHLORIDE 9 MG/ML
INJECTION, SOLUTION INTRAVENOUS CONTINUOUS
Status: DISCONTINUED | OUTPATIENT
Start: 2024-08-24 | End: 2024-08-26

## 2024-08-24 RX ORDER — SODIUM,POTASSIUM PHOSPHATES 280-250MG
2 POWDER IN PACKET (EA) ORAL
Status: DISCONTINUED | OUTPATIENT
Start: 2024-08-24 | End: 2024-08-28 | Stop reason: HOSPADM

## 2024-08-24 RX ORDER — ASCORBIC ACID 500 MG
500 TABLET ORAL 2 TIMES DAILY
Status: DISCONTINUED | OUTPATIENT
Start: 2024-08-24 | End: 2024-08-24

## 2024-08-24 RX ORDER — TALC
6 POWDER (GRAM) TOPICAL NIGHTLY PRN
Status: DISCONTINUED | OUTPATIENT
Start: 2024-08-24 | End: 2024-08-28 | Stop reason: HOSPADM

## 2024-08-24 RX ORDER — GUAIFENESIN AND DEXTROMETHORPHAN HYDROBROMIDE 10; 100 MG/5ML; MG/5ML
10 SYRUP ORAL EVERY 4 HOURS PRN
Status: DISCONTINUED | OUTPATIENT
Start: 2024-08-24 | End: 2024-08-28 | Stop reason: HOSPADM

## 2024-08-24 RX ORDER — NALOXONE HCL 0.4 MG/ML
0.02 VIAL (ML) INJECTION
Status: CANCELLED | OUTPATIENT
Start: 2024-08-24

## 2024-08-24 RX ORDER — ONDANSETRON HYDROCHLORIDE 2 MG/ML
4 INJECTION, SOLUTION INTRAVENOUS
Status: COMPLETED | OUTPATIENT
Start: 2024-08-24 | End: 2024-08-24

## 2024-08-24 RX ORDER — SODIUM,POTASSIUM PHOSPHATES 280-250MG
2 POWDER IN PACKET (EA) ORAL
Status: CANCELLED | OUTPATIENT
Start: 2024-08-24

## 2024-08-24 RX ORDER — PANTOPRAZOLE SODIUM 40 MG/1
40 TABLET, DELAYED RELEASE ORAL 2 TIMES DAILY
Status: DISCONTINUED | OUTPATIENT
Start: 2024-08-24 | End: 2024-08-28 | Stop reason: HOSPADM

## 2024-08-24 RX ORDER — ONDANSETRON HYDROCHLORIDE 2 MG/ML
8 INJECTION, SOLUTION INTRAVENOUS
Status: DISCONTINUED | OUTPATIENT
Start: 2024-08-24 | End: 2024-08-24

## 2024-08-24 RX ORDER — LANOLIN ALCOHOL/MO/W.PET/CERES
800 CREAM (GRAM) TOPICAL
Status: DISCONTINUED | OUTPATIENT
Start: 2024-08-24 | End: 2024-08-28 | Stop reason: HOSPADM

## 2024-08-24 RX ORDER — ACETAMINOPHEN 325 MG/1
650 TABLET ORAL EVERY 4 HOURS PRN
Status: CANCELLED | OUTPATIENT
Start: 2024-08-24

## 2024-08-24 RX ORDER — GLUCAGON 1 MG
1 KIT INJECTION
Status: DISCONTINUED | OUTPATIENT
Start: 2024-08-24 | End: 2024-08-28 | Stop reason: HOSPADM

## 2024-08-24 RX ORDER — ACETAMINOPHEN 325 MG/1
650 TABLET ORAL EVERY 8 HOURS PRN
Status: CANCELLED | OUTPATIENT
Start: 2024-08-24

## 2024-08-24 RX ORDER — ONDANSETRON HYDROCHLORIDE 2 MG/ML
4 INJECTION, SOLUTION INTRAVENOUS EVERY 6 HOURS PRN
Status: CANCELLED | OUTPATIENT
Start: 2024-08-24

## 2024-08-24 RX ORDER — DEXTROSE MONOHYDRATE 100 MG/ML
INJECTION, SOLUTION INTRAVENOUS
Status: DISCONTINUED | OUTPATIENT
Start: 2024-08-24 | End: 2024-08-24

## 2024-08-24 RX ORDER — IBUPROFEN 200 MG
16 TABLET ORAL
Status: CANCELLED | OUTPATIENT
Start: 2024-08-24

## 2024-08-24 RX ORDER — SODIUM CHLORIDE 0.9 % (FLUSH) 0.9 %
10 SYRINGE (ML) INJECTION
Status: DISCONTINUED | OUTPATIENT
Start: 2024-08-24 | End: 2024-08-28 | Stop reason: HOSPADM

## 2024-08-24 RX ORDER — IBUPROFEN 200 MG
16 TABLET ORAL
Status: DISCONTINUED | OUTPATIENT
Start: 2024-08-24 | End: 2024-08-28 | Stop reason: HOSPADM

## 2024-08-24 RX ORDER — OXYCODONE AND ACETAMINOPHEN 7.5; 325 MG/1; MG/1
1 TABLET ORAL EVERY 6 HOURS PRN
Status: DISCONTINUED | OUTPATIENT
Start: 2024-08-24 | End: 2024-08-28 | Stop reason: HOSPADM

## 2024-08-24 RX ORDER — HYDROCODONE BITARTRATE AND ACETAMINOPHEN 5; 325 MG/1; MG/1
1 TABLET ORAL EVERY 6 HOURS PRN
Status: CANCELLED | OUTPATIENT
Start: 2024-08-24

## 2024-08-24 RX ORDER — IPRATROPIUM BROMIDE AND ALBUTEROL SULFATE 2.5; .5 MG/3ML; MG/3ML
3 SOLUTION RESPIRATORY (INHALATION)
Status: DISCONTINUED | OUTPATIENT
Start: 2024-08-24 | End: 2024-08-28 | Stop reason: HOSPADM

## 2024-08-24 RX ORDER — MUPIROCIN 20 MG/G
OINTMENT TOPICAL 2 TIMES DAILY
Status: DISCONTINUED | OUTPATIENT
Start: 2024-08-24 | End: 2024-08-28 | Stop reason: HOSPADM

## 2024-08-24 RX ORDER — DEXAMETHASONE SODIUM PHOSPHATE 4 MG/ML
8 INJECTION, SOLUTION INTRA-ARTICULAR; INTRALESIONAL; INTRAMUSCULAR; INTRAVENOUS; SOFT TISSUE
Status: COMPLETED | OUTPATIENT
Start: 2024-08-24 | End: 2024-08-24

## 2024-08-24 RX ORDER — ALUMINUM HYDROXIDE, MAGNESIUM HYDROXIDE, AND SIMETHICONE 1200; 120; 1200 MG/30ML; MG/30ML; MG/30ML
30 SUSPENSION ORAL 4 TIMES DAILY PRN
Status: CANCELLED | OUTPATIENT
Start: 2024-08-24

## 2024-08-24 RX ORDER — GLUCAGON 1 MG
1 KIT INJECTION
Status: CANCELLED | OUTPATIENT
Start: 2024-08-24

## 2024-08-24 RX ORDER — OXYCODONE AND ACETAMINOPHEN 7.5; 325 MG/1; MG/1
1 TABLET ORAL EVERY 6 HOURS PRN
Status: ON HOLD | COMMUNITY
Start: 2024-08-16 | End: 2024-08-28 | Stop reason: HOSPADM

## 2024-08-24 RX ORDER — DEXTROSE 50 % IN WATER (D50W) INTRAVENOUS SYRINGE
25
Status: COMPLETED | OUTPATIENT
Start: 2024-08-24 | End: 2024-08-24

## 2024-08-24 RX ORDER — ATORVASTATIN CALCIUM 40 MG/1
40 TABLET, FILM COATED ORAL NIGHTLY
Status: DISCONTINUED | OUTPATIENT
Start: 2024-08-25 | End: 2024-08-28 | Stop reason: HOSPADM

## 2024-08-24 RX ORDER — LANOLIN ALCOHOL/MO/W.PET/CERES
800 CREAM (GRAM) TOPICAL
Status: CANCELLED | OUTPATIENT
Start: 2024-08-24

## 2024-08-24 RX ORDER — ACETAMINOPHEN 325 MG/1
650 TABLET ORAL EVERY 6 HOURS PRN
Status: DISCONTINUED | OUTPATIENT
Start: 2024-08-24 | End: 2024-08-28 | Stop reason: HOSPADM

## 2024-08-24 RX ORDER — SODIUM CHLORIDE, SODIUM LACTATE, POTASSIUM CHLORIDE, CALCIUM CHLORIDE 600; 310; 30; 20 MG/100ML; MG/100ML; MG/100ML; MG/100ML
INJECTION, SOLUTION INTRAVENOUS CONTINUOUS
Status: DISCONTINUED | OUTPATIENT
Start: 2024-08-24 | End: 2024-08-24

## 2024-08-24 RX ORDER — AMOXICILLIN 250 MG
1 CAPSULE ORAL 2 TIMES DAILY PRN
Status: CANCELLED | OUTPATIENT
Start: 2024-08-24

## 2024-08-24 RX ORDER — ACETAMINOPHEN 500 MG
1000 TABLET ORAL
Status: COMPLETED | OUTPATIENT
Start: 2024-08-24 | End: 2024-08-24

## 2024-08-24 RX ORDER — GABAPENTIN 300 MG/1
600 CAPSULE ORAL 3 TIMES DAILY
Status: CANCELLED | OUTPATIENT
Start: 2024-08-24

## 2024-08-24 RX ORDER — CLONAZEPAM 1 MG/1
1 TABLET ORAL NIGHTLY PRN
Status: DISCONTINUED | OUTPATIENT
Start: 2024-08-24 | End: 2024-08-28 | Stop reason: HOSPADM

## 2024-08-24 RX ORDER — TAMSULOSIN HYDROCHLORIDE 0.4 MG/1
0.4 CAPSULE ORAL NIGHTLY
Status: DISCONTINUED | OUTPATIENT
Start: 2024-08-25 | End: 2024-08-24

## 2024-08-24 RX ORDER — IPRATROPIUM BROMIDE AND ALBUTEROL SULFATE 2.5; .5 MG/3ML; MG/3ML
3 SOLUTION RESPIRATORY (INHALATION)
Status: CANCELLED | OUTPATIENT
Start: 2024-08-24

## 2024-08-24 RX ORDER — TAMSULOSIN HYDROCHLORIDE 0.4 MG/1
0.4 CAPSULE ORAL NIGHTLY
Status: DISCONTINUED | OUTPATIENT
Start: 2024-08-24 | End: 2024-08-24

## 2024-08-24 RX ORDER — GABAPENTIN 100 MG/1
100 CAPSULE ORAL 3 TIMES DAILY
Status: DISCONTINUED | OUTPATIENT
Start: 2024-08-24 | End: 2024-08-24

## 2024-08-24 RX ORDER — ONDANSETRON HYDROCHLORIDE 2 MG/ML
4 INJECTION, SOLUTION INTRAVENOUS EVERY 8 HOURS PRN
Status: DISCONTINUED | OUTPATIENT
Start: 2024-08-24 | End: 2024-08-25

## 2024-08-24 RX ORDER — IBUPROFEN 200 MG
24 TABLET ORAL
Status: CANCELLED | OUTPATIENT
Start: 2024-08-24

## 2024-08-24 RX ORDER — ENOXAPARIN SODIUM 150 MG/ML
1 INJECTION SUBCUTANEOUS 2 TIMES DAILY
Status: DISCONTINUED | OUTPATIENT
Start: 2024-08-24 | End: 2024-08-28 | Stop reason: HOSPADM

## 2024-08-24 RX ORDER — LAMOTRIGINE 25 MG/1
50 TABLET ORAL DAILY
Status: DISCONTINUED | OUTPATIENT
Start: 2024-08-25 | End: 2024-08-28 | Stop reason: HOSPADM

## 2024-08-24 RX ORDER — IPRATROPIUM BROMIDE AND ALBUTEROL SULFATE 2.5; .5 MG/3ML; MG/3ML
3 SOLUTION RESPIRATORY (INHALATION)
Status: COMPLETED | OUTPATIENT
Start: 2024-08-24 | End: 2024-08-24

## 2024-08-24 RX ORDER — INSULIN GLARGINE 100 [IU]/ML
40 INJECTION, SOLUTION SUBCUTANEOUS DAILY
COMMUNITY
Start: 2024-07-26

## 2024-08-24 RX ORDER — TALC
6 POWDER (GRAM) TOPICAL NIGHTLY PRN
Status: CANCELLED | OUTPATIENT
Start: 2024-08-24

## 2024-08-24 RX ORDER — QUETIAPINE FUMARATE 100 MG/1
300 TABLET, FILM COATED ORAL NIGHTLY
Status: DISCONTINUED | OUTPATIENT
Start: 2024-08-24 | End: 2024-08-24

## 2024-08-24 RX ADMIN — Medication 6 MG: at 11:08

## 2024-08-24 RX ADMIN — IPRATROPIUM BROMIDE AND ALBUTEROL SULFATE 3 ML: 2.5; .5 SOLUTION RESPIRATORY (INHALATION) at 07:08

## 2024-08-24 RX ADMIN — REMDESIVIR 200 MG: 100 INJECTION, POWDER, LYOPHILIZED, FOR SOLUTION INTRAVENOUS at 05:08

## 2024-08-24 RX ADMIN — DEXTROSE: 10 SOLUTION INTRAVENOUS at 03:08

## 2024-08-24 RX ADMIN — CLONAZEPAM 1 MG: 0.5 TABLET ORAL at 11:08

## 2024-08-24 RX ADMIN — DEXTROSE 50 % IN WATER (D50W) INTRAVENOUS SYRINGE 25 G: at 01:08

## 2024-08-24 RX ADMIN — IOHEXOL 100 ML: 350 INJECTION, SOLUTION INTRAVENOUS at 12:08

## 2024-08-24 RX ADMIN — ONDANSETRON 4 MG: 2 INJECTION INTRAMUSCULAR; INTRAVENOUS at 05:08

## 2024-08-24 RX ADMIN — ONDANSETRON 4 MG: 2 INJECTION INTRAMUSCULAR; INTRAVENOUS at 11:08

## 2024-08-24 RX ADMIN — DEXTROSE MONOHYDRATE 25 G: 25 INJECTION, SOLUTION INTRAVENOUS at 01:08

## 2024-08-24 RX ADMIN — OXYCODONE HYDROCHLORIDE AND ACETAMINOPHEN 1 TABLET: 7.5; 325 TABLET ORAL at 05:08

## 2024-08-24 RX ADMIN — ACETAMINOPHEN 1000 MG: 500 TABLET, FILM COATED ORAL at 11:08

## 2024-08-24 RX ADMIN — SODIUM CHLORIDE, POTASSIUM CHLORIDE, SODIUM LACTATE AND CALCIUM CHLORIDE: 600; 310; 30; 20 INJECTION, SOLUTION INTRAVENOUS at 11:08

## 2024-08-24 RX ADMIN — VANCOMYCIN HYDROCHLORIDE 2000 MG: 500 INJECTION, POWDER, LYOPHILIZED, FOR SOLUTION INTRAVENOUS at 01:08

## 2024-08-24 RX ADMIN — DEXAMETHASONE SODIUM PHOSPHATE 8 MG: 4 INJECTION INTRA-ARTICULAR; INTRALESIONAL; INTRAMUSCULAR; INTRAVENOUS; SOFT TISSUE at 01:08

## 2024-08-24 RX ADMIN — DEXTROSE MONOHYDRATE 12.5 G: 25 INJECTION, SOLUTION INTRAVENOUS at 06:08

## 2024-08-24 RX ADMIN — SODIUM CHLORIDE: 9 INJECTION, SOLUTION INTRAVENOUS at 09:08

## 2024-08-24 RX ADMIN — Medication 1 TABLET: at 05:08

## 2024-08-24 RX ADMIN — MEROPENEM 2 G: 2 INJECTION, POWDER, FOR SOLUTION INTRAVENOUS at 12:08

## 2024-08-24 RX ADMIN — IPRATROPIUM BROMIDE AND ALBUTEROL SULFATE 3 ML: .5; 3 SOLUTION RESPIRATORY (INHALATION) at 01:08

## 2024-08-24 RX ADMIN — ENOXAPARIN SODIUM 105 MG: 120 INJECTION SUBCUTANEOUS at 09:08

## 2024-08-24 RX ADMIN — MUPIROCIN 1 G: 20 OINTMENT TOPICAL at 09:08

## 2024-08-24 RX ADMIN — MEROPENEM 2 G: 2 INJECTION, POWDER, FOR SOLUTION INTRAVENOUS at 08:08

## 2024-08-24 RX ADMIN — PANTOPRAZOLE SODIUM 40 MG: 40 TABLET, DELAYED RELEASE ORAL at 05:08

## 2024-08-24 NOTE — HPI
Patient is a 69-year-old male with a past medical history of COPD, CAD, DM 2, GERD, hyperlipidemia, hypertension, previous DVT and PE on Eliquis.  Patient presents to the ED today with complaints of abdominal pain, nausea, and vomiting.  Per EMS, patient was found to have hypoglycemia and was administered D10.  Patient also was given Zofran in route to hospital.  Patient was recently diagnosed with COVID 2 days ago.  Patient states he was also told he had some intra-abdominal infection and was prescribed antibiotics.  CT of the abdomen was completed in the ED which shows dilation of jejunal loops with focal transition point in the left mid abdomen and normal caliber small bowel loops distal to that level. Findings are suspicious for partial mechanical small bowel obstruction.  NG tube was not inserted in the ED due to resolved nausea and vomiting.  Patient also found to have bilateral lower lobe pneumonia.  While in the ED, patient became hypoxic in 80s and required O2 per nasal cannula.  Patient is generally not on O2 at home.  Patient was initiated on IV vancomycin, meropenem.  Patient does have continued residual cough, generalized fatigue, chills, and fever.  Patient denies headache, dizziness, current nausea or vomiting, diarrhea, or chest pain.  Patient does endorse intermittent constipation.    In the ED:  Lactic 0.95, WBC 11.63, hemoglobin 9.7, sodium 135, calcium 8.2, glucose 88 on arrival with a drop to 33, COVID positive, influenza negative, UA negative, troponin 4.9, lipase 12.

## 2024-08-24 NOTE — ASSESSMENT & PLAN NOTE
POCT glucose q1 hr for now due to hypoglycemia, D10 at 50 cc/hour  insulin sliding scale once regulated.

## 2024-08-24 NOTE — ASSESSMENT & PLAN NOTE
NG tube not placed in ED, patient no longer having nausea or vomiting  Will keep patient NPO for bowel rest and monitor

## 2024-08-24 NOTE — CARE UPDATE
08/24/24 1342   Patient Assessment/Suction   Level of Consciousness (AVPU) responds to voice   Respiratory Effort Unlabored;Normal;Mouth breathing   All Lung Fields Breath Sounds Anterior:;diminished;clear   Rhythm/Pattern, Respiratory unlabored;no shortness of breath reported   Cough Frequency infrequent   Cough Type fair;congested;loose;nonproductive   PRE-TX-O2   Device (Oxygen Therapy) non rebreather mask   $ Is the patient on Low Flow Oxygen? Yes   Flow (L/min) (Oxygen Therapy) 15   SpO2 (!) 92 %   Pulse Oximetry Type Continuous   $ Pulse Oximetry - Multiple Charge Pulse Oximetry - Multiple   Pulse 90   Resp 17   Positioning HOB elevated 30 degrees   Aerosol Therapy   $ Aerosol Therapy Charges Aerosol Treatment   Daily Review of Necessity (SVN) completed   Respiratory Treatment Status (SVN) given   Treatment Route (SVN) mask;oxygen   Patient Position HOB elevated   Post Treatment Assessment (SVN) breath sounds unchanged;vital signs unchanged   Signs of Intolerance (SVN) none
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
## 2024-08-24 NOTE — ASSESSMENT & PLAN NOTE
Most recent hemoglobin and hematocrit are listed below.  Recent Labs     08/24/24  1122   HGB 9.7*   HCT 31.3*     Monitor serial CBC: Daily  Transfuse PRBC if patient becomes hemodynamically unstable, symptomatic or H/H drops below 7/21.  Patient has not received any PRBC transfusions to date  Patient's anemia is currently stable
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
## 2024-08-24 NOTE — ASSESSMENT & PLAN NOTE
Patient is identified as Severe COVID-19 based on hypoxemia with O2 saturations <94% on room air or on ambulation   Initiate standard COVID protocols; COVID-19 testing ,Infection Control notification  and isolation- respiratory, contact and droplet per protocol    Diagnostics: CBC, CMP, Ferritin, CRP, BNP, Troponin, ECG, Legionella antigen, Blood Culture x2, Portable CXR, and UA and culture    Management: Initiate targeted therapy with Remdesivir, 200mg IV x1, followed by 100mg IV daily x5 days total, Dexamethasone PO/IV 6mg daily x10 days, and Anticoagulation, Patient admitted to critical care- Will initiate prophylactic dose anticoagulation, Maintain oxygen saturations 92-96% via Nasal Cannula 4 LPM and monitor with continuous/intermittent pulse oximetry. , Inhaled bronchodilators as needed for shortness of breath., and Continuous cardiac monitoring.    Advance Care Planning  Current advance care plan has been discussed with patient and patient currently wishes Full Code.

## 2024-08-24 NOTE — ASSESSMENT & PLAN NOTE
"This patient does have evidence of infective focus with covid pneumonia. Patient hypoxic, hypotensive, and RR 26.  My overall impression is sepsis.  Source: Respiratory  Antibiotics given-   Antibiotics (72h ago, onward)      Start     Stop Route Frequency Ordered    08/24/24 2100  mupirocin 2 % ointment         08/29/24 2059 Nasl 2 times daily 08/24/24 1513    08/24/24 2000  meropenem 2 g in sodium chloride 0.9 % 100 mL IVPB (ready to mix)         -- IV Every 8 hours (non-standard times) 08/24/24 1511    08/24/24 1229  vancomycin - pharmacy to dose  (vancomycin IVPB (PEDS and ADULTS))        Placed in "And" Linked Group    -- IV pharmacy to manage frequency 08/24/24 1129          Latest lactate reviewed-  Recent Labs   Lab 08/24/24  1125   POCLAC 0.95     Organ dysfunction indicated by Acute respiratory failure  Fluid given in ED, monitor closely due to CHF.  "

## 2024-08-24 NOTE — ASSESSMENT & PLAN NOTE
"Patient pneumonia with possible aspiration and COVID.  CT of the abdomen shows right lower lobe infiltrate and left lower lobe infiltrates.  The patient has the following signs/symptoms of pneumonia:  Hypoxia, cough and shortness of breath.   The patient does have a current oxygen requirement and the patient does not have a home oxygen requirement.   I have reviewed the pertinent imaging. The following cultures have been collected: Blood cultures and Sputum culture     Current antimicrobial regimen consists of the antibiotics listed below and Remdesivir.   Will monitor patient closely and continue current treatment plan unchanged.    Antibiotics (From admission, onward)      Start     Stop Route Frequency Ordered    08/24/24 2100  mupirocin 2 % ointment         08/29/24 2059 Nasl 2 times daily 08/24/24 1513    08/24/24 2000  meropenem 2 g in sodium chloride 0.9 % 100 mL IVPB (ready to mix)         -- IV Every 8 hours (non-standard times) 08/24/24 1511    08/24/24 1229  vancomycin - pharmacy to dose  (vancomycin IVPB (PEDS and ADULTS))        Placed in "And" Linked Group    -- IV pharmacy to manage frequency 08/24/24 1129            Microbiology Results (last 7 days)       Procedure Component Value Units Date/Time    Culture, Respiratory with Gram Stain [[id number]]     Order Status: No result Specimen: Sputum, Expectorated     Blood culture x two cultures. Draw prior to antibiotics. [[id number]] Collected: 08/24/24 1321    Order Status: Sent Specimen: Blood from Peripheral, Hand, Right Updated: 08/24/24 1326    Narrative:      Collection has been rescheduled by [email or web address] at 08/24/2024 11:40 Reason:   Patient unavailable  Collection has been rescheduled by [email or web address] at 08/24/2024 11:46 Reason:   Unable to collect  Collection has been rescheduled by [email or web address] at 08/24/2024 11:40 Reason:   Patient unavailable  Collection has been rescheduled by [email or web address] at 08/24/2024 11:46 Reason:   Unable to collect    Blood culture x two cultures. Draw " prior to antibiotics. [[id number]] Collected: 08/24/24 1124    Order Status: Sent Specimen: Blood from Peripheral, Hand, Left Updated: 08/24/24 1257

## 2024-08-24 NOTE — ASSESSMENT & PLAN NOTE
Last echo 1/2024 but unable to see results. Last known EF 55%   Will update echo,  and BLE 2+ pitting edema and hypoxia  Daily weights, strict I's and O's  Hold Lasix at this time due to hypotension

## 2024-08-25 ENCOUNTER — CLINICAL SUPPORT (OUTPATIENT)
Dept: CARDIOLOGY | Facility: HOSPITAL | Age: 69
End: 2024-08-25
Attending: EMERGENCY MEDICINE
Payer: MEDICARE

## 2024-08-25 VITALS — WEIGHT: 240.06 LBS | BODY MASS INDEX: 30.81 KG/M2 | HEIGHT: 74 IN

## 2024-08-25 PROBLEM — I50.32 CHRONIC DIASTOLIC HEART FAILURE: Status: ACTIVE | Noted: 2021-01-07

## 2024-08-25 PROBLEM — R33.9 URINARY RETENTION: Status: ACTIVE | Noted: 2024-08-25

## 2024-08-25 LAB
ALBUMIN SERPL BCP-MCNC: 3.3 G/DL (ref 3.5–5.2)
ALP SERPL-CCNC: 80 U/L (ref 55–135)
ALT SERPL W/O P-5'-P-CCNC: 5 U/L (ref 10–44)
ANION GAP SERPL CALC-SCNC: 7 MMOL/L (ref 8–16)
AORTIC ROOT ANNULUS: 3.7 CM
AORTIC VALVE CUSP SEPERATION: 2.3 CM
APICAL FOUR CHAMBER EJECTION FRACTION: 51 %
AST SERPL-CCNC: 10 U/L (ref 10–40)
AV INDEX (PROSTH): 0.79
AV MEAN GRADIENT: 3 MMHG
AV PEAK GRADIENT: 6 MMHG
AV VALVE AREA BY VELOCITY RATIO: 2.88 CM²
AV VALVE AREA: 2.72 CM²
AV VELOCITY RATIO: 0.83
B PERT DNA NPH QL NAA+PROBE: NOT DETECTED
BACTERIA #/AREA URNS HPF: NORMAL /HPF
BASOPHILS # BLD AUTO: 0.03 K/UL (ref 0–0.2)
BASOPHILS NFR BLD: 0.2 % (ref 0–1.9)
BILIRUB SERPL-MCNC: 0.3 MG/DL (ref 0.1–1)
BILIRUB UR QL STRIP: NEGATIVE
BSA FOR ECHO PROCEDURE: 2.38 M2
BUN SERPL-MCNC: 14 MG/DL (ref 8–23)
C PNEUM DNA LOWER RESP QL NAA+NON-PROBE: NOT DETECTED
CALCIUM SERPL-MCNC: 8.3 MG/DL (ref 8.7–10.5)
CHLORIDE SERPL-SCNC: 101 MMOL/L (ref 95–110)
CLARITY UR: CLEAR
CO2 SERPL-SCNC: 30 MMOL/L (ref 23–29)
COLOR UR: COLORLESS
CREAT SERPL-MCNC: 0.9 MG/DL (ref 0.5–1.4)
CRP SERPL-MCNC: 10.3 MG/DL
CV ECHO LV RWT: 0.45 CM
DIFFERENTIAL METHOD BLD: ABNORMAL
DOP CALC AO PEAK VEL: 1.2 M/S
DOP CALC AO VTI: 27.1 CM
DOP CALC LVOT AREA: 3.5 CM2
DOP CALC LVOT DIAMETER: 2.1 CM
DOP CALC LVOT PEAK VEL: 1 M/S
DOP CALC LVOT STROKE VOLUME: 73.74 CM3
DOP CALC MV VTI: 42.5 CM
DOP CALCLVOT PEAK VEL VTI: 21.3 CM
E WAVE DECELERATION TIME: 190 MSEC
E/A RATIO: 1.69
E/E' RATIO: 11.04 M/S
ECHO LV POSTERIOR WALL: 1.03 CM (ref 0.6–1.1)
EOSINOPHIL # BLD AUTO: 0 K/UL (ref 0–0.5)
EOSINOPHIL NFR BLD: 0.1 % (ref 0–8)
ERYTHROCYTE [DISTWIDTH] IN BLOOD BY AUTOMATED COUNT: 15.8 % (ref 11.5–14.5)
ESTIMATED AVG GLUCOSE: 174 MG/DL (ref 68–131)
FERRITIN SERPL-MCNC: 82.7 NG/ML (ref 20–300)
FLUAV RNA NPH QL NAA+NON-PROBE: NOT DETECTED
FLUBV RNA NPH QL NAA+NON-PROBE: NOT DETECTED
FOLATE SERPL-MCNC: 15.8 NG/ML (ref 4–24)
FRACTIONAL SHORTENING: 27 % (ref 28–44)
GFR SERPLBLD CREATININE-BSD FMLA CKD-EPI: >60 ML/MIN/1.73 M^2
GLUCOSE SERPL-MCNC: 102 MG/DL (ref 70–110)
GLUCOSE SERPL-MCNC: 108 MG/DL (ref 70–110)
GLUCOSE SERPL-MCNC: 114 MG/DL (ref 70–110)
GLUCOSE SERPL-MCNC: 150 MG/DL (ref 70–110)
GLUCOSE SERPL-MCNC: 257 MG/DL (ref 70–110)
GLUCOSE SERPL-MCNC: 57 MG/DL (ref 70–110)
GLUCOSE SERPL-MCNC: 58 MG/DL (ref 70–110)
GLUCOSE SERPL-MCNC: 59 MG/DL (ref 70–110)
GLUCOSE SERPL-MCNC: 72 MG/DL (ref 70–110)
GLUCOSE SERPL-MCNC: 73 MG/DL (ref 70–110)
GLUCOSE SERPL-MCNC: 87 MG/DL (ref 70–110)
GLUCOSE UR QL STRIP: ABNORMAL
HADV DNA NPH QL NAA+NON-PROBE: NOT DETECTED
HBA1C MFR BLD: 7.7 % (ref 4.5–6.2)
HCOV 229E RNA NPH QL NAA+NON-PROBE: NOT DETECTED
HCOV HKU1 RNA NPH QL NAA+NON-PROBE: NOT DETECTED
HCOV NL63 RNA NPH QL NAA+NON-PROBE: NOT DETECTED
HCOV OC43 RNA NPH QL NAA+NON-PROBE: NOT DETECTED
HCT VFR BLD AUTO: 32.4 % (ref 40–54)
HGB BLD-MCNC: 9.9 G/DL (ref 14–18)
HGB UR QL STRIP: ABNORMAL
HMPV RNA LOWER RESP QL NAA+NON-PROBE: NOT DETECTED
HMPV RNA NPH QL NAA+NON-PROBE: NOT DETECTED
HPIV1 RNA NPH QL NAA+NON-PROBE: NOT DETECTED
HPIV2 RNA NPH QL NAA+NON-PROBE: NOT DETECTED
HPIV3 RNA NPH QL NAA+NON-PROBE: NOT DETECTED
HPIV4 RNA NPH QL NAA+NON-PROBE: NOT DETECTED
IMM GRANULOCYTES # BLD AUTO: 0.07 K/UL (ref 0–0.04)
IMM GRANULOCYTES NFR BLD AUTO: 0.4 % (ref 0–0.5)
INTERVENTRICULAR SEPTUM: 0.95 CM (ref 0.6–1.1)
IRON SERPL-MCNC: 15 UG/DL (ref 45–160)
IVC DIAMETER: 2.5 CM
KETONES UR QL STRIP: NEGATIVE
LEFT ATRIUM AREA SYSTOLIC (APICAL 4 CHAMBER): 16 CM2
LEFT ATRIUM SIZE: 3 CM
LEFT INTERNAL DIMENSION IN SYSTOLE: 3.33 CM (ref 2.1–4)
LEFT VENTRICLE DIASTOLIC VOLUME INDEX: 41 ML/M2
LEFT VENTRICLE DIASTOLIC VOLUME: 96.35 ML
LEFT VENTRICLE END DIASTOLIC VOLUME APICAL 4 CHAMBER: 127 ML
LEFT VENTRICLE END SYSTOLIC VOLUME APICAL 4 CHAMBER: 35.7 ML
LEFT VENTRICLE MASS INDEX: 66 G/M2
LEFT VENTRICLE SYSTOLIC VOLUME INDEX: 19.2 ML/M2
LEFT VENTRICLE SYSTOLIC VOLUME: 45.11 ML
LEFT VENTRICULAR INTERNAL DIMENSION IN DIASTOLE: 4.58 CM (ref 3.5–6)
LEFT VENTRICULAR MASS: 155.54 G
LEUKOCYTE ESTERASE UR QL STRIP: NEGATIVE
LV LATERAL E/E' RATIO: 11.55 M/S
LV SEPTAL E/E' RATIO: 10.58 M/S
LVED V (TEICH): 96.35 ML
LVES V (TEICH): 45.11 ML
LVOT MG: 2 MMHG
LVOT MV: 0.64 CM/S
LYMPHOCYTES # BLD AUTO: 1.9 K/UL (ref 1–4.8)
LYMPHOCYTES NFR BLD: 12.1 % (ref 18–48)
MAGNESIUM SERPL-MCNC: 2.1 MG/DL (ref 1.6–2.6)
MCH RBC QN AUTO: 29.7 PG (ref 27–31)
MCHC RBC AUTO-ENTMCNC: 30.6 G/DL (ref 32–36)
MCV RBC AUTO: 97 FL (ref 82–98)
MICROSCOPIC COMMENT: NORMAL
MONOCYTES # BLD AUTO: 0.8 K/UL (ref 0.3–1)
MONOCYTES NFR BLD: 5 % (ref 4–15)
MRSA SCREEN BY PCR: NEGATIVE
MV MEAN GRADIENT: 2 MMHG
MV PEAK A VEL: 0.75 M/S
MV PEAK E VEL: 1.27 M/S
MV PEAK GRADIENT: 7 MMHG
MV STENOSIS PRESSURE HALF TIME: 79 MS
MV VALVE AREA BY CONTINUITY EQUATION: 1.73 CM2
MV VALVE AREA P 1/2 METHOD: 2.78 CM2
NEUTROPHILS # BLD AUTO: 13.1 K/UL (ref 1.8–7.7)
NEUTROPHILS NFR BLD: 82.2 % (ref 38–73)
NITRITE UR QL STRIP: NEGATIVE
NRBC BLD-RTO: 0 /100 WBC
OHS CV RV/LV RATIO: 0.66 CM
PH UR STRIP: 7 [PH] (ref 5–8)
PHOSPHATE SERPL-MCNC: 4.2 MG/DL (ref 2.7–4.5)
PISA TR MAX VEL: 2.35 M/S
PLATELET # BLD AUTO: 298 K/UL (ref 150–450)
PMV BLD AUTO: 9.1 FL (ref 9.2–12.9)
POTASSIUM SERPL-SCNC: 3.7 MMOL/L (ref 3.5–5.1)
PROT SERPL-MCNC: 6.6 G/DL (ref 6–8.4)
PROT UR QL STRIP: NEGATIVE
RA PRESSURE ESTIMATED: 15 MMHG
RBC # BLD AUTO: 3.33 M/UL (ref 4.6–6.2)
RBC #/AREA URNS HPF: 1 /HPF (ref 0–4)
RIGHT ATRIUM VOLUME AREA LENGTH APICAL 4 CHAMBER: 84.7 ML
RIGHT VENTRICULAR END-DIASTOLIC DIMENSION: 3.03 CM
RSV RNA NPH QL NAA+NON-PROBE: NOT DETECTED
RSV RNA NPH QL NAA+NON-PROBE: NOT DETECTED
RV TB RVSP: 17 MMHG
RV TISSUE DOPPLER FREE WALL SYSTOLIC VELOCITY 1 (APICAL 4 CHAMBER VIEW): 19.4 CM/S
RV+EV RNA NPH QL NAA+NON-PROBE: NOT DETECTED
SARS-COV-2 RNA RESP QL NAA+PROBE: DETECTED
SATURATED IRON: 5 % (ref 20–50)
SODIUM SERPL-SCNC: 138 MMOL/L (ref 136–145)
SP GR UR STRIP: 1 (ref 1–1.03)
SPECIMEN SOURCE: ABNORMAL
SQUAMOUS #/AREA URNS HPF: 0 /HPF
TDI LATERAL: 0.11 M/S
TDI SEPTAL: 0.12 M/S
TDI: 0.12 M/S
TOTAL IRON BINDING CAPACITY: 301 UG/DL (ref 250–450)
TR MAX PG: 22 MMHG
TRANSFERRIN SERPL-MCNC: 215 MG/DL (ref 200–375)
TV REST PULMONARY ARTERY PRESSURE: 37 MMHG
URN SPEC COLLECT METH UR: ABNORMAL
UROBILINOGEN UR STRIP-ACNC: NEGATIVE EU/DL
VIT B12 SERPL-MCNC: 185 PG/ML (ref 210–950)
WBC # BLD AUTO: 15.99 K/UL (ref 3.9–12.7)
WBC #/AREA URNS HPF: 1 /HPF (ref 0–5)
YEAST URNS QL MICRO: NORMAL
Z-SCORE OF LEFT VENTRICULAR DIMENSION IN END DIASTOLE: -7.64
Z-SCORE OF LEFT VENTRICULAR DIMENSION IN END SYSTOLE: -4.53

## 2024-08-25 PROCEDURE — 84100 ASSAY OF PHOSPHORUS: CPT | Performed by: PHYSICAL THERAPY ASSISTANT

## 2024-08-25 PROCEDURE — 36415 COLL VENOUS BLD VENIPUNCTURE: CPT | Performed by: PHYSICAL THERAPY ASSISTANT

## 2024-08-25 PROCEDURE — 85025 COMPLETE CBC W/AUTO DIFF WBC: CPT | Performed by: FAMILY MEDICINE

## 2024-08-25 PROCEDURE — 82728 ASSAY OF FERRITIN: CPT | Performed by: PHYSICAL THERAPY ASSISTANT

## 2024-08-25 PROCEDURE — 97166 OT EVAL MOD COMPLEX 45 MIN: CPT

## 2024-08-25 PROCEDURE — 25000242 PHARM REV CODE 250 ALT 637 W/ HCPCS: Performed by: PHYSICAL THERAPY ASSISTANT

## 2024-08-25 PROCEDURE — 82746 ASSAY OF FOLIC ACID SERUM: CPT | Performed by: PHYSICAL THERAPY ASSISTANT

## 2024-08-25 PROCEDURE — 94761 N-INVAS EAR/PLS OXIMETRY MLT: CPT

## 2024-08-25 PROCEDURE — 93306 TTE W/DOPPLER COMPLETE: CPT | Mod: 26,,, | Performed by: INTERNAL MEDICINE

## 2024-08-25 PROCEDURE — 99900035 HC TECH TIME PER 15 MIN (STAT)

## 2024-08-25 PROCEDURE — 36415 COLL VENOUS BLD VENIPUNCTURE: CPT | Performed by: FAMILY MEDICINE

## 2024-08-25 PROCEDURE — 97530 THERAPEUTIC ACTIVITIES: CPT

## 2024-08-25 PROCEDURE — 27000221 HC OXYGEN, UP TO 24 HOURS

## 2024-08-25 PROCEDURE — 93306 TTE W/DOPPLER COMPLETE: CPT

## 2024-08-25 PROCEDURE — 25000003 PHARM REV CODE 250: Performed by: PHYSICAL THERAPY ASSISTANT

## 2024-08-25 PROCEDURE — 87581 M.PNEUMON DNA AMP PROBE: CPT | Performed by: FAMILY MEDICINE

## 2024-08-25 PROCEDURE — 63600175 PHARM REV CODE 636 W HCPCS: Performed by: INTERNAL MEDICINE

## 2024-08-25 PROCEDURE — 87641 MR-STAPH DNA AMP PROBE: CPT | Performed by: FAMILY MEDICINE

## 2024-08-25 PROCEDURE — 80053 COMPREHEN METABOLIC PANEL: CPT | Performed by: PHYSICAL THERAPY ASSISTANT

## 2024-08-25 PROCEDURE — 63600175 PHARM REV CODE 636 W HCPCS: Performed by: FAMILY MEDICINE

## 2024-08-25 PROCEDURE — 86140 C-REACTIVE PROTEIN: CPT | Performed by: PHYSICAL THERAPY ASSISTANT

## 2024-08-25 PROCEDURE — 83036 HEMOGLOBIN GLYCOSYLATED A1C: CPT | Performed by: PHYSICAL THERAPY ASSISTANT

## 2024-08-25 PROCEDURE — 97116 GAIT TRAINING THERAPY: CPT

## 2024-08-25 PROCEDURE — 20000000 HC ICU ROOM

## 2024-08-25 PROCEDURE — 81001 URINALYSIS AUTO W/SCOPE: CPT | Performed by: FAMILY MEDICINE

## 2024-08-25 PROCEDURE — 25000003 PHARM REV CODE 250: Performed by: FAMILY MEDICINE

## 2024-08-25 PROCEDURE — 25000003 PHARM REV CODE 250: Performed by: INTERNAL MEDICINE

## 2024-08-25 PROCEDURE — 99900031 HC PATIENT EDUCATION (STAT)

## 2024-08-25 PROCEDURE — 63600175 PHARM REV CODE 636 W HCPCS: Performed by: PHYSICAL THERAPY ASSISTANT

## 2024-08-25 PROCEDURE — 83540 ASSAY OF IRON: CPT | Performed by: PHYSICAL THERAPY ASSISTANT

## 2024-08-25 PROCEDURE — 94640 AIRWAY INHALATION TREATMENT: CPT

## 2024-08-25 PROCEDURE — 83735 ASSAY OF MAGNESIUM: CPT | Performed by: PHYSICAL THERAPY ASSISTANT

## 2024-08-25 PROCEDURE — 97161 PT EVAL LOW COMPLEX 20 MIN: CPT

## 2024-08-25 PROCEDURE — 27000207 HC ISOLATION

## 2024-08-25 PROCEDURE — 82607 VITAMIN B-12: CPT | Performed by: PHYSICAL THERAPY ASSISTANT

## 2024-08-25 RX ORDER — IBUPROFEN 200 MG
16 TABLET ORAL
Status: DISCONTINUED | OUTPATIENT
Start: 2024-08-25 | End: 2024-08-28 | Stop reason: HOSPADM

## 2024-08-25 RX ORDER — ONDANSETRON HYDROCHLORIDE 2 MG/ML
4 INJECTION, SOLUTION INTRAVENOUS EVERY 6 HOURS PRN
Status: DISCONTINUED | OUTPATIENT
Start: 2024-08-25 | End: 2024-08-28 | Stop reason: HOSPADM

## 2024-08-25 RX ORDER — LANOLIN ALCOHOL/MO/W.PET/CERES
1000 CREAM (GRAM) TOPICAL DAILY
Status: DISCONTINUED | OUTPATIENT
Start: 2024-08-25 | End: 2024-08-28 | Stop reason: HOSPADM

## 2024-08-25 RX ORDER — DEXTROSE MONOHYDRATE 100 MG/ML
INJECTION, SOLUTION INTRAVENOUS CONTINUOUS
Status: DISCONTINUED | OUTPATIENT
Start: 2024-08-25 | End: 2024-08-26

## 2024-08-25 RX ORDER — IBUPROFEN 200 MG
24 TABLET ORAL
Status: DISCONTINUED | OUTPATIENT
Start: 2024-08-25 | End: 2024-08-28 | Stop reason: HOSPADM

## 2024-08-25 RX ORDER — GLUCAGON 1 MG
1 KIT INJECTION
Status: DISCONTINUED | OUTPATIENT
Start: 2024-08-25 | End: 2024-08-28 | Stop reason: HOSPADM

## 2024-08-25 RX ORDER — INSULIN ASPART 100 [IU]/ML
0-5 INJECTION, SOLUTION INTRAVENOUS; SUBCUTANEOUS
Status: DISCONTINUED | OUTPATIENT
Start: 2024-08-25 | End: 2024-08-28 | Stop reason: HOSPADM

## 2024-08-25 RX ORDER — SYRING-NEEDL,DISP,INSUL,0.3 ML 29 G X1/2"
296 SYRINGE, EMPTY DISPOSABLE MISCELLANEOUS ONCE
Status: COMPLETED | OUTPATIENT
Start: 2024-08-25 | End: 2024-08-25

## 2024-08-25 RX ORDER — HYDROMORPHONE HYDROCHLORIDE 1 MG/ML
0.5 INJECTION, SOLUTION INTRAMUSCULAR; INTRAVENOUS; SUBCUTANEOUS ONCE
Status: COMPLETED | OUTPATIENT
Start: 2024-08-25 | End: 2024-08-25

## 2024-08-25 RX ORDER — TAMSULOSIN HYDROCHLORIDE 0.4 MG/1
0.8 CAPSULE ORAL DAILY
Status: DISCONTINUED | OUTPATIENT
Start: 2024-08-25 | End: 2024-08-28 | Stop reason: HOSPADM

## 2024-08-25 RX ORDER — AMOXICILLIN 250 MG
1 CAPSULE ORAL 2 TIMES DAILY
Status: DISCONTINUED | OUTPATIENT
Start: 2024-08-25 | End: 2024-08-28 | Stop reason: HOSPADM

## 2024-08-25 RX ADMIN — CLONAZEPAM 1 MG: 0.5 TABLET ORAL at 09:08

## 2024-08-25 RX ADMIN — LAMOTRIGINE 50 MG: 25 TABLET ORAL at 09:08

## 2024-08-25 RX ADMIN — ONDANSETRON 4 MG: 2 INJECTION INTRAMUSCULAR; INTRAVENOUS at 06:08

## 2024-08-25 RX ADMIN — OXYCODONE HYDROCHLORIDE AND ACETAMINOPHEN 1 TABLET: 7.5; 325 TABLET ORAL at 06:08

## 2024-08-25 RX ADMIN — IPRATROPIUM BROMIDE AND ALBUTEROL SULFATE 3 ML: 2.5; .5 SOLUTION RESPIRATORY (INHALATION) at 07:08

## 2024-08-25 RX ADMIN — HYDROMORPHONE HYDROCHLORIDE 0.5 MG: 0.5 INJECTION, SOLUTION INTRAMUSCULAR; INTRAVENOUS; SUBCUTANEOUS at 10:08

## 2024-08-25 RX ADMIN — IPRATROPIUM BROMIDE AND ALBUTEROL SULFATE 3 ML: 2.5; .5 SOLUTION RESPIRATORY (INHALATION) at 01:08

## 2024-08-25 RX ADMIN — OXYCODONE HYDROCHLORIDE AND ACETAMINOPHEN 1 TABLET: 7.5; 325 TABLET ORAL at 12:08

## 2024-08-25 RX ADMIN — REMDESIVIR 100 MG: 100 INJECTION, POWDER, LYOPHILIZED, FOR SOLUTION INTRAVENOUS at 10:08

## 2024-08-25 RX ADMIN — Medication 6 MG: at 09:08

## 2024-08-25 RX ADMIN — MAGESIUM CITRATE 296 ML: 1.75 LIQUID ORAL at 08:08

## 2024-08-25 RX ADMIN — DEXTROSE MONOHYDRATE 12.5 G: 25 INJECTION, SOLUTION INTRAVENOUS at 06:08

## 2024-08-25 RX ADMIN — ATORVASTATIN CALCIUM 40 MG: 40 TABLET, FILM COATED ORAL at 09:08

## 2024-08-25 RX ADMIN — MEROPENEM 2 G: 2 INJECTION, POWDER, FOR SOLUTION INTRAVENOUS at 03:08

## 2024-08-25 RX ADMIN — MEROPENEM 2 G: 2 INJECTION, POWDER, FOR SOLUTION INTRAVENOUS at 08:08

## 2024-08-25 RX ADMIN — ENOXAPARIN SODIUM 105 MG: 120 INJECTION SUBCUTANEOUS at 09:08

## 2024-08-25 RX ADMIN — DEXTROSE MONOHYDRATE 12.5 G: 25 INJECTION, SOLUTION INTRAVENOUS at 10:08

## 2024-08-25 RX ADMIN — SENNOSIDES AND DOCUSATE SODIUM 1 TABLET: 50; 8.6 TABLET ORAL at 09:08

## 2024-08-25 RX ADMIN — CYANOCOBALAMIN TAB 1000 MCG 1000 MCG: 1000 TAB at 05:08

## 2024-08-25 RX ADMIN — PANTOPRAZOLE SODIUM 40 MG: 40 TABLET, DELAYED RELEASE ORAL at 05:08

## 2024-08-25 RX ADMIN — MEROPENEM 2 G: 2 INJECTION, POWDER, FOR SOLUTION INTRAVENOUS at 12:08

## 2024-08-25 RX ADMIN — DEXAMETHASONE 6 MG: 4 TABLET ORAL at 10:08

## 2024-08-25 RX ADMIN — INSULIN ASPART 1 UNITS: 100 INJECTION, SOLUTION INTRAVENOUS; SUBCUTANEOUS at 10:08

## 2024-08-25 RX ADMIN — TAMSULOSIN HYDROCHLORIDE 0.8 MG: 0.4 CAPSULE ORAL at 10:08

## 2024-08-25 RX ADMIN — VANCOMYCIN HYDROCHLORIDE 1500 MG: 1.5 INJECTION, POWDER, LYOPHILIZED, FOR SOLUTION INTRAVENOUS at 01:08

## 2024-08-25 RX ADMIN — DEXTROSE: 10 SOLUTION INTRAVENOUS at 10:08

## 2024-08-25 RX ADMIN — PANTOPRAZOLE SODIUM 40 MG: 40 TABLET, DELAYED RELEASE ORAL at 06:08

## 2024-08-25 RX ADMIN — MUPIROCIN: 20 OINTMENT TOPICAL at 09:08

## 2024-08-25 NOTE — ASSESSMENT & PLAN NOTE
Likely from COVID pneumonia  MRSA neg, Respiratory panel neg except for covid, monitor COVID labs including procal  Found to have bilateral lower lobe pneumonia and on broad secptrum antibiotics which can be deescalated slowly.   Currently on treatment for COVID with steroid and ramdesivir. (COVID + 2 days before the admission)

## 2024-08-25 NOTE — ASSESSMENT & PLAN NOTE
Resolved  CT of the abdomen showed  suspicious for partial mechanical small bowel obstruction which has been resolved with bowl movement and repeat imaging.

## 2024-08-25 NOTE — PROGRESS NOTES
[facility] Medicine  Progress Note    Patient Name: Kevan Colin Sr.  MRN: [id number]  Patient Class: IP- Inpatient   Admission Date: 8/24/2024  Length of Stay: 1 days  Attending Physician: Hilton Moses DO  Primary Care Provider: Jonathan Benton III, MD        Subjective:     Principal Problem:Sepsis        HPI:  Patient is a 69-year-old male with a past medical history of COPD, CAD, DM 2, GERD, hyperlipidemia, hypertension, previous DVT and PE on Eliquis.  Patient presents to the ED today with complaints of abdominal pain, nausea, and vomiting.  Per EMS, patient was found to have hypoglycemia and was administered D10.  Patient also was given Zofran in route to hospital.  Patient was recently diagnosed with COVID 2 days ago.  Patient states he was also told he had some intra-abdominal infection and was prescribed antibiotics.  CT of the abdomen was completed in the ED which shows dilation of jejunal loops with focal transition point in the left mid abdomen and normal caliber small bowel loops distal to that level. Findings are suspicious for partial mechanical small bowel obstruction.  NG tube was not inserted in the ED due to resolved nausea and vomiting.  Patient also found to have bilateral lower lobe pneumonia.  While in the ED, patient became hypoxic in 80s and required O2 per nasal cannula.  Patient is generally not on O2 at home.  Patient was initiated on IV vancomycin, meropenem.  Patient does have continued residual cough, generalized fatigue, chills, and fever.  Patient denies headache, dizziness, current nausea or vomiting, diarrhea, or chest pain.  Patient does endorse intermittent constipation.    In the ED:  Lactic 0.95, WBC 11.63, hemoglobin 9.7, sodium 135, calcium 8.2, glucose 88 on arrival with a drop to 33, COVID positive, influenza negative, UA negative, troponin 4.9, lipase 12.    Overview/Hospital Course:   69-year-old male with a past medical history of COPD,  CAD, DM 2, GERD, hyperlipidemia, hypertension, previous DVT and PE on apixaban came in with complaints of abdominal pain, nausea, and vomiting and admitted for sepsis. Patient was recently diagnosed with COVID 2 days ago.  CT of the abdomen showed  suspicious for partial mechanical small bowel obstruction which has been resolved with bowl movement and repeat imaging.  Patient was also found to have bilateral lower lobe pneumonia and on broad secptrum antibiotics which can be deescalated slowly. For COVID treated with steroid and ramdesivir. Stout was placed due to urinary retention and started on tamsulosin on 8/25.    Interval History: Patient said he feels better after stout was placed and he was able to tolerate diet.    Review of Systems   All other systems reviewed and are negative.    Objective:     Vital Signs (Most Recent):  Temp: 98.7 °F (37.1 °C) (08/25/24 1100)  Pulse: 89 (08/25/24 1325)  Resp: 16 (08/25/24 1325)  BP: (!) 153/65 (08/25/24 1300)  SpO2: 98 % (08/25/24 1325) Vital Signs (24h Range):  Temp:  [97.6 °F (36.4 °C)-98.8 °F (37.1 °C)] 98.7 °F (37.1 °C)  Pulse:  [62-89] 89  Resp:  [9-24] 16  SpO2:  [95 %-100 %] 98 %  BP: ()/(56-84) 153/65     Weight: 108.9 kg (240 lb)  Body mass index is 30.81 kg/m².    Intake/Output Summary (Last 24 hours) at 8/25/2024 1509  Last data filed at 8/25/2024 0730  Gross per 24 hour   Intake --   Output 2900 ml   Net -2900 ml         Physical Exam  Cardiovascular:      Rate and Rhythm: Normal rate.      Heart sounds: Murmur heard.   Abdominal:      General: Abdomen is flat. Bowel sounds are normal.   Neurological:      Mental Status: He is alert and oriented to person, place, and time.             Significant Labs: All pertinent labs within the past 24 hours have been reviewed.    Significant Imaging: I have reviewed all pertinent imaging results/findings within the past 24 hours.    Assessment/Plan:      * Sepsis  Likely from COVID pneumonia  MRSA neg, Respiratory  panel neg except for covid, monitor COVID labs including procal  Found to have bilateral lower lobe pneumonia and on broad secptrum antibiotics which can be deescalated slowly.   Currently on treatment for COVID with steroid and ramdesivir. (COVID + 2 days before the admission)    Type 2 diabetes mellitus with hypoglycemia  Hypoglycemia has been resolved  Currently on SSI and can add glargine home dose once glucose is stable    Partial bowel obstruction  Resolved  CT of the abdomen showed  suspicious for partial mechanical small bowel obstruction which has been resolved with bowl movement and repeat imaging.    Urinary retention  Hardy in on 8/25 and started tamsulosin on 8/25      Anemia  Most recent hemoglobin and hematocrit are listed below.  Recent Labs     08/24/24  1122   HGB 9.7*   HCT 31.3*     Monitor serial CBC: Daily  Transfuse PRBC if patient becomes hemodynamically unstable, symptomatic or H/H drops below 7/21.  Patient has not received any PRBC transfusions to date  Patient's anemia is currently stable    COVID-19  See under sepsis    Bilateral pneumonia  See under sepsis    Chronic diastolic heart failure  EF 55-60%  Continue to monitor    Coronary artery disease involving native coronary artery without angina pectoris  Patient with known CAD,   Continue with medical managment      VTE Risk Mitigation (From admission, onward)           Ordered     enoxaparin injection 105 mg  2 times daily         08/24/24 1508     IP VTE HIGH RISK PATIENT  Once         08/24/24 1553     Place sequential compression device  Until discontinued         08/24/24 1553                    Discharge Planning   HOMAR:      Code Status: Full Code   Is the patient medically ready for discharge?:     Reason for patient still in hospital (select all that apply): Patient trending condition  Discharge Plan A: Home with family            Critical care time spent on the evaluation and treatment of severe organ dysfunction, review of  pertinent labs and imaging studies, discussions with consulting providers and discussions with patient/family: 32 minutes.      Hilton Moses DO  Department of Hospital Medicine   [facility]

## 2024-08-25 NOTE — HOSPITAL COURSE
69-year-old male with a past medical history of COPD, CAD, DM 2, GERD, hyperlipidemia, hypertension, previous DVT and PE on apixaban came in with complaints of abdominal pain, nausea, and vomiting and admitted for sepsis. Patient was recently diagnosed with COVID 2 days ago.  CT of the abdomen showed  suspicious for partial mechanical small bowel obstruction which has been resolved with bowl movement and repeat imaging.  Patient was also found to have bilateral lower lobe pneumonia and on broad secptrum Was on broad spectrum antibiotics which were discontinued as bacteria infection is ruled out as procal negative. For COVID treated with steroid and ramdesivir. Hardy was placed due to urinary retention and started on tamsulosin on 8/25 and passed void trial.  Medically stable to DC now that he has finished antibiotics and dexamethasone/remdesivir.

## 2024-08-25 NOTE — SUBJECTIVE & OBJECTIVE
Interval History: Patient said he feels better after stout was placed and he was able to tolerate diet.    Review of Systems   All other systems reviewed and are negative.    Objective:     Vital Signs (Most Recent):  Temp: 98.7 °F (37.1 °C) (08/25/24 1100)  Pulse: 89 (08/25/24 1325)  Resp: 16 (08/25/24 1325)  BP: (!) 153/65 (08/25/24 1300)  SpO2: 98 % (08/25/24 1325) Vital Signs (24h Range):  Temp:  [97.6 °F (36.4 °C)-98.8 °F (37.1 °C)] 98.7 °F (37.1 °C)  Pulse:  [62-89] 89  Resp:  [9-24] 16  SpO2:  [95 %-100 %] 98 %  BP: ()/(56-84) 153/65     Weight: 108.9 kg (240 lb)  Body mass index is 30.81 kg/m².    Intake/Output Summary (Last 24 hours) at 8/25/2024 1509  Last data filed at 8/25/2024 0730  Gross per 24 hour   Intake --   Output 2900 ml   Net -2900 ml         Physical Exam  Cardiovascular:      Rate and Rhythm: Normal rate.      Heart sounds: Murmur heard.   Abdominal:      General: Abdomen is flat. Bowel sounds are normal.   Neurological:      Mental Status: He is alert and oriented to person, place, and time.             Significant Labs: All pertinent labs within the past 24 hours have been reviewed.    Significant Imaging: I have reviewed all pertinent imaging results/findings within the past 24 hours.

## 2024-08-25 NOTE — PLAN OF CARE
[facility]  Initial Discharge Assessment       Primary Care Provider: Jonathan Benton III, MD    Admission Diagnosis: Pneumonia of right lower lobe due to infectious organism [J18.9]    Admission Date: 8/24/2024  Expected Discharge Date:     Assessment completed at bedside with Pt, and all information on FaceSheet confirmed, including demographics, PCP, pharmacy and insurance.  Pt has addressed advance directives, and reports  his wife is his POA / NOK.  Pt can't recalled if he have one on file. He currently lives with his spouse.  His PCP is Mr. Jonathan Benton III , and preferred pharmacy is [id number] - [place]. Pt stated he current taking Lovenox 105 mg. For transportation to appointments, Pt stated his neighbor provide transportation.  For transportation at discharge, the neighbor will provide transportation. .  Plan for discharge is that Pt will return home.      Transition of Care Barriers: None    Payor: HUMANA MANAGED MEDICARE / Plan: HUMANA [employer] HMO PPO SPECIAL NEEDS / Product Type: Medicare Advantage /     Extended Emergency Contact Information  Primary Emergency Contact: Cheryle Villafana  Address: [address] [address]           [address]  Home Phone: [phone]  Mobile Phone: [phone]  Relation: Spouse  Preferred language: English   needed? No  Secondary Emergency Contact: Lisette Monsivais  Address: UNKNOWN   [place]  Mobile Phone: [phone]  Relation: Sister  Preferred language: English   needed? No    Discharge Plan A: Home with family  Discharge Plan B: Home with family      [id number] - [place] - [address]  [address]  [place] [address]  Phone: [phone] Fax: [phone]    [id number] - [place] - [address]  [address] [facility] [address]  Phone: [phone] Fax:  [phone]      Initial Assessment (most recent)       Adult Discharge Assessment - 08/25/24 0931          Discharge Assessment    Assessment Type Discharge Planning Assessment     Confirmed/corrected address, phone number and insurance Yes     Confirmed Demographics Correct on Facesheet     Source of Information patient     When was your last doctors appointment? 02/01/24     Communicated HOMAR with patient/caregiver No     Reason For Admission Sepsis     People in Home spouse     Facility Arrived From: Home     Do you expect to return to your current living situation? Yes     Do you have help at home or someone to help you manage your care at home? Yes     Who are your caregiver(s) and their phone number(s)? Cheryle Villafana (spouse) [phone]     Prior to hospitilization cognitive status: Alert/Oriented     Current cognitive status: Alert/Oriented     Walking or Climbing Stairs Difficulty yes     Walking or Climbing Stairs ambulation difficulty, dependent     Mobility Management Pt stated he used a walking cane and sometime assistance.     Dressing/Bathing Difficulty yes     Dressing/Bathing bathing difficulty, assistance 1 person     Dressing/Bathing Management He have a grab bar in the shower abd sometimes assistance.     Home Accessibility wheelchair accessible     Home Layout Able to live on 1st floor     Equipment Currently Used at Home commode;walker, rolling     Readmission within 30 days? No     Patient currently being followed by outpatient case management? No     Do you currently have service(s) that help you manage your care at home? No     Do you take prescription medications? Yes     Do you have prescription coverage? Yes     Coverage Payor: HUMANA MANAGED MEDICARE - HUMANA [facility] HMO PPO SPECIAL NEEDS - CAPITATED     Do you have any problems affording any of your prescribed medications? No     Is the patient taking medications as prescribed? yes     Who is going to help you get home at discharge? Pt  stated his neighbor ( Mr. Drew) will provide transportation, but he doesn't know his number of hand.     How do you get to doctors appointments? family or friend will provide     Are you on dialysis? No     Do you take coumadin? Yes   Pt stated he own Lovenox 105 mg.    Discharge Plan A Home with family     Discharge Plan B Home with family     DME Needed Upon Discharge  none     Discharge Plan discussed with: Patient     Transition of Care Barriers None     SDOH --   Pt stated he didn't experience any of these answer below.       Physical Activity    On average, how many days per week do you engage in moderate to strenuous exercise (like a brisk walk)? 1 day     On average, how many minutes do you engage in exercise at this level? Patient unable to answer        Financial Resource Strain    How hard is it for you to pay for the very basics like food, housing, medical care, and heating? Somewhat hard        Housing Stability    In the last 12 months, was there a time when you were not able to pay the mortgage or rent on time? No     At any time in the past 12 months, were you homeless or living in a shelter (including now)? No        Transportation Needs    Has the lack of transportation kept you from medical appointments, meetings, work or from getting things needed for daily living? No        Food Insecurity    Within the past 12 months, you worried that your food would run out before you got the money to buy more. Sometimes true     Within the past 12 months, the food you bought just didn't last and you didn't have money to get more. Sometimes true        Stress    Do you feel stress - tense, restless, nervous, or anxious, or unable to sleep at night because your mind is troubled all the time - these days? Only a little        Social Isolation    How often do you feel lonely or isolated from those around you?  Rarely        Alcohol Use    Q1: How often do you have a drink containing alcohol? Never     Q2: How many  drinks containing alcohol do you have on a typical day when you are drinking? Patient does not drink     Q3: How often do you have six or more drinks on one occasion? Never        Utilities    In the past 12 months has the electric, gas, oil, or water company threatened to shut off services in your home? No        Health Literacy    How often do you need to have someone help you when you read instructions, pamphlets, or other written material from your doctor or pharmacy? Rarely

## 2024-08-25 NOTE — NURSING
Nurses Note -- 4 Eyes      8/24/2024   7:50 PM      Skin assessed during: Q Shift Change      [] No Altered Skin Integrity Present    []Prevention Measures Documented      [x] Yes- Altered Skin Integrity Present or Discovered   [x] LDA Added if Not in Epic (Describe Wound)   [] New Altered Skin Integrity was Present on Admit and Documented in LDA   [] Wound Image Taken    Wound Care Consulted? No    Attending Nurse:  AARTI Chamberlain     Second RN/Staff Member:   AARTI Patel

## 2024-08-25 NOTE — ASSESSMENT & PLAN NOTE
Hypoglycemia has been resolved  Currently on SSI and can add glargine home dose once glucose is stable

## 2024-08-25 NOTE — CARE UPDATE
08/25/24 0804   Patient Assessment/Suction   Level of Consciousness (AVPU) alert   Respiratory Effort Normal;Unlabored   Expansion/Accessory Muscles/Retractions no use of accessory muscles;no retractions;expansion symmetric   All Lung Fields Breath Sounds diminished   Rhythm/Pattern, Respiratory unlabored;pattern regular;depth regular;chest wiggle adequate;no shortness of breath reported   Cough Frequency infrequent   Cough Type good   PRE-TX-O2   Device (Oxygen Therapy) nasal cannula   $ Is the patient on Low Flow Oxygen? Yes   Flow (L/min) (Oxygen Therapy) 2   SpO2 100 %   Pulse Oximetry Type Continuous   $ Pulse Oximetry - Multiple Charge Pulse Oximetry - Multiple   Pulse 65   Resp 13   BP (!) 180/84   Aerosol Therapy   $ Aerosol Therapy Charges On hold  (patient nauseated.)   Education   $ Education Bronchodilator;15 min

## 2024-08-25 NOTE — NURSING
0730: Pt complaining of 10/10 LLQ pain. Pt frequently guarding area of the bladder. Bladder scan revealed>2000mL. Stout placed with 2200 mL of clear yellow urine. MD notified. Pt states moderate relief of pain after catheter insertion. Pt's nausea improved after stout inserted.       Pt AAOx4. VSS. Weaned to room air. Afebrile. Stout inserted for urinary retention; total of 5200mL output throughout shift. Diet started and blood sugars are stable. 1 large bowel movement today. Up in chair for one hour today. Wife updated on POC.

## 2024-08-25 NOTE — ASSESSMENT & PLAN NOTE
Likely from COVID pneumonia  MRSA neg, Respiratory panel neg except for covid, proocal neg  Currently on broad secptrum antibiotics which can be discontinued once bacteria infection is ruled out  Currently on treatment for COVID with steroid and ramdesivir. (COVID + 2 days before the admission)  Repeat chest x-ray as needed to re-evaluate

## 2024-08-25 NOTE — PLAN OF CARE
Problem: Occupational Therapy  Goal: Occupational Therapy Goal  Description: Goals to be met by: 9/15/2024     Patient will increase functional independence with ADLs by performing:    UE Dressing with Supervision.  LE Dressing with Supervision and Assistive Devices as needed.  Grooming while seated with Modified [place].  Toileting from bedside commode with Supervision for hygiene and clothing management.   Step transfer with Supervision  Toilet transfer to bedside commode with Supervision.    Outcome: Progressing

## 2024-08-25 NOTE — RESPIRATORY THERAPY
08/24/24 1936   Patient Assessment/Suction   Level of Consciousness (AVPU) alert   Respiratory Effort Normal;Unlabored   Expansion/Accessory Muscles/Retractions no use of accessory muscles   All Lung Fields Breath Sounds Anterior:;diminished   Rhythm/Pattern, Respiratory unlabored;pattern regular;depth regular;no shortness of breath reported   PRE-TX-O2   SpO2 100 %   Pulse Oximetry Type Continuous   $ Pulse Oximetry - Multiple Charge Pulse Oximetry - Multiple   Pulse 80   Resp (!) 24   Aerosol Therapy   $ Aerosol Therapy Charges Aerosol Treatment   Daily Review of Necessity (SVN) completed   Respiratory Treatment Status (SVN) given   Treatment Route (SVN) mask;oxygen   Patient Position HOB elevated   Post Treatment Assessment (SVN) increased aeration   Signs of Intolerance (SVN) none   Breath Sounds Post-Respiratory Treatment   Post-treatment Heart Rate (beats/min) 80   Post-treatment Resp Rate (breaths/min) 24   Education   $ Education Bronchodilator;DME Nebulizer;DME Oxygen;15 min   Respiratory Evaluation   $ Care Plan Tech Time 15 min

## 2024-08-25 NOTE — PROGRESS NOTES
"Pharmacokinetic Initial Assessment: IV Vancomycin    Assessment/Plan:    Initiate intravenous vancomycin with loading dose of 2000 mg once followed by a maintenance dose of vancomycin 1500 mg IV every 12 hours  Desired empiric serum trough concentration is 15 to 20 mcg/mL  Draw vancomycin trough level 60 min prior to fourth dose on 8/26/24 at approximately 0000  Pharmacy will continue to follow and monitor vancomycin.      Please contact pharmacy at extension [phone] with any questions regarding this assessment.     Thank you for the consult,   Tosin Foreman       Patient brief summary:  Kevan Colin Sr. is a 69 y.o. male initiated on antimicrobial therapy with IV Vancomycin for treatment of suspected bacteremia    Drug Allergies:   Review of patient's allergies indicates:   Allergen Reactions    Bee pollens Anaphylaxis     Bee stings     Penicillins Nausea Only     Other reaction(s): Unknown    Codeine Rash     Other reaction(s): Unknown       Actual Body Weight:   108.9 K    Renal Function:   Estimated Creatinine Clearance: 90.3 mL/min (based on SCr of 1 mg/dL).,     Dialysis Method (if applicable):  N/A    CBC (last 72 hours):  Recent Labs   Lab Result Units 08/24/24  1122   WBC K/uL 11.63   Hemoglobin g/dL 9.7*   Hematocrit % 31.3*   Platelets K/uL 285   Gran % % 85.7*   Lymph % % 7.8*   Mono % % 4.5   Eosinophil % % 1.2   Basophil % % 0.4   Differential Method  Automated       Metabolic Panel (last 72 hours):  Recent Labs   Lab Result Units 08/24/24  1122 08/24/24  1146   Sodium mmol/L 135*  --    Potassium mmol/L 4.2  --    Chloride mmol/L 99  --    CO2 mmol/L 28  --    Glucose mg/dL 88  --    Glucose, UA   --  4+*   BUN mg/dL 14  --    Creatinine mg/dL 1.0  --    Albumin g/dL 3.5  --    Total Bilirubin mg/dL 0.3  --    Alkaline Phosphatase U/L 80  --    AST U/L 12  --    ALT U/L 7*  --        Drug levels (last 3 results):  No results for input(s): "VANCOMYCINRA", "VANCORANDOM", "VANCOMYCINPE", " ""VANCOPEAK", "VANCOMYCINTR", "VANCOTROUGH" in the last 72 hours.    Microbiologic Results:  Microbiology Results (last 7 days)       Procedure Component Value Units Date/Time    Blood culture x two cultures. Draw prior to antibiotics. [[id number]] Collected: 08/24/24 1124    Order Status: Completed Specimen: Blood from Peripheral, Hand, Left Updated: 08/24/24 1917     Blood Culture, Routine No Growth to date    Narrative:      Aerobic and anaerobic    Culture, Respiratory with Gram Stain [[id number]]     Order Status: No result Specimen: Sputum, Expectorated     Blood culture x two cultures. Draw prior to antibiotics. [[id number]] Collected: 08/24/24 1321    Order Status: Sent Specimen: Blood from Peripheral, Hand, Right Updated: 08/24/24 1326    Narrative:      Collection has been rescheduled by [email or web address] at 08/24/2024 11:40 Reason:   Patient unavailable  Collection has been rescheduled by [email or web address] at 08/24/2024 11:46 Reason:   Unable to collect  Collection has been rescheduled by [email or web address] at 08/24/2024 11:40 Reason:   Patient unavailable  Collection has been rescheduled by [email or web address] at 08/24/2024 11:46 Reason:   Unable to collect            "
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
## 2024-08-26 ENCOUNTER — TELEPHONE (OUTPATIENT)
Dept: FAMILY MEDICINE | Facility: CLINIC | Age: 69
End: 2024-08-26
Payer: MEDICARE

## 2024-08-26 LAB
ALBUMIN SERPL BCP-MCNC: 3.2 G/DL (ref 3.5–5.2)
ALP SERPL-CCNC: 73 U/L (ref 55–135)
ALT SERPL W/O P-5'-P-CCNC: 5 U/L (ref 10–44)
ANION GAP SERPL CALC-SCNC: 6 MMOL/L (ref 8–16)
AST SERPL-CCNC: 9 U/L (ref 10–40)
BASOPHILS # BLD AUTO: 0.02 K/UL (ref 0–0.2)
BASOPHILS NFR BLD: 0.2 % (ref 0–1.9)
BILIRUB SERPL-MCNC: 0.3 MG/DL (ref 0.1–1)
BUN SERPL-MCNC: 18 MG/DL (ref 8–23)
CALCIUM SERPL-MCNC: 8.5 MG/DL (ref 8.7–10.5)
CHLORIDE SERPL-SCNC: 100 MMOL/L (ref 95–110)
CO2 SERPL-SCNC: 30 MMOL/L (ref 23–29)
CREAT SERPL-MCNC: 0.9 MG/DL (ref 0.5–1.4)
DIFFERENTIAL METHOD BLD: ABNORMAL
EOSINOPHIL # BLD AUTO: 0 K/UL (ref 0–0.5)
EOSINOPHIL NFR BLD: 0.1 % (ref 0–8)
ERYTHROCYTE [DISTWIDTH] IN BLOOD BY AUTOMATED COUNT: 15.9 % (ref 11.5–14.5)
GFR SERPLBLD CREATININE-BSD FMLA CKD-EPI: >60 ML/MIN/1.73 M^2
GLUCOSE SERPL-MCNC: 148 MG/DL (ref 70–110)
GLUCOSE SERPL-MCNC: 187 MG/DL (ref 70–110)
GLUCOSE SERPL-MCNC: 198 MG/DL (ref 70–110)
GLUCOSE SERPL-MCNC: 98 MG/DL (ref 70–110)
HCT VFR BLD AUTO: 30.8 % (ref 40–54)
HGB BLD-MCNC: 9.5 G/DL (ref 14–18)
IMM GRANULOCYTES # BLD AUTO: 0.08 K/UL (ref 0–0.04)
IMM GRANULOCYTES NFR BLD AUTO: 0.8 % (ref 0–0.5)
LDH SERPL L TO P-CCNC: 151 U/L (ref 110–260)
LYMPHOCYTES # BLD AUTO: 1.5 K/UL (ref 1–4.8)
LYMPHOCYTES NFR BLD: 14.5 % (ref 18–48)
MAGNESIUM SERPL-MCNC: 2.3 MG/DL (ref 1.6–2.6)
MCH RBC QN AUTO: 29.8 PG (ref 27–31)
MCHC RBC AUTO-ENTMCNC: 30.8 G/DL (ref 32–36)
MCV RBC AUTO: 97 FL (ref 82–98)
MONOCYTES # BLD AUTO: 0.6 K/UL (ref 0.3–1)
MONOCYTES NFR BLD: 6.3 % (ref 4–15)
NEUTROPHILS # BLD AUTO: 8 K/UL (ref 1.8–7.7)
NEUTROPHILS NFR BLD: 78.1 % (ref 38–73)
NRBC BLD-RTO: 0 /100 WBC
PHOSPHATE SERPL-MCNC: 3.7 MG/DL (ref 2.7–4.5)
PLATELET # BLD AUTO: 315 K/UL (ref 150–450)
PMV BLD AUTO: 9.1 FL (ref 9.2–12.9)
POTASSIUM SERPL-SCNC: 5 MMOL/L (ref 3.5–5.1)
PROCALCITONIN SERPL IA-MCNC: 0.11 NG/ML (ref 0–0.5)
PROT SERPL-MCNC: 6.4 G/DL (ref 6–8.4)
RBC # BLD AUTO: 3.19 M/UL (ref 4.6–6.2)
SODIUM SERPL-SCNC: 136 MMOL/L (ref 136–145)
WBC # BLD AUTO: 10.2 K/UL (ref 3.9–12.7)

## 2024-08-26 PROCEDURE — 83615 LACTATE (LD) (LDH) ENZYME: CPT | Performed by: PHYSICAL THERAPY ASSISTANT

## 2024-08-26 PROCEDURE — 80053 COMPREHEN METABOLIC PANEL: CPT | Performed by: PHYSICAL THERAPY ASSISTANT

## 2024-08-26 PROCEDURE — 84100 ASSAY OF PHOSPHORUS: CPT | Performed by: PHYSICAL THERAPY ASSISTANT

## 2024-08-26 PROCEDURE — 84145 PROCALCITONIN (PCT): CPT | Performed by: PHYSICAL THERAPY ASSISTANT

## 2024-08-26 PROCEDURE — 25000003 PHARM REV CODE 250: Performed by: FAMILY MEDICINE

## 2024-08-26 PROCEDURE — 97530 THERAPEUTIC ACTIVITIES: CPT

## 2024-08-26 PROCEDURE — 94761 N-INVAS EAR/PLS OXIMETRY MLT: CPT

## 2024-08-26 PROCEDURE — 85025 COMPLETE CBC W/AUTO DIFF WBC: CPT | Performed by: FAMILY MEDICINE

## 2024-08-26 PROCEDURE — 27000207 HC ISOLATION

## 2024-08-26 PROCEDURE — 97116 GAIT TRAINING THERAPY: CPT

## 2024-08-26 PROCEDURE — 97535 SELF CARE MNGMENT TRAINING: CPT

## 2024-08-26 PROCEDURE — 83735 ASSAY OF MAGNESIUM: CPT | Performed by: PHYSICAL THERAPY ASSISTANT

## 2024-08-26 PROCEDURE — 94640 AIRWAY INHALATION TREATMENT: CPT

## 2024-08-26 PROCEDURE — 63600175 PHARM REV CODE 636 W HCPCS: Mod: JZ,TB | Performed by: PHYSICAL THERAPY ASSISTANT

## 2024-08-26 PROCEDURE — 36415 COLL VENOUS BLD VENIPUNCTURE: CPT | Performed by: PHYSICAL THERAPY ASSISTANT

## 2024-08-26 PROCEDURE — 99900035 HC TECH TIME PER 15 MIN (STAT)

## 2024-08-26 PROCEDURE — 25000003 PHARM REV CODE 250: Performed by: PHYSICAL THERAPY ASSISTANT

## 2024-08-26 PROCEDURE — 25000003 PHARM REV CODE 250: Performed by: INTERNAL MEDICINE

## 2024-08-26 PROCEDURE — 94799 UNLISTED PULMONARY SVC/PX: CPT

## 2024-08-26 PROCEDURE — 25000242 PHARM REV CODE 250 ALT 637 W/ HCPCS: Performed by: PHYSICAL THERAPY ASSISTANT

## 2024-08-26 PROCEDURE — 12000002 HC ACUTE/MED SURGE SEMI-PRIVATE ROOM

## 2024-08-26 PROCEDURE — 99900031 HC PATIENT EDUCATION (STAT)

## 2024-08-26 RX ORDER — GABAPENTIN 100 MG/1
100 CAPSULE ORAL 3 TIMES DAILY
Status: DISCONTINUED | OUTPATIENT
Start: 2024-08-26 | End: 2024-08-28 | Stop reason: HOSPADM

## 2024-08-26 RX ORDER — OXYCODONE AND ACETAMINOPHEN 10; 325 MG/1; MG/1
1 TABLET ORAL EVERY 6 HOURS PRN
Status: DISCONTINUED | OUTPATIENT
Start: 2024-08-26 | End: 2024-08-28 | Stop reason: HOSPADM

## 2024-08-26 RX ORDER — GABAPENTIN 100 MG/1
100 CAPSULE ORAL 2 TIMES DAILY
Status: DISCONTINUED | OUTPATIENT
Start: 2024-08-26 | End: 2024-08-26

## 2024-08-26 RX ORDER — LANOLIN ALCOHOL/MO/W.PET/CERES
1 CREAM (GRAM) TOPICAL EVERY OTHER DAY
Status: DISCONTINUED | OUTPATIENT
Start: 2024-08-27 | End: 2024-08-28 | Stop reason: HOSPADM

## 2024-08-26 RX ADMIN — SENNOSIDES AND DOCUSATE SODIUM 1 TABLET: 50; 8.6 TABLET ORAL at 08:08

## 2024-08-26 RX ADMIN — ENOXAPARIN SODIUM 105 MG: 120 INJECTION SUBCUTANEOUS at 08:08

## 2024-08-26 RX ADMIN — CYANOCOBALAMIN TAB 1000 MCG 1000 MCG: 1000 TAB at 08:08

## 2024-08-26 RX ADMIN — OXYCODONE HYDROCHLORIDE AND ACETAMINOPHEN 1 TABLET: 7.5; 325 TABLET ORAL at 10:08

## 2024-08-26 RX ADMIN — DEXAMETHASONE 6 MG: 4 TABLET ORAL at 08:08

## 2024-08-26 RX ADMIN — MUPIROCIN 1 G: 20 OINTMENT TOPICAL at 08:08

## 2024-08-26 RX ADMIN — IPRATROPIUM BROMIDE AND ALBUTEROL SULFATE 3 ML: 2.5; .5 SOLUTION RESPIRATORY (INHALATION) at 07:08

## 2024-08-26 RX ADMIN — OXYCODONE HYDROCHLORIDE AND ACETAMINOPHEN 1 TABLET: 10; 325 TABLET ORAL at 02:08

## 2024-08-26 RX ADMIN — PANTOPRAZOLE SODIUM 40 MG: 40 TABLET, DELAYED RELEASE ORAL at 05:08

## 2024-08-26 RX ADMIN — GABAPENTIN 100 MG: 100 CAPSULE ORAL at 12:08

## 2024-08-26 RX ADMIN — LAMOTRIGINE 50 MG: 25 TABLET ORAL at 08:08

## 2024-08-26 RX ADMIN — MEROPENEM 2 G: 2 INJECTION, POWDER, FOR SOLUTION INTRAVENOUS at 12:08

## 2024-08-26 RX ADMIN — MEROPENEM 2 G: 2 INJECTION, POWDER, FOR SOLUTION INTRAVENOUS at 03:08

## 2024-08-26 RX ADMIN — IPRATROPIUM BROMIDE AND ALBUTEROL SULFATE 3 ML: 2.5; .5 SOLUTION RESPIRATORY (INHALATION) at 01:08

## 2024-08-26 RX ADMIN — GABAPENTIN 100 MG: 100 CAPSULE ORAL at 02:08

## 2024-08-26 RX ADMIN — GABAPENTIN 100 MG: 100 CAPSULE ORAL at 08:08

## 2024-08-26 RX ADMIN — TAMSULOSIN HYDROCHLORIDE 0.8 MG: 0.4 CAPSULE ORAL at 08:08

## 2024-08-26 RX ADMIN — MEROPENEM 2 G: 2 INJECTION, POWDER, FOR SOLUTION INTRAVENOUS at 07:08

## 2024-08-26 RX ADMIN — OXYCODONE HYDROCHLORIDE AND ACETAMINOPHEN 1 TABLET: 7.5; 325 TABLET ORAL at 03:08

## 2024-08-26 RX ADMIN — REMDESIVIR 100 MG: 100 INJECTION, POWDER, LYOPHILIZED, FOR SOLUTION INTRAVENOUS at 08:08

## 2024-08-26 RX ADMIN — ATORVASTATIN CALCIUM 40 MG: 40 TABLET, FILM COATED ORAL at 08:08
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
## 2024-08-26 NOTE — PT/OT/SLP PROGRESS
Occupational Therapy   Treatment    Name: Kevan Colin Sr.  MRN: [id number]  Admitting Diagnosis:  Sepsis       Recommendations:     Discharge Recommendations: Low Intensity Therapy  Discharge Equipment Recommendations:  bath bench  Barriers to discharge:   (increased physical assistance with ADLs and functional mobility.)    Assessment:     Kevan Colin Sr. is a 69 y.o. male with a medical diagnosis of Sepsis.  He presents with general weakness and right groin pain. Patient participated in bed mobility, unsupported sitting EOB, UB dressing (donning LSO brace), functional transfer and ambulation using rolling walker. Performance deficits affecting function are weakness, impaired endurance, impaired self care skills, impaired functional mobility, gait instability, impaired balance, decreased safety awareness, decreased lower extremity function, decreased upper extremity function, orthopedic precautions.     Rehab Prognosis:  Fair; patient would benefit from acute skilled OT services to address these deficits and reach maximum level of function.       Plan:     Patient to be seen 5 x/week to address the above listed problems via self-care/home management, therapeutic activities, therapeutic exercises  Plan of Care Expires: 09/25/24  Plan of Care Reviewed with: patient    Subjective     Chief Complaint: Right groin pain and general weakness  Patient/Family Comments/goals: Improved functional mobility and ADL independence.  Pain/Comfort:  Pain Rating 1: 10/10  Location 1:  (right medial groin)  Pain Addressed 1: Distraction, Reposition  Pain Rating Post-Intervention 1: 10/10    Objective:     Communicated with: nurse prior to session.  Patient found HOB elevated with telemetry, peripheral IV, stout catheter upon OT entry to room.    General Precautions: Standard, fall, airborne, contact, droplet    Orthopedic Precautions:spinal precautions  Braces: LSO  Respiratory Status: Room air     Occupational  Performance:     Bed Mobility:    Patient completed Scooting/Bridging with minimum assistance  Patient completed Supine to Sit with minimum assistance   Performed unsupported sitting EOB with contact guard assistance.    Functional Mobility/Transfers:  Patient completed Sit <> Stand Transfer with contact guard assistance  with  rolling walker   Functional Mobility: ambulated 10 feet in hospital room with contact guard assistance using rolling walker.    Activities of Daily Living:  Upper Body Dressing: moderate assistance to don LSO brace sitting EOB.      [facility] 6 Click ADL:      Treatment & Education:  Patient reports increased right groin pain after recent spine surgery.     Patient left up in chair with all lines intact and call button in reach    GOALS:   Multidisciplinary Problems       Occupational Therapy Goals          Problem: Occupational Therapy    Goal Priority Disciplines Outcome Interventions   Occupational Therapy Goal     OT, PT/OT Progressing    Description: Goals to be met by: 9/15/2024     Patient will increase functional independence with ADLs by performing:    UE Dressing with Supervision.  LE Dressing with Supervision and Assistive Devices as needed.  Grooming while seated with Modified [place].  Toileting from bedside commode with Supervision for hygiene and clothing management.   Step transfer with Supervision  Toilet transfer to bedside commode with Supervision.                         Time Tracking:     OT Date of Treatment: 08/26/24  OT Start Time: 1118  OT Stop Time: 1141  OT Total Time (min): 23 min    Billable Minutes:Self Care/Home Management 12  Therapeutic Activity 11    Co-Treat between OT and PT. OT addressed dressing, PT addressed ambulation.    OT/TOSHIA: OT          8/26/2024

## 2024-08-26 NOTE — PT/OT/SLP PROGRESS
Physical Therapy Treatment    Patient Name:  Kevan Colin Sr.   MRN:  [id number]    Recommendations:     Discharge Recommendations: Low Intensity Therapy  Discharge Equipment Recommendations: bath bench  Barriers to discharge:  pain, medical issues    Assessment:     Kevan Colin Sr. is a 69 y.o. male admitted with a medical diagnosis of Sepsis. He is COVID +  He presents with the following impairments/functional limitations: weakness, impaired endurance, impaired functional mobility, gait instability, impaired balance, decreased lower extremity function, decreased safety awareness, pain, edema, impaired cardiopulmonary response to activity, other (comment) (spinal precautions) Pt found with OT. Having increased R LE and difficulty moving today. Required support of r LE to come to sit on EOB. LSO now present in room. Donned dependently. Stood with min A x 2 and RW. Pt ambulated in room with step to pattern, using UE's on walker to unweight R LE/ Agreeable to up in chair for lunch..    Rehab Prognosis: Good; patient would benefit from acute skilled PT services to address these deficits and reach maximum level of function.    Recent Surgery: * No surgery found *      Plan:     During this hospitalization, patient to be seen daily to address the identified rehab impairments via gait training, therapeutic activities, therapeutic exercises, neuromuscular re-education and progress toward the following goals:    Plan of Care Expires:  09/22/24    Subjective     Chief Complaint: R LE nerve pain throughout entire LE, worse inner thigh  Patient/Family Comments/goals: move painfree  Pain/Comfort:  Pain Rating 1:  (does not rate)  Location - Side 1: Right  Location - Orientation 1: upper  Location 1: thigh  Pain Addressed 1: Pre-medicate for activity, Reposition, Distraction, Cessation of Activity  Pain Rating Post-Intervention 1:  (unable to rate)      Objective:     Communicated with nurse prior to session.   Patient found HOB elevated with blood pressure cuff, telemetry, peripheral IV, stout catheter upon PT entry to room.     General Precautions: Standard, airborne, contact, diabetic, droplet, fall  Orthopedic Precautions: spinal precautions  Braces: LSO  Respiratory Status: Room air     Functional Mobility:  Bed Mobility:     Supine to Sit: minimum assistance  Transfers:     Sit to Stand:  minimum assistance and of 2 persons with rolling walker  Gait: 16 ft with RW min A plus second person for safety. Required repositioning of walker for gait and retro pulsion to sit in BS chair      AM-PAC 6 CLICK MOBILITY  Turning over in bed (including adjusting bedclothes, sheets and blankets)?: 3  Sitting down on and standing up from a chair with arms (e.g., wheelchair, bedside commode, etc.): 3  Moving from lying on back to sitting on the side of the bed?: 3  Moving to and from a bed to a chair (including a wheelchair)?: 3  Need to walk in hospital room?: 3  Climbing 3-5 steps with a railing?: 2  Basic Mobility Total Score: 17       Treatment & Education:  Pt educated on safety with RW for transfers and gait, donning LSO before activity, spine precautions, positioning of R LE, use of call light, fall prevention    Patient left up in chair with all lines intact, call button in reach, and chair alarm on..    GOALS:   Multidisciplinary Problems       Physical Therapy Goals          Problem: Physical Therapy    Goal Priority Disciplines Outcome Goal Variances Interventions   Physical Therapy Goal     PT, PT/OT      Description: Goals to be met by: 24     Patient will increase functional independence with mobility by performin. Supine to sit with Supervision  2. Sit to stand transfer with Supervision  3. Bed to chair transfer with Supervision using Rolling Walker  4. Gait  x 150 feet with Supervision using Rolling Walker.                              Time Tracking:     PT Received On: 24  PT Start Time: 1129     PT  Stop Time: 1145  PT Total Time (min): 16 min     Billable Minutes: Gait Training 16    Treatment Type: Treatment  PT/PTA: PT           08/26/2024

## 2024-08-26 NOTE — PROGRESS NOTES
[facility] Medicine  Progress Note    Patient Name: Kevan Colin Sr.  MRN: [id number]  Patient Class: IP- Inpatient   Admission Date: 8/24/2024  Length of Stay: 2 days  Attending Physician: Hilton Moses DO  Primary Care Provider: Jonathan Benton III, MD        Subjective:     Principal Problem:Sepsis        HPI:  Patient is a 69-year-old male with a past medical history of COPD, CAD, DM 2, GERD, hyperlipidemia, hypertension, previous DVT and PE on Eliquis.  Patient presents to the ED today with complaints of abdominal pain, nausea, and vomiting.  Per EMS, patient was found to have hypoglycemia and was administered D10.  Patient also was given Zofran in route to hospital.  Patient was recently diagnosed with COVID 2 days ago.  Patient states he was also told he had some intra-abdominal infection and was prescribed antibiotics.  CT of the abdomen was completed in the ED which shows dilation of jejunal loops with focal transition point in the left mid abdomen and normal caliber small bowel loops distal to that level. Findings are suspicious for partial mechanical small bowel obstruction.  NG tube was not inserted in the ED due to resolved nausea and vomiting.  Patient also found to have bilateral lower lobe pneumonia.  While in the ED, patient became hypoxic in 80s and required O2 per nasal cannula.  Patient is generally not on O2 at home.  Patient was initiated on IV vancomycin, meropenem.  Patient does have continued residual cough, generalized fatigue, chills, and fever.  Patient denies headache, dizziness, current nausea or vomiting, diarrhea, or chest pain.  Patient does endorse intermittent constipation.    In the ED:  Lactic 0.95, WBC 11.63, hemoglobin 9.7, sodium 135, calcium 8.2, glucose 88 on arrival with a drop to 33, COVID positive, influenza negative, UA negative, troponin 4.9, lipase 12.    Overview/Hospital Course:   69-year-old male with a past medical history of COPD,  CAD, DM 2, GERD, hyperlipidemia, hypertension, previous DVT and PE on apixaban came in with complaints of abdominal pain, nausea, and vomiting and admitted for sepsis. Patient was recently diagnosed with COVID 2 days ago.  CT of the abdomen showed  suspicious for partial mechanical small bowel obstruction which has been resolved with bowl movement and repeat imaging.  Patient was also found to have bilateral lower lobe pneumonia and on broad secptrum Currently on broad spectrum antibiotics which can be discontinued once bacteria infection is ruled out. For COVID treated with steroid and ramdesivir. Hardy was placed due to urinary retention and started on tamsulosin on 8/25.    Interval History: patient said he is feeling better except for his chronic neuropathic pain in leg after surgery    Review of Systems   All other systems reviewed and are negative.    Objective:     Vital Signs (Most Recent):  Temp: 97.6 °F (36.4 °C) (08/26/24 1435)  Pulse: 74 (08/26/24 1700)  Resp: 17 (08/26/24 1700)  BP: 136/70 (08/26/24 1600)  SpO2: 100 % (08/26/24 1700) Vital Signs (24h Range):  Temp:  [97.6 °F (36.4 °C)-98 °F (36.7 °C)] 97.6 °F (36.4 °C)  Pulse:  [55-97] 74  Resp:  [9-31] 17  SpO2:  [95 %-100 %] 100 %  BP: ()/(52-90) 136/70     Weight: 108.9 kg (240 lb)  Body mass index is 30.81 kg/m².    Intake/Output Summary (Last 24 hours) at 8/26/2024 1709  Last data filed at 8/26/2024 0600  Gross per 24 hour   Intake 3396.71 ml   Output 5725 ml   Net -2328.29 ml         Physical Exam  Cardiovascular:      Rate and Rhythm: Normal rate.      Pulses: Normal pulses.   Pulmonary:      Effort: Pulmonary effort is normal.   Abdominal:      General: Abdomen is flat. There is no distension.   Neurological:      Mental Status: He is alert and oriented to person, place, and time.             Significant Labs: All pertinent labs within the past 24 hours have been reviewed.    Significant Imaging: I have reviewed all pertinent imaging  results/findings within the past 24 hours.    Assessment/Plan:      * Sepsis  Likely from COVID pneumonia  MRSA neg, Respiratory panel neg except for covid, proocal neg  Currently on broad secptrum antibiotics which can be discontinued once bacteria infection is ruled out  Currently on treatment for COVID with steroid and ramdesivir. (COVID + 2 days before the admission)  Repeat chest x-ray as needed to re-evaluate    Type 2 diabetes mellitus with hypoglycemia  Hypoglycemia has been resolved  Currently on SSI and can add glargine home dose once glucose is stable    Partial bowel obstruction  Resolved  CT of the abdomen showed  suspicious for partial mechanical small bowel obstruction which has been resolved with bowl movement and repeat imaging.    Urinary retention  Hardy in on 8/25 and started tamsulosin on 8/25      Anemia  Most recent hemoglobin and hematocrit are listed below.  Recent Labs     08/24/24  1122   HGB 9.7*   HCT 31.3*     Monitor serial CBC: Daily  Transfuse PRBC if patient becomes hemodynamically unstable, symptomatic or H/H drops below 7/21.  Patient has not received any PRBC transfusions to date  Patient's anemia is currently stable    COVID-19  See under sepsis    Bilateral pneumonia  See under sepsis    Chronic diastolic heart failure  EF 55-60%  Continue to monitor    Coronary artery disease involving native coronary artery without angina pectoris  Patient with known CAD,   Continue with medical managment      VTE Risk Mitigation (From admission, onward)           Ordered     enoxaparin injection 105 mg  2 times daily         08/24/24 1508     IP VTE HIGH RISK PATIENT  Once         08/24/24 1553     Place sequential compression device  Until discontinued         08/24/24 1553                    Discharge Planning   HOMAR: 8/29/2024     Code Status: Full Code   Is the patient medically ready for discharge?:     Reason for patient still in hospital (select all that apply): Patient trending  condition  Discharge Plan A: Home Health   Discharge Delays: None known at this time        Critical care time spent on the evaluation and treatment of severe organ dysfunction, review of pertinent labs and imaging studies, discussions with consulting providers and discussions with patient/family: 32 minutes.      Hilton Moses DO  Department of Hospital Medicine   [facility]

## 2024-08-26 NOTE — SUBJECTIVE & OBJECTIVE
Interval History: patient said he is feeling better except for his chronic neuropathic pain in leg after surgery    Review of Systems   All other systems reviewed and are negative.    Objective:     Vital Signs (Most Recent):  Temp: 97.6 °F (36.4 °C) (08/26/24 1435)  Pulse: 74 (08/26/24 1700)  Resp: 17 (08/26/24 1700)  BP: 136/70 (08/26/24 1600)  SpO2: 100 % (08/26/24 1700) Vital Signs (24h Range):  Temp:  [97.6 °F (36.4 °C)-98 °F (36.7 °C)] 97.6 °F (36.4 °C)  Pulse:  [55-97] 74  Resp:  [9-31] 17  SpO2:  [95 %-100 %] 100 %  BP: ()/(52-90) 136/70     Weight: 108.9 kg (240 lb)  Body mass index is 30.81 kg/m².    Intake/Output Summary (Last 24 hours) at 8/26/2024 1709  Last data filed at 8/26/2024 0600  Gross per 24 hour   Intake 3396.71 ml   Output 5725 ml   Net -2328.29 ml         Physical Exam  Cardiovascular:      Rate and Rhythm: Normal rate.      Pulses: Normal pulses.   Pulmonary:      Effort: Pulmonary effort is normal.   Abdominal:      General: Abdomen is flat. There is no distension.   Neurological:      Mental Status: He is alert and oriented to person, place, and time.             Significant Labs: All pertinent labs within the past 24 hours have been reviewed.    Significant Imaging: I have reviewed all pertinent imaging results/findings within the past 24 hours.

## 2024-08-26 NOTE — PLAN OF CARE
CM met with pt and gave HH choice list.  He also had CM contact his fiance and CM spoke with her via phone. Referral sent to [facility]. Will continue to follow       08/26/24 4495   Post-Acute Status   Post-Acute Authorization Home Health   Home Health Status Referrals Sent   Discharge Delays None known at this time   Discharge Plan   Discharge Plan A Home Health   Discharge Plan B Home with family

## 2024-08-26 NOTE — CARE UPDATE
08/26/24 0727   Patient Assessment/Suction   Level of Consciousness (AVPU) alert   Respiratory Effort Normal;Unlabored   Expansion/Accessory Muscles/Retractions no use of accessory muscles;no retractions   All Lung Fields Breath Sounds diminished   Rhythm/Pattern, Respiratory pattern regular;unlabored   Cough Frequency infrequent   Cough Type good   PRE-TX-O2   Device (Oxygen Therapy) room air   SpO2 100 %   Pulse Oximetry Type Continuous   $ Pulse Oximetry - Multiple Charge Pulse Oximetry - Multiple   Pulse 97   Resp 18   Aerosol Therapy   $ Aerosol Therapy Charges Aerosol Treatment   Daily Review of Necessity (SVN) completed   Respiratory Treatment Status (SVN) given   Treatment Route (SVN) mask;oxygen   Patient Position HOB elevated   Post Treatment Assessment (SVN) breath sounds unchanged   Signs of Intolerance (SVN) none   Breath Sounds Post-Respiratory Treatment   Throughout All Fields Post-Treatment All Fields   Throughout All Fields Post-Treatment no change   Post-treatment Heart Rate (beats/min) 60   Post-treatment Resp Rate (breaths/min) 18   Education   $ Education 15 min;Bronchodilator

## 2024-08-26 NOTE — RESPIRATORY THERAPY
08/25/24 1943   Patient Assessment/Suction   Level of Consciousness (AVPU) alert   Respiratory Effort Normal;Unlabored   Expansion/Accessory Muscles/Retractions no retractions;no use of accessory muscles   All Lung Fields Breath Sounds diminished   Rhythm/Pattern, Respiratory pattern regular;unlabored   Cough Frequency infrequent   Cough Type nonproductive   PRE-TX-O2   Device (Oxygen Therapy) room air   SpO2 100 %   Pulse Oximetry Type Continuous   $ Pulse Oximetry - Multiple Charge Pulse Oximetry - Multiple   Pulse 79   Resp 12   Aerosol Therapy   $ Aerosol Therapy Charges Aerosol Treatment   Daily Review of Necessity (SVN) completed   Respiratory Treatment Status (SVN) given   Treatment Route (SVN) mask;oxygen   Patient Position HOB elevated   Post Treatment Assessment (SVN) breath sounds unchanged   Signs of Intolerance (SVN) none   Education   $ Education Bronchodilator;15 min

## 2024-08-26 NOTE — TELEPHONE ENCOUNTER
----- Message from Beena Tapia sent at 8/26/2024 12:48 PM CDT -----  Contact: Cheryle [phone]  Type: Needs Medical Advice  Who Called:  Pts wife Cheryle     Best Call Back Number: [phone]    Additional Information:      Pts wife calling to advise that pt is admitted for covid. Wife just wanted Dr Benton to know.

## 2024-08-27 LAB
ALBUMIN SERPL BCP-MCNC: 3.4 G/DL (ref 3.5–5.2)
ALP SERPL-CCNC: 67 U/L (ref 55–135)
ALT SERPL W/O P-5'-P-CCNC: 6 U/L (ref 10–44)
ANION GAP SERPL CALC-SCNC: 7 MMOL/L (ref 8–16)
AST SERPL-CCNC: 8 U/L (ref 10–40)
BASOPHILS # BLD AUTO: 0.04 K/UL (ref 0–0.2)
BASOPHILS NFR BLD: 0.5 % (ref 0–1.9)
BILIRUB SERPL-MCNC: 0.3 MG/DL (ref 0.1–1)
BUN SERPL-MCNC: 16 MG/DL (ref 8–23)
CALCIUM SERPL-MCNC: 8.7 MG/DL (ref 8.7–10.5)
CHLORIDE SERPL-SCNC: 100 MMOL/L (ref 95–110)
CO2 SERPL-SCNC: 28 MMOL/L (ref 23–29)
CREAT SERPL-MCNC: 0.8 MG/DL (ref 0.5–1.4)
CRP SERPL-MCNC: 3 MG/DL
DIFFERENTIAL METHOD BLD: ABNORMAL
EOSINOPHIL # BLD AUTO: 0.1 K/UL (ref 0–0.5)
EOSINOPHIL NFR BLD: 0.9 % (ref 0–8)
ERYTHROCYTE [DISTWIDTH] IN BLOOD BY AUTOMATED COUNT: 15.3 % (ref 11.5–14.5)
GFR SERPLBLD CREATININE-BSD FMLA CKD-EPI: >60 ML/MIN/1.73 M^2
GLUCOSE SERPL-MCNC: 159 MG/DL (ref 70–110)
GLUCOSE SERPL-MCNC: 160 MG/DL (ref 70–110)
GLUCOSE SERPL-MCNC: 185 MG/DL (ref 70–110)
GLUCOSE SERPL-MCNC: 222 MG/DL (ref 70–110)
GLUCOSE SERPL-MCNC: 281 MG/DL (ref 70–110)
HCT VFR BLD AUTO: 33.4 % (ref 40–54)
HGB BLD-MCNC: 10.8 G/DL (ref 14–18)
IMM GRANULOCYTES # BLD AUTO: 0.04 K/UL (ref 0–0.04)
IMM GRANULOCYTES NFR BLD AUTO: 0.5 % (ref 0–0.5)
LAMOTRIGINE SERPL-MCNC: 1.9 UG/ML (ref 2–20)
LYMPHOCYTES # BLD AUTO: 2.4 K/UL (ref 1–4.8)
LYMPHOCYTES NFR BLD: 30 % (ref 18–48)
MAGNESIUM SERPL-MCNC: 2.2 MG/DL (ref 1.6–2.6)
MCH RBC QN AUTO: 30.7 PG (ref 27–31)
MCHC RBC AUTO-ENTMCNC: 32.3 G/DL (ref 32–36)
MCV RBC AUTO: 95 FL (ref 82–98)
MONOCYTES # BLD AUTO: 0.7 K/UL (ref 0.3–1)
MONOCYTES NFR BLD: 8.2 % (ref 4–15)
NEUTROPHILS # BLD AUTO: 4.9 K/UL (ref 1.8–7.7)
NEUTROPHILS NFR BLD: 59.9 % (ref 38–73)
NRBC BLD-RTO: 0 /100 WBC
PHOSPHATE SERPL-MCNC: 3.2 MG/DL (ref 2.7–4.5)
PLATELET # BLD AUTO: 356 K/UL (ref 150–450)
PMV BLD AUTO: 9.2 FL (ref 9.2–12.9)
POTASSIUM SERPL-SCNC: 4.5 MMOL/L (ref 3.5–5.1)
PROT SERPL-MCNC: 6.8 G/DL (ref 6–8.4)
RBC # BLD AUTO: 3.52 M/UL (ref 4.6–6.2)
SODIUM SERPL-SCNC: 135 MMOL/L (ref 136–145)
WBC # BLD AUTO: 8.09 K/UL (ref 3.9–12.7)

## 2024-08-27 PROCEDURE — 25000003 PHARM REV CODE 250: Performed by: PHYSICAL THERAPY ASSISTANT

## 2024-08-27 PROCEDURE — 94761 N-INVAS EAR/PLS OXIMETRY MLT: CPT

## 2024-08-27 PROCEDURE — 27000207 HC ISOLATION

## 2024-08-27 PROCEDURE — 97110 THERAPEUTIC EXERCISES: CPT

## 2024-08-27 PROCEDURE — 83735 ASSAY OF MAGNESIUM: CPT | Performed by: PHYSICAL THERAPY ASSISTANT

## 2024-08-27 PROCEDURE — 36415 COLL VENOUS BLD VENIPUNCTURE: CPT | Performed by: PHYSICAL THERAPY ASSISTANT

## 2024-08-27 PROCEDURE — 86140 C-REACTIVE PROTEIN: CPT | Performed by: FAMILY MEDICINE

## 2024-08-27 PROCEDURE — 12000002 HC ACUTE/MED SURGE SEMI-PRIVATE ROOM

## 2024-08-27 PROCEDURE — 80053 COMPREHEN METABOLIC PANEL: CPT | Performed by: PHYSICAL THERAPY ASSISTANT

## 2024-08-27 PROCEDURE — 25000003 PHARM REV CODE 250: Performed by: INTERNAL MEDICINE

## 2024-08-27 PROCEDURE — 25000003 PHARM REV CODE 250: Performed by: FAMILY MEDICINE

## 2024-08-27 PROCEDURE — 94640 AIRWAY INHALATION TREATMENT: CPT

## 2024-08-27 PROCEDURE — 63600175 PHARM REV CODE 636 W HCPCS: Performed by: PHYSICAL THERAPY ASSISTANT

## 2024-08-27 PROCEDURE — 99900031 HC PATIENT EDUCATION (STAT)

## 2024-08-27 PROCEDURE — 84100 ASSAY OF PHOSPHORUS: CPT | Performed by: PHYSICAL THERAPY ASSISTANT

## 2024-08-27 PROCEDURE — 97530 THERAPEUTIC ACTIVITIES: CPT

## 2024-08-27 PROCEDURE — 97535 SELF CARE MNGMENT TRAINING: CPT

## 2024-08-27 PROCEDURE — 25000242 PHARM REV CODE 250 ALT 637 W/ HCPCS: Performed by: PHYSICAL THERAPY ASSISTANT

## 2024-08-27 PROCEDURE — 97116 GAIT TRAINING THERAPY: CPT

## 2024-08-27 PROCEDURE — 85025 COMPLETE CBC W/AUTO DIFF WBC: CPT | Performed by: FAMILY MEDICINE

## 2024-08-27 RX ADMIN — INSULIN ASPART 3 UNITS: 100 INJECTION, SOLUTION INTRAVENOUS; SUBCUTANEOUS at 05:08

## 2024-08-27 RX ADMIN — GABAPENTIN 100 MG: 100 CAPSULE ORAL at 08:08

## 2024-08-27 RX ADMIN — LAMOTRIGINE 50 MG: 25 TABLET ORAL at 08:08

## 2024-08-27 RX ADMIN — CYANOCOBALAMIN TAB 1000 MCG 1000 MCG: 1000 TAB at 08:08

## 2024-08-27 RX ADMIN — OXYCODONE HYDROCHLORIDE AND ACETAMINOPHEN 1 TABLET: 10; 325 TABLET ORAL at 12:08

## 2024-08-27 RX ADMIN — OXYCODONE HYDROCHLORIDE AND ACETAMINOPHEN 1 TABLET: 7.5; 325 TABLET ORAL at 05:08

## 2024-08-27 RX ADMIN — MEROPENEM 2 G: 2 INJECTION, POWDER, FOR SOLUTION INTRAVENOUS at 05:08

## 2024-08-27 RX ADMIN — OXYCODONE HYDROCHLORIDE AND ACETAMINOPHEN 1 TABLET: 10; 325 TABLET ORAL at 08:08

## 2024-08-27 RX ADMIN — PANTOPRAZOLE SODIUM 40 MG: 40 TABLET, DELAYED RELEASE ORAL at 05:08

## 2024-08-27 RX ADMIN — ENOXAPARIN SODIUM 105 MG: 120 INJECTION SUBCUTANEOUS at 08:08

## 2024-08-27 RX ADMIN — REMDESIVIR 100 MG: 100 INJECTION, POWDER, LYOPHILIZED, FOR SOLUTION INTRAVENOUS at 08:08

## 2024-08-27 RX ADMIN — SENNOSIDES AND DOCUSATE SODIUM 1 TABLET: 50; 8.6 TABLET ORAL at 08:08

## 2024-08-27 RX ADMIN — DEXAMETHASONE 6 MG: 4 TABLET ORAL at 08:08

## 2024-08-27 RX ADMIN — FERROUS SULFATE TAB 325 MG (65 MG ELEMENTAL FE) 1 EACH: 325 (65 FE) TAB at 08:08

## 2024-08-27 RX ADMIN — MUPIROCIN 1 G: 20 OINTMENT TOPICAL at 08:08

## 2024-08-27 RX ADMIN — INSULIN ASPART 1 UNITS: 100 INJECTION, SOLUTION INTRAVENOUS; SUBCUTANEOUS at 08:08

## 2024-08-27 RX ADMIN — IPRATROPIUM BROMIDE AND ALBUTEROL SULFATE 3 ML: 2.5; .5 SOLUTION RESPIRATORY (INHALATION) at 07:08

## 2024-08-27 RX ADMIN — ATORVASTATIN CALCIUM 40 MG: 40 TABLET, FILM COATED ORAL at 08:08

## 2024-08-27 RX ADMIN — IPRATROPIUM BROMIDE AND ALBUTEROL SULFATE 3 ML: 2.5; .5 SOLUTION RESPIRATORY (INHALATION) at 08:08

## 2024-08-27 RX ADMIN — GABAPENTIN 100 MG: 100 CAPSULE ORAL at 05:08

## 2024-08-27 RX ADMIN — IPRATROPIUM BROMIDE AND ALBUTEROL SULFATE 3 ML: 2.5; .5 SOLUTION RESPIRATORY (INHALATION) at 01:08

## 2024-08-27 RX ADMIN — TAMSULOSIN HYDROCHLORIDE 0.8 MG: 0.4 CAPSULE ORAL at 08:08

## 2024-08-27 NOTE — PT/OT/SLP PROGRESS
Occupational Therapy   Treatment    Name: Kevan Colin Sr.  MRN: [id number]  Admitting Diagnosis:  Sepsis       Recommendations:     Discharge Recommendations: Low Intensity Therapy  Discharge Equipment Recommendations:  bath bench  Barriers to discharge:   (increased physical assistance with ADLs and functional mobility.)    Assessment:     Kevan Colin Sr. is a 69 y.o. male with a medical diagnosis of Sepsis.  He presents with improving medical acuity and functional mobility. Patient participated in bed mobility, unsupported sitting EOB, UB dressing (don/doff LSO brace) sitting EOB, functional transfer and ambulate using rolling walker. Performance deficits affecting function are weakness, impaired endurance, impaired self care skills, impaired functional mobility, gait instability, impaired balance, decreased safety awareness, decreased lower extremity function, decreased upper extremity function, orthopedic precautions.     Rehab Prognosis:  Fair; patient would benefit from acute skilled OT services to address these deficits and reach maximum level of function.       Plan:     Patient to be seen 5 x/week to address the above listed problems via self-care/home management, therapeutic activities, therapeutic exercises  Plan of Care Expires: 09/25/24  Plan of Care Reviewed with: patient    Subjective     Chief Complaint: General weakness and pain to right inner thigh.  Patient/Family Comments/goals: improved functional mobility and ADL independence.  Pain/Comfort:  Pain Rating 1: 0/10  Pain Rating Post-Intervention 1: 0/10    Objective:     Communicated with: nurse prior to session.  Patient found HOB elevated with telemetry, peripheral IV, stout catheter upon OT entry to room.    General Precautions: Standard, fall, airborne, contact, droplet    Orthopedic Precautions:spinal precautions  Braces: LSO  Respiratory Status: Room air     Occupational Performance:     Bed Mobility:    Patient completed  Scooting/Bridging with minimum assistance  Patient completed Supine to Sit with minimum assistance  Patient completed Sit to Supine with minimum assistance   Performed unsupported sitting EOB with contact guard assistance.     Functional Mobility/Transfers:  Patient completed Sit <> Stand Transfer with contact guard assistance  with  rolling walker   Functional Mobility: ambulated 5 feet forwards/backwards with contact guard assistance using rolling walker.    Activities of Daily Living:  Upper Body Dressing: contact guard assistance to don/doff LSO sitting EOB.      [facility] 6 Click ADL: 19    Treatment & Education:  Patient is making positive progress towards OT goals.    Patient left HOB elevated with all lines intact, call button in reach, and bed alarm on    GOALS:   Multidisciplinary Problems       Occupational Therapy Goals          Problem: Occupational Therapy    Goal Priority Disciplines Outcome Interventions   Occupational Therapy Goal     OT, PT/OT Progressing    Description: Goals to be met by: 9/15/2024     Patient will increase functional independence with ADLs by performing:    UE Dressing with Supervision.  LE Dressing with Supervision and Assistive Devices as needed.  Grooming while seated with Modified [place].  Toileting from bedside commode with Supervision for hygiene and clothing management.   Step transfer with Supervision  Toilet transfer to bedside commode with Supervision.                         Time Tracking:     OT Date of Treatment: 08/27/24  OT Start Time: 1003  OT Stop Time: 1028  OT Total Time (min): 25 min    Billable Minutes:Self Care/Home Management 13  Therapeutic Activity 12    OT/TOSHIA: OT          8/27/2024

## 2024-08-27 NOTE — CARE UPDATE
08/26/24 2200   Respiratory Evaluation   $ Care Plan Tech Time 15 min   $ Respiratory Re-evaluation Complete   Evaluation For Transfer   Admitting Diagnosis Nausea and vomiting   Cardiac Diagnosis NA   Pulmonary Diagnosis PNA/Covid   Current Surgeries NA   Oxygen Care Plan   Oxygen Care Plan Per Protocol   SPO2 Goal (%) 92% non-cardiac   Bronchodilator Care Plan   Bronchodilator Care Plan Aerosol   Aerosol Meds w/ frequency Albuterol - Ipratropium (DUO-NEB) 0.5mg-3mg(2.5ml) 3ml Nebulizer Solution1.25mg (pediatric dose) TID   Rationale No Rationale found   Atelectasis Care Plan   Rationale No Rational Found   Airway Clearance Care Plan   Rationale No rationale found

## 2024-08-27 NOTE — PT/OT/SLP PROGRESS
Physical Therapy Treatment    Patient Name:  Kevan Colin Sr.   MRN:  [id number]    Recommendations:     Discharge Recommendations: Low Intensity Therapy  Discharge Equipment Recommendations: bath bench  Barriers to discharge:  pain, medical issues    Assessment:     Kevan Colin Sr. is a 69 y.o. male admitted with a medical diagnosis of Sepsis. He is COVID +  He presents with the following impairments/functional limitations: weakness, impaired endurance, impaired functional mobility, gait instability, impaired balance, decreased lower extremity function, decreased safety awareness, pain, edema, impaired cardiopulmonary response to activity, other (comment) (spinal precautions) Pt found awake in bed. Agreeable to PT Reports receiving pain medication this am. Instructed in desensitization techniques for R LE followed by neural flossing.Required support of RLE to come to sit on EOB. Donned LSO with min A. Performed R LE HS and hip flexion Stood with CGA and RW. Pt ambulated in room with step to pattern, using UE's on walker to unweight R LE x 75 ft. Requested back to bed.    Rehab Prognosis: Good; patient would benefit from acute skilled PT services to address these deficits and reach maximum level of function.    Recent Surgery: * No surgery found *      Plan:     During this hospitalization, patient to be seen daily to address the identified rehab impairments via gait training, therapeutic activities, therapeutic exercises, neuromuscular re-education and progress toward the following goals:    Plan of Care Expires:  09/22/24    Subjective     Chief Complaint: R LE nerve pain throughout entire LE, worse inner thigh  Patient/Family Comments/goals: move painfree  Pain/Comfort:  Pain Rating 1:  (unable to rate)  Location - Side 1: Right  Location - Orientation 1: upper  Location 1: thigh  Pain Addressed 1: Pre-medicate for activity, Reposition, Distraction, Cessation of Activity  Pain Rating  Post-Intervention 1:  (generally comfortable)      Objective:     Communicated with nurse prior to session.  Patient found HOB elevated with telemetry, peripheral IV upon PT entry to room.     General Precautions: Standard, airborne, contact, diabetic, droplet, fall  Orthopedic Precautions: spinal precautions  Braces: LSO  Respiratory Status: Room air     Functional Mobility:  Bed Mobility:     Supine to Sit: minimum assistance  Transfers:     Sit to Stand:  contact guard assistance with rolling walker  Gait: 75 ft with RW CGA/SBA      AM-PAC 6 CLICK MOBILITY  Turning over in bed (including adjusting bedclothes, sheets and blankets)?: 3  Sitting down on and standing up from a chair with arms (e.g., wheelchair, bedside commode, etc.): 4  Moving from lying on back to sitting on the side of the bed?: 3  Moving to and from a bed to a chair (including a wheelchair)?: 4  Need to walk in hospital room?: 3  Climbing 3-5 steps with a railing?: 2  Basic Mobility Total Score: 19       Treatment & Education:  Pt educated on safety with RW for transfers and gait, donning LSO before activity, spine precautions, positioning of R LE,neural flossing, TE,  use of call light, fall prevention    Patient left HOB elevated with all lines intact, call button in reach, and bed alarm on..    GOALS:   Multidisciplinary Problems       Physical Therapy Goals          Problem: Physical Therapy    Goal Priority Disciplines Outcome Goal Variances Interventions   Physical Therapy Goal     PT, PT/OT      Description: Goals to be met by: 24     Patient will increase functional independence with mobility by performin. Supine to sit with Supervision  2. Sit to stand transfer with Supervision  3. Bed to chair transfer with Supervision using Rolling Walker  4. Gait  x 150 feet with Supervision using Rolling Walker.                              Time Tracking:     PT Received On: 24  PT Start Time: 1103     PT Stop Time: 1131  PT Total  Time (min): 28 min     Billable Minutes: Gait Training 10 and Therapeutic Exercise 18    Treatment Type: Treatment  PT/PTA: PT           08/27/2024

## 2024-08-27 NOTE — CARE UPDATE
08/27/24 0500   Patient Assessment/Suction   Level of Consciousness (AVPU) alert   Respiratory Effort Normal;Unlabored   Expansion/Accessory Muscles/Retractions no use of accessory muscles;no retractions   GHULAM Breath Sounds clear   LLL Breath Sounds diminished   RUL Breath Sounds clear   RML Breath Sounds coarse   RLL Breath Sounds coarse   Rhythm/Pattern, Respiratory unlabored;pattern regular;no shortness of breath reported   Cough Frequency no cough   PRE-TX-O2   Device (Oxygen Therapy) room air   SpO2 (!) 93 %   Pulse Oximetry Type Intermittent   $ Pulse Oximetry - Multiple Charge Pulse Oximetry - Multiple   Pulse 71   Resp 16   Temp 97.5 °F (36.4 °C)   /82

## 2024-08-27 NOTE — NURSING
Nurses Note -- 4 Eyes      8/26/2024   10:30 PM      Skin assessed during: Transfer      [x] No Altered Skin Integrity Present    []Prevention Measures Documented      [] Yes- Altered Skin Integrity Present or Discovered   [] LDA Added if Not in Epic (Describe Wound)   [] New Altered Skin Integrity was Present on Admit and Documented in LDA   [] Wound Image Taken    Wound Care Consulted? No    Attending Nurse:  Yamil fuentes rn     Second RN/Staff Member:   sarath matthews rn

## 2024-08-27 NOTE — ASSESSMENT & PLAN NOTE
resolving  Likely from COVID pneumonia  MRSA neg, Respiratory panel neg except for covid, proocal neg  Currently on broad secptrum antibiotics which were discontinued once bacteria infection is ruled out  Currently on treatment for COVID with steroid and ramdesivir. (COVID + 2 days before the admission)  Repeat chest x-ray as needed to re-evaluate

## 2024-08-27 NOTE — SUBJECTIVE & OBJECTIVE
Interval History: patient said he is feeling better except for his chronic neuropathic pain in leg after surgery    Review of Systems   All other systems reviewed and are negative.    Objective:     Vital Signs (Most Recent):  Temp: 97.3 °F (36.3 °C) (08/27/24 1200)  Pulse: 72 (08/27/24 1338)  Resp: 16 (08/27/24 1338)  BP: 125/65 (08/27/24 1200)  SpO2: 96 % (08/27/24 1338) Vital Signs (24h Range):  Temp:  [97.3 °F (36.3 °C)-98.4 °F (36.9 °C)] 97.3 °F (36.3 °C)  Pulse:  [0-82] 72  Resp:  [14-24] 16  SpO2:  [93 %-100 %] 96 %  BP: (120-136)/(65-82) 125/65     Weight: 108.9 kg (240 lb)  Body mass index is 30.81 kg/m².    Intake/Output Summary (Last 24 hours) at 8/27/2024 1543  Last data filed at 8/27/2024 0509  Gross per 24 hour   Intake --   Output 6450 ml   Net -6450 ml         Physical Exam  Cardiovascular:      Rate and Rhythm: Normal rate.      Pulses: Normal pulses.   Pulmonary:      Effort: Pulmonary effort is normal.   Abdominal:      General: Abdomen is flat. There is no distension.   Neurological:      Mental Status: He is alert and oriented to person, place, and time.             Significant Labs: All pertinent labs within the past 24 hours have been reviewed.  Recent Lab Results         08/27/24  1142   08/27/24  0605   08/27/24  0520   08/26/24  1721        Albumin     3.4         ALP     67         ALT     6         Anion Gap     7         AST     8         Baso #     0.04         Basophil %     0.5         BILIRUBIN TOTAL     0.3  Comment: For infants and newborns, interpretation of results should be based  on gestational age, weight and in agreement with clinical  observations.    Premature Infant recommended reference ranges:  Up to 24 hours.............<8.0 mg/dL  Up to 48 hours............<12.0 mg/dL  3-5 days..................<15.0 mg/dL  6-29 days.................<15.0 mg/dL           BUN     16         Calcium     8.7         Chloride     100         CO2     28         Creatinine     0.8          CRP     3.00  Comment: CRP-Normal Application expected values:   <1.0        mg/dL   Normal Range  1.0 - 5.0  mg/dL   Indicates mild inflammation  5.0 - 10.0 mg/dL   Indicates severe inflammation  >10.0        mg/dL   Represents serious processes and   frequently         indicates the presence of a bacterial   infection.            Differential Method     Automated         eGFR     >60.0         Eos #     0.1         Eos %     0.9         Glucose     160         Gran # (ANC)     4.9         Gran %     59.9         Hematocrit     33.4         Hemoglobin     10.8         Immature Grans (Abs)     0.04  Comment: Mild elevation in immature granulocytes is non specific and   can be seen in a variety of conditions including stress response,   acute inflammation, trauma and pregnancy. Correlation with other   laboratory and clinical findings is essential.           Immature Granulocytes     0.5         Lymph #     2.4         Lymph %     30.0         Magnesium      2.2         MCH     30.7         MCHC     32.3         MCV     95         Mono #     0.7         Mono %     8.2         MPV     9.2         nRBC     0         Phosphorus Level     3.2         Platelet Count     356         POC Glucose 185   159     187       Potassium     4.5         PROTEIN TOTAL     6.8         RBC     3.52         RDW     15.3         Sodium     135         WBC     8.09                 Significant Imaging: I have reviewed all pertinent imaging results/findings within the past 24 hours.  Imaging Results              CT Abdomen Pelvis With IV Contrast NO Oral Contrast (Final result)  Result time 08/24/24 12:47:56      Final result by Bala Coates MD (08/24/24 12:47:56)                   Impression:      1. Ill-defined right lower lobe alveolar infiltrate compatible with pneumonia.  Short-term CT follow-up is recommended to document resolution, as detailed above.  2. Minimal atelectasis or infiltrate at the left lung base.  3. Dilation of  jejunal loops with focal transition point in the left mid abdomen and normal caliber small bowel loops distal to that level.  Findings are suspicious for partial mechanical small bowel obstruction.  4. Additional details as above.      Electronically signed by: Bala Coates  Date:    08/24/2024  Time:    12:47               Narrative:    EXAMINATION:  CT ABDOMEN PELVIS WITH IV CONTRAST    CLINICAL HISTORY:  Abdominal abscess/infection suspected;.    TECHNIQUE:  Post infusion axial images were obtained from the lung bases to the pubic symphysis 100 cc nonionic contrast was utilized for the examination.    COMPARISON:  May 15, 2024    FINDINGS:  Heart size is normal.  Ill-defined right lower lobe alveolar infiltrate is compatible with pneumonia.  Nodular components are noted.  As such, short-term CT follow-up in 4-6 weeks to document resolution is recommended.  There is minimal atelectasis or infiltrate at the left lung base there are no pleural effusions.    Mild intrahepatic and extrahepatic biliary dilation is unchanged.  No hepatic mass or contour abnormality is identified.  The gallbladder is absent.  The spleen is normal in size and appearance.  Pancreatic duct dilation is stable.  No peripancreatic inflammatory reaction is demonstrated.    The kidneys and adrenal glands are within normal limits.  The aorta is normal in caliber.  Inferior vena cava filter is noted.    There is a moderate amount of retained fecal matter noted in the colon.  There is mild abnormal dilation of small bowel loops at midline in the upper abdomen, with perceived transition in the left mid abdomen (series 3, image 69).  No free air or free fluid is identified.  The appendix is normal.    Images of the pelvis demonstrate a normal appearing urinary bladder.  There is a mild amount of retained fecal matter in the rectum.  No free fluid or pathologic lymphadenopathy is identified.                                       X-Ray Chest AP  Portable (Final result)  Result time 08/24/24 12:18:36      Final result by Bala Coates MD (08/24/24 12:18:36)                   Impression:      1. New ill-defined patchy alveolar infiltrate in the right mid and lower lung compatible with pneumonia.  Mild atelectasis or infiltrate at the left lung base with tiny left pleural effusion.      Electronically signed by: Bala Coates  Date:    08/24/2024  Time:    12:18               Narrative:    EXAMINATION:  XR CHEST AP PORTABLE    CLINICAL HISTORY:  CHF;    COMPARISON:  May 2024    FINDINGS:  Two AP views demonstrate normal heart size.  The mediastinum is unremarkable.  Ill-defined patchy alveolar infiltrate in the right mid and lower lung is new compared to the prior study and compatible with pneumonia.  There is mild atelectasis or infiltrate at the left lung base.  Blunting of lateral costophrenic angle suggests a trace amount of left-sided pleural fluid.

## 2024-08-27 NOTE — RESPIRATORY THERAPY
08/26/24 1946   Patient Assessment/Suction   Level of Consciousness (AVPU) alert   Respiratory Effort Normal;Unlabored   Expansion/Accessory Muscles/Retractions no use of accessory muscles   All Lung Fields Breath Sounds Anterior:;clear;diminished   Rhythm/Pattern, Respiratory unlabored;pattern regular;depth regular;shortness of breath   Cough Frequency infrequent   PRE-TX-O2   Device (Oxygen Therapy) room air   SpO2 100 %   Pulse Oximetry Type Continuous   $ Pulse Oximetry - Multiple Charge Pulse Oximetry - Multiple   Pulse 72   Resp (!) 24   Aerosol Therapy   $ Aerosol Therapy Charges Aerosol Treatment   Daily Review of Necessity (SVN) completed   Respiratory Treatment Status (SVN) given   Treatment Route (SVN) mask;oxygen   Patient Position HOB elevated   Post Treatment Assessment (SVN) patient reports no change in breathing;breath sounds unchanged   Signs of Intolerance (SVN) none   Breath Sounds Post-Respiratory Treatment   Throughout All Fields Post-Treatment no change   Post-treatment Heart Rate (beats/min) 72   Post-treatment Resp Rate (breaths/min) 24   Education   $ Education 15 min;Bronchodilator;DME Oxygen

## 2024-08-27 NOTE — PLAN OF CARE
Problem: Occupational Therapy  Goal: Occupational Therapy Goal  Description: Goals to be met by: 9/15/2024     Patient will increase functional independence with ADLs by performing:    UE Dressing with Supervision.  LE Dressing with Supervision and Assistive Devices as needed.  Grooming while seated with Modified [place].  Toileting from bedside commode with Supervision for hygiene and clothing management.   Step transfer with Supervision  Toilet transfer to bedside commode with Supervision.    Outcome: Progressing

## 2024-08-27 NOTE — PROGRESS NOTES
[facility]  Progress Note    Patient Name: Kevan Colin Sr.  MRN: [id number]  Patient Class: IP- Inpatient   Admission Date: 8/24/2024  Length of Stay: 3 days  Attending Physician: Jojo Saini DO  Primary Care Provider: Jonathan Benton III, MD        Subjective:     Principal Problem:Sepsis        HPI:  Patient is a 69-year-old male with a past medical history of COPD, CAD, DM 2, GERD, hyperlipidemia, hypertension, previous DVT and PE on Eliquis.  Patient presents to the ED today with complaints of abdominal pain, nausea, and vomiting.  Per EMS, patient was found to have hypoglycemia and was administered D10.  Patient also was given Zofran in route to hospital.  Patient was recently diagnosed with COVID 2 days ago.  Patient states he was also told he had some intra-abdominal infection and was prescribed antibiotics.  CT of the abdomen was completed in the ED which shows dilation of jejunal loops with focal transition point in the left mid abdomen and normal caliber small bowel loops distal to that level. Findings are suspicious for partial mechanical small bowel obstruction.  NG tube was not inserted in the ED due to resolved nausea and vomiting.  Patient also found to have bilateral lower lobe pneumonia.  While in the ED, patient became hypoxic in 80s and required O2 per nasal cannula.  Patient is generally not on O2 at home.  Patient was initiated on IV vancomycin, meropenem.  Patient does have continued residual cough, generalized fatigue, chills, and fever.  Patient denies headache, dizziness, current nausea or vomiting, diarrhea, or chest pain.  Patient does endorse intermittent constipation.    In the ED:  Lactic 0.95, WBC 11.63, hemoglobin 9.7, sodium 135, calcium 8.2, glucose 88 on arrival with a drop to 33, COVID positive, influenza negative, UA negative, troponin 4.9, lipase 12.    Overview/Hospital Course:   69-year-old male with a past medical history of  COPD, CAD, DM 2, GERD, hyperlipidemia, hypertension, previous DVT and PE on apixaban came in with complaints of abdominal pain, nausea, and vomiting and admitted for sepsis. Patient was recently diagnosed with COVID 2 days ago.  CT of the abdomen showed  suspicious for partial mechanical small bowel obstruction which has been resolved with bowl movement and repeat imaging.  Patient was also found to have bilateral lower lobe pneumonia and on broad secptrum Was on broad spectrum antibiotics which were discontinued as bacteria infection is ruled out as procal negative. For COVID treated with steroid and ramdesivir. Hardy was placed due to urinary retention and started on tamsulosin on 8/25 and are attempting void trial.    Interval History: patient said he is feeling better except for his chronic neuropathic pain in leg after surgery    Review of Systems   All other systems reviewed and are negative.    Objective:     Vital Signs (Most Recent):  Temp: 97.3 °F (36.3 °C) (08/27/24 1200)  Pulse: 72 (08/27/24 1338)  Resp: 16 (08/27/24 1338)  BP: 125/65 (08/27/24 1200)  SpO2: 96 % (08/27/24 1338) Vital Signs (24h Range):  Temp:  [97.3 °F (36.3 °C)-98.4 °F (36.9 °C)] 97.3 °F (36.3 °C)  Pulse:  [0-82] 72  Resp:  [14-24] 16  SpO2:  [93 %-100 %] 96 %  BP: (120-136)/(65-82) 125/65     Weight: 108.9 kg (240 lb)  Body mass index is 30.81 kg/m².    Intake/Output Summary (Last 24 hours) at 8/27/2024 1543  Last data filed at 8/27/2024 0509  Gross per 24 hour   Intake --   Output 6450 ml   Net -6450 ml         Physical Exam  Cardiovascular:      Rate and Rhythm: Normal rate.      Pulses: Normal pulses.   Pulmonary:      Effort: Pulmonary effort is normal.   Abdominal:      General: Abdomen is flat. There is no distension.   Neurological:      Mental Status: He is alert and oriented to person, place, and time.             Significant Labs: All pertinent labs within the past 24 hours have been reviewed.  Recent Lab Results          08/27/24  1142   08/27/24  0605   08/27/24  0520   08/26/24  1721        Albumin     3.4         ALP     67         ALT     6         Anion Gap     7         AST     8         Baso #     0.04         Basophil %     0.5         BILIRUBIN TOTAL     0.3  Comment: For infants and newborns, interpretation of results should be based  on gestational age, weight and in agreement with clinical  observations.    Premature Infant recommended reference ranges:  Up to 24 hours.............<8.0 mg/dL  Up to 48 hours............<12.0 mg/dL  3-5 days..................<15.0 mg/dL  6-29 days.................<15.0 mg/dL           BUN     16         Calcium     8.7         Chloride     100         CO2     28         Creatinine     0.8         CRP     3.00  Comment: CRP-Normal Application expected values:   <1.0        mg/dL   Normal Range  1.0 - 5.0  mg/dL   Indicates mild inflammation  5.0 - 10.0 mg/dL   Indicates severe inflammation  >10.0        mg/dL   Represents serious processes and   frequently         indicates the presence of a bacterial   infection.            Differential Method     Automated         eGFR     >60.0         Eos #     0.1         Eos %     0.9         Glucose     160         Gran # (ANC)     4.9         Gran %     59.9         Hematocrit     33.4         Hemoglobin     10.8         Immature Grans (Abs)     0.04  Comment: Mild elevation in immature granulocytes is non specific and   can be seen in a variety of conditions including stress response,   acute inflammation, trauma and pregnancy. Correlation with other   laboratory and clinical findings is essential.           Immature Granulocytes     0.5         Lymph #     2.4         Lymph %     30.0         Magnesium      2.2         MCH     30.7         MCHC     32.3         MCV     95         Mono #     0.7         Mono %     8.2         MPV     9.2         nRBC     0         Phosphorus Level     3.2         Platelet Count     356         POC Glucose 185    159     187       Potassium     4.5         PROTEIN TOTAL     6.8         RBC     3.52         RDW     15.3         Sodium     135         WBC     8.09                 Significant Imaging: I have reviewed all pertinent imaging results/findings within the past 24 hours.  Imaging Results              CT Abdomen Pelvis With IV Contrast NO Oral Contrast (Final result)  Result time 08/24/24 12:47:56      Final result by Bala Coates MD (08/24/24 12:47:56)                   Impression:      1. Ill-defined right lower lobe alveolar infiltrate compatible with pneumonia.  Short-term CT follow-up is recommended to document resolution, as detailed above.  2. Minimal atelectasis or infiltrate at the left lung base.  3. Dilation of jejunal loops with focal transition point in the left mid abdomen and normal caliber small bowel loops distal to that level.  Findings are suspicious for partial mechanical small bowel obstruction.  4. Additional details as above.      Electronically signed by: Bala Coates  Date:    08/24/2024  Time:    12:47               Narrative:    EXAMINATION:  CT ABDOMEN PELVIS WITH IV CONTRAST    CLINICAL HISTORY:  Abdominal abscess/infection suspected;.    TECHNIQUE:  Post infusion axial images were obtained from the lung bases to the pubic symphysis 100 cc nonionic contrast was utilized for the examination.    COMPARISON:  May 15, 2024    FINDINGS:  Heart size is normal.  Ill-defined right lower lobe alveolar infiltrate is compatible with pneumonia.  Nodular components are noted.  As such, short-term CT follow-up in 4-6 weeks to document resolution is recommended.  There is minimal atelectasis or infiltrate at the left lung base there are no pleural effusions.    Mild intrahepatic and extrahepatic biliary dilation is unchanged.  No hepatic mass or contour abnormality is identified.  The gallbladder is absent.  The spleen is normal in size and appearance.  Pancreatic duct dilation is stable.  No  peripancreatic inflammatory reaction is demonstrated.    The kidneys and adrenal glands are within normal limits.  The aorta is normal in caliber.  Inferior vena cava filter is noted.    There is a moderate amount of retained fecal matter noted in the colon.  There is mild abnormal dilation of small bowel loops at midline in the upper abdomen, with perceived transition in the left mid abdomen (series 3, image 69).  No free air or free fluid is identified.  The appendix is normal.    Images of the pelvis demonstrate a normal appearing urinary bladder.  There is a mild amount of retained fecal matter in the rectum.  No free fluid or pathologic lymphadenopathy is identified.                                       X-Ray Chest AP Portable (Final result)  Result time 08/24/24 12:18:36      Final result by Bala Coates MD (08/24/24 12:18:36)                   Impression:      1. New ill-defined patchy alveolar infiltrate in the right mid and lower lung compatible with pneumonia.  Mild atelectasis or infiltrate at the left lung base with tiny left pleural effusion.      Electronically signed by: Bala Coates  Date:    08/24/2024  Time:    12:18               Narrative:    EXAMINATION:  XR CHEST AP PORTABLE    CLINICAL HISTORY:  CHF;    COMPARISON:  May 2024    FINDINGS:  Two AP views demonstrate normal heart size.  The mediastinum is unremarkable.  Ill-defined patchy alveolar infiltrate in the right mid and lower lung is new compared to the prior study and compatible with pneumonia.  There is mild atelectasis or infiltrate at the left lung base.  Blunting of lateral costophrenic angle suggests a trace amount of left-sided pleural fluid.                                        Assessment/Plan:      * Sepsis  resolving  Likely from COVID pneumonia  MRSA neg, Respiratory panel neg except for covid, proocal neg  Currently on broad secptrum antibiotics which were discontinued once bacteria infection is ruled  out  Currently on treatment for COVID with steroid and ramdesivir. (COVID + 2 days before the admission)  Repeat chest x-ray as needed to re-evaluate    Urinary retention  Hardy in on 8/25 and started tamsulosin on 8/25  Attempting void trial    Anemia  Most recent hemoglobin and hematocrit are listed below.  Recent Labs     08/24/24  1122   HGB 9.7*   HCT 31.3*     Monitor serial CBC: Daily  Transfuse PRBC if patient becomes hemodynamically unstable, symptomatic or H/H drops below 7/21.  Patient has not received any PRBC transfusions to date  Patient's anemia is currently stable    COVID-19  See under sepsis    Bilateral pneumonia  See under sepsis    Partial bowel obstruction  Resolved  CT of the abdomen showed  suspicious for partial mechanical small bowel obstruction which has been resolved with bowl movement and repeat imaging.    Type 2 diabetes mellitus with hypoglycemia  Hypoglycemia has been resolved  Currently on SSI and can add glargine home dose once glucose is stable    Chronic diastolic heart failure  EF 55-60%  Continue to monitor    Coronary artery disease involving native coronary artery without angina pectoris  Patient with known CAD,   Continue with medical managment      VTE Risk Mitigation (From admission, onward)           Ordered     enoxaparin injection 105 mg  2 times daily         08/24/24 1508     IP VTE HIGH RISK PATIENT  Once         08/24/24 1553     Place sequential compression device  Until discontinued         08/24/24 1553                    Discharge Planning   HOMAR: 8/28/2024     Code Status: Full Code   Is the patient medically ready for discharge?:     Reason for patient still in hospital (select all that apply): Treatment, PT / OT recommendations, and Pending disposition  Discharge Plan A: Home Health   Discharge Delays: None known at this time              Jojo Saini DO  Department of Hospital Medicine   Former[facility]

## 2024-08-28 ENCOUNTER — TELEPHONE (OUTPATIENT)
Dept: FAMILY MEDICINE | Facility: CLINIC | Age: 69
End: 2024-08-28
Payer: MEDICARE

## 2024-08-28 VITALS
WEIGHT: 240 LBS | DIASTOLIC BLOOD PRESSURE: 62 MMHG | OXYGEN SATURATION: 98 % | BODY MASS INDEX: 30.8 KG/M2 | HEART RATE: 80 BPM | RESPIRATION RATE: 18 BRPM | SYSTOLIC BLOOD PRESSURE: 132 MMHG | HEIGHT: 74 IN | TEMPERATURE: 98 F

## 2024-08-28 DIAGNOSIS — U07.1 COVID-19 VIRUS DETECTED: ICD-10-CM

## 2024-08-28 PROBLEM — K56.600 PARTIAL BOWEL OBSTRUCTION: Status: RESOLVED | Noted: 2024-08-24 | Resolved: 2024-08-28

## 2024-08-28 PROBLEM — J18.9 BILATERAL PNEUMONIA: Status: RESOLVED | Noted: 2024-08-24 | Resolved: 2024-08-28

## 2024-08-28 PROBLEM — R33.9 URINARY RETENTION: Status: RESOLVED | Noted: 2024-08-25 | Resolved: 2024-08-28

## 2024-08-28 PROBLEM — A41.9 SEPSIS: Status: RESOLVED | Noted: 2024-08-24 | Resolved: 2024-08-28

## 2024-08-28 LAB
ALBUMIN SERPL BCP-MCNC: 3.6 G/DL (ref 3.5–5.2)
ALP SERPL-CCNC: 72 U/L (ref 55–135)
ALT SERPL W/O P-5'-P-CCNC: 10 U/L (ref 10–44)
ANION GAP SERPL CALC-SCNC: 6 MMOL/L (ref 8–16)
AST SERPL-CCNC: 13 U/L (ref 10–40)
BASOPHILS # BLD AUTO: 0.03 K/UL (ref 0–0.2)
BASOPHILS NFR BLD: 0.4 % (ref 0–1.9)
BILIRUB SERPL-MCNC: 0.4 MG/DL (ref 0.1–1)
BUN SERPL-MCNC: 18 MG/DL (ref 8–23)
CALCIUM SERPL-MCNC: 8.9 MG/DL (ref 8.7–10.5)
CHLORIDE SERPL-SCNC: 99 MMOL/L (ref 95–110)
CO2 SERPL-SCNC: 30 MMOL/L (ref 23–29)
CREAT SERPL-MCNC: 0.8 MG/DL (ref 0.5–1.4)
DIFFERENTIAL METHOD BLD: ABNORMAL
EOSINOPHIL # BLD AUTO: 0.1 K/UL (ref 0–0.5)
EOSINOPHIL NFR BLD: 0.9 % (ref 0–8)
ERYTHROCYTE [DISTWIDTH] IN BLOOD BY AUTOMATED COUNT: 15.2 % (ref 11.5–14.5)
GFR SERPLBLD CREATININE-BSD FMLA CKD-EPI: >60 ML/MIN/1.73 M^2
GLUCOSE SERPL-MCNC: 146 MG/DL (ref 70–110)
GLUCOSE SERPL-MCNC: 157 MG/DL (ref 70–110)
GLUCOSE SERPL-MCNC: 202 MG/DL (ref 70–110)
HCT VFR BLD AUTO: 34.7 % (ref 40–54)
HGB BLD-MCNC: 11 G/DL (ref 14–18)
IMM GRANULOCYTES # BLD AUTO: 0.05 K/UL (ref 0–0.04)
IMM GRANULOCYTES NFR BLD AUTO: 0.6 % (ref 0–0.5)
L PNEUMO1 AG UR QL IA: NEGATIVE
LEGIONELLA SPECIMEN SOURCE: NORMAL
LYMPHOCYTES # BLD AUTO: 3.2 K/UL (ref 1–4.8)
LYMPHOCYTES NFR BLD: 38.3 % (ref 18–48)
MAGNESIUM SERPL-MCNC: 2.2 MG/DL (ref 1.6–2.6)
MCH RBC QN AUTO: 29.4 PG (ref 27–31)
MCHC RBC AUTO-ENTMCNC: 31.7 G/DL (ref 32–36)
MCV RBC AUTO: 93 FL (ref 82–98)
MONOCYTES # BLD AUTO: 0.8 K/UL (ref 0.3–1)
MONOCYTES NFR BLD: 9.7 % (ref 4–15)
NEUTROPHILS # BLD AUTO: 4.2 K/UL (ref 1.8–7.7)
NEUTROPHILS NFR BLD: 50.1 % (ref 38–73)
NRBC BLD-RTO: 0 /100 WBC
PHOSPHATE SERPL-MCNC: 3 MG/DL (ref 2.7–4.5)
PLATELET # BLD AUTO: 380 K/UL (ref 150–450)
PMV BLD AUTO: 9.2 FL (ref 9.2–12.9)
POTASSIUM SERPL-SCNC: 4.4 MMOL/L (ref 3.5–5.1)
PROT SERPL-MCNC: 7.1 G/DL (ref 6–8.4)
RBC # BLD AUTO: 3.74 M/UL (ref 4.6–6.2)
SODIUM SERPL-SCNC: 135 MMOL/L (ref 136–145)
WBC # BLD AUTO: 8.43 K/UL (ref 3.9–12.7)

## 2024-08-28 PROCEDURE — 94640 AIRWAY INHALATION TREATMENT: CPT

## 2024-08-28 PROCEDURE — 84100 ASSAY OF PHOSPHORUS: CPT | Performed by: PHYSICAL THERAPY ASSISTANT

## 2024-08-28 PROCEDURE — 25000003 PHARM REV CODE 250: Performed by: INTERNAL MEDICINE

## 2024-08-28 PROCEDURE — 85025 COMPLETE CBC W/AUTO DIFF WBC: CPT | Performed by: FAMILY MEDICINE

## 2024-08-28 PROCEDURE — 36415 COLL VENOUS BLD VENIPUNCTURE: CPT | Performed by: PHYSICAL THERAPY ASSISTANT

## 2024-08-28 PROCEDURE — 25000003 PHARM REV CODE 250: Performed by: PHYSICAL THERAPY ASSISTANT

## 2024-08-28 PROCEDURE — 63600175 PHARM REV CODE 636 W HCPCS: Performed by: PHYSICAL THERAPY ASSISTANT

## 2024-08-28 PROCEDURE — 97116 GAIT TRAINING THERAPY: CPT

## 2024-08-28 PROCEDURE — 25000003 PHARM REV CODE 250: Performed by: FAMILY MEDICINE

## 2024-08-28 PROCEDURE — 83735 ASSAY OF MAGNESIUM: CPT | Performed by: PHYSICAL THERAPY ASSISTANT

## 2024-08-28 PROCEDURE — 97535 SELF CARE MNGMENT TRAINING: CPT

## 2024-08-28 PROCEDURE — 25000242 PHARM REV CODE 250 ALT 637 W/ HCPCS: Performed by: PHYSICAL THERAPY ASSISTANT

## 2024-08-28 PROCEDURE — 94761 N-INVAS EAR/PLS OXIMETRY MLT: CPT

## 2024-08-28 PROCEDURE — 80053 COMPREHEN METABOLIC PANEL: CPT | Performed by: PHYSICAL THERAPY ASSISTANT

## 2024-08-28 PROCEDURE — 99900031 HC PATIENT EDUCATION (STAT)

## 2024-08-28 RX ORDER — OXYCODONE AND ACETAMINOPHEN 7.5; 325 MG/1; MG/1
1 TABLET ORAL EVERY 8 HOURS PRN
Qty: 21 TABLET | Refills: 0 | Status: SHIPPED | OUTPATIENT
Start: 2024-08-28 | End: 2024-09-05

## 2024-08-28 RX ORDER — FUROSEMIDE 20 MG/1
20 TABLET ORAL DAILY
Start: 2024-08-28 | End: 2024-10-30

## 2024-08-28 RX ORDER — FERROUS SULFATE 325(65) MG
325 TABLET, DELAYED RELEASE (ENTERIC COATED) ORAL
Qty: 12 TABLET | Refills: 0 | Status: SHIPPED | OUTPATIENT
Start: 2024-08-28

## 2024-08-28 RX ADMIN — DEXAMETHASONE 6 MG: 4 TABLET ORAL at 08:08

## 2024-08-28 RX ADMIN — CYANOCOBALAMIN TAB 1000 MCG 1000 MCG: 1000 TAB at 08:08

## 2024-08-28 RX ADMIN — GABAPENTIN 100 MG: 100 CAPSULE ORAL at 08:08

## 2024-08-28 RX ADMIN — OXYCODONE HYDROCHLORIDE AND ACETAMINOPHEN 1 TABLET: 10; 325 TABLET ORAL at 08:08

## 2024-08-28 RX ADMIN — IPRATROPIUM BROMIDE AND ALBUTEROL SULFATE 3 ML: 2.5; .5 SOLUTION RESPIRATORY (INHALATION) at 08:08

## 2024-08-28 RX ADMIN — IPRATROPIUM BROMIDE AND ALBUTEROL SULFATE 3 ML: 2.5; .5 SOLUTION RESPIRATORY (INHALATION) at 01:08

## 2024-08-28 RX ADMIN — SENNOSIDES AND DOCUSATE SODIUM 1 TABLET: 50; 8.6 TABLET ORAL at 08:08

## 2024-08-28 RX ADMIN — ENOXAPARIN SODIUM 105 MG: 120 INJECTION SUBCUTANEOUS at 08:08

## 2024-08-28 RX ADMIN — MUPIROCIN 1 G: 20 OINTMENT TOPICAL at 08:08

## 2024-08-28 RX ADMIN — REMDESIVIR 100 MG: 100 INJECTION, POWDER, LYOPHILIZED, FOR SOLUTION INTRAVENOUS at 01:08

## 2024-08-28 RX ADMIN — TAMSULOSIN HYDROCHLORIDE 0.8 MG: 0.4 CAPSULE ORAL at 08:08

## 2024-08-28 RX ADMIN — LAMOTRIGINE 50 MG: 25 TABLET ORAL at 08:08

## 2024-08-28 RX ADMIN — PANTOPRAZOLE SODIUM 40 MG: 40 TABLET, DELAYED RELEASE ORAL at 05:08

## 2024-08-28 NOTE — PLAN OF CARE
Pt clear for DC from case management standpoint. Discharging to home with [facility]. SOC 8/30/2024 . In basket request made for PCP to call pt to schedule f/u appt.        08/28/24 9081   Final Note   Assessment Type Final Discharge Note   Anticipated Discharge Disposition Home   Hospital Resources/Appts/Education Provided Appointment suggestion unavailable

## 2024-08-28 NOTE — ASSESSMENT & PLAN NOTE
Most recent hemoglobin and hematocrit are listed below.  Recent Labs     08/26/24  0301 08/27/24  0520 08/28/24  0505   HGB 9.5* 10.8* 11.0*   HCT 30.8* 33.4* 34.7*       Monitor serial CBC: Daily  Transfuse PRBC if patient becomes hemodynamically unstable, symptomatic or H/H drops below 7/21.  Patient has not received any PRBC transfusions to date  Patient's anemia is currently stable

## 2024-08-28 NOTE — RESPIRATORY THERAPY
08/28/24 0859   Patient Assessment/Suction   Level of Consciousness (AVPU) alert   Respiratory Effort Unlabored   All Lung Fields Breath Sounds diminished;clear   PRE-TX-O2   Device (Oxygen Therapy) room air   SpO2 97 %   Pulse Oximetry Type Intermittent   $ Pulse Oximetry - Multiple Charge Pulse Oximetry - Multiple   Pulse 76   Resp 18   Aerosol Therapy   $ Aerosol Therapy Charges Aerosol Treatment   Respiratory Treatment Status (SVN) given   Treatment Route (SVN) mask   Patient Position semi-Ashton's   Post Treatment Assessment (SVN) breath sounds unchanged   Signs of Intolerance (SVN) none   Breath Sounds Post-Respiratory Treatment   Post-treatment Heart Rate (beats/min) 77   Post-treatment Resp Rate (breaths/min) 18   Education   $ Education Bronchodilator;15 min

## 2024-08-28 NOTE — PLAN OF CARE
Per Duong Matthews , Pt already has DME Bench chair being delivered by [employer] to his home today .          08/28/24 3442   Post-Acute Status   Post-Acute Authorization LEONID

## 2024-08-28 NOTE — DISCHARGE SUMMARY
[facility] Medicine  Discharge Summary      Patient Name: Kevan Colin Sr.  MRN: [id number]  LIBBY: [id number]  Patient Class: IP- Inpatient  Admission Date: 8/24/2024  Hospital Length of Stay: 4 days  Discharge Date and Time:  08/28/2024 1:41 PM  Attending Physician: Jojo Saini DO   Discharging Provider: Jojo Saini DO  Primary Care Provider: Jonathan Benton III, MD    Primary Care Team: Networked reference to record PCT     HPI:   Patient is a 69-year-old male with a past medical history of COPD, CAD, DM 2, GERD, hyperlipidemia, hypertension, previous DVT and PE on Eliquis.  Patient presents to the ED today with complaints of abdominal pain, nausea, and vomiting.  Per EMS, patient was found to have hypoglycemia and was administered D10.  Patient also was given Zofran in route to hospital.  Patient was recently diagnosed with COVID 2 days ago.  Patient states he was also told he had some intra-abdominal infection and was prescribed antibiotics.  CT of the abdomen was completed in the ED which shows dilation of jejunal loops with focal transition point in the left mid abdomen and normal caliber small bowel loops distal to that level. Findings are suspicious for partial mechanical small bowel obstruction.  NG tube was not inserted in the ED due to resolved nausea and vomiting.  Patient also found to have bilateral lower lobe pneumonia.  While in the ED, patient became hypoxic in 80s and required O2 per nasal cannula.  Patient is generally not on O2 at home.  Patient was initiated on IV vancomycin, meropenem.  Patient does have continued residual cough, generalized fatigue, chills, and fever.  Patient denies headache, dizziness, current nausea or vomiting, diarrhea, or chest pain.  Patient does endorse intermittent constipation.    In the ED:  Lactic 0.95, WBC 11.63, hemoglobin 9.7, sodium 135, calcium 8.2, glucose 88 on arrival with a drop to 33, COVID positive, influenza  negative, UA negative, troponin 4.9, lipase 12.    * No surgery found *      Hospital Course:    69-year-old male with a past medical history of COPD, CAD, DM 2, GERD, hyperlipidemia, hypertension, previous DVT and PE on apixaban came in with complaints of abdominal pain, nausea, and vomiting and admitted for sepsis. Patient was recently diagnosed with COVID 2 days ago.  CT of the abdomen showed  suspicious for partial mechanical small bowel obstruction which has been resolved with bowl movement and repeat imaging.  Patient was also found to have bilateral lower lobe pneumonia and on broad secptrum Was on broad spectrum antibiotics which were discontinued as bacteria infection is ruled out as procal negative. For COVID treated with steroid and ramdesivir. Hardy was placed due to urinary retention and started on tamsulosin on 8/25 and passed void trial.  Medically stable to DC now that he has finished antibiotics and dexamethasone/remdesivir.     Goals of Care Treatment Preferences:  Code Status: Full Code      SDOH Screening:  The patient was screened for food insecurity, housing instability, transportation needs, utility difficulties, and interpersonal safety. The social determinant(s) of health identified as a concern this admission are:  Food insecurity        Social Determinants of Health with Concerns     Food Insecurity: Food Insecurity Present (8/25/2024)        Consults:     Cardiac/Vascular  Chronic diastolic heart failure  EF 55-60%  Continue to monitor    Coronary artery disease involving native coronary artery without angina pectoris  Patient with known CAD,   Continue with medical managment    ID  COVID-19  See under sepsis    Oncology  Anemia  Most recent hemoglobin and hematocrit are listed below.  Recent Labs     08/26/24  0301 08/27/24  0520 08/28/24  0505   HGB 9.5* 10.8* 11.0*   HCT 30.8* 33.4* 34.7*       Monitor serial CBC: Daily  Transfuse PRBC if patient becomes hemodynamically unstable,  "symptomatic or H/H drops below 7/21.  Patient has not received any PRBC transfusions to date  Patient's anemia is currently stable    Endocrine  Type 2 diabetes mellitus with hypoglycemia  Hypoglycemia has been resolved  Currently on SSI and can add glargine home dose once glucose is stable      Final Active Diagnoses:    Diagnosis Date Noted POA    COVID-19 [U07.1] 08/24/2024 Yes    Anemia [D64.9] 08/24/2024 Yes    Type 2 diabetes mellitus with hypoglycemia [E11.649] 11/19/2023 Yes    Chronic diastolic heart failure [I50.32] 01/07/2021 Yes    Coronary artery disease involving native coronary artery without angina pectoris [I25.10] 02/06/2016 Yes      Problems Resolved During this Admission:    Diagnosis Date Noted Date Resolved POA    PRINCIPAL PROBLEM:  Sepsis [A41.9] 08/24/2024 08/28/2024 Yes    Urinary retention [R33.9] 08/25/2024 08/28/2024 Yes    Partial bowel obstruction [K56.600] 08/24/2024 08/28/2024 Yes    Bilateral pneumonia [J18.9] 08/24/2024 08/28/2024 Yes       Discharged Condition: good    Disposition: Home-Health Care [facility] [place] [facility]    Follow Up:   Follow-up Information       Jonathan Benton III, MD Follow up in 1 week(s).    Specialty: Family Medicine  Contact information:  [address] [place], [place] [id number]  [phone]                           Patient Instructions:      BATH/SHOWER CHAIR FOR HOME USE     Order Specific Question Answer Comments   Height: 6' 2" (1.88 m)    Weight: 108.9 kg (240 lb)    Does patient have medical equipment at home? bedside commode pt reports rollator on order. has back brace he report is broken / pt reports rollator on order. has back brace he report is broken / pt reports rollator on order. has back brace he report is broken / pt reports rollator on order. has back brace he repor   Does patient have medical equipment at home? walker, rolling    Does patient have medical equipment at home? hip kit    Does patient have medical equipment at home? shower chair  " "  Length of need (1-99 months): 99    Type: Without back      Ambulatory referral/consult to Home Health   Standing Status: Future   Referral Priority: Routine Referral Type: Home Health   Referral Reason: Specialty Services Required   Requested Specialty: Home Health Services   Number of Visits Requested: 1     Notify your health care provider if you experience any of the following:  temperature >100.4     Notify your health care provider if you experience any of the following:  persistent nausea and vomiting or diarrhea     Notify your health care provider if you experience any of the following:  severe uncontrolled pain     Notify your health care provider if you experience any of the following:  persistent dizziness, light-headedness, or visual disturbances     Activity as tolerated       Significant Diagnostic Studies: Labs: BMP:   Recent Labs   Lab 08/27/24  0520 08/28/24  0505   * 146*   * 135*   K 4.5 4.4    99   CO2 28 30*   BUN 16 18   CREATININE 0.8 0.8   CALCIUM 8.7 8.9   MG 2.2 2.2   , CMP   Recent Labs   Lab 08/27/24  0520 08/28/24  0505   * 135*   K 4.5 4.4    99   CO2 28 30*   * 146*   BUN 16 18   CREATININE 0.8 0.8   CALCIUM 8.7 8.9   PROT 6.8 7.1   ALBUMIN 3.4* 3.6   BILITOT 0.3 0.4   ALKPHOS 67 72   AST 8* 13   ALT 6* 10   ANIONGAP 7* 6*   , CBC   Recent Labs   Lab 08/27/24  0520 08/28/24  0505   WBC 8.09 8.43   HGB 10.8* 11.0*   HCT 33.4* 34.7*    380   , INR   Lab Results   Component Value Date    INR 1.0 08/24/2024    INR 1.0 11/01/2022    INR 1.1 12/01/2021   , Lipid Panel   Lab Results   Component Value Date    CHOL 115 (L) 05/17/2024    HDL 31 (L) 05/17/2024    LDLCALC 55.8 (L) 05/17/2024    TRIG 141 05/17/2024    CHOLHDL 27.0 05/17/2024   , Troponin No results for input(s): "TROPONINI" in the last 168 hours., and A1C:   Recent Labs   Lab 03/06/24  0907 05/28/24  1137 08/25/24  0635   HGBA1C 8.9* 7.3* 7.7*     Radiology:   Imaging Results         "      CT Abdomen Pelvis With IV Contrast NO Oral Contrast (Final result)  Result time 08/24/24 12:47:56      Final result by Bala Coates MD (08/24/24 12:47:56)                   Impression:      1. Ill-defined right lower lobe alveolar infiltrate compatible with pneumonia.  Short-term CT follow-up is recommended to document resolution, as detailed above.  2. Minimal atelectasis or infiltrate at the left lung base.  3. Dilation of jejunal loops with focal transition point in the left mid abdomen and normal caliber small bowel loops distal to that level.  Findings are suspicious for partial mechanical small bowel obstruction.  4. Additional details as above.      Electronically signed by: Bala Coates  Date:    08/24/2024  Time:    12:47               Narrative:    EXAMINATION:  CT ABDOMEN PELVIS WITH IV CONTRAST    CLINICAL HISTORY:  Abdominal abscess/infection suspected;.    TECHNIQUE:  Post infusion axial images were obtained from the lung bases to the pubic symphysis 100 cc nonionic contrast was utilized for the examination.    COMPARISON:  May 15, 2024    FINDINGS:  Heart size is normal.  Ill-defined right lower lobe alveolar infiltrate is compatible with pneumonia.  Nodular components are noted.  As such, short-term CT follow-up in 4-6 weeks to document resolution is recommended.  There is minimal atelectasis or infiltrate at the left lung base there are no pleural effusions.    Mild intrahepatic and extrahepatic biliary dilation is unchanged.  No hepatic mass or contour abnormality is identified.  The gallbladder is absent.  The spleen is normal in size and appearance.  Pancreatic duct dilation is stable.  No peripancreatic inflammatory reaction is demonstrated.    The kidneys and adrenal glands are within normal limits.  The aorta is normal in caliber.  Inferior vena cava filter is noted.    There is a moderate amount of retained fecal matter noted in the colon.  There is mild abnormal dilation of  small bowel loops at midline in the upper abdomen, with perceived transition in the left mid abdomen (series 3, image 69).  No free air or free fluid is identified.  The appendix is normal.    Images of the pelvis demonstrate a normal appearing urinary bladder.  There is a mild amount of retained fecal matter in the rectum.  No free fluid or pathologic lymphadenopathy is identified.                                       X-Ray Chest AP Portable (Final result)  Result time 08/24/24 12:18:36      Final result by Bala Coates MD (08/24/24 12:18:36)                   Impression:      1. New ill-defined patchy alveolar infiltrate in the right mid and lower lung compatible with pneumonia.  Mild atelectasis or infiltrate at the left lung base with tiny left pleural effusion.      Electronically signed by: Bala Coates  Date:    08/24/2024  Time:    12:18               Narrative:    EXAMINATION:  XR CHEST AP PORTABLE    CLINICAL HISTORY:  CHF;    COMPARISON:  May 2024    FINDINGS:  Two AP views demonstrate normal heart size.  The mediastinum is unremarkable.  Ill-defined patchy alveolar infiltrate in the right mid and lower lung is new compared to the prior study and compatible with pneumonia.  There is mild atelectasis or infiltrate at the left lung base.  Blunting of lateral costophrenic angle suggests a trace amount of left-sided pleural fluid.                                        Pending Diagnostic Studies:       Procedure Component Value Units Date/Time    Lactate Dehydrogenase [[id number]]     Order Status: Sent Lab Status: No result     Specimen: Blood            Medications:  Reconciled Home Medications:      Medication List        START taking these medications      ferrous sulfate 325 (65 FE) MG EC tablet  Take 1 tablet (325 mg total) by mouth every Mon, Wed, Fri.            CHANGE how you take these medications      furosemide 20 MG tablet  Commonly known as: LASIX  Take 1 tablet (20 mg total) by  mouth once daily.  What changed: how much to take     insulin lispro 100 unit/mL pen  Commonly known as: HumaLOG KwikPen Insulin  Inject 15 units with meals.  What changed:   how much to take  how to take this     oxyCODONE-acetaminophen 7.5-325 mg per tablet  Commonly known as: PERCOCET  Take 1 tablet by mouth every 8 (eight) hours as needed for Pain.  What changed:   when to take this  reasons to take this            CONTINUE taking these medications      albuterol 90 mcg/actuation inhaler  Commonly known as: PROVENTIL/VENTOLIN HFA  Inhale 2 puffs into the lungs every 4 (four) hours as needed for Shortness of Breath or Wheezing.     apixaban 5 mg Tab  Commonly known as: ELIQUIS  Take 1 tablet (5 mg total) by mouth 2 (two) times daily.     atorvastatin 40 MG tablet  Commonly known as: LIPITOR  Take 1 tablet (40 mg total) by mouth every evening.     blood sugar diagnostic Strp  To check BG 4 times daily, to use with insurance preferred meter     blood-glucose meter kit  To check BG 4 times daily, to use with insurance preferred meter     carvediloL 3.125 MG tablet  Commonly known as: COREG  Take 1 tablet (3.125 mg total) by mouth 2 (two) times daily.     clindamycin 300 MG capsule  Commonly known as: CLEOCIN  Take 1 capsule (300 mg total) by mouth 4 (four) times daily.     clonazePAM 1 MG tablet  Commonly known as: KlonoPIN  Take 1 mg by mouth nightly as needed for Anxiety.     cyclobenzaprine 5 MG tablet  Commonly known as: FLEXERIL  Take 5 mg by mouth 2 (two) times daily.     empagliflozin 25 mg tablet  Commonly known as: JARDIANCE  Take 1 tablet (25 mg total) by mouth once daily.     EPINEPHrine 0.3 mg/0.3 mL Atin  Commonly known as: EPIPEN  EpiPen 2-Helio 0.3 mg/0.3 mL injection, auto-injector     eszopiclone 2 MG Tab  Commonly known as: LUNESTA  Take 2 mg by mouth nightly as needed.     gabapentin 600 MG tablet  Commonly known as: NEURONTIN  Take 1 tablet (600 mg total) by mouth 3 (three) times daily.      lamoTRIgine 25 MG tablet  Commonly known as: LAMICTAL  Take 2 tablets (50 mg total) by mouth once daily.     LANTUS SOLOSTAR U-100 INSULIN 100 unit/mL (3 mL) Inpn pen  Generic drug: insulin glargine U-100 (Lantus)  Inject 40 Units into the skin once daily.     metFORMIN 1000 MG tablet  Commonly known as: GLUCOPHAGE  Take 1 tablet (1,000 mg total) by mouth 2 (two) times daily with meals.     ondansetron 4 MG Tbdl  Commonly known as: ZOFRAN-ODT  Take 1 tablet (4 mg total) by mouth every 6 (six) hours as needed (nausea).     pantoprazole 40 MG tablet  Commonly known as: PROTONIX  Take 1 tablet (40 mg total) by mouth 2 (two) times daily.     potassium chloride SA 20 MEQ tablet  Commonly known as: K-DUR,KLOR-CON  Take 20 mEq by mouth once daily.     QUEtiapine 300 MG Tab  Commonly known as: SEROQUEL  Take 1 tablet (300 mg total) by mouth every evening.     tamsulosin 0.4 mg Cap  Commonly known as: FLOMAX  Take 1 capsule (0.4 mg total) by mouth every evening. Take in evening.     TRINTELLIX 20 mg Tab  Generic drug: vortioxetine  Take 1 tablet (20 mg total) by mouth Daily.            STOP taking these medications      DEXCOM G7 SENSOR [place]  Generic drug: blood-glucose sensor              Indwelling Lines/Drains at time of discharge:   Lines/Drains/Airways       None                   Time spent on the discharge of patient: 32 minutes         Jojo Saini DO  Department of Hospital Medicine  [facility]

## 2024-08-28 NOTE — CARE UPDATE
08/27/24 2015   Patient Assessment/Suction   Level of Consciousness (AVPU) alert   Respiratory Effort Normal;Unlabored   Expansion/Accessory Muscles/Retractions no use of accessory muscles;no retractions;expansion symmetric   All Lung Fields Breath Sounds clear;equal bilaterally   Rhythm/Pattern, Respiratory no shortness of breath reported;pattern regular;unlabored   Cough Frequency no cough   PRE-TX-O2   Device (Oxygen Therapy) room air   SpO2 96 %   Pulse Oximetry Type Intermittent   $ Pulse Oximetry - Multiple Charge Pulse Oximetry - Multiple   Pulse 85   Resp 16   Aerosol Therapy   $ Aerosol Therapy Charges Aerosol Treatment   Daily Review of Necessity (SVN) completed   Respiratory Treatment Status (SVN) given   Treatment Route (SVN) mask;oxygen   Patient Position HOB elevated   Post Treatment Assessment (SVN) breath sounds unchanged   Signs of Intolerance (SVN) none   Breath Sounds Post-Respiratory Treatment   Throughout All Fields Post-Treatment All Fields   Throughout All Fields Post-Treatment no change   Post-treatment Heart Rate (beats/min) 70   Post-treatment Resp Rate (breaths/min) 20

## 2024-08-28 NOTE — PT/OT/SLP PROGRESS
Physical Therapy Treatment    Patient Name:  Kevan Colin Sr.   MRN:  [id number]    Recommendations:     Discharge Recommendations: Low Intensity Therapy  Discharge Equipment Recommendations: bath bench  Barriers to discharge: None    Assessment:     Kevan Colin Sr. is a 69 y.o. male admitted with a medical diagnosis of Sepsis.  He presents with the following impairments/functional limitations: weakness, impaired endurance, impaired self care skills, impaired functional mobility, gait instability, impaired balance, decreased lower extremity function, decreased safety awareness, orthopedic precautions. Patient is agreeable to participation with PT treatment. He requires SBA for supine to sit with LSO applied while sitting EOB. He requires CGA for sit to stand with RW. He ambulated 35' in room with RW and CGA. He declined further ambulation and returned to bed with bed alarm on and all needs met.     Rehab Prognosis: Good; patient would benefit from acute skilled PT services to address these deficits and reach maximum level of function.    Recent Surgery: * No surgery found *      Plan:     During this hospitalization, patient to be seen daily to address the identified rehab impairments via gait training, therapeutic activities, therapeutic exercises and progress toward the following goals:    Plan of Care Expires:  09/22/24    Subjective     Chief Complaint: none  Patient/Family Comments/goals: home today  Pain/Comfort:  Pain Rating 1:  (no pain verbalized)      Objective:     Communicated with AARTI Carreno prior to session.  Patient found HOB elevated with telemetry upon PT entry to room.     General Precautions: Standard, airborne, contact, droplet, fall  Orthopedic Precautions: spinal precautions  Braces: LSO  Respiratory Status: Room air     Functional Mobility:  Bed Mobility:     Supine to Sit: stand by assistance  Transfers:     Sit to Stand:  contact guard assistance with rolling walker  Gait: 35'  in room with RW and CGA      AM-PAC 6 CLICK MOBILITY  Turning over in bed (including adjusting bedclothes, sheets and blankets)?: 4  Sitting down on and standing up from a chair with arms (e.g., wheelchair, bedside commode, etc.): 4  Moving from lying on back to sitting on the side of the bed?: 4  Moving to and from a bed to a chair (including a wheelchair)?: 4  Need to walk in hospital room?: 4  Climbing 3-5 steps with a railing?: 2  Basic Mobility Total Score: 22       Treatment & Education:  Patient was educated on the importance of OOB activity and functional mobility to negate negative effects of prolonged bed rest during hospitalization, safe transfers and ambulation, and D/C planning     Patient left HOB elevated with all lines intact, call button in reach, and bed alarm on..    GOALS:   Multidisciplinary Problems       Physical Therapy Goals          Problem: Physical Therapy    Goal Priority Disciplines Outcome Goal Variances Interventions   Physical Therapy Goal     PT, PT/OT Progressing     Description: Goals to be met by: 24     Patient will increase functional independence with mobility by performin. Supine to sit with Supervision  2. Sit to stand transfer with Supervision  3. Bed to chair transfer with Supervision using Rolling Walker  4. Gait  x 150 feet with Supervision using Rolling Walker.                              Time Tracking:     PT Received On: 24  PT Start Time: 1056     PT Stop Time: 1110  PT Total Time (min): 14 min     Billable Minutes: Gait Training 14    Treatment Type: Treatment  PT/PTA: PT     Number of PTA visits since last PT visit: 0     2024

## 2024-08-28 NOTE — NURSING
Discharge instructions explained to patient. Patient verbalized understanding. Medications from pharmacy delivered and taxi called to transport patient home.

## 2024-08-28 NOTE — PT/OT/SLP PROGRESS
Occupational Therapy   Treatment    Name: Kevan Colin Sr.  MRN: [id number]  Admitting Diagnosis:  Sepsis       Recommendations:     Discharge Recommendations: Low Intensity Therapy  Discharge Equipment Recommendations:  bath bench  Barriers to discharge:  None    Assessment:     Kevan Colin Sr. is a 69 y.o. male with a medical diagnosis of Sepsis.  He presents with improving medical acuity and functional mobility. Patient participated in bed mobility, unsupported sitting EOB, UB dressing (don/doff LSO) sitting EOB, grooming standing at sink, functional mobility and ambulation using rolling walker. Performance deficits affecting function are weakness, impaired endurance, impaired self care skills, impaired functional mobility, gait instability, impaired balance, decreased safety awareness, decreased lower extremity function, decreased upper extremity function.     Rehab Prognosis:  Good; patient would benefit from acute skilled OT services to address these deficits and reach maximum level of function.       Plan:     Patient to be seen 5 x/week to address the above listed problems via self-care/home management, therapeutic activities, therapeutic exercises  Plan of Care Expires: 09/25/24  Plan of Care Reviewed with: patient    Subjective     Chief Complaint: General weakness  Patient/Family Comments/goals: Improved functional mobility and ADL independence.  Pain/Comfort:  Pain Rating 1: 0/10  Pain Rating Post-Intervention 1: 0/10    Objective:     Communicated with: nurse prior to session.  Patient found HOB elevated with telemetry upon OT entry to room.    General Precautions: Standard, fall, airborne, contact, droplet    Orthopedic Precautions:spinal precautions  Braces: LSO  Respiratory Status: Room air     Occupational Performance:     Bed Mobility:    Patient completed Scooting/Bridging with contact guard assistance  Patient completed Supine to Sit with contact guard assistance  Patient completed  Sit to Supine with contact guard assistance   Performed unsupported sitting EOB with contact guard assistance.    Functional Mobility/Transfers:  Patient completed Sit <> Stand Transfer with contact guard assistance  with  rolling walker   Functional Mobility: ambulated 25 feet in the hospital room and bathroom with contact guard assistance using rolling walker.    Activities of Daily Living:  Grooming: contact guard assistance to wash hands standing at sink.  Upper Body Dressing: contact guard assistance to don/doff LSO sitting EOB.      [facility] 6 Click ADL:      Treatment & Education:  Patient is making positive progress towards all ADLs.    Patient left HOB elevated with all lines intact and call button in reach    GOALS:   Multidisciplinary Problems       Occupational Therapy Goals          Problem: Occupational Therapy    Goal Priority Disciplines Outcome Interventions   Occupational Therapy Goal     OT, PT/OT Progressing    Description: Goals to be met by: 9/15/2024     Patient will increase functional independence with ADLs by performing:    UE Dressing with Supervision.  LE Dressing with Supervision and Assistive Devices as needed.  Grooming while seated with Modified [place].  Toileting from bedside commode with Supervision for hygiene and clothing management.   Step transfer with Supervision  Toilet transfer to bedside commode with Supervision.                         Time Tracking:     OT Date of Treatment: 08/28/24  OT Start Time: 1035  OT Stop Time: 1058  OT Total Time (min): 23 min    Billable Minutes:Self Care/Home Management 23    OT/TOSHIA: OT          8/28/2024

## 2024-08-29 LAB
BACTERIA BLD CULT: NORMAL
BACTERIA BLD CULT: NORMAL

## 2024-08-29 NOTE — PROGRESS NOTES
"[facility]  Progress Note    Patient Name: Kevan Colin Sr.  MRN: [id number]  Patient Class: IP- Inpatient   Admission Date: 6/8/2020  Length of Stay: 1 days  Attending Physician: Marixa Jon MD  Primary Care Provider: Faiza Ochoa NP        Subjective:     Principal Problem:Chest pain due to myocardial ischemia        HPI:  Mr. Colin presents today with complaints of chest pain for the last 2 weeks. It is moderate. It is associated with QUIÑONES, nausea, sweats, and dizziness. He denies fever, chills, V/D, cough, or LOC. Chest pain is absent at rest and described as a diffuse "tightening" with minimal exertion, walking a few steps or carrying heavy things. It does not radiate and subsides with rest. He has a history of HTN, IDDM2, CAD, COPD, HFrEF 35%, carotid stenosis, PE/DVT on eliquis, and chronic back pain on chronic opioids. He is a patient of Dr. Wheeler and had an angiogram in 2014 with patent coronaries and NM stress test in 2016. He has been out of his bentyl and protonix for about 2 weeks as well. Today, he went to see his pulmonologist for COPD and reported this pain and was sent to the ED for further work up. He has a healing ulcer to the left leg he states looks much better.    Overview/Hospital Course:  6/9/2020  I have had a little chest pain today that lasted a short time and 5/10 pain.   Pt will have his second part of the stress test tomorrow    6/10/2020  Mr Colin continues to has some 2/10 chest pain at rest and 6/10 with movement. I just feel that something is very wrong and I cannot continue like this.    6/11/2020  Pt has no complaints at present. He will have an EGD tomorrow Re dysphagia and an angiogram Re his abnormal Nuclear medicine study    Interval History: Pt will have an EGD and an angiogram, tomorrow?    Review of Systems   Constitutional: Positive for fatigue. Negative for diaphoresis.   HENT: Positive for trouble swallowing.    Eyes: " Patient is identified as having Systolic (HFrEF) heart failure that is Chronic. CHF is currently controlled. Latest ECHO performed and demonstrates- Results for orders placed in visit on 06/07/21    Echo    Interpretation Summary  · The left ventricle is normal in size with low normal systolic function.  · Normal right ventricular size with normal right ventricular systolic function.  · The estimated ejection fraction is 50%.  · Indeterminate left ventricular diastolic function.  · Mild mitral regurgitation.  · Normal central venous pressure (3 mmHg).  · The estimated PA systolic pressure is 31 mmHg.  . Continue Beta Blocker, ACE/ARB, Furosemide, and Aldactone , SGLT2, and monitor clinical status closely.     Continue current medications   Negative.    Respiratory: Positive for cough. Negative for shortness of breath and wheezing.    Cardiovascular: Positive for chest pain.   Gastrointestinal: Positive for abdominal pain. Negative for nausea and vomiting.   Endocrine: Negative.    Genitourinary: Negative.    Musculoskeletal: Positive for arthralgias and myalgias.   Neurological: Positive for weakness.   Hematological: Negative.    Psychiatric/Behavioral: Negative.      Objective:     Vital Signs (Most Recent):  Temp: 97.5 °F (36.4 °C) (06/11/20 1515)  Pulse: 63 (06/11/20 1515)  Resp: 20 (06/11/20 1515)  BP: 125/68 (06/11/20 1515)  SpO2: 96 % (06/11/20 1515) Vital Signs (24h Range):  Temp:  [97.5 °F (36.4 °C)-98.2 °F (36.8 °C)] 97.5 °F (36.4 °C)  Pulse:  [63-95] 63  Resp:  [16-20] 20  SpO2:  [90 %-98 %] 96 %  BP: (115-144)/(68-85) 125/68     Weight: 116.8 kg (257 lb 8 oz)  Body mass index is 33.06 kg/m².    Intake/Output Summary (Last 24 hours) at 6/11/2020 1645  Last data filed at 6/11/2020 1200  Gross per 24 hour   Intake 1080 ml   Output 5 ml   Net 1075 ml      Physical Exam   Constitutional: He is oriented to person, place, and time. No distress.   HENT:   Head: Normocephalic and atraumatic.   Eyes: Pupils are equal, round, and reactive to light.   Neck: Normal range of motion. Neck supple.   Cardiovascular: Normal rate and regular rhythm. Exam reveals gallop.   Pulmonary/Chest: Effort normal and breath sounds normal. He has no wheezes.   Abdominal: Soft. Bowel sounds are normal. He exhibits no distension. There is no tenderness.   Musculoskeletal: Normal range of motion.   Neurological: He is alert and oriented to person, place, and time.   Skin: Skin is warm. He is not diaphoretic.   Psychiatric: He has a normal mood and affect.       Significant Labs:   BMP:   Recent Labs   Lab 06/11/20  0449   *      K 3.7      CO2 27   BUN 10   CREATININE 0.9   CALCIUM 8.6*   MG 2.0     CBC: No results for input(s): WBC, HGB, HCT, PLT in the  last 48 hours.  CMP:   Recent Labs   Lab 06/10/20  0452 06/11/20  0449    141   K 3.7 3.7    105   CO2 29 27   * 142*   BUN 9 10   CREATININE 0.8 0.9   CALCIUM 9.0 8.6*   ANIONGAP 10 9   EGFRNONAA >60.0 >60.0     Cardiac Markers: No results for input(s): CKMB, MYOGLOBIN, BNP, TROPISTAT in the last 48 hours.  Coagulation: No results for input(s): PT, INR, APTT in the last 48 hours.  Lactic Acid: No results for input(s): LACTATE in the last 48 hours.  Lipid Panel: No results for input(s): CHOL, HDL, LDLCALC, TRIG, CHOLHDL in the last 48 hours.  POCT Glucose: No results for input(s): POCTGLUCOSE in the last 48 hours.  Troponin: No results for input(s): TROPONINI in the last 48 hours.  Urine Studies: No results for input(s): COLORU, APPEARANCEUA, PHUR, SPECGRAV, PROTEINUA, GLUCUA, KETONESU, BILIRUBINUA, OCCULTUA, NITRITE, UROBILINOGEN, LEUKOCYTESUR, RBCUA, WBCUA, BACTERIA, SQUAMEPITHEL, HYALINECASTS in the last 48 hours.    Invalid input(s): SUZIE    Significant Imaging: I have reviewed all pertinent imaging results/findings within the past 24 hours.      Assessment/Plan:      * Chest pain due to myocardial ischemia  Admit to cardiology B  Trend troponins  Continue ASA  NM stress test in AM     Pt has a positive ETT  With a reversible deficit and pain at rest.    P)  NTG .4 prn  Cardiology consulted  6/11/2020  Cardiology with preform a cardiac cath.  Continue medications and telemetry      Dysphgia with some abdominal pain    P)  EGD in the AM        Chronic, continuous use of opioids  Continue home opioids      Benign essential HTN  Continue home meds  monitor      Venous stasis ulcer  Consult wound care      Type 2 diabetes mellitus, uncontrolled  accuchecks ACHS with low dose ISS        VTE Risk Mitigation (From admission, onward)         Ordered     IP VTE HIGH RISK PATIENT  Once      06/08/20 1752     Place sequential compression device  Until discontinued      06/08/20 1752                       Beto Verma MD  Department of Hospital Medicine   Former[facility]

## 2024-08-30 ENCOUNTER — TELEPHONE (OUTPATIENT)
Dept: FAMILY MEDICINE | Facility: CLINIC | Age: 69
End: 2024-08-30
Payer: MEDICARE

## 2024-08-30 ENCOUNTER — PATIENT OUTREACH (OUTPATIENT)
Dept: ADMINISTRATIVE | Facility: CLINIC | Age: 69
End: 2024-08-30
Payer: MEDICARE

## 2024-08-30 NOTE — TELEPHONE ENCOUNTER
----- Message from Beena Tapia sent at 8/28/2024  4:10 PM CDT -----  Contact: Cheryle [phone]  Type: Needs Medical Advice  Who Called:  Pts wife Cheryle Slaughter Call Back Number: [phone] (home)     Additional Information: Calling to advise pt is home and out of the hospital. Pls call back

## 2024-08-30 NOTE — TELEPHONE ENCOUNTER
"Discharge Information     Discharge Date:  08/28/2024          Primary Discharge Diagnosis:  covid and pneummonia          How patient is feeling since discharge from the hospital?  Says he is doing a whole lot better          Medication & Order Review     Discharge Medication Review:    Medication reconciliation performed Yes   If no, state reason why not performed: N/A       Did patient have any difficulty/problems filling prescriptions?  No           If yes, state reason and steps taken to assist in resolving issue: N/A     Does patient have any questions regarding medications?  No           If yes, state question and steps taken to answer question:  N/A    Was Home Health and/or any equipment ordered for patient upon discharge?  No          Home Health No   If yes, has home health contacted patient and/or initiated services? N/A   Name of Home Health Agency N/A   Durable Medical Equipment (DME)  No (Example: walker, wheelchair, nebulizer)   If yes, has the DME provider contacted patient and delivered equipment? N/A    DME Company N/A     Red Flag Review     Was patient educated on "red flags" or things to watch for?  No       If yes:  "Red flags" patient was told to watch for:                                                  Other:           Is patient experiencing any red flags today (or any of these symptoms) (List examples of red flags specifically)?  No       Notes:  States he is doing a whole lot better                   If no:    Educated the patient on 3 "red flags" to watch for:       Other:                 Is patient experiencing any red flags today (or any of these symptoms) (List examples of red flags specifically)?  No      Notes:     Patient Education & Follow Up               Phone number patient will call if having any questions or problems:  Patient was able to state the phone number for [facility] accurately.    Appointment scheduled?  Yes      Follow-up/transition of care appointment, " including the date/time and location of your appointment:  Patient was able to state the follow-up appointment correctly.        Notes:            Provided patient with date, time and location of follow-up appointment if they do not have it:  Yes      Rescheduled transition of care appointment if the patient has a conflict:    Appointment re-scheduled?  No        Patient informed of this appointment?  Yes      Notes:            3 questions patient would like to ask physician during appointment:    none

## 2024-08-30 NOTE — PROGRESS NOTES
C3 nurse attempted to contact patient. The following occurred:   C3 nurse attempted to contact Kevan Colin Sr.  for a TCC post hospital discharge follow up call. The patient is unable to conduct the call @ this time. The patient requested a callback.    The patient does not have a scheduled HOSFU appointment within 5-7 days post hospital discharge date 08/28/2024. Message sent to Physician staff to assist with HOSFU appointment scheduling.

## 2024-08-31 LAB
OHS QRS DURATION: 82 MS
OHS QTC CALCULATION: 402 MS

## 2024-09-05 ENCOUNTER — OFFICE VISIT (OUTPATIENT)
Dept: FAMILY MEDICINE | Facility: CLINIC | Age: 69
End: 2024-09-05
Payer: MEDICARE

## 2024-09-05 VITALS
WEIGHT: 237.56 LBS | HEART RATE: 97 BPM | OXYGEN SATURATION: 98 % | SYSTOLIC BLOOD PRESSURE: 96 MMHG | DIASTOLIC BLOOD PRESSURE: 60 MMHG | BODY MASS INDEX: 30.49 KG/M2 | TEMPERATURE: 99 F | HEIGHT: 74 IN

## 2024-09-05 DIAGNOSIS — Z98.890 HISTORY OF LUMBAR SURGERY: ICD-10-CM

## 2024-09-05 DIAGNOSIS — R11.2 NAUSEA AND VOMITING, UNSPECIFIED VOMITING TYPE: ICD-10-CM

## 2024-09-05 DIAGNOSIS — U07.1 COVID-19: ICD-10-CM

## 2024-09-05 DIAGNOSIS — I25.10 CORONARY ARTERY DISEASE INVOLVING NATIVE CORONARY ARTERY OF NATIVE HEART WITHOUT ANGINA PECTORIS: ICD-10-CM

## 2024-09-05 DIAGNOSIS — Z79.01 ANTICOAGULANT LONG-TERM USE: Primary | ICD-10-CM

## 2024-09-05 DIAGNOSIS — R11.10 VOMITING, UNSPECIFIED VOMITING TYPE, UNSPECIFIED WHETHER NAUSEA PRESENT: ICD-10-CM

## 2024-09-05 DIAGNOSIS — J18.9 PNEUMONIA DUE TO INFECTIOUS ORGANISM, UNSPECIFIED LATERALITY, UNSPECIFIED PART OF LUNG: ICD-10-CM

## 2024-09-05 DIAGNOSIS — Z79.4 TYPE 2 DIABETES MELLITUS WITH DIABETIC POLYNEUROPATHY, WITH LONG-TERM CURRENT USE OF INSULIN: ICD-10-CM

## 2024-09-05 DIAGNOSIS — I10 ESSENTIAL HYPERTENSION: ICD-10-CM

## 2024-09-05 DIAGNOSIS — R42 DIZZINESS: ICD-10-CM

## 2024-09-05 DIAGNOSIS — I26.99 PULMONARY EMBOLISM, UNSPECIFIED CHRONICITY, UNSPECIFIED PULMONARY EMBOLISM TYPE, UNSPECIFIED WHETHER ACUTE COR PULMONALE PRESENT: ICD-10-CM

## 2024-09-05 DIAGNOSIS — E11.42 TYPE 2 DIABETES MELLITUS WITH DIABETIC POLYNEUROPATHY, WITH LONG-TERM CURRENT USE OF INSULIN: ICD-10-CM

## 2024-09-05 PROCEDURE — 99999 PR PBB SHADOW E&M-EST. PATIENT-LVL V: CPT | Mod: PBBFAC,HCNC,, | Performed by: FAMILY MEDICINE

## 2024-09-05 RX ORDER — ONDANSETRON 4 MG/1
4 TABLET, ORALLY DISINTEGRATING ORAL EVERY 6 HOURS PRN
Qty: 20 TABLET | Refills: 2 | Status: SHIPPED | OUTPATIENT
Start: 2024-09-05

## 2024-09-05 RX ORDER — QUETIAPINE FUMARATE 100 MG/1
100 TABLET, FILM COATED ORAL NIGHTLY
Qty: 30 TABLET | Refills: 2 | Status: SHIPPED | OUTPATIENT
Start: 2024-09-05

## 2024-09-05 NOTE — PROGRESS NOTES
SCRIBE #1 NOTE: I, Georges Hollins, am scribing for, and in the presence of,  Jonathan Benton III, MD. I have scribed the entire note.     Subjective:       Patient ID: Kevan Colin Sr. is a 69 y.o. male.    Chief Complaint: Follow-up (Hospital follow up) and Insomnia    Hospital follow-up.  Hospitalized August 24th through the 28th.  Dizziness COVID pneumonia.  COPD exacerbation.  Has coronary artery disease.  Type 2 diabetes on insulin sugar 135 today.  Hypertension is controlled.  Had previous DVT and PE.  He is anticoagulated.  BMI of 30.  They said his weight was up 16 lb when he is in the hospital come back down but we have for about the same weight now is prior to the hospitalization.  He is status post lumbar fusion late July by Dr. Torres.  Using Lasix 20 b.i.d. doing daily weights.  Ejection fraction 55-60%.  He is having insomnia issues.  He is off his Seroquel since he came out of rehab.  They do not know why it was discontinued.  Was in [facility].  Hemoglobin is 11 GFR is over 60.   Review of Systems    Objective:      Physical examination: Vital signs noted. No acute distress. No carotid bruit. Regular heart rate and rhythm. Lungs clear to auscultation bilaterally. Extremities without edema. 2+ pedal pulses.      Assessment:       1. Anticoagulant long-term use    2. Vomiting, unspecified vomiting type, unspecified whether nausea present    3. Nausea and vomiting, unspecified vomiting type    4. Pulmonary embolism, unspecified chronicity, unspecified pulmonary embolism type, unspecified whether acute cor pulmonale present    5. BMI 30.0-30.9,adult    6. Coronary artery disease involving native coronary artery of native heart without angina pectoris    7. Dizziness    8. History of lumbar surgery    9. Pneumonia due to infectious organism, unspecified laterality, unspecified part of lung    10. COVID-19    11. Essential hypertension    12. Type 2 diabetes mellitus with diabetic polyneuropathy,  with long-term current use of insulin        Plan:       Anticoagulant long-term use    Vomiting, unspecified vomiting type, unspecified whether nausea present  -     ondansetron (ZOFRAN-ODT) 4 MG TbDL; Take 1 tablet (4 mg total) by mouth every 6 (six) hours as needed (nausea).  Dispense: 20 tablet; Refill: 2    Nausea and vomiting, unspecified vomiting type  -     ondansetron (ZOFRAN-ODT) 4 MG TbDL; Take 1 tablet (4 mg total) by mouth every 6 (six) hours as needed (nausea).  Dispense: 20 tablet; Refill: 2    Pulmonary embolism, unspecified chronicity, unspecified pulmonary embolism type, unspecified whether acute cor pulmonale present  -     apixaban (ELIQUIS) 5 mg Tab; Take 1 tablet (5 mg total) by mouth 2 (two) times daily.  Dispense: 90 tablet; Refill: 1    BMI 30.0-30.9,adult    Coronary artery disease involving native coronary artery of native heart without angina pectoris    Dizziness    History of lumbar surgery    Pneumonia due to infectious organism, unspecified laterality, unspecified part of lung  -     X-Ray Chest PA And Lateral; Future; Expected date: 10/05/2024    COVID-19    Essential hypertension    Type 2 diabetes mellitus with diabetic polyneuropathy, with long-term current use of insulin  -     Microalbumin/Creatinine Ratio, Urine; Future; Expected date: 09/05/2024    Other orders  -     empagliflozin (JARDIANCE) 25 mg tablet; Take 1 tablet (25 mg total) by mouth once daily.  Dispense: 90 tablet; Refill: 1  -     QUEtiapine (SEROQUEL) 100 MG Tab; Take 1 tablet (100 mg total) by mouth every evening.  Dispense: 30 tablet; Refill: 2    Refill his Eliquis.  Refill his Jardiance.  Refill Zofran.  Microalbumin ordered.  Follow-up with psychiatry.  Try Seroquel 100 HS in the meantime.  Chest x-ray in one-month see me.  All care gaps addressed or discussed.     IJonathan MD, personally performed the services described in this documentation. All medical record entries made by the scribe were at my  direction and in my presence. I have reviewed the chart and agree that the record reflects my personal performance and is accurate and complete.

## 2024-09-08 ENCOUNTER — HOSPITAL ENCOUNTER (EMERGENCY)
Facility: HOSPITAL | Age: 69
Discharge: HOME | End: 2024-09-08
Attending: EMERGENCY MEDICINE
Payer: MEDICARE

## 2024-09-08 VITALS
RESPIRATION RATE: 20 BRPM | TEMPERATURE: 98 F | DIASTOLIC BLOOD PRESSURE: 82 MMHG | WEIGHT: 237 LBS | OXYGEN SATURATION: 98 % | HEART RATE: 87 BPM | HEIGHT: 74 IN | SYSTOLIC BLOOD PRESSURE: 142 MMHG | BODY MASS INDEX: 30.42 KG/M2

## 2024-09-08 DIAGNOSIS — T14.90XA TRAUMA: ICD-10-CM
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
## 2024-09-08 LAB
ALBUMIN SERPL BCP-MCNC: 3.3 G/DL (ref 3.5–5.2)
ALP SERPL-CCNC: 98 U/L (ref 55–135)
ALT SERPL W/O P-5'-P-CCNC: 10 U/L (ref 10–44)
ANION GAP SERPL CALC-SCNC: 12 MMOL/L (ref 8–16)
AST SERPL-CCNC: 11 U/L (ref 10–40)
BASOPHILS # BLD AUTO: 0.08 K/UL (ref 0–0.2)
BASOPHILS NFR BLD: 0.9 % (ref 0–1.9)
BILIRUB SERPL-MCNC: 0.3 MG/DL (ref 0.1–1)
BNP SERPL-MCNC: 70 PG/ML (ref 0–99)
BUN SERPL-MCNC: 16 MG/DL (ref 8–23)
CALCIUM SERPL-MCNC: 9.5 MG/DL (ref 8.7–10.5)
CHLORIDE SERPL-SCNC: 99 MMOL/L (ref 95–110)
CO2 SERPL-SCNC: 27 MMOL/L (ref 23–29)
CREAT SERPL-MCNC: 1.2 MG/DL (ref 0.5–1.4)
DIFFERENTIAL METHOD BLD: ABNORMAL
EOSINOPHIL # BLD AUTO: 0.5 K/UL (ref 0–0.5)
EOSINOPHIL NFR BLD: 5.8 % (ref 0–8)
ERYTHROCYTE [DISTWIDTH] IN BLOOD BY AUTOMATED COUNT: 15.4 % (ref 11.5–14.5)
EST. GFR  (NO RACE VARIABLE): >60 ML/MIN/1.73 M^2
GLUCOSE SERPL-MCNC: 145 MG/DL (ref 70–110)
HCT VFR BLD AUTO: 32.4 % (ref 40–54)
HGB BLD-MCNC: 10.1 G/DL (ref 14–18)
IMM GRANULOCYTES # BLD AUTO: 0.03 K/UL (ref 0–0.04)
IMM GRANULOCYTES NFR BLD AUTO: 0.3 % (ref 0–0.5)
LYMPHOCYTES # BLD AUTO: 2.2 K/UL (ref 1–4.8)
LYMPHOCYTES NFR BLD: 23.7 % (ref 18–48)
MCH RBC QN AUTO: 30 PG (ref 27–31)
MCHC RBC AUTO-ENTMCNC: 31.2 G/DL (ref 32–36)
MCV RBC AUTO: 96 FL (ref 82–98)
MONOCYTES # BLD AUTO: 0.9 K/UL (ref 0.3–1)
MONOCYTES NFR BLD: 9.4 % (ref 4–15)
NEUTROPHILS # BLD AUTO: 5.4 K/UL (ref 1.8–7.7)
NEUTROPHILS NFR BLD: 59.9 % (ref 38–73)
NRBC BLD-RTO: 0 /100 WBC
PLATELET # BLD AUTO: 261 K/UL (ref 150–450)
PMV BLD AUTO: 9.6 FL (ref 9.2–12.9)
POTASSIUM SERPL-SCNC: 4.2 MMOL/L (ref 3.5–5.1)
PROT SERPL-MCNC: 7.3 G/DL (ref 6–8.4)
RBC # BLD AUTO: 3.37 M/UL (ref 4.6–6.2)
SODIUM SERPL-SCNC: 138 MMOL/L (ref 136–145)
TROPONIN I SERPL DL<=0.01 NG/ML-MCNC: <0.006 NG/ML (ref 0–0.03)
TROPONIN I SERPL DL<=0.01 NG/ML-MCNC: <0.006 NG/ML (ref 0–0.03)
WBC # BLD AUTO: 9.07 K/UL (ref 3.9–12.7)

## 2024-09-08 PROCEDURE — 83880 ASSAY OF NATRIURETIC PEPTIDE: CPT | Performed by: EMERGENCY MEDICINE

## 2024-09-08 PROCEDURE — 85025 COMPLETE CBC W/AUTO DIFF WBC: CPT | Performed by: EMERGENCY MEDICINE

## 2024-09-08 PROCEDURE — 80053 COMPREHEN METABOLIC PANEL: CPT | Performed by: EMERGENCY MEDICINE

## 2024-09-08 PROCEDURE — 36415 COLL VENOUS BLD VENIPUNCTURE: CPT | Performed by: EMERGENCY MEDICINE

## 2024-09-08 PROCEDURE — 99285 EMERGENCY DEPT VISIT HI MDM: CPT | Mod: 25

## 2024-09-08 PROCEDURE — 93005 ELECTROCARDIOGRAM TRACING: CPT

## 2024-09-08 PROCEDURE — 84484 ASSAY OF TROPONIN QUANT: CPT | Performed by: EMERGENCY MEDICINE

## 2024-09-08 PROCEDURE — 84484 ASSAY OF TROPONIN QUANT: CPT | Mod: 91 | Performed by: EMERGENCY MEDICINE
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
## 2024-09-09 ENCOUNTER — PATIENT OUTREACH (OUTPATIENT)
Dept: EMERGENCY MEDICINE | Facility: HOSPITAL | Age: 69
End: 2024-09-09

## 2024-09-13 ENCOUNTER — LAB VISIT (OUTPATIENT)
Dept: LAB | Facility: HOSPITAL | Age: 69
End: 2024-09-13
Attending: PHYSICIAN ASSISTANT
Payer: MEDICARE

## 2024-09-13 ENCOUNTER — HOSPITAL ENCOUNTER (OUTPATIENT)
Dept: RADIOLOGY | Facility: HOSPITAL | Age: 69
Discharge: HOME | End: 2024-09-13
Attending: FAMILY MEDICINE
Payer: MEDICARE

## 2024-09-13 DIAGNOSIS — Z79.4 TYPE 2 DIABETES MELLITUS WITH HYPERGLYCEMIA, WITH LONG-TERM CURRENT USE OF INSULIN: ICD-10-CM

## 2024-09-13 DIAGNOSIS — J18.9 PNEUMONIA DUE TO INFECTIOUS ORGANISM, UNSPECIFIED LATERALITY, UNSPECIFIED PART OF LUNG: ICD-10-CM

## 2024-09-13 DIAGNOSIS — E11.65 TYPE 2 DIABETES MELLITUS WITH HYPERGLYCEMIA, WITH LONG-TERM CURRENT USE OF INSULIN: ICD-10-CM

## 2024-09-13 LAB
ESTIMATED AVG GLUCOSE: 163 MG/DL (ref 68–131)
HBA1C MFR BLD: 7.3 % (ref 4–5.6)

## 2024-09-13 PROCEDURE — 36415 COLL VENOUS BLD VENIPUNCTURE: CPT | Mod: HCNC,PO | Performed by: PHYSICIAN ASSISTANT

## 2024-09-13 PROCEDURE — 83036 HEMOGLOBIN GLYCOSYLATED A1C: CPT | Mod: HCNC | Performed by: PHYSICIAN ASSISTANT

## 2024-09-13 PROCEDURE — 71046 X-RAY EXAM CHEST 2 VIEWS: CPT | Mod: 26,,, | Performed by: RADIOLOGY

## 2024-09-13 PROCEDURE — 71046 X-RAY EXAM CHEST 2 VIEWS: CPT | Mod: TC

## 2024-09-17 ENCOUNTER — PATIENT OUTREACH (OUTPATIENT)
Dept: ADMINISTRATIVE | Facility: OTHER | Age: 69
End: 2024-09-17
Payer: MEDICARE

## 2024-09-18 ENCOUNTER — OFFICE VISIT (OUTPATIENT)
Dept: ENDOCRINOLOGY | Facility: CLINIC | Age: 69
End: 2024-09-18
Payer: MEDICARE

## 2024-09-18 VITALS
HEART RATE: 102 BPM | SYSTOLIC BLOOD PRESSURE: 100 MMHG | HEIGHT: 74 IN | WEIGHT: 235.69 LBS | BODY MASS INDEX: 30.25 KG/M2 | DIASTOLIC BLOOD PRESSURE: 62 MMHG | TEMPERATURE: 98 F | OXYGEN SATURATION: 97 %

## 2024-09-18 DIAGNOSIS — G20.A2 PARKINSON'S DISEASE WITHOUT DYSKINESIA, WITH FLUCTUATING MANIFESTATIONS: ICD-10-CM

## 2024-09-18 DIAGNOSIS — E66.9 OBESITY (BMI 30-39.9): ICD-10-CM

## 2024-09-18 DIAGNOSIS — E11.59 HYPERTENSION ASSOCIATED WITH TYPE 2 DIABETES MELLITUS: ICD-10-CM

## 2024-09-18 DIAGNOSIS — E11.65 TYPE 2 DIABETES MELLITUS WITH HYPERGLYCEMIA, WITH LONG-TERM CURRENT USE OF INSULIN: Primary | ICD-10-CM

## 2024-09-18 DIAGNOSIS — E11.42 TYPE 2 DIABETES MELLITUS WITH DIABETIC POLYNEUROPATHY, WITH LONG-TERM CURRENT USE OF INSULIN: ICD-10-CM

## 2024-09-18 DIAGNOSIS — I15.2 HYPERTENSION ASSOCIATED WITH TYPE 2 DIABETES MELLITUS: ICD-10-CM

## 2024-09-18 DIAGNOSIS — E11.69 HYPERLIPIDEMIA ASSOCIATED WITH TYPE 2 DIABETES MELLITUS: ICD-10-CM

## 2024-09-18 DIAGNOSIS — Z79.4 TYPE 2 DIABETES MELLITUS WITH HYPERGLYCEMIA, WITH LONG-TERM CURRENT USE OF INSULIN: Primary | ICD-10-CM

## 2024-09-18 DIAGNOSIS — I25.10 CORONARY ARTERY DISEASE INVOLVING NATIVE CORONARY ARTERY OF NATIVE HEART WITHOUT ANGINA PECTORIS: ICD-10-CM

## 2024-09-18 DIAGNOSIS — E78.5 HYPERLIPIDEMIA ASSOCIATED WITH TYPE 2 DIABETES MELLITUS: ICD-10-CM

## 2024-09-18 DIAGNOSIS — Z79.4 TYPE 2 DIABETES MELLITUS WITH DIABETIC POLYNEUROPATHY, WITH LONG-TERM CURRENT USE OF INSULIN: ICD-10-CM

## 2024-09-18 DIAGNOSIS — Z91.030 H/O BEE STING ALLERGY: ICD-10-CM

## 2024-09-18 PROCEDURE — 3288F FALL RISK ASSESSMENT DOCD: CPT | Mod: HCNC,CPTII,S$GLB, | Performed by: PHYSICIAN ASSISTANT

## 2024-09-18 PROCEDURE — 3051F HG A1C>EQUAL 7.0%<8.0%: CPT | Mod: HCNC,CPTII,S$GLB, | Performed by: PHYSICIAN ASSISTANT

## 2024-09-18 PROCEDURE — 3061F NEG MICROALBUMINURIA REV: CPT | Mod: HCNC,CPTII,S$GLB, | Performed by: PHYSICIAN ASSISTANT

## 2024-09-18 PROCEDURE — 3008F BODY MASS INDEX DOCD: CPT | Mod: HCNC,CPTII,S$GLB, | Performed by: PHYSICIAN ASSISTANT

## 2024-09-18 PROCEDURE — 1111F DSCHRG MED/CURRENT MED MERGE: CPT | Mod: HCNC,CPTII,S$GLB, | Performed by: PHYSICIAN ASSISTANT

## 2024-09-18 PROCEDURE — 99999 PR PBB SHADOW E&M-EST. PATIENT-LVL V: CPT | Mod: PBBFAC,HCNC,, | Performed by: PHYSICIAN ASSISTANT

## 2024-09-18 PROCEDURE — G2211 COMPLEX E/M VISIT ADD ON: HCPCS | Mod: HCNC,S$GLB,, | Performed by: PHYSICIAN ASSISTANT

## 2024-09-18 PROCEDURE — 1159F MED LIST DOCD IN RCRD: CPT | Mod: HCNC,CPTII,S$GLB, | Performed by: PHYSICIAN ASSISTANT

## 2024-09-18 PROCEDURE — 99213 OFFICE O/P EST LOW 20 MIN: CPT | Mod: HCNC,S$GLB,, | Performed by: PHYSICIAN ASSISTANT

## 2024-09-18 PROCEDURE — 1125F AMNT PAIN NOTED PAIN PRSNT: CPT | Mod: HCNC,CPTII,S$GLB, | Performed by: PHYSICIAN ASSISTANT

## 2024-09-18 PROCEDURE — 3074F SYST BP LT 130 MM HG: CPT | Mod: HCNC,CPTII,S$GLB, | Performed by: PHYSICIAN ASSISTANT

## 2024-09-18 PROCEDURE — 1100F PTFALLS ASSESS-DOCD GE2>/YR: CPT | Mod: HCNC,CPTII,S$GLB, | Performed by: PHYSICIAN ASSISTANT

## 2024-09-18 PROCEDURE — 1160F RVW MEDS BY RX/DR IN RCRD: CPT | Mod: HCNC,CPTII,S$GLB, | Performed by: PHYSICIAN ASSISTANT

## 2024-09-18 PROCEDURE — 3066F NEPHROPATHY DOC TX: CPT | Mod: HCNC,CPTII,S$GLB, | Performed by: PHYSICIAN ASSISTANT

## 2024-09-18 PROCEDURE — 3078F DIAST BP <80 MM HG: CPT | Mod: HCNC,CPTII,S$GLB, | Performed by: PHYSICIAN ASSISTANT

## 2024-09-18 RX ORDER — EPINEPHRINE 0.3 MG/.3ML
INJECTION SUBCUTANEOUS
Qty: 1 EACH | Refills: 1 | Status: SHIPPED | OUTPATIENT
Start: 2024-09-18

## 2024-09-18 RX ORDER — INSULIN PUMP SYRINGE, 3 ML
EACH MISCELLANEOUS
Qty: 1 EACH | Refills: 0 | Status: SHIPPED | OUTPATIENT
Start: 2024-09-18 | End: 2025-09-18

## 2024-09-18 NOTE — PROGRESS NOTES
CC: This 69 y.o. male presents for management of T2DM  and chronic conditions pending review including HTN, HLP    HPI: was diagnosed with T2DM 30 years ago. Arrives with his wife, Cheryle Villafana. Seen by Dr. Yu in .  EMS called one month ago for hypoglycemia, pt had COVID.   Family hx of DM: 2 sisters  Fhx of thyroid disease: none  Denies missing doses of DM medication.     Pt had ERCP in  and has not had any more episodes of vomiting.    hypoglycemia at home: none  monitoring BG at home:  Fastin-170    Diet:   2 meals daily.  Snacks on ice cream. Drinks sweet tea.    Exercise: PT 2x weekly for his back. Walks for 30 min bid.     CURRENT DM MEDS:  Metformin 1000 mg bid  Jardiance 25 mg qd  Humalog 15 u tid ac  Lantus 70 units qam    Standards of Care:  Eye exam:  Dr. Nicole q 6 mths.  Dr. Oliveira  Podiatry exam: PCP  DE: never    PMHx, PSHx: reviewed in epic. Parkinson's Disease.  Social Hx: no ETOH use. Former smoker.    Wt Readings from Last 15 Encounters:   24 106.9 kg (235 lb 10.8 oz)   24 107.5 kg (237 lb)   24 107.7 kg (237 lb 8.7 oz)   24 108.9 kg (240 lb)   24 108.9 kg (240 lb 1.3 oz)   08/15/24 108.3 kg (238 lb 13.9 oz)   24 101 kg (222 lb 10.6 oz)   24 101.6 kg (223 lb 15.8 oz)   24 100.2 kg (221 lb)   05/15/24 102 kg (224 lb 13.9 oz)   24 104.2 kg (229 lb 11.5 oz)   24 108.9 kg (240 lb 1.6 oz)   24 111 kg (244 lb 11.4 oz)   24 107.7 kg (237 lb 7 oz)   23 104.3 kg (230 lb)      ROS:   Gen: Appetite good, + wt gain 11 lbs, denies fatigue and weakness.  Skin: Skin is intact and heals well, no rashes, no hair changes  Eyes: Denies visual disturbances  Resp: no SOB or QUIÑONES, no cough  Cardiac: No palpitations, chest pain, no edema   GI: No nausea or vomiting, diarrhea, constipation, or abdominal pain.  /GYN: No nocturia, burning or pain.   MS/Neuro: Denies numbness/ tingling in BLE; Gait steady, speech  "clear  Psych: Denies drug/ETOH abuse, no hx of depression.  Other systems: negative.    /62 (BP Location: Right arm, Patient Position: Sitting, BP Method: Small (Manual))   Pulse 102   Temp 98.3 °F (36.8 °C) (Oral)   Ht 6' 2" (1.88 m)   Wt 106.9 kg (235 lb 10.8 oz)   SpO2 97%   BMI 30.26 kg/m²      PE:  GENERAL: Well developed, well nourished.  PSYCH: AAOx3, appropriate mood and affect, pleasant expression, conversant, appears relaxed, well groomed.   EYES: Conjunctiva, corneas clear  NECK: Supple, trachea midline,no thyromegaly or nodules  CHEST: Resp even and unlabored,   NEURO: Gait steady  SKIN: Skin warm and dry no acanthosis nigracans.  3/24  Foot Exam: no sores or macerations noted.     Protective Sensation (w/ 10 gram monofilament):  Right: Intact  Left: Intact    Visual Inspection:  Normal -  Bilateral, Nails Intact - without Evidence of Foot Deformity- Bilateral, Callus -  Bilateral, Dry Skin -  Bilateral, and Onychomycosis -  Bilateral    Pedal Pulses:   Right: Present  Left: Present    Posterior Tibialis Pulses:   Right:Present  Left: Present     Vibratory Sensation  Right:Positive  Left:Positive     Personally reviewed labs below:    Lab Visit on 09/13/2024   Component Date Value Ref Range Status    Hemoglobin A1C 09/13/2024 7.3 (H)  4.0 - 5.6 % Final    Comment: ADA Screening Guidelines:  5.7-6.4%  Consistent with prediabetes  >or=6.5%  Consistent with diabetes    High levels of fetal hemoglobin interfere with the HbA1C  assay. Heterozygous hemoglobin variants (HbS, HgC, etc)do  not significantly interfere with this assay.   However, presence of multiple variants may affect accuracy.      Estimated Avg Glucose 09/13/2024 163 (H)  68 - 131 mg/dL Final   Lab Visit on 09/13/2024   Component Date Value Ref Range Status    Microalbumin, Urine 09/13/2024 <7.0  <19.9 ug/mL Final    Creatinine, Urine 09/13/2024 25.5  23.0 - 375.0 mg/dL Final    Comment: The random urine reference ranges provided " were established   for 24 hour urine collections.  No reference ranges exist for  random urine specimens.  Correlate clinically.      Microalb/Creat Ratio 09/13/2024 Unable to calculate  0.0 - 30.0 ug/mg Final   Admission on 09/08/2024, Discharged on 09/08/2024   Component Date Value Ref Range Status    QRS Duration 09/08/2024 98  ms Final    [facility] QTC Calculation 09/08/2024 428  ms Final    WBC 09/08/2024 9.07  3.90 - 12.70 K/uL Final    RBC 09/08/2024 3.37 (L)  4.60 - 6.20 M/uL Final    Hemoglobin 09/08/2024 10.1 (L)  14.0 - 18.0 g/dL Final    Hematocrit 09/08/2024 32.4 (L)  40.0 - 54.0 % Final    MCV 09/08/2024 96  82 - 98 fL Final    MCH 09/08/2024 30.0  27.0 - 31.0 pg Final    MCHC 09/08/2024 31.2 (L)  32.0 - 36.0 g/dL Final    RDW 09/08/2024 15.4 (H)  11.5 - 14.5 % Final    Platelets 09/08/2024 261  150 - 450 K/uL Final    MPV 09/08/2024 9.6  9.2 - 12.9 fL Final    Immature Granulocytes 09/08/2024 0.3  0.0 - 0.5 % Final    Gran # (ANC) 09/08/2024 5.4  1.8 - 7.7 K/uL Final    Immature Grans (Abs) 09/08/2024 0.03  0.00 - 0.04 K/uL Final    Comment: Mild elevation in immature granulocytes is non specific and   can be seen in a variety of conditions including stress response,   acute inflammation, trauma and pregnancy. Correlation with other   laboratory and clinical findings is essential.      Lymph # 09/08/2024 2.2  1.0 - 4.8 K/uL Final    Mono # 09/08/2024 0.9  0.3 - 1.0 K/uL Final    Eos # 09/08/2024 0.5  0.0 - 0.5 K/uL Final    Baso # 09/08/2024 0.08  0.00 - 0.20 K/uL Final    nRBC 09/08/2024 0  0 /100 WBC Final    Gran % 09/08/2024 59.9  38.0 - 73.0 % Final    Lymph % 09/08/2024 23.7  18.0 - 48.0 % Final    Mono % 09/08/2024 9.4  4.0 - 15.0 % Final    Eosinophil % 09/08/2024 5.8  0.0 - 8.0 % Final    Basophil % 09/08/2024 0.9  0.0 - 1.9 % Final    Differential Method 09/08/2024 Automated   Final    Sodium 09/08/2024 138  136 - 145 mmol/L Final    Potassium 09/08/2024 4.2  3.5 - 5.1 mmol/L Final    Chloride  09/08/2024 99  95 - 110 mmol/L Final    CO2 09/08/2024 27  23 - 29 mmol/L Final    Glucose 09/08/2024 145 (H)  70 - 110 mg/dL Final    BUN 09/08/2024 16  8 - 23 mg/dL Final    Creatinine 09/08/2024 1.2  0.5 - 1.4 mg/dL Final    Calcium 09/08/2024 9.5  8.7 - 10.5 mg/dL Final    Total Protein 09/08/2024 7.3  6.0 - 8.4 g/dL Final    Albumin 09/08/2024 3.3 (L)  3.5 - 5.2 g/dL Final    Total Bilirubin 09/08/2024 0.3  0.1 - 1.0 mg/dL Final    Comment: For infants and newborns, interpretation of results should be based  on gestational age, weight and in agreement with clinical  observations.    Premature Infant recommended reference ranges:  Up to 24 hours.............<8.0 mg/dL  Up to 48 hours............<12.0 mg/dL  3-5 days..................<15.0 mg/dL  6-29 days.................<15.0 mg/dL      Alkaline Phosphatase 09/08/2024 98  55 - 135 U/L Final    AST 09/08/2024 11  10 - 40 U/L Final    ALT 09/08/2024 10  10 - 44 U/L Final    eGFR 09/08/2024 >60  >60 mL/min/1.73 m^2 Final    Anion Gap 09/08/2024 12  8 - 16 mmol/L Final    Troponin I 09/08/2024 <0.006  0.000 - 0.026 ng/mL Final    Comment: The reference interval for Troponin I represents the 99th percentile   cutoff   for our facility and is consistent with 3rd generation assay   performance.      BNP 09/08/2024 70  0 - 99 pg/mL Final    Values of less than 100 pg/ml are consistent with non-CHF populations.    Troponin I 09/08/2024 <0.006  0.000 - 0.026 ng/mL Final    Comment: The reference interval for Troponin I represents the 99th percentile   cutoff   for our facility and is consistent with 3rd generation assay   performance.     No results displayed because visit has over 200 results.            ASSESSMENT and PLAN:    1. Type 2 diabetes mellitus with hyperglycemia, with long-term current use of insulin  Ambulatory referral/consult to Diabetes Education    blood sugar diagnostic Strp    Hemoglobin A1C      2. Hypertension associated with type 2 diabetes  mellitus        3. Hyperlipidemia associated with type 2 diabetes mellitus        4. Coronary artery disease involving native coronary artery of native heart without angina pectoris        5. Parkinson's disease without dyskinesia, with fluctuating manifestations        6. Obesity (BMI 30-39.9)        7. H/O bee sting allergy  EPINEPHrine (EPIPEN) 0.3 mg/0.3 mL AtIn      8. Type 2 diabetes mellitus with diabetic polyneuropathy, with long-term current use of insulin  blood sugar diagnostic Strp         T2DM with hyperglycemia-  A1c is at goal.  Goal <7.5%    Continue current regimen.  Start dexcom.      Continue Basaglar to 70 units nightly.      Continue Humalog 15 units with meals.      Continue Jardiance and Metformin.      Discussed DM, progression of disease, long term complications, tx options.   Discussed A1c and BG goals.   Reviewed  hypoglycemia, s/s and appropriate tx.   Instructed to monitor BG and bring meter/ log to every clinic visit.   - takes ASA, ACEi, statin    HTN - controlled, continue meds as previously prescribed and monitor.     HLP - stable LDL , on statin therapy, LFTs WNL  CAD-stable-f/u w/ cardiology.  Parkinson's Disease-stable-f/u w/ neurology  Obesity-Body mass index is 30.26 kg/m². Increase exercise to 30 min qd.    Follow-Up  Referral to DE-dexcom   F/u in 6 mths w/ labs prior -a1c

## 2024-09-27 ENCOUNTER — PATIENT OUTREACH (OUTPATIENT)
Dept: DIABETES | Facility: CLINIC | Age: 69
End: 2024-09-27
Payer: MEDICARE

## 2024-09-27 NOTE — PATIENT INSTRUCTIONS
Called patient regarding appt on 10/2/24 to start/train for Dexcom G7.  Wife states he will use  instead of phone rakesh.  She knows to bring supplies to appt.

## 2024-10-02 ENCOUNTER — NUTRITION (OUTPATIENT)
Dept: DIABETES | Facility: CLINIC | Age: 69
End: 2024-10-02
Payer: MEDICARE

## 2024-10-02 VITALS — BODY MASS INDEX: 29.41 KG/M2 | WEIGHT: 229.06 LBS

## 2024-10-02 DIAGNOSIS — Z79.4 TYPE 2 DIABETES MELLITUS WITH HYPERGLYCEMIA, WITH LONG-TERM CURRENT USE OF INSULIN: ICD-10-CM

## 2024-10-02 DIAGNOSIS — E11.65 TYPE 2 DIABETES MELLITUS WITH HYPERGLYCEMIA, WITH LONG-TERM CURRENT USE OF INSULIN: ICD-10-CM

## 2024-10-02 PROCEDURE — G0108 DIAB MANAGE TRN  PER INDIV: HCPCS | Mod: HCNC,S$GLB,, | Performed by: NUTRITIONIST

## 2024-10-02 PROCEDURE — 99999 PR PBB SHADOW E&M-EST. PATIENT-LVL III: CPT | Mod: PBBFAC,HCNC,, | Performed by: NUTRITIONIST

## 2024-10-02 NOTE — PROGRESS NOTES
Diabetes Care Specialist Progress Note  Author: Belinda Hein RD  Date: 10/2/2024    Referral 3/6/24    Intake    Program Intake  Reason for Diabetes Program Visit:: Intervention  Type of Intervention:: Individual  Individual: Device Training  Device Training: Personal CGM (train/start Dexcom G7 via )  Current diabetes risk level:: high (T2DM Outcomes Risk=4.5)  Permission to speak with others about care:: yes (wife, Cheryle)    Current Diabetes Treatment: Oral Medications, Insulin  Oral Medication Type/Dose:  (Jardiance 25 mg daily; Metformin 1000 mg BID)  Method of insulin delivery?: Injections  Injection Type: Pens  Pen Type/Dose:  (Lantus 75 units AM; Humalog 15 units TIDWM)    Continuous Glucose Monitoring  Patient has CGM: Yes  Personal CGM type::  (start/train Dexcom G7 via  today)    Lab Results   Component Value Date    HGBA1C 7.3 (H) 09/13/2024     Wt:   103.9 jc=350#  BMI=29.41    Diabetes Self-Management Skills Assessment    Medication Skills Assessment  Patient is able to identify current diabetes medications, dosages, and appropriate timing of medications.: yes  Patient reports problems or concerns with current medication regimen.: no  Patient is  aware that some diabetes medications can cause low blood sugar?: Yes  Medication Skills Assessment Completed:: Yes  Assessment indicates:: Adequate understanding  Area of need?: No    Home Blood Glucose Monitoring  Patient states that blood sugar is checked at home daily.: yes  Monitoring Method:: personal continuous glucose monitor  Personal CGM type::  (start/train Dexcom G7 via  today)  Home Blood Glucose Monitoring Skills Assessment Completed: : Yes  Assessment indicates:: Instruction Needed  Area of need?: Yes     Assessment Summary and Plan    Based on today's diabetes care assessment, the following areas of need were identified:          10/2/2024    11:04 AM   Areas of Need   Medications/Current Diabetes Treatment No--pt  "continues to take meds instructed; no Ozempic   Home Blood Glucose Monitoring Yes--see Care Plan       Today's interventions were provided through individual discussion, instruction, and written materials were provided.      Patient verbalized understanding of instruction and written materials.  Pt was able to return back demonstration of instructions today. Patient understood key points, needs reinforcement and further instruction.     Diabetes Self-Management Care Plan:    Today's Diabetes Self-Management Care Plan was developed with Kevan's input. Kevan has agreed to work toward the following goal(s) to improve his/her overall diabetes control.      Care Plan: Diabetes Management   Updates made since 9/2/2024 12:00 AM     Problem: Blood Glucose Self-Monitoring         Goal: : Patient will use Dexcom G7 CGM to continuously monitor blood glucose levels daily    Start Date: 10/2/2024   Expected End Date: 1/2/2025   Priority: Level 2   Note:    Task: Patient agrees to use and understands  the importance of changing sensor every 10 days (with 12 hour bola period). Pt will use  to follow steps for reinsertion and activation     Task:   Pt aware that 30 minute warm up period required before CGM will provide BG levels and will check BG as needed manually     DEXCOM G7 CGM TRAINING  Dexcom G7 mobile apps downloaded to phone. Education was provided using "Quick Start Guide" and demo device per Dexcom protocol. [facility] data share set-up.    Patient will use Dexcom G7  to receive continuous glucose data.        Overview:  5 minute glucose reading updates, trending arrows, BG graph screens, reports screen,  connectivity and functions.   Menus: Trend graph, start sensor, enter BG, events, alerts, settings, replace sensor, stop sensor, and shutdown  Dexcom GThinkfuse mobile rakesh/ Settings:                          * Urgent Low: 55 mg/dL                          * Urgent Low Soon: On                "           * Low Alert: 80 mg/dL; Snooze 15 min                          * High Alert: 250 mg/dL; Snooze OFF                           * Signal Loss: OFF                          * Brief Sensor Issue: OFF     Reviewed additional setting options.  Patient paired sensor using 4-digit code listed on sensor cap..    Reviewed where to find sensor insertion time and expiration date.   Reviewed appropriate calibration techniques.  Reviewed sensor site selection. Patient selected and prepped site using aseptic technique, inserted sensor, applied overlay tape and started session.   Reviewed sensor removal from site. Discussed times to remove sensor per  guidelines include MRI or diatherapy.   Patient able to demonstrate without difficulty. Encouraged to review manual and/or training videos prior to starting another sensor.   Reviewed problem solving aspects of sensor transmission/variables that can disrupt RF transmission.  range 20 feet, but the first 3 hrs keep within 3 feet of transmitter.  Patient instructed on lag time of interstitial fluid from CBG and was advised to treat hypoglycemia and dose insulin based on SMBG values.  Dexcom technical support contact number given and examples of when to contact them discussed.       SUPPLIES FROM:  [employer]           Follow Up Plan     Follow up in about 2 weeks (around 10/16/2024) for f/u for first download of G7 reciever.    Today's care plan and follow up schedule was discussed with patient.  Kevan verbalized understanding of the care plan, goals, and agrees to follow up plan.        The patient was encouraged to communicate with his/her health care provider/physician and care team regarding his/her condition(s) and treatment.  I provided the patient with my contact information today and encouraged to contact me via phone or [facility] Patient Portal as needed.     Length of Visit   Total Time: 60 Minutes

## 2024-10-07 ENCOUNTER — OFFICE VISIT (OUTPATIENT)
Dept: PODIATRY | Facility: CLINIC | Age: 69
End: 2024-10-07
Payer: MEDICARE

## 2024-10-07 VITALS — HEIGHT: 74 IN | WEIGHT: 229.06 LBS | BODY MASS INDEX: 29.4 KG/M2

## 2024-10-07 DIAGNOSIS — E11.51 TYPE II DIABETES MELLITUS WITH PERIPHERAL CIRCULATORY DISORDER: ICD-10-CM

## 2024-10-07 DIAGNOSIS — B35.1 ONYCHOMYCOSIS DUE TO DERMATOPHYTE: ICD-10-CM

## 2024-10-07 DIAGNOSIS — E11.49 TYPE II DIABETES MELLITUS WITH NEUROLOGICAL MANIFESTATIONS: Primary | ICD-10-CM

## 2024-10-07 PROCEDURE — 3066F NEPHROPATHY DOC TX: CPT | Mod: HCNC,CPTII,S$GLB, | Performed by: PODIATRIST

## 2024-10-07 PROCEDURE — 1160F RVW MEDS BY RX/DR IN RCRD: CPT | Mod: HCNC,CPTII,S$GLB, | Performed by: PODIATRIST

## 2024-10-07 PROCEDURE — 3051F HG A1C>EQUAL 7.0%<8.0%: CPT | Mod: HCNC,CPTII,S$GLB, | Performed by: PODIATRIST

## 2024-10-07 PROCEDURE — 11721 DEBRIDE NAIL 6 OR MORE: CPT | Mod: Q9,HCNC,S$GLB, | Performed by: PODIATRIST

## 2024-10-07 PROCEDURE — 99999 PR PBB SHADOW E&M-EST. PATIENT-LVL IV: CPT | Mod: PBBFAC,HCNC,, | Performed by: PODIATRIST

## 2024-10-07 PROCEDURE — 1159F MED LIST DOCD IN RCRD: CPT | Mod: HCNC,CPTII,S$GLB, | Performed by: PODIATRIST

## 2024-10-07 PROCEDURE — 99203 OFFICE O/P NEW LOW 30 MIN: CPT | Mod: 25,HCNC,S$GLB, | Performed by: PODIATRIST

## 2024-10-07 PROCEDURE — 1126F AMNT PAIN NOTED NONE PRSNT: CPT | Mod: HCNC,CPTII,S$GLB, | Performed by: PODIATRIST

## 2024-10-07 PROCEDURE — 3061F NEG MICROALBUMINURIA REV: CPT | Mod: HCNC,CPTII,S$GLB, | Performed by: PODIATRIST

## 2024-10-07 PROCEDURE — 3288F FALL RISK ASSESSMENT DOCD: CPT | Mod: HCNC,CPTII,S$GLB, | Performed by: PODIATRIST

## 2024-10-07 PROCEDURE — 3008F BODY MASS INDEX DOCD: CPT | Mod: HCNC,CPTII,S$GLB, | Performed by: PODIATRIST

## 2024-10-07 PROCEDURE — 1101F PT FALLS ASSESS-DOCD LE1/YR: CPT | Mod: HCNC,CPTII,S$GLB, | Performed by: PODIATRIST

## 2024-10-07 RX ORDER — BLOOD-GLUCOSE METER
EACH MISCELLANEOUS
COMMUNITY
Start: 2024-09-18

## 2024-10-07 RX ORDER — OXYCODONE AND ACETAMINOPHEN 7.5; 325 MG/1; MG/1
1 TABLET ORAL EVERY 6 HOURS PRN
COMMUNITY
Start: 2024-09-20
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
## 2024-10-08 ENCOUNTER — OFFICE VISIT (OUTPATIENT)
Dept: FAMILY MEDICINE | Facility: CLINIC | Age: 69
End: 2024-10-08
Payer: MEDICARE

## 2024-10-08 VITALS
TEMPERATURE: 98 F | SYSTOLIC BLOOD PRESSURE: 118 MMHG | HEIGHT: 74 IN | WEIGHT: 242.13 LBS | DIASTOLIC BLOOD PRESSURE: 76 MMHG | BODY MASS INDEX: 31.07 KG/M2 | HEART RATE: 73 BPM | OXYGEN SATURATION: 93 %

## 2024-10-08 DIAGNOSIS — Z79.01 ANTICOAGULANT LONG-TERM USE: ICD-10-CM

## 2024-10-08 DIAGNOSIS — Z23 NEED FOR VACCINATION: ICD-10-CM

## 2024-10-08 DIAGNOSIS — I25.10 CORONARY ARTERY DISEASE INVOLVING NATIVE CORONARY ARTERY OF NATIVE HEART WITHOUT ANGINA PECTORIS: ICD-10-CM

## 2024-10-08 DIAGNOSIS — J69.0 ASPIRATION PNEUMONIA, UNSPECIFIED ASPIRATION PNEUMONIA TYPE, UNSPECIFIED LATERALITY, UNSPECIFIED PART OF LUNG: ICD-10-CM

## 2024-10-08 DIAGNOSIS — I26.99 PULMONARY EMBOLISM, UNSPECIFIED CHRONICITY, UNSPECIFIED PULMONARY EMBOLISM TYPE, UNSPECIFIED WHETHER ACUTE COR PULMONALE PRESENT: ICD-10-CM

## 2024-10-08 DIAGNOSIS — G20.A2 PARKINSON'S DISEASE WITHOUT DYSKINESIA, WITH FLUCTUATING MANIFESTATIONS: Chronic | ICD-10-CM

## 2024-10-08 DIAGNOSIS — Z98.1 S/P LUMBAR FUSION: ICD-10-CM

## 2024-10-08 DIAGNOSIS — E78.5 HYPERLIPIDEMIA, UNSPECIFIED HYPERLIPIDEMIA TYPE: ICD-10-CM

## 2024-10-08 DIAGNOSIS — E11.42 TYPE 2 DIABETES MELLITUS WITH DIABETIC POLYNEUROPATHY, WITH LONG-TERM CURRENT USE OF INSULIN: ICD-10-CM

## 2024-10-08 DIAGNOSIS — Z86.73 STATUS POST CVA: Primary | ICD-10-CM

## 2024-10-08 DIAGNOSIS — J18.9 PNEUMONIA DUE TO INFECTIOUS ORGANISM, UNSPECIFIED LATERALITY, UNSPECIFIED PART OF LUNG: ICD-10-CM

## 2024-10-08 DIAGNOSIS — Z79.4 TYPE 2 DIABETES MELLITUS WITH DIABETIC POLYNEUROPATHY, WITH LONG-TERM CURRENT USE OF INSULIN: ICD-10-CM

## 2024-10-08 PROCEDURE — 99999 PR PBB SHADOW E&M-EST. PATIENT-LVL V: CPT | Mod: PBBFAC,HCNC,, | Performed by: FAMILY MEDICINE

## 2024-10-08 RX ORDER — ESZOPICLONE 2 MG/1
2 TABLET, FILM COATED ORAL NIGHTLY
Qty: 30 TABLET | Refills: 0 | Status: SHIPPED | OUTPATIENT
Start: 2024-10-08 | End: 2024-11-07

## 2024-10-09 NOTE — PROGRESS NOTES
Subjective:       Patient ID: Kevan Colin Sr. is a 69 y.o. male.    Chief Complaint: Follow-up (1 month)    Prior CVA.  Parkinson's disease.  Anticoagulated.  Pulmonary embolus.  BMI of 31.  Coronary artery disease no anginal chest pain.  Seeing psychiatry November 7th.  Diabetes mellitus type 2 with polyneuropathy on insulin.  Pneumoniae is doing well.  Status post lumbar fusion.  Right side of the lower back is feeling better been high some pain on left side.  A1c is 7.3.  Has Dexcom but it has quit working.  They have put a call into the diabetic educator about this.    Physical examination.  Vital signs noted.  Neck without bruit no adenopathy.  Chest clear no wheezes or crackles.  Heart regular rate rhythm.  Some venous stasis changes of the lower extremities left greater than the right.  No significant edema today.        Objective:        Assessment:       1. Status post CVA    2. Anticoagulant long-term use    3. Pulmonary embolism, unspecified chronicity, unspecified pulmonary embolism type, unspecified whether acute cor pulmonale present    4. BMI 31.0-31.9,adult    5. Coronary artery disease involving native coronary artery of native heart without angina pectoris    6. Type 2 diabetes mellitus with diabetic polyneuropathy, with long-term current use of insulin    7. Pneumonia due to infectious organism, unspecified laterality, unspecified part of lung    8. S/P lumbar fusion    9. Parkinson's disease without dyskinesia, with fluctuating manifestations    10. Need for vaccination    11. Hyperlipidemia, unspecified hyperlipidemia type    12. Aspiration pneumonia, unspecified aspiration pneumonia type, unspecified laterality, unspecified part of lung        Plan:       Status post CVA    Anticoagulant long-term use    Pulmonary embolism, unspecified chronicity, unspecified pulmonary embolism type, unspecified whether acute cor pulmonale present    BMI 31.0-31.9,adult    Coronary artery disease  involving native coronary artery of native heart without angina pectoris    Type 2 diabetes mellitus with diabetic polyneuropathy, with long-term current use of insulin  -     HEMOGLOBIN A1C; Future; Expected date: 10/08/2024    Pneumonia due to infectious organism, unspecified laterality, unspecified part of lung    S/P lumbar fusion    Parkinson's disease without dyskinesia, with fluctuating manifestations    Need for vaccination  -     influenza (adjuvanted) (Fluad) 45 mcg/0.5 mL IM vaccine (> or = 66 yo) 0.5 mL    Hyperlipidemia, unspecified hyperlipidemia type  -     COMPREHENSIVE METABOLIC PANEL; Future; Expected date: 10/08/2024  -     LIPID PANEL; Future; Expected date: 10/08/2024    Aspiration pneumonia, unspecified aspiration pneumonia type, unspecified laterality, unspecified part of lung  -     X-Ray Chest PA And Lateral; Future; Expected date: 10/08/2024      Handicap tag.  Flu shot high-dose today.  Chest x-ray.  Continue same treatment for his diabetes.  See the diabetic  about the Dexcom problem today.  A1c in 4 months and follow-up then also CMP and lipids at that time.

## 2024-10-10 ENCOUNTER — HOSPITAL ENCOUNTER (OUTPATIENT)
Dept: RADIOLOGY | Facility: HOSPITAL | Age: 69
Discharge: HOME | End: 2024-10-10
Attending: FAMILY MEDICINE
Payer: MEDICARE

## 2024-10-10 DIAGNOSIS — J69.0 ASPIRATION PNEUMONIA, UNSPECIFIED ASPIRATION PNEUMONIA TYPE, UNSPECIFIED LATERALITY, UNSPECIFIED PART OF LUNG: ICD-10-CM

## 2024-10-10 PROCEDURE — 71046 X-RAY EXAM CHEST 2 VIEWS: CPT | Mod: 26,,, | Performed by: RADIOLOGY

## 2024-10-10 PROCEDURE — 71046 X-RAY EXAM CHEST 2 VIEWS: CPT | Mod: TC

## 2024-10-11 ENCOUNTER — TELEPHONE (OUTPATIENT)
Dept: FAMILY MEDICINE | Facility: CLINIC | Age: 69
End: 2024-10-11
Payer: MEDICARE

## 2024-10-11 NOTE — TELEPHONE ENCOUNTER
----- Message from Jonathan Benton MD sent at 10/10/2024 11:18 PM CDT -----  Chest x-ray one-month.  Call if any fever worsening cough.  FOLLOW-UP AS RECOMMENDED

## 2024-10-14 ENCOUNTER — HOSPITAL ENCOUNTER (OUTPATIENT)
Dept: RADIOLOGY | Facility: HOSPITAL | Age: 69
Discharge: HOME | End: 2024-10-14
Attending: NEUROLOGICAL SURGERY
Payer: MEDICARE

## 2024-10-14 DIAGNOSIS — M54.16 RADICULOPATHY, LUMBAR REGION: Primary | ICD-10-CM

## 2024-10-14 DIAGNOSIS — M54.16 RADICULOPATHY, LUMBAR REGION: ICD-10-CM

## 2024-10-14 PROCEDURE — 72100 X-RAY EXAM L-S SPINE 2/3 VWS: CPT | Mod: 26,,, | Performed by: RADIOLOGY

## 2024-10-14 PROCEDURE — 72100 X-RAY EXAM L-S SPINE 2/3 VWS: CPT | Mod: TC,PO

## 2024-10-16 ENCOUNTER — NUTRITION (OUTPATIENT)
Dept: DIABETES | Facility: CLINIC | Age: 69
End: 2024-10-16
Payer: MEDICARE

## 2024-10-16 DIAGNOSIS — E11.65 TYPE 2 DIABETES MELLITUS WITH HYPERGLYCEMIA, WITH LONG-TERM CURRENT USE OF INSULIN: Primary | ICD-10-CM

## 2024-10-16 DIAGNOSIS — Z79.4 TYPE 2 DIABETES MELLITUS WITH HYPERGLYCEMIA, WITH LONG-TERM CURRENT USE OF INSULIN: Primary | ICD-10-CM

## 2024-10-16 PROCEDURE — 99999 PR PBB SHADOW E&M-EST. PATIENT-LVL I: CPT | Mod: PBBFAC,HCNC,, | Performed by: NUTRITIONIST

## 2024-10-16 NOTE — PROGRESS NOTES
Diabetes Care Specialist Progress Note  Author: Belinda Hein RD  Date: 10/16/2024    Referral 3/6/24  Initial DM Assessment 3/20/24  Train/start Dexcom G7  10/2/24    Intake    Program Intake  Reason for Diabetes Program Visit:: Intervention  Type of Intervention:: Individual  Individual: Education  Education: Other (assist with changing Dexcom G7 sensor)  Current diabetes risk level:: high (T2DM Outcomes Risk=4.5)  Permission to speak with others about care:: yes (Cheryle)    Current Diabetes Treatment: Oral Medications, Insulin  Oral Medication Type/Dose:  (Jardiance 25 mg daily; Metformin 1000 mg BID)  Method of insulin delivery?: Injections  Injection Type: Pens  Pen Type/Dose:  (Lantus 75 units HS; Humalog 15 units TIDWM)    Continuous Glucose Monitoring  Patient has CGM: Yes  Personal CGM type::  (Dexcom G7 via )    Lab Results   Component Value Date    HGBA1C 7.3 (H) 09/13/2024       Diabetes Self-Management Skills Assessment    Medication Skills Assessment  Patient is able to identify current diabetes medications, dosages, and appropriate timing of medications.: yes  Patient reports problems or concerns with current medication regimen.: no  Patient is  aware that some diabetes medications can cause low blood sugar?: Yes  Medication Skills Assessment Completed:: Yes  Assessment indicates:: Adequate understanding  Area of need?: No    Home Blood Glucose Monitoring  Patient states that blood sugar is checked at home daily.: yes  Monitoring Method:: personal continuous glucose monitor  Personal CGM type::  (Dexcom G7 via )  Home Blood Glucose Monitoring Skills Assessment Completed: : Yes  Assessment indicates:: Instruction Needed  Area of need?: Yes    Assessment Summary and Plan    Based on today's diabetes care assessment, the following areas of need were identified:          10/16/2024   Areas of Need   Medications/Current Diabetes Treatment No   Home Blood Glucose Monitoring Yes--see Care  "Plan        Today's interventions were provided through individual discussion, instruction, and written materials were provided.      Patient verbalized understanding of instruction and written materials.  Pt was able to return back demonstration of instructions today. Patient understood key points, needs reinforcement and further instruction.     Diabetes Self-Management Care Plan:    Today's Diabetes Self-Management Care Plan was developed with Kevan's input. Kevan has agreed to work toward the following goal(s) to improve his/her overall diabetes control.      Care Plan: Diabetes Management   Updates made since 9/16/2024 12:00 AM       Problem: Blood Glucose Self-Monitoring         Goal: Patient will use Dexcom G7 CGM to continuously monitor blood glucose levels daily    Start Date: 10/2/2024   Expected End Date: 1/2/2025   This Visit's Progress: No change   Priority: Level 2   Note:    Task: Patient agrees to use and understands  the importance of changing sensor every 10 days (with 12 hour bola period). Pt will use  to follow steps for reinsertion and activation     Task:   Pt aware that 30 minute warm up period required before CGM will provide BG levels and will check BG as needed manually     10/16/24--Pt comes in today with initial sensor still on (14 days) but states device only worked for 4 days and stopped working.  Pt did not call Educator but states he tried calling Dexcom and was told to leave call back number and no one called him back.  When  was opened, message states "start new sensor" but apparently neither pt or Cheryle understood what to do despite training.  Made sure pt understands he can contact Educator with concerns at any time.  Also walked  pt through starting new sensor today.  Did download data but with only 3 1/2 days of data, this is not useful information.  So, walked through process of putting new sensor on; what to expect when sensor expires in 10- 10 1/2 days; and " importance of NOT leaving this device on for greater than 10 1/2 days.           Follow Up Plan     Follow up in about 2 weeks (around 10/30/2024) for Pt will come back to have Dexcom G7 reciever downloaded and this data can be sent to Carol for review and possible med changes.    Today's care plan and follow up schedule was discussed with patient.  Kevan verbalized understanding of the care plan, goals, and agrees to follow up plan.        The patient was encouraged to communicate with his/her health care provider/physician and care team regarding his/her condition(s) and treatment.  I provided the patient with my contact information today and encouraged to contact me via phone or [facility] Patient Portal as needed.     Length of Visit   Total Time: 40 Minutes

## 2024-10-28 DIAGNOSIS — R11.2 NAUSEA AND VOMITING, UNSPECIFIED VOMITING TYPE: ICD-10-CM

## 2024-10-28 DIAGNOSIS — R11.10 VOMITING, UNSPECIFIED VOMITING TYPE, UNSPECIFIED WHETHER NAUSEA PRESENT: ICD-10-CM

## 2024-10-28 DIAGNOSIS — I26.99 PULMONARY EMBOLISM, UNSPECIFIED CHRONICITY, UNSPECIFIED PULMONARY EMBOLISM TYPE, UNSPECIFIED WHETHER ACUTE COR PULMONALE PRESENT: ICD-10-CM

## 2024-10-28 RX ORDER — ONDANSETRON 4 MG/1
TABLET, ORALLY DISINTEGRATING ORAL
Qty: 20 TABLET | Refills: 2 | Status: SHIPPED | OUTPATIENT
Start: 2024-10-28

## 2024-10-28 RX ORDER — EMPAGLIFLOZIN 25 MG/1
25 TABLET, FILM COATED ORAL
Qty: 28 TABLET | Refills: 2 | Status: SHIPPED | OUTPATIENT
Start: 2024-10-28

## 2024-10-28 RX ORDER — APIXABAN 5 MG/1
5 TABLET, FILM COATED ORAL 2 TIMES DAILY
Qty: 56 TABLET | Refills: 2 | Status: SHIPPED | OUTPATIENT
Start: 2024-10-28

## 2024-10-29 DIAGNOSIS — I10 BENIGN ESSENTIAL HTN: ICD-10-CM

## 2024-10-29 DIAGNOSIS — I50.22 CHRONIC SYSTOLIC HEART FAILURE: ICD-10-CM

## 2024-10-30 ENCOUNTER — NURSE TRIAGE (OUTPATIENT)
Dept: ADMINISTRATIVE | Facility: CLINIC | Age: 69
End: 2024-10-30
Payer: MEDICARE

## 2024-10-30 DIAGNOSIS — I50.22 CHRONIC SYSTOLIC HEART FAILURE: ICD-10-CM

## 2024-10-30 DIAGNOSIS — I10 BENIGN ESSENTIAL HTN: ICD-10-CM

## 2024-10-30 RX ORDER — FUROSEMIDE 20 MG/1
20 TABLET ORAL
Qty: 90 TABLET | Refills: 3 | Status: SHIPPED | OUTPATIENT
Start: 2024-10-30 | End: 2024-10-30

## 2024-10-30 RX ORDER — FUROSEMIDE 20 MG/1
20 TABLET ORAL
Qty: 28 TABLET | Refills: 0 | Status: SHIPPED | OUTPATIENT
Start: 2024-10-30

## 2024-11-08 DIAGNOSIS — I10 BENIGN ESSENTIAL HTN: ICD-10-CM

## 2024-11-08 DIAGNOSIS — I50.22 CHRONIC SYSTOLIC HEART FAILURE: ICD-10-CM

## 2024-11-08 RX ORDER — FUROSEMIDE 20 MG/1
TABLET ORAL
Qty: 90 TABLET | Refills: 3 | Status: SHIPPED | OUTPATIENT
Start: 2024-11-08

## 2024-11-08 NOTE — TELEPHONE ENCOUNTER
No care due was identified.  Health [facility] Embedded Care Due Messages. Reference number: [id number].   11/08/2024 5:43:32 AM CST

## 2024-11-08 NOTE — TELEPHONE ENCOUNTER
Kevan Colin  is requesting a refill authorization.  Brief Assessment and Rationale for Refill:  Approve     Medication Therapy Plan:         Comments:     Note composed:8:28 AM 11/08/2024

## 2024-11-19 ENCOUNTER — TELEPHONE (OUTPATIENT)
Dept: FAMILY MEDICINE | Facility: CLINIC | Age: 69
End: 2024-11-19
Payer: MEDICARE

## 2024-11-19 NOTE — TELEPHONE ENCOUNTER
----- Message from Kusum sent at 11/18/2024  4:26 PM CST -----  Contact: pt wife ya  Type: Needs Medical Advice  Who Called:  pt leandro pandya  Best Call Back Number: [phone]   Additional Information: pt wife ya would like to get orders for rollator for pt .would like the large walker with the seat [employer] fax [phone]. Pt had back surgery on 7/25.please call

## 2024-11-22 ENCOUNTER — TELEPHONE (OUTPATIENT)
Dept: FAMILY MEDICINE | Facility: CLINIC | Age: 69
End: 2024-11-22
Payer: MEDICARE

## 2024-11-22 NOTE — TELEPHONE ENCOUNTER
Spoke with pts wife made apt for steroid inj [id number] dr diehl . Sent to [facility] to enter. Also told her she discuss rolater at apt.

## 2024-11-25 RX ORDER — QUETIAPINE FUMARATE 100 MG/1
100 TABLET, FILM COATED ORAL NIGHTLY
Qty: 28 TABLET | OUTPATIENT
Start: 2024-11-25

## 2024-11-25 RX ORDER — ESZOPICLONE 2 MG/1
2 TABLET, FILM COATED ORAL
Qty: 28 TABLET | OUTPATIENT
Start: 2024-11-25

## 2024-11-25 NOTE — LETTER
Dr. Benton - [address] [address]  Phone: [phone]  Fax: [phone] November 26, 2024     Patient: Kevan Colin Sr.   YOB: 1955   Date of Visit: 11/25/2024       To Whom It May Concern:     Mr. Colin is cleared for surgery, moderate risk.    If you have any questions or concerns, please don't hesitate to contact my office.    Sincerely,         Jonathan Benton III, MD

## 2024-11-25 NOTE — TELEPHONE ENCOUNTER
No care due was identified.  Health [facility] Embedded Care Due Messages. Reference number: [id number].   11/25/2024 4:07:42 PM CST

## 2024-12-09 ENCOUNTER — HOSPITAL ENCOUNTER (EMERGENCY)
Facility: HOSPITAL | Age: 69
Discharge: HOME | End: 2024-12-09
Attending: EMERGENCY MEDICINE
Payer: MEDICARE

## 2024-12-09 VITALS
TEMPERATURE: 98 F | BODY MASS INDEX: 30.8 KG/M2 | RESPIRATION RATE: 18 BRPM | HEART RATE: 71 BPM | OXYGEN SATURATION: 96 % | DIASTOLIC BLOOD PRESSURE: 90 MMHG | SYSTOLIC BLOOD PRESSURE: 158 MMHG | WEIGHT: 240 LBS | HEIGHT: 74 IN

## 2024-12-09 DIAGNOSIS — R93.89 ABNORMAL FINDING ON CT SCAN: ICD-10-CM

## 2024-12-09 DIAGNOSIS — R11.2 NAUSEA VOMITING AND DIARRHEA: Primary | ICD-10-CM

## 2024-12-09 DIAGNOSIS — R19.7 NAUSEA VOMITING AND DIARRHEA: Primary | ICD-10-CM

## 2024-12-09 LAB
ALBUMIN SERPL BCP-MCNC: 4.2 G/DL (ref 3.5–5.2)
ALP SERPL-CCNC: 93 U/L (ref 55–135)
ALT SERPL W/O P-5'-P-CCNC: 10 U/L (ref 10–44)
ANION GAP SERPL CALC-SCNC: 6 MMOL/L (ref 8–16)
AST SERPL-CCNC: 11 U/L (ref 10–40)
BACTERIA #/AREA URNS HPF: NORMAL /HPF
BASOPHILS # BLD AUTO: 0.07 K/UL (ref 0–0.2)
BASOPHILS NFR BLD: 1.1 % (ref 0–1.9)
BILIRUB SERPL-MCNC: 0.4 MG/DL (ref 0.1–1)
BILIRUB UR QL STRIP: NEGATIVE
BUN SERPL-MCNC: 12 MG/DL (ref 8–23)
C DIFF GDH STL QL: NEGATIVE
C DIFF TOX A+B STL QL IA: NEGATIVE
CALCIUM SERPL-MCNC: 8.9 MG/DL (ref 8.7–10.5)
CHLORIDE SERPL-SCNC: 105 MMOL/L (ref 95–110)
CLARITY UR: CLEAR
CO2 SERPL-SCNC: 26 MMOL/L (ref 23–29)
COLOR UR: COLORLESS
CREAT SERPL-MCNC: 1 MG/DL (ref 0.5–1.4)
CREAT SERPL-MCNC: 1 MG/DL (ref 0.5–1.4)
DIFFERENTIAL METHOD BLD: ABNORMAL
EOSINOPHIL # BLD AUTO: 0.4 K/UL (ref 0–0.5)
EOSINOPHIL NFR BLD: 6.8 % (ref 0–8)
ERYTHROCYTE [DISTWIDTH] IN BLOOD BY AUTOMATED COUNT: 14.9 % (ref 11.5–14.5)
EST. GFR  (NO RACE VARIABLE): >60 ML/MIN/1.73 M^2
GLUCOSE SERPL-MCNC: 209 MG/DL (ref 70–110)
GLUCOSE UR QL STRIP: ABNORMAL
HCT VFR BLD AUTO: 38.4 % (ref 40–54)
HGB BLD-MCNC: 12 G/DL (ref 14–18)
HGB UR QL STRIP: NEGATIVE
IMM GRANULOCYTES # BLD AUTO: 0.01 K/UL (ref 0–0.04)
IMM GRANULOCYTES NFR BLD AUTO: 0.2 % (ref 0–0.5)
KETONES UR QL STRIP: NEGATIVE
LEUKOCYTE ESTERASE UR QL STRIP: NEGATIVE
LIPASE SERPL-CCNC: 17 U/L (ref 4–60)
LYMPHOCYTES # BLD AUTO: 1.7 K/UL (ref 1–4.8)
LYMPHOCYTES NFR BLD: 27.8 % (ref 18–48)
MCH RBC QN AUTO: 29.1 PG (ref 27–31)
MCHC RBC AUTO-ENTMCNC: 31.3 G/DL (ref 32–36)
MCV RBC AUTO: 93 FL (ref 82–98)
MICROSCOPIC COMMENT: NORMAL
MONOCYTES # BLD AUTO: 0.6 K/UL (ref 0.3–1)
MONOCYTES NFR BLD: 9.3 % (ref 4–15)
NEUTROPHILS # BLD AUTO: 3.4 K/UL (ref 1.8–7.7)
NEUTROPHILS NFR BLD: 54.8 % (ref 38–73)
NITRITE UR QL STRIP: NEGATIVE
NRBC BLD-RTO: 0 /100 WBC
OB PNL STL: POSITIVE
PH UR STRIP: 6 [PH] (ref 5–8)
PLATELET # BLD AUTO: 213 K/UL (ref 150–450)
PMV BLD AUTO: 9.6 FL (ref 9.2–12.9)
POTASSIUM SERPL-SCNC: 4.6 MMOL/L (ref 3.5–5.1)
PROT SERPL-MCNC: 7.4 G/DL (ref 6–8.4)
PROT UR QL STRIP: NEGATIVE
RBC # BLD AUTO: 4.13 M/UL (ref 4.6–6.2)
RBC #/AREA URNS HPF: 0 /HPF (ref 0–4)
SAMPLE: NORMAL
SODIUM SERPL-SCNC: 137 MMOL/L (ref 136–145)
SP GR UR STRIP: 1.02 (ref 1–1.03)
URN SPEC COLLECT METH UR: ABNORMAL
UROBILINOGEN UR STRIP-ACNC: NEGATIVE EU/DL
WBC # BLD AUTO: 6.15 K/UL (ref 3.9–12.7)
WBC #/AREA STL HPF: ABNORMAL /[HPF]
WBC #/AREA URNS HPF: 0 /HPF (ref 0–5)
YEAST URNS QL MICRO: NORMAL

## 2024-12-09 PROCEDURE — 82565 ASSAY OF CREATININE: CPT

## 2024-12-09 PROCEDURE — 87449 NOS EACH ORGANISM AG IA: CPT | Mod: 91 | Performed by: NURSE PRACTITIONER

## 2024-12-09 PROCEDURE — 89055 LEUKOCYTE ASSESSMENT FECAL: CPT | Performed by: NURSE PRACTITIONER

## 2024-12-09 PROCEDURE — 25000003 PHARM REV CODE 250: Performed by: NURSE PRACTITIONER

## 2024-12-09 PROCEDURE — 83690 ASSAY OF LIPASE: CPT | Performed by: NURSE PRACTITIONER

## 2024-12-09 PROCEDURE — 85025 COMPLETE CBC W/AUTO DIFF WBC: CPT | Performed by: NURSE PRACTITIONER

## 2024-12-09 PROCEDURE — 96374 THER/PROPH/DIAG INJ IV PUSH: CPT

## 2024-12-09 PROCEDURE — 99285 EMERGENCY DEPT VISIT HI MDM: CPT | Mod: 25

## 2024-12-09 PROCEDURE — 87046 STOOL CULTR AEROBIC BACT EA: CPT | Performed by: NURSE PRACTITIONER

## 2024-12-09 PROCEDURE — 87449 NOS EACH ORGANISM AG IA: CPT | Performed by: NURSE PRACTITIONER

## 2024-12-09 PROCEDURE — 87177 OVA AND PARASITES SMEARS: CPT | Performed by: NURSE PRACTITIONER

## 2024-12-09 PROCEDURE — 80053 COMPREHEN METABOLIC PANEL: CPT | Performed by: NURSE PRACTITIONER

## 2024-12-09 PROCEDURE — 82272 OCCULT BLD FECES 1-3 TESTS: CPT | Performed by: NURSE PRACTITIONER

## 2024-12-09 PROCEDURE — 25500020 PHARM REV CODE 255: Performed by: NURSE PRACTITIONER

## 2024-12-09 PROCEDURE — 63600175 PHARM REV CODE 636 W HCPCS: Performed by: NURSE PRACTITIONER

## 2024-12-09 PROCEDURE — 87045 FECES CULTURE AEROBIC BACT: CPT | Performed by: NURSE PRACTITIONER

## 2024-12-09 PROCEDURE — 87389 HIV-1 AG W/HIV-1&-2 AB AG IA: CPT | Performed by: EMERGENCY MEDICINE

## 2024-12-09 PROCEDURE — 81001 URINALYSIS AUTO W/SCOPE: CPT | Performed by: NURSE PRACTITIONER

## 2024-12-09 PROCEDURE — 96375 TX/PRO/DX INJ NEW DRUG ADDON: CPT

## 2024-12-09 RX ORDER — ONDANSETRON HYDROCHLORIDE 2 MG/ML
4 INJECTION, SOLUTION INTRAVENOUS
Status: COMPLETED | OUTPATIENT
Start: 2024-12-09 | End: 2024-12-09

## 2024-12-09 RX ORDER — DICYCLOMINE HYDROCHLORIDE 10 MG/1
20 CAPSULE ORAL
Status: COMPLETED | OUTPATIENT
Start: 2024-12-09 | End: 2024-12-09

## 2024-12-09 RX ORDER — DICYCLOMINE HYDROCHLORIDE 20 MG/1
20 TABLET ORAL 3 TIMES DAILY PRN
Qty: 20 TABLET | Refills: 0 | Status: SHIPPED | OUTPATIENT
Start: 2024-12-09 | End: 2025-01-08

## 2024-12-09 RX ORDER — ONDANSETRON 4 MG/1
4 TABLET, ORALLY DISINTEGRATING ORAL EVERY 8 HOURS PRN
Qty: 12 TABLET | Refills: 0 | Status: SHIPPED | OUTPATIENT
Start: 2024-12-09

## 2024-12-09 RX ORDER — METOCLOPRAMIDE HYDROCHLORIDE 5 MG/ML
10 INJECTION INTRAMUSCULAR; INTRAVENOUS
Status: COMPLETED | OUTPATIENT
Start: 2024-12-09 | End: 2024-12-09

## 2024-12-09 RX ADMIN — DICYCLOMINE HYDROCHLORIDE 20 MG: 10 CAPSULE ORAL at 03:12

## 2024-12-09 RX ADMIN — ONDANSETRON 4 MG: 2 INJECTION INTRAMUSCULAR; INTRAVENOUS at 12:12

## 2024-12-09 RX ADMIN — IOHEXOL 100 ML: 350 INJECTION, SOLUTION INTRAVENOUS at 12:12

## 2024-12-09 RX ADMIN — METOCLOPRAMIDE 10 MG: 5 INJECTION, SOLUTION INTRAMUSCULAR; INTRAVENOUS at 03:12

## 2024-12-10 ENCOUNTER — TELEPHONE (OUTPATIENT)
Dept: FAMILY MEDICINE | Facility: CLINIC | Age: 69
End: 2024-12-10
Payer: MEDICARE

## 2024-12-10 LAB — HIV 1+2 AB+HIV1 P24 AG SERPL QL IA: NEGATIVE

## 2024-12-10 NOTE — TELEPHONE ENCOUNTER
----- Message from Harriet sent at 12/10/2024  2:55 PM CST -----  Regarding: Leaving message  Type: Needs Medical Advice  Who Called:  [facility] [place]    Best Call Back Number: [phone] Fax: [phone] attention [place]  Additional Information: Got clearance for procedure but doesn't list eloquis instructions please call to chetan, needs it to state he needs to hold eloquis for 3 days
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
## 2024-12-10 NOTE — TELEPHONE ENCOUNTER
Spoke with Catrachita and she stated they needed letter stating they needed him to stop Eliquis for 3 days prior to procedure. Letter has been done and faxed

## 2024-12-10 NOTE — LETTER
Dr. Benton - [address] [address]  Phone: [phone]  Fax: [phone] December 10, 2024     Patient: Kevan Colin Sr.   YOB: 1955   Date of Visit: 12/10/2024       To Whom It May Concern:  Mr. Colin can  hold Eliquis 3 days prior to procedure.     If you have any questions or concerns, please don't hesitate to contact my office.    Sincerely,         Jonathan Benton III, MD

## 2024-12-12 LAB
BACTERIA STL CULT: NORMAL
BACTERIA STL CULT: NORMAL

## 2024-12-16 ENCOUNTER — HOSPITAL ENCOUNTER (EMERGENCY)
Facility: HOSPITAL | Age: 69
Discharge: HOME | End: 2024-12-16
Attending: EMERGENCY MEDICINE
Payer: MEDICARE

## 2024-12-16 ENCOUNTER — OFFICE VISIT (OUTPATIENT)
Dept: FAMILY MEDICINE | Facility: CLINIC | Age: 69
End: 2024-12-16
Payer: MEDICARE

## 2024-12-16 VITALS
OXYGEN SATURATION: 98 % | HEART RATE: 82 BPM | RESPIRATION RATE: 16 BRPM | TEMPERATURE: 98 F | SYSTOLIC BLOOD PRESSURE: 124 MMHG | WEIGHT: 221 LBS | HEIGHT: 74 IN | BODY MASS INDEX: 28.36 KG/M2 | DIASTOLIC BLOOD PRESSURE: 70 MMHG

## 2024-12-16 VITALS
BODY MASS INDEX: 29.07 KG/M2 | DIASTOLIC BLOOD PRESSURE: 74 MMHG | HEART RATE: 76 BPM | WEIGHT: 226.5 LBS | OXYGEN SATURATION: 98 % | TEMPERATURE: 96 F | SYSTOLIC BLOOD PRESSURE: 116 MMHG | HEIGHT: 74 IN

## 2024-12-16 DIAGNOSIS — Z96.653 S/P TOTAL KNEE REPLACEMENT, BILATERAL: ICD-10-CM

## 2024-12-16 DIAGNOSIS — K52.9 GASTROENTERITIS: Primary | ICD-10-CM

## 2024-12-16 DIAGNOSIS — R11.10 VOMITING, UNSPECIFIED VOMITING TYPE, UNSPECIFIED WHETHER NAUSEA PRESENT: Primary | ICD-10-CM

## 2024-12-16 DIAGNOSIS — R19.7 DIARRHEA, UNSPECIFIED TYPE: ICD-10-CM

## 2024-12-16 DIAGNOSIS — M51.9 LUMBAR DISC DISEASE: ICD-10-CM

## 2024-12-16 DIAGNOSIS — R11.0 NAUSEA: ICD-10-CM

## 2024-12-16 DIAGNOSIS — E86.0 DEHYDRATION: ICD-10-CM

## 2024-12-16 LAB
ALBUMIN SERPL BCP-MCNC: 4.6 G/DL (ref 3.5–5.2)
ALP SERPL-CCNC: 96 U/L (ref 55–135)
ALT SERPL W/O P-5'-P-CCNC: 9 U/L (ref 10–44)
ANION GAP SERPL CALC-SCNC: 6 MMOL/L (ref 8–16)
AST SERPL-CCNC: 11 U/L (ref 10–40)
BACTERIA #/AREA URNS HPF: NORMAL /HPF
BASOPHILS # BLD AUTO: 0.08 K/UL (ref 0–0.2)
BASOPHILS NFR BLD: 0.7 % (ref 0–1.9)
BILIRUB SERPL-MCNC: 0.6 MG/DL (ref 0.1–1)
BILIRUB UR QL STRIP: NEGATIVE
BUN SERPL-MCNC: 11 MG/DL (ref 8–23)
CALCIUM SERPL-MCNC: 9.1 MG/DL (ref 8.7–10.5)
CHLORIDE SERPL-SCNC: 101 MMOL/L (ref 95–110)
CLARITY UR: CLEAR
CO2 SERPL-SCNC: 29 MMOL/L (ref 23–29)
COLOR UR: YELLOW
CREAT SERPL-MCNC: 1 MG/DL (ref 0.5–1.4)
DIFFERENTIAL METHOD BLD: ABNORMAL
EOSINOPHIL # BLD AUTO: 0.8 K/UL (ref 0–0.5)
EOSINOPHIL NFR BLD: 7.5 % (ref 0–8)
ERYTHROCYTE [DISTWIDTH] IN BLOOD BY AUTOMATED COUNT: 15.2 % (ref 11.5–14.5)
EST. GFR  (NO RACE VARIABLE): >60 ML/MIN/1.73 M^2
GLUCOSE SERPL-MCNC: 174 MG/DL (ref 70–110)
GLUCOSE UR QL STRIP: ABNORMAL
HCT VFR BLD AUTO: 38.6 % (ref 40–54)
HGB BLD-MCNC: 12.4 G/DL (ref 14–18)
HGB UR QL STRIP: NEGATIVE
IMM GRANULOCYTES # BLD AUTO: 0.03 K/UL (ref 0–0.04)
IMM GRANULOCYTES NFR BLD AUTO: 0.3 % (ref 0–0.5)
KETONES UR QL STRIP: NEGATIVE
LEUKOCYTE ESTERASE UR QL STRIP: NEGATIVE
LIPASE SERPL-CCNC: 22 U/L (ref 4–60)
LYMPHOCYTES # BLD AUTO: 2 K/UL (ref 1–4.8)
LYMPHOCYTES NFR BLD: 18.4 % (ref 18–48)
MCH RBC QN AUTO: 30 PG (ref 27–31)
MCHC RBC AUTO-ENTMCNC: 32.1 G/DL (ref 32–36)
MCV RBC AUTO: 93 FL (ref 82–98)
MICROSCOPIC COMMENT: NORMAL
MONOCYTES # BLD AUTO: 0.7 K/UL (ref 0.3–1)
MONOCYTES NFR BLD: 6.8 % (ref 4–15)
NEUTROPHILS # BLD AUTO: 7.3 K/UL (ref 1.8–7.7)
NEUTROPHILS NFR BLD: 66.3 % (ref 38–73)
NITRITE UR QL STRIP: NEGATIVE
NRBC BLD-RTO: 0 /100 WBC
PH UR STRIP: 6 [PH] (ref 5–8)
PLATELET # BLD AUTO: 237 K/UL (ref 150–450)
PMV BLD AUTO: 9.5 FL (ref 9.2–12.9)
POTASSIUM SERPL-SCNC: 4.1 MMOL/L (ref 3.5–5.1)
PROT SERPL-MCNC: 7.7 G/DL (ref 6–8.4)
PROT UR QL STRIP: NEGATIVE
RBC # BLD AUTO: 4.14 M/UL (ref 4.6–6.2)
RBC #/AREA URNS HPF: 0 /HPF (ref 0–4)
SODIUM SERPL-SCNC: 136 MMOL/L (ref 136–145)
SP GR UR STRIP: >1.03 (ref 1–1.03)
SQUAMOUS #/AREA URNS HPF: 0 /HPF
URN SPEC COLLECT METH UR: ABNORMAL
UROBILINOGEN UR STRIP-ACNC: NEGATIVE EU/DL
WBC # BLD AUTO: 10.93 K/UL (ref 3.9–12.7)
WBC #/AREA URNS HPF: 2 /HPF (ref 0–5)
YEAST URNS QL MICRO: NORMAL

## 2024-12-16 PROCEDURE — 80053 COMPREHEN METABOLIC PANEL: CPT | Performed by: NURSE PRACTITIONER

## 2024-12-16 PROCEDURE — 81001 URINALYSIS AUTO W/SCOPE: CPT | Performed by: NURSE PRACTITIONER

## 2024-12-16 PROCEDURE — 85025 COMPLETE CBC W/AUTO DIFF WBC: CPT | Performed by: NURSE PRACTITIONER

## 2024-12-16 PROCEDURE — 99214 OFFICE O/P EST MOD 30 MIN: CPT | Mod: HCNC,S$GLB,, | Performed by: FAMILY MEDICINE

## 2024-12-16 PROCEDURE — 3008F BODY MASS INDEX DOCD: CPT | Mod: HCNC,CPTII,S$GLB, | Performed by: FAMILY MEDICINE

## 2024-12-16 PROCEDURE — G2211 COMPLEX E/M VISIT ADD ON: HCPCS | Mod: HCNC,S$GLB,, | Performed by: FAMILY MEDICINE

## 2024-12-16 PROCEDURE — 1160F RVW MEDS BY RX/DR IN RCRD: CPT | Mod: HCNC,CPTII,S$GLB, | Performed by: FAMILY MEDICINE

## 2024-12-16 PROCEDURE — 1126F AMNT PAIN NOTED NONE PRSNT: CPT | Mod: HCNC,CPTII,S$GLB, | Performed by: FAMILY MEDICINE

## 2024-12-16 PROCEDURE — 1101F PT FALLS ASSESS-DOCD LE1/YR: CPT | Mod: HCNC,CPTII,S$GLB, | Performed by: FAMILY MEDICINE

## 2024-12-16 PROCEDURE — 3078F DIAST BP <80 MM HG: CPT | Mod: HCNC,CPTII,S$GLB, | Performed by: FAMILY MEDICINE

## 2024-12-16 PROCEDURE — 99285 EMERGENCY DEPT VISIT HI MDM: CPT | Mod: 25

## 2024-12-16 PROCEDURE — 83690 ASSAY OF LIPASE: CPT | Performed by: NURSE PRACTITIONER

## 2024-12-16 PROCEDURE — 3051F HG A1C>EQUAL 7.0%<8.0%: CPT | Mod: HCNC,CPTII,S$GLB, | Performed by: FAMILY MEDICINE

## 2024-12-16 PROCEDURE — 3066F NEPHROPATHY DOC TX: CPT | Mod: HCNC,CPTII,S$GLB, | Performed by: FAMILY MEDICINE

## 2024-12-16 PROCEDURE — 3074F SYST BP LT 130 MM HG: CPT | Mod: HCNC,CPTII,S$GLB, | Performed by: FAMILY MEDICINE

## 2024-12-16 PROCEDURE — 99999 PR PBB SHADOW E&M-EST. PATIENT-LVL III: CPT | Mod: PBBFAC,HCNC,, | Performed by: FAMILY MEDICINE

## 2024-12-16 PROCEDURE — 3288F FALL RISK ASSESSMENT DOCD: CPT | Mod: HCNC,CPTII,S$GLB, | Performed by: FAMILY MEDICINE

## 2024-12-16 PROCEDURE — 1159F MED LIST DOCD IN RCRD: CPT | Mod: HCNC,CPTII,S$GLB, | Performed by: FAMILY MEDICINE

## 2024-12-16 PROCEDURE — 25500020 PHARM REV CODE 255: Performed by: EMERGENCY MEDICINE

## 2024-12-16 PROCEDURE — 25000003 PHARM REV CODE 250: Performed by: EMERGENCY MEDICINE

## 2024-12-16 PROCEDURE — 3061F NEG MICROALBUMINURIA REV: CPT | Mod: HCNC,CPTII,S$GLB, | Performed by: FAMILY MEDICINE

## 2024-12-16 RX ORDER — HYDROCODONE BITARTRATE AND ACETAMINOPHEN 5; 325 MG/1; MG/1
1 TABLET ORAL
Status: COMPLETED | OUTPATIENT
Start: 2024-12-16 | End: 2024-12-16

## 2024-12-16 RX ORDER — MORPHINE SULFATE 4 MG/ML
4 INJECTION, SOLUTION INTRAMUSCULAR; INTRAVENOUS
Status: DISCONTINUED | OUTPATIENT
Start: 2024-12-16 | End: 2024-12-16

## 2024-12-16 RX ORDER — ONDANSETRON 4 MG/1
4 TABLET, ORALLY DISINTEGRATING ORAL EVERY 6 HOURS PRN
Qty: 12 TABLET | Refills: 0 | Status: SHIPPED | OUTPATIENT
Start: 2024-12-16 | End: 2024-12-21

## 2024-12-16 RX ORDER — MIRTAZAPINE 7.5 MG/1
7.5 TABLET, FILM COATED ORAL NIGHTLY
COMMUNITY
Start: 2024-11-26

## 2024-12-16 RX ORDER — METHOCARBAMOL 500 MG/1
500 TABLET, FILM COATED ORAL EVERY 12 HOURS PRN
COMMUNITY
Start: 2024-11-15

## 2024-12-16 RX ORDER — ONDANSETRON HYDROCHLORIDE 2 MG/ML
4 INJECTION, SOLUTION INTRAVENOUS
Status: DISCONTINUED | OUTPATIENT
Start: 2024-12-16 | End: 2024-12-16

## 2024-12-16 RX ORDER — HYDROCODONE BITARTRATE AND ACETAMINOPHEN 5; 325 MG/1; MG/1
1 TABLET ORAL EVERY 4 HOURS PRN
Qty: 12 TABLET | Refills: 0 | Status: SHIPPED | OUTPATIENT
Start: 2024-12-16

## 2024-12-16 RX ADMIN — IOHEXOL 100 ML: 350 INJECTION, SOLUTION INTRAVENOUS at 04:12

## 2024-12-16 RX ADMIN — HYDROCODONE BITARTRATE AND ACETAMINOPHEN 1 TABLET: 5; 325 TABLET ORAL at 05:12

## 2024-12-16 NOTE — PROGRESS NOTES
SCRIBE #1 NOTE: I, Alexandr Blair, am scribing for, and in the presence of,  Jonathan Benton III, MD. I have scribed the entire note.     Subjective:       Patient ID: Kevan Colin Sr. is a 69 y.o. male.    Chief Complaint: Pre-op Exam (Clearance for Injection) and Nausea (Patient complains of nausea, vomiting, and diarrhea/)    Mr. Colin is here for pre-op exam and nausea after his last appointment on October 8th. Accompanied by his wife. He has been sick for 8 days now with diarrhea and vomiting. He went to the ER 8 days ago, and he was given Zofran 2x a day. He is still having some vomiting. It will stay down for 2-3 hours then he will expel it, but other times it will come up after 30 minutes. He had no fever with this, but he does have some abdominal pain. He is having diarrhea 3x a day with no normal stools. No one else is sick around him. Stool was positive for blood. He has not been on any recent antibiotics. Blood glucose was 209. GFR is >60. CMP is okay. Stool WBC had occ neutrophils seen. LFT's are okay. Hemoglobin is 12.0. Wife states they were told he has a fatty liver. He is also having an injection in his back at \[facility]\"" in [place]. He states he fell and has a bump on his right knee. S/P cholecystectomy in [place] many years ago. S/P bilateral knee replacement in [place] many years ago. BMI of 29.1, down 16 pounds. Cardiovascular good. No chest pain. No palpitations. Blood pressure is 116/74. Colonoscopy was done with Dr. Mcclain as of January 2020, but the report is not in the system. He also had an ERCP done in June.     Review of Systems   Constitutional:  Negative for chills and fever.   HENT:  Negative for congestion and sore throat.    Eyes:  Negative for pain and visual disturbance.   Respiratory:  Negative for chest tightness and shortness of breath.    Cardiovascular:  Negative for chest pain.   Gastrointestinal:  Positive for abdominal pain, diarrhea, nausea and vomiting.    Endocrine: Negative for polydipsia and polyuria.   Genitourinary:  Negative for dysuria and flank pain.   Musculoskeletal:  Positive for back pain. Negative for neck pain and neck stiffness.   Skin:  Negative for rash.   Allergic/Immunologic: Negative for immunocompromised state.   Neurological:  Negative for dizziness and weakness.   Hematological:  Does not bruise/bleed easily.   Psychiatric/Behavioral:  Negative for agitation and behavioral problems.    All other systems reviewed and are negative.      Objective:      Physical examination: Vital signs noted. No acute distress. No carotid bruit. Regular heart rate and rhythm. Lungs clear to auscultation bilaterally. Abdomen bowel sounds positive soft. Tender with pressure on the right lower quadrant of the abdomen. No rebounding. Extremities without edema. 2+ pedal pulses.      Assessment:       1. Vomiting, unspecified vomiting type, unspecified whether nausea present    2. Nausea    3. Diarrhea, unspecified type    4. Dehydration    5. Lumbar disc disease    6. BMI 29.0-29.9,adult    7. S/p total knee replacement, bilateral        Plan:       Vomiting, unspecified vomiting type, unspecified whether nausea present    Nausea    Diarrhea, unspecified type    Dehydration    Lumbar disc disease    BMI 29.0-29.9,adult    S/p total knee replacement, bilateral    Persistent nausea and vomiting and diarrhea for almost 10 days now.  Weight loss of 16 lb.  Dehydrated.  Need is prior colonoscopy result from back in 2020.  Discussed options.  Feel the best is for him to go to the emergency room to be admitted release evaluated again.  Needs stool for C diff. problem needs repeat CT scan.  Given the abdominal tenderness.  Called and discussed with the ER physician who will be expecting him.  All care gaps addressed or discussed.     Jonathan LAYTON III, MD, personally performed the services described in this documentation. All medical record entries made by the ashlee  were at my direction and in my presence. I have reviewed the chart and agree that the record reflects my personal performance and is accurate and complete.

## 2024-12-16 NOTE — FIRST PROVIDER EVALUATION
Emergency Department TeleTriage Encounter Note      CHIEF COMPLAINT    Chief Complaint   Patient presents with    Abdominal Pain     Reports was here week ago Sunday for same symptoms. Dr Benton sent him here for further work up    Nausea    Vomiting    Diarrhea       VITAL SIGNS   Initial Vitals [12/16/24 1232]   BP Pulse Resp Temp SpO2   124/70 82 18 98.3 °F (36.8 °C) 98 %      MAP       --            ALLERGIES    Review of patient's allergies indicates:   Allergen Reactions    Bee pollens Anaphylaxis     Bee stings     Penicillins Nausea Only     Other reaction(s): Unknown    Codeine Rash     Other reaction(s): Unknown       PROVIDER TRIAGE NOTE  Verbal consent for the teletriage evaluation was given by the patient at the start of the evaluation.  All efforts will be made to maintain patient's privacy during the evaluation.      This is a teletriage evaluation of a 69 y.o. male presenting to the ED with c/o nausea, vomiting, and diarrhea with right side pain that started 1 month ago; worsened since last week.  Seen in the ED 12/9/2024 with a negative w/u. Limited physical exam via telehealth: The patient is awake, alert, answering questions appropriately and is not in respiratory distress.  As the Teletriage provider, I performed an initial assessment and ordered appropriate labs and imaging studies, if any, to facilitate the patient's care once placed in the ED. Once a room is available, care and a full evaluation will be completed by an alternate ED provider.  Any additional orders and the final disposition will be determined by that provider.  All imaging and labs will not be followed-up by the Teletriage Team, including myself.         ORDERS  Labs Reviewed   CBC W/ AUTO DIFFERENTIAL   COMPREHENSIVE METABOLIC PANEL   LIPASE   URINALYSIS, REFLEX TO URINE CULTURE       ED Orders (720h ago, onward)      Start Ordered     Status Ordering Provider    12/16/24 1243 12/16/24 1242  Vital signs  Every 2 hours          Ordered JESSICA MIX    12/16/24 1243 12/16/24 1242  Diet NPO  Diet effective now         Ordered JESSICA MIX    12/16/24 1243 12/16/24 1242  Insert peripheral IV  Once         Ordered JESSICA MIX    12/16/24 1243 12/16/24 1242  CBC W/ AUTO DIFFERENTIAL  STAT         Ordered JESSICA MIX    12/16/24 1243 12/16/24 1242  Comp. Metabolic Panel  STAT         Ordered JESSICA MIX    12/16/24 1243 12/16/24 1242  Lipase  STAT         Ordered JESSICA MIX    12/16/24 1243 12/16/24 1242  Urinalysis, Reflex to Urine Culture Urine, Clean Catch  STAT         Ordered JESSICA MIX              Virtual Visit Note: The provider triage portion of this emergency department evaluation and documentation was performed via [employer], a HIPAA-compliant telemedicine application, in concert with a tele-presenter in the room. A face to face patient evaluation with one of my colleagues will occur once the patient is placed in an emergency department room.      DISCLAIMER: This note was prepared with [employer] voice recognition transcription software. Garbled syntax, mangled pronouns, and other bizarre constructions may be attributed to that software system.
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
## 2024-12-24 ENCOUNTER — TELEPHONE (OUTPATIENT)
Dept: ENDOCRINOLOGY | Facility: CLINIC | Age: 69
End: 2024-12-24
Payer: MEDICARE

## 2024-12-24 NOTE — TELEPHONE ENCOUNTER
Received notice from [employer] stating Dexcom G7  has been approved.  Auth# [id number], date range 01/01/25 to 12/31/2025.

## 2024-12-26 ENCOUNTER — TELEPHONE (OUTPATIENT)
Dept: NEUROLOGY | Facility: CLINIC | Age: 69
End: 2024-12-26
Payer: MEDICARE

## 2024-12-26 NOTE — TELEPHONE ENCOUNTER
Spoke with pts wife, has  appt held to est care for tremors, unsteady gait and falls. On Jan 13th at 0800. Pt also scheduled in first available with Lizzette Coffey for memory.

## 2024-12-26 NOTE — TELEPHONE ENCOUNTER
----- Message from Beena sent at 12/26/2024 12:20 PM CST -----  Contact: Cheryle [phone]  Type: Needs Medical Advice  Who Called:  Pts wife Cheryle Slaughter Call Back Number: [phone]  Additional Information:     Stated pts psych dr wants pt to get checked for parkinson's or dementia. Requesting a NP appt. Pls call back and advise

## 2025-01-03 ENCOUNTER — HOSPITAL ENCOUNTER (OUTPATIENT)
Dept: RADIOLOGY | Facility: HOSPITAL | Age: 70
Discharge: HOME | End: 2025-01-03
Attending: NEUROLOGICAL SURGERY
Payer: MEDICARE

## 2025-01-03 DIAGNOSIS — M54.16 RADICULOPATHY OF LUMBAR REGION: ICD-10-CM

## 2025-01-03 DIAGNOSIS — M54.16 RADICULOPATHY OF LUMBAR REGION: Primary | ICD-10-CM

## 2025-01-03 PROCEDURE — 72100 X-RAY EXAM L-S SPINE 2/3 VWS: CPT | Mod: 26,,, | Performed by: RADIOLOGY

## 2025-01-03 PROCEDURE — 72100 X-RAY EXAM L-S SPINE 2/3 VWS: CPT | Mod: TC,PO

## 2025-01-03 PROCEDURE — 72100 X-RAY EXAM L-S SPINE 2/3 VWS: CPT | Mod: TC

## 2025-01-13 ENCOUNTER — HOSPITAL ENCOUNTER (EMERGENCY)
Facility: HOSPITAL | Age: 70
Discharge: HOME | End: 2025-01-13
Attending: EMERGENCY MEDICINE
Payer: MEDICARE

## 2025-01-13 VITALS
BODY MASS INDEX: 28.36 KG/M2 | SYSTOLIC BLOOD PRESSURE: 152 MMHG | OXYGEN SATURATION: 96 % | RESPIRATION RATE: 18 BRPM | TEMPERATURE: 98 F | HEIGHT: 74 IN | HEART RATE: 70 BPM | DIASTOLIC BLOOD PRESSURE: 83 MMHG | WEIGHT: 221 LBS

## 2025-01-13 DIAGNOSIS — R11.10 VOMITING: ICD-10-CM

## 2025-01-13 DIAGNOSIS — Z00.00 ENCOUNTER FOR MEDICARE ANNUAL WELLNESS EXAM: ICD-10-CM

## 2025-01-13 DIAGNOSIS — R11.2 NAUSEA AND VOMITING, UNSPECIFIED VOMITING TYPE: Primary | ICD-10-CM

## 2025-01-13 LAB
ALBUMIN SERPL BCP-MCNC: 4.2 G/DL (ref 3.5–5.2)
ALP SERPL-CCNC: 97 U/L (ref 40–150)
ALT SERPL W/O P-5'-P-CCNC: 17 U/L (ref 10–44)
ANION GAP SERPL CALC-SCNC: 13 MMOL/L (ref 8–16)
AST SERPL-CCNC: 18 U/L (ref 10–40)
BASOPHILS # BLD AUTO: 0.08 K/UL (ref 0–0.2)
BASOPHILS NFR BLD: 1.1 % (ref 0–1.9)
BILIRUB SERPL-MCNC: 0.6 MG/DL (ref 0.1–1)
BUN SERPL-MCNC: 9 MG/DL (ref 8–23)
CALCIUM SERPL-MCNC: 9.6 MG/DL (ref 8.7–10.5)
CHLORIDE SERPL-SCNC: 103 MMOL/L (ref 95–110)
CO2 SERPL-SCNC: 23 MMOL/L (ref 23–29)
CREAT SERPL-MCNC: 1.1 MG/DL (ref 0.5–1.4)
DIFFERENTIAL METHOD BLD: ABNORMAL
EOSINOPHIL # BLD AUTO: 0.6 K/UL (ref 0–0.5)
EOSINOPHIL NFR BLD: 8.9 % (ref 0–8)
ERYTHROCYTE [DISTWIDTH] IN BLOOD BY AUTOMATED COUNT: 14.9 % (ref 11.5–14.5)
EST. GFR  (NO RACE VARIABLE): >60 ML/MIN/1.73 M^2
GLUCOSE SERPL-MCNC: 161 MG/DL (ref 70–110)
HCT VFR BLD AUTO: 39.4 % (ref 40–54)
HGB BLD-MCNC: 12.7 G/DL (ref 14–18)
IMM GRANULOCYTES # BLD AUTO: 0.01 K/UL (ref 0–0.04)
IMM GRANULOCYTES NFR BLD AUTO: 0.1 % (ref 0–0.5)
LIPASE SERPL-CCNC: 24 U/L (ref 4–60)
LYMPHOCYTES # BLD AUTO: 2.1 K/UL (ref 1–4.8)
LYMPHOCYTES NFR BLD: 29.5 % (ref 18–48)
MCH RBC QN AUTO: 29.7 PG (ref 27–31)
MCHC RBC AUTO-ENTMCNC: 32.2 G/DL (ref 32–36)
MCV RBC AUTO: 92 FL (ref 82–98)
MONOCYTES # BLD AUTO: 0.6 K/UL (ref 0.3–1)
MONOCYTES NFR BLD: 8.1 % (ref 4–15)
NEUTROPHILS # BLD AUTO: 3.8 K/UL (ref 1.8–7.7)
NEUTROPHILS NFR BLD: 52.3 % (ref 38–73)
NRBC BLD-RTO: 0 /100 WBC
PLATELET # BLD AUTO: 220 K/UL (ref 150–450)
PMV BLD AUTO: 9 FL (ref 9.2–12.9)
POTASSIUM SERPL-SCNC: 3.3 MMOL/L (ref 3.5–5.1)
PROT SERPL-MCNC: 7.6 G/DL (ref 6–8.4)
RBC # BLD AUTO: 4.27 M/UL (ref 4.6–6.2)
SODIUM SERPL-SCNC: 139 MMOL/L (ref 136–145)
WBC # BLD AUTO: 7.18 K/UL (ref 3.9–12.7)

## 2025-01-13 PROCEDURE — 93010 ELECTROCARDIOGRAM REPORT: CPT | Mod: ,,, | Performed by: GENERAL PRACTICE

## 2025-01-13 PROCEDURE — 83690 ASSAY OF LIPASE: CPT | Performed by: PHYSICIAN ASSISTANT

## 2025-01-13 PROCEDURE — 96361 HYDRATE IV INFUSION ADD-ON: CPT

## 2025-01-13 PROCEDURE — 63600175 PHARM REV CODE 636 W HCPCS: Mod: JZ,TB | Performed by: EMERGENCY MEDICINE

## 2025-01-13 PROCEDURE — 85025 COMPLETE CBC W/AUTO DIFF WBC: CPT | Performed by: PHYSICIAN ASSISTANT

## 2025-01-13 PROCEDURE — 99285 EMERGENCY DEPT VISIT HI MDM: CPT | Mod: 25

## 2025-01-13 PROCEDURE — 96374 THER/PROPH/DIAG INJ IV PUSH: CPT

## 2025-01-13 PROCEDURE — 25000003 PHARM REV CODE 250: Performed by: EMERGENCY MEDICINE

## 2025-01-13 PROCEDURE — 93005 ELECTROCARDIOGRAM TRACING: CPT

## 2025-01-13 PROCEDURE — 25500020 PHARM REV CODE 255

## 2025-01-13 PROCEDURE — 96375 TX/PRO/DX INJ NEW DRUG ADDON: CPT

## 2025-01-13 PROCEDURE — 80053 COMPREHEN METABOLIC PANEL: CPT | Performed by: PHYSICIAN ASSISTANT

## 2025-01-13 PROCEDURE — 36415 COLL VENOUS BLD VENIPUNCTURE: CPT | Performed by: PHYSICIAN ASSISTANT

## 2025-01-13 RX ORDER — HYDROMORPHONE HYDROCHLORIDE 1 MG/ML
1 INJECTION, SOLUTION INTRAMUSCULAR; INTRAVENOUS; SUBCUTANEOUS
Status: COMPLETED | OUTPATIENT
Start: 2025-01-13 | End: 2025-01-13

## 2025-01-13 RX ORDER — ONDANSETRON HYDROCHLORIDE 2 MG/ML
4 INJECTION, SOLUTION INTRAVENOUS
Status: COMPLETED | OUTPATIENT
Start: 2025-01-13 | End: 2025-01-13

## 2025-01-13 RX ADMIN — SODIUM CHLORIDE 1000 ML: 9 INJECTION, SOLUTION INTRAVENOUS at 12:01

## 2025-01-13 RX ADMIN — POTASSIUM BICARBONATE 60 MEQ: 978 TABLET, EFFERVESCENT ORAL at 03:01

## 2025-01-13 RX ADMIN — ONDANSETRON 4 MG: 2 INJECTION INTRAMUSCULAR; INTRAVENOUS at 12:01

## 2025-01-13 RX ADMIN — IOHEXOL 100 ML: 350 INJECTION, SOLUTION INTRAVENOUS at 12:01

## 2025-01-13 RX ADMIN — HYDROMORPHONE HYDROCHLORIDE 1 MG: 1 INJECTION, SOLUTION INTRAMUSCULAR; INTRAVENOUS; SUBCUTANEOUS at 12:01
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
## 2025-01-13 NOTE — FIRST PROVIDER EVALUATION
Emergency Department TeleTriage Encounter Note      CHIEF COMPLAINT    Chief Complaint   Patient presents with    Vomiting     Patient states he has been vomiting off and on for a month, states it has gotten worse over the past 2 days        VITAL SIGNS   Initial Vitals [01/13/25 1032]   BP Pulse Resp Temp SpO2   138/71 88 20 98.4 °F (36.9 °C) 96 %      MAP       --            ALLERGIES    Review of patient's allergies indicates:   Allergen Reactions    Bee pollens Anaphylaxis     Bee stings     Penicillins Nausea Only     Other reaction(s): Unknown    Codeine Rash     Other reaction(s): Unknown       PROVIDER TRIAGE NOTE  Patient presents with complaint of vomiting daily for the last few months..  Reports worsened over the last few days.  Reports dysuria.      Phy:   Constitutional: well nourished, well developed, appearing stated age, NAD        Initial orders will be placed and care will be transferred to an alternate provider when patient is roomed for a full evaluation. Any additional orders and the final disposition will be determined by that provider.        ORDERS  Labs Reviewed - No data to display    ED Orders (720h ago, onward)      None              Virtual Visit Note: The provider triage portion of this emergency department evaluation and documentation was performed via [address], a HIPAA-compliant telemedicine application, in concert with a tele-presenter in the room. A face to face patient evaluation with one of my colleagues will occur once the patient is placed in an emergency department room.      DISCLAIMER: This note was prepared with [employer] voice recognition transcription software. Garbled syntax, mangled pronouns, and other bizarre constructions may be attributed to that software system.

## 2025-01-14 ENCOUNTER — OFFICE VISIT (OUTPATIENT)
Dept: FAMILY MEDICINE | Facility: CLINIC | Age: 70
End: 2025-01-14
Payer: MEDICARE

## 2025-01-14 VITALS
TEMPERATURE: 98 F | SYSTOLIC BLOOD PRESSURE: 116 MMHG | OXYGEN SATURATION: 97 % | WEIGHT: 222 LBS | HEART RATE: 78 BPM | BODY MASS INDEX: 28.49 KG/M2 | HEIGHT: 74 IN | DIASTOLIC BLOOD PRESSURE: 80 MMHG

## 2025-01-14 DIAGNOSIS — Z93.0 STATUS POST TRACHEOSTOMY: ICD-10-CM

## 2025-01-14 DIAGNOSIS — K86.1 CHRONIC BILIARY PANCREATITIS: ICD-10-CM

## 2025-01-14 DIAGNOSIS — I26.99 PULMONARY EMBOLISM, UNSPECIFIED CHRONICITY, UNSPECIFIED PULMONARY EMBOLISM TYPE, UNSPECIFIED WHETHER ACUTE COR PULMONALE PRESENT: ICD-10-CM

## 2025-01-14 DIAGNOSIS — E11.42 DIABETIC POLYNEUROPATHY ASSOCIATED WITH TYPE 2 DIABETES MELLITUS: ICD-10-CM

## 2025-01-14 DIAGNOSIS — R19.7 DIARRHEA, UNSPECIFIED TYPE: ICD-10-CM

## 2025-01-14 DIAGNOSIS — N40.0 BENIGN PROSTATIC HYPERPLASIA, UNSPECIFIED WHETHER LOWER URINARY TRACT SYMPTOMS PRESENT: ICD-10-CM

## 2025-01-14 DIAGNOSIS — R11.10 VOMITING, UNSPECIFIED VOMITING TYPE, UNSPECIFIED WHETHER NAUSEA PRESENT: Primary | ICD-10-CM

## 2025-01-14 DIAGNOSIS — G20.A2 PARKINSON'S DISEASE WITHOUT DYSKINESIA, WITH FLUCTUATING MANIFESTATIONS: ICD-10-CM

## 2025-01-14 DIAGNOSIS — J44.89 COPD TYPE B: ICD-10-CM

## 2025-01-14 DIAGNOSIS — F20.9 SCHIZOPHRENIA, UNSPECIFIED TYPE: ICD-10-CM

## 2025-01-14 DIAGNOSIS — I10 BENIGN ESSENTIAL HTN: ICD-10-CM

## 2025-01-14 DIAGNOSIS — I50.22 CHRONIC SYSTOLIC HEART FAILURE: ICD-10-CM

## 2025-01-14 DIAGNOSIS — K63.5 POLYP OF COLON, UNSPECIFIED PART OF COLON, UNSPECIFIED TYPE: ICD-10-CM

## 2025-01-14 DIAGNOSIS — E11.42 TYPE 2 DIABETES MELLITUS WITH DIABETIC POLYNEUROPATHY, WITH LONG-TERM CURRENT USE OF INSULIN: ICD-10-CM

## 2025-01-14 DIAGNOSIS — Z79.4 TYPE 2 DIABETES MELLITUS WITH DIABETIC POLYNEUROPATHY, WITH LONG-TERM CURRENT USE OF INSULIN: ICD-10-CM

## 2025-01-14 PROCEDURE — 1160F RVW MEDS BY RX/DR IN RCRD: CPT | Mod: HCNC,CPTII,S$GLB, | Performed by: FAMILY MEDICINE

## 2025-01-14 PROCEDURE — 99214 OFFICE O/P EST MOD 30 MIN: CPT | Mod: HCNC,S$GLB,, | Performed by: FAMILY MEDICINE

## 2025-01-14 PROCEDURE — 3074F SYST BP LT 130 MM HG: CPT | Mod: HCNC,CPTII,S$GLB, | Performed by: FAMILY MEDICINE

## 2025-01-14 PROCEDURE — 1159F MED LIST DOCD IN RCRD: CPT | Mod: HCNC,CPTII,S$GLB, | Performed by: FAMILY MEDICINE

## 2025-01-14 PROCEDURE — 1125F AMNT PAIN NOTED PAIN PRSNT: CPT | Mod: HCNC,CPTII,S$GLB, | Performed by: FAMILY MEDICINE

## 2025-01-14 PROCEDURE — 3079F DIAST BP 80-89 MM HG: CPT | Mod: HCNC,CPTII,S$GLB, | Performed by: FAMILY MEDICINE

## 2025-01-14 PROCEDURE — 3288F FALL RISK ASSESSMENT DOCD: CPT | Mod: HCNC,CPTII,S$GLB, | Performed by: FAMILY MEDICINE

## 2025-01-14 PROCEDURE — 1101F PT FALLS ASSESS-DOCD LE1/YR: CPT | Mod: HCNC,CPTII,S$GLB, | Performed by: FAMILY MEDICINE

## 2025-01-14 PROCEDURE — 99999 PR PBB SHADOW E&M-EST. PATIENT-LVL V: CPT | Mod: PBBFAC,HCNC,, | Performed by: FAMILY MEDICINE

## 2025-01-14 PROCEDURE — 3008F BODY MASS INDEX DOCD: CPT | Mod: HCNC,CPTII,S$GLB, | Performed by: FAMILY MEDICINE

## 2025-01-14 PROCEDURE — G2211 COMPLEX E/M VISIT ADD ON: HCPCS | Mod: HCNC,S$GLB,, | Performed by: FAMILY MEDICINE

## 2025-01-14 RX ORDER — GABAPENTIN 600 MG/1
600 TABLET ORAL 3 TIMES DAILY
Qty: 270 TABLET | Refills: 1 | Status: SHIPPED | OUTPATIENT
Start: 2025-01-14

## 2025-01-14 RX ORDER — PANTOPRAZOLE SODIUM 40 MG/1
40 TABLET, DELAYED RELEASE ORAL 2 TIMES DAILY
Qty: 180 TABLET | Refills: 1 | Status: SHIPPED | OUTPATIENT
Start: 2025-01-14

## 2025-01-14 RX ORDER — ONDANSETRON 4 MG/1
4 TABLET, FILM COATED ORAL EVERY 6 HOURS PRN
Qty: 20 TABLET | Refills: 0 | Status: SHIPPED | OUTPATIENT
Start: 2025-01-14 | End: 2025-01-30

## 2025-01-14 RX ORDER — METFORMIN HYDROCHLORIDE 1000 MG/1
1000 TABLET ORAL 2 TIMES DAILY WITH MEALS
Qty: 180 TABLET | Refills: 1 | Status: SHIPPED | OUTPATIENT
Start: 2025-01-14

## 2025-01-14 RX ORDER — CARVEDILOL 3.12 MG/1
3.12 TABLET ORAL 2 TIMES DAILY
Qty: 180 TABLET | Refills: 1 | Status: SHIPPED | OUTPATIENT
Start: 2025-01-14

## 2025-01-14 NOTE — PATIENT INSTRUCTIONS
US Bladder- [facility] will call you to schedule     All medications will be sent to pharm after 5:30 pm today

## 2025-01-15 DIAGNOSIS — I26.99 PULMONARY EMBOLISM, UNSPECIFIED CHRONICITY, UNSPECIFIED PULMONARY EMBOLISM TYPE, UNSPECIFIED WHETHER ACUTE COR PULMONALE PRESENT: ICD-10-CM

## 2025-01-15 RX ORDER — APIXABAN 5 MG/1
5 TABLET, FILM COATED ORAL 2 TIMES DAILY
Qty: 56 TABLET | Refills: 2 | Status: SHIPPED | OUTPATIENT
Start: 2025-01-15

## 2025-01-15 RX ORDER — EMPAGLIFLOZIN 25 MG/1
25 TABLET, FILM COATED ORAL
Qty: 28 TABLET | Refills: 2 | Status: SHIPPED | OUTPATIENT
Start: 2025-01-15

## 2025-01-16 NOTE — PROGRESS NOTES
Subjective:       Patient ID: Kevan Colin Sr. is a 69 y.o. male.    Chief Complaint: Follow-up (4 month), Diarrhea, Nausea, and Emesis    COPD doing okay.  Parkinson's doing okay.  Schizophrenia doing okay.  Nausea vomiting and diarrhea since November.  He has been to the emergency room a couple of times.  Went there just yesterday.  Will vomit regularly for a few days then get better.  Had ERCP back in June of 2024.  BMI of 28.5 is down about 10 lb.  Does have history of some gastroparesis.  All his stools are watery.  With the vomiting occurs in cycles for 5 times a day for a few days and then will get better.  Marijuana seems to help the nausea.  But can not afford to stay on it.  All stools or water and he has as many as 6 or more per day.  Sees Gastroenterology nurse practitioner tomorrow.  Heme positive stool in the emergency room.  CMP was okay potassium just slightly low at 3.3.  BPH CT did show some bladder thickening.  PSA 2.42 in May current.  Status post cholecystectomy.  History of colon polyps.  Some biliary issues.  C diff negative culture and sensitivity negative.  Had CTA yesterday that was okay.  Colonoscopy January of 2020 repeat recommended in 5 years which is now.    Physical examination.  Vital signs noted.  No acute distress.  Neck without bruit.  Chest clear.  Heart regular rate and rhythm.  Abdomen bowel sounds are positive tender right upper quadrant.  No rebound.  Extremities without edema positive pedal pulses.        Objective:        Assessment:       1. Vomiting, unspecified vomiting type, unspecified whether nausea present    2. Type 2 diabetes mellitus with diabetic polyneuropathy, with long-term current use of insulin    3. Diabetic polyneuropathy associated with type 2 diabetes mellitus    4. Pulmonary embolism, unspecified chronicity, unspecified pulmonary embolism type, unspecified whether acute cor pulmonale present    5. Benign essential HTN    6. Chronic systolic  heart failure    7. Benign prostatic hyperplasia, unspecified whether lower urinary tract symptoms present    8. Polyp of colon, unspecified part of colon, unspecified type    9. Diarrhea, unspecified type    10. Chronic biliary pancreatitis    11. Schizophrenia, unspecified type    12. Parkinson's disease without dyskinesia, with fluctuating manifestations    13. Status post tracheostomy    14. COPD type B        Plan:       Vomiting, unspecified vomiting type, unspecified whether nausea present  -     US Bladder; Future; Expected date: 01/14/2025  -     pantoprazole (PROTONIX) 40 MG tablet; Take 1 tablet (40 mg total) by mouth 2 (two) times daily.  Dispense: 180 tablet; Refill: 1    Type 2 diabetes mellitus with diabetic polyneuropathy, with long-term current use of insulin  -     metFORMIN (GLUCOPHAGE) 1000 MG tablet; Take 1 tablet (1,000 mg total) by mouth 2 (two) times daily with meals.  Dispense: 180 tablet; Refill: 1    Diabetic polyneuropathy associated with type 2 diabetes mellitus  -     gabapentin (NEURONTIN) 600 MG tablet; Take 1 tablet (600 mg total) by mouth 3 (three) times daily.  Dispense: 270 tablet; Refill: 1    Pulmonary embolism, unspecified chronicity, unspecified pulmonary embolism type, unspecified whether acute cor pulmonale present    Benign essential HTN  -     carvediloL (COREG) 3.125 MG tablet; Take 1 tablet (3.125 mg total) by mouth 2 (two) times daily.  Dispense: 180 tablet; Refill: 1    Chronic systolic heart failure    Benign prostatic hyperplasia, unspecified whether lower urinary tract symptoms present    Polyp of colon, unspecified part of colon, unspecified type    Diarrhea, unspecified type    Chronic biliary pancreatitis    Schizophrenia, unspecified type    Parkinson's disease without dyskinesia, with fluctuating manifestations    Status post tracheostomy    COPD type B    Other orders  -     ondansetron (ZOFRAN) 4 MG tablet; Take 1 tablet (4 mg total) by mouth every 6 (six)  hours as needed for Nausea.  Dispense: 20 tablet; Refill: 0    Reviewed all stat labs and studies.  Needs to go for the colonoscopy now.  Refill medications.  See GI tomorrow.  Get an ultrasound for residual urine.  Refill Zofran.  Refill gabapentin.  Refill metformin.  Recommend he hold the metformin temporarily to see if it has any effect on the diarrhea.  We ordered a sprue panel but the insurance refused at same he had had 1 previously but I do not see the results of that.

## 2025-01-20 LAB
OHS QRS DURATION: 98 MS
OHS QTC CALCULATION: 420 MS

## 2025-01-30 RX ORDER — ONDANSETRON 4 MG/1
TABLET, FILM COATED ORAL
Qty: 20 TABLET | Refills: 0 | Status: SHIPPED | OUTPATIENT
Start: 2025-01-30

## 2025-01-30 NOTE — TELEPHONE ENCOUNTER
Care Due:                  Date            Visit Type   Department     Provider  --------------------------------------------------------------------------------                                [facility]                              [facility]  [facility]                              EP -                              PRIMARY      [facility]  Next Visit: 05-      CARE ([facility])   [facility]  [place]                                                            Last  Test          Frequency    Reason                     Performed    Due Date  --------------------------------------------------------------------------------    HBA1C.......  6 months...  JARDIANCE, metFORMIN.....  09- 03-    [facility] Embedded Care Due Messages. Reference number: [id number].   1/30/2025 8:22:39 AM CST

## 2025-02-10 ENCOUNTER — HOSPITAL ENCOUNTER (EMERGENCY)
Facility: HOSPITAL | Age: 70
Discharge: HOME | End: 2025-02-10
Attending: STUDENT IN AN ORGANIZED HEALTH CARE EDUCATION/TRAINING PROGRAM
Payer: MEDICARE

## 2025-02-10 VITALS
RESPIRATION RATE: 20 BRPM | BODY MASS INDEX: 28.88 KG/M2 | OXYGEN SATURATION: 98 % | DIASTOLIC BLOOD PRESSURE: 89 MMHG | HEART RATE: 79 BPM | TEMPERATURE: 98 F | HEIGHT: 74 IN | SYSTOLIC BLOOD PRESSURE: 161 MMHG | WEIGHT: 225 LBS

## 2025-02-10 DIAGNOSIS — R11.0 NAUSEA: ICD-10-CM

## 2025-02-10 DIAGNOSIS — R11.10 VOMITING, UNSPECIFIED VOMITING TYPE, UNSPECIFIED WHETHER NAUSEA PRESENT: Primary | ICD-10-CM

## 2025-02-10 LAB
ALBUMIN SERPL BCP-MCNC: 4.7 G/DL (ref 3.5–5.2)
ALP SERPL-CCNC: 122 U/L (ref 55–135)
ALT SERPL W/O P-5'-P-CCNC: 19 U/L (ref 10–44)
ANION GAP SERPL CALC-SCNC: 11 MMOL/L (ref 8–16)
AST SERPL-CCNC: 19 U/L (ref 10–40)
BACTERIA #/AREA URNS HPF: NORMAL /HPF
BASOPHILS # BLD AUTO: 0.07 K/UL (ref 0–0.2)
BASOPHILS NFR BLD: 1 % (ref 0–1.9)
BILIRUB SERPL-MCNC: 0.6 MG/DL (ref 0.1–1)
BILIRUB UR QL STRIP: NEGATIVE
BUN SERPL-MCNC: 16 MG/DL (ref 8–23)
CALCIUM SERPL-MCNC: 9.6 MG/DL (ref 8.7–10.5)
CHLORIDE SERPL-SCNC: 97 MMOL/L (ref 95–110)
CLARITY UR: CLEAR
CO2 SERPL-SCNC: 26 MMOL/L (ref 23–29)
COLOR UR: YELLOW
CREAT SERPL-MCNC: 1.1 MG/DL (ref 0.5–1.4)
CREAT SERPL-MCNC: 1.2 MG/DL (ref 0.5–1.4)
DIFFERENTIAL METHOD BLD: ABNORMAL
EOSINOPHIL # BLD AUTO: 0.5 K/UL (ref 0–0.5)
EOSINOPHIL NFR BLD: 6.3 % (ref 0–8)
ERYTHROCYTE [DISTWIDTH] IN BLOOD BY AUTOMATED COUNT: 14.8 % (ref 11.5–14.5)
EST. GFR  (NO RACE VARIABLE): >60 ML/MIN/1.73 M^2
GLUCOSE SERPL-MCNC: 236 MG/DL (ref 70–110)
GLUCOSE UR QL STRIP: ABNORMAL
HCT VFR BLD AUTO: 37.8 % (ref 40–54)
HGB BLD-MCNC: 12.1 G/DL (ref 14–18)
HGB UR QL STRIP: NEGATIVE
IMM GRANULOCYTES # BLD AUTO: 0.02 K/UL (ref 0–0.04)
IMM GRANULOCYTES NFR BLD AUTO: 0.3 % (ref 0–0.5)
INFLUENZA A, MOLECULAR: NEGATIVE
INFLUENZA B, MOLECULAR: NEGATIVE
KETONES UR QL STRIP: ABNORMAL
LEUKOCYTE ESTERASE UR QL STRIP: NEGATIVE
LIPASE SERPL-CCNC: 35 U/L (ref 4–60)
LYMPHOCYTES # BLD AUTO: 1.3 K/UL (ref 1–4.8)
LYMPHOCYTES NFR BLD: 18.3 % (ref 18–48)
MCH RBC QN AUTO: 30 PG (ref 27–31)
MCHC RBC AUTO-ENTMCNC: 32 G/DL (ref 32–36)
MCV RBC AUTO: 94 FL (ref 82–98)
MICROSCOPIC COMMENT: NORMAL
MONOCYTES # BLD AUTO: 0.6 K/UL (ref 0.3–1)
MONOCYTES NFR BLD: 8.3 % (ref 4–15)
NEUTROPHILS # BLD AUTO: 4.7 K/UL (ref 1.8–7.7)
NEUTROPHILS NFR BLD: 65.8 % (ref 38–73)
NITRITE UR QL STRIP: NEGATIVE
NRBC BLD-RTO: 0 /100 WBC
PH UR STRIP: 6 [PH] (ref 5–8)
PLATELET # BLD AUTO: 177 K/UL (ref 150–450)
PMV BLD AUTO: 10.1 FL (ref 9.2–12.9)
POTASSIUM SERPL-SCNC: 4.5 MMOL/L (ref 3.5–5.1)
PROT SERPL-MCNC: 8.1 G/DL (ref 6–8.4)
PROT UR QL STRIP: NEGATIVE
RBC # BLD AUTO: 4.03 M/UL (ref 4.6–6.2)
RBC #/AREA URNS HPF: 0 /HPF (ref 0–4)
SAMPLE: NORMAL
SARS-COV-2 RDRP RESP QL NAA+PROBE: NEGATIVE
SODIUM SERPL-SCNC: 134 MMOL/L (ref 136–145)
SP GR UR STRIP: >1.03 (ref 1–1.03)
SPECIMEN SOURCE: NORMAL
SQUAMOUS #/AREA URNS HPF: 0 /HPF
TROPONIN I SERPL HS-MCNC: 4.8 PG/ML (ref 0–14.9)
URN SPEC COLLECT METH UR: ABNORMAL
UROBILINOGEN UR STRIP-ACNC: NEGATIVE EU/DL
WBC # BLD AUTO: 7.2 K/UL (ref 3.9–12.7)
WBC #/AREA URNS HPF: 0 /HPF (ref 0–5)
YEAST URNS QL MICRO: NORMAL

## 2025-02-10 PROCEDURE — 82565 ASSAY OF CREATININE: CPT

## 2025-02-10 PROCEDURE — 25000003 PHARM REV CODE 250: Performed by: STUDENT IN AN ORGANIZED HEALTH CARE EDUCATION/TRAINING PROGRAM

## 2025-02-10 PROCEDURE — 87635 SARS-COV-2 COVID-19 AMP PRB: CPT | Performed by: STUDENT IN AN ORGANIZED HEALTH CARE EDUCATION/TRAINING PROGRAM

## 2025-02-10 PROCEDURE — 83690 ASSAY OF LIPASE: CPT | Performed by: PHYSICIAN ASSISTANT

## 2025-02-10 PROCEDURE — 63600175 PHARM REV CODE 636 W HCPCS: Performed by: STUDENT IN AN ORGANIZED HEALTH CARE EDUCATION/TRAINING PROGRAM

## 2025-02-10 PROCEDURE — 80053 COMPREHEN METABOLIC PANEL: CPT | Performed by: PHYSICIAN ASSISTANT

## 2025-02-10 PROCEDURE — 81001 URINALYSIS AUTO W/SCOPE: CPT | Performed by: PHYSICIAN ASSISTANT

## 2025-02-10 PROCEDURE — 84484 ASSAY OF TROPONIN QUANT: CPT | Performed by: STUDENT IN AN ORGANIZED HEALTH CARE EDUCATION/TRAINING PROGRAM

## 2025-02-10 PROCEDURE — 96374 THER/PROPH/DIAG INJ IV PUSH: CPT

## 2025-02-10 PROCEDURE — 85025 COMPLETE CBC W/AUTO DIFF WBC: CPT | Performed by: PHYSICIAN ASSISTANT

## 2025-02-10 PROCEDURE — 25500020 PHARM REV CODE 255: Performed by: STUDENT IN AN ORGANIZED HEALTH CARE EDUCATION/TRAINING PROGRAM

## 2025-02-10 PROCEDURE — 96375 TX/PRO/DX INJ NEW DRUG ADDON: CPT

## 2025-02-10 PROCEDURE — 93005 ELECTROCARDIOGRAM TRACING: CPT | Performed by: INTERNAL MEDICINE

## 2025-02-10 PROCEDURE — 99285 EMERGENCY DEPT VISIT HI MDM: CPT | Mod: 25

## 2025-02-10 PROCEDURE — 87502 INFLUENZA DNA AMP PROBE: CPT | Performed by: STUDENT IN AN ORGANIZED HEALTH CARE EDUCATION/TRAINING PROGRAM

## 2025-02-10 RX ORDER — FAMOTIDINE 10 MG/ML
20 INJECTION INTRAVENOUS
Status: COMPLETED | OUTPATIENT
Start: 2025-02-10 | End: 2025-02-10

## 2025-02-10 RX ORDER — ONDANSETRON 4 MG/1
4 TABLET, ORALLY DISINTEGRATING ORAL EVERY 6 HOURS PRN
Qty: 12 TABLET | Refills: 0 | Status: SHIPPED | OUTPATIENT
Start: 2025-02-10

## 2025-02-10 RX ORDER — ONDANSETRON HYDROCHLORIDE 2 MG/ML
4 INJECTION, SOLUTION INTRAVENOUS
Status: COMPLETED | OUTPATIENT
Start: 2025-02-10 | End: 2025-02-10

## 2025-02-10 RX ORDER — OXYCODONE HYDROCHLORIDE 5 MG/1
5 TABLET ORAL ONCE
Status: COMPLETED | OUTPATIENT
Start: 2025-02-10 | End: 2025-02-10

## 2025-02-10 RX ORDER — ACETAMINOPHEN 500 MG
1000 TABLET ORAL
Status: COMPLETED | OUTPATIENT
Start: 2025-02-10 | End: 2025-02-10

## 2025-02-10 RX ORDER — KETOROLAC TROMETHAMINE 30 MG/ML
15 INJECTION, SOLUTION INTRAMUSCULAR; INTRAVENOUS
Status: COMPLETED | OUTPATIENT
Start: 2025-02-10 | End: 2025-02-10

## 2025-02-10 RX ADMIN — KETOROLAC TROMETHAMINE 15 MG: 30 INJECTION, SOLUTION INTRAMUSCULAR at 07:02

## 2025-02-10 RX ADMIN — OXYCODONE HYDROCHLORIDE 5 MG: 5 TABLET ORAL at 07:02

## 2025-02-10 RX ADMIN — ONDANSETRON 4 MG: 2 INJECTION INTRAMUSCULAR; INTRAVENOUS at 11:02

## 2025-02-10 RX ADMIN — ACETAMINOPHEN 1000 MG: 500 TABLET, FILM COATED ORAL at 07:02

## 2025-02-10 RX ADMIN — IOHEXOL 100 ML: 350 INJECTION, SOLUTION INTRAVENOUS at 07:02

## 2025-02-10 RX ADMIN — FAMOTIDINE 20 MG: 10 INJECTION, SOLUTION INTRAVENOUS at 07:02

## 2025-02-10 NOTE — FIRST PROVIDER EVALUATION
" Emergency Department TeleTriage Encounter Note      CHIEF COMPLAINT    Chief Complaint   Patient presents with    Emesis     Patient reports "not eating since yesterday"  had one episode of vomiting today; patient reports throwing up a "glob of tissue"  pt reports abdominal pain started today        VITAL SIGNS   Initial Vitals [02/10/25 1717]   BP Pulse Resp Temp SpO2   (!) 166/90 98 16 97.5 °F (36.4 °C) 99 %      MAP       --            ALLERGIES    Review of patient's allergies indicates:   Allergen Reactions    Bee pollens Anaphylaxis     Bee stings     Penicillins Nausea Only     Other reaction(s): Unknown    Codeine Rash     Other reaction(s): Unknown       PROVIDER TRIAGE NOTE  Patient presents with complaint of N/V and abdominal pain that started today. Denied urinary symptoms.      Phy:   Constitutional: well nourished, well developed, appearing stated age, NAD        Initial orders will be placed and care will be transferred to an alternate provider when patient is roomed for a full evaluation. Any additional orders and the final disposition will be determined by that provider.        ORDERS  Labs Reviewed - No data to display    ED Orders (720h ago, onward)      None              Virtual Visit Note: The provider triage portion of this emergency department evaluation and documentation was performed via [employer], a HIPAA-compliant telemedicine application, in concert with a tele-presenter in the room. A face to face patient evaluation with one of my colleagues will occur once the patient is placed in an emergency department room.      DISCLAIMER: This note was prepared with [employer] voice recognition transcription software. Garbled syntax, mangled pronouns, and other bizarre constructions may be attributed to that software system.    "
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
## 2025-02-11 NOTE — DISCHARGE INSTRUCTIONS
No emergency reasons for your symptoms were found but you need to monitor your symptoms and with any persistence, worsening, new symptoms or any concerns then you need to return to the ER immediately.  You will need to have follow up of your symptoms, please follow up with your primary care doctor within 3 days for further evaluation.    Please follow up your CT results with your primary care doctor:       As we discussed there is diagnostic uncertainty meeting we are not sure what caused your symptoms today.  Please monitor your symptoms carefully and if your nausea returns or you are unable to keep anything down or you develop any symptoms such as chest pain or shortness of breath please return to the ER for further evaluation.    FINDINGS:  Stable bibasilar atelectasis or scarring at the lung bases.  No significant pleural fluid.  The visualized portions of the heart appear normal.     The liver is not significantly enlarged.  There is diffuse decreased attenuation of the hepatic parenchyma which may reflect hepatic steatosis.  There is a somewhat more focal geographic region of parenchymal hypoattenuation adjacent to the falciform ligament which may reflect focal fatty infiltration, similar to prior studies.  Gallbladder is surgically absent.  There is intrahepatic biliary ductal dilatation.  The extrahepatic common bile duct is dilated measuring up to 1.4 cm, similar to prior examination.     There is diffuse pancreatic atrophy.  There is dilatation of the main pancreatic duct measuring up to 0.6 cm, similar to prior examination.  No significant peripancreatic inflammatory change to suggest acute pancreatitis.  The stomach, spleen, and adrenal glands demonstrate no acute abnormalities.     Kidneys enhance symmetrically without evidence of hydronephrosis.  Urinary bladder demonstrates nonspecific circumferential wall thickening.  Prostate is mildly enlarged measuring 4.6 cm.  No significant free fluid in the  pelvis.  Fat containing right inguinal hernia.     The abdominal aorta is normal in course and caliber with moderate atherosclerotic calcification along its course.  There is an IVC filter in place.  Diminutive caliber of the infrarenal IVC with chronic retroperitoneal and pelvic collateral vessels noted.     No evidence to suggest high-grade small bowel obstruction.  Scattered retained stool throughout the right hemicolon.  Scattered colonic diverticula without evidence of acute diverticulitis.  No CT evidence of acute appendicitis.  There is no ascites, free fluid, or intraperitoneal free air. There are scattered shotty small mesenteric and retroperitoneal lymph nodes.     Remote postoperative change of the lumbar spine.  The extraperitoneal soft tissues are unremarkable.     Impression:     Moderate circumferential urinary bladder wall thickening which may relate to cystitis or chronic outlet obstruction.  Clinical correlation urinalysis advised.     Cholecystectomy.  Redemonstration of mild to moderate intra and extrahepatic biliary ductal dilatation, similar to prior examination.  Continued nonspecific mild diffuse prominence of the main pancreatic duct, similar to prior study.  Correlation with appropriate lab values advised.     Additional stable findings as above.

## 2025-02-13 ENCOUNTER — PATIENT OUTREACH (OUTPATIENT)
Facility: OTHER | Age: 70
End: 2025-02-13
Payer: MEDICARE

## 2025-02-13 LAB
OHS QRS DURATION: 106 MS
OHS QTC CALCULATION: 424 MS

## 2025-02-14 DIAGNOSIS — I10 BENIGN ESSENTIAL HTN: ICD-10-CM

## 2025-02-14 DIAGNOSIS — I50.22 CHRONIC SYSTOLIC HEART FAILURE: ICD-10-CM

## 2025-02-14 NOTE — PROGRESS NOTES
Pt visited the ED on 2/10/25. I made 2 attempts to reach patient to assist with scheduling a post ED 7-day follow up with PCP. Unable to reach.  Closing Encounter.    Hayde Li

## 2025-02-14 NOTE — TELEPHONE ENCOUNTER
Care Due:                  Date            Visit Type   Department     Provider  --------------------------------------------------------------------------------                                [facility]  Last Visit: 01-      [facility] ([facility])   [facility]   [place]                              EP -                              PRIMARY      [facility]  Next Visit: 05-      [facility] ([facility])   [facility]  [place]                                                            Last  Test          Frequency    Reason                     Performed    Due Date  --------------------------------------------------------------------------------    Lipid Panel.  12 months..  atorvastatin.............  05- 05-    [facility] Embedded Care Due Messages. Reference number: [id number].   2/14/2025 9:21:17 AM CST

## 2025-02-15 RX ORDER — FUROSEMIDE 20 MG/1
20 TABLET ORAL
Qty: 30 TABLET | Refills: 0 | Status: SHIPPED | OUTPATIENT
Start: 2025-02-15

## 2025-02-24 DIAGNOSIS — Z96.651 PRESENCE OF RIGHT ARTIFICIAL KNEE JOINT: Primary | ICD-10-CM

## 2025-03-05 ENCOUNTER — HOSPITAL ENCOUNTER (OUTPATIENT)
Dept: RADIOLOGY | Facility: HOSPITAL | Age: 70
Discharge: HOME | End: 2025-03-05
Attending: ORTHOPAEDIC SURGERY
Payer: MEDICARE

## 2025-03-05 DIAGNOSIS — Z96.651 PRESENCE OF RIGHT ARTIFICIAL KNEE JOINT: ICD-10-CM

## 2025-03-05 PROCEDURE — A9503 TC99M MEDRONATE: HCPCS | Performed by: ORTHOPAEDIC SURGERY

## 2025-03-05 PROCEDURE — 73700 CT LOWER EXTREMITY W/O DYE: CPT | Mod: 26,RT,, | Performed by: RADIOLOGY

## 2025-03-05 PROCEDURE — 73700 CT LOWER EXTREMITY W/O DYE: CPT | Mod: TC,RT

## 2025-03-05 PROCEDURE — 78315 BONE IMAGING 3 PHASE: CPT | Mod: TC

## 2025-03-05 PROCEDURE — 78315 BONE IMAGING 3 PHASE: CPT | Mod: 26,,, | Performed by: RADIOLOGY

## 2025-03-05 RX ORDER — TC 99M MEDRONATE 20 MG/10ML
20 INJECTION, POWDER, LYOPHILIZED, FOR SOLUTION INTRAVENOUS
Status: COMPLETED | OUTPATIENT
Start: 2025-03-05 | End: 2025-03-05

## 2025-03-05 RX ADMIN — TECHNETIUM TC 99M MEDRONATE 20 MILLICURIE: 25 INJECTION, POWDER, FOR SOLUTION INTRAVENOUS at 11:03

## 2025-03-12 DIAGNOSIS — Z79.4 TYPE 2 DIABETES MELLITUS WITH HYPERGLYCEMIA, WITH LONG-TERM CURRENT USE OF INSULIN: Primary | ICD-10-CM

## 2025-03-12 DIAGNOSIS — E11.65 TYPE 2 DIABETES MELLITUS WITH HYPERGLYCEMIA, WITH LONG-TERM CURRENT USE OF INSULIN: Primary | ICD-10-CM

## 2025-03-12 RX ORDER — BLOOD-GLUCOSE SENSOR
EACH MISCELLANEOUS
Qty: 9 EACH | Refills: 3 | Status: SHIPPED | OUTPATIENT
Start: 2025-03-12

## 2025-03-20 DIAGNOSIS — M54.16 RADICULOPATHY, LUMBAR REGION: Primary | ICD-10-CM

## 2025-03-21 ENCOUNTER — HOSPITAL ENCOUNTER (OUTPATIENT)
Dept: RADIOLOGY | Facility: HOSPITAL | Age: 70
Discharge: HOME | End: 2025-03-21
Attending: NEUROLOGICAL SURGERY
Payer: MEDICARE

## 2025-03-21 DIAGNOSIS — M54.16 RADICULOPATHY, LUMBAR REGION: ICD-10-CM

## 2025-03-21 PROCEDURE — 72100 X-RAY EXAM L-S SPINE 2/3 VWS: CPT | Mod: TC,PO

## 2025-03-21 PROCEDURE — 72100 X-RAY EXAM L-S SPINE 2/3 VWS: CPT | Mod: 26,,, | Performed by: RADIOLOGY

## 2025-04-14 DIAGNOSIS — I26.99 PULMONARY EMBOLISM, UNSPECIFIED CHRONICITY, UNSPECIFIED PULMONARY EMBOLISM TYPE, UNSPECIFIED WHETHER ACUTE COR PULMONALE PRESENT: ICD-10-CM

## 2025-04-14 DIAGNOSIS — I50.22 CHRONIC SYSTOLIC HEART FAILURE: ICD-10-CM

## 2025-04-14 DIAGNOSIS — I10 BENIGN ESSENTIAL HTN: ICD-10-CM

## 2025-04-14 RX ORDER — EMPAGLIFLOZIN 25 MG/1
25 TABLET, FILM COATED ORAL
Qty: 30 TABLET | Refills: 2 | Status: SHIPPED | OUTPATIENT
Start: 2025-04-14

## 2025-04-14 RX ORDER — FUROSEMIDE 20 MG/1
20 TABLET ORAL
Qty: 30 TABLET | Refills: 0 | Status: SHIPPED | OUTPATIENT
Start: 2025-04-14

## 2025-04-14 RX ORDER — APIXABAN 5 MG/1
5 TABLET, FILM COATED ORAL 2 TIMES DAILY
Qty: 60 TABLET | Refills: 2 | Status: SHIPPED | OUTPATIENT
Start: 2025-04-14

## 2025-04-14 NOTE — TELEPHONE ENCOUNTER
Care Due:                  Date            Visit Type   Department     Provider  --------------------------------------------------------------------------------                                [facility]                              [facility]  Last Visit: 01-      CARE ([facility])   [facility] [place]                              EP -                              PRIMARY      [facility]  Next Visit: 05-      CARE ([facility])   [facility] [place]                                                            Last  Test          Frequency    Reason                     Performed    Due Date  --------------------------------------------------------------------------------    HBA1C.......  6 months...  JARDIANCE, metFORMIN.....  09- 03-    [facility] Embedded Care Due Messages. Reference number: [id number].   4/14/2025 2:48:20 PM CDT

## 2025-04-25 ENCOUNTER — HOSPITAL ENCOUNTER (EMERGENCY)
Facility: HOSPITAL | Age: 70
Discharge: HOME | End: 2025-04-25
Attending: EMERGENCY MEDICINE
Payer: MEDICARE

## 2025-04-25 VITALS
RESPIRATION RATE: 18 BRPM | WEIGHT: 225 LBS | DIASTOLIC BLOOD PRESSURE: 82 MMHG | HEIGHT: 74 IN | HEART RATE: 83 BPM | BODY MASS INDEX: 28.88 KG/M2 | SYSTOLIC BLOOD PRESSURE: 177 MMHG | TEMPERATURE: 98 F | OXYGEN SATURATION: 97 %

## 2025-04-25 DIAGNOSIS — R07.9 CHEST PAIN: ICD-10-CM

## 2025-04-25 DIAGNOSIS — M70.71 BURSITIS OF RIGHT HIP, UNSPECIFIED BURSA: ICD-10-CM

## 2025-04-25 DIAGNOSIS — R11.2 NAUSEA AND VOMITING, UNSPECIFIED VOMITING TYPE: Primary | ICD-10-CM

## 2025-04-25 DIAGNOSIS — M25.559 HIP PAIN: ICD-10-CM

## 2025-04-25 LAB
ABSOLUTE EOSINOPHIL (SMH): 0.46 K/UL
ABSOLUTE MONOCYTE (SMH): 0.5 K/UL (ref 0.3–1)
ABSOLUTE NEUTROPHIL COUNT (SMH): 4.5 K/UL (ref 1.8–7.7)
ALBUMIN SERPL-MCNC: 4.1 G/DL (ref 3.5–5.2)
ALLENS TEST: ABNORMAL
ALP SERPL-CCNC: 171 UNIT/L (ref 40–150)
ALT SERPL-CCNC: 47 UNIT/L (ref 10–44)
ANION GAP (SMH): 17 MMOL/L (ref 8–16)
AST SERPL-CCNC: 109 UNIT/L (ref 11–45)
BASOPHILS # BLD AUTO: 0.06 K/UL
BASOPHILS NFR BLD AUTO: 0.8 %
BILIRUB SERPL-MCNC: 0.6 MG/DL (ref 0.1–1)
BUN SERPL-MCNC: 13 MG/DL (ref 8–23)
CALCIUM SERPL-MCNC: 9.1 MG/DL (ref 8.7–10.5)
CHLORIDE SERPL-SCNC: 100 MMOL/L (ref 95–110)
CO2 SERPL-SCNC: 20 MMOL/L (ref 23–29)
CREAT SERPL-MCNC: 1 MG/DL (ref 0.5–1.4)
DELSYS: ABNORMAL
ERYTHROCYTE [DISTWIDTH] IN BLOOD BY AUTOMATED COUNT: 13.4 % (ref 11.5–14.5)
GFR SERPLBLD CREATININE-BSD FMLA CKD-EPI: >60 ML/MIN/1.73/M2
GLUCOSE SERPL-MCNC: 117 MG/DL (ref 70–110)
HCT VFR BLD AUTO: 46.8 % (ref 40–54)
HCV AB SERPL QL IA: NORMAL
HGB BLD-MCNC: 14.9 GM/DL (ref 14–18)
IMM GRANULOCYTES # BLD AUTO: 0.03 K/UL (ref 0–0.04)
IMM GRANULOCYTES NFR BLD AUTO: 0.4 % (ref 0–0.5)
LIPASE SERPL-CCNC: 40 U/L (ref 4–60)
LYMPHOCYTES # BLD AUTO: 1.56 K/UL (ref 1–4.8)
MAGNESIUM SERPL-MCNC: 2.1 MG/DL (ref 1.6–2.6)
MCH RBC QN AUTO: 30.7 PG (ref 27–31)
MCHC RBC AUTO-ENTMCNC: 31.8 G/DL (ref 32–36)
MCV RBC AUTO: 96 FL (ref 82–98)
NT-PROBNP SERPL-MCNC: 371 PG/ML
NUCLEATED RBC (/100WBC) (SMH): 0 /100 WBC
OHS QRS DURATION: 82 MS
OHS QTC CALCULATION: 420 MS
PLATELET # BLD AUTO: 238 K/UL (ref 150–450)
PMV BLD AUTO: 10.7 FL (ref 9.2–12.9)
POTASSIUM BLD-SCNC: 5.3 MMOL/L (ref 3.5–5.1)
POTASSIUM SERPL-SCNC: 4.7 MMOL/L (ref 3.5–5.1)
POTASSIUM SERPL-SCNC: 6.5 MMOL/L (ref 3.5–5.1)
PROT SERPL-MCNC: 9.1 GM/DL (ref 6–8.4)
RBC # BLD AUTO: 4.86 M/UL (ref 4.6–6.2)
RELATIVE EOSINOPHIL (SMH): 6.5 % (ref 0–8)
RELATIVE LYMPHOCYTE (SMH): 22 % (ref 18–48)
RELATIVE MONOCYTE (SMH): 7.1 % (ref 4–15)
RELATIVE NEUTROPHIL (SMH): 63.2 % (ref 38–73)
SAMPLE: ABNORMAL
SITE: ABNORMAL
SODIUM SERPL-SCNC: 137 MMOL/L (ref 136–145)
TROPONIN I SERPL HS-MCNC: <3 NG/L
WBC # BLD AUTO: 7.09 K/UL (ref 3.9–12.7)

## 2025-04-25 PROCEDURE — 84132 ASSAY OF SERUM POTASSIUM: CPT

## 2025-04-25 PROCEDURE — 84484 ASSAY OF TROPONIN QUANT: CPT | Performed by: NURSE PRACTITIONER

## 2025-04-25 PROCEDURE — 99285 EMERGENCY DEPT VISIT HI MDM: CPT | Mod: 25

## 2025-04-25 PROCEDURE — 96375 TX/PRO/DX INJ NEW DRUG ADDON: CPT

## 2025-04-25 PROCEDURE — 83690 ASSAY OF LIPASE: CPT | Performed by: NURSE PRACTITIONER

## 2025-04-25 PROCEDURE — 93010 ELECTROCARDIOGRAM REPORT: CPT | Mod: ,,, | Performed by: GENERAL PRACTICE

## 2025-04-25 PROCEDURE — 63600175 PHARM REV CODE 636 W HCPCS: Performed by: NURSE PRACTITIONER

## 2025-04-25 PROCEDURE — 94761 N-INVAS EAR/PLS OXIMETRY MLT: CPT

## 2025-04-25 PROCEDURE — 83880 ASSAY OF NATRIURETIC PEPTIDE: CPT | Performed by: NURSE PRACTITIONER

## 2025-04-25 PROCEDURE — 25500020 PHARM REV CODE 255

## 2025-04-25 PROCEDURE — 84132 ASSAY OF SERUM POTASSIUM: CPT | Performed by: NURSE PRACTITIONER

## 2025-04-25 PROCEDURE — 93005 ELECTROCARDIOGRAM TRACING: CPT

## 2025-04-25 PROCEDURE — 86803 HEPATITIS C AB TEST: CPT | Performed by: EMERGENCY MEDICINE

## 2025-04-25 PROCEDURE — 83735 ASSAY OF MAGNESIUM: CPT | Performed by: NURSE PRACTITIONER

## 2025-04-25 PROCEDURE — 85025 COMPLETE CBC W/AUTO DIFF WBC: CPT | Performed by: NURSE PRACTITIONER

## 2025-04-25 PROCEDURE — 36415 COLL VENOUS BLD VENIPUNCTURE: CPT | Performed by: NURSE PRACTITIONER

## 2025-04-25 PROCEDURE — 99900035 HC TECH TIME PER 15 MIN (STAT)

## 2025-04-25 PROCEDURE — 96374 THER/PROPH/DIAG INJ IV PUSH: CPT

## 2025-04-25 PROCEDURE — 80053 COMPREHEN METABOLIC PANEL: CPT | Performed by: NURSE PRACTITIONER

## 2025-04-25 RX ORDER — ONDANSETRON 4 MG/1
4 TABLET, ORALLY DISINTEGRATING ORAL EVERY 8 HOURS PRN
Qty: 15 TABLET | Refills: 0 | Status: SHIPPED | OUTPATIENT
Start: 2025-04-25

## 2025-04-25 RX ORDER — DIPHENHYDRAMINE HYDROCHLORIDE 50 MG/ML
6.25 INJECTION, SOLUTION INTRAMUSCULAR; INTRAVENOUS
Status: COMPLETED | OUTPATIENT
Start: 2025-04-25 | End: 2025-04-25

## 2025-04-25 RX ORDER — ONDANSETRON HYDROCHLORIDE 2 MG/ML
4 INJECTION, SOLUTION INTRAVENOUS
Status: COMPLETED | OUTPATIENT
Start: 2025-04-25 | End: 2025-04-25

## 2025-04-25 RX ORDER — METHOCARBAMOL 500 MG/1
500 TABLET, FILM COATED ORAL EVERY 8 HOURS PRN
Qty: 20 TABLET | Refills: 0 | Status: SHIPPED | OUTPATIENT
Start: 2025-04-25 | End: 2025-04-30

## 2025-04-25 RX ORDER — MORPHINE SULFATE 2 MG/ML
2 INJECTION, SOLUTION INTRAMUSCULAR; INTRAVENOUS
Status: COMPLETED | OUTPATIENT
Start: 2025-04-25 | End: 2025-04-25

## 2025-04-25 RX ADMIN — DIPHENHYDRAMINE HYDROCHLORIDE 6.25 MG: 50 INJECTION INTRAMUSCULAR; INTRAVENOUS at 06:04

## 2025-04-25 RX ADMIN — MORPHINE SULFATE 2 MG: 2 INJECTION, SOLUTION INTRAMUSCULAR; INTRAVENOUS at 05:04

## 2025-04-25 RX ADMIN — ONDANSETRON 4 MG: 2 INJECTION INTRAMUSCULAR; INTRAVENOUS at 02:04

## 2025-04-25 RX ADMIN — METHYLPREDNISOLONE SODIUM SUCCINATE 80 MG: 40 INJECTION, POWDER, FOR SOLUTION INTRAMUSCULAR; INTRAVENOUS at 06:04

## 2025-04-25 RX ADMIN — IOHEXOL 100 ML: 350 INJECTION, SOLUTION INTRAVENOUS at 04:04

## 2025-04-25 NOTE — ED NOTES
Pt reports itchy to rt hand post morphine, denies any sob/cp. Md made aware and placed orders for benadryl and solumedrol
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
## 2025-04-28 ENCOUNTER — PATIENT OUTREACH (OUTPATIENT)
Facility: OTHER | Age: 70
End: 2025-04-28
Payer: MEDICARE

## 2025-04-28 NOTE — PROGRESS NOTES
Jennifer Whiting  ED Navigator  Emergency Department    Project: [facility] ED Navigator  Role: Community Health Worker    Date: 2025  Patient Name: Kevan Colin Sr.  MRN: [id number]  PCP: Jonathan Benton III, MD    Assessment:     Kevan Colin Sr. is a 69 y.o. male who has presented to ED for nausea/vomiting. Patient has visited the ED 2 times in the past 3 months. Patient did not contact PCP.     ED Navigator Initial Assessment    ED Navigator Enrollment Documentation  Consent to Services  Does patient consent to completing the assessment?: Yes  Contact  Method of Initial Contact: Phone  Transportation  Insurance Coverage  Do you have coverage/adequate coverage?: Yes  Specialist Appointment  Did the patient come to the ED to see a specialist?: No  Does the patient have a pending specialist referral?: No  Does the patient have a specialist appointment made?: No  PCP Follow Up Appointment  Medications  Is patient able to afford medication?: Yes  Psychological  Food  Communication/Education  Other Financial Concerns  Other Social Barriers/Concerns  Primary Barrier         Social History     Socioeconomic History    Marital status:    Tobacco Use    Smoking status: Former     Current packs/day: 0.00     Average packs/day: 0.3 packs/day for 45.0 years (11.3 ttl pk-yrs)     Types: Cigarettes     Start date: 1972     Quit date: 2017     Years since quittin.7    Smokeless tobacco: Never   Substance and Sexual Activity    Alcohol use: No     Alcohol/week: 0.0 standard drinks of alcohol    Drug use: Not Currently     Frequency: 5.0 times per week     Types: Marijuana     Comment: pipe/day     Social Drivers of Health     Financial Resource Strain: Medium Risk (2024)    Overall Financial Resource Strain (CARDIA)     Difficulty of Paying Living Expenses: Somewhat hard   Food Insecurity: Food Insecurity Present (2024)    Hunger Vital Sign     Worried About Running Out of Food in the Last Year:  Sometimes true     Ran Out of Food in the Last Year: Sometimes true   Transportation Needs: No Transportation Needs (8/25/2024)    TRANSPORTATION NEEDS     Transportation : No   Physical Activity: Unknown (8/25/2024)    Exercise Vital Sign     Days of Exercise per Week: 1 day     Minutes of Exercise per Session: Patient unable to answer   Stress: No Stress Concern Present (8/25/2024)    [place] citizen [employer] - Occupational Stress Questionnaire     Feeling of Stress : Only a little   Housing Stability: Unknown (8/25/2024)    Housing Stability Vital Sign     Unable to Pay for Housing in the Last Year: No     Homeless in the Last Year: No       Plan:   Patient was contacted for post ED discharge navigation. They have an appointment scheduled with Dr. Taran Vásquez on 5/15/25 at 11:30. ED navigator will remind patient of appointment.

## 2025-05-19 RX ORDER — ONDANSETRON 4 MG/1
TABLET, ORALLY DISINTEGRATING ORAL
Qty: 20 TABLET | Refills: 0 | Status: SHIPPED | OUTPATIENT
Start: 2025-05-19

## 2025-05-19 NOTE — TELEPHONE ENCOUNTER
Care Due:                  Date            Visit Type   Department     Provider  --------------------------------------------------------------------------------                                EP -                              PRIMARY      [facility]  Last Visit: 01-      [facility]  Next Visit: None Scheduled  None         None Found                                                            Last  Test          Frequency    Reason                     Performed    Due Date  --------------------------------------------------------------------------------    Lipid Panel.  12 months..  atorvastatin.............  05- 05-    [facility] Embedded Care Due Messages. Reference number: [id number].   5/19/2025 10:46:24 AM CDT

## 2025-05-20 RX ORDER — TAMSULOSIN HYDROCHLORIDE 0.4 MG/1
1 CAPSULE ORAL NIGHTLY
Qty: 30 CAPSULE | Refills: 3 | Status: SHIPPED | OUTPATIENT
Start: 2025-05-20

## 2025-05-28 ENCOUNTER — PATIENT OUTREACH (OUTPATIENT)
Dept: ADMINISTRATIVE | Facility: HOSPITAL | Age: 70
End: 2025-05-28
Payer: MEDICARE

## 2025-05-28 NOTE — PROGRESS NOTES
[facility] Review & Patient Outreach Details      Contacted patient by phone:    Unable to reach/ Follow up date noted   Letter mailed

## 2025-06-09 ENCOUNTER — PATIENT OUTREACH (OUTPATIENT)
Dept: ADMINISTRATIVE | Facility: HOSPITAL | Age: 70
End: 2025-06-09
Payer: MEDICARE

## 2025-06-09 NOTE — PROGRESS NOTES
Population Health Chart Review & Patient Outreach Details    Outreach Performed: YES Portal Active and/or Letter inactive  Additional [facility] Health Notes:      Routine Dilated Eye Exam  (Diabetic Retinopathy screening)     Non-compliant report chart audits for Eye Exam for Patients with Diabetes     Outreach to patient in reference to a routine Eye Exam                Updates Requested / Reviewed:             Health Maintenance Topics Overdue:      [facility] Score: 4     Eye Exam  Hemoglobin A1c  Lipid Panel  Foot Exam    RSV Vaccine                  Health Maintenance Topic(s) Outreach Outcomes & Actions Taken:  Eye Exam - Outreach Outcomes & Actions Taken  :

## 2025-06-09 NOTE — LETTER
June 9, 2025    Kevan Colin Sr.  [address] [address]  [place] [address]             [facility]  [address]  [place] [address]  Phone: [phone] Dear Kevan Colin Sr.,      Jonathan Benton III, MD has reviewed your records and found that you are overdue for your diabetic eye exam.  Yearly eye exams are especially important, as this can identify early eye complications and disease caused by your diabetes.  Jonathan Benton III, MD has requested you schedule your diabetic eye exam. Our [place] [facility] location has 2 Optometrist/Ophthalmologist,  Dr. Estevez and Dr. Domingo.  Please call  [phone] to schedule your eye exam appointment.     If you recently completed this exam at an outside facility, please notify us so that we may request a copy of the exam and update your chart.     Also, There are several other providers located in the [place] area to name a few:     [facility] [phone]  [facility] [phone]  [facility] [phone]  [facility] [phone]    [facility] [phone]      [facility] [phone]       If you do schedule an eye exam at an outside facility,  please let us know when and where so that we can request your records.    Diabetes can result in damage to your eye because glucose can hurt your fragile blood vessels supplying your eyes. See below to see how you can lose your vision due to diabetes.         Wishing you good health,      Your [facility] Team

## 2025-06-11 ENCOUNTER — OFFICE VISIT (OUTPATIENT)
Dept: FAMILY MEDICINE | Facility: CLINIC | Age: 70
End: 2025-06-11
Payer: MEDICARE

## 2025-06-11 VITALS
SYSTOLIC BLOOD PRESSURE: 118 MMHG | HEIGHT: 74 IN | DIASTOLIC BLOOD PRESSURE: 66 MMHG | HEART RATE: 76 BPM | TEMPERATURE: 98 F | WEIGHT: 219.38 LBS | BODY MASS INDEX: 28.15 KG/M2 | OXYGEN SATURATION: 95 %

## 2025-06-11 DIAGNOSIS — Z79.4 TYPE 2 DIABETES MELLITUS WITH DIABETIC POLYNEUROPATHY, WITH LONG-TERM CURRENT USE OF INSULIN: ICD-10-CM

## 2025-06-11 DIAGNOSIS — I10 HYPERTENSION, ESSENTIAL: ICD-10-CM

## 2025-06-11 DIAGNOSIS — K86.1 CHRONIC BILIARY PANCREATITIS: ICD-10-CM

## 2025-06-11 DIAGNOSIS — F31.9 BIPOLAR 1 DISORDER: ICD-10-CM

## 2025-06-11 DIAGNOSIS — Z96.653 S/P TOTAL KNEE REPLACEMENT, BILATERAL: ICD-10-CM

## 2025-06-11 DIAGNOSIS — I26.99 PULMONARY EMBOLISM, UNSPECIFIED CHRONICITY, UNSPECIFIED PULMONARY EMBOLISM TYPE, UNSPECIFIED WHETHER ACUTE COR PULMONALE PRESENT: ICD-10-CM

## 2025-06-11 DIAGNOSIS — Z79.01 ANTICOAGULANT LONG-TERM USE: ICD-10-CM

## 2025-06-11 DIAGNOSIS — Z87.891 FORMER SMOKER: ICD-10-CM

## 2025-06-11 DIAGNOSIS — E11.42 TYPE 2 DIABETES MELLITUS WITH DIABETIC POLYNEUROPATHY, WITH LONG-TERM CURRENT USE OF INSULIN: ICD-10-CM

## 2025-06-11 DIAGNOSIS — F11.90 CHRONIC, CONTINUOUS USE OF OPIOIDS: ICD-10-CM

## 2025-06-11 DIAGNOSIS — Z86.73 STATUS POST CVA: ICD-10-CM

## 2025-06-11 DIAGNOSIS — E78.5 HYPERLIPIDEMIA, UNSPECIFIED HYPERLIPIDEMIA TYPE: ICD-10-CM

## 2025-06-11 DIAGNOSIS — F43.10 PTSD (POST-TRAUMATIC STRESS DISORDER): ICD-10-CM

## 2025-06-11 DIAGNOSIS — Z00.00 PHYSICAL EXAM: Primary | ICD-10-CM

## 2025-06-11 DIAGNOSIS — Z98.1 S/P LUMBAR FUSION: ICD-10-CM

## 2025-06-11 DIAGNOSIS — J44.1 COPD EXACERBATION: ICD-10-CM

## 2025-06-11 DIAGNOSIS — Z12.5 PROSTATE CANCER SCREENING: ICD-10-CM

## 2025-06-11 DIAGNOSIS — S68.119A AMPUTATION OF INDEX FINGER: ICD-10-CM

## 2025-06-11 DIAGNOSIS — F17.210 SMOKING GREATER THAN 30 PACK YEARS: ICD-10-CM

## 2025-06-11 DIAGNOSIS — Z79.01 ANTICOAGULATED: ICD-10-CM

## 2025-06-11 PROCEDURE — 99999 PR PBB SHADOW E&M-EST. PATIENT-LVL V: CPT | Mod: PBBFAC,HCNC,, | Performed by: FAMILY MEDICINE

## 2025-06-11 NOTE — PROGRESS NOTES
SCRIBE #1 NOTE: I, Siva Weber, am scribing for, and in the presence of, Jonathan Benton III, MD. I have scribed the entire note.     Subjective:       Patient ID: Kevan Colin Sr. is a 70 y.o. male.    Chief Complaint: Annual Exam    Mr. Kevan Colin Sr. is here for an annual after his last appointment on 1/14/2025.     Social History: Former smoker with greater than 30 pack years. He quit 7-8 years ago. His wife just recently passed away from pancreatic cancer.     Family History: No changes.    Immunizations: No changes.    Past Medical History: S/P CVA. S/P lumbar fusion. S/P bilateral knee replacements. S/P amputation of left index finger. BMI of 28.16. Cardiovascular good. No chest pain. No palpitations. Blood pressure is 118/66. Hypertension controlled. Hyperlipidemia. Type II DM with polyneuropathy. On insulin. Eye exam is due. He sees a doctor in [place]. Foot exam is being done today. PTSD. Bipolar 1 disorder. COPD. Carotid stenosis. Chronic pancreatitis ok.  Anticoagulation.  History of pulmonary embolus.    Review of Systems   Constitutional:  Negative for chills and fever.   HENT:  Negative for congestion and sore throat.    Eyes:  Negative for pain and visual disturbance.   Respiratory:  Negative for chest tightness and shortness of breath.    Cardiovascular:  Negative for chest pain.   Gastrointestinal:  Negative for nausea.   Endocrine: Negative for polydipsia and polyuria.   Genitourinary:  Negative for dysuria and flank pain.   Musculoskeletal:  Negative for back pain, neck pain and neck stiffness.   Skin:  Negative for rash.   Allergic/Immunologic: Negative for immunocompromised state.   Neurological:  Negative for dizziness and weakness.   Hematological:  Does not bruise/bleed easily.   Psychiatric/Behavioral:  Negative for agitation and behavioral problems.    All other systems reviewed and are negative.      Objective:      Physical Exam  Vitals and nursing note reviewed.    Constitutional:       Appearance: Normal appearance. He is well-developed and normal weight.   HENT:      Head: Normocephalic and atraumatic.      Right Ear: Tympanic membrane normal.      Left Ear: Tympanic membrane normal.      Nose: Nose normal.      Mouth/Throat:      Mouth: Mucous membranes are moist.   Eyes:      Conjunctiva/sclera: Conjunctivae normal.      Pupils: Pupils are equal, round, and reactive to light.   Neck:      Vascular: No carotid bruit.   Cardiovascular:      Rate and Rhythm: Normal rate and regular rhythm.      Pulses: Normal pulses.      Heart sounds: Normal heart sounds. No murmur heard.     No gallop.   Pulmonary:      Effort: Pulmonary effort is normal.      Breath sounds: Normal breath sounds.   Abdominal:      General: Bowel sounds are normal.      Palpations: Abdomen is soft.      Tenderness: There is no abdominal tenderness.   Musculoskeletal:         General: Normal range of motion.      Cervical back: Normal range of motion.      Right lower leg: No edema.      Left lower leg: No edema.   Comment: Posterior tibial 2+ bilaterally.  Lymphadenopathy:      Cervical: No cervical adenopathy.   Skin:     General: Skin is warm and dry.   Comment: No calluses, breaks in the skin, crackling, or dryness.  Neurological:      General: No focal deficit present.      Mental Status: He is alert and oriented to person, place, and time.   Comment: 0-1/5 light touch to each foot. Vibratory senses intact.  Psychiatric:         Behavior: Behavior normal.         Thought Content: Thought content normal.         Judgment: Judgment normal.      Assessment:       1. Physical exam    2. Hypertension, essential    3. Hyperlipidemia, unspecified hyperlipidemia type    4. BMI 28.0-28.9,adult    5. S/P lumbar fusion    6. PTSD (post-traumatic stress disorder)    7. Bipolar 1 disorder    8. COPD exacerbation    9. Status post CVA    10. Chronic biliary pancreatitis    11. S/p total knee replacement, bilateral     12. Amputation of index finger    13. Former smoker    14. Smoking greater than 30 pack years    15. Type 2 diabetes mellitus with diabetic polyneuropathy, with long-term current use of insulin    16. Pulmonary embolism, unspecified chronicity, unspecified pulmonary embolism type, unspecified whether acute cor pulmonale present    17. Chronic, continuous use of opioids    18. Prostate cancer screening    19. Anticoagulated    20. Anticoagulant long-term use        Plan:       Physical exam  -     PSA, Screening; Future; Expected date: 06/11/2025  -     CBC Auto Differential; Future; Expected date: 06/11/2025  -     Comprehensive Metabolic Panel; Future; Expected date: 06/11/2025  -     Lipid Panel; Future; Expected date: 06/11/2025  -     Hemoglobin A1C; Future; Expected date: 06/11/2025  -     TSH; Future; Expected date: 06/11/2025    Hypertension, essential    Hyperlipidemia, unspecified hyperlipidemia type    BMI 28.0-28.9,adult    S/P lumbar fusion    PTSD (post-traumatic stress disorder)    Bipolar 1 disorder    COPD exacerbation    Status post CVA    Chronic biliary pancreatitis    S/p total knee replacement, bilateral    Amputation of index finger    Former smoker    Smoking greater than 30 pack years    Type 2 diabetes mellitus with diabetic polyneuropathy, with long-term current use of insulin    Pulmonary embolism, unspecified chronicity, unspecified pulmonary embolism type, unspecified whether acute cor pulmonale present    Chronic, continuous use of opioids    Prostate cancer screening  -     PSA, Screening; Future; Expected date: 06/11/2025    Anticoagulated    Anticoagulant long-term use    Continue follow-up with pain management.  Be very careful with feet there quite numb.  Check PSA CBC CMP lipids A1c TSH.  Needs to go for eye exam.  Uses Dr. Reyes.  Continue current medications.  All care gaps addressed or discussed.     IJonathan III, MD, personally performed the services described in  this documentation. All medical record entries made by the scribe were at my direction and in my presence. I have reviewed the chart and agree that the record reflects my personal performance and is accurate and complete.

## 2025-06-17 DIAGNOSIS — I50.22 CHRONIC SYSTOLIC HEART FAILURE: ICD-10-CM

## 2025-06-17 DIAGNOSIS — I10 BENIGN ESSENTIAL HTN: ICD-10-CM

## 2025-06-17 RX ORDER — FUROSEMIDE 20 MG/1
20 TABLET ORAL
Qty: 90 TABLET | Refills: 1 | Status: SHIPPED | OUTPATIENT
Start: 2025-06-17

## 2025-06-17 NOTE — TELEPHONE ENCOUNTER
Refill Routing Note   Medication(s) are not appropriate for processing by [facility] for the following reason(s):        Required labs abnormal    [facility] action(s):  Defer     Requires labs : Yes             Appointments  past 12m or future 3m with PCP    Date Provider   Last Visit   6/11/2025 Jonathan Benton III, MD   Next Visit   Visit date not found Jonathan Benton III, MD   ED visits in past 90 days: 1        Note composed:10:57 AM 06/17/2025

## 2025-06-17 NOTE — TELEPHONE ENCOUNTER
Care Due:                  Date            Visit Type   Department     Provider  --------------------------------------------------------------------------------                                EP                               PRIMARY      [facility]  Last Visit: 06-      CARE ([facility])   [facility]  Next Visit: None Scheduled  None         None Found                                                            Last  Test          Frequency    Reason                     Performed    Due Date  --------------------------------------------------------------------------------    HBA1C.......  6 months...  empagliflozin, metFORMIN.  09- 03-    [facility] Embedded Care Due Messages. Reference number: [id number].   6/17/2025 10:29:23 AM CDT

## 2025-06-24 NOTE — DISCHARGE INSTRUCTIONS
Continue your pain medication as prescribed by Dr. Buckley.  You can take the muscle relaxer as prescribed.  Return to the ER for any new or worsening symptoms.   In an effort to ensure that our patients [employer], a Team Member has reviewed your chart and identified an opportunity to provide the best care possible. An attempt was made to discuss or schedule due or overdue Preventive or Chronic Condition care.Care Gaps identified: Wellness Visits.    The Outcome was Contact was not made, left message. We are attempting to schedule a lab appointment. If you have any questions or need help with scheduling, contact your primary care provider..   Type of Appointment needed: Fasting lab appointment.

## 2025-06-27 DIAGNOSIS — E78.1 HYPERTRIGLYCERIDEMIA: ICD-10-CM

## 2025-06-27 RX ORDER — ATORVASTATIN CALCIUM 40 MG/1
40 TABLET, FILM COATED ORAL NIGHTLY
Qty: 30 TABLET | Refills: 1 | Status: SHIPPED | OUTPATIENT
Start: 2025-06-27

## 2025-06-27 NOTE — TELEPHONE ENCOUNTER
No care due was identified.  [facility] Embedded Care Due Messages. Reference number: [id number].   6/27/2025 8:43:16 AM CDT

## 2025-06-27 NOTE — TELEPHONE ENCOUNTER
Copied from CRM #[id number]. Topic: Medications - Medication Refill  >> Jun 27, 2025  8:41 AM [place] wrote:  Type:  RX Refill Request    Who Called:  [employer]  Refill or New Rx:  refill  RX Name and Strength:  atorvastatin (LIPITOR) 40 MG tablet  How is the patient currently taking it? (ex. 1XDay):  as rx'd  Is this a 30 day or 90 day RX:  30  Preferred Pharmacy with phone number:    [facility] - [place], [place] - [address]  [address] [address]  Phone: [phone] Fax: [phone]  Local or Mail Order:  local  Ordering Provider:  same  Best Call Back Number:  [phone]   Additional Information:  pt is out of rx needs refill asap  Thanks

## 2025-06-27 NOTE — TELEPHONE ENCOUNTER
Refill Routing Note   Medication(s) are not appropriate for processing by [facility] for the following reason(s):        Required labs outdated    [facility] action(s):  Defer             Appointments  past 12m or future 3m with PCP    Date Provider   Last Visit   6/11/2025 Jonathan Benton III, MD   Next Visit   Visit date not found Jonathan Benton III, MD   ED visits in past 90 days: 1        Note composed:3:16 PM 06/27/2025

## 2025-07-08 DIAGNOSIS — E11.42 DIABETIC POLYNEUROPATHY ASSOCIATED WITH TYPE 2 DIABETES MELLITUS: ICD-10-CM

## 2025-07-08 DIAGNOSIS — R11.10 VOMITING, UNSPECIFIED VOMITING TYPE, UNSPECIFIED WHETHER NAUSEA PRESENT: ICD-10-CM

## 2025-07-08 DIAGNOSIS — I10 BENIGN ESSENTIAL HTN: ICD-10-CM

## 2025-07-08 RX ORDER — CARVEDILOL 3.12 MG/1
3.12 TABLET ORAL 2 TIMES DAILY
Qty: 180 TABLET | Refills: 3 | Status: SHIPPED | OUTPATIENT
Start: 2025-07-08

## 2025-07-08 RX ORDER — PANTOPRAZOLE SODIUM 40 MG/1
40 TABLET, DELAYED RELEASE ORAL 2 TIMES DAILY
Qty: 180 TABLET | Refills: 3 | Status: SHIPPED | OUTPATIENT
Start: 2025-07-08

## 2025-07-08 RX ORDER — GABAPENTIN 600 MG/1
600 TABLET ORAL 3 TIMES DAILY
Qty: 90 TABLET | Refills: 0 | Status: SHIPPED | OUTPATIENT
Start: 2025-07-08

## 2025-07-08 RX ORDER — ONDANSETRON 4 MG/1
TABLET, ORALLY DISINTEGRATING ORAL
Qty: 20 TABLET | Refills: 0 | Status: SHIPPED | OUTPATIENT
Start: 2025-07-08

## 2025-07-08 NOTE — TELEPHONE ENCOUNTER
No care due was identified.  [facility] Embedded Care Due Messages. Reference number: [id number].   7/08/2025 9:10:42 AM CDT

## 2025-07-09 NOTE — TELEPHONE ENCOUNTER
Refill Routing Note   Medication(s) are not appropriate for processing by [facility] for the following reason(s):        Drug-disease interaction  Outside of protocol    [facility] action(s):  Defer  Route        Medication Therapy Plan: Drug-Disease: carvediloL and COPD exacerbation; PPI > 40 mg daily is outside of [facility] protocol    Pharmacist review requested: Yes     Appointments  past 12m or future 3m with PCP    Date Provider   Last Visit   6/11/2025 Jonathan Benton III, MD   Next Visit   Visit date not found Jonathan Benton III, MD   ED visits in past 90 days: 1        Note composed:7:42 PM 07/08/2025

## 2025-08-04 ENCOUNTER — HOSPITAL ENCOUNTER (EMERGENCY)
Facility: HOSPITAL | Age: 70
Discharge: HOME | End: 2025-08-04
Attending: EMERGENCY MEDICINE
Payer: MEDICARE

## 2025-08-04 VITALS
DIASTOLIC BLOOD PRESSURE: 72 MMHG | BODY MASS INDEX: 26.36 KG/M2 | TEMPERATURE: 98 F | WEIGHT: 205.38 LBS | HEART RATE: 85 BPM | OXYGEN SATURATION: 98 % | HEIGHT: 74 IN | SYSTOLIC BLOOD PRESSURE: 147 MMHG | RESPIRATION RATE: 18 BRPM

## 2025-08-04 DIAGNOSIS — R10.33 PERIUMBILICAL PAIN: ICD-10-CM

## 2025-08-04 DIAGNOSIS — R33.9 URINARY RETENTION: Primary | ICD-10-CM

## 2025-08-04 LAB
ABSOLUTE EOSINOPHIL (SMH): 0.81 K/UL
ABSOLUTE MONOCYTE (SMH): 0.54 K/UL (ref 0.3–1)
ABSOLUTE NEUTROPHIL COUNT (SMH): 2.7 K/UL (ref 1.8–7.7)
ALBUMIN SERPL-MCNC: 4 G/DL (ref 3.5–5.2)
ALP SERPL-CCNC: 88 UNIT/L (ref 40–150)
ALT SERPL-CCNC: 8 UNIT/L (ref 10–44)
ANION GAP (SMH): 10 MMOL/L (ref 8–16)
AST SERPL-CCNC: 13 UNIT/L (ref 11–45)
BASOPHILS # BLD AUTO: 0.08 K/UL
BASOPHILS NFR BLD AUTO: 1.4 %
BILIRUB SERPL-MCNC: 0.5 MG/DL (ref 0.1–1)
BILIRUB UR QL STRIP.AUTO: NEGATIVE
BUN SERPL-MCNC: 10 MG/DL (ref 8–23)
CALCIUM SERPL-MCNC: 9.1 MG/DL (ref 8.7–10.5)
CHLORIDE SERPL-SCNC: 102 MMOL/L (ref 95–110)
CLARITY UR: CLEAR
CO2 SERPL-SCNC: 27 MMOL/L (ref 23–29)
COLOR UR AUTO: COLORLESS
CREAT SERPL-MCNC: 0.8 MG/DL (ref 0.5–1.4)
ERYTHROCYTE [DISTWIDTH] IN BLOOD BY AUTOMATED COUNT: 14.1 % (ref 11.5–14.5)
GFR SERPLBLD CREATININE-BSD FMLA CKD-EPI: >60 ML/MIN/1.73/M2
GLUCOSE SERPL-MCNC: 204 MG/DL (ref 70–110)
GLUCOSE UR QL STRIP: ABNORMAL
HCT VFR BLD AUTO: 37.1 % (ref 40–54)
HGB BLD-MCNC: 12 GM/DL (ref 14–18)
HGB UR QL STRIP: NEGATIVE
IMM GRANULOCYTES # BLD AUTO: 0 K/UL (ref 0–0.04)
IMM GRANULOCYTES NFR BLD AUTO: 0 % (ref 0–0.5)
KETONES UR QL STRIP: NEGATIVE
LEUKOCYTE ESTERASE UR QL STRIP: NEGATIVE
LIPASE SERPL-CCNC: 24 U/L (ref 4–60)
LYMPHOCYTES # BLD AUTO: 1.66 K/UL (ref 1–4.8)
MAGNESIUM SERPL-MCNC: 1.9 MG/DL (ref 1.6–2.6)
MCH RBC QN AUTO: 30.7 PG (ref 27–31)
MCHC RBC AUTO-ENTMCNC: 32.3 G/DL (ref 32–36)
MCV RBC AUTO: 95 FL (ref 82–98)
NITRITE UR QL STRIP: NEGATIVE
NUCLEATED RBC (/100WBC) (SMH): 0 /100 WBC
PH UR STRIP: 7 [PH]
PLATELET # BLD AUTO: 184 K/UL (ref 150–450)
PMV BLD AUTO: 9.5 FL (ref 9.2–12.9)
POTASSIUM SERPL-SCNC: 4 MMOL/L (ref 3.5–5.1)
PROT SERPL-MCNC: 7.3 GM/DL (ref 6–8.4)
PROT UR QL STRIP: NEGATIVE
RBC # BLD AUTO: 3.91 M/UL (ref 4.6–6.2)
RELATIVE EOSINOPHIL (SMH): 13.9 % (ref 0–8)
RELATIVE LYMPHOCYTE (SMH): 28.5 % (ref 18–48)
RELATIVE MONOCYTE (SMH): 9.3 % (ref 4–15)
RELATIVE NEUTROPHIL (SMH): 46.9 % (ref 38–73)
SODIUM SERPL-SCNC: 139 MMOL/L (ref 136–145)
SP GR UR STRIP: 1.01
TROPONIN I SERPL HS-MCNC: <3 NG/L
UROBILINOGEN UR STRIP-ACNC: NEGATIVE EU/DL
WBC # BLD AUTO: 5.83 K/UL (ref 3.9–12.7)

## 2025-08-04 PROCEDURE — 94760 N-INVAS EAR/PLS OXIMETRY 1: CPT

## 2025-08-04 PROCEDURE — 99285 EMERGENCY DEPT VISIT HI MDM: CPT | Mod: 25

## 2025-08-04 PROCEDURE — 63600175 PHARM REV CODE 636 W HCPCS: Performed by: EMERGENCY MEDICINE

## 2025-08-04 PROCEDURE — 80053 COMPREHEN METABOLIC PANEL: CPT | Performed by: EMERGENCY MEDICINE

## 2025-08-04 PROCEDURE — 93010 ELECTROCARDIOGRAM REPORT: CPT | Mod: ,,, | Performed by: GENERAL PRACTICE

## 2025-08-04 PROCEDURE — 96374 THER/PROPH/DIAG INJ IV PUSH: CPT

## 2025-08-04 PROCEDURE — 25000003 PHARM REV CODE 250: Performed by: STUDENT IN AN ORGANIZED HEALTH CARE EDUCATION/TRAINING PROGRAM

## 2025-08-04 PROCEDURE — 51798 US URINE CAPACITY MEASURE: CPT

## 2025-08-04 PROCEDURE — 93005 ELECTROCARDIOGRAM TRACING: CPT

## 2025-08-04 PROCEDURE — 36415 COLL VENOUS BLD VENIPUNCTURE: CPT | Performed by: EMERGENCY MEDICINE

## 2025-08-04 PROCEDURE — 84484 ASSAY OF TROPONIN QUANT: CPT | Performed by: EMERGENCY MEDICINE

## 2025-08-04 PROCEDURE — 96361 HYDRATE IV INFUSION ADD-ON: CPT

## 2025-08-04 PROCEDURE — 25500020 PHARM REV CODE 255

## 2025-08-04 PROCEDURE — 81003 URINALYSIS AUTO W/O SCOPE: CPT | Performed by: EMERGENCY MEDICINE

## 2025-08-04 PROCEDURE — 83690 ASSAY OF LIPASE: CPT | Performed by: EMERGENCY MEDICINE

## 2025-08-04 PROCEDURE — 51702 INSERT TEMP BLADDER CATH: CPT

## 2025-08-04 PROCEDURE — 85025 COMPLETE CBC W/AUTO DIFF WBC: CPT | Performed by: EMERGENCY MEDICINE

## 2025-08-04 PROCEDURE — 83735 ASSAY OF MAGNESIUM: CPT | Performed by: EMERGENCY MEDICINE

## 2025-08-04 RX ORDER — DICYCLOMINE HYDROCHLORIDE 10 MG/1
10 CAPSULE ORAL
COMMUNITY
Start: 2025-07-17

## 2025-08-04 RX ORDER — ONDANSETRON HYDROCHLORIDE 2 MG/ML
4 INJECTION, SOLUTION INTRAVENOUS
Status: COMPLETED | OUTPATIENT
Start: 2025-08-04 | End: 2025-08-04

## 2025-08-04 RX ORDER — TAMSULOSIN HYDROCHLORIDE 0.4 MG/1
0.4 CAPSULE ORAL DAILY
Qty: 30 CAPSULE | Refills: 0 | Status: SHIPPED | OUTPATIENT
Start: 2025-08-04 | End: 2025-08-11 | Stop reason: SDUPTHER

## 2025-08-04 RX ORDER — LAMOTRIGINE 100 MG/1
100 TABLET ORAL 2 TIMES DAILY
COMMUNITY
Start: 2025-07-10

## 2025-08-04 RX ORDER — METHOCARBAMOL 500 MG/1
500 TABLET, FILM COATED ORAL 2 TIMES DAILY
COMMUNITY
Start: 2025-06-13

## 2025-08-04 RX ORDER — TAMSULOSIN HYDROCHLORIDE 0.4 MG/1
0.4 CAPSULE ORAL
Status: COMPLETED | OUTPATIENT
Start: 2025-08-04 | End: 2025-08-04

## 2025-08-04 RX ORDER — MIRTAZAPINE 15 MG/1
15 TABLET, FILM COATED ORAL NIGHTLY
COMMUNITY
Start: 2025-07-10

## 2025-08-04 RX ORDER — OXYCODONE AND ACETAMINOPHEN 10; 325 MG/1; MG/1
1 TABLET ORAL EVERY 8 HOURS PRN
COMMUNITY
Start: 2025-06-13

## 2025-08-04 RX ORDER — FAMOTIDINE 20 MG/1
20 TABLET, FILM COATED ORAL DAILY
COMMUNITY
Start: 2025-04-04

## 2025-08-04 RX ORDER — SUVOREXANT 15 MG/1
1 TABLET, FILM COATED ORAL NIGHTLY PRN
COMMUNITY
Start: 2025-07-10

## 2025-08-04 RX ADMIN — SODIUM CHLORIDE, POTASSIUM CHLORIDE, SODIUM LACTATE AND CALCIUM CHLORIDE 1000 ML: 600; 310; 30; 20 INJECTION, SOLUTION INTRAVENOUS at 03:08

## 2025-08-04 RX ADMIN — IOHEXOL 100 ML: 350 INJECTION, SOLUTION INTRAVENOUS at 05:08

## 2025-08-04 RX ADMIN — TAMSULOSIN HYDROCHLORIDE 0.4 MG: 0.4 CAPSULE ORAL at 06:08

## 2025-08-04 RX ADMIN — ONDANSETRON 4 MG: 2 INJECTION INTRAMUSCULAR; INTRAVENOUS at 03:08

## 2025-08-05 ENCOUNTER — PATIENT OUTREACH (OUTPATIENT)
Facility: OTHER | Age: 70
End: 2025-08-05
Payer: MEDICARE

## 2025-08-05 ENCOUNTER — HOSPITAL ENCOUNTER (EMERGENCY)
Facility: HOSPITAL | Age: 70
Discharge: HOME | End: 2025-08-05
Attending: EMERGENCY MEDICINE
Payer: MEDICARE

## 2025-08-05 VITALS
HEIGHT: 74 IN | DIASTOLIC BLOOD PRESSURE: 65 MMHG | WEIGHT: 215 LBS | OXYGEN SATURATION: 100 % | BODY MASS INDEX: 27.59 KG/M2 | HEART RATE: 83 BPM | TEMPERATURE: 98 F | RESPIRATION RATE: 18 BRPM | SYSTOLIC BLOOD PRESSURE: 118 MMHG

## 2025-08-05 DIAGNOSIS — T83.9XXA FOLEY CATHETER PROBLEM, INITIAL ENCOUNTER: Primary | ICD-10-CM

## 2025-08-05 LAB — HIV 1+2 AB+HIV1 P24 AG SERPL QL IA: NORMAL

## 2025-08-05 PROCEDURE — 87389 HIV-1 AG W/HIV-1&-2 AB AG IA: CPT | Performed by: EMERGENCY MEDICINE

## 2025-08-05 PROCEDURE — 36415 COLL VENOUS BLD VENIPUNCTURE: CPT | Performed by: EMERGENCY MEDICINE

## 2025-08-05 PROCEDURE — 99283 EMERGENCY DEPT VISIT LOW MDM: CPT

## 2025-08-05 NOTE — FIRST PROVIDER EVALUATION
Emergency Department TeleTriage Encounter Note      CHIEF COMPLAINT    Chief Complaint   Patient presents with    Stout leaking     Had stout catheter placed last night in ED, urine leaking around catheter today       VITAL SIGNS   Initial Vitals [08/05/25 1416]   BP Pulse Resp Temp SpO2   (!) 145/67 89 18 98.5 °F (36.9 °C) 97 %      MAP       --            ALLERGIES    Review of patient's allergies indicates:   Allergen Reactions    Bee pollens Anaphylaxis     Bee stings     Penicillins Nausea Only     Other reaction(s): Unknown    Codeine Rash     Other reaction(s): Unknown       PROVIDER TRIAGE NOTE  Verbal consent for the teletriage evaluation was given by the patient at the start of the evaluation.  All efforts will be made to maintain patient's privacy during the evaluation.      This is a teletriage evaluation of a 70 y.o. male presenting to the ED with c/o had a stout placed yesterday; started leaking around the catheter. Limited physical exam via telehealth: The patient is awake, alert, answering questions appropriately and is not in respiratory distress.  As the Teletriage provider, I performed an initial assessment and ordered appropriate labs and imaging studies, if any, to facilitate the patient's care once placed in the ED. Once a room is available, care and a full evaluation will be completed by an alternate ED provider.  Any additional orders and the final disposition will be determined by that provider.  All imaging and labs will not be followed-up by the Teletriage Team, including myself.          ORDERS  Labs Reviewed   HIV 1 / 2 ANTIBODY   HIV VIRUS CONFIRMATION HOLD SPECIMEN       ED Orders (720h ago, onward)      Start Ordered     Status Ordering Provider    08/05/25 1421 08/05/25 1420  Bladder scan  Once        Comments: PRN reason: per post stout removal protocol or symptoms of urinary retention including urge to void, abdominal fullness, or distention.    Notify MD for bladder volume >300  "mL        Ordered JESSICA MIX    08/05/25 1419 08/05/25 1418  HIV 1/2 Ag/Ab (4th Gen)  STAT        Placed in "And" Linked Group    Ordered TREVA OCHOA    08/05/25 1419 08/05/25 1418  HIV Virus Confirmation Hold Specimen  STAT        Placed in "And" Linked Group    Ordered TREVA OCHOA              Virtual Visit Note: The provider triage portion of this emergency department evaluation and documentation was performed via [employer], a HIPAA-compliant telemedicine application, in concert with a tele-presenter in the room. A face to face patient evaluation with one of my colleagues will occur once the patient is placed in an emergency department room.      DISCLAIMER: This note was prepared with [employer] voice recognition transcription software. Garbled syntax, mangled pronouns, and other bizarre constructions may be attributed to that software system.    "
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
## 2025-08-05 NOTE — ED NOTES
Pt states his stout is not draining. oNly changed it once since this morning. Stout was replaced just now in ED    
none

## 2025-08-08 LAB — HOLD SPECIMEN: NORMAL

## 2025-08-09 LAB
OHS QRS DURATION: 94 MS
OHS QTC CALCULATION: 437 MS

## 2025-08-11 ENCOUNTER — OFFICE VISIT (OUTPATIENT)
Dept: UROLOGY | Facility: CLINIC | Age: 70
End: 2025-08-11
Payer: MEDICARE

## 2025-08-11 VITALS — WEIGHT: 187 LBS | BODY MASS INDEX: 24 KG/M2 | HEIGHT: 74 IN

## 2025-08-11 DIAGNOSIS — E13.649 UNCONTROLLED DIABETES MELLITUS OF OTHER TYPE WITH HYPOGLYCEMIA, UNSPECIFIED HYPOGLYCEMIA COMA STATUS: ICD-10-CM

## 2025-08-11 DIAGNOSIS — R33.9 URINARY RETENTION: Primary | ICD-10-CM

## 2025-08-11 DIAGNOSIS — Z97.8 FOLEY CATHETER IN PLACE PRIOR TO ARRIVAL: ICD-10-CM

## 2025-08-11 PROCEDURE — 99999 PR PBB SHADOW E&M-EST. PATIENT-LVL II: CPT | Mod: PBBFAC,HCNC,, | Performed by: NURSE PRACTITIONER

## 2025-08-11 RX ORDER — DOXYCYCLINE 100 MG/1
100 CAPSULE ORAL 2 TIMES DAILY
Qty: 42 CAPSULE | Refills: 0 | Status: SHIPPED | OUTPATIENT
Start: 2025-08-11 | End: 2025-09-01

## 2025-08-11 RX ORDER — TAMSULOSIN HYDROCHLORIDE 0.4 MG/1
0.4 CAPSULE ORAL DAILY
Qty: 90 CAPSULE | Refills: 2 | Status: SHIPPED | OUTPATIENT
Start: 2025-08-11 | End: 2025-11-09

## 2025-08-14 ENCOUNTER — OFFICE VISIT (OUTPATIENT)
Dept: FAMILY MEDICINE | Facility: CLINIC | Age: 70
End: 2025-08-14
Payer: MEDICARE

## 2025-08-14 ENCOUNTER — PATIENT OUTREACH (OUTPATIENT)
Dept: ADMINISTRATIVE | Facility: HOSPITAL | Age: 70
End: 2025-08-14
Payer: MEDICARE

## 2025-08-14 VITALS
HEART RATE: 76 BPM | TEMPERATURE: 98 F | BODY MASS INDEX: 26.73 KG/M2 | WEIGHT: 208.31 LBS | DIASTOLIC BLOOD PRESSURE: 78 MMHG | SYSTOLIC BLOOD PRESSURE: 128 MMHG | HEIGHT: 74 IN | OXYGEN SATURATION: 95 %

## 2025-08-14 DIAGNOSIS — I65.29 STENOSIS OF CAROTID ARTERY, UNSPECIFIED LATERALITY: ICD-10-CM

## 2025-08-14 DIAGNOSIS — R10.9 ABDOMINAL PAIN, UNSPECIFIED ABDOMINAL LOCATION: ICD-10-CM

## 2025-08-14 DIAGNOSIS — Z86.711 HISTORY OF PULMONARY EMBOLUS (PE): ICD-10-CM

## 2025-08-14 DIAGNOSIS — J44.1 COPD EXACERBATION: ICD-10-CM

## 2025-08-14 DIAGNOSIS — I50.32 CHRONIC DIASTOLIC HEART FAILURE: ICD-10-CM

## 2025-08-14 DIAGNOSIS — Z12.5 PROSTATE CANCER SCREENING: ICD-10-CM

## 2025-08-14 DIAGNOSIS — E78.5 HYPERLIPIDEMIA, UNSPECIFIED HYPERLIPIDEMIA TYPE: ICD-10-CM

## 2025-08-14 DIAGNOSIS — E11.319 TYPE 2 DIABETES MELLITUS WITH RETINOPATHY, WITHOUT LONG-TERM CURRENT USE OF INSULIN, MACULAR EDEMA PRESENCE UNSPECIFIED, UNSPECIFIED LATERALITY, UNSPECIFIED RETINOPATHY SEVERITY: ICD-10-CM

## 2025-08-14 DIAGNOSIS — F31.9 BIPOLAR 1 DISORDER: ICD-10-CM

## 2025-08-14 DIAGNOSIS — I10 HYPERTENSION, ESSENTIAL: Primary | ICD-10-CM

## 2025-08-14 DIAGNOSIS — R33.9 URINARY RETENTION: ICD-10-CM

## 2025-08-14 DIAGNOSIS — Z86.718 HISTORY OF DVT (DEEP VEIN THROMBOSIS): ICD-10-CM

## 2025-08-14 DIAGNOSIS — I25.2 OLD MYOCARDIAL INFARCT: ICD-10-CM

## 2025-08-14 DIAGNOSIS — Z79.01 ANTICOAGULANT LONG-TERM USE: ICD-10-CM

## 2025-08-14 DIAGNOSIS — E11.42 TYPE 2 DIABETES MELLITUS WITH DIABETIC POLYNEUROPATHY, WITH LONG-TERM CURRENT USE OF INSULIN: ICD-10-CM

## 2025-08-14 DIAGNOSIS — Z79.4 TYPE 2 DIABETES MELLITUS WITH DIABETIC POLYNEUROPATHY, WITH LONG-TERM CURRENT USE OF INSULIN: ICD-10-CM

## 2025-08-14 DIAGNOSIS — E66.813 CLASS 3 OBESITY: ICD-10-CM

## 2025-08-14 DIAGNOSIS — Z95.828 GREENFIELD FILTER IN PLACE: ICD-10-CM

## 2025-08-14 DIAGNOSIS — G89.4 CHRONIC PAIN SYNDROME: ICD-10-CM

## 2025-08-14 PROCEDURE — 3008F BODY MASS INDEX DOCD: CPT | Mod: CPTII,HCNC,S$GLB, | Performed by: FAMILY MEDICINE

## 2025-08-14 PROCEDURE — 1125F AMNT PAIN NOTED PAIN PRSNT: CPT | Mod: CPTII,HCNC,S$GLB, | Performed by: FAMILY MEDICINE

## 2025-08-14 PROCEDURE — 99214 OFFICE O/P EST MOD 30 MIN: CPT | Mod: HCNC,S$GLB,, | Performed by: FAMILY MEDICINE

## 2025-08-14 PROCEDURE — 99999 PR PBB SHADOW E&M-EST. PATIENT-LVL V: CPT | Mod: PBBFAC,HCNC,, | Performed by: FAMILY MEDICINE

## 2025-08-14 PROCEDURE — 1160F RVW MEDS BY RX/DR IN RCRD: CPT | Mod: CPTII,HCNC,S$GLB, | Performed by: FAMILY MEDICINE

## 2025-08-14 PROCEDURE — 3288F FALL RISK ASSESSMENT DOCD: CPT | Mod: CPTII,HCNC,S$GLB, | Performed by: FAMILY MEDICINE

## 2025-08-14 PROCEDURE — 1101F PT FALLS ASSESS-DOCD LE1/YR: CPT | Mod: CPTII,HCNC,S$GLB, | Performed by: FAMILY MEDICINE

## 2025-08-14 PROCEDURE — 1159F MED LIST DOCD IN RCRD: CPT | Mod: CPTII,HCNC,S$GLB, | Performed by: FAMILY MEDICINE

## 2025-08-14 PROCEDURE — 3078F DIAST BP <80 MM HG: CPT | Mod: CPTII,HCNC,S$GLB, | Performed by: FAMILY MEDICINE

## 2025-08-14 PROCEDURE — 3074F SYST BP LT 130 MM HG: CPT | Mod: CPTII,HCNC,S$GLB, | Performed by: FAMILY MEDICINE

## 2025-08-16 PROBLEM — Z86.718 HISTORY OF DVT (DEEP VEIN THROMBOSIS): Status: ACTIVE | Noted: 2025-08-16

## 2025-08-16 PROBLEM — E11.319 TYPE 2 DIABETES MELLITUS WITH RETINOPATHY, WITHOUT LONG-TERM CURRENT USE OF INSULIN: Status: ACTIVE | Noted: 2025-08-16

## 2025-08-16 PROBLEM — Z86.711 HISTORY OF PULMONARY EMBOLUS (PE): Status: ACTIVE | Noted: 2025-08-16

## 2025-08-19 ENCOUNTER — PATIENT OUTREACH (OUTPATIENT)
Dept: ADMINISTRATIVE | Facility: HOSPITAL | Age: 70
End: 2025-08-19
Payer: MEDICARE

## 2025-08-28 ENCOUNTER — PATIENT OUTREACH (OUTPATIENT)
Dept: ADMINISTRATIVE | Facility: HOSPITAL | Age: 70
End: 2025-08-28
Payer: MEDICARE

## 2025-08-29 ENCOUNTER — HOSPITAL ENCOUNTER (OUTPATIENT)
Dept: RADIOLOGY | Facility: HOSPITAL | Age: 70
Discharge: HOME | End: 2025-08-29
Attending: FAMILY MEDICINE
Payer: MEDICARE

## (undated) DEVICE — GOWN X-LARGE 044674

## (undated) DEVICE — SYRINGE 30ML 302832

## (undated) DEVICE — SUTURE PROLENE 7-0 BV-1 30 8703H

## (undated) DEVICE — SEE MEDLINE ITEM 152487

## (undated) DEVICE — INSERT FOGARTY 33 SOFT33

## (undated) DEVICE — LOOP MAXI RED 30-722

## (undated) DEVICE — TRAY SKIN PREP DRY

## (undated) DEVICE — CATHETER EXPO MLTPK 6FR 100X110CM

## (undated) DEVICE — NDL BOX COUNTER

## (undated) DEVICE — GOWN SMART LRG 044673

## (undated) DEVICE — SOLUTION PREP IODINE 4OZ

## (undated) DEVICE — LOOP MINI BLUE 30-713

## (undated) DEVICE — COVER SURG LIGHT HANDLE

## (undated) DEVICE — STRAP OR TABLE 5IN X 72IN

## (undated) DEVICE — GLOVE SURG ULTRA TOUCH 7.5

## (undated) DEVICE — ELECTRODE BLADE INSULATED 1 IN

## (undated) DEVICE — KIT SHUNT CAROTID ARGYLE 577775

## (undated) DEVICE — DRESSING POST OP MEPILEX AG 4X8

## (undated) DEVICE — SEE MEDLINE ITEM 152622

## (undated) DEVICE — SUTURE MONOCRYL 2-0 CT-1 MCP945H

## (undated) DEVICE — SYR 10CC LUER LOCK

## (undated) DEVICE — TRACH TUBE CUFF FLEX DISP 7.5

## (undated) DEVICE — SOLUTION SCRUB IODINE 4OZ

## (undated) DEVICE — TIP BOVIE TEFLON E1450X

## (undated) DEVICE — PAD BOVIE ADULT

## (undated) DEVICE — DRAPE MAGNETIC 16X20 Q420

## (undated) DEVICE — SUTURE PROLENE 5-0 BV-1 24 9702H

## (undated) DEVICE — BULB DRAIN 100CC 70740

## (undated) DEVICE — TUBING EASY SPRAY TISSEEL

## (undated) DEVICE — GLOVE #7.5 BIOGEL 30475

## (undated) DEVICE — CLOSURE SKIN STERI STRIP 1/2X4

## (undated) DEVICE — HOLDER TRACH TUBE X-LARGE

## (undated) DEVICE — DRAPE IOBAN 13X13 6640EZ

## (undated) DEVICE — SUTURE PROLENE 7-0 BV175-6 24 8735H

## (undated) DEVICE — PACK BASIC

## (undated) DEVICE — TAPE UMBILICAL COTTON XR RADIOPAQU 1/8 X 36 STERILE

## (undated) DEVICE — DRAPE LAPAROTOMY TRANSVERSE 89281

## (undated) DEVICE — SET BASIN O R 31451126

## (undated) DEVICE — TOWEL OR WHITE

## (undated) DEVICE — DRAPE 3/4

## (undated) DEVICE — COVER LIGHT HANDLE LB53

## (undated) DEVICE — PACK UNIVERSAL 88761

## (undated) DEVICE — DRAPE THYROID WITH ARMBOARD

## (undated) DEVICE — Device

## (undated) DEVICE — SPONGE XRAY DETECTABLE 4X4

## (undated) DEVICE — UNDERGLOVE BIOGEL PI MICRO BLUE SZ 6.5

## (undated) DEVICE — SUTURE SILK 2-0 PS 18 1588H

## (undated) DEVICE — APPLICATOR CHLORAPREP ORN 26ML

## (undated) DEVICE — SUTURE PROLENE 6-0 BV-1 24 M8805

## (undated) DEVICE — SOL 9P NACL IRR PIC IL

## (undated) DEVICE — BLADE SCALPEL #11 371111

## (undated) DEVICE — SUT SILK 2.0 BLK 18

## (undated) DEVICE — LINER SUCTION 3000CC

## (undated) DEVICE — HOLDER TRACH TUBE NECKBAND

## (undated) DEVICE — SEE MEDLINE ITEM 146313

## (undated) DEVICE — ELECTRODE REM PLYHSV RETURN 9

## (undated) DEVICE — SUTURE SILK 4-0 18 A183H

## (undated) DEVICE — SKINMARKER W/RULER DEVON

## (undated) DEVICE — SPONGE GAUZE 16PLY 4X4

## (undated) DEVICE — CLIP APPLIER MCS20 STERILMED ETHMCS20

## (undated) DEVICE — SUT J286G 2-0 VICRYL

## (undated) DEVICE — CLIP APPLIER MSM20 STERILMED  ETHMSM20

## (undated) DEVICE — SHEATH PINNACLE 6FRX10CM W/GUIDEWIRE

## (undated) DEVICE — TOWEL OR BLUE      MDT2168284

## (undated) DEVICE — GUIDEWIRE DOUBLE ENDED .035 DIA. 150CML

## (undated) DEVICE — GAUZE SPONGE PEANUT STRL

## (undated) DEVICE — SPONGE IV DRAIN 4X4 STERILE

## (undated) DEVICE — INSTRUMENT FRAZIER 10FR W/VENT

## (undated) DEVICE — PREP CHLORA 26ML

## (undated) DEVICE — SUTURE SILK 3-0 18 A184H

## (undated) DEVICE — SUT CHROMIC 3-0 SH 27IN GUT

## (undated) DEVICE — SUT PROLENE 2-0 SH 36IN BLU

## (undated) DEVICE — PENCIL ROCKER SWITCH 10FT CORD

## (undated) DEVICE — SUTURE PROLENE 6-0 C-1 30 8706H

## (undated) DEVICE — SLEEVE SCD EXPRESS CALF MEDIUM

## (undated) DEVICE — BLADE SURG CARBON STEEL SZ11

## (undated) DEVICE — SUT PROLENE 0 CT-2 BL MONO

## (undated) DEVICE — BAG DECANTER 10-102

## (undated) DEVICE — TRACH TUBE CUFF FLEX DISP 8.5